# Patient Record
Sex: MALE | Race: WHITE | Employment: OTHER | URBAN - METROPOLITAN AREA
[De-identification: names, ages, dates, MRNs, and addresses within clinical notes are randomized per-mention and may not be internally consistent; named-entity substitution may affect disease eponyms.]

---

## 2016-12-30 RX ORDER — DIPHENHYDRAMINE HYDROCHLORIDE 50 MG/ML
50 INJECTION, SOLUTION INTRAMUSCULAR; INTRAVENOUS AS NEEDED
Status: CANCELLED
Start: 2017-01-02

## 2016-12-30 RX ORDER — FUROSEMIDE 10 MG/ML
20 INJECTION INTRAMUSCULAR; INTRAVENOUS AS NEEDED
Status: CANCELLED
Start: 2017-01-02

## 2016-12-30 RX ORDER — HYDROCORTISONE SODIUM SUCCINATE 100 MG/2ML
100 INJECTION, POWDER, FOR SOLUTION INTRAMUSCULAR; INTRAVENOUS AS NEEDED
Status: CANCELLED | OUTPATIENT
Start: 2017-01-02

## 2016-12-30 RX ORDER — ACETAMINOPHEN 325 MG/1
650 TABLET ORAL AS NEEDED
Status: CANCELLED
Start: 2017-01-02

## 2016-12-30 RX ORDER — HEPARIN 100 UNIT/ML
300-500 SYRINGE INTRAVENOUS AS NEEDED
Status: CANCELLED
Start: 2017-01-02

## 2016-12-30 RX ORDER — EPINEPHRINE 1 MG/ML
0.3 INJECTION, SOLUTION, CONCENTRATE INTRAVENOUS AS NEEDED
Status: CANCELLED | OUTPATIENT
Start: 2017-01-02

## 2016-12-30 RX ORDER — HEPARIN SODIUM 1000 [USP'U]/ML
2000 INJECTION, SOLUTION INTRAVENOUS; SUBCUTANEOUS AS NEEDED
Status: CANCELLED
Start: 2017-01-02

## 2016-12-30 RX ORDER — ALBUTEROL SULFATE 0.83 MG/ML
2.5 SOLUTION RESPIRATORY (INHALATION) AS NEEDED
Status: CANCELLED
Start: 2017-01-02

## 2017-01-02 ENCOUNTER — HOSPITAL ENCOUNTER (OUTPATIENT)
Dept: INFUSION THERAPY | Age: 62
Discharge: HOME | End: 2017-01-02
Payer: COMMERCIAL

## 2017-01-02 VITALS
WEIGHT: 250.8 LBS | SYSTOLIC BLOOD PRESSURE: 130 MMHG | RESPIRATION RATE: 18 BRPM | DIASTOLIC BLOOD PRESSURE: 67 MMHG | BODY MASS INDEX: 37.04 KG/M2 | HEART RATE: 107 BPM | OXYGEN SATURATION: 96 % | TEMPERATURE: 97.6 F

## 2017-01-02 DIAGNOSIS — C32.1 SQUAMOUS CELL CARCINOMA OF EPIGLOTTIS (HCC): ICD-10-CM

## 2017-01-02 LAB
ALBUMIN SERPL BCP-MCNC: 3.5 G/DL (ref 3.2–4.6)
ALBUMIN/GLOB SERPL: 0.9 {RATIO} (ref 1.2–3.5)
ALP SERPL-CCNC: 82 U/L (ref 50–136)
ALT SERPL-CCNC: 23 U/L (ref 12–65)
ANION GAP BLD CALC-SCNC: 9 MMOL/L (ref 7–16)
AST SERPL W P-5'-P-CCNC: 13 U/L (ref 15–37)
BASOPHILS # BLD AUTO: 0.1 K/UL (ref 0–0.2)
BASOPHILS # BLD: 1 % (ref 0–2)
BILIRUB SERPL-MCNC: 0.5 MG/DL (ref 0.2–1.1)
BUN SERPL-MCNC: 16 MG/DL (ref 8–23)
CALCIUM SERPL-MCNC: 8.8 MG/DL (ref 8.3–10.4)
CHLORIDE SERPL-SCNC: 98 MMOL/L (ref 98–107)
CO2 SERPL-SCNC: 29 MMOL/L (ref 23–32)
CREAT SERPL-MCNC: 1.48 MG/DL (ref 0.8–1.5)
CREAT SERPL-MCNC: 1.65 MG/DL (ref 0.8–1.5)
DIFFERENTIAL METHOD BLD: ABNORMAL
EOSINOPHIL # BLD: 0.8 K/UL (ref 0–0.8)
EOSINOPHIL NFR BLD: 7 % (ref 0.5–7.8)
ERYTHROCYTE [DISTWIDTH] IN BLOOD BY AUTOMATED COUNT: 12.9 % (ref 11.9–14.6)
GLOBULIN SER CALC-MCNC: 3.8 G/DL (ref 2.3–3.5)
GLUCOSE SERPL-MCNC: 326 MG/DL (ref 65–100)
HCT VFR BLD AUTO: 33.9 % (ref 41.1–50.3)
HGB BLD-MCNC: 11.4 G/DL (ref 13.6–17.2)
LYMPHOCYTES # BLD AUTO: 16 % (ref 13–44)
LYMPHOCYTES # BLD: 1.7 K/UL (ref 0.5–4.6)
MAGNESIUM SERPL-MCNC: 1.8 MG/DL (ref 1.8–2.4)
MCH RBC QN AUTO: 27.6 PG (ref 26.1–32.9)
MCHC RBC AUTO-ENTMCNC: 33.6 G/DL (ref 31.4–35)
MCV RBC AUTO: 82.1 FL (ref 79.6–97.8)
MONOCYTES # BLD: 0.5 K/UL (ref 0.1–1.3)
MONOCYTES NFR BLD AUTO: 4 % (ref 4–12)
NEUTS SEG # BLD: 7.7 K/UL (ref 1.7–8.2)
NEUTS SEG NFR BLD AUTO: 72 % (ref 43–78)
NRBC # BLD: 0 K/UL (ref 0–0.2)
PLATELET # BLD AUTO: 272 K/UL (ref 150–450)
PMV BLD AUTO: 8.9 FL (ref 10.8–14.1)
POTASSIUM SERPL-SCNC: 3.8 MMOL/L (ref 3.5–5.1)
PROT SERPL-MCNC: 7.3 G/DL (ref 6.3–8.2)
RBC # BLD AUTO: 4.13 M/UL (ref 4.23–5.67)
SODIUM SERPL-SCNC: 136 MMOL/L (ref 136–145)
WBC # BLD AUTO: 10.7 K/UL (ref 4.3–11.1)

## 2017-01-02 PROCEDURE — 74011000258 HC RX REV CODE- 258: Performed by: INTERNAL MEDICINE

## 2017-01-02 PROCEDURE — 77030003560 HC NDL HUBR BARD -A

## 2017-01-02 PROCEDURE — 74011250636 HC RX REV CODE- 250/636: Performed by: INTERNAL MEDICINE

## 2017-01-02 PROCEDURE — 74011250636 HC RX REV CODE- 250/636

## 2017-01-02 PROCEDURE — 96376 TX/PRO/DX INJ SAME DRUG ADON: CPT

## 2017-01-02 PROCEDURE — 96375 TX/PRO/DX INJ NEW DRUG ADDON: CPT

## 2017-01-02 PROCEDURE — 96366 THER/PROPH/DIAG IV INF ADDON: CPT

## 2017-01-02 PROCEDURE — 96415 CHEMO IV INFUSION ADDL HR: CPT

## 2017-01-02 PROCEDURE — 96413 CHEMO IV INFUSION 1 HR: CPT

## 2017-01-02 PROCEDURE — 80053 COMPREHEN METABOLIC PANEL: CPT | Performed by: INTERNAL MEDICINE

## 2017-01-02 PROCEDURE — 96417 CHEMO IV INFUS EACH ADDL SEQ: CPT

## 2017-01-02 PROCEDURE — 85025 COMPLETE CBC W/AUTO DIFF WBC: CPT | Performed by: INTERNAL MEDICINE

## 2017-01-02 PROCEDURE — 96361 HYDRATE IV INFUSION ADD-ON: CPT

## 2017-01-02 PROCEDURE — 96367 TX/PROPH/DG ADDL SEQ IV INF: CPT

## 2017-01-02 PROCEDURE — 83735 ASSAY OF MAGNESIUM: CPT | Performed by: INTERNAL MEDICINE

## 2017-01-02 RX ORDER — SODIUM CHLORIDE 0.9 % (FLUSH) 0.9 %
10 SYRINGE (ML) INJECTION AS NEEDED
Status: ACTIVE | OUTPATIENT
Start: 2017-01-02 | End: 2017-01-02

## 2017-01-02 RX ORDER — DEXAMETHASONE SODIUM PHOSPHATE 100 MG/10ML
10 INJECTION INTRAMUSCULAR; INTRAVENOUS ONCE
Status: COMPLETED | OUTPATIENT
Start: 2017-01-02 | End: 2017-01-02

## 2017-01-02 RX ORDER — SODIUM CHLORIDE 9 MG/ML
500 INJECTION, SOLUTION INTRAVENOUS ONCE
Status: COMPLETED | OUTPATIENT
Start: 2017-01-02 | End: 2017-01-02

## 2017-01-02 RX ORDER — ONDANSETRON 2 MG/ML
8 INJECTION INTRAMUSCULAR; INTRAVENOUS AS NEEDED
Status: DISPENSED | OUTPATIENT
Start: 2017-01-02 | End: 2017-01-03

## 2017-01-02 RX ORDER — ONDANSETRON 2 MG/ML
8 INJECTION INTRAMUSCULAR; INTRAVENOUS ONCE
Status: COMPLETED | OUTPATIENT
Start: 2017-01-02 | End: 2017-01-02

## 2017-01-02 RX ADMIN — SODIUM CHLORIDE 500 ML: 900 INJECTION, SOLUTION INTRAVENOUS at 09:00

## 2017-01-02 RX ADMIN — POTASSIUM CHLORIDE: 2 INJECTION, SOLUTION, CONCENTRATE INTRAVENOUS at 09:42

## 2017-01-02 RX ADMIN — DEXAMETHASONE SODIUM PHOSPHATE 10 MG: 10 INJECTION INTRAMUSCULAR; INTRAVENOUS at 11:10

## 2017-01-02 RX ADMIN — SODIUM CHLORIDE 150 MG: 900 INJECTION, SOLUTION INTRAVENOUS at 11:22

## 2017-01-02 RX ADMIN — Medication 10 ML: at 16:15

## 2017-01-02 RX ADMIN — ONDANSETRON 8 MG: 2 INJECTION INTRAMUSCULAR; INTRAVENOUS at 16:05

## 2017-01-02 RX ADMIN — POTASSIUM CHLORIDE: 2 INJECTION, SOLUTION, CONCENTRATE INTRAVENOUS at 15:15

## 2017-01-02 RX ADMIN — DOCETAXEL 176 MG: 80 INJECTION, SOLUTION, CONCENTRATE INTRAVENOUS at 11:51

## 2017-01-02 RX ADMIN — ONDANSETRON 8 MG: 2 INJECTION INTRAMUSCULAR; INTRAVENOUS at 11:07

## 2017-01-02 RX ADMIN — CISPLATIN 176 MG: 1 INJECTION, SOLUTION INTRAVENOUS at 13:00

## 2017-01-02 NOTE — PROGRESS NOTES
Pt. Discharged ambulatory accompanied by family. Tolerated chemotherapy well. No distress noted. To return to [facility] on 1/7/16.

## 2017-01-03 ENCOUNTER — HOSPITAL ENCOUNTER (OUTPATIENT)
Dept: PHYSICAL THERAPY | Age: 62
Discharge: HOME | End: 2017-01-03
Attending: INTERNAL MEDICINE
Payer: COMMERCIAL

## 2017-01-03 DIAGNOSIS — C32.1 SQUAMOUS CELL CARCINOMA OF EPIGLOTTIS (HCC): ICD-10-CM

## 2017-01-03 PROCEDURE — 92610 EVALUATE SWALLOWING FUNCTION: CPT | Performed by: SPEECH-LANGUAGE PATHOLOGIST

## 2017-01-03 NOTE — PROGRESS NOTES
Fabiana Madden  : 1955 [facility] [address], [address] [place] [address],[address] [facility], [place] [address].  [phone]   [phone]         OUTPATIENT SPEECH LANGUAGE PATHOLOGY: Initial Assessment  ICD-10: Treatment Diagnosis: Dysphagia R13.19  REFERRING PHYSICIAN: Torsten Jauregui MD MD Orders: Dysphagia, Evaluate and Treat  PAST MEDICAL HISTORY:   Mr. Damian Johnson is a 62 y.o. male who  has a past medical history of GERD (gastroesophageal reflux disease); Gout; Hypertension; Morbid obesity ([facility] [address] Squamous cell carcinoma of epiglottis (HCC) (2016); and Type 2 diabetes mellitus ([facility] [address] [facility] He also  has a past surgical history that includes colonoscopy (2016) and other surgical (Left). 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 ASSESSMENT:  Based on the objective data described below, the patient presents with no overt clinical s/sx of Dysphagia. Patient is newly diagnosed SCC of the Epiglottis. Patient is scheduled to began concurrent chemo/XRT. Patient with complaint of some (+) s/sx of Dysphagia especially with particulate textures (i.e. Hamburger meat, toast etc). Currently he is consuming a regular diet with thin liquids. He has not gone for his PEG placement but states that he plans to have PEG inserted. Oral motor exam was WLF's. Patient has natural dentition with good oral hygiene. Ulcers observed along posterior tongue base however patient denies pain or burning. Labial and lingual function is WFL's. Voice (+) for stidor. Patient given p.o trials of thin liquids via cup, puree, mechanical soft, mixed consistencies and solids. No overt clinical s/sx of aspiration observed. Patient would benefit from a MBSS to establish baseline swallow function.  SLP recommends dysphagia therapy at 1x a week to preserve the integrity of his swallow as well as make appropriate diet downgrades if Dysphagia worsens. Patient and wife in agreement with the above aforementioned. Patient will benefit from skilled intervention to address the below impairments. ?????? ? ? This section established at most recent assessment??????????  PROBLEM LIST (Impairments causing functional limitations):  1. Dysphagia  GOALS: (Goals have been discussed and agreed upon with patient.)  SHORT-TERM FUNCTIONAL GOALS: Time Frame: 12 weeks  Patient will tolerate various textures without overt clinical s/sx of aspiration at 100%. Completed laryngeal exercises independently at 100% accuracy. DISCHARGE GOALS: Time Frame: 12 weels  1. Patient will tolerate least restrictive diet without overt clinical s/sx of aspiration for a safe and adequate swallow function at 100%. REHABILITATION POTENTIAL FOR STATED GOALS: GoodPLAN OF CARE:  Patient will benefit from skilled intervention to address the following impairments. RECOMMENDATIONS AND PLANNED INTERVENTIONS (Benefits and precautions of therapy have been discussed with the patient.):  · continue prescribed diet as tolerated  MEDICATIONS:  · With liquid as tolerated  COMPENSATORY STRATEGIES/MODIFICATIONS INCLUDING:  · Small sips and bites  OTHER RECOMMENDATIONS (including follow up treatment recommendations):   · Laryngeal exercises  · Patient education  RECOMMENDED DIET MODIFICATIONS DISCUSSED WITH:  · Patient  TREATMENT PLAN EFFECTIVE DATES: 1/3/17 TO 4/3/17  FREQUENCY/DURATION: Continue to follow patient 1 time a week for 12 weeks to address above goals. Regarding Santos Landin's therapy, I certify that the treatment plan above will be carried out by a therapist or under their direction. Thank you for this referral,  TERESA Szymanski Ed CCC-SLP                  Referring Physician Signature: Chantelle Nogueira MD    Date      SUBJECTIVE:  Cooperative  Present Symptoms: None     Current Medications: on file in patient's chart   Date Last Reviewed: 1/3/17  Current Dietary Status:  Regular/thin      History of reflux:  YES    Reflux medication:unknown  Social History/Home Situation:  lives with his wife. Work/Activity History: unknown    OBJECTIVE:  Objective Measure: Tool Used: National Outcomes Measurement System: Functional Communication Measures: SWALLOWING  Score:  Initial: 7 Most Recent: X (Date: -- )   Interpretation of Tool: This measure describes the change in functional communication status subsequent to speech-language pathology treatment of patients with dysphagia.  o Level 1:  Individual is not able to swallow anything safely by mouth. All nutrition and hydration is received through non-oral means (e.g., nasogastric tube, PEG). o Level 2: Individual is not able to swallow safely by mouth for nutrition and hydration, but may take some consistency with consistent maximal cues in therapy only. Alternative method of feeding required. o Level 3:  Alternative method of feeding required as individual takes less than 50% of nutrition and hydration by mouth, and/or swallowing is safe with consistent use of moderate cues to use compensatory strategies and/or requires maximum diet restriction. o Level 4:  Swallowing is safe, but usually requires moderate cues to use compensatory strategies, and/or the individual has moderate diet restrictions and/or still requires tube feeding and/or oral supplements. o Level 5:  Swallowing is safe with minimal diet restriction and/or occasionally requires minimal cueing to use compensatory strategies. The individual may occasionally self-cue. All nutrition and hydration needs are met by mouth at mealtime. o Level 6:  Swallowing is safe, and the individual eats and drinks independently and may rarely require minimal cueing. The individual usually self-cues when difficulty occurs.  May need to avoid specific food items (e.g., popcorn and nuts), or require additional time (due to dysphagia). o Level 7: The individuals ability to eat independently is not limited by swallow function. Swallowing would be safe and efficient for all consistencies. Compensatory strategies are effectively used when needed. Score Level 7 Level 6 Level 5 Level 4 Level 3 Level 2 Level 1   Modifier CH CI CJ CK CL CM CN   ? Swallowing:     - CURRENT STATUS: CH - 0% impaired, limited or restricted    - GOAL STATUS:  CH - 0% impaired, limited or restricted    - D/C STATUS:  ---------------To be determined---------------    Respiratory Status:      Room Air  CXR Results: N/A  MRI/CT Results:N/A  Oral Motor Structure/Speech:  Oral-Motor Structure/Motor Speech  Labial: No impairment  Dentition: Natural  Oral Hygiene: good  Lingual: No impairment  Velum: No impairment  Mandible: No impairment    Cognitive and Communication Status:  Neurologic State: Alert  Orientation Level: Oriented X4  Cognition: Appropriate decision making  Perception: Appears intact  Perseveration: No perseveration noted  Safety/Judgement: Awareness of environment    BEDSIDE SWALLOW EVALUATION  Oral Assessment:  Oral Assessment  Labial: No impairment  Dentition: Natural  Oral Hygiene: good  Lingual: No impairment  Velum: No impairment  Mandible: No impairment  Gag Reflex: Absent  P.O. Trials:  Patient Position: upright at 90 degrees    The patient was given teaspoon to tablespoon   amounts of the following:   Consistency Presented: Mixed consistency; Thin liquid; Solid;Mechanical soft;Puree  How Presented: Self-fed/presented; Successive swallows;Cup/sip;Spoon    ORAL PHASE:  Bolus Acceptance: No impairment  Bolus Formation/Control: No impairment  Propulsion: No impairment     Oral Residue: None    PHARYNGEAL PHASE:     Laryngeal Elevation: Functional  Aspiration Signs/Symptoms: None  Vocal Quality: No impairment  Cues for Modifications: None  Effective Modifications: None     Pharyngeal Phase Characteristics: No impairment, issues, or problems     OTHER OBSERVATIONS:  Rate/bite size: WNL   Endurance:  Questionable   Coments:      TREATMENT:    (In addition to Assessment/Re-Assessment sessions the following treatments were rendered)  Assessment/Reassessment only, no treatment provided today      LARYNGEAL / PHARYNGEAL EXERCISES:                                                                                                                                     __________________________________________________________________________________________________  Treatment Assessment:  No current s/sx of Dysphagia. Progression/Medical Necessity:   · Patient is expected to demonstrate progress in swallow strength, swallow timeliness, swallow function, diet tolerance and swallow safety to improve swallow safety and decrease aspiration risk. Compliance with Program/Exercises: Will assess as treatment progresses. Recommendations/Intent for next treatment session: \"Treatment next visit will focus on laryngeal exercises\". Total Treatment Duration:  Time In: 1400  Time Out: [address]

## 2017-01-04 ENCOUNTER — HOSPITAL ENCOUNTER (OUTPATIENT)
Dept: RADIATION ONCOLOGY | Age: 62
Discharge: HOME | End: 2017-01-04
Payer: COMMERCIAL

## 2017-01-04 PROCEDURE — 77301 RADIOTHERAPY DOSE PLAN IMRT: CPT

## 2017-01-04 PROCEDURE — 77300 RADIATION THERAPY DOSE PLAN: CPT

## 2017-01-04 PROCEDURE — 77399 UNLISTED PX MED RADJ PHYSICS: CPT

## 2017-01-05 ENCOUNTER — HOSPITAL ENCOUNTER (OUTPATIENT)
Dept: RADIATION ONCOLOGY | Age: 62
Discharge: HOME | End: 2017-01-05
Payer: COMMERCIAL

## 2017-01-05 PROCEDURE — 77338 DESIGN MLC DEVICE FOR IMRT: CPT

## 2017-01-07 ENCOUNTER — HOSPITAL ENCOUNTER (OUTPATIENT)
Dept: INFUSION THERAPY | Age: 62
Discharge: HOME | End: 2017-01-07
Payer: COMMERCIAL

## 2017-01-07 VITALS
SYSTOLIC BLOOD PRESSURE: 120 MMHG | HEART RATE: 100 BPM | TEMPERATURE: 98 F | RESPIRATION RATE: 16 BRPM | OXYGEN SATURATION: 97 % | DIASTOLIC BLOOD PRESSURE: 62 MMHG

## 2017-01-07 PROCEDURE — 74011250636 HC RX REV CODE- 250/636: Performed by: INTERNAL MEDICINE

## 2017-01-07 PROCEDURE — 96360 HYDRATION IV INFUSION INIT: CPT

## 2017-01-07 RX ORDER — SODIUM CHLORIDE 0.9 % (FLUSH) 0.9 %
10-40 SYRINGE (ML) INJECTION AS NEEDED
Status: DISCONTINUED | OUTPATIENT
Start: 2017-01-07 | End: 2017-01-11 | Stop reason: HOSPADM

## 2017-01-07 RX ORDER — SODIUM CHLORIDE 9 MG/ML
999 INJECTION, SOLUTION INTRAVENOUS ONCE
Status: COMPLETED | OUTPATIENT
Start: 2017-01-07 | End: 2017-01-07

## 2017-01-07 RX ORDER — HEPARIN 100 UNIT/ML
500 SYRINGE INTRAVENOUS AS NEEDED
Status: DISPENSED | OUTPATIENT
Start: 2017-01-07 | End: 2017-01-07

## 2017-01-07 RX ADMIN — SODIUM CHLORIDE, PRESERVATIVE FREE 500 UNITS: 5 INJECTION INTRAVENOUS at 16:15

## 2017-01-07 RX ADMIN — SODIUM CHLORIDE 999 ML/HR: 900 INJECTION, SOLUTION INTRAVENOUS at 15:10

## 2017-01-07 RX ADMIN — Medication 10 ML: at 16:15

## 2017-01-07 NOTE — PROGRESS NOTES
Arrived to the [facility]. Assessment completed. Patient tolerated IV fluids well today. One liter normal saline was given due to orthostatic hypotension today. Patient's fluorouracil pump was disconnected from his port today, without difficulty. The orthostatic hypotension was resolved, upon discharge. Any issues or concerns during appointment: complains of nausea. Encouraged him to continue taking his ant-nausea medications at home routinely, at least for next 2 to 3 days, at least.  He and wife verbalizes understanding. Patient aware of next infusion appointment on 1/10/17 (date) at 200 with lab and 18 with [facility]. Discharged ambulatory, with wife. Patient instructed to call Dr. Odell Ozuna office immediately for any problems or concerns. He verbalizes understanding.

## 2017-01-10 ENCOUNTER — HOSPITAL ENCOUNTER (OUTPATIENT)
Dept: LAB | Age: 62
Discharge: HOME | End: 2017-01-10
Payer: COMMERCIAL

## 2017-01-10 ENCOUNTER — HOSPITAL ENCOUNTER (OUTPATIENT)
Dept: INFUSION THERAPY | Age: 62
Discharge: HOME | End: 2017-01-10
Payer: COMMERCIAL

## 2017-01-10 DIAGNOSIS — R11.15 NON-INTRACTABLE CYCLICAL VOMITING WITH NAUSEA: ICD-10-CM

## 2017-01-10 DIAGNOSIS — C32.1 SQUAMOUS CELL CARCINOMA OF EPIGLOTTIS (HCC): ICD-10-CM

## 2017-01-10 DIAGNOSIS — N28.9 RENAL INSUFFICIENCY: ICD-10-CM

## 2017-01-10 LAB
ALBUMIN SERPL BCP-MCNC: 3.2 G/DL (ref 3.2–4.6)
ALBUMIN/GLOB SERPL: 0.9 {RATIO} (ref 1.2–3.5)
ALP SERPL-CCNC: 62 U/L (ref 50–136)
ALT SERPL-CCNC: 26 U/L (ref 12–65)
ANION GAP BLD CALC-SCNC: 7 MMOL/L (ref 7–16)
AST SERPL W P-5'-P-CCNC: 18 U/L (ref 15–37)
BASOPHILS # BLD AUTO: 0 K/UL (ref 0–0.2)
BASOPHILS # BLD: 1 % (ref 0–2)
BILIRUB SERPL-MCNC: 0.5 MG/DL (ref 0.2–1.1)
BUN SERPL-MCNC: 21 MG/DL (ref 8–23)
CALCIUM SERPL-MCNC: 7.9 MG/DL (ref 8.3–10.4)
CHLORIDE SERPL-SCNC: 96 MMOL/L (ref 98–107)
CO2 SERPL-SCNC: 32 MMOL/L (ref 23–32)
CREAT SERPL-MCNC: 1.99 MG/DL (ref 0.8–1.5)
DIFFERENTIAL METHOD BLD: ABNORMAL
EOSINOPHIL # BLD: 0.1 K/UL (ref 0–0.8)
EOSINOPHIL NFR BLD: 5 % (ref 0.5–7.8)
ERYTHROCYTE [DISTWIDTH] IN BLOOD BY AUTOMATED COUNT: 12.1 % (ref 11.9–14.6)
GLOBULIN SER CALC-MCNC: 3.6 G/DL (ref 2.3–3.5)
GLUCOSE SERPL-MCNC: 175 MG/DL (ref 65–100)
HCT VFR BLD AUTO: 26.2 % (ref 41.1–50.3)
HGB BLD-MCNC: 9 G/DL (ref 13.6–17.2)
LYMPHOCYTES # BLD AUTO: 48 % (ref 13–44)
LYMPHOCYTES # BLD: 0.9 K/UL (ref 0.5–4.6)
MCH RBC QN AUTO: 27.6 PG (ref 26.1–32.9)
MCHC RBC AUTO-ENTMCNC: 34.4 G/DL (ref 31.4–35)
MCV RBC AUTO: 80.4 FL (ref 79.6–97.8)
MONOCYTES # BLD: 0.2 K/UL (ref 0.1–1.3)
MONOCYTES NFR BLD AUTO: 9 % (ref 4–12)
NEUTS SEG # BLD: 0.7 K/UL (ref 1.7–8.2)
NEUTS SEG NFR BLD AUTO: 37 % (ref 43–78)
NRBC # BLD: 0 K/UL (ref 0–0.2)
PLATELET # BLD AUTO: 114 K/UL (ref 150–450)
PMV BLD AUTO: 8.8 FL (ref 10.8–14.1)
POTASSIUM SERPL-SCNC: 3.6 MMOL/L (ref 3.5–5.1)
PROT SERPL-MCNC: 6.8 G/DL (ref 6.3–8.2)
RBC # BLD AUTO: 3.26 M/UL (ref 4.23–5.67)
SODIUM SERPL-SCNC: 135 MMOL/L (ref 136–145)
WBC # BLD AUTO: 1.9 K/UL (ref 4.3–11.1)

## 2017-01-10 PROCEDURE — 80053 COMPREHEN METABOLIC PANEL: CPT | Performed by: NURSE PRACTITIONER

## 2017-01-10 PROCEDURE — 74011250636 HC RX REV CODE- 250/636: Performed by: NURSE PRACTITIONER

## 2017-01-10 PROCEDURE — 96361 HYDRATE IV INFUSION ADD-ON: CPT

## 2017-01-10 PROCEDURE — 85025 COMPLETE CBC W/AUTO DIFF WBC: CPT | Performed by: NURSE PRACTITIONER

## 2017-01-10 PROCEDURE — 96375 TX/PRO/DX INJ NEW DRUG ADDON: CPT

## 2017-01-10 PROCEDURE — 96374 THER/PROPH/DIAG INJ IV PUSH: CPT

## 2017-01-10 PROCEDURE — 77030003560 HC NDL HUBR BARD -A

## 2017-01-10 PROCEDURE — 74011250636 HC RX REV CODE- 250/636: Performed by: INTERNAL MEDICINE

## 2017-01-10 RX ORDER — HEPARIN 100 UNIT/ML
300-500 SYRINGE INTRAVENOUS AS NEEDED
Status: DISPENSED | OUTPATIENT
Start: 2017-01-10 | End: 2017-01-11

## 2017-01-10 RX ORDER — SODIUM CHLORIDE 0.9 % (FLUSH) 0.9 %
10-40 SYRINGE (ML) INJECTION AS NEEDED
Status: DISCONTINUED | OUTPATIENT
Start: 2017-01-10 | End: 2017-01-14 | Stop reason: HOSPADM

## 2017-01-10 RX ORDER — DEXAMETHASONE SODIUM PHOSPHATE 4 MG/ML
1 INJECTION, SOLUTION INTRA-ARTICULAR; INTRALESIONAL; INTRAMUSCULAR; INTRAVENOUS; SOFT TISSUE ONCE
Status: COMPLETED | OUTPATIENT
Start: 2017-01-10 | End: 2017-01-10

## 2017-01-10 RX ORDER — LORAZEPAM 2 MG/ML
0.5 INJECTION INTRAMUSCULAR ONCE
Status: COMPLETED | OUTPATIENT
Start: 2017-01-10 | End: 2017-01-10

## 2017-01-10 RX ADMIN — SODIUM CHLORIDE 1000 ML: 900 INJECTION, SOLUTION INTRAVENOUS at 14:49

## 2017-01-10 RX ADMIN — DEXAMETHASONE SODIUM PHOSPHATE 1 MG: 4 INJECTION, SOLUTION INTRAMUSCULAR; INTRAVENOUS at 14:48

## 2017-01-10 RX ADMIN — LORAZEPAM 0.5 MG: 2 INJECTION INTRAMUSCULAR; INTRAVENOUS at 14:47

## 2017-01-10 RX ADMIN — Medication 500 UNITS: at 15:55

## 2017-01-10 RX ADMIN — Medication 10 ML: at 14:46

## 2017-01-10 RX ADMIN — Medication 10 ML: at 15:55

## 2017-01-10 NOTE — PROGRESS NOTES
Arrived to the [facility] of [facility]. 1 liter NS, ativan, and decadron completed. Patient tolerated well. Any issues or concerns during appointment: none. Patient aware of next infusion appointment on 1/23/17 at 8am.  Discharged ambulatory.

## 2017-01-11 ENCOUNTER — APPOINTMENT (OUTPATIENT)
Dept: GENERAL RADIOLOGY | Age: 62
DRG: 809 | End: 2017-01-11
Attending: EMERGENCY MEDICINE
Payer: COMMERCIAL

## 2017-01-11 ENCOUNTER — HOSPITAL ENCOUNTER (INPATIENT)
Age: 62
LOS: 7 days | Discharge: HOME | DRG: 809 | End: 2017-01-19
Attending: EMERGENCY MEDICINE | Admitting: INTERNAL MEDICINE
Payer: COMMERCIAL

## 2017-01-11 DIAGNOSIS — D70.9 FEBRILE NEUTROPENIA (HCC): Primary | ICD-10-CM

## 2017-01-11 DIAGNOSIS — R50.81 FEBRILE NEUTROPENIA (HCC): Primary | ICD-10-CM

## 2017-01-11 DIAGNOSIS — E86.0 DEHYDRATION: ICD-10-CM

## 2017-01-11 LAB
BASOPHILS # BLD AUTO: 0 K/UL (ref 0–0.2)
BASOPHILS # BLD: 0 % (ref 0–2)
DIFFERENTIAL METHOD BLD: ABNORMAL
EOSINOPHIL # BLD: 0 K/UL (ref 0–0.8)
EOSINOPHIL NFR BLD: 2 % (ref 0.5–7.8)
ERYTHROCYTE [DISTWIDTH] IN BLOOD BY AUTOMATED COUNT: 12.6 % (ref 11.9–14.6)
HCT VFR BLD AUTO: 24.8 % (ref 41.1–50.3)
HGB BLD-MCNC: 8.4 G/DL (ref 13.6–17.2)
IMM GRANULOCYTES # BLD: 0 K/UL (ref 0–0.5)
IMM GRANULOCYTES NFR BLD AUTO: 0 % (ref 0–5)
LACTATE BLD-SCNC: 0.9 MMOL/L (ref 0.5–1.9)
LYMPHOCYTES # BLD AUTO: 40 % (ref 13–44)
LYMPHOCYTES # BLD: 0.7 K/UL (ref 0.5–4.6)
MCH RBC QN AUTO: 27.7 PG (ref 26.1–32.9)
MCHC RBC AUTO-ENTMCNC: 33.9 G/DL (ref 31.4–35)
MCV RBC AUTO: 81.8 FL (ref 79.6–97.8)
MONOCYTES # BLD: 0.4 K/UL (ref 0.1–1.3)
MONOCYTES NFR BLD AUTO: 23 % (ref 4–12)
NEUTS SEG # BLD: 0.6 K/UL (ref 1.7–8.2)
NEUTS SEG NFR BLD AUTO: 35 % (ref 43–78)
PLATELET # BLD AUTO: 114 K/UL (ref 150–450)
PMV BLD AUTO: 8.8 FL (ref 10.8–14.1)
RBC # BLD AUTO: 3.03 M/UL (ref 4.23–5.67)
WBC # BLD AUTO: 1.7 K/UL (ref 4.3–11.1)

## 2017-01-11 PROCEDURE — 87040 BLOOD CULTURE FOR BACTERIA: CPT | Performed by: EMERGENCY MEDICINE

## 2017-01-11 PROCEDURE — 96365 THER/PROPH/DIAG IV INF INIT: CPT | Performed by: EMERGENCY MEDICINE

## 2017-01-11 PROCEDURE — 85025 COMPLETE CBC W/AUTO DIFF WBC: CPT | Performed by: EMERGENCY MEDICINE

## 2017-01-11 PROCEDURE — 84145 PROCALCITONIN (PCT): CPT | Performed by: EMERGENCY MEDICINE

## 2017-01-11 PROCEDURE — 80053 COMPREHEN METABOLIC PANEL: CPT | Performed by: EMERGENCY MEDICINE

## 2017-01-11 PROCEDURE — 83605 ASSAY OF LACTIC ACID: CPT

## 2017-01-11 PROCEDURE — 74011250636 HC RX REV CODE- 250/636: Performed by: EMERGENCY MEDICINE

## 2017-01-11 PROCEDURE — 93005 ELECTROCARDIOGRAM TRACING: CPT | Performed by: EMERGENCY MEDICINE

## 2017-01-11 PROCEDURE — 96367 TX/PROPH/DG ADDL SEQ IV INF: CPT | Performed by: EMERGENCY MEDICINE

## 2017-01-11 PROCEDURE — 94760 N-INVAS EAR/PLS OXIMETRY 1: CPT | Performed by: EMERGENCY MEDICINE

## 2017-01-11 PROCEDURE — 99285 EMERGENCY DEPT VISIT HI MDM: CPT | Performed by: EMERGENCY MEDICINE

## 2017-01-11 PROCEDURE — 71010 XR CHEST PORT: CPT

## 2017-01-11 RX ORDER — LORAZEPAM 2 MG/ML
0.5 INJECTION INTRAMUSCULAR ONCE
Status: CANCELLED | OUTPATIENT
Start: 2017-01-12 | End: 2017-01-12

## 2017-01-11 RX ORDER — VANCOMYCIN 2 GRAM/500 ML IN 0.9 % SODIUM CHLORIDE INTRAVENOUS
2000
Status: COMPLETED | OUTPATIENT
Start: 2017-01-11 | End: 2017-01-12

## 2017-01-11 RX ORDER — SODIUM CHLORIDE 0.9 % (FLUSH) 0.9 %
5-10 SYRINGE (ML) INJECTION AS NEEDED
Status: DISCONTINUED | OUTPATIENT
Start: 2017-01-11 | End: 2017-01-12

## 2017-01-11 RX ORDER — DEXAMETHASONE SODIUM PHOSPHATE 4 MG/ML
1 INJECTION, SOLUTION INTRA-ARTICULAR; INTRALESIONAL; INTRAMUSCULAR; INTRAVENOUS; SOFT TISSUE ONCE
Status: CANCELLED | OUTPATIENT
Start: 2017-01-12 | End: 2017-01-12

## 2017-01-11 RX ADMIN — SODIUM CHLORIDE 3402 ML: 900 INJECTION, SOLUTION INTRAVENOUS at 23:30

## 2017-01-11 NOTE — IP AVS SNAPSHOT
Andre Clonts 
 
 
 [address] [facility] [address] [facility] 
[phone] Patient: Viky Jean MRN: [id number] FPN:2/83/4531 You are allergic to the following No active allergies Immunizations Administered for This Admission Name Date Influenza Vaccine (Quad) PF  Deferred () Recent Documentation Height Weight BMI Smoking Status 1.753 m 107.5 kg 35.01 kg/m2 Former Smoker Emergency Contacts Name Discharge Info Relation Home Work Mobile Meera Landin DISCHARGE CAREGIVER [3] Spouse [3] [phone] About your hospitalization You were admitted on:  January 12, 2017 You last received care in the:  [facility] 5B BONE M[employer] You were discharged on:  January 19, 2017 Unit phone number:  [phone] Why you were hospitalized Your primary diagnosis was:  Neutropenic Fever (Hcc) Your diagnoses also included:  Squamous Cell Carcinoma Of Epiglottis (Hcc), Pancytopenia (Hcc), Renal Insufficiency Providers Seen During Your Hospitalizations Provider Role Specialty Primary office phone Maurilio hCilders MD Attending Provider Emergency Medicine [phone] Rosa Benavidez DO Attending Provider Internal Medicine [phone] Carlene Hagen MD Attending Provider Internal Medicine [phone] Olivia Hopson MD Attending Provider Internal Medicine [phone] Your Primary Care Physician (PCP) Primary Care Physician Office Phone Office Fax [address] [facility] [phone] ** None ** Follow-up Information Follow up With Details Comments Contact Info Bertha Dixon MD   [address] [facility] 
[phone] Your Appointments Monday January 23, 2017  8:00 AM EST Chemo with Delia Mina. [address] [facility] [address] Peter Richardson [phone] [facility] [address]  
[phone] [address] Tuesday January 24, 2017 PERCUTANEOUS ENDOSCOPIC GASTROSTOMY TUBE INSERTION with Angel Prather MD, Micky Page MD  
[facility] ([address] [place] [address]) [address]  
[phone] Wednesday January 25, 2017  1:25 PM EST Office Visit with Ludin Torre, [address] ENT [address] [facility]) [address] [facility],4Th Floor [facility]  [address]  
[phone] Thursday January 26, 2017  9:00 AM EST  
SC ST RECURRING VISIT with [facility]  
[facility] ([address]) [address] [facility] [id number] [phone] Saturday January 28, 2017  5:00 PM EST Chemo with FLR5 INF2 [facility] ([address] [place] [address]) 5th Floor Infusion [address] Nichols Rafa [phone]  [facility] [address]  
[phone] [facility] Floor [phone] ext. [phone] [facility] Friday February 10, 2017 10:30 AM EST  
LAB with [address]  
[address] [facility] OUTREACH INSURANCE [facility]) [address] [place] [address]  
[phone] Friday February 10, 2017 11:00 AM EST PreChemo Follow Up with MD Tamica Allen [facility]) FAROOQ/ Dane Carlos [address] [facility] [address]  
[phone] Monday February 13, 2017 10:00 AM EST Chemo with NUR5  
ST. [address] ([facility]) [address] Nichols Rafa [phone] [facility] [address]  
[phone] [address] Current Discharge Medication List  
  
CONTINUE these medications which have NOT CHANGED Dose & Instructions Dispensing Information Comments Morning Noon Evening Bedtime allopurinol 300 mg tablet Commonly known as:  Shawanda Delacruz Your next dose is: Today, Tomorrow Other:  _________ Dose:  300 mg  
300 mg nightly. Indications: GOUT Refills:  0  
     
   
   
   
  
 amLODIPine 10 mg tablet Commonly known as:  Deadwood Citron Your next dose is: Today, Tomorrow Other:  _________ Dose:  10 mg Take 10 mg by mouth nightly. Take / use AM day of surgery  per anesthesia protocols. Indications: Hypertension Refills:  0  
     
   
   
   
  
 aspirin delayed-release 81 mg tablet Your next dose is: Today, Tomorrow Other:  _________ Dose:  81 mg Take 81 mg by mouth every morning. Take / use AM day of surgery  per anesthesia protocols. Indications: myocardial infarction prevention Refills:  0  
     
   
   
   
  
 glipiZIDE SR 10 mg CR tablet Commonly known as:  GLUCOTROL XL Your next dose is: Today, Tomorrow Other:  _________ Dose:  10 mg Take 10 mg by mouth two (2) times a day. Indications: type 2 diabetes mellitus Refills:  0  
     
   
   
   
  
 lidocaine-prilocaine topical cream  
Commonly known as:  EMLA Your next dose is: Today, Tomorrow Other:  _________ Apply  to affected area as needed. Apply 1/2 inch amount of cream to port site 90 minutes prior to needle access. Quantity:  30 g Refills:  0 LORazepam 1 mg tablet Commonly known as:  ATIVAN Your next dose is: Today, Tomorrow Other:  _________ Dose:  0.5-1 mg Take 0.5-1 Tabs by mouth every six (6) hours as needed for Anxiety. Max Daily Amount: 4 mg. Indications: CANCER CHEMOTHERAPY-INDUCED NAUSEA AND VOMITING Quantity:  90 Tab Refills:  0  
     
   
   
   
  
 losartan 50 mg tablet Commonly known as:  COZAAR Your next dose is: Today, Tomorrow Other:  _________  Dose:  50 mg  
 50 mg two (2) times a day. Indications: Hypertension Refills:  0  
     
   
   
   
  
 magic mouthwash Susp Commonly known as:  Dylan Darana Your next dose is: Today, Tomorrow Other:  _________ Dose:  10 mL Take 10 mL by mouth every four (4) hours. Quantity:  500 mL Refills:  3  
     
   
   
   
  
 metFORMIN 500 mg tablet Commonly known as:  GLUCOPHAGE Your next dose is: Today, Tomorrow Other:  _________ Dose:  500 mg Take 500 mg by mouth three (3) times daily (with meals). Indications: PREVENTION OF TYPE 2 DIABETES MELLITUS Refills:  0  
     
   
   
   
  
 omeprazole 20 mg capsule Commonly known as:  PRILOSEC Your next dose is: Today, Tomorrow Other:  _________ Dose:  20 mg Take 20 mg by mouth every morning. Take / use AM day of surgery  per anesthesia protocols. Indications: GASTROESOPHAGEAL REFLUX Refills:  0  
     
   
   
   
  
 ondansetron hcl 8 mg tablet Commonly known as:  Marjennifer Sampson Your next dose is: Today, Tomorrow Other:  _________ Dose:  8 mg Take 1 Tab by mouth every eight (8) hours as needed for Nausea. Quantity:  90 Tab Refills:  3  
     
   
   
   
  
 prochlorperazine 10 mg tablet Commonly known as:  COMPAZINE Your next dose is: Today, Tomorrow Other:  _________ Dose:  10 mg Take 1 Tab by mouth every six (6) hours as needed. Quantity:  120 Tab Refills:  3  
     
   
   
   
  
 triamterene-hydroCHLOROthiazide 37.5-25 mg per tablet Commonly known as:  Ros Alex Your next dose is: Today, Tomorrow Other:  _________ Dose:  1 Tab Take 1 Tab by mouth every morning. Indications: Edema, Hypertension Refills:  0 Discharge Instructions DISCHARGE SUMMARY from Nurse The following personal items are in your possession at time of discharge: Dental Appliances:  (with spouse) Visual Aid: Glasses, With patient Home Medications: None Jewelry: None Clothing: Bathrobe, At bedside, Footwear, Pants, Shirt, Shorts, [place], Undergarments Other Valuables: Eyeglasses, Cell Phone PATIENT INSTRUCTIONS: 
 
After general anesthesia or intravenous sedation, for 24 hours or while taking prescription Narcotics: · Limit your activities · Do not drive and operate hazardous machinery · Do not make important personal or business decisions · Do  not drink alcoholic beverages · If you have not urinated within 8 hours after discharge, please contact your surgeon on call. Report the following to your surgeon: 
· Excessive pain, swelling, redness or odor of or around the surgical area · Temperature over 100.5 · Nausea and vomiting lasting longer than 4 hours or if unable to take medications · Any signs of decreased circulation or nerve impairment to extremity: change in color, persistent  numbness, tingling, coldness or increase pain · Any questions What to do at Home: 
Recommended activity: Activity as tolerated, per MD instructions If you experience any of the following symptoms fever > 101, nausea, vomiting, pain, chest pain, shortness of breath please follow up with MD. 
 
 
*  Please give a list of your current medications to your Primary Care Provider. *  Please update this list whenever your medications are discontinued, doses are 
    changed, or new medications (including over-the-counter products) are added. *  Please carry medication information at all times in case of emergency situations. These are general instructions for a healthy lifestyle: No smoking/ No tobacco products/ Avoid exposure to second hand smoke Surgeon General's Warning:  Quitting smoking now greatly reduces serious risk to your health. Obesity, smoking, and sedentary lifestyle greatly increases your risk for illness A healthy diet, regular physical exercise & weight monitoring are important for maintaining a healthy lifestyle You may be retaining fluid if you have a history of heart failure or if you experience any of the following symptoms:  Weight gain of 3 pounds or more overnight or 5 pounds in a week, increased swelling in our hands or feet or shortness of breath while lying flat in bed. Please call your doctor as soon as you notice any of these symptoms; do not wait until your next office visit. Recognize signs and symptoms of STROKE: 
 
F-face looks uneven A-arms unable to move or move unevenly S-speech slurred or non-existent T-time-call 911 as soon as signs and symptoms begin-DO NOT go Back to bed or wait to see if you get better-TIME IS BRAIN. Warning Signs of HEART ATTACK Call 911 if you have these symptoms: 
? Chest discomfort. Most heart attacks involve discomfort in the center of the chest that lasts more than a few minutes, or that goes away and comes back. It can feel like uncomfortable pressure, squeezing, fullness, or pain. ? Discomfort in other areas of the upper body. Symptoms can include pain or discomfort in one or both arms, the back, neck, jaw, or stomach. ? Shortness of breath with or without chest discomfort. ? Other signs may include breaking out in a cold sweat, nausea, or lightheadedness. Don't wait more than five minutes to call [address]! Fast action can save your life. Calling 911 is almost always the fastest way to get lifesaving treatment. Emergency Medical Services staff can begin treatment when they arrive  up to an hour sooner than if someone gets to the hospital by car. The discharge information has been reviewed with the patient. The patient verbalized understanding. Discharge medications reviewed with the patient and appropriate educational materials and side effects teaching were provided. Learning About Fever What is a fever? A fever is a high body temperature. It's one way your body fights being sick. A fever shows that the body is responding to infection or other illnesses, both minor and severe. A fever is a symptom, not an illness by itself. A fever can be a sign that you are ill, but most fevers are not caused by a serious problem. You may have a fever with a minor illness, such as a cold. But sometimes a very serious infection may cause little or no fever. It is important to look at other symptoms, other conditions you have, and how you feel in general. In children, notice how they act and see what symptoms they complain of. What is a normal body temperature? A normal body temperature is about 98. 6ºF. Some people have a normal temperature that is a little higher or a little lower than this. Your temperature may be a little lower in the morning than it is later in the day. It may go up during hot weather or when you exercise, wear heavy clothes, or take a hot bath. Your temperature may also be different depending on how you take it. A temperature taken in the mouth (oral) or under the arm may be a little lower than your core temperature (rectal). What is a fever temperature? A core temperature of 100.4°F or above is considered a fever. What can cause a fever? A fever may be caused by: · Infections. This is the most common cause of a fever. Examples of infections that can cause a fever include the flu, a kidney infection, or pneumonia. · Some medicines. · Severe trauma or injury, such as a heart attack, stroke, heatstroke, or burns. · Other medical conditions, such as arthritis and some cancers. How can you treat a fever at home? · Ask your doctor if you can take an over-the-counter pain medicine, such as acetaminophen (Tylenol), ibuprofen (Advil, Motrin), or naproxen (Aleve). Be safe with medicines. Read and follow all instructions on the label. · To prevent dehydration, drink plenty of fluids. Choose water and other caffeine-free clear liquids until you feel better. If you have kidney, heart, or liver disease and have to limit fluids, talk with your doctor before you increase the amount of fluids you drink. Follow-up care is a key part of your treatment and safety. Be sure to make and go to all appointments, and call your doctor if you are having problems. It's also a good idea to know your test results and keep a list of the medicines you take. Where can you learn more? Go to http://maria c-jeff.info/. Enter Q279 in the search box to learn more about \"Learning About Fever. \" Current as of: May 27, 2016 Content Version: 11.1 © 9798-8924 [employer]. Care instructions adapted under license by [employer] (which disclaims liability or warranty for this information). If you have questions about a medical condition or this instruction, always ask your healthcare professional. [place] [address] any warranty or liability for your use of this information. Neutropenia: Care Instructions Your Care Instructions Neutropenia (say \"fsm-ytwj-GOM-nee-uh\") means that your blood has too few neutrophils. These are white blood cells that help protect the body from infection. They do this by killing bacteria. Neutropenia can be caused by some types of infection. It also can be caused by immune system conditions such as HIV or lupus, a lack of vitamin J85 or folic acid, or an enlarged spleen. Some medicines can cause it too. It is most often caused by treatments for certain health problems, such as chemotherapy and radiation treatment for cancer. Mild neutropenia usually causes no symptoms. But when it's severe, it increases the risk of infection of your skin and organs. That's because your body can't fight off germs as well as it should. Follow-up care is a key part of your treatment and safety. Be sure to make and go to all appointments, and call your doctor if you are having problems. It's also a good idea to know your test results and keep a list of the medicines you take. How can you care for yourself at home? · Take your medicines exactly as prescribed. Call your doctor if you have any problems with your medicine. · Eat a healthy, balanced diet. Eat foods with a lot of fiber. This helps to prevent constipation. Prevent infections · Take your temperature several times a day, as your doctor suggests. Keep a written record of your temperature readings. Fever is a common symptom of infection. And it may be the only symptom. · Use a soft toothbrush. Do not floss your teeth. Talk with your doctor about other steps to prevent infections in your mouth. · Wash your hands often with soap and water, especially before you eat and after you use the bathroom. · If you are a woman, use sanitary napkins (pads) instead of tampons. Do not douche. · Do not use rectal thermometers or suppositories. · Avoid tasks that might expose you to germs, such as disposing of pet feces or urine. · Avoid crowds of people and anyone who might have an infection or an illness such as a cold or the flu. You may need to avoid people who have recently had certain kinds of vaccinations. · Even small injuries can get infected. Take steps to prevent cuts, burns, and sunburns. · If you have severe neutropenia, your doctor may advise you to avoid fresh fruits, vegetables, and flowers. When should you call for help? Call 911 anytime you think you may need emergency care. For example, call if: 
· You have severe shortness of breath. · You passed out (lost consciousness). Call your doctor now or seek immediate medical care if: 
· You have signs of infection, such as: 
¨ Increased pain, swelling, warmth, or redness of your skin. ¨ Red streaks leading from a wound. ¨ Pus draining from a wound. ¨ A fever. Watch closely for changes in your health, and be sure to contact your doctor if: 
· You do not get better as expected. Where can you learn more? Go to http://maria c-jeff.info/. Enter W142 in the search box to learn more about \"Neutropenia: Care Instructions. \" Current as of: February 19, 2016 Content Version: 11.1 © 6808-5604 [employer]. Care instructions adapted under license by [employer] (which disclaims liability or warranty for this information). If you have questions about a medical condition or this instruction, always ask your healthcare professional. [address] any warranty or liability for your use of this information. PRN - he states he is not planning on returning to [facility] but does plan to go to [facility] for further care. Discharge Orders None Introducing [facility] [place] [facility]! Dear Priya Carlos: 
Thank you for requesting a [employer] account. Our records indicate that you have previously registered for a [employer] account but its currently inactive. Please call our [employer] support line at [phone]. Additional Information If you have questions, please visit the Frequently Asked Questions section of the [employer] website at [email or web address]. [email or web address]/. Remember, [employer] is NOT to be used for urgent needs. For medical emergencies, dial 911. Now available from your iPhone and Android! General Information Please provide this summary of care documentation to your next provider. Patient Signature:  ____________________________________________________________ Date:  ____________________________________________________________  
  
Charla Luke Provider Signature:  ____________________________________________________________ Date:  ____________________________________________________________

## 2017-01-12 ENCOUNTER — HOSPITAL ENCOUNTER (OUTPATIENT)
Dept: INFUSION THERAPY | Age: 62
Discharge: HOME | End: 2017-01-12
Payer: COMMERCIAL

## 2017-01-12 ENCOUNTER — APPOINTMENT (OUTPATIENT)
Dept: GENERAL RADIOLOGY | Age: 62
DRG: 809 | End: 2017-01-12
Attending: INTERNAL MEDICINE
Payer: COMMERCIAL

## 2017-01-12 ENCOUNTER — HOSPITAL ENCOUNTER (OUTPATIENT)
Dept: PHYSICAL THERAPY | Age: 62
End: 2017-01-12
Attending: INTERNAL MEDICINE
Payer: COMMERCIAL

## 2017-01-12 PROBLEM — D70.9 NEUTROPENIC FEVER (HCC): Status: ACTIVE | Noted: 2017-01-12

## 2017-01-12 PROBLEM — D61.818 PANCYTOPENIA (HCC): Status: ACTIVE | Noted: 2017-01-12

## 2017-01-12 PROBLEM — R50.81 NEUTROPENIC FEVER (HCC): Status: ACTIVE | Noted: 2017-01-12

## 2017-01-12 LAB
ALBUMIN SERPL BCP-MCNC: 2.9 G/DL (ref 3.2–4.6)
ALBUMIN/GLOB SERPL: 0.9 {RATIO} (ref 1.2–3.5)
ALP SERPL-CCNC: 51 U/L (ref 50–136)
ALT SERPL-CCNC: 21 U/L (ref 12–65)
ANION GAP BLD CALC-SCNC: 8 MMOL/L (ref 7–16)
AST SERPL W P-5'-P-CCNC: 18 U/L (ref 15–37)
ATRIAL RATE: 111 BPM
BILIRUB SERPL-MCNC: 0.6 MG/DL (ref 0.2–1.1)
BUN SERPL-MCNC: 15 MG/DL (ref 8–23)
CALCIUM SERPL-MCNC: 7.6 MG/DL (ref 8.3–10.4)
CALCULATED P AXIS, ECG09: 65 DEGREES
CALCULATED R AXIS, ECG10: 45 DEGREES
CALCULATED T AXIS, ECG11: 71 DEGREES
CHLORIDE SERPL-SCNC: 99 MMOL/L (ref 98–107)
CO2 SERPL-SCNC: 30 MMOL/L (ref 21–32)
CREAT SERPL-MCNC: 1.78 MG/DL (ref 0.8–1.5)
DIAGNOSIS, 93000: NORMAL
DIASTOLIC BP, ECG02: NORMAL MMHG
FLUAV AG NPH QL IA: NEGATIVE
FLUBV AG NPH QL IA: NEGATIVE
GLOBULIN SER CALC-MCNC: 3.4 G/DL (ref 2.3–3.5)
GLUCOSE BLD STRIP.AUTO-MCNC: 110 MG/DL (ref 65–100)
GLUCOSE BLD STRIP.AUTO-MCNC: 118 MG/DL (ref 65–100)
GLUCOSE BLD STRIP.AUTO-MCNC: 120 MG/DL (ref 65–100)
GLUCOSE BLD STRIP.AUTO-MCNC: 99 MG/DL (ref 65–100)
GLUCOSE SERPL-MCNC: 148 MG/DL (ref 65–100)
P-R INTERVAL, ECG05: 150 MS
POTASSIUM SERPL-SCNC: 3.6 MMOL/L (ref 3.5–5.1)
PROCALCITONIN SERPL-MCNC: 0.3 NG/ML
PROT SERPL-MCNC: 6.3 G/DL (ref 6.3–8.2)
Q-T INTERVAL, ECG07: 312 MS
QRS DURATION, ECG06: 78 MS
QTC CALCULATION (BEZET), ECG08: 424 MS
SODIUM SERPL-SCNC: 137 MMOL/L (ref 136–145)
SYSTOLIC BP, ECG01: NORMAL MMHG
VENTRICULAR RATE, ECG03: 111 BPM

## 2017-01-12 PROCEDURE — 77010033678 HC OXYGEN DAILY

## 2017-01-12 PROCEDURE — 74011250637 HC RX REV CODE- 250/637: Performed by: EMERGENCY MEDICINE

## 2017-01-12 PROCEDURE — 74011000250 HC RX REV CODE- 250: Performed by: NURSE PRACTITIONER

## 2017-01-12 PROCEDURE — 65270000029 HC RM PRIVATE

## 2017-01-12 PROCEDURE — 74011000255 HC RX REV CODE- 255: Performed by: INTERNAL MEDICINE

## 2017-01-12 PROCEDURE — 87804 INFLUENZA ASSAY W/OPTIC: CPT | Performed by: EMERGENCY MEDICINE

## 2017-01-12 PROCEDURE — 65270000015 HC RM PRIVATE ONCOLOGY

## 2017-01-12 PROCEDURE — 74011250636 HC RX REV CODE- 250/636: Performed by: EMERGENCY MEDICINE

## 2017-01-12 PROCEDURE — 94760 N-INVAS EAR/PLS OXIMETRY 1: CPT

## 2017-01-12 PROCEDURE — 74011250636 HC RX REV CODE- 250/636: Performed by: INTERNAL MEDICINE

## 2017-01-12 PROCEDURE — 74011000258 HC RX REV CODE- 258: Performed by: EMERGENCY MEDICINE

## 2017-01-12 PROCEDURE — 87086 URINE CULTURE/COLONY COUNT: CPT | Performed by: EMERGENCY MEDICINE

## 2017-01-12 PROCEDURE — 82962 GLUCOSE BLOOD TEST: CPT

## 2017-01-12 PROCEDURE — 74011250637 HC RX REV CODE- 250/637: Performed by: INTERNAL MEDICINE

## 2017-01-12 PROCEDURE — 74230 X-RAY XM SWLNG FUNCJ C+: CPT

## 2017-01-12 PROCEDURE — 92611 MOTION FLUOROSCOPY/SWALLOW: CPT

## 2017-01-12 PROCEDURE — 94640 AIRWAY INHALATION TREATMENT: CPT

## 2017-01-12 PROCEDURE — 74011000258 HC RX REV CODE- 258: Performed by: INTERNAL MEDICINE

## 2017-01-12 RX ORDER — AMLODIPINE BESYLATE 5 MG/1
10 TABLET ORAL
Status: DISCONTINUED | OUTPATIENT
Start: 2017-01-12 | End: 2017-01-19 | Stop reason: HOSPADM

## 2017-01-12 RX ORDER — ASPIRIN 81 MG/1
81 TABLET ORAL DAILY
Status: DISCONTINUED | OUTPATIENT
Start: 2017-01-12 | End: 2017-01-19 | Stop reason: HOSPADM

## 2017-01-12 RX ORDER — SODIUM CHLORIDE 9 MG/ML
100 INJECTION, SOLUTION INTRAVENOUS CONTINUOUS
Status: DISCONTINUED | OUTPATIENT
Start: 2017-01-12 | End: 2017-01-13

## 2017-01-12 RX ORDER — SODIUM CHLORIDE 0.9 % (FLUSH) 0.9 %
5-10 SYRINGE (ML) INJECTION AS NEEDED
Status: DISCONTINUED | OUTPATIENT
Start: 2017-01-12 | End: 2017-01-19 | Stop reason: HOSPADM

## 2017-01-12 RX ORDER — ACETAMINOPHEN 325 MG/1
650 TABLET ORAL
Status: DISCONTINUED | OUTPATIENT
Start: 2017-01-12 | End: 2017-01-19 | Stop reason: HOSPADM

## 2017-01-12 RX ORDER — LORAZEPAM 0.5 MG/1
0.5 TABLET ORAL
Status: DISCONTINUED | OUTPATIENT
Start: 2017-01-12 | End: 2017-01-19 | Stop reason: HOSPADM

## 2017-01-12 RX ORDER — LORAZEPAM 1 MG/1
1 TABLET ORAL
Status: DISCONTINUED | OUTPATIENT
Start: 2017-01-12 | End: 2017-01-19 | Stop reason: HOSPADM

## 2017-01-12 RX ORDER — ACETAMINOPHEN 325 MG/1
650 TABLET ORAL
Status: COMPLETED | OUTPATIENT
Start: 2017-01-12 | End: 2017-01-12

## 2017-01-12 RX ORDER — ENOXAPARIN SODIUM 100 MG/ML
40 INJECTION SUBCUTANEOUS EVERY 24 HOURS
Status: DISCONTINUED | OUTPATIENT
Start: 2017-01-12 | End: 2017-01-19 | Stop reason: HOSPADM

## 2017-01-12 RX ORDER — VANCOMYCIN/0.9 % SOD CHLORIDE 1.5G/250ML
1500 PLASTIC BAG, INJECTION (ML) INTRAVENOUS EVERY 24 HOURS
Status: DISCONTINUED | OUTPATIENT
Start: 2017-01-13 | End: 2017-01-14

## 2017-01-12 RX ORDER — IPRATROPIUM BROMIDE AND ALBUTEROL SULFATE 2.5; .5 MG/3ML; MG/3ML
3 SOLUTION RESPIRATORY (INHALATION)
Status: DISCONTINUED | OUTPATIENT
Start: 2017-01-12 | End: 2017-01-19 | Stop reason: HOSPADM

## 2017-01-12 RX ORDER — LORAZEPAM 0.5 MG/1
.5-1 TABLET ORAL
Status: DISCONTINUED | OUTPATIENT
Start: 2017-01-12 | End: 2017-01-12 | Stop reason: SDUPTHER

## 2017-01-12 RX ORDER — SODIUM CHLORIDE 0.9 % (FLUSH) 0.9 %
5 SYRINGE (ML) INJECTION EVERY 8 HOURS
Status: DISCONTINUED | OUTPATIENT
Start: 2017-01-12 | End: 2017-01-19 | Stop reason: HOSPADM

## 2017-01-12 RX ORDER — INSULIN LISPRO 100 [IU]/ML
INJECTION, SOLUTION INTRAVENOUS; SUBCUTANEOUS
Status: DISCONTINUED | OUTPATIENT
Start: 2017-01-12 | End: 2017-01-14

## 2017-01-12 RX ORDER — ALLOPURINOL 300 MG/1
300 TABLET ORAL
Status: DISCONTINUED | OUTPATIENT
Start: 2017-01-12 | End: 2017-01-19 | Stop reason: HOSPADM

## 2017-01-12 RX ORDER — LOSARTAN POTASSIUM 50 MG/1
50 TABLET ORAL 2 TIMES DAILY
Status: DISCONTINUED | OUTPATIENT
Start: 2017-01-12 | End: 2017-01-19 | Stop reason: HOSPADM

## 2017-01-12 RX ORDER — LORAZEPAM 2 MG/ML
0.5 INJECTION INTRAMUSCULAR
Status: DISCONTINUED | OUTPATIENT
Start: 2017-01-12 | End: 2017-01-19 | Stop reason: HOSPADM

## 2017-01-12 RX ADMIN — SODIUM CHLORIDE 100 ML/HR: 900 INJECTION, SOLUTION INTRAVENOUS at 21:31

## 2017-01-12 RX ADMIN — AMLODIPINE BESYLATE 10 MG: 5 TABLET ORAL at 03:16

## 2017-01-12 RX ADMIN — SODIUM CHLORIDE 100 ML/HR: 900 INJECTION, SOLUTION INTRAVENOUS at 08:00

## 2017-01-12 RX ADMIN — Medication 5 ML: at 14:00

## 2017-01-12 RX ADMIN — ACETAMINOPHEN 650 MG: 325 TABLET, FILM COATED ORAL at 00:05

## 2017-01-12 RX ADMIN — CEFEPIME HYDROCHLORIDE 2 G: 2 INJECTION, POWDER, FOR SOLUTION INTRAVENOUS at 12:52

## 2017-01-12 RX ADMIN — BARIUM SULFATE 15 ML: 400 SUSPENSION ORAL at 11:09

## 2017-01-12 RX ADMIN — ASPIRIN 81 MG: 81 TABLET, COATED ORAL at 10:22

## 2017-01-12 RX ADMIN — Medication 10 ML: at 21:56

## 2017-01-12 RX ADMIN — BARIUM SULFATE 15 ML: 980 POWDER, FOR SUSPENSION ORAL at 11:10

## 2017-01-12 RX ADMIN — LOSARTAN POTASSIUM 50 MG: 50 TABLET ORAL at 10:22

## 2017-01-12 RX ADMIN — ALLOPURINOL 300 MG: 300 TABLET ORAL at 03:16

## 2017-01-12 RX ADMIN — VANCOMYCIN HYDROCHLORIDE 2000 MG: 10 INJECTION, POWDER, LYOPHILIZED, FOR SOLUTION INTRAVENOUS at 00:59

## 2017-01-12 RX ADMIN — Medication 10 ML: at 21:35

## 2017-01-12 RX ADMIN — BARIUM SULFATE 15 ML: 400 PASTE ORAL at 11:08

## 2017-01-12 RX ADMIN — CEFEPIME HYDROCHLORIDE 2 G: 2 INJECTION, POWDER, FOR SOLUTION INTRAVENOUS at 00:00

## 2017-01-12 RX ADMIN — IPRATROPIUM BROMIDE AND ALBUTEROL SULFATE 3 ML: 2.5; .5 SOLUTION RESPIRATORY (INHALATION) at 18:33

## 2017-01-12 RX ADMIN — LORAZEPAM 0.5 MG: 2 INJECTION INTRAMUSCULAR; INTRAVENOUS at 21:56

## 2017-01-12 NOTE — CONSULTS
Inpatient Hematology / Oncology Consult Note    Reason for Consult:  Neutropenic fever [facility])  Referring Physician:  Sydni Guerra DO    History of Present Illness:  Mr. Safia Patel is a 62 y.o. male who presented to ED with fever. He is a known patient of Dr. Iliana Mcneil receiving treatment for supraglottic cancer. He had developed fever over the last 24 hours and had been feeling unwell with nausea and vomiting. He reported some mild generalized abdominal pain and has had a few loose stools. BC, UC, and CXR were done. He was started on cefepime and vancomycin. Oncology history copied from chart: Mr. Safia Patel was seen in our office for the first time in December 2016. He was referred for evaluation of a head and neck malignancy. He was a gentleman in generally good health with the exception of diabetes mellitus which was non-insulin requiring. Beginning in the fall 2016, he began to note some difficulty with dysphagia as well as some change in his voice. Ultimately, there was some left-sided otalgia for which she sought help from his primary care physician. A course of antibiotics did not prove helpful and ultimately he was referred to Dr. Tita Farfan for evaluation. The patient had not smoked for approximately 11 years prior to his initial visit. He did not abuse alcohol. The patient underwent a flexible laryngoscopy demonstrating a large mass involving the entire epiglottis. The tumor appeared to involve the aryepiglottic folds. The vocal cords were not visualized. CT scanning on November 3, 2016 demonstrated 3.9 x 2.6 x 2.3 cm supraglottic mass extending across midline. Inferiorly, it appeared to involve the left true vocal cord. The overlying thyroid cartilage seems preserved. There was a level 3 lymph node on the left side measuring 1.8 cm in short axis. On November 14, the patient was taken to the OR for panendoscopy.  Dr. Janina Vaughn notes from that visit indicate that he visualized the true and false vocal cords both of which appeared uninvolved by the tumor. He also did micro-debridement, subsequent to which the patient's voice appeared to be improved. 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 There was no mention of HPV status. The patient was seen at [address], where surgery was offered. The patient was averse to the notion of a tracheostomy and sought additional opinion here. Consultation by radiation oncology who agrees with the notion of definitive chemo and radiation therapy. However, in reviewing his oral examination, it was felt that his teeth were in such poor repair that he needed dental evaluation prior to any radiation therapy intervention. Given the time that it would take for recovery, it was decided that a course (or perhaps 2) of induction chemotherapy might be appropriate.     .    Review of Systems:  Constitutional  fever, chills, -weight loss, +appetite changes, +fatigue,    HEENT Denies trauma, blurry vision, hearing loss, ear pain, nosebleeds, +sore throat   Skin Denies lesions or rashes. Lungs dyspnea, cough, sputum production, no hemoptysis. Cardiovascular Denies chest pain, palpitations, or lower extremity edema. Gastrointestinal +nausea, -vomiting, +few loose stools, -bloody or black stools, mild abdominal pain    Denies dysuria, frequency or hesitancy of urination. Neuro Denies headaches, visual changes or ataxia. Denies dizziness, tingling, tremors, sensory change, speech change, focal weakness or headaches. Hematology Denies easy bruising or bleeding, denies gingival bleeding or epistaxis. MSK Denies back pain, arthralgias, myalgias or frequent falls. Psychiatric/Behavioral  The patient is not nervous/anxious.          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      Social History Main Topics    Smoking status: Former Smoker     Packs/day: 2.00     Years: 20.00     Quit date: 2005    Smokeless tobacco: Never Used    Alcohol use No    Drug use: No    Sexual activity: Not on file     Other Topics Concern    Not on file     Social History Narrative     Current Facility-Administered Medications   Medication Dose Route Frequency Provider Last Rate Last Dose    amLODIPine (NORVASC) tablet 10 mg  10 mg Oral QHS Katiana Sale, DO   10 mg at 01/12/17 0316    allopurinol (ZYLOPRIM) tablet 300 mg  300 mg Oral QHS Katiana Sale, DO   300 mg at 01/12/17 7217    aspirin delayed-release tablet 81 mg  81 mg Oral DAILY Katiana Sale, DO        losartan (COZAAR) tablet 50 mg  50 mg Oral BID Katiana Sale, DO        sodium chloride (NS) flush 5 mL  5 mL InterCATHeter Q8H Katiana Sale, DO        sodium chloride (NS) flush 5-10 mL  5-10 mL InterCATHeter PRN Katiana Sale, DO        insulin lispro (HUMALOG) injection   SubCUTAneous AC&HS Katiana Sale, DO        0.9% sodium chloride infusion  100 mL/hr IntraVENous CONTINUOUS Katiana Sale, DO        cefepime (MAXIPIME) 2 g in 0.9% sodium chloride (MBP/ADV) 100 mL  2 g IntraVENous Q12H Katiana Sale, DO        acetaminophen (TYLENOL) tablet 650 mg  650 mg Oral Q6H PRN Veverly Pierce DO Emy        enoxaparin (LOVENOX) injection 40 mg  40 mg SubCUTAneous Q24H Regan Estrelal DO        LORazepam (ATIVAN) tablet 0.5 mg  0.5 mg Oral Q6H PRN Regan Estrella DO        Or    LORazepam (ATIVAN) tablet 1 mg  1 mg Oral Q6H PRN Regan Estrella DO        [START ON 2017] vancomycin (VANCOCIN) 1500 mg in  ml infusion  1,500 mg IntraVENous Q24H Regan Estrella DO         Facility-Administered Medications Ordered in Other Encounters   Medication Dose Route Frequency Provider Last Rate Last Dose    sodium chloride (NS) flush 10-40 mL  10-40 mL IntraVENous PRN Zandra Vela MD   10 mL at 01/10/17 1555       OBJECTIVE:  Patient Vitals for the past 8 hrs:   BP Temp Pulse Resp SpO2   17 0601 134/73 97.2 °F (36.2 °C) (!) 104 20 95 %   17 0558 - - - - (!) 86 %   17 0227 145/73 98.2 °F (36.8 °C) (!) 104 20 93 %   17 0200 109/57 - (!) 101 - 97 %   17 0140 131/63 99.8 °F (37.7 °C) (!) 103 15 97 %   17 0120 135/60 - (!) 101 (!) 36 97 %   17 0100 116/60 - (!) 104 23 94 %     Temp (24hrs), Av °F (37.2 °C), Min:97.2 °F (36.2 °C), Max:100.3 °F (37.9 °C)         Physical Exam:  Constitutional: Well developed, well nourished male in no acute distress, lying comfortably in the hospital bed. Wife at bedside   HEENT: Normocephalic and atraumatic. Oropharynx is clear, mucous membranes are moist.Extraocular muscles are intact. Sclerae anicteric. Neck supple    Lymph node   No palpable submandibular, cervical, supraclavicular, axillary lymph nodes. Skin Warm and dry. No bruising and no rash noted. No erythema. No pallor. Respiratory Lungs are clear to auscultation bilaterally without wheezes, rales or rhonchi, normal air exchange without accessory muscle use. CVS Normal rate, regular rhythm and normal S1 and S2. No murmurs, gallops, or rubs. Abdomen Soft, nontender and nondistended, normoactive bowel sounds. No palpable mass.   No hepatosplenomegaly. Neuro Grossly nonfocal with no obvious sensory or motor deficits. MSK Normal range of motion in general.  No edema and no tenderness. Psych Appropriate mood and affect. Labs:    Recent Results (from the past 24 hour(s))   CULTURE, BLOOD    Collection Time: 01/11/17 11:05 PM   Result Value Ref Range    Special Requests: RT HAND     Culture result: NO GROWTH AFTER 8 HOURS     POC LACTIC ACID    Collection Time: 01/11/17 11:24 PM   Result Value Ref Range    Lactic Acid (POC) 0.9 0.5 - 1.9 mmol/L   CBC WITH AUTOMATED DIFF    Collection Time: 01/11/17 11:30 PM   Result Value Ref Range    WBC 1.7 (LL) 4.3 - 11.1 K/uL    RBC 3.03 (L) 4.23 - 5.67 M/uL    HGB 8.4 (L) 13.6 - 17.2 g/dL    HCT 24.8 (L) 41.1 - 50.3 %    MCV 81.8 79.6 - 97.8 FL    MCH 27.7 26.1 - 32.9 PG    MCHC 33.9 31.4 - 35.0 g/dL    RDW 12.6 11.9 - 14.6 %    PLATELET 209 (L) 233 - 450 K/uL    MPV 8.8 (L) 10.8 - 14.1 FL    DF AUTOMATED      NEUTROPHILS 35 (L) 43 - 78 %    LYMPHOCYTES 40 13 - 44 %    MONOCYTES 23 (H) 4.0 - 12.0 %    EOSINOPHILS 2 0.5 - 7.8 %    BASOPHILS 0 0.0 - 2.0 %    IMMATURE GRANULOCYTES 0.0 0.0 - 5.0 %    ABS. NEUTROPHILS 0.6 (L) 1.7 - 8.2 K/UL    ABS. LYMPHOCYTES 0.7 0.5 - 4.6 K/UL    ABS. MONOCYTES 0.4 0.1 - 1.3 K/UL    ABS. EOSINOPHILS 0.0 0.0 - 0.8 K/UL    ABS. BASOPHILS 0.0 0.0 - 0.2 K/UL    ABS. IMM. GRANS. 0.0 0.0 - 0.5 K/UL   METABOLIC PANEL, COMPREHENSIVE    Collection Time: 01/11/17 11:30 PM   Result Value Ref Range    Sodium 137 136 - 145 mmol/L    Potassium 3.6 3.5 - 5.1 mmol/L    Chloride 99 98 - 107 mmol/L    CO2 30 21 - 32 mmol/L    Anion gap 8 7 - 16 mmol/L    Glucose 148 (H) 65 - 100 mg/dL    BUN 15 8 - 23 MG/DL    Creatinine 1.78 (H) 0.8 - 1.5 MG/DL    GFR est AA 50 (L) >60 ml/min/1.73m2    GFR est non-AA 42 (L) >60 ml/min/1.73m2    Calcium 7.6 (L) 8.3 - 10.4 MG/DL    Bilirubin, total 0.6 0.2 - 1.1 MG/DL    ALT 21 12 - 65 U/L    AST 18 15 - 37 U/L    Alk.  phosphatase 51 50 - 136 U/L Protein, total 6.3 6.3 - 8.2 g/dL    Albumin 2.9 (L) 3.2 - 4.6 g/dL    Globulin 3.4 2.3 - 3.5 g/dL    A-G Ratio 0.9 (L) 1.2 - 3.5     PROCALCITONIN    Collection Time: 01/11/17 11:30 PM   Result Value Ref Range    Procalcitonin 0.3 ng/mL   CULTURE, BLOOD    Collection Time: 01/11/17 11:30 PM   Result Value Ref Range    Special Requests: PORT R CHEST     Culture result: NO GROWTH AFTER 8 HOURS     EKG, 12 LEAD, INITIAL    Collection Time: 01/11/17 11:47 PM   Result Value Ref Range    Systolic BP  mmHg    Diastolic BP  mmHg    Ventricular Rate 111 BPM    Atrial Rate 111 BPM    P-R Interval 150 ms    QRS Duration 78 ms    Q-T Interval 312 ms    QTC Calculation (Bezet) 424 ms    Calculated P Axis 65 degrees    Calculated R Axis 45 degrees    Calculated T Axis 71 degrees    Diagnosis       !! AGE AND GENDER SPECIFIC ECG ANALYSIS !! Sinus tachycardia  Low voltage QRS  Borderline ECG  Confirmed by NAIF FAROOQ (), GUI VENTURA ([id number]) on 1/12/2017 8:09:01 AM     INFLUENZA A & B AG (RAPID TEST)    Collection Time: 01/12/17 12:09 AM   Result Value Ref Range    Influenza A Ag NEGATIVE  NEG      Influenza B Ag NEGATIVE  NEG     CULTURE, URINE    Collection Time: 01/12/17 12:09 AM   Result Value Ref Range    Special Requests: NO SPECIAL REQUESTS      Culture result:        NO GROWTH AFTER SHORT PERIOD OF INCUBATION. FURTHER RESULTS TO FOLLOW AFTER OVERNIGHT INCUBATION. GLUCOSE, POC    Collection Time: 01/12/17  8:16 AM   Result Value Ref Range    Glucose (POC) 118 (H) 65 - 100 mg/dL       Imaging:  [unfilled]    ASSESSMENT:  Problem List  Date Reviewed: 1/10/2017          Codes Class Noted    * (Principal)Neutropenic fever ([facility] ICD-10-CM: D70.9, R50.81  ICD-9-CM: 288.00, 780.61  1/12/2017        Pancytopenia ([facility] ICD-10-CM: G99.321  ICD-9-CM: 284.19  1/12/2017        Renal insufficiency ICD-10-CM: N28.9  ICD-9-CM: 593.9  1/10/2017        Non-intractable cyclical vomiting with nausea ICD-10-CM: G43. 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 He has increased amounts of thick secretions in his throat described as white thick mucus. He has PEG scheduled later this month and depending on LOS may consider placing on this admission. Ralph Lancaster NP   [facility]  [address]  Office : [phone]  Fax : [phone]  Patient seen, examed and discussed with NP, agree with above history/assessment/plan. 61 y.o.male epiglottis SCC s/p cycle 1 neoadjuvant TCF, admitted for fever, neutropenia, N/V, abd pain and diarrhea, ISI, cough, cover with broad spec abx, follow cultures, give granix, IVF. Ivan Sharpe M.D.   [address]  Office : [phone]  Fax : [phone]

## 2017-01-12 NOTE — PROGRESS NOTES
TRANSFER - IN REPORT:    Verbal report received from [facility] on [place]  being received from Emergency(unit) for routine progression of care      Report consisted of patients Situation, Background, Assessment and   Recommendations(SBAR). Information from the following report(s) SBAR and ED Summary was reviewed with the receiving nurse. Opportunity for questions and clarification was provided. Assessment completed upon patients arrival to unit and care assumed.

## 2017-01-12 NOTE — H&P
HOSPITALIST H&P    NAME:  Viky Jean   Age:  62 y.o.  :   1955   MRN:   [id number]  PCP: Bertha Dixon MD  Chief complaint: fever    Subjective:     Patient is a 62 y.o.  male who presents with fever. Pt has h/o throat cancer on chemotherapy. He developed fever in the past 24 hours and has been feeling unwell with nausea and vomiting. He reports some mild generalized abdominal pain and has had a few loose stools. He notified his oncologist who referred him to the ED for workup. He admits to productive cough, but denies chest pain, rash, headaches. Past Medical History   Diagnosis Date    GERD (gastroesophageal reflux disease)      managed with medication     Gout      symptoms in ankles, no recent episodes    Hypertension      managed with medication     Morbid obesity ([facility]     Squamous cell carcinoma of epiglottis ([facility] 2016    Type 2 diabetes mellitus ([facility]      oral hypoglycemic/ does not check BS        Past Surgical History   Procedure Laterality Date    Hx colonoscopy      Hx other surgical Left      at age 12 had 2rd nipple removed       Current Facility-Administered Medications on File Prior to Encounter   Medication Dose Route Frequency Provider Last Rate Last Dose    sodium chloride (NS) flush 10-40 mL  10-40 mL IntraVENous PRN Isidra Cueva MD   10 mL at 01/10/17 1555     Current Outpatient Prescriptions on File Prior to Encounter   Medication Sig Dispense Refill    LORazepam (ATIVAN) 1 mg tablet Take 0.5-1 Tabs by mouth every six (6) hours as needed for Anxiety. Max Daily Amount: 4 mg. Indications: CANCER CHEMOTHERAPY-INDUCED NAUSEA AND VOMITING 90 Tab 0    magic mouthwash (GLADYS) susp Take 10 mL by mouth every four (4) hours. 500 mL 3    lidocaine-prilocaine (EMLA) topical cream Apply  to affected area as needed. Apply 1/2 inch amount of cream to port site 90 minutes prior to needle access.  30 g 0    ondansetron hcl (ZOFRAN) 8 mg tablet Take 1 Tab by mouth every eight (8) hours as needed for Nausea. 90 Tab 3    prochlorperazine (COMPAZINE) 10 mg tablet Take 1 Tab by mouth every six (6) hours as needed. 120 Tab 3    metFORMIN (GLUCOPHAGE) 500 mg tablet Take 500 mg by mouth three (3) times daily (with meals). Indications: PREVENTION OF TYPE 2 DIABETES MELLITUS      aspirin delayed-release 81 mg tablet Take 81 mg by mouth every morning. Take / use AM day of surgery  per anesthesia protocols. Indications: myocardial infarction prevention      triamterene-hydroCHLOROthiazide (MAXZIDE) 37.5-25 mg per tablet Take 1 Tab by mouth every morning. Indications: Edema, Hypertension      losartan (COZAAR) 50 mg tablet 50 mg two (2) times a day. Indications: Hypertension      amLODIPine (NORVASC) 10 mg tablet Take 10 mg by mouth nightly. Take / use AM day of surgery  per anesthesia protocols. Indications: Hypertension      allopurinol (ZYLOPRIM) 300 mg tablet 300 mg nightly. Indications: GOUT      glipiZIDE SR (GLUCOTROL) 10 mg CR tablet Take 10 mg by mouth two (2) times a day. Indications: type 2 diabetes mellitus      omeprazole (PRILOSEC) 20 mg capsule Take 20 mg by mouth every morning. Take / use AM day of surgery  per anesthesia protocols. Indications: GASTROESOPHAGEAL REFLUX         No Known Allergies    Social History   Substance Use Topics    Smoking status: Former Smoker     Packs/day: 2.00     Years: 20.00     Quit date: 2005    Smokeless tobacco: Never Used    Alcohol use No       Family History   Problem Relation Age of Onset    Family history unknown: Yes    Stroke Mother        I have personally reviewed and reconciled patients history. Review of Systems  A comprehensive 12 point review of systems is negative other than what is listed above.     Objective:     Patient Vitals for the past 24 hrs:   BP Temp Pulse Resp SpO2 Height Weight   01/12/17 0140 131/63 99.8 °F (37.7 °C) (!) 103 15 97 % - -   01/12/17 0120 135/60 - (!) 101 (!) 36 97 % - - 01/12/17 0100 116/60 - (!) 104 23 94 % - -   01/12/17 0040 140/63 - (!) 109 15 91 % - -   01/12/17 0020 154/73 - (!) 113 22 97 % - -   01/12/17 0000 162/70 100.3 °F (37.9 °C) (!) 108 16 91 % - -   01/11/17 2357 148/89 - (!) 112 15 91 % - -   01/11/17 2354 - - - - 91 % - -   01/11/17 2200 141/68 99.4 °F (37.4 °C) (!) 120 18 92 % 5' 9\" (1.753 m) 113.4 kg (250 lb)       Exam:  General: awake, alert, no apparent distress  Eyes: PERRL, anicteric  Neck: Supple, trachea midline  Lungs: Clear to auscultation bilaterally  Heart: Regular and tachycardic. No appreciable murmur. Abdomen: Soft, mild generalized abdominal discomfort to palpation, nondistended. Bowel sounds normal.  No rebound tenderness, guarding, or rigidity. Extremities:  No LE edema. Skin: Warm/dry. No rashes or lesions. Neurologic: CN II-XII grossly intact bilaterally. Psych: AOx3. Normal mood and affect. Data Review (Labs):   Recent Results (from the past 24 hour(s))   POC LACTIC ACID    Collection Time: 01/11/17 11:24 PM   Result Value Ref Range    Lactic Acid (POC) 0.9 0.5 - 1.9 mmol/L   CBC WITH AUTOMATED DIFF    Collection Time: 01/11/17 11:30 PM   Result Value Ref Range    WBC 1.7 (LL) 4.3 - 11.1 K/uL    RBC 3.03 (L) 4.23 - 5.67 M/uL    HGB 8.4 (L) 13.6 - 17.2 g/dL    HCT 24.8 (L) 41.1 - 50.3 %    MCV 81.8 79.6 - 97.8 FL    MCH 27.7 26.1 - 32.9 PG    MCHC 33.9 31.4 - 35.0 g/dL    RDW 12.6 11.9 - 14.6 %    PLATELET 789 (L) 584 - 450 K/uL    MPV 8.8 (L) 10.8 - 14.1 FL    DF AUTOMATED      NEUTROPHILS 35 (L) 43 - 78 %    LYMPHOCYTES 40 13 - 44 %    MONOCYTES 23 (H) 4.0 - 12.0 %    EOSINOPHILS 2 0.5 - 7.8 %    BASOPHILS 0 0.0 - 2.0 %    IMMATURE GRANULOCYTES 0.0 0.0 - 5.0 %    ABS. NEUTROPHILS 0.6 (L) 1.7 - 8.2 K/UL    ABS. LYMPHOCYTES 0.7 0.5 - 4.6 K/UL    ABS. MONOCYTES 0.4 0.1 - 1.3 K/UL    ABS. EOSINOPHILS 0.0 0.0 - 0.8 K/UL    ABS. BASOPHILS 0.0 0.0 - 0.2 K/UL    ABS. IMM.  GRANS. 0.0 0.0 - 0.5 K/UL   METABOLIC PANEL, COMPREHENSIVE Collection Time: 01/11/17 11:30 PM   Result Value Ref Range    Sodium 137 136 - 145 mmol/L    Potassium 3.6 3.5 - 5.1 mmol/L    Chloride 99 98 - 107 mmol/L    CO2 30 21 - 32 mmol/L    Anion gap 8 7 - 16 mmol/L    Glucose 148 (H) 65 - 100 mg/dL    BUN 15 8 - 23 MG/DL    Creatinine 1.78 (H) 0.8 - 1.5 MG/DL    GFR est AA 50 (L) >60 ml/min/1.73m2    GFR est non-AA 42 (L) >60 ml/min/1.73m2    Calcium 7.6 (L) 8.3 - 10.4 MG/DL    Bilirubin, total 0.6 0.2 - 1.1 MG/DL    ALT 21 12 - 65 U/L    AST 18 15 - 37 U/L    Alk.  phosphatase 51 50 - 136 U/L    Protein, total 6.3 6.3 - 8.2 g/dL    Albumin 2.9 (L) 3.2 - 4.6 g/dL    Globulin 3.4 2.3 - 3.5 g/dL    A-G Ratio 0.9 (L) 1.2 - 3.5     PROCALCITONIN    Collection Time: 01/11/17 11:30 PM   Result Value Ref Range    Procalcitonin 0.3 ng/mL   INFLUENZA A & B AG (RAPID TEST)    Collection Time: 01/12/17 12:09 AM   Result Value Ref Range    Influenza A Ag NEGATIVE  NEG      Influenza B Ag NEGATIVE  NEG         Assessment:   Principal Problem:    Ne[facility]rop[facility] ([facility] (1/12/2017)    Active Problems:    Squamous cell carcinoma of epiglottis ([facility] (11/23/2016)      Renal insufficiency (1/10/2017)      Pancytopenia ([facility] (1/12/2017)        Plan:     Admit to medical floor  Consult oncology to assume care in AM  Blood cultures x 2 and urine culture obtained in ED  Empiric cefepime and vanc  IVF    DVT prophylaxis: Lovenox  Code Status: FULL    Plan of care discussed with: patient/family  Time spent on patient care: 30 minutes  Anticipated date of discharge: 3 days    Richie Berman DO

## 2017-01-12 NOTE — PROGRESS NOTES
Pharmacokinetic Consult to Pharmacist    Nicole Dunk is a 62 y.o. male being treated for febrile neutropenia with vancomycin and cefepime. Height: 5' 9\" (175.3 cm)  Weight: 113.4 kg (250 lb)  Lab Results   Component Value Date/Time    BUN 15 01/11/2017 11:30 PM    Creatinine 1.78 01/11/2017 11:30 PM    WBC 1.7 01/11/2017 11:30 PM    Procalcitonin 0.3 01/11/2017 11:30 PM      Estimated Creatinine Clearance: 54.8 mL/min (based on Cr of 1.78). CULTURES:  01/11 BC x 2, UC: pending   Influenza: negative x 2    Day 1 of vancomycin. Goal trough is 15-20. Will load with 2 grams, followed by 1.5 g q24h. Plan trough prior to 3rd/4th dose. Will continue to follow patient.       Thank you,  Kisha Winchester, PharmD

## 2017-01-12 NOTE — ED PROVIDER NOTES
HPI Comments: Patient is a 21-year-old male who is coming in for multiple symptoms he last finished chemotherapy for throat cancer on Saturday. Since then he's not been doing well he's had some vomiting and diarrhea with nausea. He's also had a productive wet cough. He has also been having low-grade fevers but they did check earlier today and is 100.5. He denies any current pain. Per the family's had very little by mouth intake. Patient is a 62 y.o. male presenting with fever. The history is provided by the patient. Fever    Associated symptoms include diarrhea, vomiting, cough and shortness of breath. Pertinent negatives include no chest pain, no headaches, no neck pain and no rash. 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 Social History Main Topics    Smoking status: Former Smoker     Packs/day: 2.00     Years: 20.00     Quit date: 2005    Smokeless tobacco: Never Used    Alcohol use No    Drug use: No    Sexual activity: Not on file     Other Topics Concern    Not on file     Social History Narrative         ALLERGIES: Review of patient's allergies indicates no known allergies.     Review of Systems   Constitutional: Positive for activity change, appetite change, chills, fatigue and fever. Respiratory: Positive for cough and shortness of breath. Cardiovascular: Negative for chest pain and palpitations. Gastrointestinal: Positive for diarrhea, nausea and vomiting. Musculoskeletal: Negative for neck pain. Skin: Negative for color change and rash. Neurological: Positive for weakness. Negative for numbness and headaches. Vitals:    01/11/17 2200   BP: 141/68   Pulse: (!) 120   Resp: 18   Temp: 99.4 °F (37.4 °C)   SpO2: 92%   Weight: 113.4 kg (250 lb)   Height: 5' 9\" (1.753 m)            Physical Exam   Constitutional: He is oriented to person, place, and time. He appears well-developed and well-nourished. No distress. HENT:   Head: Normocephalic and atraumatic. Eyes: Conjunctivae are normal. No scleral icterus. Neck: Normal range of motion. Neck supple. Cardiovascular: Normal rate, regular rhythm and normal heart sounds. Pulmonary/Chest: Effort normal and breath sounds normal. No stridor. No respiratory distress. He has no wheezes. He has no rales. Scattered rhonchi bilaterally. Occasional wet sounding cough. Abdominal: Soft. There is no tenderness. There is no rebound and no guarding. Neurological: He is alert and oriented to person, place, and time. No focal weakness   Skin: Skin is warm and dry. No rash noted. He is not diaphoretic. Psychiatric: He has a normal mood and affect. His behavior is normal.   Nursing note and vitals reviewed. MDM  Number of Diagnoses or Management Options  Diagnosis management comments: Patient has fevers with neutropenia no signs of septic shock. His tachycardia is improving he does have fairly significant coughing. He was empirically given antibiotics and fluids I will admit to hospitalist given everything else. Ousmane Billingsley MD; 1/12/2017 @12:52 AM Voice dictation software was used during the making of this note.   This software is not perfect and grammatical and other typographical errors may be present.   This note has not been proofread for errors.  ===================================================================        Amount and/or Complexity of Data Reviewed  Clinical lab tests: ordered and reviewed (Results for orders placed or performed during the hospital encounter of 01/11/17  -INFLUENZA A & B AG (RAPID TEST)       Result                                            Value                         Ref Range                       Influenza A Ag                                    NEGATIVE                      NEG                             Influenza B Ag                                    NEGATIVE                      NEG                        -CBC WITH AUTOMATED DIFF       Result                                            Value                         Ref Range                       WBC                                               1.7 (LL)                      4.3 - 11.1 K/uL                 RBC                                               3.03 (L)                      4.23 - 5.67 M/uL                HGB                                               8.4 (L)                       13.6 - 17.2 g/dL                HCT                                               24.8 (L)                      41.1 - 50.3 %                   MCV                                               81.8                          79.6 - 97.8 FL                  MCH                                               27.7                          26.1 - 32.9 PG                  MCHC                                              33.9                          31.4 - 35.0 g/dL                RDW                                               12.6                          11.9 - 14.6 %                   PLATELET                                          114 (L)                       150 - 450 K/uL                  MPV                                               8.8 (L) 10.8 - 14.1 FL                  DF                                                AUTOMATED                                                     NEUTROPHILS                                       35 (L)                        43 - 78 %                       LYMPHOCYTES                                       40                            13 - 44 %                       MONOCYTES                                         23 (H)                        4.0 - 12.0 %                    EOSINOPHILS                                       2                             0.5 - 7.8 %                     BASOPHILS                                         0                             0.0 - 2.0 %                     IMMATURE GRANULOCYTES                             0.0                           0.0 - 5.0 %                     ABS. NEUTROPHILS                                  0.6 (L)                       1.7 - 8.2 K/UL                  ABS. LYMPHOCYTES                                  0.7                           0.5 - 4.6 K/UL                  ABS. MONOCYTES                                    0.4                           0.1 - 1.3 K/UL                  ABS. EOSINOPHILS                                  0.0                           0.0 - 0.8 K/UL                  ABS. BASOPHILS                                    0.0                           0.0 - 0.2 K/UL                  ABS. IMM.  GRANS.                                  0.0                           0.0 - 0.5 K/UL             -METABOLIC PANEL, COMPREHENSIVE       Result                                            Value                         Ref Range                       Sodium                                            137                           136 - 145 mmol/L                Potassium                                         3.6                           3.5 - 5.1 mmol/L                Chloride                                          99                            98 - 107 mmol/L                 CO2                                               30                            21 - 32 mmol/L                  Anion gap                                         8                             7 - 16 mmol/L                   Glucose                                           148 (H)                       65 - 100 mg/dL                  BUN                                               15                            8 - 23 MG/DL                    Creatinine                                        1.78 (H)                      0.8 - 1.5 MG/DL                 GFR est AA                                        50 (L)                        >60 ml/min/1.73m2               GFR est non-AA                                    42 (L)                        >60 ml/min/1.73m2               Calcium                                           7.6 (L)                       8.3 - 10.4 MG/DL                Bilirubin, total                                  0.6                           0.2 - 1.1 MG/DL                 ALT                                               21                            12 - 65 U/L                     AST                                               18                            15 - 37 U/L                     Alk. phosphatase                                  51                            50 - 136 U/L                    Protein, total                                    6.3                           6.3 - 8.2 g/dL                  Albumin                                           2.9 (L)                       3.2 - 4.6 g/dL                  Globulin                                          3.4                           2.3 - 3.5 g/dL                  A-G Ratio                                         0.9 (L)                       1.2 - 3.5                  -PROCALCITONIN       Result                                            Value                         Ref Range Procalcitonin                                     0.3                           ng/mL                      -POC LACTIC ACID       Result                                            Value                         Ref Range                       Lactic Acid (POC)                                 0.9                           0.5 - 1.9 mmol/L          )  Tests in the radiology section of CPT®: ordered and reviewed (.risrXr Chest Port    Result Date: 1/11/2017  Exam: Single view of the chest Indication: Weakness with nausea, vomiting, and diarrhea, productive cough and fever Comparison: None available Findings: Right chest port catheter is in place with distal tip near the cavoatrial junction. The cardia mediastinal silhouette is within normal limits. Minimal lung base atelectasis without focal consolidation, large pleural effusion, or evidence of pneumothorax. No acute osseous abnormality. IMPRESSION: No acute cardiopulmonary findings.    )      ED Course       Procedures

## 2017-01-12 NOTE — PROGRESS NOTES
SPEECH PATHOLOGY NOTE:  Modified Barium Swallow study complete. Patient with neeru SILENT ASPIRATION of all consistencies assessed. Single tsp of thin liquid, nectar and pudding trials all with silent aspiration during swallow. No further textures given. Recommend STRICT NPO with alternate method for nutrition/meds. Results and recommendations called to floor; spoke with Summer [address] [place] [address]. Full report to follow.   Froilan Wilhelm MA, CCC-SLP

## 2017-01-12 NOTE — PROGRESS NOTES
Kathie Dubon RN & Yasmin Curtis RN have performed a head to toe skin assessment. No areas of skin breakdown noted.

## 2017-01-12 NOTE — PROGRESS NOTES
Speech Pathology  Per SLP at [facility]OSP[facility] [address], pt was evaluated in outpatient last week. Pt was scheduled for an outpatient modified barium swallow study this date as he recently started chemo for epiglottic cancer. I requested orders to have it completed while he is here as an inpatient. Dr. Rissa Bradley in agreement.     [address] [facility] [address] CCC-SLP

## 2017-01-12 NOTE — PROGRESS NOTES
Problem: Dysphagia (Adult)  Goal: *Acute Goals and Plan of Care (Insert Text)  STG: Patient will perform laryngeal strengthening exercise x10 each with 80% accuracy. LTG: Patient will be independent performing laryngeal strengthening exercises with 95% accuracy. Speech language pathology: modified barium swallow study: Initial Assessment    NAME/AGE/GENDER: Nelida Telles is a 62 y.o. male  DATE: 1/12/2017  PRIMARY DIAGNOSIS: Neutropenic fever ([address]       ICD-10: Treatment Diagnosis: Pharyngeal dysphagia (R13.13)  INTERDISCIPLINARY COLLABORATION: Radiologist, Dr. Dion Chan  PRECAUTIONS/ALLERGIES: Review of patient's allergies indicates no known allergies. ASSESSMENT/PLAN OF CARE:Based on the objective data described below, Mr. Linn Dominguez presents with neeru SILENT ASPIRATION of all consistencies assessed. Single tsp of thin liquid, nectar and pudding trials all with silent aspiration during swallow. No further textures given. No epiglottal movement observed during swallow. Recommend STRICT NPO with alternate method for nutrition/meds. Results and recommendations called to floor; spoke with Gerhardt Anthony, [facility]. SLP to follow for laryngeal strengthening exercises. ?????? ? ? This section established at most recent assessment??????????  RECOMMENDATIONS AND PLANNED INTERVENTIONS (Benefits and precautions of therapy have been discussed with the patient.):  · NPO with alternative means of nutrition  MEDICATIONS:  · Non-oral    OTHER RECOMMENDATIONS (including follow up treatment recommendations): · Family training/education  · Laryngeal exercises  · Patient education  FREQUENCY/DURATION: Continue to follow patient 3 times a week until goals met. RECOMMENDED REHABILITATION/EQUIPMENT: (at time of discharge pending progress):   Continue Skilled Therapy. SUBJECTIVE:   Patient pleasant and cooperative. He was seen by SLP at [facility] as outpatient last week and MBS recommended at that time but no overt signs or symptoms of aspiration observed. Patient reports that he went for chemo on Saturday and had rapid deterioration of swallow ability since that time. History of Present Injury/Illness: Mr. Carine Jimenez  has a past medical history of GERD (gastroesophageal reflux disease); Gout; Hypertension; Morbid obesity ([facility] Squamous cell carcinoma of epiglottis ([facility]) (11/23/2016); and Type 2 diabetes mellitus ([facility] He also  has a past surgical history that includes colonoscopy (2016) and other surgical (Left). Present Symptoms: epiglottic cancer; nausea/vomiting  Pain Intensity 1: 0    Current Dietary Status:  Regular textures, thin liquids    Social History/Home Situation:    Home Environment: Private residence  # Steps to Enter: 4  One/Two Story Residence: One story  Living Alone: No  Support Systems: Spouse/Significant Other/Partner, Child(lizette)  Patient Expects to be Discharged to[de-identified] Private residence  Current DME Used/Available at Home: None  OBJECTIVE:     Cognitive/Communication Status:  Mental Status  Neurologic State: Alert  Orientation Level: Appropriate for age  Cognition: Appropriate decision making  Perception: Appears intact  Perseveration: No perseveration noted  Safety/Judgement: Insight into deficits    Oral Assessment:  Oral Assessment  Labial: No impairment  Dentition: Natural  Oral Hygiene: adequate  Lingual: No impairment  Velum: No impairment    Vocal Quality: hoarse    Patient Viewed: Patient Position: Upright in chair  Film Views: Lateral, Fluoro    Oral Prepatory:  The patient was given the following: Consistency Presented:  Thin liquid, Nectar thick liquid, Pudding  How Presented: SLP-fed/presented, Spoon    Oral Phase:  Bolus Acceptance: No impairment  Bolus Formation/Control: No impairment  Propulsion: No impairment     Oral Residue: None  Initiation of Swallow: No impairment  Oral Phase Severity: No impairment    Pharyngeal Phase:  Timing: No impairment  Decreased Tongue Base Retraction?: No  Laryngeal Elevation: Minimal movement of larynx/epiglottis, Incomplete laryngeal closure, No closure, Reduced excursion with laryngeal vestibule gap, Inadequate epiglottic inversion     Aspiration/Timing: Silent , Before, From initial swallow  Aspiration/Penetration Score: 8 (Aspiration-Contrast passes cords/glottis with no effort to eject, ie/silent aspiration)     Pharyngeal Dysfunction: Decreased elevation/closure  Pharyngeal Phase Severity: Severe       Assessment/Reassessment only, no treatment provided today    Tool Used: Dysphagia Outcome and Severity Scale (GUI)    Score Comments   Normal Diet  [] 7 With no strategies or extra time needed       Functional Swallow  [] 6 May have mild oral or pharyngeal delay       Mild Dysphagia    [] 5 Which may require one diet consistency restricted (those who demonstrate penetration which is entirely cleared on MBS would be included)   Mild-Moderate Dysphagia  [] 4 With 1-2 diet consistencies restricted       Moderate Dysphagia  [] 3 With 2 or more diet consistencies restricted       Moderately Severe Dysphagia  [] 2 With partial PO strategies (trials with ST only)       Severe Dysphagia  [x] 1 With inability to tolerate any PO safely          Score:  Initial: 1 Most Recent: X (Date: -- )   Interpretation of Tool: The Dysphagia Outcome and Severity Scale (GUI) is a simple, easy-to-use, 7-point scale developed to systematically rate the functional severity of dysphagia based on objective assessment and make recommendations for diet level, independence level, and type of nutrition. Score 7 6 5 4 3 2 1   Modifier CH CI CJ CK CL CM CN   ?  Swallowing:     - CURRENT STATUS: CN - 100% impaired, limited or restricted    - GOAL STATUS:  CM - 80%-99% impaired, limited or restricted    - D/C STATUS:  ---------------To be determined---------------  Payor: BLUE CROSS / Plan[place] [employer] / Product Type: Nicole Reynolds / __________________________________________________________________________________________________  Safety:   After treatment position/precautions:  · Patient waiting in radiology holding bay for transport back to room. · Results/recommendations called to floor.  Spoke with [facility], RN  Recommendations for treatment: laryngeal exercises  Total Treatment Duration:  Time In: 1045   Time Out: 211 Penelope Lockwood MA, CCC-SLP

## 2017-01-12 NOTE — ED NOTES
TRANSFER - OUT REPORT:    Verbal report given to Linda Del Rosario RN (name) on [place]  being transferred to [facility]) for routine progression of care       Report consisted of patients Situation, Background, Assessment and   Recommendations(SBAR). Information from the following report(s) SBAR was reviewed with the receiving nurse. Lines:   Venous Access Device Venous Chest Port 12/23/16 Upper chest (subclavicular area, right (Active)   Date Accessed (Medial Site) 01/11/17 1/11/2017 11:30 PM   Access Time (Medial Site) 2330 1/11/2017 11:30 PM   Access Needle Size (Site #1) 21 G 1/11/2017 11:30 PM   Access Needle Length (Medial Site) 1 inch 1/11/2017 11:30 PM   Positive Blood Return (Medial Site) Yes 1/11/2017 11:30 PM   Action Taken (Medial Site) Blood drawn 1/11/2017 11:30 PM        Opportunity for questions and clarification was provided.       Patient transported with:   [employer]

## 2017-01-12 NOTE — ED TRIAGE NOTES
C/o weakness, n/v/d, productive cough with clear sputum and fever. Onset Saturday with worsening today. Pt currently receiving chemo for throat ca. Received iv fluids yesterday, told to return with worsening of symptoms. Family states decreased appetite. Family states pt has been wearing chemo pump, taken off this past Saturday. States wore for 5 days. Pt with port to right chest wall, requesting to be accessed. Lab work not drawn in triage.

## 2017-01-12 NOTE — PROGRESS NOTES
2704 pt sleeping oxygen saturation 86% on room air,  Restarted oxygen per nasal cannula at 2 liters / minute  Pt refuses Flu shot.

## 2017-01-13 LAB
ANION GAP BLD CALC-SCNC: 10 MMOL/L (ref 7–16)
BASOPHILS # BLD AUTO: 0 K/UL (ref 0–0.2)
BASOPHILS # BLD: 0 % (ref 0–2)
BUN SERPL-MCNC: 12 MG/DL (ref 8–23)
CALCIUM SERPL-MCNC: 7.8 MG/DL (ref 8.3–10.4)
CHLORIDE SERPL-SCNC: 107 MMOL/L (ref 98–107)
CO2 SERPL-SCNC: 28 MMOL/L (ref 21–32)
CREAT SERPL-MCNC: 1.36 MG/DL (ref 0.8–1.5)
DIFFERENTIAL METHOD BLD: ABNORMAL
EOSINOPHIL # BLD: 0.1 K/UL (ref 0–0.8)
EOSINOPHIL NFR BLD: 3 % (ref 0.5–7.8)
ERYTHROCYTE [DISTWIDTH] IN BLOOD BY AUTOMATED COUNT: 12.8 % (ref 11.9–14.6)
GLUCOSE BLD STRIP.AUTO-MCNC: 103 MG/DL (ref 65–100)
GLUCOSE BLD STRIP.AUTO-MCNC: 104 MG/DL (ref 65–100)
GLUCOSE BLD STRIP.AUTO-MCNC: 125 MG/DL (ref 65–100)
GLUCOSE SERPL-MCNC: 103 MG/DL (ref 65–100)
HCT VFR BLD AUTO: 23.9 % (ref 41.1–50.3)
HGB BLD-MCNC: 8 G/DL (ref 13.6–17.2)
IMM GRANULOCYTES # BLD: 0 K/UL (ref 0–0.5)
IMM GRANULOCYTES NFR BLD AUTO: 0 % (ref 0–5)
LYMPHOCYTES # BLD AUTO: 32 % (ref 13–44)
LYMPHOCYTES # BLD: 0.5 K/UL (ref 0.5–4.6)
MAGNESIUM SERPL-MCNC: 1.6 MG/DL (ref 1.8–2.4)
MCH RBC QN AUTO: 27.3 PG (ref 26.1–32.9)
MCHC RBC AUTO-ENTMCNC: 33.5 G/DL (ref 31.4–35)
MCV RBC AUTO: 81.6 FL (ref 79.6–97.8)
MONOCYTES # BLD: 0.3 K/UL (ref 0.1–1.3)
MONOCYTES NFR BLD AUTO: 21 % (ref 4–12)
NEUTS SEG # BLD: 0.7 K/UL (ref 1.7–8.2)
NEUTS SEG NFR BLD AUTO: 44 % (ref 43–78)
PHOSPHATE SERPL-MCNC: 2.5 MG/DL (ref 2.3–3.7)
PLATELET # BLD AUTO: 105 K/UL (ref 150–450)
PMV BLD AUTO: 9.1 FL (ref 10.8–14.1)
POTASSIUM SERPL-SCNC: 3.4 MMOL/L (ref 3.5–5.1)
RBC # BLD AUTO: 2.93 M/UL (ref 4.23–5.67)
SODIUM SERPL-SCNC: 145 MMOL/L (ref 136–145)
TRIGL SERPL-MCNC: 99 MG/DL (ref 35–150)
WBC # BLD AUTO: 1.5 K/UL (ref 4.3–11.1)

## 2017-01-13 PROCEDURE — 84100 ASSAY OF PHOSPHORUS: CPT | Performed by: NURSE PRACTITIONER

## 2017-01-13 PROCEDURE — 30243N1 TRANSFUSION OF NONAUTOLOGOUS RED BLOOD CELLS INTO CENTRAL VEIN, PERCUTANEOUS APPROACH: ICD-10-PCS | Performed by: INTERNAL MEDICINE

## 2017-01-13 PROCEDURE — 65270000029 HC RM PRIVATE

## 2017-01-13 PROCEDURE — 74011250637 HC RX REV CODE- 250/637: Performed by: INTERNAL MEDICINE

## 2017-01-13 PROCEDURE — 74011250636 HC RX REV CODE- 250/636: Performed by: NURSE PRACTITIONER

## 2017-01-13 PROCEDURE — 74011000258 HC RX REV CODE- 258: Performed by: INTERNAL MEDICINE

## 2017-01-13 PROCEDURE — 74011000250 HC RX REV CODE- 250: Performed by: NURSE PRACTITIONER

## 2017-01-13 PROCEDURE — 74011250636 HC RX REV CODE- 250/636: Performed by: INTERNAL MEDICINE

## 2017-01-13 PROCEDURE — 3E0336Z INTRODUCTION OF NUTRITIONAL SUBSTANCE INTO PERIPHERAL VEIN, PERCUTANEOUS APPROACH: ICD-10-PCS | Performed by: INTERNAL MEDICINE

## 2017-01-13 PROCEDURE — 84478 ASSAY OF TRIGLYCERIDES: CPT | Performed by: NURSE PRACTITIONER

## 2017-01-13 PROCEDURE — 83735 ASSAY OF MAGNESIUM: CPT | Performed by: NURSE PRACTITIONER

## 2017-01-13 PROCEDURE — 82962 GLUCOSE BLOOD TEST: CPT

## 2017-01-13 PROCEDURE — 85025 COMPLETE CBC W/AUTO DIFF WBC: CPT | Performed by: INTERNAL MEDICINE

## 2017-01-13 PROCEDURE — 80048 BASIC METABOLIC PNL TOTAL CA: CPT | Performed by: INTERNAL MEDICINE

## 2017-01-13 PROCEDURE — 74011000258 HC RX REV CODE- 258: Performed by: NURSE PRACTITIONER

## 2017-01-13 RX ORDER — POTASSIUM CHLORIDE 29.8 MG/ML
40 INJECTION INTRAVENOUS ONCE
Status: COMPLETED | OUTPATIENT
Start: 2017-01-13 | End: 2017-01-15

## 2017-01-13 RX ORDER — DEXTROSE MONOHYDRATE AND SODIUM CHLORIDE 5; .45 G/100ML; G/100ML
100 INJECTION, SOLUTION INTRAVENOUS CONTINUOUS
Status: DISPENSED | OUTPATIENT
Start: 2017-01-13 | End: 2017-01-13

## 2017-01-13 RX ORDER — MAGNESIUM SULFATE HEPTAHYDRATE 40 MG/ML
2 INJECTION, SOLUTION INTRAVENOUS ONCE
Status: COMPLETED | OUTPATIENT
Start: 2017-01-13 | End: 2017-01-14

## 2017-01-13 RX ADMIN — ENOXAPARIN SODIUM 40 MG: 100 INJECTION SUBCUTANEOUS at 22:07

## 2017-01-13 RX ADMIN — CEFEPIME HYDROCHLORIDE 2 G: 2 INJECTION, POWDER, FOR SOLUTION INTRAVENOUS at 13:30

## 2017-01-13 RX ADMIN — CALCIUM GLUCONATE: 94 INJECTION, SOLUTION INTRAVENOUS at 20:03

## 2017-01-13 RX ADMIN — MAGNESIUM SULFATE IN WATER 2 G: 40 INJECTION, SOLUTION INTRAVENOUS at 20:10

## 2017-01-13 RX ADMIN — CEFEPIME HYDROCHLORIDE 2 G: 2 INJECTION, POWDER, FOR SOLUTION INTRAVENOUS at 00:22

## 2017-01-13 RX ADMIN — CEFEPIME HYDROCHLORIDE 2 G: 2 INJECTION, POWDER, FOR SOLUTION INTRAVENOUS at 22:04

## 2017-01-13 RX ADMIN — I.V. FAT EMULSION 250 ML: 20 EMULSION INTRAVENOUS at 20:02

## 2017-01-13 RX ADMIN — TBO-FILGRASTIM 480 MCG: 480 INJECTION, SOLUTION SUBCUTANEOUS at 10:21

## 2017-01-13 RX ADMIN — LORAZEPAM 0.5 MG: 2 INJECTION INTRAMUSCULAR; INTRAVENOUS at 22:01

## 2017-01-13 RX ADMIN — VANCOMYCIN HYDROCHLORIDE 1500 MG: 10 INJECTION, POWDER, LYOPHILIZED, FOR SOLUTION INTRAVENOUS at 06:26

## 2017-01-13 RX ADMIN — POTASSIUM CHLORIDE 40 MEQ: 29.8 INJECTION, SOLUTION INTRAVENOUS at 20:10

## 2017-01-13 RX ADMIN — SODIUM CHLORIDE 100 ML/HR: 900 INJECTION, SOLUTION INTRAVENOUS at 10:24

## 2017-01-13 RX ADMIN — Medication 10 ML: at 20:03

## 2017-01-13 RX ADMIN — Medication 10 ML: at 22:01

## 2017-01-13 NOTE — PROGRESS NOTES
Problem: Nutrition Deficit  Goal: *Optimize nutritional status  Nutrition  Reason for assessment: Consult for TPN management per nutritional support protocols. Estevan Tejeda NP)  Assessment:   Diet order(s): NPO  Food/Nutrition History:  Patient with h/o SCC of epiglottis with silent aspiration s/p SLP evaluation. Patient is strict NPO with plan for G tube placement potentially early next week. Patient was originally planned to have G tube placed later this month. Patient to receive TPN until G tube is placed. Patient has a h/o notable for DM, HTN, and ? Renal insufficiency. Central line access : Port  Pertinent Medications: D5 1/2 NS 100ml/hr, Cefepime q8, prednisone, vancomycin, granix, SSI  Pertinent Labs: A1C 7.4 (11/15/16), glucose 103, Na 145, Cl 107, Mg 1.6, Phos 2.5, Triglyceride 99, corrected calcium 8.68  POC Glucoses:  103-104 mg/dl today,  mg/dl x 2 days; Patient has not received any SSI so far during admission  Anthropometrics:Height: 5' 9\" (175.3 cm), Weight Source: Standing scale (comment), Weight: 111.5 kg (245 lb 12.8 oz), Body mass index is 36.3 kg/(m^2). [place] BMI class of obesity class II. Macronutrient needs:  EER:  3081-7253 kcal /day (15-20 kcal/kg listed BW)(153% IBW)  EPR:  58-87 grams protein/day (0.8-1.2 grams/kg IBW)(GFR 57)  Max CHO:  418 grams/day (4mg/kg IBW/min)  Fluid:  1ml/kcal  Intake/Comparative Standards: Current NPO status does not meet kcal or protein needs     Nutrition Diagnosis: Inadequate oral intake r/t inability to consume sufficient oral intake as evidenced by patient with silent aspiration, NPO s/p SLP evaluation. Intervention:   Meals and snacks: NPO  Nutritional Supplement Therapy: Electrolyte replacement per nutritional support protocols are active on MAR. Replace potassium and magnesium per protocol.   PN/IVF:   1. Start TPN: 10%DEX/ 4.25%AA at 85 ml/hr with 250 ml 20% Lipids daily provides 1520 kcal/d (91% of needs), 85 grams of protein/d (100% of needs), 200 grams of CHO/d (does not exceed maximum CHO load) and 2250 ml of total volume/d ( 100% of needs). 2. Adjust IVF once TPN starts. -D/C  3. Potential goal TPN: 20%DEX/ 5%AA at 85 ml/hr with 250 ml 20% Lipids daily provides  2260 kcal/d (100% of needs), 100 grams of protein/d (>100% of needs), 400 grams of CHO/d (does not exceed maximum CHO load) and 2250 ml of total volume/d ( 100% of needs). 4. Add TPN labs, POC Glucoses/SSI if Glucose > 180 mg/dl on AM BMP.                            Marixa Davalos [address], [address], [address] [place] [address], [phone]

## 2017-01-13 NOTE — CONSULTS
[facility] Consult Note       Primary GI Physician: Javier Harman MD  Referring Physician:  Candace Ndiaye NP    Consult Date:  1/13/2017    Admit Date:  1/11/2017    Chief Complaint:  Feeding difficulties    Subjective:     History of Present Illness:  Patient is a 62 y.o. male who is seen in consultation at the request of Candace Ndiaye NP for PEG placement. Patient was referred to ENT last Fall 2016 for change in voice & dysphagia. A flexible laryngoscopy with Dr. Dov Olmos revealed a large mass involving  the entire epiglottis. CT scanning on Nov 2016 demonstrated 3.9 x 2.6 x 2.3 cm supraglottic mass extending across the midline. He was seen by Dr. Javier Harman for PEG placement in Dec 2016 & was scheduled to have one placed later this month. He is s/p chemotherapy. No XRT. Prior to admission, patient developed a fever & had been feeling unwell with N/V. He was admitted with neutropenic fever. CXR, UA, BC were obtained, results pending. A modified barium swallow study was completed & demonstrated silent aspiration. GI was asked to place a PEG sooner rather than the original scheduled date later this month. Patient denies any abdominal pain. No hematochezia or melena. 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  Authorizing Provider   LORazepam (ATIVAN) 1 mg tablet Take 0.5-1 Tabs by mouth every six (6) hours as needed for Anxiety. Max Daily Amount: 4 mg. Indications: CANCER CHEMOTHERAPY-INDUCED NAUSEA AND VOMITING 1/10/17   Anuel Shafer, NP   magic mouthwash Jenners Barbone) susp Take 10 mL by mouth every four (4) hours. 1/5/17   Lorin Manzanares MD   lidocaine-prilocaine (EMLA) topical cream Apply  to affected area as needed. Apply 1/2 inch amount of cream to port site 90 minutes prior to needle access. 12/27/16   Lorin Manzanares MD   ondansetron hcl [facility]) 8 mg tablet Take 1 Tab by mouth every eight (8) hours as needed for Nausea. 12/27/16   Lorin Manzanares MD   prochlorperazine (COMPAZINE) 10 mg tablet Take 1 Tab by mouth every six (6) hours as needed. 12/27/16   Lorin Manzanares MD   metFORMIN (GLUCOPHAGE) 500 mg tablet Take 500 mg by mouth three (3) times daily (with meals). Indications: PREVENTION OF TYPE 2 DIABETES MELLITUS    Historical Provider   aspirin delayed-release 81 mg tablet Take 81 mg by mouth every morning. Take / use AM day of surgery  per anesthesia protocols. Indications: myocardial infarction prevention    Historical Provider   triamterene-hydroCHLOROthiazide (MAXZIDE) 37.5-25 mg per tablet Take 1 Tab by mouth every morning. Indications: Edema, Hypertension 10/20/16   Historical Provider   losartan (COZAAR) 50 mg tablet 50 mg two (2) times a day. Indications: Hypertension 10/20/16   Historical Provider   amLODIPine (NORVASC) 10 mg tablet Take 10 mg by mouth nightly. Take / use AM day of surgery  per anesthesia protocols. Indications: Hypertension 10/20/16   Historical Provider   allopurinol (ZYLOPRIM) 300 mg tablet 300 mg nightly. Indications: GOUT 10/20/16   Historical Provider   glipiZIDE SR (GLUCOTROL) 10 mg CR tablet Take 10 mg by mouth two (2) times a day. Indications: type 2 diabetes mellitus 10/20/16   Historical Provider   omeprazole (PRILOSEC) 20 mg capsule Take 20 mg by mouth every morning. Take / use AM day of surgery  per anesthesia protocols.   Indications: GASTROESOPHAGEAL REFLUX    Historical Provider       Hospital Medications:  Current Facility-Administered Medications   Medication Dose Route Frequency    cefepime (MAXIPIME) 2 g in 0.9% sodium chloride (MBP/ADV) 100 mL  2 g IntraVENous Q8H    amLODIPine (NORVASC) tablet 10 mg  10 mg Oral QHS    allopurinol (ZYLOPRIM) tablet 300 mg  300 mg Oral QHS    aspirin delayed-release tablet 81 mg  81 mg Oral DAILY    losartan (COZAAR) tablet 50 mg  50 mg Oral BID    sodium chloride (NS) flush 5 mL  5 mL InterCATHeter Q8H    sodium chloride (NS) flush 5-10 mL  5-10 mL InterCATHeter PRN    insulin lispro (HUMALOG) injection   SubCUTAneous AC&HS    0.9% sodium chloride infusion  100 mL/hr IntraVENous CONTINUOUS    acetaminophen (TYLENOL) tablet 650 mg  650 mg Oral Q6H PRN    enoxaparin (LOVENOX) injection 40 mg  40 mg SubCUTAneous Q24H    LORazepam (ATIVAN) tablet 0.5 mg  0.5 mg Oral Q6H PRN    Or    LORazepam (ATIVAN) tablet 1 mg  1 mg Oral Q6H PRN    vancomycin (VANCOCIN) 1500 mg in  ml infusion  1,500 mg IntraVENous Q24H    tbo-filgrastim (GRANIX) injection 480 mcg  480 mcg SubCUTAneous DAILY    albuterol-ipratropium (DUO-NEB) 2.5 MG-0.5 MG/3 ML  3 mL Nebulization Q4H PRN    LORazepam (ATIVAN) injection 0.5 mg  0.5 mg IntraVENous Q6H PRN     Facility-Administered Medications Ordered in Other Encounters   Medication Dose Route Frequency    sodium chloride (NS) flush 10-40 mL  10-40 mL IntraVENous PRN       Social History:  Social History   Substance Use Topics    Smoking status: Former Smoker     Packs/day: 2.00     Years: 20.00     Quit date: 2005    Smokeless tobacco: Never Used    Alcohol use No       Pt denies any history of drug use, blood transfusions, or tattoos. Family History:  Family History   Problem Relation Age of Onset    Family history unknown: Yes    Stroke Mother        Review of Systems:  A detailed 10 system ROS is obtained, with pertinent positives as listed above.   All others are negative. Diet:  NPO    Objective:     Physical Exam:  Vitals:  Visit Vitals    /70    Pulse (!) 102    Temp 98.3 °F (36.8 °C)    Resp 20    Ht 5' 9\" (1.753 m)    Wt 111.5 kg (245 lb 12.8 oz)    SpO2 93%    BMI 36.3 kg/m2     Gen:  Pt is alert, cooperative, no acute distress. Skin:  Extremities and face reveal no rashes. HEENT: Sclerae anicteric. Extra-occular muscles are intact. No oral ulcers. No abnormal pigmentation of the lips. The neck is supple. Cardiovascular: Regular rate and rhythm. No murmurs, gallops, or rubs. Respiratory: . Clear breath sounds anteriorly with no wheezes, rales, or rhonchi. GI:  Abdomen nondistended, soft, and nontender. Normal active bowel sounds. No enlargement of the liver or spleen. No masses palpable. Rectal:  Deferred  Musculoskeletal:  No pitting edema of the lower legs. Neurological:  Gross memory appears intact. Patient is alert and oriented. Psychiatric:  Mood appears appropriate with judgement intact. Lymphatic:  No cervical or supraclavicular adenopathy. Laboratory:    Recent Labs      01/13/17   0420  01/11/17   2330   WBC  1.5*  1.7*   HGB  8.0*  8.4*   HCT  23.9*  24.8*   PLT  105*  114*   MCV  81.6  81.8   NA  145  137   K  3.4*  3.6   CL  107  99   CO2  28  30   BUN  12  15   CREA  1.36  1.78*   CA  7.8*  7.6*   GLU  103*  148*   AP   --   51   SGOT   --   18   ALT   --   21   TBILI   --   0.6   ALB   --   2.9*   TP   --   6.3          Assessment:     Principal Problem:  Neutropenic fever (HCC) (1/12/2017)    Active Problems:  Squamous cell carcinoma of epiglottis (HCC) (11/23/2016)  Renal insufficiency (1/10/2017)  Pancytopenia ([address] (1/12/2017)    63 y/o male with epiglottis CA admitted with neutropenic fever. Failed swallow study, positive for aspiration. He was scheduled for PEG placement with Dr. Hayes Laughter later this month, but given the feeding difficulties, GI was asked to place PEG sooner rather than later.   Case was further discussed with anesthesia. Dr. Irineo Calzada who  recommends PEG placement be done in the OR. Plan:     Oncology has plans on keeping Mr. Stroud inpatient over the weekend for nutritional optimization with TPN & further improvement of leukopenia. Plan for PEG placement sometime early next week in the OR and once WBCs improve. Patient is seen and examined in collaboration with Dr. Nany Espitia. Assessment and plan as per Dr. Akin Gibson. Pepe Harris PA-C    Patient seen and examined. Agree with above. Discussed assessment and plans with patient. Increased risks secondary to co-morbidity discussed. Continued need for NPO status till oropharyngeal function improves was discussed. Jasen Gibson MD

## 2017-01-13 NOTE — PROGRESS NOTES
Inpatient Hematology / Oncology Progress Note      Admission Date: 2017 10:12 PM  Reason for Admission/Hospital Course: Neutropenic fever (HCC)      24 Hour Events:  Failed swallow eval  NPO  Start TPN  PEG in OR early next week      ROS:  Constitutional: negative for fever, chills, weakness, malaise, fatigue. CV: negative for chest pain, palpitations, edema. Respiratory: negative for dyspnea, +cough, -wheezing. GI:Negative for nausea, abdominal pain, diarrhea. 10 point review of systems is otherwise negative with the exception of the elements mentioned above in the HPI. No Known Allergies    OBJECTIVE:  Patient Vitals for the past 8 hrs:   BP Temp Pulse Resp SpO2   17 1315 119/69 98.3 °F (36.8 °C) (!) 103 18 92 %   17 0820 150/70 98.3 °F (36.8 °C) (!) 102 20 93 %     Temp (24hrs), Av.6 °F (37 °C), Min:98.3 °F (36.8 °C), Max:98.9 °F (37.2 °C)     0701 -  1900  In: -   Out: 450 [Urine:450]    Physical Exam:  Constitutional: Well developed, well nourished male in no acute distress, sitting comfortably in the bedside chair. Family at bedside   HEENT: Normocephalic and atraumatic. Oropharynx is clear, mucous membranes are moist.Extraocular muscles are intact. Sclerae anicteric. Lymph node   Deferred   Skin Warm and dry. No bruising and no rash noted. No erythema. No pallor. Respiratory Lungs are clear to auscultation bilaterally without wheezes, rales or rhonchi, normal air exchange without accessory muscle use. CVS Normal rate, regular rhythm and normal S1 and S2. No murmurs, gallops, or rubs. Abdomen Soft, nontender and nondistended, normoactive bowel sounds. No palpable mass. No hepatosplenomegaly. Neuro Grossly nonfocal with no obvious sensory or motor deficits. MSK Normal range of motion in general.  No edema and no tenderness. Psych Appropriate mood and affect.         Labs:    Recent Labs      17   0420  17   2330   WBC  1.5* 1.7*   RBC  2.93*  3.03*   HGB  8.0*  8.4*   HCT  23.9*  24.8*   MCV  81.6  81.8   MCH  27.3  27.7   MCHC  33.5  33.9   RDW  12.8  12.6   PLT  105*  114*   GRANS  44  35*   LYMPH  32  40   MONOS  21*  23*   EOS  3  2   BASOS  0  0   IG  0.0  0.0   DF  AUTOMATED  AUTOMATED   ANEU  0.7*  0.6*   ABL  0.5  0.7   ABM  0.3  0.4   MAHI  0.1  0.0   ABB  0.0  0.0   AIG  0.0  0.0      Recent Labs      01/13/17   0420  01/11/17   2330   NA  145  137   K  3.4*  3.6   CL  107  99   CO2  28  30   AGAP  10  8   GLU  103*  148*   BUN  12  15   CREA  1.36  1.78*   GFRAA  >60  50*   GFRNA  57*  42*   CA  7.8*  7.6*   SGOT   --   18   AP   --   51   TP   --   6.3   ALB   --   2.9*   GLOB   --   3.4   AGRAT   --   0.9*         Imaging:      ASSESSMENT:    Problem List  Date Reviewed: 1/12/2017          Codes Class Noted    * (Principal)Neutropenic fever ([facility] ICD-10-CM: D70.9, R50.81  ICD-9-CM: 288.00, 780.61  1/12/2017        Pancytopenia ([facility] ICD-10-CM: I00.712  ICD-9-CM: 284.19  1/12/2017        Renal insufficiency ICD-10-CM: N28.9  ICD-9-CM: 593.9  1/10/2017        Non-intractable cyclical vomiting with nausea ICD-10-CM: G43. 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 Start TPN. PEG placement first part of next week.       Disposition: Mr. Robin Watts failed swallow evaluation and is now NPO. GI was consulted regarding PEG placement as the need was sooner than the planned date later this month. I spoke with GI and due to the complexity of PEG placement and decreased WBC, it will need to in OR which can be done first part of next week.   In the interim we will start on TPN and continue with granix. Divine Angel NP   [facility]  [address] [address]  Office : [phone]  Fax : [phone]   Patient seen, examed and discussed with NP, agree with above history/assessment/plan. 61 y.o.male epiglottis SCC s/p cycle 1 neoadjuvant TCF, admitted for fever, neutropenia, N/V, abd pain and diarrhea, ISI, cough, cover with broad spec abx, follow cultures, give granix, IVF. He failed swallow test for all consistencies, consulted GI for PEG and rec to hold off till [facility] improves next week, supportive TPN and monitor BS.       Olvin Luna M.D.   [address]  Office : [phone]  Fax : [phone]

## 2017-01-13 NOTE — PROGRESS NOTES
Call received from Mahamed Barreto, speech pathologist, stated patient with silent aspiration of all consistencies and recommended strict NPO. Sign placed on patient door for NPO status, and [facility] notified of reporting.  Orders reviewed for NPO status

## 2017-01-14 LAB
ALBUMIN SERPL BCP-MCNC: 2.3 G/DL (ref 3.2–4.6)
ALBUMIN/GLOB SERPL: 0.6 {RATIO} (ref 1.2–3.5)
ALP SERPL-CCNC: 53 U/L (ref 50–136)
ALT SERPL-CCNC: 19 U/L (ref 12–65)
ANION GAP BLD CALC-SCNC: 9 MMOL/L (ref 7–16)
AST SERPL W P-5'-P-CCNC: 20 U/L (ref 15–37)
BACTERIA SPEC CULT: NORMAL
BASOPHILS # BLD AUTO: 0 K/UL (ref 0–0.2)
BASOPHILS # BLD: 0 % (ref 0–2)
BILIRUB SERPL-MCNC: 0.4 MG/DL (ref 0.2–1.1)
BUN SERPL-MCNC: 12 MG/DL (ref 8–23)
CALCIUM SERPL-MCNC: 7.6 MG/DL (ref 8.3–10.4)
CHLORIDE SERPL-SCNC: 107 MMOL/L (ref 98–107)
CO2 SERPL-SCNC: 28 MMOL/L (ref 21–32)
CREAT SERPL-MCNC: 1.39 MG/DL (ref 0.8–1.5)
DIFFERENTIAL METHOD BLD: ABNORMAL
EOSINOPHIL # BLD: 0.1 K/UL (ref 0–0.8)
EOSINOPHIL NFR BLD: 1 % (ref 0.5–7.8)
ERYTHROCYTE [DISTWIDTH] IN BLOOD BY AUTOMATED COUNT: 13 % (ref 11.9–14.6)
GLOBULIN SER CALC-MCNC: 3.7 G/DL (ref 2.3–3.5)
GLUCOSE BLD STRIP.AUTO-MCNC: 139 MG/DL (ref 65–100)
GLUCOSE BLD STRIP.AUTO-MCNC: 166 MG/DL (ref 65–100)
GLUCOSE BLD STRIP.AUTO-MCNC: 194 MG/DL (ref 65–100)
GLUCOSE BLD STRIP.AUTO-MCNC: 272 MG/DL (ref 65–100)
GLUCOSE SERPL-MCNC: 132 MG/DL (ref 65–100)
HCT VFR BLD AUTO: 23.8 % (ref 41.1–50.3)
HGB BLD-MCNC: 7.9 G/DL (ref 13.6–17.2)
IMM GRANULOCYTES # BLD: 0 K/UL (ref 0–0.5)
IMM GRANULOCYTES NFR BLD AUTO: 0.2 % (ref 0–5)
LYMPHOCYTES # BLD AUTO: 13 % (ref 13–44)
LYMPHOCYTES # BLD: 0.7 K/UL (ref 0.5–4.6)
MAGNESIUM SERPL-MCNC: 2.1 MG/DL (ref 1.8–2.4)
MCH RBC QN AUTO: 27.6 PG (ref 26.1–32.9)
MCHC RBC AUTO-ENTMCNC: 33.2 G/DL (ref 31.4–35)
MCV RBC AUTO: 83.2 FL (ref 79.6–97.8)
MONOCYTES # BLD: 0.4 K/UL (ref 0.1–1.3)
MONOCYTES NFR BLD AUTO: 7 % (ref 4–12)
NEUTS SEG # BLD: 4.6 K/UL (ref 1.7–8.2)
NEUTS SEG NFR BLD AUTO: 79 % (ref 43–78)
PHOSPHATE SERPL-MCNC: 1.8 MG/DL (ref 2.3–3.7)
PLATELET # BLD AUTO: 135 K/UL (ref 150–450)
PMV BLD AUTO: 9 FL (ref 10.8–14.1)
POTASSIUM SERPL-SCNC: 3.6 MMOL/L (ref 3.5–5.1)
PROT SERPL-MCNC: 6 G/DL (ref 6.3–8.2)
RBC # BLD AUTO: 2.86 M/UL (ref 4.23–5.67)
SERVICE CMNT-IMP: NORMAL
SODIUM SERPL-SCNC: 144 MMOL/L (ref 136–145)
TRIGL SERPL-MCNC: 126 MG/DL (ref 35–150)
WBC # BLD AUTO: 5.8 K/UL (ref 4.3–11.1)

## 2017-01-14 PROCEDURE — 84100 ASSAY OF PHOSPHORUS: CPT | Performed by: NURSE PRACTITIONER

## 2017-01-14 PROCEDURE — 65270000029 HC RM PRIVATE

## 2017-01-14 PROCEDURE — 74011250636 HC RX REV CODE- 250/636: Performed by: INTERNAL MEDICINE

## 2017-01-14 PROCEDURE — 74011000250 HC RX REV CODE- 250: Performed by: INTERNAL MEDICINE

## 2017-01-14 PROCEDURE — 74011636637 HC RX REV CODE- 636/637: Performed by: INTERNAL MEDICINE

## 2017-01-14 PROCEDURE — 83735 ASSAY OF MAGNESIUM: CPT | Performed by: NURSE PRACTITIONER

## 2017-01-14 PROCEDURE — 84478 ASSAY OF TRIGLYCERIDES: CPT | Performed by: NURSE PRACTITIONER

## 2017-01-14 PROCEDURE — 74011250636 HC RX REV CODE- 250/636: Performed by: NURSE PRACTITIONER

## 2017-01-14 PROCEDURE — 74011000258 HC RX REV CODE- 258: Performed by: INTERNAL MEDICINE

## 2017-01-14 PROCEDURE — 82962 GLUCOSE BLOOD TEST: CPT

## 2017-01-14 PROCEDURE — 80053 COMPREHEN METABOLIC PANEL: CPT | Performed by: NURSE PRACTITIONER

## 2017-01-14 PROCEDURE — 85025 COMPLETE CBC W/AUTO DIFF WBC: CPT | Performed by: NURSE PRACTITIONER

## 2017-01-14 PROCEDURE — 74011000250 HC RX REV CODE- 250: Performed by: NURSE PRACTITIONER

## 2017-01-14 RX ORDER — LORAZEPAM 2 MG/ML
1 INJECTION INTRAMUSCULAR
Status: DISCONTINUED | OUTPATIENT
Start: 2017-01-14 | End: 2017-01-19 | Stop reason: HOSPADM

## 2017-01-14 RX ORDER — INSULIN LISPRO 100 [IU]/ML
INJECTION, SOLUTION INTRAVENOUS; SUBCUTANEOUS EVERY 6 HOURS
Status: DISCONTINUED | OUTPATIENT
Start: 2017-01-14 | End: 2017-01-19 | Stop reason: HOSPADM

## 2017-01-14 RX ADMIN — CEFEPIME HYDROCHLORIDE 2 G: 2 INJECTION, POWDER, FOR SOLUTION INTRAVENOUS at 05:48

## 2017-01-14 RX ADMIN — INSULIN LISPRO 2 UNITS: 100 INJECTION, SOLUTION INTRAVENOUS; SUBCUTANEOUS at 12:03

## 2017-01-14 RX ADMIN — TBO-FILGRASTIM 480 MCG: 480 INJECTION, SOLUTION SUBCUTANEOUS at 08:54

## 2017-01-14 RX ADMIN — VANCOMYCIN HYDROCHLORIDE 1500 MG: 10 INJECTION, POWDER, LYOPHILIZED, FOR SOLUTION INTRAVENOUS at 06:25

## 2017-01-14 RX ADMIN — SODIUM CHLORIDE: 900 INJECTION, SOLUTION INTRAVENOUS at 11:08

## 2017-01-14 RX ADMIN — INSULIN LISPRO 6 UNITS: 100 INJECTION, SOLUTION INTRAVENOUS; SUBCUTANEOUS at 19:45

## 2017-01-14 RX ADMIN — CEFEPIME HYDROCHLORIDE 2 G: 2 INJECTION, POWDER, FOR SOLUTION INTRAVENOUS at 21:11

## 2017-01-14 RX ADMIN — LORAZEPAM 0.5 MG: 2 INJECTION INTRAMUSCULAR; INTRAVENOUS at 02:32

## 2017-01-14 RX ADMIN — I.V. FAT EMULSION 250 ML: 20 EMULSION INTRAVENOUS at 18:00

## 2017-01-14 RX ADMIN — CALCIUM GLUCONATE: 94 INJECTION, SOLUTION INTRAVENOUS at 18:00

## 2017-01-14 RX ADMIN — Medication 10 ML: at 02:32

## 2017-01-14 RX ADMIN — Medication 10 ML: at 23:49

## 2017-01-14 RX ADMIN — CEFEPIME HYDROCHLORIDE 2 G: 2 INJECTION, POWDER, FOR SOLUTION INTRAVENOUS at 13:39

## 2017-01-14 RX ADMIN — METHYLPREDNISOLONE SODIUM SUCCINATE 40 MG: 40 INJECTION, POWDER, FOR SOLUTION INTRAMUSCULAR; INTRAVENOUS at 23:56

## 2017-01-14 RX ADMIN — ENOXAPARIN SODIUM 40 MG: 100 INJECTION SUBCUTANEOUS at 08:54

## 2017-01-14 RX ADMIN — LORAZEPAM 1 MG: 2 INJECTION INTRAMUSCULAR; INTRAVENOUS at 23:46

## 2017-01-14 RX ADMIN — METHYLPREDNISOLONE SODIUM SUCCINATE 40 MG: 40 INJECTION, POWDER, FOR SOLUTION INTRAMUSCULAR; INTRAVENOUS at 12:03

## 2017-01-14 NOTE — PROGRESS NOTES
Inpatient Hematology / Oncology Progress Note      Admission Date: 2017 10:12 PM  Reason for Admission/Hospital Course: Neutropenic fever (HCC)      24 Hour Events:  Failed swallow eval  NPO  Start TPN  PEG in OR early next week      ROS:  Constitutional: negative for fever, chills, weakness, malaise, fatigue. CV: negative for chest pain, palpitations, edema. Respiratory: negative for dyspnea, +cough, -wheezing. GI:Negative for nausea, abdominal pain, diarrhea. 10 point review of systems is otherwise negative with the exception of the elements mentioned above in the HPI. No Known Allergies    OBJECTIVE:  Patient Vitals for the past 8 hrs:   BP Temp Pulse Resp SpO2   17 0740 142/68 98.3 °F (36.8 °C) (!) 102 22 92 %     Temp (24hrs), Av.3 °F (36.8 °C), Min:97.8 °F (36.6 °C), Max:98.8 °F (37.1 °C)         Physical Exam:  Constitutional: Well developed, well nourished male in no acute distress, sitting comfortably in the bedside chair. Family at bedside   HEENT: Normocephalic and atraumatic. Oropharynx is clear, mucous membranes are moist.Extraocular muscles are intact. Sclerae anicteric. Lymph node   Deferred   Skin Warm and dry. No bruising and no rash noted. No erythema. No pallor. Respiratory Lungs are clear to auscultation bilaterally without wheezes, rales or rhonchi, normal air exchange without accessory muscle use. CVS Normal rate, regular rhythm and normal S1 and S2. No murmurs, gallops, or rubs. Abdomen Soft, nontender and nondistended, normoactive bowel sounds. No palpable mass. No hepatosplenomegaly. Neuro Grossly nonfocal with no obvious sensory or motor deficits. MSK Normal range of motion in general.  No edema and no tenderness. Psych Appropriate mood and affect.         Labs:      Recent Labs      17   0650  17   0420  17   2330   WBC  5.8  1.5*  1.7*   RBC  2.86*  2.93*  3.03*   HGB  7.9*  8.0*  8.4*   HCT  23.8*  23.9*  24.8* MCV  83.2  81.6  81.8   MCH  27.6  27.3  27.7   MCHC  33.2  33.5  33.9   RDW  13.0  12.8  12.6   PLT  135*  105*  114*   GRANS  79*  44  35*   LYMPH  13  32  40   MONOS  7  21*  23*   EOS  1  3  2   BASOS  0  0  0   IG  0.2  0.0  0.0   DF  AUTOMATED  AUTOMATED  AUTOMATED   ANEU  4.6  0.7*  0.6*   ABL  0.7  0.5  0.7   ABM  0.4  0.3  0.4   MAHI  0.1  0.1  0.0   ABB  0.0  0.0  0.0   AIG  0.0  0.0  0.0        Recent Labs      01/14/17   0650  01/13/17   0420  01/11/17   2330   NA  144  145  137   K  3.6  3.4*  3.6   CL  107  107  99   CO2  28  28  30   AGAP  9  10  8   GLU  132*  103*  148*   BUN  12  12  15   CREA  1.39  1.36  1.78*   GFRAA  >60  >60  50*   GFRNA  55*  57*  42*   CA  7.6*  7.8*  7.6*   SGOT  20   --   18   AP  53   --   51   TP  6.0*   --   6.3   ALB  2.3*   --   2.9*   GLOB  3.7*   --   3.4   AGRAT  0.6*   --   0.9*   MG  2.1  1.6*   --    PHOS  1.8*  2.5   --          Imaging:      ASSESSMENT:    Problem List  Date Reviewed: 1/12/2017          Codes Class Noted    * (Principal)Neutropenic fever ([facility] ICD-10-CM: D70.9, R50.81  ICD-9-CM: 288.00, 780.61  1/12/2017        Pancytopenia (HCC) ICD-10-CM: R85.406  ICD-9-CM: 284.19  1/12/2017        Renal insufficiency ICD-10-CM: N28.9  ICD-9-CM: 593.9  1/10/2017        Non-intractable cyclical vomiting with nausea ICD-10-CM: G43. 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 Start TPN. PEG placement first part of next week.       Disposition: Mr. Dailey Moh failed swallow evaluation and is now NPO.  GI was consulted regarding PEG placement as the need was sooner than the planned date later this month. I spoke with GI and due to the complexity of PEG placement and decreased WBC, it will need to in OR which can be done first part of next week. In the interim we will start on TPN and continue with granix. Patient seen, examed and discussed with NP, agree with above history/assessment/plan. 61 y.o.male epiglottis SCC s/p cycle 1 neoadjuvant TCF, admitted for fever, neutropenia, N/V, abd pain and diarrhea, ISI, cough, cover with broad spec abx, follow cultures, give granix, IVF. He failed swallow test for all consistencies, consulted GI for PEG and rec to hold off till [address] improves next week,  supportive TPN and monitor BS, hand gout flare and start solumedrol, cultures negative and wean vanc. ANC 4.6 today,       Pam Diana M.D.   [address]  Office : [phone]  Fax : [phone]

## 2017-01-14 NOTE — PROGRESS NOTES
Problem: Nutrition Deficit  Goal: *Optimize nutritional status  Nutrition F/U  TPN management per nutritional support protocols. Nickey Duane, NP)  Assessment:   · Diet order(s): NPO  · Remains TPN dependent d/t dysphagia associated with SCC of epiglottis. Plan for GT early next week in the OR and once WBCs improve. · Central line access : Port. Pt does receive IVATB for ~90 minutes per day. · K 3.6-WNL after bolus yesterday. Current TPN provides ~122 meq/d  · Phos 1.8 c/w refeeding. Current TPN provides ~30 meq Phos/d. Would benefit from increased phos in TPN. · Mg 2. 1-WNL after Mg bolus yesterday. Current TPN provides ~16 meq/d  · POC Glucoses 125-166 mg/ld-starting to trend up since TPN started last night and Solumedrol started today. Hx of DM on Glucophage and Glucotrol PTA. Has received 2 units SSI thus far today. Current TPN contains no insulin. Expect increased glucoses with once dextrose increased in TPN and with start of steroids, Therefore would benefit from addition of insulin to TPN. · Revised Macronutrient needs:EPR:  grams protein/day (1.2-1.5 grams/kg IBW) (Creat WNL)  · Intake/Comparative Standards: Current TPN: 10%DEX/ 4.25%AA at 85 ml/hr with 250 ml 20% Lipids daily provides 1520 kcal/d (91% of needs), 85 grams of protein/d (100% of needs), 200 grams of CHO/d (does not exceed maximum CHO load) and 2250 ml of total volume/d (100% of needs). Intervention:   Meals and snacks: NPO  Nutritional Supplement Therapy: Electrolyte replacement per nutritional support protocols are active on MAR. Replace Phos per protocol. PN:   1. Change TPN to 2L/d of 15%DEX/ 5%AA at 85 ml/hr with 250 ml 20% Lipids daily provides 1920 kcal/d (100% of needs), 100 grams of protein/d (100% of needs), 300 grams of CHO/d (does not exceed maximum CHO load) and 2250 ml of total volume/d ( 100% of needs). Consider adjust TPN rate for IVATB once TPN at goal and if GT not placed until later next week.   2. Add 10 units insulin/L or ~20 units/d  3. Increase Phos to 30 meq/L or ~61 meq/d. 4. Change POC Glucoses and SSI from ac and hs to every 6hrs  5. Add Mg for tomorrow AM and MWF.      [facility], [address], [address] [place] [address], [address] [place] [address]

## 2017-01-14 NOTE — PROGRESS NOTES
MD/NP  Patient needs pain medicine IV for his left hand. He states it is gout. Please change all his meds to IV. Can he have the ativan 0.5 to 1 mg IV for sleep? Thanks.

## 2017-01-15 LAB
ALBUMIN SERPL BCP-MCNC: 2.5 G/DL (ref 3.2–4.6)
ALBUMIN/GLOB SERPL: 0.7 {RATIO} (ref 1.2–3.5)
ALP SERPL-CCNC: 64 U/L (ref 50–136)
ALT SERPL-CCNC: 20 U/L (ref 12–65)
ANION GAP BLD CALC-SCNC: 10 MMOL/L (ref 7–16)
AST SERPL W P-5'-P-CCNC: 18 U/L (ref 15–37)
BASOPHILS # BLD AUTO: 0.2 K/UL (ref 0–0.2)
BASOPHILS NFR BLD MANUAL: 2 % (ref 0–2)
BILIRUB SERPL-MCNC: 0.3 MG/DL (ref 0.2–1.1)
BUN SERPL-MCNC: 16 MG/DL (ref 8–23)
CALCIUM SERPL-MCNC: 8 MG/DL (ref 8.3–10.4)
CHLORIDE SERPL-SCNC: 107 MMOL/L (ref 98–107)
CO2 SERPL-SCNC: 26 MMOL/L (ref 21–32)
CREAT SERPL-MCNC: 1.37 MG/DL (ref 0.8–1.5)
DIFFERENTIAL METHOD BLD: ABNORMAL
ERYTHROCYTE [DISTWIDTH] IN BLOOD BY AUTOMATED COUNT: 12.9 % (ref 11.9–14.6)
GLOBULIN SER CALC-MCNC: 3.6 G/DL (ref 2.3–3.5)
GLUCOSE BLD STRIP.AUTO-MCNC: 258 MG/DL (ref 65–100)
GLUCOSE BLD STRIP.AUTO-MCNC: 287 MG/DL (ref 65–100)
GLUCOSE SERPL-MCNC: 240 MG/DL (ref 65–100)
HCT VFR BLD AUTO: 23.5 % (ref 41.1–50.3)
HGB BLD-MCNC: 7.8 G/DL (ref 13.6–17.2)
LYMPHOCYTES # BLD: 0.1 K/UL (ref 0.5–4.6)
LYMPHOCYTES NFR BLD MANUAL: 1 % (ref 16–44)
MAGNESIUM SERPL-MCNC: 2.2 MG/DL (ref 1.8–2.4)
MCH RBC QN AUTO: 27.5 PG (ref 26.1–32.9)
MCHC RBC AUTO-ENTMCNC: 33.2 G/DL (ref 31.4–35)
MCV RBC AUTO: 82.7 FL (ref 79.6–97.8)
MONOCYTES # BLD: 0.3 K/UL (ref 0.1–1.3)
MONOCYTES NFR BLD MANUAL: 3 % (ref 3–9)
NEUTS BAND NFR BLD MANUAL: 13 % (ref 0–10)
NEUTS SEG # BLD: 9.4 K/UL (ref 1.7–8.2)
NEUTS SEG NFR BLD MANUAL: 81 % (ref 47–75)
PHOSPHATE SERPL-MCNC: 2.6 MG/DL (ref 2.3–3.7)
PLATELET # BLD AUTO: 160 K/UL (ref 150–450)
PLATELET COMMENTS,PCOM: ADEQUATE
PMV BLD AUTO: 9.2 FL (ref 10.8–14.1)
POTASSIUM SERPL-SCNC: 4.2 MMOL/L (ref 3.5–5.1)
PROT SERPL-MCNC: 6.1 G/DL (ref 6.3–8.2)
RBC # BLD AUTO: 2.84 M/UL (ref 4.23–5.67)
RBC MORPH BLD: ABNORMAL
SODIUM SERPL-SCNC: 143 MMOL/L (ref 136–145)
WBC # BLD AUTO: 10 K/UL (ref 4.3–11.1)

## 2017-01-15 PROCEDURE — 83735 ASSAY OF MAGNESIUM: CPT | Performed by: INTERNAL MEDICINE

## 2017-01-15 PROCEDURE — 74011250636 HC RX REV CODE- 250/636: Performed by: INTERNAL MEDICINE

## 2017-01-15 PROCEDURE — 74011000250 HC RX REV CODE- 250: Performed by: INTERNAL MEDICINE

## 2017-01-15 PROCEDURE — 82962 GLUCOSE BLOOD TEST: CPT

## 2017-01-15 PROCEDURE — 65270000029 HC RM PRIVATE

## 2017-01-15 PROCEDURE — 74011636637 HC RX REV CODE- 636/637: Performed by: INTERNAL MEDICINE

## 2017-01-15 PROCEDURE — 84100 ASSAY OF PHOSPHORUS: CPT | Performed by: INTERNAL MEDICINE

## 2017-01-15 PROCEDURE — 80053 COMPREHEN METABOLIC PANEL: CPT | Performed by: INTERNAL MEDICINE

## 2017-01-15 PROCEDURE — 74011000258 HC RX REV CODE- 258: Performed by: INTERNAL MEDICINE

## 2017-01-15 PROCEDURE — 74011000250 HC RX REV CODE- 250: Performed by: NURSE PRACTITIONER

## 2017-01-15 PROCEDURE — 85025 COMPLETE CBC W/AUTO DIFF WBC: CPT | Performed by: INTERNAL MEDICINE

## 2017-01-15 RX ADMIN — INSULIN LISPRO 6 UNITS: 100 INJECTION, SOLUTION INTRAVENOUS; SUBCUTANEOUS at 18:08

## 2017-01-15 RX ADMIN — LORAZEPAM 1 MG: 2 INJECTION INTRAMUSCULAR; INTRAVENOUS at 03:54

## 2017-01-15 RX ADMIN — Medication 10 ML: at 03:54

## 2017-01-15 RX ADMIN — CALCIUM GLUCONATE: 94 INJECTION, SOLUTION INTRAVENOUS at 17:53

## 2017-01-15 RX ADMIN — CEFEPIME HYDROCHLORIDE 2 G: 2 INJECTION, POWDER, FOR SOLUTION INTRAVENOUS at 05:40

## 2017-01-15 RX ADMIN — METHYLPREDNISOLONE SODIUM SUCCINATE 40 MG: 40 INJECTION, POWDER, FOR SOLUTION INTRAMUSCULAR; INTRAVENOUS at 08:58

## 2017-01-15 RX ADMIN — Medication 5 ML: at 20:43

## 2017-01-15 RX ADMIN — INSULIN LISPRO 2 UNITS: 100 INJECTION, SOLUTION INTRAVENOUS; SUBCUTANEOUS at 00:02

## 2017-01-15 RX ADMIN — I.V. FAT EMULSION 250 ML: 20 EMULSION INTRAVENOUS at 17:53

## 2017-01-15 RX ADMIN — INSULIN LISPRO 6 UNITS: 100 INJECTION, SOLUTION INTRAVENOUS; SUBCUTANEOUS at 12:00

## 2017-01-15 RX ADMIN — INSULIN LISPRO 4 UNITS: 100 INJECTION, SOLUTION INTRAVENOUS; SUBCUTANEOUS at 05:41

## 2017-01-15 RX ADMIN — ENOXAPARIN SODIUM 40 MG: 100 INJECTION SUBCUTANEOUS at 08:58

## 2017-01-15 RX ADMIN — METHYLPREDNISOLONE SODIUM SUCCINATE 40 MG: 40 INJECTION, POWDER, FOR SOLUTION INTRAMUSCULAR; INTRAVENOUS at 20:43

## 2017-01-15 RX ADMIN — Medication 5 ML: at 14:00

## 2017-01-15 NOTE — PROGRESS NOTES
Problem: Nutrition Deficit  Goal: *Optimize nutritional status  Nutrition F/U  TPN management per nutritional support protocols. Hilary Castro NP)  Assessment:   Diet order(s): NPO  · Remains TPN dependent d/t dysphagia associated with SCC of epiglottis. Plan for GT early next week in the OR and once WBCs improve. · Central line access : Port. Pt does receive IVATB for ~90 minutes per day. · Phos 2. 6-WNL after bolus yesterday and Phos increased in TPN last night from ~30 meq Phos/d to ~61 meq/d. · POC Glucoses 194-272 mg/ld-increased since Solumedrol started yesterday and CHO increased in TPN. Benjamín Plough Hx of DM on Glucophage and Glucotrol PTA. Received 10 units SSI yesterday and 2 units SSI thus far today. Insulin added to TPN last night and pt is receiving ~20 units/d. Would benefit from increased insulin in TPN. Would hold off on increasing kcal in TPN  Intake/Comparative Standards: Current TPN: 2L/d of 15%DEX/ 5%AA at 85 ml/hr with 250 ml 20% Lipids daily provides 1920 kcal/d (100% of needs), 100 grams of protein/d (100% of needs), 300 grams of CHO/d (does not exceed maximum CHO load) and 2250 ml of total volume/d ( 100% of needs). Intervention:   Meals and snacks: NPO  Nutritional Supplement Therapy: Electrolyte replacement per nutritional support protocols are active on MAR. PN:   1. Increase insulin to 15 units insulin/L or ~31 units/d  2. Adjust TPN rate to 90 ml/hr to account for  IVATB infusion time.       Ludivina Hatfield, [address], [address] [place] [address]

## 2017-01-15 NOTE — PROGRESS NOTES
Inpatient Hematology / Oncology Progress Note      Admission Date: 2017 10:12 PM  Reason for Admission/Hospital Course: Neutropenic fever (HCC)      24 Hour Events:  Failed swallow eval  NPO  Start TPN  PEG in OR early next week      ROS:  Constitutional: negative for fever, chills, weakness, malaise, fatigue. CV: negative for chest pain, palpitations, edema. Respiratory: negative for dyspnea, +cough, -wheezing. GI:Negative for nausea, abdominal pain, diarrhea. 10 point review of systems is otherwise negative with the exception of the elements mentioned above in the HPI. No Known Allergies    OBJECTIVE:  Patient Vitals for the past 8 hrs:   BP Temp Pulse Resp SpO2 Weight   01/15/17 0809 - - - - - 243 lb 14.4 oz (110.6 kg)   01/15/17 0740 137/68 97.5 °F (36.4 °C) 96 22 95 % -   01/15/17 0546 139/60 97.8 °F (36.6 °C) 94 20 92 % -     Temp (24hrs), Av °F (36.7 °C), Min:97.5 °F (36.4 °C), Max:98.8 °F (37.1 °C)         Physical Exam:  Constitutional: Well developed, well nourished male in no acute distress, sitting comfortably in the bedside chair. Family at bedside   HEENT: Normocephalic and atraumatic. Oropharynx is clear, mucous membranes are moist.Extraocular muscles are intact. Sclerae anicteric. Lymph node   Deferred   Skin Warm and dry. No bruising and no rash noted. No erythema. No pallor. Respiratory Lungs are clear to auscultation bilaterally without wheezes, rales or rhonchi, normal air exchange without accessory muscle use. CVS Normal rate, regular rhythm and normal S1 and S2. No murmurs, gallops, or rubs. Abdomen Soft, nontender and nondistended, normoactive bowel sounds. No palpable mass. No hepatosplenomegaly. Neuro Grossly nonfocal with no obvious sensory or motor deficits. MSK Normal range of motion in general.  No edema and no tenderness. Psych Appropriate mood and affect.         Labs:      Recent Labs      01/15/17   0340  17   0650  17 0420   WBC  10.0  5.8  1.5*   RBC  2.84*  2.86*  2.93*   HGB  7.8*  7.9*  8.0*   HCT  23.5*  23.8*  23.9*   MCV  82.7  83.2  81.6   MCH  27.5  27.6  27.3   MCHC  33.2  33.2  33.5   RDW  12.9  13.0  12.8   PLT  160  135*  105*   GRANS  81*  79*  44   LYMPH  1*  13  32   MONOS  3  7  21*   EOS   --   1  3   BASOS  2  0  0   IG   --   0.2  0.0   DF  AUTOMATED  AUTOMATED  AUTOMATED   ANEU  9.4*  4.6  0.7*   ABL  0.1*  0.7  0.5   ABM  0.3  0.4  0.3   MAHI   --   0.1  0.1   ABB  0.2  0.0  0.0   AIG   --   0.0  0.0        Recent Labs      01/15/17   0340  01/14/17   0650  01/13/17   0420   NA  143  144  145   K  4.2  3.6  3.4*   CL  107  107  107   CO2  26  28  28   AGAP  10  9  10   GLU  240*  132*  103*   BUN  16  12  12   CREA  1.37  1.39  1.36   GFRAA  >60  >60  >60   GFRNA  56*  55*  57*   CA  8.0*  7.6*  7.8*   SGOT  18  20   --    AP  64  53   --    TP  6.1*  6.0*   --    ALB  2.5*  2.3*   --    GLOB  3.6*  3.7*   --    AGRAT  0.7*  0.6*   --    MG  2.2  2.1  1.6*   PHOS  2.6  1.8*  2.5         Imaging:      ASSESSMENT:    Problem List  Date Reviewed: 1/12/2017          Codes Class Noted    * (Principal)Neutropenic fever ([facility] [address] ICD-10-CM: D70.9, R50.81  ICD-9-CM: 288.00, 780.61  1/12/2017        Pancytopenia (HCC) ICD-10-CM: Y76.727  ICD-9-CM: 284.19  1/12/2017        Renal insufficiency ICD-10-CM: N28.9  ICD-9-CM: 593.9  1/10/2017        Non-intractable cyclical vomiting with nausea ICD-10-CM: G43. 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 Start TPN.  PEG placement first part of next week.       Disposition: Mr. Gary Elizabeth failed swallow evaluation and is now NPO. GI was consulted regarding PEG placement as the need was sooner than the planned date later this month. I spoke with GI and due to the complexity of PEG placement and decreased WBC, it will need to in OR which can be done first part of next week. In the interim we will start on TPN and continue with granix. Patient seen, examed and discussed with NP, agree with above history/assessment/plan. 61 y.o.male epiglottis SCC s/p cycle 1 neoadjuvant TCF, admitted for fever, neutropenia, N/V, abd pain and diarrhea, ISI, cough, cover with broad spec abx, follow cultures, give granix, IVF. He failed swallow test for all consistencies, consulted GI for PEG and rec to hold off till [address] improves next week,  supportive TPN and monitor BS, hand gout flare and start solumedrol, cultures negative and wean vanc. ANC normalized, culture negative, dc abx, hand better.       Yuliet Aguilar M.D.   [address], [address] [place] [address]  Office : [phone]  Fax : [phone]

## 2017-01-15 NOTE — PROGRESS NOTES
GI DAILY PROGRESS NOTE    Admit Date:  1/11/2017    Today's Date:  1/15/2017    CC:  Feeding difficulties, Aspiration    Subjective:     Patient anxious to proceed with EGD and PEG and go home. Understands that placement of PEG tube will not improve his ability to take anything po and that tube will need to remain in place for few months even if not required. Medications:   Current Facility-Administered Medications   Medication Dose Route Frequency    TPN ADULT-CENTRAL - dextrose 15% amino acid 5%   IntraVENous QPM    methylPREDNISolone (PF) (SOLU-MEDROL) injection 40 mg  40 mg IntraVENous Q12H    insulin lispro (HUMALOG) injection   SubCUTAneous Q6H    TPN ADULT-CENTRAL - dextrose 15% amino acid 5%   IntraVENous QPM    LORazepam (ATIVAN) injection 1 mg  1 mg IntraVENous Q6H PRN    NUTRITIONAL SUPPORT ELECTROLYTE PRN ORDERS   Does Not Apply PRN    fat emulsion 20% (LIPOSYN, INTRALIPID) infusion 250 mL  250 mL IntraVENous QPM    amLODIPine (NORVASC) tablet 10 mg  10 mg Oral QHS    allopurinol (ZYLOPRIM) tablet 300 mg  300 mg Oral QHS    aspirin delayed-release tablet 81 mg  81 mg Oral DAILY    losartan (COZAAR) tablet 50 mg  50 mg Oral BID    sodium chloride (NS) flush 5 mL  5 mL InterCATHeter Q8H    sodium chloride (NS) flush 5-10 mL  5-10 mL InterCATHeter PRN    acetaminophen (TYLENOL) tablet 650 mg  650 mg Oral Q6H PRN    enoxaparin (LOVENOX) injection 40 mg  40 mg SubCUTAneous Q24H    LORazepam (ATIVAN) tablet 0.5 mg  0.5 mg Oral Q6H PRN    Or    LORazepam (ATIVAN) tablet 1 mg  1 mg Oral Q6H PRN    albuterol-ipratropium (DUO-NEB) 2.5 MG-0.5 MG/3 ML  3 mL Nebulization Q4H PRN    LORazepam (ATIVAN) injection 0.5 mg  0.5 mg IntraVENous Q6H PRN       Review of Systems:  ROS was obtained, with pertinent positives as listed above. No chest pain or SOB. Diet:  NPO.     Objective:   Vitals:  Visit Vitals    /78 (BP 1 Location: Left arm, BP Patient Position: At rest;Sitting)    Pulse 92  Temp 97.9 °F (36.6 °C)    Resp 20    Ht 5' 9\" (1.753 m)    Wt 110.6 kg (243 lb 14.4 oz)    SpO2 95%    BMI 36.02 kg/m2     Intake/Output:     01/13 1901 - 01/15 0700  In: 3507 [I.V.:3507]  Out: 2600 [Urine:2600]  Exam:  General appearance: alert, cooperative, no distress  Lungs: clear to auscultation bilaterally anteriorly  Heart: regular rate and rhythm  Abdomen: soft, non-tender. Bowel sounds normal. No masses, no organomegaly  Extremities:  no cyanosis or edema  Neuro:  alert and oriented, hoarse    Data Review (Labs):    Recent Labs      01/15/17   0340  01/14/17   0650  01/13/17   0420   WBC  10.0  5.8  1.5*   HGB  7.8*  7.9*  8.0*   HCT  23.5*  23.8*  23.9*   PLT  160  135*  105*   MCV  82.7  83.2  81.6   NA  143  144  145   K  4.2  3.6  3.4*   CL  107  107  107   CO2  26  28  28   BUN  16  12  12   CREA  1.37  1.39  1.36   CA  8.0*  7.6*  7.8*   MG  2.2  2.1  1.6*   GLU  240*  132*  103*   AP  64  53   --    SGOT  18  20   --    ALT  20  19   --    TBILI  0.3  0.4   --    ALB  2.5*  2.3*   --    TP  6.1*  6.0*   --        Assessment:     Principal Problem:    Neutropenic fever (HCC) (1/12/2017)    Active Problems:    Squamous cell carcinoma of epiglottis (HCC) (11/23/2016)      Renal insufficiency (1/10/2017)      Pancytopenia ([address] (1/12/2017)    Feeding difficulties with aspiration    Plan:     61 y/o male with epiglottis CA admitted with neutropenic fever. Failed swallow study, positive for aspiration. He was scheduled for PEG placement with Dr. Atif Webber later this month, but given the feeding difficulties, GI was asked to place PEG sooner rather than later. Case was further discussed with anesthesia. Dr. Ugo Manzanares who recommends PEG placement be done in the OR. His WBC counts are improved. Will try to proceed tomorrow. Possible failure to place PEG safely was discussed. Alternative of IR placement of gastrostomy tube was also discussed.     Nino Love MD

## 2017-01-16 ENCOUNTER — ANESTHESIA (OUTPATIENT)
Dept: SURGERY | Age: 62
DRG: 809 | End: 2017-01-16
Payer: COMMERCIAL

## 2017-01-16 ENCOUNTER — ANESTHESIA EVENT (OUTPATIENT)
Dept: SURGERY | Age: 62
DRG: 809 | End: 2017-01-16
Payer: COMMERCIAL

## 2017-01-16 LAB
ALBUMIN SERPL BCP-MCNC: 2.5 G/DL (ref 3.2–4.6)
ALBUMIN/GLOB SERPL: 0.7 {RATIO} (ref 1.2–3.5)
ALP SERPL-CCNC: 88 U/L (ref 50–136)
ALT SERPL-CCNC: 27 U/L (ref 12–65)
ANION GAP BLD CALC-SCNC: 9 MMOL/L (ref 7–16)
AST SERPL W P-5'-P-CCNC: 23 U/L (ref 15–37)
BACTERIA SPEC CULT: NORMAL
BACTERIA SPEC CULT: NORMAL
BASOPHILS # BLD AUTO: 0 K/UL (ref 0–0.2)
BASOPHILS # BLD: 0 % (ref 0–2)
BILIRUB SERPL-MCNC: 0.3 MG/DL (ref 0.2–1.1)
BUN SERPL-MCNC: 25 MG/DL (ref 8–23)
CALCIUM SERPL-MCNC: 8.3 MG/DL (ref 8.3–10.4)
CHLORIDE SERPL-SCNC: 106 MMOL/L (ref 98–107)
CO2 SERPL-SCNC: 27 MMOL/L (ref 21–32)
CREAT SERPL-MCNC: 1.31 MG/DL (ref 0.8–1.5)
DIFFERENTIAL METHOD BLD: ABNORMAL
EOSINOPHIL # BLD: 0 K/UL (ref 0–0.8)
EOSINOPHIL NFR BLD: 0 % (ref 0.5–7.8)
ERYTHROCYTE [DISTWIDTH] IN BLOOD BY AUTOMATED COUNT: 13 % (ref 11.9–14.6)
GLOBULIN SER CALC-MCNC: 3.5 G/DL (ref 2.3–3.5)
GLUCOSE BLD STRIP.AUTO-MCNC: 187 MG/DL (ref 65–100)
GLUCOSE BLD STRIP.AUTO-MCNC: 232 MG/DL (ref 65–100)
GLUCOSE BLD STRIP.AUTO-MCNC: 237 MG/DL (ref 65–100)
GLUCOSE BLD STRIP.AUTO-MCNC: 249 MG/DL (ref 65–100)
GLUCOSE BLD STRIP.AUTO-MCNC: 320 MG/DL (ref 65–100)
GLUCOSE SERPL-MCNC: 298 MG/DL (ref 65–100)
HCT VFR BLD AUTO: 24.8 % (ref 41.1–50.3)
HGB BLD-MCNC: 8.1 G/DL (ref 13.6–17.2)
IMM GRANULOCYTES # BLD: 0 K/UL (ref 0–0.5)
IMM GRANULOCYTES NFR BLD AUTO: 0 % (ref 0–5)
LYMPHOCYTES # BLD AUTO: 9 % (ref 13–44)
LYMPHOCYTES # BLD: 1 K/UL (ref 0.5–4.6)
MAGNESIUM SERPL-MCNC: 2.5 MG/DL (ref 1.8–2.4)
MCH RBC QN AUTO: 27.5 PG (ref 26.1–32.9)
MCHC RBC AUTO-ENTMCNC: 32.7 G/DL (ref 31.4–35)
MCV RBC AUTO: 84.1 FL (ref 79.6–97.8)
MONOCYTES # BLD: 0.2 K/UL (ref 0.1–1.3)
MONOCYTES NFR BLD AUTO: 2 % (ref 4–12)
NEUTS SEG # BLD: 10 K/UL (ref 1.7–8.2)
NEUTS SEG NFR BLD AUTO: 89 % (ref 43–78)
PHOSPHATE SERPL-MCNC: 3.9 MG/DL (ref 2.3–3.7)
PLATELET # BLD AUTO: 217 K/UL (ref 150–450)
PLATELET COMMENTS,PCOM: ADEQUATE
PMV BLD AUTO: 9.6 FL (ref 10.8–14.1)
POTASSIUM SERPL-SCNC: 4.5 MMOL/L (ref 3.5–5.1)
PROT SERPL-MCNC: 6 G/DL (ref 6.3–8.2)
RBC # BLD AUTO: 2.95 M/UL (ref 4.23–5.67)
RBC MORPH BLD: ABNORMAL
SERVICE CMNT-IMP: NORMAL
SERVICE CMNT-IMP: NORMAL
SODIUM SERPL-SCNC: 142 MMOL/L (ref 136–145)
WBC # BLD AUTO: 11.2 K/UL (ref 4.3–11.1)
WBC MORPH BLD: ABNORMAL

## 2017-01-16 PROCEDURE — 74011636637 HC RX REV CODE- 636/637: Performed by: NURSE PRACTITIONER

## 2017-01-16 PROCEDURE — 82962 GLUCOSE BLOOD TEST: CPT

## 2017-01-16 PROCEDURE — 74011000258 HC RX REV CODE- 258: Performed by: NURSE PRACTITIONER

## 2017-01-16 PROCEDURE — 74011250636 HC RX REV CODE- 250/636: Performed by: INTERNAL MEDICINE

## 2017-01-16 PROCEDURE — 80053 COMPREHEN METABOLIC PANEL: CPT | Performed by: NURSE PRACTITIONER

## 2017-01-16 PROCEDURE — 74011000250 HC RX REV CODE- 250: Performed by: INTERNAL MEDICINE

## 2017-01-16 PROCEDURE — 84100 ASSAY OF PHOSPHORUS: CPT | Performed by: NURSE PRACTITIONER

## 2017-01-16 PROCEDURE — 74011250636 HC RX REV CODE- 250/636: Performed by: ANESTHESIOLOGY

## 2017-01-16 PROCEDURE — 85025 COMPLETE CBC W/AUTO DIFF WBC: CPT | Performed by: NURSE PRACTITIONER

## 2017-01-16 PROCEDURE — 77030032490 HC SLV COMPR SCD KNE COVD -B: Performed by: INTERNAL MEDICINE

## 2017-01-16 PROCEDURE — 74011000250 HC RX REV CODE- 250: Performed by: NURSE PRACTITIONER

## 2017-01-16 PROCEDURE — 74011250636 HC RX REV CODE- 250/636: Performed by: NURSE PRACTITIONER

## 2017-01-16 PROCEDURE — 74011636637 HC RX REV CODE- 636/637: Performed by: ANESTHESIOLOGY

## 2017-01-16 PROCEDURE — 74011250636 HC RX REV CODE- 250/636

## 2017-01-16 PROCEDURE — 76010000138 HC OR TIME 0.5 TO 1 HR: Performed by: INTERNAL MEDICINE

## 2017-01-16 PROCEDURE — 76210000006 HC OR PH I REC 0.5 TO 1 HR: Performed by: INTERNAL MEDICINE

## 2017-01-16 PROCEDURE — 94640 AIRWAY INHALATION TREATMENT: CPT

## 2017-01-16 PROCEDURE — 76060000032 HC ANESTHESIA 0.5 TO 1 HR: Performed by: INTERNAL MEDICINE

## 2017-01-16 PROCEDURE — 83735 ASSAY OF MAGNESIUM: CPT | Performed by: NURSE PRACTITIONER

## 2017-01-16 PROCEDURE — 74011636637 HC RX REV CODE- 636/637: Performed by: INTERNAL MEDICINE

## 2017-01-16 PROCEDURE — 74011000250 HC RX REV CODE- 250: Performed by: ANESTHESIOLOGY

## 2017-01-16 PROCEDURE — 74011250637 HC RX REV CODE- 250/637: Performed by: ANESTHESIOLOGY

## 2017-01-16 PROCEDURE — 65270000029 HC RM PRIVATE

## 2017-01-16 RX ORDER — MIDAZOLAM HYDROCHLORIDE 1 MG/ML
INJECTION, SOLUTION INTRAMUSCULAR; INTRAVENOUS AS NEEDED
Status: DISCONTINUED | OUTPATIENT
Start: 2017-01-16 | End: 2017-01-16 | Stop reason: HOSPADM

## 2017-01-16 RX ORDER — METRONIDAZOLE 500 MG/100ML
500 INJECTION, SOLUTION INTRAVENOUS ONCE
Status: COMPLETED | OUTPATIENT
Start: 2017-01-16 | End: 2017-01-16

## 2017-01-16 RX ORDER — LIDOCAINE HYDROCHLORIDE 40 MG/ML
SOLUTION TOPICAL ONCE
Status: COMPLETED | OUTPATIENT
Start: 2017-01-16 | End: 2017-01-16

## 2017-01-16 RX ORDER — CEFAZOLIN SODIUM IN 0.9 % NACL 2 G/50 ML
2 INTRAVENOUS SOLUTION, PIGGYBACK (ML) INTRAVENOUS ONCE
Status: ACTIVE | OUTPATIENT
Start: 2017-01-16 | End: 2017-01-17

## 2017-01-16 RX ORDER — OXYMETAZOLINE HCL 0.05 %
2 SPRAY, NON-AEROSOL (ML) NASAL
Status: COMPLETED | OUTPATIENT
Start: 2017-01-16 | End: 2017-01-16

## 2017-01-16 RX ORDER — HYDROMORPHONE HYDROCHLORIDE 2 MG/ML
0.5 INJECTION, SOLUTION INTRAMUSCULAR; INTRAVENOUS; SUBCUTANEOUS
Status: DISCONTINUED | OUTPATIENT
Start: 2017-01-16 | End: 2017-01-16 | Stop reason: HOSPADM

## 2017-01-16 RX ORDER — PROPOFOL 10 MG/ML
INJECTION, EMULSION INTRAVENOUS AS NEEDED
Status: DISCONTINUED | OUTPATIENT
Start: 2017-01-16 | End: 2017-01-16 | Stop reason: HOSPADM

## 2017-01-16 RX ORDER — GLYCOPYRROLATE 0.2 MG/ML
0.2 INJECTION INTRAMUSCULAR; INTRAVENOUS ONCE
Status: COMPLETED | OUTPATIENT
Start: 2017-01-16 | End: 2017-01-16

## 2017-01-16 RX ORDER — MIDAZOLAM HYDROCHLORIDE 1 MG/ML
2 INJECTION, SOLUTION INTRAMUSCULAR; INTRAVENOUS
Status: DISCONTINUED | OUTPATIENT
Start: 2017-01-16 | End: 2017-01-16 | Stop reason: HOSPADM

## 2017-01-16 RX ORDER — SODIUM CHLORIDE, SODIUM LACTATE, POTASSIUM CHLORIDE, CALCIUM CHLORIDE 600; 310; 30; 20 MG/100ML; MG/100ML; MG/100ML; MG/100ML
75 INJECTION, SOLUTION INTRAVENOUS CONTINUOUS
Status: DISCONTINUED | OUTPATIENT
Start: 2017-01-16 | End: 2017-01-16 | Stop reason: HOSPADM

## 2017-01-16 RX ORDER — SODIUM CHLORIDE 0.9 % (FLUSH) 0.9 %
5-10 SYRINGE (ML) INJECTION AS NEEDED
Status: DISCONTINUED | OUTPATIENT
Start: 2017-01-16 | End: 2017-01-16 | Stop reason: HOSPADM

## 2017-01-16 RX ORDER — OXYCODONE HYDROCHLORIDE 5 MG/1
5 TABLET ORAL
Status: DISCONTINUED | OUTPATIENT
Start: 2017-01-16 | End: 2017-01-16 | Stop reason: HOSPADM

## 2017-01-16 RX ORDER — LIDOCAINE HYDROCHLORIDE 20 MG/ML
INJECTION, SOLUTION EPIDURAL; INFILTRATION; INTRACAUDAL; PERINEURAL AS NEEDED
Status: DISCONTINUED | OUTPATIENT
Start: 2017-01-16 | End: 2017-01-16

## 2017-01-16 RX ORDER — OXYCODONE AND ACETAMINOPHEN 10; 325 MG/1; MG/1
1 TABLET ORAL AS NEEDED
Status: DISCONTINUED | OUTPATIENT
Start: 2017-01-16 | End: 2017-01-16 | Stop reason: HOSPADM

## 2017-01-16 RX ADMIN — PROPOFOL 30 MG: 10 INJECTION, EMULSION INTRAVENOUS at 14:19

## 2017-01-16 RX ADMIN — LORAZEPAM 1 MG: 2 INJECTION INTRAMUSCULAR; INTRAVENOUS at 00:10

## 2017-01-16 RX ADMIN — Medication 5 ML: at 20:54

## 2017-01-16 RX ADMIN — INSULIN LISPRO 8 UNITS: 100 INJECTION, SOLUTION INTRAVENOUS; SUBCUTANEOUS at 00:09

## 2017-01-16 RX ADMIN — MIDAZOLAM HYDROCHLORIDE 2 MG: 1 INJECTION, SOLUTION INTRAMUSCULAR; INTRAVENOUS at 13:58

## 2017-01-16 RX ADMIN — CALCIUM GLUCONATE: 94 INJECTION, SOLUTION INTRAVENOUS at 18:16

## 2017-01-16 RX ADMIN — GLYCOPYRROLATE 0.2 MG: 0.2 INJECTION, SOLUTION INTRAMUSCULAR; INTRAVENOUS at 13:36

## 2017-01-16 RX ADMIN — METHYLPREDNISOLONE SODIUM SUCCINATE 40 MG: 40 INJECTION, POWDER, FOR SOLUTION INTRAMUSCULAR; INTRAVENOUS at 20:54

## 2017-01-16 RX ADMIN — OXYMETAZOLINE HYDROCHLORIDE 2 SPRAY: 5 SPRAY NASAL at 13:36

## 2017-01-16 RX ADMIN — PROPOFOL 30 MG: 10 INJECTION, EMULSION INTRAVENOUS at 14:00

## 2017-01-16 RX ADMIN — LIDOCAINE HYDROCHLORIDE: 40 SOLUTION TOPICAL at 13:28

## 2017-01-16 RX ADMIN — INSULIN LISPRO 4 UNITS: 100 INJECTION, SOLUTION INTRAVENOUS; SUBCUTANEOUS at 09:59

## 2017-01-16 RX ADMIN — INSULIN HUMAN 5 UNITS: 100 INJECTION, SOLUTION PARENTERAL at 13:23

## 2017-01-16 RX ADMIN — SODIUM CHLORIDE, SODIUM LACTATE, POTASSIUM CHLORIDE, AND CALCIUM CHLORIDE: 600; 310; 30; 20 INJECTION, SOLUTION INTRAVENOUS at 13:00

## 2017-01-16 RX ADMIN — SODIUM CHLORIDE, SODIUM LACTATE, POTASSIUM CHLORIDE, AND CALCIUM CHLORIDE: 600; 310; 30; 20 INJECTION, SOLUTION INTRAVENOUS at 14:26

## 2017-01-16 RX ADMIN — METHYLPREDNISOLONE SODIUM SUCCINATE 40 MG: 40 INJECTION, POWDER, FOR SOLUTION INTRAMUSCULAR; INTRAVENOUS at 09:28

## 2017-01-16 RX ADMIN — OXYMETAZOLINE HYDROCHLORIDE 2 SPRAY: 5 SPRAY NASAL at 13:23

## 2017-01-16 RX ADMIN — METRONIDAZOLE 500 MG: 500 INJECTION, SOLUTION INTRAVENOUS at 13:59

## 2017-01-16 RX ADMIN — INSULIN LISPRO 4 UNITS: 100 INJECTION, SOLUTION INTRAVENOUS; SUBCUTANEOUS at 19:11

## 2017-01-16 NOTE — PROGRESS NOTES
Date of Surgery Update:  Nicole Lynn was seen and examined. History and physical has been reviewed. The patient has been examined.  There have been no significant clinical changes since the completion of the originally dated History and Physical.    Signed By: Marceline Cranker, MD     January 16, 2017 1:54 PM

## 2017-01-16 NOTE — CONSULTS
Called about pt after there was difficulty intubating him earlier today for a planned PEG tube- he is patient of Dr. Lakisha Driver w/ supraglottic SCCA and has undergone 2 courses of induction chemo. Plans to start RT soon but is currently hospitalized for neutropenic fever. Needs PEG prior to starting RT and barium swallow study revealed aspiration. I will discuss w/ Dr. Juan Norwood tomorrow about possible trach but will defer all airway management options to him as Mr. Artur Hinojosa is his patient. Pt is stable currently w/ no airway obstruction.      Mendel Law, MD

## 2017-01-16 NOTE — PROGRESS NOTES
Problem: Nutrition Deficit  Goal: *Optimize nutritional status  Nutrition F/U  TPN management per nutritional support protocols. Yolanda Baxter NP)  Assessment:   Diet order(s): NPO  · Remains TPN dependent d/t dysphagia associated with SCC of epiglottis. Patient to receive G tube today. RD to d/c lipids and decrease TPN to 1 L tonight until patient is tolerating TF. · Central line access : Port. TPN rate was adjusted for patient to receive IVATB (cefepime) for ~90 minutes per day. RD acknowledges cefepime has been d/c as of 1/16. · Sodium 142, current TPN contains no sodium  · Potassium continues to trend up to 4.5 today-Patient currently receiving ~120 mEq/L in TPN; patient may benefit from decrease in TPN to 50 mEq/L   · Phos elevated at 3.9 this morning following increase in phos in TPN on 1/14 to ~61 meq/d; Patient may benefit from decrease in phosphorus in TPN to 15 mEq/L  · Mg elevated at 2.5-Patient receiving a total of ~16 mEq Mg in TPN; RD to decrease to ~5 mEq/L  · A.M BMP glucose 298; POC Glucoses 249-320 mg/ld-Hx of DM on Glucophage and Glucotrol PTA. Patient receiving Solu-Medrol and 300 g CHO in TPN; Current TPN has 15 units insulin/L for a total of 30 units insulin (0.1 unit/g CHO), in addition to 18 units SSI yesterday and 8 units so far today. Patient would benefit from an increase in insulin in TPN to 25 units/L, (0.16 units/g CHO) with decrease in total CHO in TPN to 150 g CHO tonight. Intake/Comparative Standards: Current TPN: 2L/d of 15%DEX/ 5%AA at 85 ml/hr with 250 ml 20% Lipids daily provides 1920 kcal/d (100% of needs), 100 grams of protein/d (100% of needs), 300 grams of CHO/d (does not exceed maximum CHO load) and 2250 ml of total volume/d ( 100% of needs). Intervention:   Meals and snacks: NPO  EN: Please consult RD for Tube Feeding management once G tube is placed and placement is verified.    Patient would benefit from glucerna 1.2 at 70 ml/hr to provide 2016 kcal, 101 g PRO, 193 g CHO, 1411 mL fluid. Long term tube feeding: patient would benefit from bolus TF of 7 total cans of glucerna 1.2 per day. Nutritional Supplement Therapy: Electrolyte replacement per nutritional support protocols are active on MAR. PN: Decrease rate of 15%DEX/5%AA to 45 ml/hr to provide a total of 1 L of TPN: 710 kcal, 50 g PRO, 150 g CHO, 1000 mL fluid. D/C lipids  1. Increase insulin to 25 units insulin/L   2. Change POC glucose checks to q6 with SSI  3. Adjust TPN rate to 45ml/hr  4. Decrease KP04 to 15 mEq/L  5. Increase KCl to 35 mEq/L  6.  Decrease Mg to 5 mEq/L     Rocío Jhaveri, MS, [address] [place] [address], [phone]

## 2017-01-16 NOTE — PROGRESS NOTES
Inpatient Hematology / Oncology Progress Note      Admission Date: 2017 10:12 PM  Reason for Admission/Hospital Course: Feeding difficulties [R63.3]      24 Hour Events:  Failed swallow eval  NPO TPN  PEG in OR today      ROS:  Constitutional: negative for fever, chills, weakness, malaise, fatigue. CV: negative for chest pain, palpitations, edema. Respiratory: negative for dyspnea, cough, wheezing. GI:Negative for nausea, abdominal pain, diarrhea. 10 point review of systems is otherwise negative with the exception of the elements mentioned above in the HPI. No Known Allergies    OBJECTIVE:  Patient Vitals for the past 8 hrs:   BP Temp Pulse Resp SpO2 Height Weight   17 1329 - - - - 95 % - -   17 1243 142/72 97.6 °F (36.4 °C) 81 22 95 % 5' 9\" (1.753 m) 243 lb (110.2 kg)   17 0831 140/67 97.5 °F (36.4 °C) 79 20 95 % - -     Temp (24hrs), Av.8 °F (36.6 °C), Min:97.5 °F (36.4 °C), Max:98.2 °F (36.8 °C)         Physical Exam:  Constitutional: Well developed, well nourished male in no acute distress, sitting comfortably in the bedside chair. Family at bedside   HEENT: Normocephalic and atraumatic. Oropharynx is clear, mucous membranes are moist.Extraocular muscles are intact. Sclerae anicteric. Lymph node   Deferred   Skin Warm and dry. No bruising and no rash noted. No erythema. No pallor. Respiratory Lungs are clear to auscultation bilaterally without wheezes, rales or rhonchi, normal air exchange without accessory muscle use. CVS Normal rate, regular rhythm and normal S1 and S2. No murmurs, gallops, or rubs. Abdomen Soft, nontender and nondistended, normoactive bowel sounds. No palpable mass. No hepatosplenomegaly. Neuro Grossly nonfocal with no obvious sensory or motor deficits. MSK Normal range of motion in general.  No edema and no tenderness. Psych Appropriate mood and affect.         Labs:      Recent Labs      17   0410  01/15/17   0340 01/14/17   0650   WBC  11.2*  10.0  5.8   RBC  2.95*  2.84*  2.86*   HGB  8.1*  7.8*  7.9*   HCT  24.8*  23.5*  23.8*   MCV  84.1  82.7  83.2   MCH  27.5  27.5  27.6   MCHC  32.7  33.2  33.2   RDW  13.0  12.9  13.0   PLT  217  160  135*   GRANS  89*  81*  79*   LYMPH  9*  1*  13   MONOS  2*  3  7   EOS  0*   --   1   BASOS  0  2  0   IG  0   --   0.2   DF  AUTOMATED  AUTOMATED  AUTOMATED   ANEU  10.0*  9.4*  4.6   ABL  1.0  0.1*  0.7   ABM  0.2  0.3  0.4   MAHI  0.0   --   0.1   ABB  0.0  0.2  0.0   AIG  0.0   --   0.0        Recent Labs      01/16/17   0410  01/15/17   0340  01/14/17   0650   NA  142  143  144   K  4.5  4.2  3.6   CL  106  107  107   CO2  27  26  28   AGAP  9  10  9   GLU  298*  240*  132*   BUN  25*  16  12   CREA  1.31  1.37  1.39   GFRAA  >60  >60  >60   GFRNA  59*  56*  55*   CA  8.3  8.0*  7.6*   SGOT  23  18  20   AP  88  64  53   TP  6.0*  6.1*  6.0*   ALB  2.5*  2.5*  2.3*   GLOB  3.5  3.6*  3.7*   AGRAT  0.7*  0.7*  0.6*   MG  2.5*  2.2  2.1   PHOS  3.9*  2.6  1.8*         Imaging:      ASSESSMENT:    Problem List  Date Reviewed: 1/16/2017          Codes Class Noted    * (Principal)Neutropenic fever ([facility] [address] ICD-10-CM: D70.9, R50.81  ICD-9-CM: 288.00, 780.61  1/12/2017        Pancytopenia (HCC) ICD-10-CM: U38.277  ICD-9-CM: 284.19  1/12/2017        Renal insufficiency ICD-10-CM: N28.9  ICD-9-CM: 593.9  1/10/2017        Non-intractable cyclical vomiting with nausea ICD-10-CM: G43. 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  Start TPN.   01-16 PEG today     Disposition: Mr. Robin Watts failed swallow evaluation and is now NPO. GI was consulted regarding PEG placement as the need was sooner than the planned date later this month. I spoke with GI and due to the complexity of PEG placement and decreased WBC, it will need to in OR which can be done first part of next week. In the interim we will start on TPN and continue with granix. PEG placement today. Dagmar George NP    [address]  Office : [phone]  Fax : [phone]        Attending Addendum:  I personally evaluated the patient with Dagmar George N.P.,  and agree with the assessment, findings and plan as documented. Appears stable, heart regular without murmur, lungs clear, abdomen benign. He may be able to go home tomorrow.               Jessie Coughlin MD  [facility]  [address]  Office : [phone]  Fax : [phone]

## 2017-01-16 NOTE — ANESTHESIA POSTPROCEDURE EVALUATION
Post-Anesthesia Evaluation and Assessment    Patient: Laila Ordonez MRN: [id number]  SSN: [id number]    YOB: 1955  Age: 62 y.o. Sex: male       Cardiovascular Function/Vital Signs  Visit Vitals    /74 (BP 1 Location: Left arm, BP Patient Position: At rest)    Pulse 96    Temp 36.8 °C (98.2 °F)    Resp 18    Ht 5' 9\" (1.753 m)    Wt 110.2 kg (243 lb)    SpO2 98%    BMI 35.88 kg/m2       Patient is status post general anesthesia for Procedure(s):  PROCEDURE CANCELLED - NOT PERFORMED. Nausea/Vomiting: None    Postoperative hydration reviewed and adequate. Pain:  Pain Scale 1: Numeric (0 - 10) (01/16/17 1440)  Pain Intensity 1: 2 (01/16/17 1440)   Managed    Neurological Status:   Neuro (WDL): Within Defined Limits (01/16/17 1440)  Neuro  Neurologic State: Alert (01/16/17 1440)  Orientation Level: Oriented X4 (01/15/17 0759)  Cognition: Appropriate decision making (01/12/17 1213)  LUE Motor Response: Purposeful (01/16/17 1440)  LLE Motor Response: Purposeful (01/16/17 1440)  RUE Motor Response: Purposeful (01/16/17 1440)  RLE Motor Response: Purposeful (01/16/17 1440)   At baseline    Mental Status and Level of Consciousness: Arousable    Pulmonary Status:   O2 Device: Nasal cannula (01/16/17 1440)   Adequate oxygenation and airway patent    Complications related to anesthesia: None    Post-anesthesia assessment completed.  No concerns    Signed By: Britany Nix MD     January 16, 2017

## 2017-01-16 NOTE — PROGRESS NOTES
TRANSFER - OUT REPORT:    Verbal report given to Preop(name) on Nicole Jade  being transferred to Preop(unit) for ordered procedure       Report consisted of patients Situation, Background, Assessment and   Recommendations(SBAR). Information from the following report(s) SBAR was reviewed with the receiving nurse. Lines:   Venous Access Device Venous Chest Port 12/23/16 Upper chest (subclavicular area, right (Active)   Central Line Being Utilized Yes 1/16/2017  8:30 AM   Criteria for Appropriate Use Irritant/vesicant medication 1/16/2017  8:30 AM   Site Assessment Clean, dry, & intact 1/16/2017  8:30 AM   Date of Last Dressing Change 01/16/17 1/16/2017  8:30 AM   Dressing Status Clean, dry, & intact 1/16/2017  8:30 AM   Dressing Type Disk with Chlorhexadine Gluconate (CHG); Transparent 1/16/2017  8:30 AM   Date Accessed (Medial Site) 01/11/17 1/16/2017  8:30 AM   Access Time (Medial Site) 2330 1/11/2017 11:30 PM   Access Needle Size (Site #1) 21 G 1/11/2017 11:30 PM   Access Needle Length (Medial Site) 1 inch 1/11/2017 11:30 PM   Positive Blood Return (Medial Site) Yes 1/15/2017 12:50 PM   Action Taken (Medial Site) Infusing 1/15/2017  7:59 AM   Alcohol Cap Used No 1/12/2017  8:00 AM        Opportunity for questions and clarification was provided.       Patient transported with:   Monitor

## 2017-01-16 NOTE — PROGRESS NOTES
Speech Pathology Note:    Patient off floor much of this afternoon for procedure which could not be performed due to inability to place ETT. Per documentation, may require trach. No treatment provided this date. Will follow for dysphagia management with pharyngeal strength and coordination exercises. Aaron Garcia MS, CCC-SLP

## 2017-01-16 NOTE — ROUTINE PROCESS
TRANSFER - OUT REPORT:    Verbal report given to Sandy Islas RN on [employer]  being transferred to  for routine progression of care       Report consisted of patients Situation, Background, Assessment and   Recommendations(SBAR). Information from the following report(s) SBAR, OR Summary, Intake/Output and MAR was reviewed with the receiving nurse. Lines:   Venous Access Device Venous Chest Port 12/23/16 Upper chest (subclavicular area, right (Active)   Central Line Being Utilized Yes 1/16/2017  8:30 AM   Criteria for Appropriate Use Irritant/vesicant medication 1/16/2017  8:30 AM   Site Assessment Clean, dry, & intact 1/16/2017  8:30 AM   Date of Last Dressing Change 01/16/17 1/16/2017  8:30 AM   Dressing Status Clean, dry, & intact 1/16/2017  8:30 AM   Dressing Type Disk with Chlorhexadine Gluconate (CHG); Transparent 1/16/2017  8:30 AM   Date Accessed (Medial Site) 01/11/17 1/16/2017  8:30 AM   Access Time (Medial Site) 2330 1/11/2017 11:30 PM   Access Needle Size (Site #1) 21 G 1/11/2017 11:30 PM   Access Needle Length (Medial Site) 1 inch 1/11/2017 11:30 PM   Positive Blood Return (Medial Site) Yes 1/15/2017 12:50 PM   Action Taken (Medial Site) Infusing 1/15/2017  7:59 AM   Alcohol Cap Used No 1/12/2017  8:00 AM       Peripheral IV 01/16/17 Right Hand (Active)   Site Assessment Clean, dry, & intact 1/16/2017 12:45 PM   Phlebitis Assessment 0 1/16/2017 12:45 PM   Infiltration Assessment 0 1/16/2017 12:45 PM   Dressing Status Clean, dry, & intact 1/16/2017 12:45 PM   Dressing Type Tape;Transparent 1/16/2017 12:45 PM   Hub Color/Line Status Green; Infusing 1/16/2017 12:45 PM        Opportunity for questions and clarification was provided. Patient transported with:   O2 @ 2 liters    VTE prophylaxis orders have been written for [employer]. Patient given room number and nurses name. Family updated re: pt status after security code verified.

## 2017-01-16 NOTE — PROGRESS NOTES
GI--EGD/PEG could not even be attempted--anasthesia was unsuccessful in their attempt to place an ETT. They recommend a tracheostomy be performed so that the airway is protected. We will consult ENT.   Thanks Antonio Shannon MD

## 2017-01-16 NOTE — ADDENDUM NOTE
Addendum  created 01/16/17 1533 by Kathe Cruz CRNA    Anesthesia Event edited, Anesthesia Intra Flowsheets edited, Procedure Event Log accessed

## 2017-01-16 NOTE — PERIOP NOTES
TRANSFER - IN REPORT:    Verbal report received from ION Pickett on [employer]  being received from 5th floor for ordered procedure      Report consisted of patients Situation, Background, Assessment and   Recommendations(SBAR). Information from the following report(s) SBAR, Kardex, [facility] and Recent Results was reviewed with the receiving nurse. Opportunity for questions and clarification was provided. Assessment completed upon patients arrival to unit and care assumed.

## 2017-01-16 NOTE — PROGRESS NOTES
TRANSFER - IN REPORT:    Verbal report received from Jeanne(name) on Nicole Jade  being received from [employer]) for routine progression of care      Report consisted of patients Situation, Background, Assessment and   Recommendations(SBAR). Information from the following report(s) SBAR was reviewed with the receiving nurse. Opportunity for questions and clarification was provided. Assessment completed upon patients arrival to unit and care assumed.

## 2017-01-16 NOTE — ANESTHESIA PREPROCEDURE EVALUATION
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           Physical Exam    Airway  Mallampati: III  TM Distance: > 6 cm  Neck ROM: normal range of motion   Mouth opening: Normal     Cardiovascular    Rhythm: regular           Dental  No notable dental hx       Pulmonary                 Abdominal  GI exam deferred       Other Findings            Anesthetic Plan    ASA: 3  Anesthesia type: general          Induction: Intravenous  Anesthetic plan and risks discussed with: Patient      Discussed awake look with Glidescope, possible fiberoptic intubation either nasally or orally, Afrin to each nares, 4% lidocaine aerosol and 0.2 mgs glycopyrolate IV have been ordered so as to optimize conditions for fiberoptic intubation

## 2017-01-17 LAB
ALBUMIN SERPL BCP-MCNC: 2.6 G/DL (ref 3.2–4.6)
ALBUMIN/GLOB SERPL: 0.8 {RATIO} (ref 1.2–3.5)
ALP SERPL-CCNC: 73 U/L (ref 50–136)
ALT SERPL-CCNC: 32 U/L (ref 12–65)
ANION GAP BLD CALC-SCNC: 9 MMOL/L (ref 7–16)
AST SERPL W P-5'-P-CCNC: 22 U/L (ref 15–37)
BASOPHILS # BLD AUTO: 0 K/UL (ref 0–0.2)
BASOPHILS # BLD: 0 % (ref 0–2)
BILIRUB SERPL-MCNC: 0.3 MG/DL (ref 0.2–1.1)
BUN SERPL-MCNC: 30 MG/DL (ref 8–23)
CALCIUM SERPL-MCNC: 8.4 MG/DL (ref 8.3–10.4)
CHLORIDE SERPL-SCNC: 108 MMOL/L (ref 98–107)
CO2 SERPL-SCNC: 27 MMOL/L (ref 21–32)
CREAT SERPL-MCNC: 1.34 MG/DL (ref 0.8–1.5)
DIFFERENTIAL METHOD BLD: ABNORMAL
EOSINOPHIL # BLD: 0 K/UL (ref 0–0.8)
EOSINOPHIL NFR BLD: 0 % (ref 0.5–7.8)
ERYTHROCYTE [DISTWIDTH] IN BLOOD BY AUTOMATED COUNT: 13.2 % (ref 11.9–14.6)
GLOBULIN SER CALC-MCNC: 3.4 G/DL (ref 2.3–3.5)
GLUCOSE BLD STRIP.AUTO-MCNC: 188 MG/DL (ref 65–100)
GLUCOSE BLD STRIP.AUTO-MCNC: 214 MG/DL (ref 65–100)
GLUCOSE SERPL-MCNC: 204 MG/DL (ref 65–100)
HCT VFR BLD AUTO: 25.1 % (ref 41.1–50.3)
HGB BLD-MCNC: 8.2 G/DL (ref 13.6–17.2)
IMM GRANULOCYTES # BLD: 0.1 K/UL (ref 0–0.5)
LYMPHOCYTES # BLD AUTO: 10 % (ref 13–44)
LYMPHOCYTES # BLD: 0.6 K/UL (ref 0.5–4.6)
MAGNESIUM SERPL-MCNC: 2.4 MG/DL (ref 1.8–2.4)
MCH RBC QN AUTO: 27.4 PG (ref 26.1–32.9)
MCHC RBC AUTO-ENTMCNC: 32.7 G/DL (ref 31.4–35)
MCV RBC AUTO: 83.9 FL (ref 79.6–97.8)
MONOCYTES # BLD: 0.5 K/UL (ref 0.1–1.3)
MONOCYTES NFR BLD AUTO: 8 % (ref 4–12)
NEUTS SEG # BLD: 5.2 K/UL (ref 1.7–8.2)
NEUTS SEG NFR BLD AUTO: 82 % (ref 43–78)
PHOSPHATE SERPL-MCNC: 4.8 MG/DL (ref 2.3–3.7)
PLATELET # BLD AUTO: 222 K/UL (ref 150–450)
PLATELET COMMENTS,PCOM: ADEQUATE
PMV BLD AUTO: 9.6 FL (ref 10.8–14.1)
POTASSIUM SERPL-SCNC: 4.6 MMOL/L (ref 3.5–5.1)
PROT SERPL-MCNC: 6 G/DL (ref 6.3–8.2)
RBC # BLD AUTO: 2.99 M/UL (ref 4.23–5.67)
RBC MORPH BLD: ABNORMAL
SODIUM SERPL-SCNC: 144 MMOL/L (ref 136–145)
WBC # BLD AUTO: 6.4 K/UL (ref 4.3–11.1)
WBC MORPH BLD: ABNORMAL

## 2017-01-17 PROCEDURE — 85025 COMPLETE CBC W/AUTO DIFF WBC: CPT | Performed by: NURSE PRACTITIONER

## 2017-01-17 PROCEDURE — 74011636637 HC RX REV CODE- 636/637: Performed by: NURSE PRACTITIONER

## 2017-01-17 PROCEDURE — 74011250637 HC RX REV CODE- 250/637: Performed by: INTERNAL MEDICINE

## 2017-01-17 PROCEDURE — 83735 ASSAY OF MAGNESIUM: CPT | Performed by: NURSE PRACTITIONER

## 2017-01-17 PROCEDURE — 74011250636 HC RX REV CODE- 250/636: Performed by: NURSE PRACTITIONER

## 2017-01-17 PROCEDURE — 36430 TRANSFUSION BLD/BLD COMPNT: CPT

## 2017-01-17 PROCEDURE — 74011636637 HC RX REV CODE- 636/637: Performed by: INTERNAL MEDICINE

## 2017-01-17 PROCEDURE — 74011000258 HC RX REV CODE- 258: Performed by: NURSE PRACTITIONER

## 2017-01-17 PROCEDURE — 86900 BLOOD TYPING SEROLOGIC ABO: CPT | Performed by: NURSE PRACTITIONER

## 2017-01-17 PROCEDURE — 74011000250 HC RX REV CODE- 250: Performed by: NURSE PRACTITIONER

## 2017-01-17 PROCEDURE — 74011250636 HC RX REV CODE- 250/636: Performed by: INTERNAL MEDICINE

## 2017-01-17 PROCEDURE — 82962 GLUCOSE BLOOD TEST: CPT

## 2017-01-17 PROCEDURE — 84100 ASSAY OF PHOSPHORUS: CPT | Performed by: NURSE PRACTITIONER

## 2017-01-17 PROCEDURE — 80053 COMPREHEN METABOLIC PANEL: CPT | Performed by: NURSE PRACTITIONER

## 2017-01-17 PROCEDURE — P9040 RBC LEUKOREDUCED IRRADIATED: HCPCS | Performed by: NURSE PRACTITIONER

## 2017-01-17 PROCEDURE — 92526 ORAL FUNCTION THERAPY: CPT

## 2017-01-17 PROCEDURE — 65270000029 HC RM PRIVATE

## 2017-01-17 PROCEDURE — 86923 COMPATIBILITY TEST ELECTRIC: CPT | Performed by: NURSE PRACTITIONER

## 2017-01-17 RX ORDER — DIPHENHYDRAMINE HCL 25 MG
25 CAPSULE ORAL
Status: DISCONTINUED | OUTPATIENT
Start: 2017-01-17 | End: 2017-01-19 | Stop reason: HOSPADM

## 2017-01-17 RX ORDER — SODIUM CHLORIDE 9 MG/ML
250 INJECTION, SOLUTION INTRAVENOUS AS NEEDED
Status: DISCONTINUED | OUTPATIENT
Start: 2017-01-17 | End: 2017-01-19 | Stop reason: HOSPADM

## 2017-01-17 RX ADMIN — METHYLPREDNISOLONE SODIUM SUCCINATE 40 MG: 40 INJECTION, POWDER, FOR SOLUTION INTRAMUSCULAR; INTRAVENOUS at 20:47

## 2017-01-17 RX ADMIN — ACETAMINOPHEN 650 MG: 325 TABLET, FILM COATED ORAL at 22:15

## 2017-01-17 RX ADMIN — METHYLPREDNISOLONE SODIUM SUCCINATE 40 MG: 40 INJECTION, POWDER, FOR SOLUTION INTRAMUSCULAR; INTRAVENOUS at 09:00

## 2017-01-17 RX ADMIN — INSULIN LISPRO 2 UNITS: 100 INJECTION, SOLUTION INTRAVENOUS; SUBCUTANEOUS at 00:00

## 2017-01-17 RX ADMIN — CALCIUM GLUCONATE: 94 INJECTION, SOLUTION INTRAVENOUS at 18:18

## 2017-01-17 RX ADMIN — Medication 5 ML: at 20:47

## 2017-01-17 RX ADMIN — SOYBEAN OIL 250 ML: 20 INJECTION, SOLUTION INTRAVENOUS at 18:18

## 2017-01-17 RX ADMIN — DIPHENHYDRAMINE HYDROCHLORIDE 25 MG: 25 CAPSULE ORAL at 22:15

## 2017-01-17 RX ADMIN — INSULIN LISPRO 4 UNITS: 100 INJECTION, SOLUTION INTRAVENOUS; SUBCUTANEOUS at 09:00

## 2017-01-17 NOTE — PROGRESS NOTES
END OF SHIFT NOTE:    Intake/Output      Voiding: YES  Catheter: NO  Drain:              Stool:  0 occurrences. Emesis:  0 occurrences. VITAL SIGNS  Patient Vitals for the past 12 hrs:   Temp Pulse Resp BP SpO2   01/16/17 1906 98.3 °F (36.8 °C) 67 20 127/82 95 %   01/16/17 1605 98.4 °F (36.9 °C) 76 18 144/75 96 %   01/16/17 1540 98 °F (36.7 °C) 80 12 128/68 98 %   01/16/17 1529 - 82 12 129/66 98 %   01/16/17 1524 - 78 12 124/66 98 %   01/16/17 1519 - 72 12 125/65 -   01/16/17 1514 - 90 12 124/73 98 %   01/16/17 1509 - 84 12 122/68 -   01/16/17 1504 - 84 14 122/69 99 %   01/16/17 1459 - 84 14 122/66 98 %   01/16/17 1454 - 84 14 120/64 -   01/16/17 1449 - 90 16 122/65 98 %   01/16/17 1444 - 91 16 123/67 98 %   01/16/17 1440 98.2 °F (36.8 °C) 96 18 123/74 98 %   01/16/17 1329 - - - - 95 %   01/16/17 1243 97.6 °F (36.4 °C) 81 22 142/72 95 %   01/16/17 0831 97.5 °F (36.4 °C) 79 20 140/67 95 %       Pain Assessment  Pain 1  Pain Scale 1: Numeric (0 - 10) (01/16/17 1540)  Pain Intensity 1: 0 (01/16/17 1540)  Patient Stated Pain Goal: 0 (01/16/17 1540)  Pain Reassessment 1: Yes (01/16/17 1540)  Pain Location 1: Mouth (01/16/17 1440)  Pain Description 1: Sore (01/16/17 1440)    Ambulating  YES    Additional Information:     Shift report given to oncoming nurse at the bedside.     Jeremiah Schwab, RN

## 2017-01-17 NOTE — PROGRESS NOTES
Inpatient Hematology / Oncology Progress Note      Admission Date: 2017 10:12 PM  Reason for Admission/Hospital Course: Feeding difficulties [R63.3]      24 Hour Events:  Needs trach to have PEG  Surgical coordinating timing  Continues to crave Pepsi- advised against it    ROS:  Constitutional: negative for fever, chills, weakness, malaise, fatigue. CV: negative for chest pain, palpitations, edema. Respiratory: negative for dyspnea, cough, wheezing. GI:Negative for nausea, abdominal pain, diarrhea. 10 point review of systems is otherwise negative with the exception of the elements mentioned above in the HPI. No Known Allergies    OBJECTIVE:  Patient Vitals for the past 8 hrs:   BP Temp Pulse Resp SpO2 Weight   17 0807 119/78 97.8 °F (36.6 °C) 79 18 96 % -   17 0332 123/62 98.3 °F (36.8 °C) 70 18 94 % 242 lb 11.2 oz (110.1 kg)     Temp (24hrs), Av °F (36.7 °C), Min:97.3 °F (36.3 °C), Max:98.4 °F (36.9 °C)         Physical Exam:  Constitutional: Well developed, well nourished male in no acute distress, sitting comfortably in the bedside chair. Family at bedside   HEENT: Normocephalic and atraumatic. Oropharynx is clear, mucous membranes are moist.Extraocular muscles are intact. Sclerae anicteric. Lymph node   Deferred   Skin Warm and dry. No bruising and no rash noted. No erythema. No pallor. Respiratory Lungs are clear to auscultation bilaterally without wheezes, rales or rhonchi, normal air exchange without accessory muscle use. CVS Normal rate, regular rhythm and normal S1 and S2. No murmurs, gallops, or rubs. Abdomen Soft, nontender and nondistended, normoactive bowel sounds. No palpable mass. No hepatosplenomegaly. Neuro Grossly nonfocal with no obvious sensory or motor deficits. MSK Normal range of motion in general.  No edema and no tenderness. Psych Appropriate mood and affect.         Labs:      Recent Labs      17   0415  17   0410 01/15/17   0340   WBC  6.4  11.2*  10.0   RBC  2.99*  2.95*  2.84*   HGB  8.2*  8.1*  7.8*   HCT  25.1*  24.8*  23.5*   MCV  83.9  84.1  82.7   MCH  27.4  27.5  27.5   MCHC  32.7  32.7  33.2   RDW  13.2  13.0  12.9   PLT  222  217  160   GRANS  82*  89*  81*   LYMPH  10*  9*  1*   MONOS  8  2*  3   EOS  0*  0*   --    BASOS  0  0  2   IG   --   0   --    DF  AUTOMATED  AUTOMATED  AUTOMATED   ANEU  5.2  10.0*  9.4*   ABL  0.6  1.0  0.1*   ABM  0.5  0.2  0.3   MAHI  0.0  0.0   --    ABB  0.0  0.0  0.2   AIG  0.1  0.0   --         Recent Labs      01/17/17   0415  01/16/17   0410  01/15/17   0340   NA  144  142  143   K  4.6  4.5  4.2   CL  108*  106  107   CO2  27  27  26   AGAP  9  9  10   GLU  204*  298*  240*   BUN  30*  25*  16   CREA  1.34  1.31  1.37   GFRAA  >60  >60  >60   GFRNA  58*  59*  56*   CA  8.4  8.3  8.0*   SGOT  22  23  18   AP  73  88  64   TP  6.0*  6.0*  6.1*   ALB  2.6*  2.5*  2.5*   GLOB  3.4  3.5  3.6*   AGRAT  0.8*  0.7*  0.7*   MG  2.4  2.5*  2.2   PHOS  4.8*  3.9*  2.6         Imaging:      ASSESSMENT:    Problem List  Date Reviewed: 1/16/2017          Codes Class Noted    * (Principal)Neutropenic fever ([facility] [address] ICD-10-CM: D70.9, R50.81  ICD-9-CM: 288.00, 780.61  1/12/2017        Pancytopenia (HCC) ICD-10-CM: U18.745  ICD-9-CM: 284.19  1/12/2017        Renal insufficiency ICD-10-CM: N28.9  ICD-9-CM: 593.9  1/10/2017        Non-intractable cyclical vomiting with nausea ICD-10-CM: G43. 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  Start TPN.   01-16 PEG today  01-17 Unable to place PEG due to unable to intubate     Disposition: OR unable to place PEG due to unable to intubate. Ongoing discussion regarding temporary trach then PEG therefore, continue with TPN/NPO. We will transfuse 2 units today for upcoming surgery. CHANCE Rowell  [address] [place] [address]  Office : [phone]  Fax : [phone]        Attending Addendum:  I personally evaluated the patient with Enriqueta Vinson, N.P.,  and agree with the assessment, findings and plan as documented. His PEG tube will hopefully be placed tomorrow.               Jessie Coughlin MD  [address]  Office : [phone]  Fax : [phone]

## 2017-01-17 NOTE — PROGRESS NOTES
Problem: Nutrition Deficit  Goal: *Optimize nutritional status  Nutrition F/U  TPN management per nutritional support protocols. Reagan Smith, CHANCE)  Assessment:   Diet order(s): NPO  · Remains TPN dependent d/t dysphagia associated with SCC of epiglottis. G tube was unable to be placed yesterday due to inability to intubate. Plan for trach and G tube placement, potentially tomorrow or Thursday. RD d/c lipids and decreased TPN to 1 L last night with anticipation of G tube placement yesterday. RD to increase rate of TPN, add lipids, to meet nutrient requirements while awaiting G tube placement. · Central line access : Port. · Sodium trending up to 144 with decrease in fluids last night; current TPN contains no sodium-RD expects sodium to improve with increased fluids tonight. · Potassium continues to trend up to 4.6 today following decrease in total potassium in TPN to 50 mEq/L last night. RD to decrease tonight. · Phos continues to remain elevated at 4.8 today despite decrease in phosphorus last night; RD to remove phosphorus tonight from TPN  · Mg WNL at 2.4 today  · Blood sugars improved with increase in insulin in TPN last night to a total of 25 units (0.16 units insulin/g CHO), in addition to a total of 16 units SSI yesterday, 4 units so far today. · A.M BMP glucose 204; POC Glucoses 187-249 mg/ld-Hx of DM on Glucophage and Glucotrol PTA. Patient receiving Solu-Medrol and 150g CHO in TPN  Intake/Comparative Standards: Current TPN 15%DEX/5%AA to 45 ml/hr to provide a total of 1 L of TPN: 710 kcal (42% kcal needs), 50 g PRO (57% protein needs), 150 g CHO (does not exceed max CHO load), 1000 mL fluid (60% fluid needs). Intervention:   Meals and snacks: NPO  EN: Please consult RD for Tube Feeding management once G tube is placed and placement is verified. Patient would benefit from glucerna 1.2 at 70 ml/hr to provide 2016 kcal, 101 g PRO, 193 g CHO, 1411 mL fluid.    Long term tube feeding: patient would benefit from bolus TF of 7 total cans of glucerna 1.2 per day. Nutritional Supplement Therapy: Electrolyte replacement per nutritional support protocols are active on MAR. PN: Increase rate of TPN to provide 2L/d of 15%DEX/ 5%AA at 85 ml/hr; Provide 250 ml 20% Lipids daily provides 1920 kcal/d (100% of needs), 100 grams of protein/d (100% of needs), 300 grams of CHO/d (does not exceed maximum CHO load) and 2250 ml of total volume/d ( 100% of needs). D/C lipids  1. Increase insulin to 35 units insulin/L  2. Adjust TPN rate to 85 ml/hr  3. Remove phosphorus from TPN  4. Provide 20 mEq/L KCl for a total of 40 mEq KCl  5.  Decrease Mg to 3 mEq/L to provide a total of 6 mEq Mg      Prince Alfredo, MS, [address] [place] [address], [phone]

## 2017-01-17 NOTE — PROGRESS NOTES
Problem: Dysphagia (Adult)  Goal: *Acute Goals and Plan of Care (Insert Text)  STG: Patient will perform laryngeal strengthening exercise x10 each with 80% accuracy. LTG: Patient will be independent performing laryngeal strengthening exercises with 95% accuracy. Speech language pathology: bedside swallow: Daily Note 1    NAME/AGE/GENDER: Cele Pinon is a 62 y.o. male  DATE: 1/17/2017  PRIMARY DIAGNOSIS: Feeding difficulties [R63.3]  Procedure(s) (LRB):  PROCEDURE CANCELLED - NOT PERFORMED (N/A) 1 Day Post-Op  ICD-10: Treatment Diagnosis: Pharyngeal dysphagia (R13.13)  INTERDISCIPLINARY COLLABORATION: none  PRECAUTIONS/ALLERGIES: Review of patient's allergies indicates no known allergies. ASSESSMENT/PLAN OF CARE:Patient requesting Pepsi. Re-educated patient/wife on reason for strict NPO and results of Modified Barium Swallow (MBS) performed last week. Patient verbalizes understanding, but continues to ask for \"one last Pepsi. \" Patient's wife verbalizes \"This is important, Santos. You can't have it. \" Patient instructed in laryngeal exercises and able to return demonstration. Patient given written copy of exercises. Per notes, patient to have trach and PEG placed tomorrow or Thurs. Instructed patient that tracheostomy will further affect laryngeal mobility and it is very important to maintain NPO. Patient reports trach will be temporary and will be removed after PEG is placed. Instructed patient that NPO status will remain the same even upon discharge from acute setting.?????? ? ? This section established at most recent assessment??????????  RECOMMENDATIONS AND PLANNED INTERVENTIONS (Benefits and precautions of therapy have been discussed with the patient.):  · NPO with alternative means of nutrition  MEDICATIONS:  · Non-oral    OTHER RECOMMENDATIONS (including follow up treatment recommendations):    · Family training/education  · Laryngeal exercises  · Patient education  FREQUENCY/DURATION: Continue to follow patient 3 times a week until goals met. RECOMMENDED REHABILITATION/EQUIPMENT: (at time of discharge pending progress):   Continue Skilled Therapy. SUBJECTIVE:   \"Just one last Pepsi today to get me through the 4 months to come with no Pepsi. \"  History of Present Injury/Illness: Mr. Laurie Gagnon  has a past medical history of GERD (gastroesophageal reflux disease); Gout; Hypertension; Morbid obesity ([facility] [address] Squamous cell carcinoma of epiglottis (HCC) (11/23/2016); and Type 2 diabetes mellitus ([facility] [address] He also  has a past surgical history that includes colonoscopy (2016) and other surgical (Left). Present Symptoms: epiglottic cancer; nausea/vomiting  Pain Intensity 1: 0  Pain Location 1: Mouth    Current Dietary Status:  NPO; TPN    Social History/Home Situation:    Home Environment: Private residence  # Steps to Enter: 4  One/Two Story Residence: One story  Living Alone: No  Support Systems: Spouse/Significant Other/Partner, Child(lizette)  Patient Expects to be Discharged to[de-identified] Private residence  Current DME Used/Available at Home: None  OBJECTIVE:     Cognitive/Communication Status:  Mental Status  Neurologic State: Alert  Orientation Level: Oriented X4  Cognition: Appropriate decision making  Perception: Appears intact  Perseveration: No perseveration noted  Safety/Judgement: Insight into deficits    Oral Assessment:  Oral Assessment  Labial: No impairment  Dentition: Natural  Oral Hygiene: adequate  Lingual: No impairment  Velum: No impairment    Vocal Quality: hoarse    Patient Viewed: Patient Position: Upright in chair  Film Views: Lateral, Fluoro    Oral Prepatory:  The patient was given the following: Consistency Presented:  Thin liquid, Nectar thick liquid, Pudding  How Presented: SLP-fed/presented, Spoon    Oral Phase:  Bolus Acceptance: No impairment  Bolus Formation/Control: No impairment  Propulsion: No impairment     Oral Residue: None  Initiation of Swallow: No impairment  Oral Phase Severity: No impairment    Pharyngeal Phase:  Timing: No impairment  Decreased Tongue Base Retraction?: No  Laryngeal Elevation: Minimal movement of larynx/epiglottis, Incomplete laryngeal closure, No closure, Reduced excursion with laryngeal vestibule gap, Inadequate epiglottic inversion     Aspiration/Timing: Silent , Before, From initial swallow  Aspiration/Penetration Score: 8 (Aspiration-Contrast passes cords/glottis with no effort to eject, ie/silent aspiration)     Pharyngeal Dysfunction: Decreased elevation/closure  Pharyngeal Phase Severity: Severe       Dysphagia Activities: Activities/Procedures listed utilized to improve progress in swallow strength and swallow function. Required minimal cueing to improve swallow safety. Tool Used: Dysphagia Outcome and Severity Scale (GUI)    Score Comments   Normal Diet  [] 7 With no strategies or extra time needed       Functional Swallow  [] 6 May have mild oral or pharyngeal delay       Mild Dysphagia    [] 5 Which may require one diet consistency restricted (those who demonstrate penetration which is entirely cleared on MBS would be included)   Mild-Moderate Dysphagia  [] 4 With 1-2 diet consistencies restricted       Moderate Dysphagia  [] 3 With 2 or more diet consistencies restricted       Moderately Severe Dysphagia  [] 2 With partial PO strategies (trials with ST only)       Severe Dysphagia  [x] 1 With inability to tolerate any PO safely          Score:  Initial: 1 Most Recent: X (Date: -- )   Interpretation of Tool: The Dysphagia Outcome and Severity Scale (GUI) is a simple, easy-to-use, 7-point scale developed to systematically rate the functional severity of dysphagia based on objective assessment and make recommendations for diet level, independence level, and type of nutrition. Score 7 6 5 4 3 2 1   Modifier CH CI CJ CK CL CM CN   ?  Swallowing:     - CURRENT STATUS: CN - 100% impaired, limited or restricted    - GOAL STATUS:  CM - 80%-99% impaired, limited or restricted    - D/C STATUS:  ---------------To be determined---------------  Payor: BLUE CROSS / Plan[place] [employer] LAM / Product Type: LAM /     TREATMENT:    (In addition to Assessment/Re-Assessment sessions the following treatments were rendered)  Dysphagia Activities: Activities/Procedures listed utilized to improve progress in swallow strength and swallow function. Required minimal cueing to perform laryngeal exercises. MODALITIES:                                                                    ORAL MOTOR  EXERCISES:                                                                                                                                                                      LARYNGEAL / PHARYNGEAL EXERCISES:           Effortful Swallow: Yes  Reps : 5  Sets : 1  Hard Glottal Attack: Yes  Reps : 5  Sets : 2              Look Up at Ceiling/Gargle: Yes  Reps : 5  Sets : 1  Mary: Yes  Reps : 5  Sets : 1  Mendelsohn Maneuver: Yes  Reps : 5  Sets : 1 Open Mouth Wide/Yawn: Yes  Reps : 5  Sets : 1           Sing \"EEE\": Yes  Reps : 5  Sets : 1                    Sustained \"ah\": Yes  Sets : 1  Reps : 5  Tongue Back & Hold: Yes  Reps : 5  Sets : 1     __________________________________________________________________________________________________  Safety:   After treatment position/precautions:  · Up in chair  · Family at bedside  Treatment Assessment:    Progression/Medical Necessity:   · Patient is expected to demonstrate progress in swallow strength to improve swallow safety. Compliance with Program/Exercises: Will assess as treatment progresses. Reason for Continuation of Services/Other Comments:  · Patient continues to require skilled intervention due to aspiration and NPO status. Recommendations/Intent for next treatment session: \"Treatment next visit will focus on laryngeal exercises\".     Total Treatment Duration:  Time In: 1115  Time Out: 1619 Bin Edward, MA, CCC-SLP

## 2017-01-17 NOTE — ADT AUTH CERT NOTES
Utilization Review           Medical Oncology 5 [address] - [facility] Day 5 (1/16/2017) by Gerardo Flower RN        Review Entered Review Status       1/16/2017 Completed       Details              Care Day: 5 Care Date: 1/16/2017 Level of Care: Inpatient Floor       Guideline Day 3        Level Of Care       (X) * Activity level acceptable       ( ) * Complete discharge planning              Clinical Status       (X) * Pain and nausea absent or adequately managed       (X) * Temperature status acceptable       (X) * No infection, or status acceptable       (X) * No neutropenia, or status acceptable       (X) * Abdominal status acceptable       (X) * Mucositis absent or adequately resolved       (X) * Diarrhea absent or adequately controlled       ( ) * Blood cell count acceptable       ( ) * Hematologic complications absent or stabilized       (X) * Neurologic status acceptable       (X) * Electrolyte status acceptable       (X) * Tumor lysis absent or resolved       (X) * Malignant effusions absent or adequately controlled       (X) * Pathologic fracture absent or stabilized       ( ) * General Discharge Criteria met              Interventions       ( ) * Intake acceptable       ( ) * No inpatient interventions needed                                   * Milestone              Additional Notes       CONT STAY REVIEW FOR  1/16/17              Gastroenterology Progress Notes              GI--EGD/PEG could not even be attempted--anasthesia was unsuccessful in their attempt to place an ETT. They recommend a tracheostomy be performed so that the airway is protected.  We will consult ENT       WBC: 11.2 (H)       RBC: 2.95 (L)       HGB: 8.1 (L)       HCT: 24.8 (L)       Glucose: 298 (H)       NSR, VSS, 2L NC, SCDS, I&O, GLUCS Q 6,  NPO,  SSI,        ATIVAN IV, SOLUMEDROL IV Q 12, LIPIDS IV Q PM,         MAXIPIME IV Q 8, TPN Q PM, INSULIN IV, FLAGYL IV, LR 75/HR, AFRIN NASAL                  Medical Oncology [facility] - Care Day 4 (1/15/2017) by Fatoumata Richardson RN        Review Entered Review Status       1/16/2017 Completed       Details              Care Day: 4 Care Date: 1/15/2017 Level of Care: Inpatient Floor       Guideline Day 3        Level Of Care       (X) * Activity level acceptable       ( ) * Complete discharge planning              Clinical Status       (X) * Pain and nausea absent or adequately managed       (X) * Temperature status acceptable       (X) * No infection, or status acceptable       (X) * No neutropenia, or status acceptable       (X) * Abdominal status acceptable       (X) * Mucositis absent or adequately resolved       (X) * Diarrhea absent or adequately controlled       ( ) * Blood cell count acceptable       ( ) * Hematologic complications absent or stabilized       (X) * Neurologic status acceptable       (X) * Electrolyte status acceptable       (X) * Tumor lysis absent or resolved       (X) * Malignant effusions absent or adequately controlled       (X) * Pathologic fracture absent or stabilized       ( ) * General Discharge Criteria met              Interventions       ( ) * Intake acceptable       ( ) * No inpatient interventions needed                                   * Milestone              Additional Notes       CONT STAY REVIEW FOR  1/15/17              Gastroenterology Progress Notes       CC: Feeding difficulties, Aspiration               Subjective:               Patient anxious to proceed with EGD and PEG and go home.               Understands that placement of PEG tube will not improve his ability to take anything po and that tube will need to remain in place for few months even if not required.       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 Failed swallow study, positive for aspiration. He was scheduled for PEG placement with Dr. Author Gallardo later this month, but given the feeding difficulties, GI was asked to place PEG sooner rather than later. Case was further discussed with anesthesia. Dr. Osuna Spotted who recommends PEG placement be done in the OR. His WBC counts are improved. Will try to proceed tomorrow. Possible failure to place PEG safely was discussed.  Alternative of IR placement of gastrostomy tube was also discussed.       WBC: 10.0       RBC: 2.84 (L)       HGB: 7.8 (L)       HCT: 23.5 (L)       Glucose: 240 (H)       Calcium: 8.0 (L)       GFR est non-AA: 56 (L)              VSS, 2L NC, SCDS, I&O, GLUCS Q 6,  NPO, LOVENOX , SSI,        ATIVAN IV, SOLUMEDROL IV Q 12, LIPIDS IV Q PM,         MAXIPIME IV Q 8, TPN Q PM, VANCO IV QD D/C                                Medical Oncology [facility] - Care Day 3 (1/14/2017) by Ana Laura Wright RN        Review Entered Review Status       1/16/2017 Completed       Details              Care Day: 3 Care Date: 1/14/2017 Level of Care: Inpatient Floor       Guideline Day 3        Level Of Care       (X) * Activity level acceptable       ( ) * Complete discharge planning              Clinical Status       (X) * Pain and nausea absent or adequately managed       (X) * Temperature status acceptable       (X) * No infection, or status acceptable       (X) * No neutropenia, or status acceptable       (X) * Abdominal status acceptable       (X) * Mucositis absent or adequately resolved       (X) * Diarrhea absent or adequately controlled       ( ) * Blood cell count acceptable       ( ) * Hematologic complications absent or stabilized       (X) * Neurologic status acceptable       (X) * Electrolyte status acceptable       (X) * Tumor lysis absent or resolved       (X) * Malignant effusions absent or adequately controlled       (X) * Pathologic fracture absent or stabilized       ( ) * General Discharge Criteria met              Interventions       ( ) * Intake acceptable       ( ) * No inpatient interventions needed                                   * Milestone              Additional Notes       CONT STAY REVIEW FOR  1/14/17       Inpatient Hematology / Oncology Progress Note       Reason for Admission/Hospital Course: Neutropenic fever (HCC)       24 Hour Events:       Failed swallow eval       NPO       Start TPN       PEG in OR early next week       ASSESSMENT:               Problem List Date Reviewed: 1/12/2017             Codes Class Noted         * (Principal)Neutropenic fever ([facility] [address] ICD-10-CM: D70.9, R50.81       ICD-9-CM: 288.00, 780.61 1/12/2017                  Pancytopenia ([facility] [address] ICD-10-CM: V24.445       ICD-9-CM: 284.19 1/12/2017                  Renal insufficiency ICD-10-CM: N28.9       ICD-9-CM: 593.9 1/10/2017                  Non-intractable cyclical vomiting with nausea ICD-10-CM: G43. A0       ICD-9-CM: 536.2 1/10/2017                  Squamous cell carcinoma of epiglottis (HCC) ICD-10-CM: C32.1       ICD-9-CM: 161.1 11/23/2016                                               PLAN:       -Squamous cell carcinoma of epiglottis s/p first cycle TCF                -Neutropenic fever-follow cultures, continue cefepime and vancomycin       01-13 BCNTD                -Neutropenia- did not receive Neulasta outpatient-start granix       01-13 ANC 0.7                -Dysphagia       01-13 Keep patient NPO due to failed swallow study. Start TPN. PEG placement first part of next week.                 Disposition: Mr. Sabrina Gonzalez failed swallow evaluation and is now NPO. GI was consulted regarding PEG placement as the need was sooner than the planned date later this month. I spoke with GI and due to the complexity of PEG placement and decreased WBC, it will need to in OR which can be done first part of next week. In the interim we will start on TPN and continue with granix.                        Patient seen, examed and discussed with NP, agree with above history/assessment/plan. 61 y.o.male epiglottis SCC s/p cycle 1 neoadjuvant TCF, admitted for fever, neutropenia, N/V, abd pain and diarrhea, ISI, cough, cover with broad spec abx, follow cultures, give granix, IVF. He failed swallow test for all consistencies, consulted GI for PEG and rec to hold off till [address] improves next week, supportive TPN and monitor BS, hand gout flare and start solumedrol, cultures negative and wean vanc.  ANC 4.6 today,                WBC: 5.8       RBC: 2.86 (L)       HGB: 7.9 (L)       HCT: 23.8 (L)       PLATELET: 938 (L)       Glucose: 132 (H)       , SAT 93% 2L NC,  SCDS, I&O, GLUCS Q 6,  NPO, LOVENOX , SSI,        ATIVAN IV, SOLUMEDROL IV Q 12, K PHOS IV, LIPIDS IV Q PM,         MAXIPIME IV Q 8, TPN Q PM, VANCO IV QD

## 2017-01-17 NOTE — PROGRESS NOTES
GI DAILY PROGRESS NOTE    Admit Date:  1/11/2017    Today's Date:  1/17/2017    CC:  Feeding difficulties, Aspiration    Subjective:     Patient anxious to proceed with EGD and PEG and go home. Small BM. Denies pain, N/V. Wants a soda informed of the dangers of aspiration. Medications:   Current Facility-Administered Medications   Medication Dose Route Frequency    influenza vaccine 2016-17 (36mos+)(PF) (FLUZONE/FLUARIX/FLULAVAL QUAD) injection 0.5 mL  0.5 mL IntraMUSCular PRIOR TO DISCHARGE    TPN ADULT-CENTRAL - dextrose 15% amino acid 5%   IntraVENous QPM    methylPREDNISolone (PF) (SOLU-MEDROL) injection 40 mg  40 mg IntraVENous Q12H    insulin lispro (HUMALOG) injection   SubCUTAneous Q6H    LORazepam (ATIVAN) injection 1 mg  1 mg IntraVENous Q6H PRN    NUTRITIONAL SUPPORT ELECTROLYTE PRN ORDERS   Does Not Apply PRN    amLODIPine (NORVASC) tablet 10 mg  10 mg Oral QHS    allopurinol (ZYLOPRIM) tablet 300 mg  300 mg Oral QHS    aspirin delayed-release tablet 81 mg  81 mg Oral DAILY    losartan (COZAAR) tablet 50 mg  50 mg Oral BID    sodium chloride (NS) flush 5 mL  5 mL InterCATHeter Q8H    sodium chloride (NS) flush 5-10 mL  5-10 mL InterCATHeter PRN    acetaminophen (TYLENOL) tablet 650 mg  650 mg Oral Q6H PRN    enoxaparin (LOVENOX) injection 40 mg  40 mg SubCUTAneous Q24H    LORazepam (ATIVAN) tablet 0.5 mg  0.5 mg Oral Q6H PRN    Or    LORazepam (ATIVAN) tablet 1 mg  1 mg Oral Q6H PRN    albuterol-ipratropium (DUO-NEB) 2.5 MG-0.5 MG/3 ML  3 mL Nebulization Q4H PRN    LORazepam (ATIVAN) injection 0.5 mg  0.5 mg IntraVENous Q6H PRN       Review of Systems:  ROS was obtained, with pertinent positives as listed above. No chest pain or SOB.     Diet:  NPO.  TPN    Objective:   Vitals:  Visit Vitals    /78    Pulse 79    Temp 97.8 °F (36.6 °C)    Resp 18    Ht 5' 9\" (1.753 m)    Wt 110.1 kg (242 lb 11.2 oz)    SpO2 96%    BMI 35.84 kg/m2     Intake/Output:     01/15 1901 - 01/17 0700  In: 2310 [I.V.:2310]  Out: 905 [Urine:900]  Exam:  General appearance: alert, cooperative, no distress  Lungs: clear to auscultation bilaterally anteriorly  Heart: regular rate and rhythm  Abdomen: soft, non-tender. Bowel sounds normal. No masses, no organomegaly  Extremities:  no cyanosis or edema  Neuro:  alert and oriented, hoarse    Data Review (Labs):    Recent Labs      01/17/17   0415  01/16/17   0410  01/15/17   0340   WBC  6.4  11.2*  10.0   HGB  8.2*  8.1*  7.8*   HCT  25.1*  24.8*  23.5*   PLT  222  217  160   MCV  83.9  84.1  82.7   NA  144  142  143   K  4.6  4.5  4.2   CL  108*  106  107   CO2  27  27  26   BUN  30*  25*  16   CREA  1.34  1.31  1.37   CA  8.4  8.3  8.0*   MG  2.4  2.5*  2.2   GLU  204*  298*  240*   AP  73  88  64   SGOT  22  23  18   ALT  32  27  20   TBILI  0.3  0.3  0.3   ALB  2.6*  2.5*  2.5*   TP  6.0*  6.0*  6.1*       Assessment:     Principal Problem:    Neutropenic fever (HCC) (1/12/2017)    Active Problems:    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 Failed swallow study, positive for aspiration. He was scheduled for PEG placement with Dr. Author Gallardo later this month, but given the feeding difficulties, GI was asked to place PEG sooner rather than later. Case was further discussed with anesthesia. Dr. Enrrique Acosta who recommends PEG placement be done in the OR. His WBC counts are improved. PEG could not even be attempted--anasthesia was unsuccessful in their attempt to place an ETT on 1/16/17. Possible tracheostomy tomorrow and PEG after (Wed or Thursday)    Rosalba Monge NP  Patient seen and examined in collaboration with Dr Dorna Reusing. Assessment and plan per Dr Michelle Gayle.

## 2017-01-17 NOTE — PROGRESS NOTES
END OF SHIFT NOTE:    Intake/Output      Voiding: YES  Catheter: NO  Drain:              Stool:  0 occurrences. Emesis:  0 occurrences. VITAL SIGNS  Patient Vitals for the past 12 hrs:   Temp Pulse Resp BP SpO2   01/17/17 1522 97.5 °F (36.4 °C) 86 18 148/74 97 %   01/17/17 1305 97.8 °F (36.6 °C) 74 18 136/76 96 %   01/17/17 0807 97.8 °F (36.6 °C) 79 18 119/78 96 %       Pain Assessment  Pain 1  Pain Scale 1: Visual (01/17/17 1100)  Pain Intensity 1: 0 (01/17/17 1100)  Patient Stated Pain Goal: 0 (01/16/17 1540)  Pain Reassessment 1: Yes (01/16/17 1540)  Pain Location 1: Mouth (01/16/17 1440)  Pain Description 1: Sore (01/16/17 1440)    Ambulating  YES    Additional Information:     Shift report given to oncoming nurse at the bedside.     Nathen Rodriguez RN

## 2017-01-18 LAB
ALBUMIN SERPL BCP-MCNC: 2.5 G/DL (ref 3.2–4.6)
ALBUMIN/GLOB SERPL: 0.8 {RATIO} (ref 1.2–3.5)
ALP SERPL-CCNC: 66 U/L (ref 50–136)
ALT SERPL-CCNC: 37 U/L (ref 12–65)
ANION GAP BLD CALC-SCNC: 8 MMOL/L (ref 7–16)
AST SERPL W P-5'-P-CCNC: 29 U/L (ref 15–37)
BASOPHILS # BLD AUTO: 0 K/UL (ref 0–0.2)
BASOPHILS # BLD: 0 % (ref 0–2)
BILIRUB SERPL-MCNC: 0.8 MG/DL (ref 0.2–1.1)
BUN SERPL-MCNC: 34 MG/DL (ref 8–23)
CALCIUM SERPL-MCNC: 7.4 MG/DL (ref 8.3–10.4)
CHLORIDE SERPL-SCNC: 108 MMOL/L (ref 98–107)
CO2 SERPL-SCNC: 26 MMOL/L (ref 21–32)
CREAT SERPL-MCNC: 1.28 MG/DL (ref 0.8–1.5)
DIFFERENTIAL METHOD BLD: ABNORMAL
EOSINOPHIL # BLD: 0 K/UL (ref 0–0.8)
EOSINOPHIL NFR BLD: 0 % (ref 0.5–7.8)
ERYTHROCYTE [DISTWIDTH] IN BLOOD BY AUTOMATED COUNT: 13.1 % (ref 11.9–14.6)
GLOBULIN SER CALC-MCNC: 3.3 G/DL (ref 2.3–3.5)
GLUCOSE BLD STRIP.AUTO-MCNC: 130 MG/DL (ref 65–100)
GLUCOSE BLD STRIP.AUTO-MCNC: 226 MG/DL (ref 65–100)
GLUCOSE BLD STRIP.AUTO-MCNC: 261 MG/DL (ref 65–100)
GLUCOSE SERPL-MCNC: 228 MG/DL (ref 65–100)
HCT VFR BLD AUTO: 29 % (ref 41.1–50.3)
HGB BLD-MCNC: 9.7 G/DL (ref 13.6–17.2)
IMM GRANULOCYTES # BLD: 0.1 K/UL (ref 0–0.5)
LYMPHOCYTES # BLD AUTO: 16 % (ref 13–44)
LYMPHOCYTES # BLD: 0.9 K/UL (ref 0.5–4.6)
MAGNESIUM SERPL-MCNC: 2.1 MG/DL (ref 1.8–2.4)
MCH RBC QN AUTO: 28 PG (ref 26.1–32.9)
MCHC RBC AUTO-ENTMCNC: 33.4 G/DL (ref 31.4–35)
MCV RBC AUTO: 83.8 FL (ref 79.6–97.8)
MONOCYTES # BLD: 0.4 K/UL (ref 0.1–1.3)
MONOCYTES NFR BLD AUTO: 8 % (ref 4–12)
NEUTS SEG # BLD: 4.2 K/UL (ref 1.7–8.2)
NEUTS SEG NFR BLD AUTO: 76 % (ref 43–78)
PHOSPHATE SERPL-MCNC: 3.7 MG/DL (ref 2.3–3.7)
PLATELET # BLD AUTO: 212 K/UL (ref 150–450)
PLATELET COMMENTS,PCOM: ADEQUATE
PMV BLD AUTO: 9.5 FL (ref 10.8–14.1)
POTASSIUM SERPL-SCNC: 4.2 MMOL/L (ref 3.5–5.1)
PROT SERPL-MCNC: 5.8 G/DL (ref 6.3–8.2)
RBC # BLD AUTO: 3.46 M/UL (ref 4.23–5.67)
RBC MORPH BLD: ABNORMAL
SODIUM SERPL-SCNC: 142 MMOL/L (ref 136–145)
WBC # BLD AUTO: 5.5 K/UL (ref 4.3–11.1)
WBC MORPH BLD: ABNORMAL

## 2017-01-18 PROCEDURE — 84100 ASSAY OF PHOSPHORUS: CPT | Performed by: NURSE PRACTITIONER

## 2017-01-18 PROCEDURE — 74011250636 HC RX REV CODE- 250/636: Performed by: INTERNAL MEDICINE

## 2017-01-18 PROCEDURE — P9040 RBC LEUKOREDUCED IRRADIATED: HCPCS | Performed by: NURSE PRACTITIONER

## 2017-01-18 PROCEDURE — 86644 CMV ANTIBODY: CPT | Performed by: NURSE PRACTITIONER

## 2017-01-18 PROCEDURE — 85025 COMPLETE CBC W/AUTO DIFF WBC: CPT | Performed by: NURSE PRACTITIONER

## 2017-01-18 PROCEDURE — 74011636637 HC RX REV CODE- 636/637: Performed by: NURSE PRACTITIONER

## 2017-01-18 PROCEDURE — 74011636637 HC RX REV CODE- 636/637: Performed by: INTERNAL MEDICINE

## 2017-01-18 PROCEDURE — 36430 TRANSFUSION BLD/BLD COMPNT: CPT

## 2017-01-18 PROCEDURE — 74011000250 HC RX REV CODE- 250: Performed by: NURSE PRACTITIONER

## 2017-01-18 PROCEDURE — 80053 COMPREHEN METABOLIC PANEL: CPT | Performed by: NURSE PRACTITIONER

## 2017-01-18 PROCEDURE — 65270000029 HC RM PRIVATE

## 2017-01-18 PROCEDURE — 83735 ASSAY OF MAGNESIUM: CPT | Performed by: NURSE PRACTITIONER

## 2017-01-18 PROCEDURE — 74011250636 HC RX REV CODE- 250/636: Performed by: NURSE PRACTITIONER

## 2017-01-18 PROCEDURE — 82962 GLUCOSE BLOOD TEST: CPT

## 2017-01-18 PROCEDURE — 74011000258 HC RX REV CODE- 258: Performed by: NURSE PRACTITIONER

## 2017-01-18 RX ADMIN — INSULIN LISPRO 6 UNITS: 100 INJECTION, SOLUTION INTRAVENOUS; SUBCUTANEOUS at 06:00

## 2017-01-18 RX ADMIN — METHYLPREDNISOLONE SODIUM SUCCINATE 40 MG: 40 INJECTION, POWDER, FOR SOLUTION INTRAMUSCULAR; INTRAVENOUS at 12:26

## 2017-01-18 RX ADMIN — CALCIUM GLUCONATE: 94 INJECTION, SOLUTION INTRAVENOUS at 17:47

## 2017-01-18 RX ADMIN — Medication 5 ML: at 12:26

## 2017-01-18 RX ADMIN — Medication 5 ML: at 06:33

## 2017-01-18 RX ADMIN — SOYBEAN OIL 250 ML: 20 INJECTION, SOLUTION INTRAVENOUS at 17:47

## 2017-01-18 RX ADMIN — INSULIN LISPRO 2 UNITS: 100 INJECTION, SOLUTION INTRAVENOUS; SUBCUTANEOUS at 00:00

## 2017-01-18 NOTE — PROGRESS NOTES
Spoke with both Clyde Upton NP (Gen-Surg) and Dr. Kelsi Stallings (ENT) regarding placement of patient's trach. Dr. Kelsi Stallings stated the first available time in OR today (1/18) is at 1800. Will touch base with GI to see whether they are also available to place the PEG at 1800.

## 2017-01-18 NOTE — PROGRESS NOTES
GI DAILY PROGRESS NOTE    Admit Date:  1/11/2017    Today's Date:  1/18/2017    CC:  Feeding difficulties, Aspiration    Subjective:     Patient anxious to proceed with [facility] and EGD/PEG and go home. Small BM. Denies pain, N/V. Medications:   Current Facility-Administered Medications   Medication Dose Route Frequency    0.9% sodium chloride infusion 250 mL  250 mL IntraVENous PRN    TPN ADULT-CENTRAL - dextrose 15% amino acid 5%   IntraVENous QPM    fat emulsion 20% (LIPOSYN, INTRALIPID) infusion 250 mL  250 mL IntraVENous QPM    diphenhydrAMINE (BENADRYL) capsule 25 mg  25 mg Oral Q6H PRN    influenza vaccine 2016-17 (36mos+)(PF) (FLUZONE/FLUARIX/FLULAVAL QUAD) injection 0.5 mL  0.5 mL IntraMUSCular PRIOR TO DISCHARGE    methylPREDNISolone (PF) (SOLU-MEDROL) injection 40 mg  40 mg IntraVENous Q12H    insulin lispro (HUMALOG) injection   SubCUTAneous Q6H    LORazepam (ATIVAN) injection 1 mg  1 mg IntraVENous Q6H PRN    NUTRITIONAL SUPPORT ELECTROLYTE PRN ORDERS   Does Not Apply PRN    amLODIPine (NORVASC) tablet 10 mg  10 mg Oral QHS    allopurinol (ZYLOPRIM) tablet 300 mg  300 mg Oral QHS    aspirin delayed-release tablet 81 mg  81 mg Oral DAILY    losartan (COZAAR) tablet 50 mg  50 mg Oral BID    sodium chloride (NS) flush 5 mL  5 mL InterCATHeter Q8H    sodium chloride (NS) flush 5-10 mL  5-10 mL InterCATHeter PRN    acetaminophen (TYLENOL) tablet 650 mg  650 mg Oral Q6H PRN    enoxaparin (LOVENOX) injection 40 mg  40 mg SubCUTAneous Q24H    LORazepam (ATIVAN) tablet 0.5 mg  0.5 mg Oral Q6H PRN    Or    LORazepam (ATIVAN) tablet 1 mg  1 mg Oral Q6H PRN    albuterol-ipratropium (DUO-NEB) 2.5 MG-0.5 MG/3 ML  3 mL Nebulization Q4H PRN    LORazepam (ATIVAN) injection 0.5 mg  0.5 mg IntraVENous Q6H PRN       Review of Systems:  ROS was obtained, with pertinent positives as listed above. No chest pain or SOB.     Diet:  NPO.  TPN    Objective:   Vitals:  Visit Vitals    /76    Pulse 67    Temp 97.5 °F (36.4 °C)    Resp 20    Ht 5' 9\" (1.753 m)    Wt 108.8 kg (239 lb 12.8 oz)    SpO2 97%    BMI 35.41 kg/m2     Intake/Output:     01/16 1901 - 01/18 0700  In: 1678 [I.V.:1678]  Out: 1400 [Urine:1400]  Exam:  General appearance: alert, cooperative, no distress  Lungs: clear to auscultation bilaterally anteriorly  Heart: regular rate and rhythm  Abdomen: soft, non-tender. Bowel sounds normal. No masses, no organomegaly  Extremities:  no cyanosis or edema  Neuro:  alert and oriented, hoarse    Data Review (Labs):    Recent Labs      01/18/17   0520  01/17/17   0415  01/16/17   0410   WBC  5.5  6.4  11.2*   HGB  9.7*  8.2*  8.1*   HCT  29.0*  25.1*  24.8*   PLT  212  222  217   MCV  83.8  83.9  84.1   NA  142  144  142   K  4.2  4.6  4.5   CL  108*  108*  106   CO2  26  27  27   BUN  34*  30*  25*   CREA  1.28  1.34  1.31   CA  7.4*  8.4  8.3   MG  2.1  2.4  2.5*   GLU  228*  204*  298*   AP  66  73  88   SGOT  29  22  23   ALT  37  32  27   TBILI  0.8  0.3  0.3   ALB  2.5*  2.6*  2.5*   TP  5.8*  6.0*  6.0*       Assessment:     Principal Problem:    Neutropenic fever (HCC) (1/12/2017)    Active Problems:    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 Failed swallow study, positive for aspiration. He was scheduled for PEG placement with Dr. Maria Fernanda Ramirez later this month, but given the feeding difficulties, GI was asked to place PEG sooner rather than later. Case was further discussed with anesthesia. Dr. Howard Benavidez who recommends PEG placement be done in the OR. His WBC counts are improved. PEG could not even be attempted--anasthesia was unsuccessful in their attempt to place an ETT on 1/16/17. Possible tracheostomy tomorrow and PEG after (Wed or Thursday).   Patient wants trach peg together will not agree to with unless done together. Was understanding that Dr Leonardo Quispe was seeing patient. Will consult general surgery to see if procedures can be done today/tomorrow. Kim Levin NP  Patient seen and examined in collaboration with Dr Carloz Negron. Assessment and plan per Dr Cedrick Reynaga.

## 2017-01-18 NOTE — CONSULTS
SURGERY:    1105 -- Received called from GI NP re: STAT consult for trach/PEG. Discussed situation with appropriate Onc and GI providers. Apparently ENT was previously consulted (seen 1/16/17) and following for trach needs. He is a known Pt of Dr. Edwin Lobato. 1117 -- Called and left message with [facility] to discuss situation with Dr. Edwin Lobato. [phone] -- Return call from Dr. Edwin Lobato. He is aware of need for trach and plans to perform this later this afternoon. Message left for GI NP, spoke with Onc NP and notified pts RN of plan. No needs for General Surgery intervention at this time. Pt not seen or evaluated, full consult not completed. Will sign-off. Please call with future GS needs. Scotty Hammans, NP-C    I have neither seen or examined this patient. I was not aware of any of the issues during this evaluation until after the events have transpired and now been assumed by ENT.   Tal Pardo MD

## 2017-01-18 NOTE — PROGRESS NOTES
Problem: Nutrition Deficit  Goal: *Optimize nutritional status  Nutrition F/U  TPN management per nutritional support protocols. Quinton Olmos, CHANCE)  Assessment:   Diet order(s): NPO  · Remains TPN dependent d/t dysphagia associated with SCC of epiglottis. Plan for trach and G tube placement either today or tomorrow. RD to continue current TPN/lipids until G tube is placed. · Central line access : Port. · Sodium trending down to WDL of 142 this morning-continue no sodium in TPN  · Potassium WDL of 4.2 following decrease in total potassium to 20 mEq/L last night. · Phos WDL of 3.7 following removal of phosphorus last night. RD expects phos to continue to trend down-patient may benefit from small addition of phosphorus in TPN tonight  · Mg WDL of 2.1 with decrease to 3 mEq/L last night  · Patient received an increased amount of insulin in TPN last night-35 units/L for a total of 70 units (0.23 units insulin/g CHO); patient has received 6 units SSI yesterday, 8 units so far today  · A.M BMP glucose 228; POC Glucoses 188-261 mg/ld-Hx of DM on Glucophage and Glucotrol PTA. Patient receiving Solu-Medrol and 300g CHO in TPN  Intake/Comparative Standards: Current TPN 15%DEX/5%AA at 85 ml/hr with 250 ml 20% Lipids daily provides 1920 kcal/d (100% of needs), 100 grams of protein/d (100% of needs), 300 grams of CHO/d (does not exceed maximum CHO load) and 2250 ml of total volume/d ( 100% of needs). Intervention:   Meals and snacks: NPO  EN: Please consult RD for Tube Feeding management once G tube is placed and placement is verified. Patient would benefit from glucerna 1.2 at 70 ml/hr to provide 2016 kcal, 101 g PRO, 193 g CHO, 1411 mL fluid. Long term tube feeding: patient would benefit from bolus TF of 7 total cans of glucerna 1.2 per day. Nutritional Supplement Therapy: Electrolyte replacement per nutritional support protocols are active on MAR.    PN: Continue TPN of 2L/d of 15%DEX/ 5%AA at 85 ml/hr with 250 ml 20% Lipids daily to provide 1920 kcal/d (100% of needs), 100 grams of protein/d (100% of needs), 300 grams of CHO/d (does not exceed maximum CHO load) and 2250 ml of total volume/d ( 100% of needs). 1. Increase insulin to 38 units insulin/L (total of 76 units of insulin, 0.25 units/g CHO)  2.  Add 10 mEq/L KP04, Reduce KCl to 10 mEq/L for a total of 20 mEq/L potassium      Anh Norman, MS, RD, LD, [phone]

## 2017-01-18 NOTE — PROGRESS NOTES
Inpatient Hematology / Oncology Progress Note      Admission Date: 2017 10:12 PM  Reason for Admission/Hospital Course: Feeding difficulties [R63.3]      24 Hour Events:  Trach and PEG today  Wife at bedside  Anxious to hurry up and have procedure  Wants to go home    ROS:  Constitutional: negative for fever, chills, weakness, malaise, fatigue. CV: negative for chest pain, palpitations, edema. Respiratory: negative for dyspnea, cough, wheezing. GI:Negative for nausea, abdominal pain, diarrhea. 10 point review of systems is otherwise negative with the exception of the elements mentioned above in the HPI. No Known Allergies    OBJECTIVE:  Patient Vitals for the past 8 hrs:   BP Temp Pulse Resp SpO2 Weight   17 0734 134/76 97.5 °F (36.4 °C) 67 20 97 % -   17 0504 119/75 98.1 °F (36.7 °C) 98 18 98 % 239 lb 12.8 oz (108.8 kg)   17 0400 120/74 98.1 °F (36.7 °C) 92 18 98 % -     Temp (24hrs), Av.9 °F (36.6 °C), Min:97.5 °F (36.4 °C), Max:98.2 °F (36.8 °C)         Physical Exam:  Constitutional: Well developed, well nourished male in no acute distress, sitting comfortably in the bedside chair. Wife at bedside   HEENT: Normocephalic and atraumatic. Oropharynx is clear, mucous membranes are moist.Extraocular muscles are intact. Sclerae anicteric. Lymph node   Deferred   Skin Warm and dry. No bruising and no rash noted. No erythema. No pallor. Respiratory Lungs are clear to auscultation bilaterally without wheezes, rales or rhonchi, normal air exchange without accessory muscle use. CVS Normal rate, regular rhythm and normal S1 and S2. No murmurs, gallops, or rubs. Abdomen Soft, nontender and nondistended, normoactive bowel sounds. No palpable mass. No hepatosplenomegaly. Neuro Grossly nonfocal with no obvious sensory or motor deficits. MSK Normal range of motion in general.  No edema and no tenderness. Psych Appropriate mood and affect.  Anxious       Labs: Recent Labs      01/18/17   0520  01/17/17   0415  01/16/17   0410   WBC  5.5  6.4  11.2*   RBC  3.46*  2.99*  2.95*   HGB  9.7*  8.2*  8.1*   HCT  29.0*  25.1*  24.8*   MCV  83.8  83.9  84.1   MCH  28.0  27.4  27.5   MCHC  33.4  32.7  32.7   RDW  13.1  13.2  13.0   PLT  212  222  217   GRANS  76  82*  89*   LYMPH  16  10*  9*   MONOS  8  8  2*   EOS  0*  0*  0*   BASOS  0  0  0   IG   --    --   0   DF  AUTOMATED  AUTOMATED  AUTOMATED   ANEU  4.2  5.2  10.0*   ABL  0.9  0.6  1.0   ABM  0.4  0.5  0.2   MAHI  0.0  0.0  0.0   ABB  0.0  0.0  0.0   AIG  0.1  0.1  0.0        Recent Labs      01/18/17   0520  01/17/17   0415  01/16/17   0410   NA  142  144  142   K  4.2  4.6  4.5   CL  108*  108*  106   CO2  26  27  27   AGAP  8  9  9   GLU  228*  204*  298*   BUN  34*  30*  25*   CREA  1.28  1.34  1.31   GFRAA  >60  >60  >60   GFRNA  >60  58*  59*   CA  7.4*  8.4  8.3   SGOT  29  22  23   AP  66  73  88   TP  5.8*  6.0*  6.0*   ALB  2.5*  2.6*  2.5*   GLOB  3.3  3.4  3.5   AGRAT  0.8*  0.8*  0.7*   MG  2.1  2.4  2.5*   PHOS  3.7  4.8*  3.9*         Imaging:      ASSESSMENT:    Problem List  Date Reviewed: 1/16/2017          Codes Class Noted    * (Principal)Neutropenic fever ([facility] [address] ICD-10-CM: D70.9, R50.81  ICD-9-CM: 288.00, 780.61  1/12/2017        Pancytopenia (HCC) ICD-10-CM: I87.223  ICD-9-CM: 284.19  1/12/2017        Renal insufficiency ICD-10-CM: N28.9  ICD-9-CM: 593.9  1/10/2017        Non-intractable cyclical vomiting with nausea ICD-10-CM: G43. 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 Start TPN.   01-16 PEG today  01-17 Unable to place PEG due to unable to intubate  01-18 to OR for trach and peg    -Left hand pain ?gout? Solumedrol 40 bid  01-18 pain resolved-begin to taper steroid           CHANCE Mazariegos  [address] [place] [address]  Office : [phone]  Fax : [phone]                        Attending Addendum:  I personally evaluated the patient with Destin Cabral N.P.,  and agree with the assessment, findings and plan as documented. We will ask IR if they can place his PEG tube.               Narcisa Hirsch MD  [address] [place] [address]  Office : [phone]  Fax : [phone]

## 2017-01-18 NOTE — PROGRESS NOTES
Called and left a message with Dr. Glennette Mortimer RN in an attempt to get clarification on patient's trach placement, as neither trach nor PEG placement has been scheduled yet.

## 2017-01-19 ENCOUNTER — HOSPITAL ENCOUNTER (OUTPATIENT)
Dept: PHYSICAL THERAPY | Age: 62
Discharge: HOME | End: 2017-01-19
Attending: INTERNAL MEDICINE
Payer: COMMERCIAL

## 2017-01-19 VITALS
DIASTOLIC BLOOD PRESSURE: 90 MMHG | HEART RATE: 84 BPM | SYSTOLIC BLOOD PRESSURE: 144 MMHG | OXYGEN SATURATION: 98 % | HEIGHT: 69 IN | RESPIRATION RATE: 18 BRPM | BODY MASS INDEX: 35.12 KG/M2 | TEMPERATURE: 97.7 F | WEIGHT: 237.1 LBS

## 2017-01-19 LAB
ABO + RH BLD: NORMAL
ALBUMIN SERPL BCP-MCNC: 2.8 G/DL (ref 3.2–4.6)
ALBUMIN/GLOB SERPL: 0.8 {RATIO} (ref 1.2–3.5)
ALP SERPL-CCNC: 73 U/L (ref 50–136)
ALT SERPL-CCNC: 42 U/L (ref 12–65)
ANION GAP BLD CALC-SCNC: 9 MMOL/L (ref 7–16)
AST SERPL W P-5'-P-CCNC: 26 U/L (ref 15–37)
BILIRUB SERPL-MCNC: 0.4 MG/DL (ref 0.2–1.1)
BLD PROD TYP BPU: NORMAL
BLD PROD TYP BPU: NORMAL
BLOOD GROUP ANTIBODIES SERPL: NORMAL
BPU ID: NORMAL
BPU ID: NORMAL
BUN SERPL-MCNC: 37 MG/DL (ref 8–23)
CALCIUM SERPL-MCNC: 8.1 MG/DL (ref 8.3–10.4)
CHLORIDE SERPL-SCNC: 107 MMOL/L (ref 98–107)
CO2 SERPL-SCNC: 28 MMOL/L (ref 21–32)
CREAT SERPL-MCNC: 1.33 MG/DL (ref 0.8–1.5)
CROSSMATCH RESULT,%XM: NORMAL
CROSSMATCH RESULT,%XM: NORMAL
GLOBULIN SER CALC-MCNC: 3.3 G/DL (ref 2.3–3.5)
GLUCOSE BLD STRIP.AUTO-MCNC: 135 MG/DL (ref 65–100)
GLUCOSE SERPL-MCNC: 131 MG/DL (ref 65–100)
PHOSPHATE SERPL-MCNC: 4.6 MG/DL (ref 2.3–3.7)
POTASSIUM SERPL-SCNC: 3.6 MMOL/L (ref 3.5–5.1)
PROT SERPL-MCNC: 6.1 G/DL (ref 6.3–8.2)
SODIUM SERPL-SCNC: 144 MMOL/L (ref 136–145)
SPECIMEN EXP DATE BLD: NORMAL
STATUS OF UNIT,%ST: NORMAL
STATUS OF UNIT,%ST: NORMAL
UNIT DIVISION, %UDIV: 0
UNIT DIVISION, %UDIV: 0

## 2017-01-19 PROCEDURE — 80053 COMPREHEN METABOLIC PANEL: CPT | Performed by: NURSE PRACTITIONER

## 2017-01-19 PROCEDURE — 84100 ASSAY OF PHOSPHORUS: CPT | Performed by: NURSE PRACTITIONER

## 2017-01-19 PROCEDURE — 82962 GLUCOSE BLOOD TEST: CPT

## 2017-01-19 RX ORDER — HEPARIN 100 UNIT/ML
300 SYRINGE INTRAVENOUS AS NEEDED
Status: DISCONTINUED | OUTPATIENT
Start: 2017-01-19 | End: 2017-01-19 | Stop reason: HOSPADM

## 2017-01-19 NOTE — PROGRESS NOTES
END OF SHIFT NOTE: Patient is still waiting patiently for PEG placement. IR consulted for placement. TPN/Lipids hung this evening. Patient resting quietly with wife at bedside. Intake/Output      Voiding: YES  Catheter: NO  Drain:      Stool:  1 occurrences. Emesis:  0 occurrences. VITAL SIGNS  Patient Vitals for the past 12 hrs:   Temp Pulse Resp BP SpO2   01/18/17 1907 97.7 °F (36.5 °C) 85 20 127/70 96 %   01/18/17 1600 - - - 132/76 -   01/18/17 1515 97.3 °F (36.3 °C) 78 18 (!) 174/95 97 %   01/18/17 1313 97.7 °F (36.5 °C) 76 18 120/75 98 %     Pain Assessment  Pain 1  Pain Scale 1: Visual (01/18/17 1931)  Pain Intensity 1: 0 (01/18/17 1931)  Patient Stated Pain Goal: 0 (01/18/17 0830)  Pain Reassessment 1: Yes (01/17/17 9000)  Pain Location 1: Mouth (01/16/17 1440)  Pain Description 1: Sore (01/16/17 1440)    Ambulating  YES    Shift report given to oncoming nurse at the bedside.     Barbara Page RN

## 2017-01-19 NOTE — DISCHARGE INSTRUCTIONS
DISCHARGE SUMMARY from Nurse    The following personal items are in your possession at time of discharge:    Dental Appliances:  (with spouse)  Visual Aid: Glasses, With patient     Home Medications: None  Jewelry: None  Clothing: Bathrobe, At bedside, Footwear, Pants, Shirt, Shorts, Slippers, Undergarments  Other Valuables: Eyeglasses, Cell Phone             PATIENT INSTRUCTIONS:    After general anesthesia or intravenous sedation, for 24 hours or while taking prescription Narcotics:  · Limit your activities  · Do not drive and operate hazardous machinery  · Do not make important personal or business decisions  · Do  not drink alcoholic beverages  · If you have not urinated within 8 hours after discharge, please contact your surgeon on call. Report the following to your surgeon:  · Excessive pain, swelling, redness or odor of or around the surgical area  · Temperature over 100.5  · Nausea and vomiting lasting longer than 4 hours or if unable to take medications  · Any signs of decreased circulation or nerve impairment to extremity: change in color, persistent  numbness, tingling, coldness or increase pain  · Any questions        What to do at Home:  Recommended activity: Activity as tolerated, per MD instructions    If you experience any of the following symptoms fever > 101, nausea, vomiting, pain, chest pain, shortness of breath please follow up with MD.      *  Please give a list of your current medications to your Primary Care Provider. *  Please update this list whenever your medications are discontinued, doses are      changed, or new medications (including over-the-counter products) are added. *  Please carry medication information at all times in case of emergency situations.           These are general instructions for a healthy lifestyle:    No smoking/ No tobacco products/ Avoid exposure to second hand smoke    Surgeon General's Warning:  Quitting smoking now greatly reduces serious risk to your health. Obesity, smoking, and sedentary lifestyle greatly increases your risk for illness    A healthy diet, regular physical exercise & weight monitoring are important for maintaining a healthy lifestyle    You may be retaining fluid if you have a history of heart failure or if you experience any of the following symptoms:  Weight gain of 3 pounds or more overnight or 5 pounds in a week, increased swelling in our hands or feet or shortness of breath while lying flat in bed. Please call your doctor as soon as you notice any of these symptoms; do not wait until your next office visit. Recognize signs and symptoms of STROKE:    F-face looks uneven    A-arms unable to move or move unevenly    S-speech slurred or non-existent    T-time-call 911 as soon as signs and symptoms begin-DO NOT go       Back to bed or wait to see if you get better-TIME IS BRAIN. Warning Signs of HEART ATTACK     Call 911 if you have these symptoms:   Chest discomfort. Most heart attacks involve discomfort in the center of the chest that lasts more than a few minutes, or that goes away and comes back. It can feel like uncomfortable pressure, squeezing, fullness, or pain.  Discomfort in other areas of the upper body. Symptoms can include pain or discomfort in one or both arms, the back, neck, jaw, or stomach.  Shortness of breath with or without chest discomfort.  Other signs may include breaking out in a cold sweat, nausea, or lightheadedness. Don't wait more than five minutes to call 911 - MINUTES MATTER! Fast action can save your life. Calling 911 is almost always the fastest way to get lifesaving treatment. Emergency Medical Services staff can begin treatment when they arrive -- up to an hour sooner than if someone gets to the hospital by car. The discharge information has been reviewed with the patient. The patient verbalized understanding.     Discharge medications reviewed with the patient and appropriate educational materials and side effects teaching were provided. Learning About Fever  What is a fever? A fever is a high body temperature. It's one way your body fights being sick. A fever shows that the body is responding to infection or other illnesses, both minor and severe. A fever is a symptom, not an illness by itself. A fever can be a sign that you are ill, but most fevers are not caused by a serious problem. You may have a fever with a minor illness, such as a cold. But sometimes a very serious infection may cause little or no fever. It is important to look at other symptoms, other conditions you have, and how you feel in general. In children, notice how they act and see what symptoms they complain of. What is a normal body temperature? A normal body temperature is about 98. 6ºF. Some people have a normal temperature that is a little higher or a little lower than this. Your temperature may be a little lower in the morning than it is later in the day. It may go up during hot weather or when you exercise, wear heavy clothes, or take a hot bath. Your temperature may also be different depending on how you take it. A temperature taken in the mouth (oral) or under the arm may be a little lower than your core temperature (rectal). What is a fever temperature? A core temperature of 100.4°F or above is considered a fever. What can cause a fever? A fever may be caused by:  · Infections. This is the most common cause of a fever. Examples of infections that can cause a fever include the flu, a kidney infection, or pneumonia. · Some medicines. · Severe trauma or injury, such as a heart attack, stroke, heatstroke, or burns. · Other medical conditions, such as arthritis and some cancers. How can you treat a fever at home? · Ask your doctor if you can take an over-the-counter pain medicine, such as acetaminophen (Tylenol), ibuprofen (Advil, Motrin), or naproxen (Aleve). Be safe with medicines. Read and follow all instructions on the label. · To prevent dehydration, drink plenty of fluids. Choose water and other caffeine-free clear liquids until you feel better. If you have kidney, heart, or liver disease and have to limit fluids, talk with your doctor before you increase the amount of fluids you drink. Follow-up care is a key part of your treatment and safety. Be sure to make and go to all appointments, and call your doctor if you are having problems. It's also a good idea to know your test results and keep a list of the medicines you take. Where can you learn more? Go to http://maria c-jeff.info/. Enter Q152 in the search box to learn more about \"Learning About Fever. \"  Current as of: May 27, 2016  Content Version: 11.1  © 5417-0492 [employer]. Care instructions adapted under license by [employer] (which disclaims liability or warranty for this information). If you have questions about a medical condition or this instruction, always ask your healthcare professional. [place] [address] any warranty or liability for your use of this information. Neutropenia: Care Instructions  Your Care Instructions  Neutropenia (say \"mvn-vwzv-KBE-nee-uh\") means that your blood has too few neutrophils. These are white blood cells that help protect the body from infection. They do this by killing bacteria. Neutropenia can be caused by some types of infection. It also can be caused by immune system conditions such as HIV or lupus, a lack of vitamin M20 or folic acid, or an enlarged spleen. Some medicines can cause it too. It is most often caused by treatments for certain health problems, such as chemotherapy and radiation treatment for cancer. Mild neutropenia usually causes no symptoms. But when it's severe, it increases the risk of infection of your skin and organs. That's because your body can't fight off germs as well as it should.   Follow-up care is a key part of your treatment and safety. Be sure to make and go to all appointments, and call your doctor if you are having problems. It's also a good idea to know your test results and keep a list of the medicines you take. How can you care for yourself at home? · Take your medicines exactly as prescribed. Call your doctor if you have any problems with your medicine. · Eat a healthy, balanced diet. Eat foods with a lot of fiber. This helps to prevent constipation. Prevent infections  · Take your temperature several times a day, as your doctor suggests. Keep a written record of your temperature readings. Fever is a common symptom of infection. And it may be the only symptom. · Use a soft toothbrush. Do not floss your teeth. Talk with your doctor about other steps to prevent infections in your mouth. · Wash your hands often with soap and water, especially before you eat and after you use the bathroom. · If you are a woman, use sanitary napkins (pads) instead of tampons. Do not douche. · Do not use rectal thermometers or suppositories. · Avoid tasks that might expose you to germs, such as disposing of pet feces or urine. · Avoid crowds of people and anyone who might have an infection or an illness such as a cold or the flu. You may need to avoid people who have recently had certain kinds of vaccinations. · Even small injuries can get infected. Take steps to prevent cuts, burns, and sunburns. · If you have severe neutropenia, your doctor may advise you to avoid fresh fruits, vegetables, and flowers. When should you call for help? Call 911 anytime you think you may need emergency care. For example, call if:  · You have severe shortness of breath. · You passed out (lost consciousness). Call your doctor now or seek immediate medical care if:  · You have signs of infection, such as:  ¨ Increased pain, swelling, warmth, or redness of your skin. ¨ Red streaks leading from a wound.   ¨ Pus draining from a wound. ¨ A fever. Watch closely for changes in your health, and be sure to contact your doctor if:  · You do not get better as expected. Where can you learn more? Go to http://maria c-jeff.info/. Enter U972 in the search box to learn more about \"Neutropenia: Care Instructions. \"  Current as of: February 19, 2016  Content Version: 11.1  © 2719-3686 [employer]. Care instructions adapted under license by [employer] (which disclaims liability or warranty for this information). If you have questions about a medical condition or this instruction, always ask your healthcare professional. [address] any warranty or liability for your use of this information. PRN - he states he is not planning on returning to [facility] but does plan to go to [facility] for further care.

## 2017-01-19 NOTE — PROGRESS NOTES
Speech Pathology:   Patient is currently hospitalized. Outpatient speech appointment cancelled this date. Continue with plan of care. TERESA Pretty

## 2017-01-19 NOTE — ADT AUTH CERT NOTES
Utilization Review           Medical Oncology [address] Day 7 (1/18/2017) by Marcel Daniel RN        Review Entered Review Status       1/18/2017 Completed       Details              Care Day: 7 Care Date: 1/18/2017 Level of Care: Inpatient Floor       Guideline Day 3        Level Of Care       (X) * Activity level acceptable       ( ) * Complete discharge planning              Clinical Status       (X) * Pain and nausea absent or adequately managed       (X) * Temperature status acceptable       (X) * No infection, or status acceptable       (X) * No neutropenia, or status acceptable       (X) * Abdominal status acceptable       (X) * Mucositis absent or adequately resolved       (X) * Diarrhea absent or adequately controlled       ( ) * Blood cell count acceptable       (X) * Hematologic complications absent or stabilized       (X) * Neurologic status acceptable       (X) * Electrolyte status acceptable       (X) * Tumor lysis absent or resolved       (X) * Malignant effusions absent or adequately controlled       (X) * Pathologic fracture absent or stabilized       ( ) * General Discharge Criteria met              Interventions       ( ) * Intake acceptable       ( ) * No inpatient interventions needed                                   * Milestone              Additional Notes       CONT STAY REVIEW FOR  1/18/17        GI DAILY PROGRESS NOTE        Admit Date: 1/11/2017        Today's Date: 1/18/2017        CC: Feeding difficulties, Aspiration        Subjective:               Patient anxious to proceed with [facility] and EGD/PEG and go home. Small BM.  Denies pain, N/V.        Assessment:               Principal Problem:       Neutropenic fever ([address] (1/12/2017)               Active Problems:       Squamous cell carcinoma of epiglottis (HCC) (11/23/2016)               Renal insufficiency (1/10/2017)               Pancytopenia ([address] (1/12/2017)               Feeding difficulties with aspiration               Plan:               63 y/o male with epiglottis CA admitted with neutropenic fever. Failed swallow study, positive for aspiration. He was scheduled for PEG placement with Dr. Pershing Gosselin later this month, but given the feeding difficulties, GI was asked to place PEG sooner rather than later. Case was further discussed with anesthesia. Dr. Sharlene Morin who recommends PEG placement be done in the OR. His WBC counts are improved. PEG could not even be attempted--anasthesia was unsuccessful in their attempt to place an ETT on 1/16/17. Possible tracheostomy tomorrow and PEG after (Wed or Thursday). Patient wants trach peg together will not agree to with unless done together. Was understanding that Dr Omar Anderson was seeing patient.  Will consult general surgery to see if procedures can be done today/tomorrow.        WBC: 5.5       RBC: 3.46 (L)       HGB: 9.7 (L)       HCT: 29.0 (L)        Glucose: 228 (H)       /95, GEN SURGERY CONSULT,  NPO, GLUC QID, TPN, SOLUMEDROL                         Medical Oncology [facility] - Care Day 6 (1/17/2017) by Celso Rascon RN        Review Entered Review Status       1/18/2017 Completed       Details              Care Day: 6 Care Date: 1/17/2017 Level of Care: Inpatient Floor       Guideline Day 3        Level Of Care       (X) * Activity level acceptable       ( ) * Complete discharge planning              Clinical Status       (X) * Pain and nausea absent or adequately managed       (X) * Temperature status acceptable       (X) * No infection, or status acceptable       (X) * No neutropenia, or status acceptable       (X) * Abdominal status acceptable       (X) * Mucositis absent or adequately resolved       (X) * Diarrhea absent or adequately controlled       ( ) * Blood cell count acceptable       ( ) * Hematologic complications absent or stabilized       (X) * Neurologic status acceptable       (X) * Electrolyte status acceptable       (X) * Tumor lysis absent or resolved       (X) * Malignant effusions absent or adequately controlled       (X) * Pathologic fracture absent or stabilized       ( ) * General Discharge Criteria met              Interventions       ( ) * Intake acceptable       ( ) * No inpatient interventions needed                                   * Milestone              Additional Notes       1/17/17       GI DAILY PROGRESS NOTE               Admit Date: 1/11/2017               Today's Date: 1/17/2017               CC: Feeding difficulties, Aspiration               Subjective:               Patient anxious to proceed with EGD and PEG and go home. Small BM. Denies pain, N/V. Wants a soda informed of the dangers of aspiration       Diet: NPO. TPN               Assessment:               Principal Problem:       Neutropenic fever (HCC) (1/12/2017)               Active Problems:       Squamous cell carcinoma of epiglottis (HCC) (11/23/2016)               Renal insufficiency (1/10/2017)               Pancytopenia ([address] (1/12/2017)               Feeding difficulties with aspiration               Plan:               61 y/o male with epiglottis CA admitted with neutropenic fever. Failed swallow study, positive for aspiration. He was scheduled for PEG placement with Dr. Fifi Bro later this month, but given the feeding difficulties, GI was asked to place PEG sooner rather than later. Case was further discussed with anesthesia. Dr. Demetrio Leblanc who recommends PEG placement be done in the OR. His WBC counts are improved. PEG could not even be attempted--anasthesia was unsuccessful in their attempt to place an ETT on 1/16/17.  Possible tracheostomy tomorrow and PEG after (Wed or Thursday)       WBC: 6.4       RBC: 2.99 (L)       HGB: 8.2 (L)       HCT: 25.1 (L)        BENADRYL , LIPIDS IV, SSI, TPN, SOLUMEDROL IV , 2 PRCS

## 2017-01-19 NOTE — CONSULTS
Department of Interventional Radiology  [phone]        Consult Note     Patient: Nicole Jade MRN: [id number]  SSN: [id number]    YOB: 1955  Age: 62 y.o. Sex: male      Referring Physician: DORIE Pino NP    Consult Date: 1/19/2017     Consulted to see pt regarding gastrostomy feeding tube placement. Upon arrival to the 5th floor, I was notified that the patient had already left AMA. We are familiar with this patient, having placed his venous chest port last month. He will need a secure airway prior to G-tube placement, and it looks like Dr. Martin William may be scheduling the patient for a trach in the near future. Once this is scheduled please call IR scheduling for a date/time. He will need to drink Gastroview the night before G-tube placement if at all possible or via NG.      Hannah Tony PA-C      Chart reviewed and plan of care created w Ms. Marcial this morning. Unfortunately, Mr. Ashlie Rosales left the hospital before Ms. Windy Barrera arrived at his room. His airway issues will need resolution prior to any procedure. I suspect that he will need a tracheostomy. Percutaneous Gastrostomy placement also requires NasoGastric intubation, but typically with a small bore catheter that is removed during the procedure. Ideally, the patient will receive gastrograffin the night before the procedure, either orally or via an NG tube. I have just spoken to Dr. Meenakshi Oneill who is trying to facilitate feeding tube placement for Mr. Ashlie Rosales. I will contact Dr. Martin William as I believe that he has already been contacted regarding this patient.       Jesenia Dobbins MD

## 2017-01-19 NOTE — PROGRESS NOTES
Discharge instructions and prescriptions given and reviewed with pt, verbalizes understanding, medication side effect sheet reviewed with pt, pt discharged home with family. Patient left room and told wife \"let's go\".

## 2017-01-19 NOTE — PROGRESS NOTES
Patient is refusing to stay and is requesting to speak to patient relations. Patient relations notified, but states they are in a meeting and unable to come quickly. [place] to come as soon as they can. Roger Jaimes NP notified and both she Dr. Sarbjit Elam were notified of patient's agitation and desire to leave imminently.

## 2017-01-19 NOTE — DISCHARGE SUMMARY
[facility]: In Patient Hematology / Oncology Discharge Summary Note    Patient ID:  Jai Woods  [id number]  58 y.o.  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 Lakesha Iglesias is a 62 y.o. male who presented to ED on 01/12/2017 with fever. He is a known patient of Dr. Lucy Gutierrez receiving treatment for supraglottic cancer sp first cycle TCF. He had developed fever over the last 24 hours and had been feeling unwell with nausea and vomiting. He reported some mild generalized abdominal pain and has had a few loose stools. BC, UC, and CXR were done. He was started on cefepime and vancomycin. While here, Mr. Lakesha Iglesias had a barium swallow study which was previously scheduled and was needed prior to starting RT. He failed  for all consistencies so he was placed NPO. He had a PEG scheduled as OP with GI later this month so we asked GI to see if PEG could be placed sooner than later. GI agreed however wanted to wait for his [address] to recover so procedure was planned for 01/16/17. Patient was placed on granix over the weekend and counts did recover. On 01/16/17, he was taken for PEG and anesthesiololgy was not able to intubate so procedure was cancel. ENT was then consulted and planned to discuss w/ Dr. Ramirez Roe about possible trach with plans to  defer all airway management options to him as Mr. Lakesha Iglesias is his patient. Arrangements were made for possible tracheostomy and PEG on 01/18 or 01/19.  On 01/18 call was received from Dr. Yisel Gamboa stating that he has re- considered method of G tube placement and given the slight risk of met to stomach from the pull through PEG technique, would prefer to see if IR will place. Therefore we consulted IR for consideration of PEG placement. Today patient is angry and upset that he has been here for a week and he still has not been able to received PEG. Mr. Anjali Espinal was treated with compassion and understanding regarding the unfortunate events. We allowed for him to vent his anger and frustrations. We encouraged him to stay long enough to discuss with Patient Relations however, he states that he has lost trust and ann in our hospital and our doctors and will be going back to [address] for all future treatment. Consults:  IP CONSULT TO ONCOLOGY  IP CONSULT TO GASTROENTEROLOGY  IP CONSULT TO OTOLARYNGOLOGY  IP CONSULT TO INTERVENTIONAL RADIOLOGY  IP CONSULT TO GENERAL SURGERY    Significant Diagnostic Studies:   01/11/17 Exam: Single view of the chest     Indication: Weakness with nausea, vomiting, and diarrhea, productive cough and  fever     Comparison: None available     Findings:   Right chest port catheter is in place with distal tip near the cavoatrial  junction. The cardia mediastinal silhouette is within normal limits. Minimal  lung base atelectasis without focal consolidation, large pleural effusion, or  evidence of pneumothorax. No acute osseous abnormality.      IMPRESSION:   No acute cardiopulmonary findings. Modified Barium Swallow, 1/12/2017     History: Dysphagia, cancer of the epiglottis.     Technique: Fluoroscopic guidance was provided for the speech pathologist.  Multiple consistencies of barium were given.     Findings: The patient's ability to swallow was evaluated with thin, nectar, and  pudding consistencies. The patient had gross aspiration with all consistencies  tested. This study was discontinued at this point. Please see speech pathologist  report for further details.     Fluoroscopy time: 0.5 minutes.   No Known Allergies    OBJECTIVE:  Patient Vitals for the past 8 hrs:   BP Temp Pulse Resp SpO2   17 0829 144/90 97.7 °F (36.5 °C) 84 18 98 %     Temp (24hrs), Av.6 °F (36.4 °C), Min:97.3 °F (36.3 °C), Max:97.7 °F (36.5 °C)         Physical Exam:  Constitutional: Oriented to person, place, and time. Well-developed and well-nourished. HEENT: Normocephalic and atraumatic. Lymph node   Deferred   Skin Warm and dry. No bruising and no rash noted. No erythema. No pallor. Respiratory Effort normal. No respiratory distress. No wheezes. No rales. No tenderness. CVS Deferred   Abdomen Deferred   Neuro Deferred   MSK Normal range of motion.     Psych Angry and frustrated     Labs:  Recent Labs      17   0520  17   0415   WBC  5.5  6.4   RBC  3.46*  2.99*   HGB  9.7*  8.2*   HCT  29.0*  25.1*   MCV  83.8  83.9   MCH  28.0  27.4   MCHC  33.4  32.7   RDW  13.1  13.2   PLT  212  222   GRANS  76  82*   LYMPH  16  10*   MONOS  8  8   EOS  0*  0*   BASOS  0  0   DF  AUTOMATED  AUTOMATED   ANEU  4.2  5.2   ABL  0.9  0.6   ABM  0.4  0.5   MAHI  0.0  0.0   ABB  0.0  0.0   AIG  0.1  0.1    Recent Labs      17   0330  17   0520  17   0415   NA  144  142  144   K  3.6  4.2  4.6   CL  107  108*  108*   CO2  28  26  27   AGAP  9  8  9   GLU  131*  228*  204*   BUN  37*  34*  30*   CREA  1.33  1.28  1.34   GFRAA  >60  >60  >60   GFRNA  58*  >60  58*   CA  8.1*  7.4*  8.4   SGOT  26  29  22   AP  73  66  73   TP  6.1*  5.8*  6.0*   ALB  2.8*  2.5*  2.6*   GLOB  3.3  3.3  3.4   AGRAT  0.8*  0.8*  0.8*   MG   --   2.1  2.4   PHOS  4.6*  3.7  4.8*       ASSESSMENT:    Principal Problem:    Neutropenic fever (HCC) (2017)    Active Problems:    Squamous cell carcinoma of epiglottis (HCC) (2016)      Renal insufficiency (1/10/2017)      Pancytopenia ([facility] (2017)      Discharge Medication List as of 2017 10:41 AM      CONTINUE these medications which have NOT CHANGED    Details   LORazepam (ATIVAN) 1 mg tablet Take 0.5-1 Tabs by mouth every six (6) hours as needed for Anxiety. Max Daily Amount: 4 mg. Indications: CANCER CHEMOTHERAPY-INDUCED NAUSEA AND VOMITING, Print, Disp-90 Tab, R-0      magic mouthwash (GLADYS) susp Take 10 mL by mouth every four (4) hours. , Print, Disp-500 mL, R-3      lidocaine-prilocaine (EMLA) topical cream Apply  to affected area as needed. Apply 1/2 inch amount of cream to port site 90 minutes prior to needle access., Normal, Disp-30 g, R-0      ondansetron hcl (ZOFRAN) 8 mg tablet Take 1 Tab by mouth every eight (8) hours as needed for Nausea., Normal, Disp-90 Tab, R-3      prochlorperazine (COMPAZINE) 10 mg tablet Take 1 Tab by mouth every six (6) hours as needed., Normal, Disp-120 Tab, R-3      metFORMIN (GLUCOPHAGE) 500 mg tablet Take 500 mg by mouth three (3) times daily (with meals). Indications: PREVENTION OF TYPE 2 DIABETES MELLITUS, Historical Med      aspirin delayed-release 81 mg tablet Take 81 mg by mouth every morning. Take / use AM day of surgery  per anesthesia protocols. Indications: myocardial infarction prevention, Historical Med      triamterene-hydroCHLOROthiazide (MAXZIDE) 37.5-25 mg per tablet Take 1 Tab by mouth every morning. Indications: Edema, Hypertension, Historical Med      losartan (COZAAR) 50 mg tablet 50 mg two (2) times a day. Indications: Hypertension, Historical Med      amLODIPine (NORVASC) 10 mg tablet Take 10 mg by mouth nightly. Take / use AM day of surgery  per anesthesia protocols. Indications: Hypertension, Historical Med      allopurinol (ZYLOPRIM) 300 mg tablet 300 mg nightly. Indications: GOUT, Historical Med      glipiZIDE SR (GLUCOTROL) 10 mg CR tablet Take 10 mg by mouth two (2) times a day. Indications: type 2 diabetes mellitus, Historical Med      omeprazole (PRILOSEC) 20 mg capsule Take 20 mg by mouth every morning. Take / use AM day of surgery  per anesthesia protocols.   Indications: GASTROESOPHAGEAL REFLUX, Historical Med             PLAN:    Follow-up Appointments   Procedures    FOLLOW UP VISIT Appointment in: Other (Specify) PRN - he states he is not planning on returning to [address] [place] [address] but does plan to go to [place] for further care. PRN - he states he is not planning on returning to [address] [place] [address] but does plan to go to [place] for further care. Standing Status:   Standing     Number of Occurrences:   1     Order Specific Question:   Appointment in     Answer: Other [id number])                 Margaret Mendosa NP  [address]  Office : [phone]  Fax : [phone]        Attending Addendum:  I personally evaluated the patient with Margaret Mendosa N.P.,  and agree with the assessment, findings and plan as documented. He decided to pursue treatment in [place] because we were unable to have a PEG placed.               Ling Santacruz MD  [address]  Office : [phone]  Fax : [phone]

## 2017-01-20 ENCOUNTER — HOSPITAL ENCOUNTER (OUTPATIENT)
Dept: LAB | Age: 62
Discharge: HOME | End: 2017-01-20
Payer: COMMERCIAL

## 2017-01-20 ENCOUNTER — HOSPITAL ENCOUNTER (OUTPATIENT)
Dept: INFUSION THERAPY | Age: 62
Discharge: HOME | End: 2017-01-20
Payer: COMMERCIAL

## 2017-01-20 VITALS
OXYGEN SATURATION: 96 % | TEMPERATURE: 97.7 F | SYSTOLIC BLOOD PRESSURE: 135 MMHG | DIASTOLIC BLOOD PRESSURE: 74 MMHG | HEART RATE: 93 BPM

## 2017-01-20 DIAGNOSIS — N28.9 RENAL INSUFFICIENCY: ICD-10-CM

## 2017-01-20 DIAGNOSIS — C32.1 SQUAMOUS CELL CARCINOMA OF EPIGLOTTIS (HCC): ICD-10-CM

## 2017-01-20 DIAGNOSIS — R11.15 NON-INTRACTABLE CYCLICAL VOMITING WITH NAUSEA: ICD-10-CM

## 2017-01-20 LAB
ALBUMIN SERPL BCP-MCNC: 2.9 G/DL (ref 3.2–4.6)
ALBUMIN/GLOB SERPL: 0.9 {RATIO} (ref 1.2–3.5)
ALP SERPL-CCNC: 69 U/L (ref 50–136)
ALT SERPL-CCNC: 33 U/L (ref 12–65)
ANION GAP BLD CALC-SCNC: 5 MMOL/L (ref 7–16)
AST SERPL W P-5'-P-CCNC: 17 U/L (ref 15–37)
BASOPHILS # BLD AUTO: 0 K/UL (ref 0–0.2)
BASOPHILS # BLD: 0 % (ref 0–2)
BILIRUB SERPL-MCNC: 0.6 MG/DL (ref 0.2–1.1)
BUN SERPL-MCNC: 30 MG/DL (ref 8–23)
CALCIUM SERPL-MCNC: 7.8 MG/DL (ref 8.3–10.4)
CHLORIDE SERPL-SCNC: 109 MMOL/L (ref 98–107)
CHOLEST SERPL-MCNC: 104 MG/DL
CO2 SERPL-SCNC: 27 MMOL/L (ref 23–32)
CREAT SERPL-MCNC: 1.34 MG/DL (ref 0.8–1.5)
DIFFERENTIAL METHOD BLD: ABNORMAL
EOSINOPHIL # BLD: 0.1 K/UL (ref 0–0.8)
EOSINOPHIL NFR BLD: 2 % (ref 0.5–7.8)
ERYTHROCYTE [DISTWIDTH] IN BLOOD BY AUTOMATED COUNT: 13.1 % (ref 11.9–14.6)
GLOBULIN SER CALC-MCNC: 3.3 G/DL (ref 2.3–3.5)
GLUCOSE SERPL-MCNC: 138 MG/DL (ref 65–100)
HCT VFR BLD AUTO: 33 % (ref 41.1–50.3)
HDLC SERPL-MCNC: 20 MG/DL (ref 40–60)
HDLC SERPL: 5.2 {RATIO}
HGB BLD-MCNC: 10.9 G/DL (ref 13.6–17.2)
LDH SERPL L TO P-CCNC: 184 U/L (ref 100–190)
LDLC SERPL CALC-MCNC: 41.6 MG/DL
LIPID PROFILE,FLP: ABNORMAL
LYMPHOCYTES # BLD AUTO: 22 % (ref 13–44)
LYMPHOCYTES # BLD: 1.7 K/UL (ref 0.5–4.6)
MAGNESIUM SERPL-MCNC: 2.1 MG/DL (ref 1.8–2.4)
MCH RBC QN AUTO: 27.5 PG (ref 26.1–32.9)
MCHC RBC AUTO-ENTMCNC: 33 G/DL (ref 31.4–35)
MCV RBC AUTO: 83.3 FL (ref 79.6–97.8)
MONOCYTES # BLD: 0.5 K/UL (ref 0.1–1.3)
MONOCYTES NFR BLD AUTO: 6 % (ref 4–12)
NEUTS SEG # BLD: 5.2 K/UL (ref 1.7–8.2)
NEUTS SEG NFR BLD AUTO: 70 % (ref 43–78)
NRBC # BLD: 0 K/UL (ref 0–0.2)
PHOSPHATE SERPL-MCNC: 3 MG/DL (ref 2.3–3.7)
PLATELET # BLD AUTO: 232 K/UL (ref 150–450)
PMV BLD AUTO: 8.8 FL (ref 10.8–14.1)
POTASSIUM SERPL-SCNC: 3.9 MMOL/L (ref 3.5–5.1)
PREALB SERPL-MCNC: 19.1 MG/DL (ref 18–35.7)
PROT SERPL-MCNC: 6.2 G/DL (ref 6.3–8.2)
RBC # BLD AUTO: 3.96 M/UL (ref 4.23–5.67)
SODIUM SERPL-SCNC: 141 MMOL/L (ref 136–145)
TRIGL SERPL-MCNC: 212 MG/DL (ref 35–150)
VLDLC SERPL CALC-MCNC: 42.4 MG/DL (ref 6–23)
WBC # BLD AUTO: 7.5 K/UL (ref 4.3–11.1)

## 2017-01-20 PROCEDURE — 85025 COMPLETE CBC W/AUTO DIFF WBC: CPT | Performed by: INTERNAL MEDICINE

## 2017-01-20 PROCEDURE — 74011250636 HC RX REV CODE- 250/636: Performed by: INTERNAL MEDICINE

## 2017-01-20 PROCEDURE — 84134 ASSAY OF PREALBUMIN: CPT | Performed by: INTERNAL MEDICINE

## 2017-01-20 PROCEDURE — 84100 ASSAY OF PHOSPHORUS: CPT | Performed by: INTERNAL MEDICINE

## 2017-01-20 PROCEDURE — 96361 HYDRATE IV INFUSION ADD-ON: CPT

## 2017-01-20 PROCEDURE — 83615 LACTATE (LD) (LDH) ENZYME: CPT | Performed by: INTERNAL MEDICINE

## 2017-01-20 PROCEDURE — 96374 THER/PROPH/DIAG INJ IV PUSH: CPT

## 2017-01-20 PROCEDURE — 80061 LIPID PANEL: CPT | Performed by: INTERNAL MEDICINE

## 2017-01-20 PROCEDURE — 83735 ASSAY OF MAGNESIUM: CPT | Performed by: INTERNAL MEDICINE

## 2017-01-20 PROCEDURE — 80053 COMPREHEN METABOLIC PANEL: CPT | Performed by: INTERNAL MEDICINE

## 2017-01-20 PROCEDURE — 77030003560 HC NDL HUBR BARD -A

## 2017-01-20 RX ORDER — SODIUM CHLORIDE 0.9 % (FLUSH) 0.9 %
10 SYRINGE (ML) INJECTION AS NEEDED
Status: DISCONTINUED | OUTPATIENT
Start: 2017-01-20 | End: 2017-01-24 | Stop reason: HOSPADM

## 2017-01-20 RX ORDER — HEPARIN 100 UNIT/ML
300-500 SYRINGE INTRAVENOUS AS NEEDED
Status: DISPENSED | OUTPATIENT
Start: 2017-01-20 | End: 2017-01-20

## 2017-01-20 RX ORDER — SODIUM CHLORIDE 9 MG/ML
2000 INJECTION, SOLUTION INTRAVENOUS ONCE
Status: COMPLETED | OUTPATIENT
Start: 2017-01-20 | End: 2017-01-20

## 2017-01-20 RX ORDER — ONDANSETRON 2 MG/ML
8 INJECTION INTRAMUSCULAR; INTRAVENOUS AS NEEDED
Status: DISPENSED | OUTPATIENT
Start: 2017-01-20 | End: 2017-01-20

## 2017-01-20 RX ADMIN — Medication 10 ML: at 09:23

## 2017-01-20 RX ADMIN — SODIUM CHLORIDE 2000 ML: 900 INJECTION, SOLUTION INTRAVENOUS at 07:34

## 2017-01-20 RX ADMIN — ONDANSETRON 8 MG: 2 INJECTION, SOLUTION INTRAMUSCULAR; INTRAVENOUS at 09:23

## 2017-01-20 RX ADMIN — SODIUM CHLORIDE, PRESERVATIVE FREE 500 UNITS: 5 INJECTION INTRAVENOUS at 09:23

## 2017-01-20 NOTE — PROGRESS NOTES
2 liters IVF infused, pt tolerated well, discharged to waiting room for appointment with Dr Chani Pleitez

## 2017-01-23 ENCOUNTER — ANESTHESIA (OUTPATIENT)
Dept: SURGERY | Age: 62
End: 2017-01-23
Payer: COMMERCIAL

## 2017-01-23 ENCOUNTER — ANESTHESIA EVENT (OUTPATIENT)
Dept: SURGERY | Age: 62
End: 2017-01-23
Payer: COMMERCIAL

## 2017-01-23 ENCOUNTER — PATIENT OUTREACH (OUTPATIENT)
Dept: CASE MANAGEMENT | Age: 62
End: 2017-01-23

## 2017-01-23 ENCOUNTER — HOSPITAL ENCOUNTER (OUTPATIENT)
Age: 62
Setting detail: OBSERVATION
Discharge: HOME HEALTH | End: 2017-01-26
Attending: OTOLARYNGOLOGY | Admitting: OTOLARYNGOLOGY
Payer: COMMERCIAL

## 2017-01-23 ENCOUNTER — HOSPITAL ENCOUNTER (OUTPATIENT)
Dept: INFUSION THERAPY | Age: 62
End: 2017-01-23
Payer: COMMERCIAL

## 2017-01-23 LAB
BACTERIA SPEC CULT: NORMAL
GLUCOSE BLD STRIP.AUTO-MCNC: 161 MG/DL (ref 65–100)
GLUCOSE BLD STRIP.AUTO-MCNC: 97 MG/DL (ref 65–100)
SERVICE CMNT-IMP: NORMAL

## 2017-01-23 PROCEDURE — 77030032490 HC SLV COMPR SCD KNE COVD -B: Performed by: OTOLARYNGOLOGY

## 2017-01-23 PROCEDURE — 77030008477 HC STYL SATN SLP COVD -A: Performed by: ANESTHESIOLOGY

## 2017-01-23 PROCEDURE — 74011000250 HC RX REV CODE- 250

## 2017-01-23 PROCEDURE — 77030008793 HC TU TRACH CUF COVD -B: Performed by: OTOLARYNGOLOGY

## 2017-01-23 PROCEDURE — G0378 HOSPITAL OBSERVATION PER HR: HCPCS

## 2017-01-23 PROCEDURE — 82962 GLUCOSE BLOOD TEST: CPT

## 2017-01-23 PROCEDURE — 99218 HC RM OBSERVATION: CPT

## 2017-01-23 PROCEDURE — 77030018846 HC SOL IRR STRL H20 ICUM -A: Performed by: OTOLARYNGOLOGY

## 2017-01-23 PROCEDURE — 74011250636 HC RX REV CODE- 250/636: Performed by: ANESTHESIOLOGY

## 2017-01-23 PROCEDURE — 77030011640 HC PAD GRND REM COVD -A: Performed by: OTOLARYNGOLOGY

## 2017-01-23 PROCEDURE — 77030037378 HC BRONCHOSCOPE DISP TRAN -D: Performed by: ANESTHESIOLOGY

## 2017-01-23 PROCEDURE — 76010000162 HC OR TIME 1.5 TO 2 HR INTENSV-TIER 1: Performed by: OTOLARYNGOLOGY

## 2017-01-23 PROCEDURE — 77030032490 HC SLV COMPR SCD KNE COVD -B

## 2017-01-23 PROCEDURE — 74011250636 HC RX REV CODE- 250/636: Performed by: OTOLARYNGOLOGY

## 2017-01-23 PROCEDURE — 77030020143 HC AIRWY LARYN INTUB CGAS -A: Performed by: ANESTHESIOLOGY

## 2017-01-23 PROCEDURE — 77030002996 HC SUT SLK J&J -A: Performed by: OTOLARYNGOLOGY

## 2017-01-23 PROCEDURE — 77030008703 HC TU ET UNCUF COVD -A: Performed by: ANESTHESIOLOGY

## 2017-01-23 PROCEDURE — 87641 MR-STAPH DNA AMP PROBE: CPT | Performed by: OTOLARYNGOLOGY

## 2017-01-23 PROCEDURE — 77030011267 HC ELECTRD BLD COVD -A: Performed by: OTOLARYNGOLOGY

## 2017-01-23 PROCEDURE — 76060000034 HC ANESTHESIA 1.5 TO 2 HR: Performed by: OTOLARYNGOLOGY

## 2017-01-23 PROCEDURE — 74011250636 HC RX REV CODE- 250/636

## 2017-01-23 PROCEDURE — 77030019940 HC BLNKT HYPOTHRM STRY -B: Performed by: ANESTHESIOLOGY

## 2017-01-23 PROCEDURE — 77030005103 HC CATH GASTMY BLN HALY -B: Performed by: OTOLARYNGOLOGY

## 2017-01-23 PROCEDURE — 74011000250 HC RX REV CODE- 250: Performed by: OTOLARYNGOLOGY

## 2017-01-23 PROCEDURE — 74011000250 HC RX REV CODE- 250: Performed by: ANESTHESIOLOGY

## 2017-01-23 PROCEDURE — 77030013113: Performed by: OTOLARYNGOLOGY

## 2017-01-23 PROCEDURE — 77030021678 HC GLIDESCP STAT DISP VERT -B: Performed by: ANESTHESIOLOGY

## 2017-01-23 DEVICE — TRACHEOSTOMY TUBE CUFFED WITH DISPOSABLE INNER CANNULA
Type: IMPLANTABLE DEVICE | Site: NECK | Status: FUNCTIONAL
Brand: SHILEY

## 2017-01-23 RX ORDER — DEXTROSE 40 %
15 GEL (GRAM) ORAL AS NEEDED
Status: DISCONTINUED | OUTPATIENT
Start: 2017-01-23 | End: 2017-01-26 | Stop reason: HOSPADM

## 2017-01-23 RX ORDER — SUCCINYLCHOLINE CHLORIDE 20 MG/ML
INJECTION INTRAMUSCULAR; INTRAVENOUS AS NEEDED
Status: DISCONTINUED | OUTPATIENT
Start: 2017-01-23 | End: 2017-01-23 | Stop reason: HOSPADM

## 2017-01-23 RX ORDER — MORPHINE SULFATE 2 MG/ML
2 INJECTION, SOLUTION INTRAMUSCULAR; INTRAVENOUS
Status: DISCONTINUED | OUTPATIENT
Start: 2017-01-23 | End: 2017-01-26 | Stop reason: HOSPADM

## 2017-01-23 RX ORDER — BUPIVACAINE HYDROCHLORIDE AND EPINEPHRINE 2.5; 5 MG/ML; UG/ML
INJECTION, SOLUTION EPIDURAL; INFILTRATION; INTRACAUDAL; PERINEURAL AS NEEDED
Status: DISCONTINUED | OUTPATIENT
Start: 2017-01-23 | End: 2017-01-23 | Stop reason: HOSPADM

## 2017-01-23 RX ORDER — PROPOFOL 10 MG/ML
INJECTION, EMULSION INTRAVENOUS AS NEEDED
Status: DISCONTINUED | OUTPATIENT
Start: 2017-01-23 | End: 2017-01-23 | Stop reason: HOSPADM

## 2017-01-23 RX ORDER — NEOSTIGMINE METHYLSULFATE 1 MG/ML
INJECTION INTRAVENOUS AS NEEDED
Status: DISCONTINUED | OUTPATIENT
Start: 2017-01-23 | End: 2017-01-23 | Stop reason: HOSPADM

## 2017-01-23 RX ORDER — LIDOCAINE HYDROCHLORIDE AND EPINEPHRINE 10; 10 MG/ML; UG/ML
INJECTION, SOLUTION INFILTRATION; PERINEURAL AS NEEDED
Status: DISCONTINUED | OUTPATIENT
Start: 2017-01-23 | End: 2017-01-23 | Stop reason: HOSPADM

## 2017-01-23 RX ORDER — DEXAMETHASONE SODIUM PHOSPHATE 4 MG/ML
INJECTION, SOLUTION INTRA-ARTICULAR; INTRALESIONAL; INTRAMUSCULAR; INTRAVENOUS; SOFT TISSUE AS NEEDED
Status: DISCONTINUED | OUTPATIENT
Start: 2017-01-23 | End: 2017-01-23 | Stop reason: HOSPADM

## 2017-01-23 RX ORDER — ROCURONIUM BROMIDE 10 MG/ML
INJECTION, SOLUTION INTRAVENOUS AS NEEDED
Status: DISCONTINUED | OUTPATIENT
Start: 2017-01-23 | End: 2017-01-23 | Stop reason: HOSPADM

## 2017-01-23 RX ORDER — MIDAZOLAM HYDROCHLORIDE 1 MG/ML
INJECTION, SOLUTION INTRAMUSCULAR; INTRAVENOUS AS NEEDED
Status: DISCONTINUED | OUTPATIENT
Start: 2017-01-23 | End: 2017-01-23 | Stop reason: HOSPADM

## 2017-01-23 RX ORDER — CEFAZOLIN SODIUM 1 G/3ML
INJECTION, POWDER, FOR SOLUTION INTRAMUSCULAR; INTRAVENOUS AS NEEDED
Status: DISCONTINUED | OUTPATIENT
Start: 2017-01-23 | End: 2017-01-23 | Stop reason: HOSPADM

## 2017-01-23 RX ORDER — INSULIN LISPRO 100 [IU]/ML
INJECTION, SOLUTION INTRAVENOUS; SUBCUTANEOUS
Status: DISCONTINUED | OUTPATIENT
Start: 2017-01-23 | End: 2017-01-26 | Stop reason: HOSPADM

## 2017-01-23 RX ORDER — ONDANSETRON 2 MG/ML
INJECTION INTRAMUSCULAR; INTRAVENOUS AS NEEDED
Status: DISCONTINUED | OUTPATIENT
Start: 2017-01-23 | End: 2017-01-23 | Stop reason: HOSPADM

## 2017-01-23 RX ORDER — HYDRALAZINE HYDROCHLORIDE 20 MG/ML
10 INJECTION INTRAMUSCULAR; INTRAVENOUS
Status: DISCONTINUED | OUTPATIENT
Start: 2017-01-23 | End: 2017-01-24

## 2017-01-23 RX ORDER — SODIUM CHLORIDE, SODIUM LACTATE, POTASSIUM CHLORIDE, CALCIUM CHLORIDE 600; 310; 30; 20 MG/100ML; MG/100ML; MG/100ML; MG/100ML
75 INJECTION, SOLUTION INTRAVENOUS CONTINUOUS
Status: DISPENSED | OUTPATIENT
Start: 2017-01-23 | End: 2017-01-24

## 2017-01-23 RX ORDER — GLYCOPYRROLATE 0.2 MG/ML
INJECTION INTRAMUSCULAR; INTRAVENOUS AS NEEDED
Status: DISCONTINUED | OUTPATIENT
Start: 2017-01-23 | End: 2017-01-23 | Stop reason: HOSPADM

## 2017-01-23 RX ORDER — SODIUM CHLORIDE, SODIUM LACTATE, POTASSIUM CHLORIDE, CALCIUM CHLORIDE 600; 310; 30; 20 MG/100ML; MG/100ML; MG/100ML; MG/100ML
75 INJECTION, SOLUTION INTRAVENOUS CONTINUOUS
Status: DISCONTINUED | OUTPATIENT
Start: 2017-01-23 | End: 2017-01-23 | Stop reason: HOSPADM

## 2017-01-23 RX ORDER — FAMOTIDINE 10 MG/ML
20 INJECTION INTRAVENOUS EVERY 12 HOURS
Status: DISCONTINUED | OUTPATIENT
Start: 2017-01-23 | End: 2017-01-23

## 2017-01-23 RX ORDER — FAMOTIDINE 10 MG/ML
20 INJECTION INTRAVENOUS ONCE
Status: COMPLETED | OUTPATIENT
Start: 2017-01-23 | End: 2017-01-23

## 2017-01-23 RX ORDER — DEXTROSE 50 % IN WATER (D50W) INTRAVENOUS SYRINGE
25-50 AS NEEDED
Status: DISCONTINUED | OUTPATIENT
Start: 2017-01-23 | End: 2017-01-26 | Stop reason: HOSPADM

## 2017-01-23 RX ORDER — FENTANYL CITRATE 50 UG/ML
INJECTION, SOLUTION INTRAMUSCULAR; INTRAVENOUS AS NEEDED
Status: DISCONTINUED | OUTPATIENT
Start: 2017-01-23 | End: 2017-01-23 | Stop reason: HOSPADM

## 2017-01-23 RX ADMIN — PROPOFOL 50 MG: 10 INJECTION, EMULSION INTRAVENOUS at 18:02

## 2017-01-23 RX ADMIN — PROPOFOL 100 MG: 10 INJECTION, EMULSION INTRAVENOUS at 18:28

## 2017-01-23 RX ADMIN — FENTANYL CITRATE 100 MCG: 50 INJECTION, SOLUTION INTRAMUSCULAR; INTRAVENOUS at 19:36

## 2017-01-23 RX ADMIN — SUCCINYLCHOLINE CHLORIDE 100 MG: 20 INJECTION INTRAMUSCULAR; INTRAVENOUS at 18:44

## 2017-01-23 RX ADMIN — Medication 2 MG: at 22:29

## 2017-01-23 RX ADMIN — FENTANYL CITRATE 75 MCG: 50 INJECTION, SOLUTION INTRAMUSCULAR; INTRAVENOUS at 18:52

## 2017-01-23 RX ADMIN — PROPOFOL 50 MG: 10 INJECTION, EMULSION INTRAVENOUS at 18:10

## 2017-01-23 RX ADMIN — SODIUM CHLORIDE, SODIUM LACTATE, POTASSIUM CHLORIDE, AND CALCIUM CHLORIDE 75 ML/HR: 600; 310; 30; 20 INJECTION, SOLUTION INTRAVENOUS at 15:30

## 2017-01-23 RX ADMIN — GLYCOPYRROLATE 0.3 MG: 0.2 INJECTION INTRAMUSCULAR; INTRAVENOUS at 19:26

## 2017-01-23 RX ADMIN — ROCURONIUM BROMIDE 15 MG: 10 INJECTION, SOLUTION INTRAVENOUS at 18:59

## 2017-01-23 RX ADMIN — CEFAZOLIN SODIUM 2 G: 1 INJECTION, POWDER, FOR SOLUTION INTRAMUSCULAR; INTRAVENOUS at 19:06

## 2017-01-23 RX ADMIN — MIDAZOLAM HYDROCHLORIDE 2 MG: 1 INJECTION, SOLUTION INTRAMUSCULAR; INTRAVENOUS at 18:02

## 2017-01-23 RX ADMIN — FAMOTIDINE 20 MG: 10 INJECTION, SOLUTION INTRAVENOUS at 16:52

## 2017-01-23 RX ADMIN — DEXAMETHASONE SODIUM PHOSPHATE 10 MG: 4 INJECTION, SOLUTION INTRA-ARTICULAR; INTRALESIONAL; INTRAMUSCULAR; INTRAVENOUS; SOFT TISSUE at 18:50

## 2017-01-23 RX ADMIN — NEOSTIGMINE METHYLSULFATE 3 MG: 1 INJECTION INTRAVENOUS at 19:26

## 2017-01-23 RX ADMIN — FENTANYL CITRATE 25 MCG: 50 INJECTION, SOLUTION INTRAMUSCULAR; INTRAVENOUS at 18:02

## 2017-01-23 RX ADMIN — ONDANSETRON 4 MG: 2 INJECTION INTRAMUSCULAR; INTRAVENOUS at 19:07

## 2017-01-23 RX ADMIN — SODIUM CHLORIDE, SODIUM LACTATE, POTASSIUM CHLORIDE, AND CALCIUM CHLORIDE 75 ML/HR: 600; 310; 30; 20 INJECTION, SOLUTION INTRAVENOUS at 21:50

## 2017-01-23 NOTE — IP AVS SNAPSHOT
Radha Ted 
 
 
 [address] [facility],[address] 
[phone] Patient: Cele Pinon MRN: [id number] HWA:6/24/1908 You are allergic to the following No active allergies Recent Documentation Weight BMI Smoking Status 106.6 kg 34.7 kg/m2 Former Smoker Emergency Contacts Name Discharge Info Relation Home Work Mobile Meera Landin DISCHARGE CAREGIVER [3] Spouse [3] [phone] About your hospitalization You were admitted on:  January 23, 2017 You last received care in the:  [address] You were discharged on:  January 26, 2017 Unit phone number:  [phone] Why you were hospitalized Your primary diagnosis was:  Head And Neck Cancer (Hcc) Your diagnoses also included:  Type 2 Diabetes Mellitus With Hyperglycemia (Hcc), Htn (Hypertension) Providers Seen During Your Hospitalizations Provider Role Specialty Primary office phone Stephy Hernandez DO Attending Provider Otolaryngology [phone] Your Primary Care Physician (PCP) Primary Care Physician Office Phone Office Fax 0615 [facility] [phone] ** None ** Follow-up Information Follow up With Details Comments Contact Info Florencia Interiano MD   [address] [facility] 
[phone] Your Appointments Thursday January 26, 2017 11:00 AM EST PreChemo Follow Up with JAMIE CARRILLO NP1 Sherrell Harris Hematology and Oncology [facility]) C/ Dane Carlos [address] [place] [id number]  
[phone] Thursday January 26, 2017 11:00 AM EST Follow Up with GREGORY Doll Hematology and Oncology [facility]) C/ Dane Carlos [address] [place] [id number]  
[phone] Friday January 27, 2017  7:15 AM EST CHEMO with Negar Cortez. [address] [facility] ([facility] ) [address] Ildefonso Raphael [phone] [facility] [address]  
[phone] [address] Friday January 27, 2017  9:00 AM EST Follow Up with JAMIE Palumbo Hematology and Oncology [facility]) FAROOQ/ Dane Carlos [address] [facility] [address]  
[phone] Monday February 13, 2017 10:00 AM EST Chemo with NUR5  
ST. [address] [facility] ([facility]) [address] Ildefonso Raphael [phone] [facility] [address]  
[phone] [address] Thursday February 16, 2017  8:15 AM EST  
LAB with [address]  
[facility] OUTREACH INSURANCE [facility]) Олег Felderon [address] [place] [address]  
[phone] Thursday February 16, 2017  8:45 AM EST PreChemo Follow Up with MD Arcenio Max Hematology and Oncology [facility]) C/ Dane Carlos [address] [facility] [address]  
[phone] Thursday February 16, 2017  9:15 AM EST Chemo with NUS10  
ST. [address] [facility] ([facility]) [address] Ildefonso Raphael [phone] [facility] [address]  
[phone] [address] Current Discharge Medication List  
  
ASK your doctor about these medications Dose & Instructions Dispensing Information Comments Morning Noon Evening Bedtime  
 allopurinol 300 mg tablet Commonly known as:  Diane Minor Your next dose is: Today, Tomorrow Other:  _________ Dose:  300 mg  
300 mg nightly. Indications: GOUT Refills:  0  
     
   
   
   
  
 amLODIPine 10 mg tablet Commonly known as:  Azucena Arevalo Your next dose is: Today, Tomorrow Other:  _________  Dose:  10 mg  
 Take 10 mg by mouth nightly. Take / use AM day of surgery  per anesthesia protocols. Indications: Hypertension Refills:  0  
     
   
   
   
  
 aspirin delayed-release 81 mg tablet Your next dose is: Today, Tomorrow Other:  _________ Dose:  81 mg Take 81 mg by mouth every morning. Take / use AM day of surgery  per anesthesia protocols. Indications: myocardial infarction prevention Refills:  0  
     
   
   
   
  
 glipiZIDE SR 10 mg CR tablet Commonly known as:  GLUCOTROL XL Your next dose is: Today, Tomorrow Other:  _________ Dose:  10 mg Take 10 mg by mouth two (2) times a day. Indications: type 2 diabetes mellitus Refills:  0  
     
   
   
   
  
 lidocaine-prilocaine topical cream  
Commonly known as:  EMLA Your next dose is: Today, Tomorrow Other:  _________ Apply  to affected area as needed. Apply 1/2 inch amount of cream to port site 90 minutes prior to needle access. Quantity:  30 g Refills:  0 LORazepam 1 mg tablet Commonly known as:  ATIVAN Your next dose is: Today, Tomorrow Other:  _________ Dose:  0.5-1 mg Take 0.5-1 Tabs by mouth every six (6) hours as needed for Anxiety. Max Daily Amount: 4 mg. Indications: CANCER CHEMOTHERAPY-INDUCED NAUSEA AND VOMITING Quantity:  90 Tab Refills:  0  
     
   
   
   
  
 losartan 50 mg tablet Commonly known as:  COZAAR Your next dose is: Today, Tomorrow Other:  _________ Dose:  50 mg  
50 mg two (2) times a day. Indications: Hypertension Refills:  0  
     
   
   
   
  
 magic mouthwash Susp Commonly known as:  [place] Your next dose is: Today, Tomorrow Other:  _________ Dose:  10 mL Take 10 mL by mouth every four (4) hours. Quantity:  500 mL Refills:  3  
     
   
   
   
  
 metFORMIN 500 mg tablet Commonly known as:  GLUCOPHAGE  
   
 Your next dose is: Today, Tomorrow Other:  _________ Dose:  500 mg Take 500 mg by mouth three (3) times daily (with meals). Indications: PREVENTION OF TYPE 2 DIABETES MELLITUS Refills:  0  
     
   
   
   
  
 omeprazole 20 mg capsule Commonly known as:  PRILOSEC Your next dose is: Today, Tomorrow Other:  _________ Dose:  20 mg Take 20 mg by mouth every morning. Take / use AM day of surgery  per anesthesia protocols. Indications: GASTROESOPHAGEAL REFLUX Refills:  0  
     
   
   
   
  
 ondansetron hcl 8 mg tablet Commonly known as:  [place] Rubioman Your next dose is: Today, Tomorrow Other:  _________ Dose:  8 mg Take 1 Tab by mouth every eight (8) hours as needed for Nausea. Quantity:  90 Tab Refills:  3  
     
   
   
   
  
 prochlorperazine 10 mg tablet Commonly known as:  COMPAZINE Your next dose is: Today, Tomorrow Other:  _________ Dose:  10 mg Take 1 Tab by mouth every six (6) hours as needed. Quantity:  120 Tab Refills:  3  
     
   
   
   
  
 triamterene-hydroCHLOROthiazide 37.5-25 mg per tablet Commonly known as:  [place] Your next dose is: Today, Tomorrow Other:  _________ Dose:  1 Tab Take 1 Tab by mouth every morning. Indications: Edema, Hypertension Refills:  0 Discharge Instructions DISCHARGE SUMMARY from Nurse The following personal items are in your possession at time of discharge: 
 
Dental Appliances: Partials, At home Visual Aid: None Home Medications: None Jewelry: None Clothing: Sent home Other Valuables: Sent home PATIENT INSTRUCTIONS: 
 
 
F-face looks uneven A-arms unable to move or move unevenly S-speech slurred or non-existent T-time-call 911 as soon as signs and symptoms begin-DO NOT go Back to bed or wait to see if you get better-TIME IS BRAIN. Warning Signs of HEART ATTACK Call 911 if you have these symptoms: 
? Chest discomfort. Most heart attacks involve discomfort in the center of the chest that lasts more than a few minutes, or that goes away and comes back. It can feel like uncomfortable pressure, squeezing, fullness, or pain. ? Discomfort in other areas of the upper body. Symptoms can include pain or discomfort in one or both arms, the back, neck, jaw, or stomach. ? Shortness of breath with or without chest discomfort. ? Other signs may include breaking out in a cold sweat, nausea, or lightheadedness. Don't wait more than five minutes to call [address]! Fast action can save your life. Calling 911 is almost always the fastest way to get lifesaving treatment. Emergency Medical Services staff can begin treatment when they arrive  up to an hour sooner than if someone gets to the hospital by car. The discharge information has been reviewed with the patient. The patient verbalized understanding. Discharge medications reviewed with the patient and appropriate educational materials and side effects teaching were provided. Discharge Instructions Attachments/References TRACHEOSTOMY: POST-OP (ENGLISH) TRACHEOSTOMY (ENGLISH) PEG (PERCUTANEOUS ENDOSCOPIC GASTROSTOMY): POST-OP (ENGLISH) HAND-WASHING (ENGLISH) Discharge Orders None Introducing [facility]! Dear Daisy Singh: 
Thank you for requesting a C[employer]ne[employer] account. Our records indicate that you have previously registered for a C[employer]ne[employer] account but its currently inactive. Please call our [employer] support line at [phone]. Additional Information If you have questions, please visit the Frequently Asked Questions section of the MyChart website at [email or web address] Remember, MyChart is NOT to be used for urgent needs. For medical emergencies, dial 911. Now available from your iPhone and Android! General Information Please provide this summary of care documentation to your next provider. Patient Signature:  ____________________________________________________________ Date:  ____________________________________________________________  
  
Celina Bowden Provider Signature:  ____________________________________________________________ Date:  ____________________________________________________________ More Information Tracheostomy: What to Expect at [facility] Your Recovery After surgery, your neck may be sore, and you may have trouble swallowing for a few days. It may take 2 to 3 days to get used to breathing through the tracheostomy (trach) tube. You can expect to feel better each day, but it may take at least 2 weeks to adjust to living with your trach (say \"trayk\"). At first, it may be hard to make sounds or to speak. Your doctor, nurses, respiratory therapists, and speech therapists can help you learn to talk with your trach tube or with other speaking devices. When you speak, your voice may sound deeper and scratchier than normal. 
Your trach tube may be sewn or tied to your skin. If you have stitches, the doctor will remove them about 1 week after your surgery. He or she may also take out your original tube and put in a new tube 5 to 10 days after surgery. Taking good care of your trach is very important. It can prevent infections and help keep you breathing easily. This care sheet gives you a general idea about how long it will take for you to recover. But each person recovers at a different pace. Follow the steps below to get better as quickly as possible. How can you care for yourself at home? Activity · Rest when you feel tired. Getting enough sleep will help you recover. · You may need to take at least 2 weeks off work. It depends on the type of work you do, your employer, your ability to speak, how you feel, and other health problems you may have. Some people are not able to return to their previous job. · Try to walk each day. Start out by walking a little more than you did the day before. Bit by bit, increase the amount you walk. Walking boosts blood flow and helps prevent pneumonia and constipation. · Avoid strenuous activities, such as biking, jogging, weight lifting, and aerobic exercise, for 6 weeks. · You may drive when you are no longer taking prescription pain medicine and can quickly move your foot from the gas pedal to the brake. · You may take a bath in shallow water. Do not splash water into your trach. · You may take a shower. Aim the shower head at your lower body or back. Cover the tube so that no water gets in but you can still breathe. · Do not swim. Diet · You should be able to eat without problems. If food or liquid gets into your tracheostomy tube, suction it out right away. Sit up while you eat. · If your stomach is upset, try bland, low-fat foods like plain rice, broiled chicken, toast, and yogurt. · Drink plenty of fluids (unless your doctor tells you not to). · You may notice that your bowel movements are not regular right after your surgery. This is common. Try to avoid constipation and straining with bowel movements. You may want to take a fiber supplement every day. If you have not had a bowel movement after a couple of days, ask your doctor about taking a mild laxative. Medicines · Your doctor will tell you if and when you can restart your medicines. He or she will also give you instructions about taking any new medicines.  
· If you take blood thinners, such as warfarin (Coumadin), clopidogrel (Plavix), or aspirin, be sure to talk to your doctor. He or she will tell you if and when to start taking those medicines again. Make sure that you understand exactly what your doctor wants you to do. · Be safe with medicines. Take pain medicines exactly as directed. ¨ If the doctor gave you a prescription medicine for pain, take it as prescribed. ¨ If you are not taking a prescription pain medicine, take an over-the-counter medicine such as acetaminophen (Tylenol), ibuprofen (Advil, Motrin), or naproxen (Aleve). Read and follow all instructions on the label. ¨ Do not take two or more pain medicines at the same time unless the doctor told you to. Many pain medicines contain acetaminophen, which is Tylenol. Too much acetaminophen (Tylenol) can be harmful. · If you think your pain medicine is making you sick to your stomach: 
¨ Take your medicine after meals (unless your doctor tells you not to). ¨ Ask your doctor for a different pain medicine. · If your doctor prescribed antibiotics, take them as directed. Do not stop taking them just because you feel better. You need to take the full course of antibiotics. Incision, stoma, and tube care Your doctor or nurse will give you instructions on how to take care of your trach. This will include how to suction your trach, how to clean the opening in your neck (stoma), and how to clean and replace your trach's inner tube (inner cannula). Be sure to follow all of your doctor's instructions closely. · Suctioning ¨ Do this as often as needed, but at least 3 or 4 times a day. For example, do it before you go to bed and when you wake up in the morning. ¨ You will need suction catheters, a suction machine, saline fluid, a small cup, and a mirror. These should be delivered to your home before you arrive there. ¨ Wash your hands with soap and water for at least 30 seconds. ¨ Pour saline fluid into the cup. ¨ Connect a catheter to the suction machine tubing. Dip the catheter tip into the saline fluid. Insert the wet catheter into the trach. Push the tube in gently, about 3 to 4 inches, until you feel it hit something. ¨ Slowly pull the catheter out of the trach, rolling it back and forth between your fingers, with your thumb over the control valve (this turns the suction on). Do not keep the suction on for more than 10 seconds at a time. ¨ Wait about 30 seconds, and repeat inserting and pulling out the tube until all the mucus has been removed. Then suction the saline left in the cup. ¨ Catheters can be used again if you clean them properly. Ask your doctor how to do this. Throw away used catheters if your doctor tells you to. · Stoma care ¨ Clean and dry the stoma 3 times a day. Apply ointment to keep the skin healthy. Do not let crust form on the skin at the stoma. ¨ You will need hydrogen peroxide, tap or sterile water, 8 or 10 cotton swabs, 2 small cups, a dry cloth, a mirror, and ointment for the skin. ¨ Wash your hands with soap and water for at least 30 seconds. ¨ Fill one cup with equal amounts of water and hydrogen peroxide. Put 3 or 4 cotton swabs in the cup. Fill the other cup with water, and put 3 or 4 swabs in that cup. ¨ Clean and remove dried mucus around the stoma with the cotton swabs in the peroxide cup. Then clean off any peroxide with the swabs in the water cup. ¨ Dry your skin with the cloth. If your doctor tells you to use skin ointment for your stoma, apply it with the remaining swabs. ¨ Wash your hands again. · Cleaning the inner cannula ¨ If you are not using the disposable type of cannula, clean and replace the inner cannula 2 or 3 times each day. You will need 2 small bowls, a small brush, hydrogen peroxide, water, and a mirror. ¨ Wash your hands with soap and water for at least 30 seconds. ¨ Pour equal amounts of water and hydrogen peroxide into one bowl.  Pour a small amount of water in the other bowl. ¨ Unlock the inner cannula, and remove it by gently pulling it out and down. Put this into the peroxide bowl to soak. Clean the inside and outside of the cannula with the brush. ¨ Rinse the cannula under tap water, then let it soak in the bowl of water. Shake the cannula out, and slide it gently back into the outer cannula. Turn it to lock it in place. Make sure it is locked in place and you cannot pull it out. ¨ Wash your hands again. Other instructions · Wear clothes that are loose around your neck. · If you are outside, wear a loose covering over your trach, such as a scarf or other cloth. Covering your trach prevents dust, dirt, and bacteria from getting into it. You can also use special trach \"bibs\" to cover your trach and to protect your clothing from mucus when you cough. You can buy trach bibs at a medical supply store. · Try not to breathe in anything that might irritate your trach. This includes small bits of food, smoke, powders, aerosol sprays, and dust. 
· Keep the air in your home moist with a room vaporizer or humidifier. · If you have thick mucus plugging your trach tube, ask your doctor about squirting a small amount of saline down your trach to help clear the tube. · Carry an extra cannula with you at all times, in case your tube becomes blocked. Follow-up care is a key part of your treatment and safety. Be sure to make and go to all appointments, and call your doctor if you are having problems. It's also a good idea to know your test results and keep a list of the medicines you take. When should you call for help? Call 911 anytime you think you may need emergency care. For example, call if: 
· You passed out (lost consciousness). · You have severe trouble breathing, and coughing and suctioning do not help. · Your trach falls out and you cannot get it back in, and you are not able to breathe. Call your doctor now or seek immediate medical care if: 
· Your trach falls out and you cannot get it back in, but you can still breathe. · You have trouble breathing after suctioning. · Your skin around your trach (stoma) has signs of infection, such as: 
¨ Increased pain, swelling, warmth, or redness. ¨ Red streaks leading from the area. ¨ Pus draining from the area. ¨ A fever. · You have pain that does not get better after you take pain medicine. · You are sick to your stomach and cannot drink fluids. Watch closely for any changes in your health, and be sure to contact your doctor if: 
· You do not have a bowel movement after taking a laxative. Where can you learn more? Go to http://maria c-jeff.info/. Enter W258 in the search box to learn more about \"Tracheostomy: What to Expect at Home. \" Current as of: July 29, 2016 Content Version: 11.1 © 0492-1332 [employer]. Care instructions adapted under license by [employer] (which disclaims liability or warranty for this information). If you have questions about a medical condition or this instruction, always ask your healthcare professional. [place] [address] any warranty or liability for your use of this information. Your Tracheostomy: Care Instructions Your Care Instructions A tracheostomy is a permanent opening through the neck into the windpipe, or trachea. This opening makes breathing easier if you have a lung or nerve problem or an infection that makes it hard to breathe. Taking good care of a tracheostomy is very important. It can prevent infections and help keep you breathing easily. Follow-up care is a key part of your treatment and safety. Be sure to make and go to all appointments, and call your doctor if you are having problems. Its also a good idea to know your test results and keep a list of the medicines you take. How can you care for yourself at home? General tips · Always wash your hands before and after caring for your tracheostomy. · Keep the air in your home moist with a room vaporizer. · Eat while sitting up. If any food gets into the tube, suction it out right away. · Wear clothing that is loose around your neck. · In a bath or shower, avoid getting water into the tracheostomy. Cover the tube so that no water gets in but you can still breathe. · Do not swim. · Replace the tube grossman if it gets wet or damaged. If it is not damaged, it can be washed and dried and used again. · Your tracheostomy, or \"trach\" (say \"trayk\"), has three parts: the outer cannula, the inner cannula, and the obturator. The inner cannula is the piece that you will remove and clean. · Your doctor may give you more instructions. Follow them closely. Suctioning · Suction the trach 3 to 4 times a day, or more if needed. For example, do it before you go to bed and when you wake up in the morning. · You will need suction catheters, a suction machine, saline fluid, a small cup, and a mirror. · Wash your hands with soap and water for at least 30 seconds. · Pour saline fluid into the cup. · Connect a catheter to the suction machine tubing. Dip the catheter tip into the saline fluid. Insert the wet catheter into the trach. Push the tube in gently, about 3 to 4 inches, until you feel it hit something. · Slowly pull the catheter out of the trach, rolling it back and forth between your fingers, with your thumb over the control valve (this turns the suction on). Do not keep the suction on for more than 10 seconds at a time. · Wait about 30 seconds and repeat inserting and pulling out the tube until all the mucus has been removed. Then suction the saline left in the cup. · Throw away the used catheter, and wash your hands again. Stoma care A stoma is the opening in your neck. · Clean and dry the stoma 3 times a day. Do not let crust form on the skin at the stoma. · You will need hydrogen peroxide, tap or sterile water, 8 or 10 cotton swabs, 2 small cups, a dry cloth, a mirror, and ointment for the skin. · Wash your hands with soap and water for at least 30 seconds. · Fill one cup with half water and half hydrogen peroxide. Put 3 or 4 cotton swabs in the cup. Fill the other cup with water and put 3 or 4 swabs in that cup. · Clean and remove dried mucus around the stoma with the cotton swabs in the peroxide cup. Then clean off any peroxide with the swabs in the water cup. · Dry your skin with the cloth. Apply ointment with the remaining swabs. · Wash your hands again. Cleaning the inner cannula A cannula is the tube that fits into the stoma. · Clean and replace the inner cannula 2 or 3 times each day. You will need 2 small bowls, a small brush, hydrogen peroxide, water, and a mirror. · Wash your hands with soap and water for at least 30 seconds. · Pour half water and half hydrogen peroxide into one bowl. Pour a small amount of water in the other bowl. · Unlock the inner cannula and remove it by gently pulling it out and down. Put this into the peroxide bowl to soak. Clean the inside and outside of the cannula with the brush. · Rinse the cannula under tap water, then let it soak in the bowl of water. Shake the cannula out and slide it gently back into the outer cannula. Turn it to lock it in place. Make sure it is locked in place and you cannot pull it out. · Wash your hands again. When should you call for help? Call 911 anytime you think you may need emergency care. For example, call if: 
· You have severe trouble breathing, and coughing or suctioning does not help. · Your trach falls out and you cannot get it back in. Call your doctor now or seek immediate medical care if: 
· You have trouble breathing after suctioning. · You have signs of infection, such as: 
¨ Increased pain, swelling, warmth, or redness in the stoma. ¨ Red streaks leading from the stoma. ¨ Pus draining from the stoma. ¨ A fever. Watch closely for changes in your health, and be sure to contact your doctor if you have any problems. Where can you learn more? Go to http://maria c-jeff.info/. Enter P279 in the search box to learn more about \"Your Tracheostomy: Care Instructions. \" Current as of: July 29, 2016 Content Version: 11.1 © 7851-5629 [employer]. Care instructions adapted under license by [employer] (which disclaims liability or warranty for this information). If you have questions about a medical condition or this instruction, always ask your healthcare professional. [place] [address] any warranty or liability for your use of this information. Percutaneous Endoscopic Gastrostomy: What to Expect at [facility] Your Recovery A percutaneous endoscopic gastrostomy (PEG) is a procedure to make an opening between the skin of your belly and your stomach. The doctor put a thin tube called a gastrostomy tube (G-tube or feeding tube) into your stomach through the opening. The tube can put liquid nutrition, fluid, and medicines directly into your stomach. The tube also may be used to drain liquid or air from the stomach. Your belly may feel sore, like you pulled a muscle, for several days. Your doctor will give you pain medicine for this. It will take about a week for the skin around your feeding tube to heal. You may have some yellowish mucus where the feeding tube comes out of your belly. This is normal and is not a sign of infection. You will need to learn how to use and care for your feeding tube. Your doctor may recommend that you have a nurse or dietitian visit you at home to help you get started with your feeding tube. At first you may need a friend or family member to help you with your tube feedings. But with practice, you may be able to do it yourself. This care sheet gives you a general idea about how long it will take for you to recover. But each person recovers at a different pace. Follow the steps below to feel better as quickly as possible. How can you care for yourself at home? Activity · Rest when you feel tired. Getting enough sleep will help you recover. · Try to walk each day. Start by walking a little more than you did the day before. Bit by bit, increase the amount you walk. Walking boosts blood flow and helps prevent pneumonia and constipation. · Ask your doctor when you can drive again. · Until your doctor says it is okay, avoid lifting anything that would make you strain. This may include heavy grocery bags and milk containers, a heavy briefcase or backpack, cat litter or dog food bags, a vacuum , or a child. · You can shower as usual. Pat dry the skin around your feeding tube. Do not take a bath unless your doctor tells you it is okay. Diet · Follow your doctor's instructions about eating and drinking. · Follow your doctor's instructions about what nutrition and fluids to feed through your tube. Medicines · Your doctor will tell you if and when you can restart your medicines. He or she will also give you instructions about taking any new medicines. · If you take blood thinners, such as warfarin (Coumadin), clopidogrel (Plavix), or aspirin, be sure to talk to your doctor. He or she will tell you if and when to start taking those medicines again. Make sure that you understand exactly what your doctor wants you to do. · If you take medicine through your feeding tube: ¨ Follow exactly your doctor's instructions about how to do this. Do not try to put whole pills in your feeding tube. They may get stuck. Ask your doctor if liquid medicine is available. ¨ Do not mix your medicine with tube-feeding formula. This can cause a clog in the feeding tube. ¨ Do not put more than one medicine down your feeding tube at a time. ¨ Flush the tube with water before and after you put each medicine down your tube. · Be safe with medicines. Take pain medicines exactly as directed. ¨ If the doctor gave you a prescription medicine for pain, take it as prescribed. ¨ If you are not taking a prescription pain medicine, ask your doctor if you can take an over-the-counter medicine. ¨ Do not take two or more pain medicines at the same time unless the doctor told you to. Many pain medicines have acetaminophen, which is Tylenol. Too much acetaminophen (Tylenol) can be harmful. · If you think your pain medicine is making you sick to your stomach: 
¨ Take your pain medicine after meals (unless your doctor has told you not to). ¨ Ask your doctor for a different pain medicine. · If your doctor prescribed antibiotics, take them as directed. Do not stop taking them just because you feel better. You need to take the full course of antibiotics. Incision care · Your doctor or nurse will show you how to care for the skin around the tube. Be sure to follow the instructions on keeping the area clean. · Wash the skin around your feeding tube daily with warm, soapy water, and pat it dry. Other cleaning products, such as hydrogen peroxide, can make the wound heal more slowly. You may cover the area with a gauze bandage if it weeps or rubs against clothing. Change the bandage every day. · Keep the area clean and dry. Using your feeding tube · Follow your doctor's instructions about using the feeding tube. Your doctor or nurse will: ¨ Tell you what to put through the tube. ¨ Teach you how to watch for infection at the tube site or for a clog in the tube. ¨ Tell you what activities you can do. · Keep your feeding tube clamped unless you are using it. · Keep the tube taped to your skin at all times, and leave some slack in the tube. This helps prevent pain and keeps the tube from coming out. · Wash your hands before you handle the tube and tube-feeding formula. Wash the top of the can of formula before you open it. · Keep the formula in the refrigerator after you open it. Do not let the formula sit at room temperature for more than 8 hours. · Sit up or keep your head up during the feeding and for 30 minutes after. · If you feel sick to your stomach or have stomach cramps during the tube feeding, slow the rate that the formula comes through the tube. Then gradually increase the rate as you can tolerate it. Follow-up care is a key part of your treatment and safety. Be sure to make and go to all appointments, and call your doctor if you are having problems. It's also a good idea to know your test results and keep a list of the medicines you take. When should you call for help? Call 911 anytime you think you may need emergency care. For example, call if: 
· You pass out (lose consciousness). · You have severe trouble breathing. · You have sudden chest pain and shortness of breath, or you cough up blood. · You have severe belly pain. Call your doctor now or seek immediate medical care if: 
· You have pain that does not get better after you take pain medicine. · You have a fever over 100°F. 
· You have signs of infection, such as: 
¨ Increased pain, swelling, warmth, or redness. ¨ Red streaks leading from the area. ¨ Pus draining from the area. ¨ Swollen lymph nodes in your neck, armpits, or groin. ¨ A fever. · The stitches that hold the feeding tube in place are loose or come out. · Your feeding tube comes out. · Your feeding tube is clogged, or liquid will not go down the tube. · You cough a lot or have other trouble during tube feedings. · You have nausea, vomiting, or diarrhea. Watch closely for any changes in your health, and be sure to contact your doctor if: 
· You have any problems with your feeding tube. Where can you learn more? Go to http://maria c-jeff.info/. Enter Q727 in the search box to learn more about \"Percutaneous Endoscopic Gastrostomy: What to Expect at Home. \" Current as of: August 9, 2016 Content Version: 11.1 © 8937-7862 [employer]. Care instructions adapted under license by [employer] (which disclaims liability or warranty for this information). If you have questions about a medical condition or this instruction, always ask your healthcare professional. [address] any warranty or liability for your use of this information. Hand-Washing: Care Instructions Your Care Instructions It is important for caregivers to wash their hands properly. This is the single best way to prevent the spread of infections. Hand-washing can help keep you from getting sick. It is easy, doesn't cost much, and it works. Make sure that you and your caregivers follow safe hand-washing routines. Caregivers may include health care workers or family members at home or in a care facility. You can talk to them about this information on hand-washing. Follow-up care is a key part of your treatment and safety. Be sure to make and go to all appointments, and call your doctor if you are having problems. It's also a good idea to know your test results and keep a list of the medicines you take. How can you care for yourself at home? · Caregivers should wash their hands with soap and water: ¨ When their hands are dirty, especially after being exposed to body fluids. This includes blood. ¨ When their hands may have been exposed to germs that could spread infection. ¨ After they touch broken skin, sores, or wound bandages. ¨ After they use the bathroom. · At other times, caregivers can use an alcohol-based gel  or soap and water to clean hands. This should be done: ¨ Before and after any contact with you. ¨ After they take off gloves. ¨ Before they handle a device that touches your body (even if gloves are used). ¨ After they touch any objects near you, such as medical equipment, lights, or doorknobs. ¨ Before they handle medicine or prepare food. Proper hand-washing for caregivers · When using an alcohol-based gel , fill your palm with the gel. Then spread it all over your hands. Rub your hands together until they are dry. · When washing hands with soap and water: ¨ Get your hands wet. Then use enough soap to cover your whole hands. ¨ Rub your hands together hard, in a [place]. Be sure that you cover all surfaces. Rub your wrists, the backs of your hands, between your fingers, and under your fingernails. Wash your hands for at least 30 seconds. ¨ Rinse your hands with water. But don't use hot water. It may irritate your hands. ¨ Use a paper towel to hold the faucet handle when you turn off the water. ¨ Dry your hands well with a paper towel. Don't use towels that have been used by others or used more than once. · If you use bar soap, use small bars. Set the soap on a rack that lets water drain. Where can you learn more? Go to http://maria c-jeff.info/. Enter Z453 in the search box to learn more about \"Hand-Washing: Care Instructions. \" Current as of: May 24, 2016 Content Version: 11.1 © 1575-5811 [employer]. Care instructions adapted under license by [employer] (which disclaims liability or warranty for this information). If you have questions about a medical condition or this instruction, always ask your healthcare professional. [place] [address] any warranty or liability for your use of this information.

## 2017-01-23 NOTE — H&P
Tam Vidal MD  [address] [facility] [address], [address], [facility]  [phone]      History and Physical    Patient: Earnest Zepeda MRN: [id number]  SSN: [id number]    YOB: 1955  Age: 62 y.o. Sex: male      Subjective:      Earnest Zepeda is a 62 y.o. male who I am asked to see for a PEG/open gastrostomy tube placement. I have discussed the options of waiting until tomorrow to undergo a feeding tube by IR or proceed with an open feeding tube placement today in combination with his planned Tracheostomy by Dr. Tristian Will. He was referred for evaluation of a head and neck malignancy. He was a gentleman in generally good health with the exception of diabetes mellitus which was non-insulin requiring. Beginning in the fall 2016, he began to note some difficulty with dysphagia as well as some change in his voice. Ultimately, there was some left-sided otalgia for which she sought help from his primary care physician. A course of antibiotics did not prove helpful and ultimately he was referred to Dr. Tristian Will for evaluation. The patient had not smoked for approximately 11 years prior to his initial visit. He did not abuse alcohol. The patient underwent a flexible laryngoscopy demonstrating a large mass involving the entire epiglottis. The tumor appeared to involve the aryepiglottic folds. The vocal cords were not visualized. CT scanning on November 3, 2016 demonstrated 3.9 x 2.6 x 2.3 cm supraglottic mass extending across midline. Inferiorly, it appeared to involve the left true vocal cord. The overlying thyroid cartilage seems preserved. There was a level 3 lymph node on the left side measuring 1.8 cm in short axis. On November 14, the patient was taken to the OR for panendoscopy. Dr. Farheen Casper notes from that visit indicate that he visualized the true and false vocal cords both of which appeared uninvolved by the tumor.  He also did micro-debridement, subsequent to which the patient's voice appeared to be improved. 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 There was no mention of HPV status. The patient has been seen at [facility] surgery has been offered. The patient is averse to the notion of a tracheostomy if that is a possibility. When seen here, the plan was to treat with two cycles of neoadjuvant chemotherapy and then definitive chemo/XRT. He returns for followup. He received his first cycle of Taxotere, cisplatin, fluorouracil in January 2017. He was subsequently hospitalized with fever and neutropenia from which he recovered uneventfully. He did however flunk a barium swallow test for all consistencies and was placed on TPN. PEG tube was planned for that admission as well but was apparently aborted due to difficulty with intubation, and a trach was recommended. According to the patient, he was willing to go through the procedure but it was continually postponed or other options presented to the point where he was reported to have left AMA (although he denies this as well and indicates that he was told by some staff member that his procedure was canceled for the day and he should go home). In any event, he is clearly disgruntled with the process and inefficiency. He feels relatively well. He remains hoarse but has no shortness of breath. He is perhaps less hoarse since his initial dose of chemotherapy. After this discussion the patient wishes to proceed with open Reagan Gastrostomy Feeding tube placement.     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 Authorizing Provider   LORazepam (ATIVAN) 1 mg tablet Take 0.5-1 Tabs by mouth every six (6) hours as needed for Anxiety. Max Daily Amount: 4 mg. Indications: CANCER CHEMOTHERAPY-INDUCED NAUSEA AND VOMITING 1/10/17   Abe Rehman NP   magic mouthwash Valetta Wallace) susp Take 10 mL by mouth every four (4) hours. 1/5/17   Doris Huynh MD   lidocaine-prilocaine (EMLA) topical cream Apply  to affected area as needed. Apply 1/2 inch amount of cream to port site 90 minutes prior to needle access. 12/27/16   Doris Huynh MD   ondansetron hcl [facility]) 8 mg tablet Take 1 Tab by mouth every eight (8) hours as needed for Nausea. 12/27/16   Doris Huynh MD   prochlorperazine (COMPAZINE) 10 mg tablet Take 1 Tab by mouth every six (6) hours as needed. 12/27/16   Doris Huynh MD   metFORMIN (GLUCOPHAGE) 500 mg tablet Take 500 mg by mouth three (3) times daily (with meals). Indications: PREVENTION OF TYPE 2 DIABETES MELLITUS    Historical Provider   aspirin delayed-release 81 mg tablet Take 81 mg by mouth every morning. Take / use AM day of surgery  per anesthesia protocols. Indications: myocardial infarction prevention    Historical Provider   triamterene-hydroCHLOROthiazide (MAXZIDE) 37.5-25 mg per tablet Take 1 Tab by mouth every morning. Indications: Edema, Hypertension 10/20/16   Historical Provider   losartan (COZAAR) 50 mg tablet 50 mg two (2) times a day.  Indications: Hypertension 10/20/16   Historical Provider amLODIPine (NORVASC) 10 mg tablet Take 10 mg by mouth nightly. Take / use AM day of surgery  per anesthesia protocols. Indications: Hypertension 10/20/16   Historical Provider   allopurinol (ZYLOPRIM) 300 mg tablet 300 mg nightly. Indications: GOUT 10/20/16   Historical Provider   glipiZIDE SR (GLUCOTROL) 10 mg CR tablet Take 10 mg by mouth two (2) times a day. Indications: type 2 diabetes mellitus 10/20/16   Historical Provider   omeprazole (PRILOSEC) 20 mg capsule Take 20 mg by mouth every morning. Take / use AM day of surgery  per anesthesia protocols. Indications: GASTROESOPHAGEAL REFLUX    Historical Provider        No Known Allergies    Review of Systems:  A comprehensive review of systems was negative except for that written in the History of Present Illness. Objective:   Xrays and labs reviewed. Vitals:    01/23/17 1430   BP: 133/73   Pulse: 95   Resp: 18   Temp: 98.7 °F (37.1 °C)   SpO2: 96%   Weight: 236 lb (107 kg)        Physical Exam:  GENERAL: alert, cooperative, no distress, appears stated age  LUNG: clear to auscultation bilaterally  HEART: regular rate and rhythm  ABDOMEN: soft, non-tender. Bowel sounds normal. No masses,  no organomegaly,  No previous abdominal surgery. EXTREMITIES:  extremities normal, atraumatic, no cyanosis or edema, no edema    Assessment:     Hospital Problems  Date Reviewed: 1/20/2017    None        Need for Nutritional access and for medications. Malnutrition. Plan:     Open Reagan Gastrostomy tube placement today in conjunction with Dr. Diamond Aceves planned tracheostomy. I have explained the risks and benefits of the procedure today to him and his wife and gave them the option for a Percutaneous G tube placement tomorrow or open placement today. They wish to proceed with open surgery. I will have this added to the consent form. If he has any additional questions he will have to the nurses call me back earlier than his surgery time.           Send fax copy to Indio Stanford MD  Signed By: River Marcus.  Patel Manning MD, FACS    January 23, 2017

## 2017-01-24 ENCOUNTER — PATIENT OUTREACH (OUTPATIENT)
Dept: CASE MANAGEMENT | Age: 62
End: 2017-01-24

## 2017-01-24 PROBLEM — E11.65 TYPE 2 DIABETES MELLITUS WITH HYPERGLYCEMIA (HCC): Status: ACTIVE | Noted: 2017-01-24

## 2017-01-24 PROBLEM — I10 HTN (HYPERTENSION): Status: ACTIVE | Noted: 2017-01-24

## 2017-01-24 PROBLEM — C76.0 HEAD AND NECK CANCER (HCC): Status: ACTIVE | Noted: 2017-01-24

## 2017-01-24 LAB
EST. AVERAGE GLUCOSE BLD GHB EST-MCNC: 177 MG/DL
GLUCOSE BLD STRIP.AUTO-MCNC: 141 MG/DL (ref 65–100)
GLUCOSE BLD STRIP.AUTO-MCNC: 142 MG/DL (ref 65–100)
GLUCOSE BLD STRIP.AUTO-MCNC: 183 MG/DL (ref 65–100)
GLUCOSE BLD STRIP.AUTO-MCNC: 228 MG/DL (ref 65–100)
HBA1C MFR BLD: 7.8 % (ref 4.8–6)

## 2017-01-24 PROCEDURE — 74011250637 HC RX REV CODE- 250/637: Performed by: OTOLARYNGOLOGY

## 2017-01-24 PROCEDURE — 77030018846 HC SOL IRR STRL H20 ICUM -A

## 2017-01-24 PROCEDURE — 74011636637 HC RX REV CODE- 636/637: Performed by: INTERNAL MEDICINE

## 2017-01-24 PROCEDURE — 82962 GLUCOSE BLOOD TEST: CPT

## 2017-01-24 PROCEDURE — 74011250637 HC RX REV CODE- 250/637: Performed by: INTERNAL MEDICINE

## 2017-01-24 PROCEDURE — 94760 N-INVAS EAR/PLS OXIMETRY 1: CPT

## 2017-01-24 PROCEDURE — 77010033678 HC OXYGEN DAILY

## 2017-01-24 PROCEDURE — 77030018798 HC PMP KT ENTRL FED COVD -A

## 2017-01-24 PROCEDURE — 74011250636 HC RX REV CODE- 250/636: Performed by: OTOLARYNGOLOGY

## 2017-01-24 PROCEDURE — 83036 HEMOGLOBIN GLYCOSYLATED A1C: CPT | Performed by: INTERNAL MEDICINE

## 2017-01-24 PROCEDURE — G0378 HOSPITAL OBSERVATION PER HR: HCPCS

## 2017-01-24 PROCEDURE — 99218 HC RM OBSERVATION: CPT

## 2017-01-24 RX ORDER — ACETAMINOPHEN 325 MG/1
650 TABLET ORAL
Status: DISCONTINUED | OUTPATIENT
Start: 2017-01-24 | End: 2017-01-26 | Stop reason: HOSPADM

## 2017-01-24 RX ORDER — HYDROGEN PEROXIDE 3 %
SOLUTION, NON-ORAL MISCELLANEOUS AS NEEDED
Status: DISCONTINUED | OUTPATIENT
Start: 2017-01-24 | End: 2017-01-26 | Stop reason: HOSPADM

## 2017-01-24 RX ORDER — LOSARTAN POTASSIUM 50 MG/1
50 TABLET ORAL DAILY
Status: DISCONTINUED | OUTPATIENT
Start: 2017-01-25 | End: 2017-01-24

## 2017-01-24 RX ORDER — HYDRALAZINE HYDROCHLORIDE 20 MG/ML
20 INJECTION INTRAMUSCULAR; INTRAVENOUS
Status: DISCONTINUED | OUTPATIENT
Start: 2017-01-24 | End: 2017-01-26 | Stop reason: HOSPADM

## 2017-01-24 RX ORDER — AMLODIPINE BESYLATE 10 MG/1
10 TABLET ORAL
Status: DISCONTINUED | OUTPATIENT
Start: 2017-01-24 | End: 2017-01-26 | Stop reason: HOSPADM

## 2017-01-24 RX ORDER — LOSARTAN POTASSIUM 50 MG/1
50 TABLET ORAL DAILY
Status: DISCONTINUED | OUTPATIENT
Start: 2017-01-24 | End: 2017-01-26 | Stop reason: HOSPADM

## 2017-01-24 RX ADMIN — ACETAMINOPHEN 650 MG: 325 TABLET, FILM COATED ORAL at 20:26

## 2017-01-24 RX ADMIN — LOSARTAN POTASSIUM 50 MG: 50 TABLET ORAL at 19:55

## 2017-01-24 RX ADMIN — INSULIN LISPRO 2 UNITS: 100 INJECTION, SOLUTION INTRAVENOUS; SUBCUTANEOUS at 11:17

## 2017-01-24 RX ADMIN — INSULIN LISPRO 4 UNITS: 100 INJECTION, SOLUTION INTRAVENOUS; SUBCUTANEOUS at 08:53

## 2017-01-24 RX ADMIN — Medication 0.5 MG: at 11:29

## 2017-01-24 RX ADMIN — SODIUM CHLORIDE, SODIUM LACTATE, POTASSIUM CHLORIDE, AND CALCIUM CHLORIDE 75 ML/HR: 600; 310; 30; 20 INJECTION, SOLUTION INTRAVENOUS at 11:13

## 2017-01-24 RX ADMIN — AMLODIPINE BESYLATE 10 MG: 10 TABLET ORAL at 23:35

## 2017-01-24 NOTE — PROGRESS NOTES
Hospitalist Progress Note     Admit Date:  2017  1:50 PM   Name:  iCtlaly Loja   Age:  62 y.o.  :  1955   MRN:  [id number]   PCP:  Reggie Valdez MD  Treatment Team: Attending Provider: Sean Angel DO; Consulting Provider: Chrystal Kaiser MD; Utilization Review: Jorge Luis Cornejo RN    Subjective:   HPI:  Pt is a 62 y.o. male with PMH of head and neck cancer, started on chemo earlier this month who had trach and PEG on . He tolerated procedure well and has no complaints post-operatively. The hospitalist group was asked to consult for medical management of this surgical pt.     - pt has no complaints. Cannot speak, mouths words. No CP, SOB. No abd pain or drainage around PEG site.   No HA      Objective:     Patient Vitals for the past 24 hrs:   Temp Pulse Resp BP SpO2   17 1034 - 78 16 135/78 99 %   17 1004 - 81 14 146/80 100 %   17 0934 - 79 17 136/78 99 %   17 0904 - 88 20 152/76 100 %   17 0834 - 77 10 137/74 100 %   17 0813 98 °F (36.7 °C) 79 18 136/73 100 %   17 0807 - - - - 100 %   17 0804 - 76 (!) 0 136/73 100 %   17 0734 - 79 (!) 7 130/79 100 %   17 0704 - 81 15 131/77 100 %   17 0504 - 87 - 140/76 99 %   17 0434 98.1 °F (36.7 °C) 88 18 135/74 99 %   17 0404 - 89 16 138/77 100 %   17 0334 - 89 - 138/77 100 %   17 0304 - 92 - 140/77 100 %   17 0104 - 93 21 145/77 97 %   17 0049 - 96 20 144/79 97 %   17 0034 98.4 °F (36.9 °C) 94 23 151/75 98 %   17 0019 - 94 21 143/75 97 %   17 0004 - 98 17 152/82 98 %   17 2349 - 95 12 146/71 98 %   17 2334 - 97 8 153/80 97 %   17 2319 - 98 16 154/80 97 %   17 2304 - 96 13 151/75 97 %   17 2249 - (!) 122 20 122/86 97 %   17 2234 - 98 21 151/78 96 %   17 2219 - 98 15 143/88 96 %   17 100 °F (37.8 °C) (!) 112 24 114/89 100 %   17 - 97 (!) 53 (!) 136/93 96 % 01/23/17 2010 - - - - 96 %   01/23/17 1430 98.7 °F (37.1 °C) 95 18 133/73 96 %     Oxygen Therapy  O2 Sat (%): 99 % (01/24/17 1034)  Pulse via Oximetry: 78 beats per minute (01/24/17 1034)  O2 Device: Tracheal collar (01/24/17 0807)  O2 Flow Rate (L/min): 7 l/min (01/24/17 0807)  FIO2 (%): 35 % (01/24/17 0807)    Intake/Output Summary (Last 24 hours) at 01/24/17 1304  Last data filed at 01/24/17 0434   Gross per 24 hour   Intake             1857 ml   Output              625 ml   Net             1232 ml       General:    Well nourished. Alert. CV:   RRR. No murmur, rub, or gallop. Lungs:   Clear to auscultation bilaterally. No wheezing, rhonchi, or rales. Abdomen:   Soft, nontender, nondistended. Bowel sounds normal.   Extremities: Warm and dry. No cyanosis or edema. Skin:     No rashes or jaundice. Current Meds:  Current Facility-Administered Medications   Medication Dose Route Frequency    hydrogen peroxide 3 %   Topical PRN    amLODIPine (NORVASC) tablet 10 mg  10 mg Oral QHS    [START ON 1/25/2017] losartan (COZAAR) tablet 50 mg  50 mg Oral DAILY    lactated ringers infusion  75 mL/hr IntraVENous CONTINUOUS    insulin lispro (HUMALOG) injection   SubCUTAneous AC&HS    dextrose 40% (GLUTOSE) oral gel 1 Tube  15 g Oral PRN    glucagon (GLUCAGEN) injection 1 mg  1 mg IntraMUSCular PRN    dextrose (D50W) injection syrg 12.5-25 g  25-50 mL IntraVENous PRN    hydrALAZINE (APRESOLINE) 20 mg/mL injection 10 mg  10 mg IntraVENous Q6H PRN    morphine injection 2 mg  2 mg IntraVENous Q1H PRN       Labs and Studies:  I have reviewed all labs, meds, telemetry events, and studies from the last 24 hours.   Recent Results (from the past 24 hour(s))   GLUCOSE, POC    Collection Time: 01/23/17  5:00 PM   Result Value Ref Range    Glucose (POC) 97 65 - 100 mg/dL   MRSA SCREEN - PCR (NASAL)    Collection Time: 01/23/17  7:50 PM   Result Value Ref Range    Special Requests: NO SPECIAL REQUESTS      Culture result:        MRSA target DNA is not detected (presumptive not colonized with MRSA)   GLUCOSE, POC    Collection Time: 01/23/17 10:35 PM   Result Value Ref Range    Glucose (POC) 161 (H) 65 - 100 mg/dL   HEMOGLOBIN A1C WITH EAG    Collection Time: 01/24/17  4:00 AM   Result Value Ref Range    Hemoglobin A1c 7.8 (H) 4.8 - 6.0 %    Est. average glucose 177 mg/dL   GLUCOSE, POC    Collection Time: 01/24/17  8:51 AM   Result Value Ref Range    Glucose (POC) 228 (H) 65 - 100 mg/dL   GLUCOSE, POC    Collection Time: 01/24/17 11:12 AM   Result Value Ref Range    Glucose (POC) 183 (H) 65 - 100 mg/dL        All Micro Results     Procedure Component Value Units Date/Time    MRSA SCREEN - PCR (NASAL) [[id number]] Collected:  01/23/17 1950    Order Status:  Completed Specimen:  Nasal from Nares Updated:  01/23/17 2221     Special Requests: NO SPECIAL REQUESTS        Culture result:         MRSA target DNA is not detected (presumptive not colonized with MRSA)          Recent Imaging:  CXR Results  (Last 48 hours)    None        CT Results  (Last 48 hours)    None          Assessment and Plan:     Hospital Problems as of 1/24/2017  Date Reviewed: 1/20/2017          Codes Class Noted - Resolved POA    Type 2 diabetes mellitus with hyperglycemia ([facility] ICD-10-CM: E11.65  ICD-9-CM: 250.00  1/24/2017 - Present Yes        HTN (hypertension) ICD-10-CM: I10  ICD-9-CM: 401.9  1/24/2017 - Present Yes                PLAN:    · Resume home losartan, amlodipine today. Can resume other home med tomorrow if needed  · Cont ISS for DM for now. On metformin only at home. · Remainder of management per primary.       Signed:  Maria Alejandra Powell MD

## 2017-01-24 NOTE — PROGRESS NOTES
Spiritual Care Assessment/Progress Notes    Earnest Zepeda [id number]  [id number]    1955  62 y.o.  male    Patient Telephone Number: [phone] (home)   Orthodox Affiliation: Emely Ordoñez   Language: English   Extended Emergency Contact Information  Primary Emergency Contact: Meera Landin  Address[address] Phone: [phone]  Mobile Phone: [phone]  Relation: Spouse   Patient Active Problem List    Diagnosis Date Noted    Neutropenic fever ([facility] 01/12/2017    Pancytopenia ([facility] 01/12/2017    Renal insufficiency 01/10/2017    Non-intractable cyclical vomiting with nausea 01/10/2017    [facility] 11/23/2016        Date: 1/24/2017       Level of Orthodox/Spiritual Activity:  []         Involved in ann tradition/spiritual practice    []         Not involved in ann tradition/spiritual practice  []         Spiritually oriented    []         Claims no spiritual orientation    []         seeking spiritual identity  []         Feels alienated from Lutheran practice/tradition  []         Feels angry about Lutheran practice/tradition  []         Spirituality/Lutheran tradition is a resource for coping at this time.   [x]         Not able to assess due to medical condition    Services Provided Today:  []         crisis intervention    []         reading Scriptures  [x]         spiritual assessment    []         prayer  []         empathic listening/emotional support  []         rites and rituals (cite in comments)  []         life review     []         Lutheran support  []         theological development   []         advocacy  []         ethical dialog     []         blessing  []         bereavement support    []         support to family  []         anticipatory grief support   []         help with AMD  []         spiritual guidance    []         meditation      Spiritual Care Needs  [] Emotional Support  []         Spiritual/Sikh Care  []         Loss/Adjustment  []         Advocacy/Referral                /Ethics  [x]         No needs expressed at               this time  []         Other: (note in               comments)  Spiritual Care Plan  []         Follow up visits with               pt/family  []         Provide materials  []         Schedule sacraments  []         Contact Community               Clergy  [x]         Follow up as needed  []         Other: (note in               comments)     Comments: patient sleeping when I entered the room. No family or friends at bedside. Assessment completed via chart and per brief conference with patient's primary RN. Patient's preferred Latter-day tradition is [place]. A spouse is listed as an emergency contact. Pastoral care will continue to follow. Paul Stevenson.  Kathie

## 2017-01-24 NOTE — CONSULTS
Consult    Patient: Britt Mazariegos MRN: [id number]  SSN: [id number]    YOB: 1955  Age: 62 y.o. Sex: male      Subjective:      Britt Mazariegos is a 62 y.o. male with PMH of head and neck cancer, started on chemo earlier this month who is POD #0 for trach and PEG. He tolerated procedure well and has no complaints post-operatively. The hospitalist group was asked to consult for medical management of this surgical pt. He is unable to speak but mouths that he has not needed his DM meds in 3 weeks. Pain controlled at this time.      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 11/23/2016    Type 2 diabetes mellitus ([facility]      oral hypoglycemic/ does not check BS      Past Surgical History   Procedure Laterality Date    Hx colonoscopy  2016    Hx other surgical Left      at age 12 had 2rd nipple removed      Family History   Problem Relation Age of Onset    Stroke Mother     Lung Disease Mother      Social History   Substance Use Topics    Smoking status: Former Smoker     Packs/day: 2.00     Years: 20.00     Quit date: 2005    Smokeless tobacco: Never Used    Alcohol use No      Current Facility-Administered Medications   Medication Dose Route Frequency Provider Last Rate Last Dose    lactated ringers infusion  75 mL/hr IntraVENous CONTINUOUS Lyndsey Furnace, DO         Facility-Administered Medications Ordered in Other Encounters   Medication Dose Route Frequency Provider Last Rate Last Dose    central line flush (saline) syringe 10 mL  10 mL InterCATHeter PRN Aramis Garcia MD   10 mL at 01/20/17 0923        No Known Allergies    Objective:     Vitals:    01/23/17 1430 01/23/17 2010 01/23/17 2012 01/23/17 2013   BP: 133/73  (!) 136/93 114/89   Pulse: 95  97 (!) 112   Resp: 18  (!) 53 24   Temp: 98.7 °F (37.1 °C)   100 °F (37.8 °C)   SpO2: 96% 96% 96% 100%   Weight: 107 kg (236 lb)  107.5 kg (237 lb)         Physical Exam:  General: middle aged  male, lying in bed, mouths words, NAD  HEENT: NCAT, EOMI, trach with some bloody drainage around site  CV: RRR, no m/g/r   Lungs: CTAB, no wheezes/crackles, good air movement   Abd: soft, NT/ND, PEG tube  Ext: no edema, 2+ DP and PT pulses     Assessment:     Mr. Allie Zamudio is a 61 yo M with head and neck cancer s/p trach and PEG today, doing well post-operatively. Pt has a normal BP at this time and last BG was 97. Plan:     1. T2DM - CF insulin only as pt has been off oral meds for a few weeks and BG are controlled at this time. 2. HTN - Normotensive at this time. Prn hydralazine will be available but scheduled meds will be helf for now. They can be restarted tomorrow as needed. We will continue to follow.     Signed By: Idris Mims MD     January 23, 2017

## 2017-01-24 NOTE — PROGRESS NOTES
Problem: Nutrition Deficit  Goal: *Optimize nutritional status  Nutrition:  Reason for assessment: Consult for TF management per nutritional support protocols / Dr. Soni Alonso. Consult received for General Nutrition Management and Supplements per Dr. Bibiana Devi  Malnutrition screening tool trigger for unintentional weight loss of 24-33#. Assessment:   Food/Nutrition History: PEG FT in place,  Abdomen soft, non tender, non distended. No documented BM. Pt s/p tracheostomy and PEG placement on 1/23. Past medical history notable for squamous cell carcinoma-epiglottis; started on chemo this month. Pt with past medical history notable for DM, HTN and GERD. Pt has had unintentional weight loss r/t swallowing difficulty, dysphagia; had been on TPN while patient at [address] 1/19/17. Pertinent Medications: LR at 75 ml/hr, Humalog  Pertinent labs:  POC glucose today:  228 mg/dl, A1C 7.8 with average glucose of 177 mg/dl. Anthropometrics: , Weight Source: Bed, Weight: 107.5 kg (237 lb), Body mass index is 35 kg/(m^2). BMI class of Obesity Class 11. Pt currently at 95% usual body weight from 3 weeks ago (250#) ;148% IBW. Macronutrient needs:  EER:  5620-2892 kcal /day (18-22 kcal/kg BW)  EPR:   grams protein/day (1.2-1.5 grams/kg IBW)  Max CHO:  296 grams/day (50% kcal)  Fluid:  1ml/kcal or per MD recommendations  Intake/Comparative standards: Current NPO status does not meet kcal or protein needs. 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   Intervention:  Meals and snacks: Continue NPO  EN:Start PEG TF of Jevity 1.2 at 20 ml/hr and increase by 10 ml/hr q 6 hrs as tolerated to goal rate of 70 ml/hr with 30 ml water every hr to provide 2016 kcal/day (100% of needs), 93 grams protein/day (100% of needs), 285 grams CHO/day (does not exceed max CHO),  and ~ 1356 ml free water/day + additional 720 ml/water from flush for total of 2076 ml water/day (100% of needs). May need to change tube feeding formula if persistent hyperglycemia persists. Per Vanesa Barrow RD at the [facility], recommend to try Jevity prior to specialized nutrition for enhanced glycemic control. Nutrition Supplement Therapy: Electrolyte replacement per nutritional support protocols are active on MAR. IV: Adjust IVF ml for ml of TF rate increase. Coordination of nutrition care:  Interdisciplinary Rounds, Vanesa Barrow RD, [facility] [place], [address] [place] [address], MPH  [phone]

## 2017-01-24 NOTE — PROGRESS NOTES
01/23/17 2010   Oxygen Therapy   O2 Sat (%) 96 %   Pulse via Oximetry 98 beats per minute   O2 Device Tracheal collar     Set up patient on 35% cool mist trach collar with water trap. Patient is alert, oriented and not in distress.

## 2017-01-24 NOTE — PROGRESS NOTES
PROGRESS NOTE            ASSESSMENT:   Patient is 62 y.o. with diagnosis of : Active Problems:    Type 2 diabetes mellitus with hyperglycemia ([facility] (2017)      HTN (hypertension) (2017)    1. 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 SUBJECTIVE:  Diagnosis/Chief Complaint:  S/p Trach & PEG    Pt is resting very comfortably in his room. He has no complaints, no SOB, no bleeding. The trach cuff is still inflated. OBJECTIVE:         Temp (24hrs), Av.6 °F (37 °C), Min:98 °F (36.7 °C), Max:100 °F (37.8 °C)           EXAM: neck - tach tube in place. There is some mucous seen at the tube opening. There is no bleeding from the tube  Or around the trach tube. There is no purluence, no leakage of air.

## 2017-01-24 NOTE — PROGRESS NOTES
Face to Face Encounter    Patients Name: Vandana Calderon    YOB: 1955    Ordering Physician:   Candido Suarez    Primary Diagnosis: SQUAMOUS CELL CARCINOMA OF THE THROAT  SQUAMOUS CELL CARCINOMA OF THE THROAT TRACH and PEG     Date of Face to Face:   1/24/2017                                  Face to Face Encounter findings are related to primary reason for home care:   yes. 1. I certify that the patient needs intermittent care as follows: skilled nursing care:  teaching/training of trach and PEG and skilled observation/assessment, patient education    2. I certify that this patient is homebound, that is: 1) patient requires the use of a indep device, special transportation, or assistance of another to leave the home; or 2) patient's condition makes leaving the home medically contraindicated; and 3) patient has a normal inability to leave the home and leaving the home requires considerable and taxing effort. Patient may leave the home for infrequent and short duration for medical reasons, and occasional absences for non-medical reasons. Homebound status is due to the following functional limitations: Patient currently under activity restrictions secondary to recent surgical procedure, this hinders their ability to safely leave the home. Patient with strength deficits limiting the performance of all ADL's without caregiver assistance or the use of an assistive device. 3. I certify that this patient is under my care and that I, or a nurse practitioner or  [id number], or clinical nurse specialist, or certified nurse midwife, working with me, had a Face-to-Face Encounter that meets the physician Face-to-Face Encounter requirements.   The following are the clinical findings from the [address] [place] [address] encounter that support the need for skilled services and is a summary of the encounter:   See Progress Notes     See attached progess note      LUCY Adam  1/24/2017      THE FOLLOWING TO BE COMPLETED BY THE COMMUNITY PHYSICIAN:    I concur with the findings described above from the F2F encounter that this patient is homebound and in need of a skilled service.     Certifying Physician: _____________________________________      Printed Certifying Physician Name: _____________________________________    Date: _________________

## 2017-01-24 NOTE — PROGRESS NOTES
[facility] Pt resting comfortably in bed without complaint at this time. Will continue to monitor.

## 2017-01-24 NOTE — PROGRESS NOTES
Care Management Interventions  Mode of Transport at Discharge: Other (see comment)  Transition of Care Consult (CM Consult): Discharge Planning, [address]: No  Reason Outside [place]: Out of service area  Current Support Network: Lives with Spouse  Confirm Follow Up Transport: Family  Plan discussed with Pt/Family/Caregiver: Yes  Discharge Location  Discharge Placement: Home with home health       Winston met with pt and  this am. Pt was transferred out of ICU late yesterday pm. Couple is anticipating discharge this am due to pt having an appointment at the [facility] this am. Pt had one episode of becoming choked/rapid response called. RN performed suctioning. Sw spoke with wife to finalize all plans. [facility] have both been in contact with wife to coordinate arrangements to visit pt this pm. Wife informed Sw that the RN casemanager with [facility] stated that they WILL cover his Jevity as his sole source of nutrition. Wife given some cans here and to be given some more by Etelvina Stone at [address] [place] [address]. All anxious to leave hospital. No other needs iden. Christina Montero spent many hours working on this case today. Winston spoke with Md this pm for clarification on several of pt's needs. Pt very anxious to be discharged as soon as possible and feels the hospital staff is all working too slowly. Winston met with pt and wife times several today in an effort to create a safe/thorough discharge plan. Pt needs Jevity as his sole source of nutrition. [employer] companies stating [employer] does not cover nutrition. Sw and dietician having to work through some options for wife. Pt needs all trach supplies, suction machine as well as home health care RN and . [facility] is aware of planned discharge for tomorrow. Winston felt pt would need to stay one more night for teaching of trach and PEG feedings with wife.  Wife attempting to get a call into pt's [facility] RN casemanager to investigate about Jevity coverage. Winston faxed all clinical and orders to [facility] to be delivered tomorrow. Couple lives in [place]. Pt has an appointment tomorrow that he needs to get to at the [facility]. Wife to transport. Pt is not thinking through all that it requires in order to discharge him safely. Sw will follow up with couple in am. [facility]        Referral per MD.  Pt will need trach supplies, suction machine , feedings and HH at d/c.  SW spoke with pt ( pt wrote answers on pad ). Pt confirmed his address and phone #.  SW explained that at d/c [facility] nurse would see pt at home. Pt lives in [place], has [facility] [employer]. Pt writes Dr. Dve Leblanc says I am going home today. WINSTON explained that was not correct and reasons why that was not possible. Pt has yet to receive any education by nursing here on trach care and his feedings have just started and they need to be monitored and changed over to bolus feedings. WINSTON explained what bolus feedings meant. WINSTON told pt possible d/c on Thurs. Pt very upset. WINSTON related this info to pt's nurse who stated she would talk to him as she had already spoken with his spouse. WINSTON made referral to [id number] , faxed clinical and orders.

## 2017-01-24 NOTE — ACP (ADVANCE CARE PLANNING)
Spoke with Tiana MENDES at Dr Raul Carvajal' office this morn and made her aware pt needed to be cancelled since feeding tube was placed by [facility]. She said she would contact their scheduling dept.

## 2017-01-24 NOTE — OP NOTES
Tam Tran MD  [facility] and [address], [address], [place]   [phone]       Operative Report    Patient: Karson Taylor MRN: [id number]  SSN: [id number]    YOB: 1955  Age: 62 y.o. Sex: male       Date of Surgery: 1/23/2017     Preoperative Diagnosis: SQUAMOUS CELL CARCINOMA OF THE THROAT     Postoperative Diagnosis: SQUAMOUS CELL CARCINOMA OF THE THROAT     NAME OF PROCEDURE:  Procedure(s):  TRACHEOSTOMY  GASTROSTOMY TUBE PLACEMENT (OPEN). Reagan Gastrostomy tube. Please See Dr. Israel Rodriguez note for the Tracheostomy. SURGEON: Tam Tran MD    Anesthesia: General   The patient was re prepped and draped after the Tracheostomy for the Reagan Gastrostomy Tube placement. Complications: none      Procedure Details       Informed consent was obtained and the patient was brought to the operating room and placed supine. After induction of a general anesthetic, Dr. Sadie Burns performed a Tracheostomy. I was called in afterward to perform my portion of the procedure. The abdomen was prepped and draped in standard fashion. A midline incision was made and cautery was used to dissect  into the peritoneal cavity. Exploration revealed a distended stomach and no adhesions. I chose an appropriate site for the Feeding Tube placement. A 8 mm incision was made in the left upper abdomen. I passed the 24 Fr feeding tube through the abdominal wall. I opened the stomach with electrocautery and after testing the balloon with 10 ml identified an intact balloon. I tacked the serosa with a 3-0 silk suture and a pursestring of 2-0 silk to seal the gastrostomy tube with in it. I filled the balloon and withdrew the tube until the balloon was seated below the abdominal wall. The bumper was pushed down the skin level and attached with 2-0 nylon in two places.   I closed the midline incision with 2-0 looped PDS and after irrigation with saline the skin was closed with 3-0 Vicryl and covered with dermabond. I did inject with 0.25% Marcaine. Appropriate dressings were applied in the form of a drain dressing. The patient tolerated the procedure well with no apparent complications and was awakened from anesthesia and extubated in the operating room and taken to the recovery room in satisfactory condition. Sponge and needle counts were correct times two as reported to me.     Estimated Blood Loss: per anesthesia           Specimens: * No specimens in log *        Signed By:  Jaspal Hernandez MD     January 23, 2017

## 2017-01-24 NOTE — PROGRESS NOTES
Dual Skin Assessment    Skin assessment completed with Baltazar Molina RN    See below.   Incision on upper mid abdomen    Wound Abdomen (Active)        Juan Carlos Ball RN    1/23/2017 8:09 PM

## 2017-01-24 NOTE — PROGRESS NOTES
TRANSFER - IN REPORT:    Verbal report received from Langston Saint, RN on [place]  being received from [address] [place] [address] for routine post - op      Report consisted of patients Situation, Background, Assessment and   Recommendations(SBAR). Information from the following report(s) SBAR, Kardex, OR Summary, Procedure Summary, Intake/Output, MAR and Cardiac Rhythm NSR was reviewed with the receiving nurse. Opportunity for questions and clarification was provided. Assessment completed upon patients arrival to unit and care assumed.

## 2017-01-25 LAB
ANION GAP BLD CALC-SCNC: 4 MMOL/L (ref 7–16)
BUN SERPL-MCNC: 24 MG/DL (ref 8–23)
CALCIUM SERPL-MCNC: 8.5 MG/DL (ref 8.3–10.4)
CHLORIDE SERPL-SCNC: 102 MMOL/L (ref 98–107)
CO2 SERPL-SCNC: 33 MMOL/L (ref 21–32)
CREAT SERPL-MCNC: 1.28 MG/DL (ref 0.8–1.5)
GLUCOSE BLD STRIP.AUTO-MCNC: 143 MG/DL (ref 65–100)
GLUCOSE BLD STRIP.AUTO-MCNC: 150 MG/DL (ref 65–100)
GLUCOSE BLD STRIP.AUTO-MCNC: 159 MG/DL (ref 65–100)
GLUCOSE BLD STRIP.AUTO-MCNC: 256 MG/DL (ref 65–100)
GLUCOSE SERPL-MCNC: 163 MG/DL (ref 65–100)
POTASSIUM SERPL-SCNC: 3.8 MMOL/L (ref 3.5–5.1)
SODIUM SERPL-SCNC: 139 MMOL/L (ref 136–145)

## 2017-01-25 PROCEDURE — 77010033678 HC OXYGEN DAILY

## 2017-01-25 PROCEDURE — 65270000029 HC RM PRIVATE

## 2017-01-25 PROCEDURE — 80048 BASIC METABOLIC PNL TOTAL CA: CPT | Performed by: INTERNAL MEDICINE

## 2017-01-25 PROCEDURE — 99218 HC RM OBSERVATION: CPT

## 2017-01-25 PROCEDURE — 74011250637 HC RX REV CODE- 250/637: Performed by: INTERNAL MEDICINE

## 2017-01-25 PROCEDURE — 74011250637 HC RX REV CODE- 250/637: Performed by: OTOLARYNGOLOGY

## 2017-01-25 PROCEDURE — G0378 HOSPITAL OBSERVATION PER HR: HCPCS

## 2017-01-25 PROCEDURE — 74011636637 HC RX REV CODE- 636/637: Performed by: INTERNAL MEDICINE

## 2017-01-25 PROCEDURE — 92597 ORAL SPEECH DEVICE EVAL: CPT | Performed by: SPEECH-LANGUAGE PATHOLOGIST

## 2017-01-25 PROCEDURE — 82962 GLUCOSE BLOOD TEST: CPT

## 2017-01-25 RX ADMIN — INSULIN LISPRO 2 UNITS: 100 INJECTION, SOLUTION INTRAVENOUS; SUBCUTANEOUS at 08:49

## 2017-01-25 RX ADMIN — LOSARTAN POTASSIUM 50 MG: 50 TABLET ORAL at 08:41

## 2017-01-25 RX ADMIN — ACETAMINOPHEN 650 MG: 325 TABLET, FILM COATED ORAL at 22:26

## 2017-01-25 RX ADMIN — INSULIN LISPRO 6 UNITS: 100 INJECTION, SOLUTION INTRAVENOUS; SUBCUTANEOUS at 22:27

## 2017-01-25 RX ADMIN — INSULIN LISPRO 2 UNITS: 100 INJECTION, SOLUTION INTRAVENOUS; SUBCUTANEOUS at 17:33

## 2017-01-25 RX ADMIN — AMLODIPINE BESYLATE 10 MG: 10 TABLET ORAL at 22:26

## 2017-01-25 NOTE — PROGRESS NOTES
Bedside shift change report given to [address] Karina Edward (oncoming nurse) by Idalia Harman RN (offgoing nurse). Report included the following information Kardex, Intake/Output, MAR, Recent Results, Med Rec Status and Cardiac Rhythm NSR. No DTIs/No pressure ulcers observed. Allevyn intact to sacrum per protocol. Tracheostomy with trach collar and o2. Productive cough. No s/sx of distress upon transfer of care/report.

## 2017-01-25 NOTE — PROGRESS NOTES
Pt very frustrated with situation. Wants to go home today, but very difficult to coordinate all of the care. Pt mouthed to his wife to go home and grab his gun so he can kill himself instead.

## 2017-01-25 NOTE — ACP (ADVANCE CARE PLANNING)
I spoke with patient's wife tonight and gave her scheduled time of 1-26-17 for NP and 1-27-17 for chemo. She is understanding pt will be released tomorrow. Email sent to NP providers to make them aware of patient visit this week.

## 2017-01-25 NOTE — PROGRESS NOTES
Bedside shift change report given to Vanessa Núñez RN (oncoming nurse) by Imelda Ozuna (offgoing nurse). Report included the following information Kardex, Intake/Output, MAR, Recent Results, Med Rec Status and Cardiac Rhythm NSR. Tracheostomy c 30% cool mist. Minimal secretions with cough through trach. Alert and oriented x 4. Denies pain at this time. Sitting up in chair watching TV. No DTIs/No pressure ulcers observed. Allevyn to sacrum intact per protocol.

## 2017-01-25 NOTE — PROGRESS NOTES
Davey respiratory therapist states,\"I do not have a cap available for 's tracheostomy. \"   Order placed in [facility] for speech therapy for PMV in am.

## 2017-01-25 NOTE — PROGRESS NOTES
Pt ambulated to chair on trach collar. Tolerated well. Wife at bedside. Vital signs remain stable. Denies pain or any other needs at this time.

## 2017-01-25 NOTE — PROGRESS NOTES
Bedside shift report received from [facility], [address] [facility]. Pt resting comfortably in bed. Alert, mouths words appropriately, nods appropriately and follows commands. Tracheostomy with trach collar and O2. Has productive cough. PEG noted to L side of abdomen. Midline abdominal incision. No drainage or redness around incision site. TF infusing. Pt denies pain or other needs at this time. Will continue to monitor.

## 2017-01-25 NOTE — PROGRESS NOTES
Problem: Communication Impaired (Adult)  Goal: *Speech Goal: (INSERT TEXT)  Outcome: Progressing Towards Goal  Speech consulted for PMV placement.

## 2017-01-25 NOTE — PROGRESS NOTES
STG: Patient tolerate PMV trials with SLP only without respiratory compromise at 100% accuracy    Speech language pathology: Passy ludivina valve note: Initial Assessment and Discharge   OBSERVATION    NAME/AGE/GENDER: Oxana De Leon is a 62 y.o. 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 Lyna Dakins presents was seen for PMV trials s/p PEG and Trach placement. RT accompanied ST at bedside for PMV trials. Patient required suctioning prior to placement. He tolerated trials fairly well with two coughing episodes with decreased in O2 stats to 89-90, however stats quickly rebounded back to his baseline at 97. Patient was able to produce adequate voicing. He was able to say his name, date of birth, count etc without difficulty. PMV was removed without back pressure. SLP recommends that speech therapy follows up with patient on a home health basis for more trials with PMV before patient can use independently. Patient and RN are aware of the results and recommendations. Patient will benefit from skilled intervention to address the below impairments. ?????? ? ? This section established at most recent assessment??????????  PROBLEM LIST (Impairments causing functional limitations):  1. Voice  REHABILITATION POTENTIAL FOR STATED GOALS: Good  PLAN OF CARE:   Patient will benefit from skilled intervention to address the following impairments.   RECOMMENDATIONS AND PLANNED INTERVENTIONS (Benefits and precautions of therapy have been discussed with the patient.):  · NPO with alternative means of nutrition  SPEAKING VALVE RECOMMENDATION  PLACEMENT:  · Speech-Language Pathologist  SPEAKING VALVE WEAR SCHEDULE:  · No schedule at this time. Patient will need follow up from [facility] a few more times prior to using independently. FREQUENCY/DURATION: Continue to follow patient 2 times a week for duration of hospital stay to address above goals. RECOMMENDED REHABILITATION/EQUIPMENT: (at time of discharge pending progress):   Home Health: Speech Therapy. SUBJECTIVE:   \"I am doing fine\"  History of Present Injury/Illness: Mr. 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 He also has no past medical history of Adverse effect of anesthesia; Difficult intubation; Malignant hyperthermia due to anesthesia; or Pseudocholinesterase deficiency. Abbie Pardo He also  has a past surgical history that includes colonoscopy (2016) and other surgical (Left). Present Symptoms: None  Pain Intensity 1: 0  Pain Location 1: Head  Pain Orientation 1: Upper  Pain Intervention(s) 1: Medication (see MAR)  Current Medications:   No current facility-administered medications on file prior to encounter. Current Outpatient Prescriptions on File Prior to Encounter   Medication Sig Dispense Refill    allopurinol (ZYLOPRIM) 300 mg tablet 300 mg nightly. Indications: GOUT      LORazepam (ATIVAN) 1 mg tablet Take 0.5-1 Tabs by mouth every six (6) hours as needed for Anxiety. Max Daily Amount: 4 mg. Indications: CANCER CHEMOTHERAPY-INDUCED NAUSEA AND VOMITING 90 Tab 0    magic mouthwash (GLADYS) susp Take 10 mL by mouth every four (4) hours. 500 mL 3    lidocaine-prilocaine (EMLA) topical cream Apply  to affected area as needed. Apply 1/2 inch amount of cream to port site 90 minutes prior to needle access. 30 g 0    ondansetron hcl (ZOFRAN) 8 mg tablet Take 1 Tab by mouth every eight (8) hours as needed for Nausea.  80 Tab 3    prochlorperazine (COMPAZINE) 10 mg tablet Take 1 Tab by mouth every six (6) hours as needed. 120 Tab 3    metFORMIN (GLUCOPHAGE) 500 mg tablet Take 500 mg by mouth three (3) times daily (with meals). Indications: PREVENTION OF TYPE 2 DIABETES MELLITUS      aspirin delayed-release 81 mg tablet Take 81 mg by mouth every morning. Take / use AM day of surgery  per anesthesia protocols. Indications: myocardial infarction prevention      triamterene-hydroCHLOROthiazide (MAXZIDE) 37.5-25 mg per tablet Take 1 Tab by mouth every morning. Indications: Edema, Hypertension      losartan (COZAAR) 50 mg tablet 50 mg two (2) times a day. Indications: Hypertension      amLODIPine (NORVASC) 10 mg tablet Take 10 mg by mouth nightly. Take / use AM day of surgery  per anesthesia protocols. Indications: Hypertension      glipiZIDE SR (GLUCOTROL) 10 mg CR tablet Take 10 mg by mouth two (2) times a day. Indications: type 2 diabetes mellitus      omeprazole (PRILOSEC) 20 mg capsule Take 20 mg by mouth every morning. Take / use AM day of surgery  per anesthesia protocols.   Indications: GASTROESOPHAGEAL REFLUX       Social History/Home Situation:  Home Environment: Trailer/mobile home  # Steps to Enter: 3  One/Two Story Residence: One story  Living Alone: No  Support Systems: Spouse/Significant Other/Partner, Family member(s), Friends \ neighbors, Child(lizette), /, [facility]  Patient Expects to be Discharged to[de-identified] Private residence  Current DME Used/Available at Home: Glucometer  OBJECTIVE/TREATMENT:   Assessment/Reassessment only, no treatment provided today    TREATMENT AND INTERVENTIONS:  Tracheostomy Data/Eval  Trach Size/Status: #6   Date of Placement: 01/24/17  [place] Placement: SLP  On Ventilator: No  Mental Status  Neurologic State: Alert  Orientation Level: Oriented to person  Cognition: Follows commands  Perception: Appears intact  Perseveration: No perseveration noted  Safety/Judgement: Awareness of environment  Evidence of Comprehension  Response to Basic Yes/No Questions (%): 100 %  Response to Complex Yes/No Questions (%) : 100 %  One-Step Basic Commands (%): 100 %  Two-Step Basic Commands (%): 100 %  Cough : Present  Airway Clearance  Suction: Deep  Suction Device: Inline suction catheter  Sputum Method Obtained: Tracheal  Sputum Amount: Moderate  Sputum Color/Odor: Tan  Sputum Consistency: Thick     PMV Trial   Application: SLP  Tolerance: Increased coughing;Decreased O2 saturation  Vocal Quality: No impairment  Vocal Intensity: No impairment  Duration (minutes): 10 minutes  Oxygen Therapy  O2 Sat (%): 95 %  Pulse via Oximetry: 91 beats per minute  O2 Device: Tracheostomy;Tracheal collar  O2 Flow Rate (L/min): 6 l/min  FIO2 (%): 30 %  Airway Clearance  Suction: Deep  Suction Device: Inline suction catheter  Sputum Method Obtained: Tracheal  Sputum Amount: Moderate  Sputum Color/Odor: Tan  Sputum Consistency: Thick    PASSY [place] VALVE TRIALS:      Heart Rate   SPO2 RR   Prior to Trial   96 91 18   After cuff deflation      After PMV placed 97 94 19   After PMV removed 96 91 18     Tool Used: Functional [place] Measure (FIMTM)   Score Comments   Eating  1     Comprehension  6     Expression  2     Social Interaction       Problem Solving       Memory          Score:  Initial: 2 Most Recent: X (Date: -- )   Interpretation of Tool: Provides a uniform system of measurement for disability based on the International Classification of Impairment, Disabilities and Handicaps; measures the level of a patient's disability and indicates how much assistance is required for the individual to carry out activities of daily living. Score 7 6 5 4 3 2 1   Modifier CH CI CJ CK CL CM CN   ?  Voice:     - CURRENT STATUS: CM - 80%-99% impaired, limited or restricted    - GOAL STATUS:   - 0% impaired, limited or restricted    - D/C STATUS:  ---------------To be determined---------------  Payor: BLUE CROSS / Plan[place] BLUE [employer]SSENTIALS LAM / Product Type: LAM /   __________________________________________________________________________________________________  Safety:   After treatment position/precautions:  · Supine in bed  Treatment Assessment:  Tolerated PMV trials fairly well. Progression/Medical Necessity:   · Skilled intervention continues to be required due to decreased secretion management, inability to communicate and decreased swallow function  Compliance with Program/Exercises: Will assess as treatment progresses. Reason for Continuation of Services/Other Comments:  · Patient continues to require skilled intervention due to patient not being able to utilize PMV independently  Recommendations/Intent for next treatment session: \"Treatment next visit will focus on PMV trials\". Total Treatment Duration:  Time In: 0900  Time Out: [address], Jacqueline Patricia. Fatoumata Herrera

## 2017-01-25 NOTE — PROGRESS NOTES
Per dietician, tube feedings stopped. 0 residual. Bolus feeding of Jevity 1.2 completed. Water flush 30 mL before and after. Pt and wife educated on process.

## 2017-01-25 NOTE — OP NOTES
[address]   OPERATIVE REPORT       Name:  Fartun Bragg   MR#:  [id number]   :  1955   Account #:  [de-identified]   Date of Adm:  2017       PREOPERATIVE DIAGNOSES   1. Squamous cell carcinoma of the epiglottis. 2. Dysphagia. POSTOPERATIVE DIAGNOSES   1. Squamous cell carcinoma of the epiglottis. 2. Dysphagia. NAME OF PROCEDURE: Tracheostomy. SURGEON: Adrianna Au DO    ASSISTANT: None. ANESTHESIA: General.    COMPLICATIONS: None. BLOOD LOSS: Less than 5 mL. HISTORY: This is a 62-year-old male who came to see me a couple   months ago due to increasing hoarseness over a 6-months time   period, did a scope in the office which revealed there to be an   abnormal-appearing mass in the supraglottis, so I did a   panendoscopy with biopsy which confirmed squamous cell carcinoma   and this did appear to be coming from the epiglottis, did not   involve the vocal cords or the rest of the larynx. The patient   subsequently started undergoing chemotherapy and during his   chemotherapy treatments while he was in the hospital, he did   have a barium swallow done which showed aspiration, so it was   recommended he go n.p.o. Because he was n.p.o. and he needed   nutrition, it was recommended a PEG tube be placed and they did   try to intubate him to place the PEG tube and Anesthesia was not   able to, so the procedure was aborted and then I was asked to   see him to do a trach placement so that we could protect his   airway and so that they could place the PEG tube for nutrition   so he could continue with his oncology therapy. I did talk to   the patient over the phone in regards to the procedure risks and   benefits, these were rediscussed with him in the preop area in   the hospital, all questions were answered and he was agreeable   to the surgery. DESCRIPTION OF PROCEDURE: The patient again was identified in   the preoperative waiting area.  He was taken back to the   operating room where he underwent general anesthesia. Once he   was under general anesthesia, the neck was extended, the neck   was prepped and he was draped in a sterile fashion. Once that   was complete, then I was able to rei out the neck structures. I   was able to trace out the cricoid cartilage, which did sit   pretty low in the neck, even with the neck extended. I drew a line just below the level of the cricoid cartilage, and   less than 1 mL of 1% lidocaine with epinephrine was injected   just below the predrawn line. Once that was done, then a knife   was used to make an incision through the predrawn line, staying   midline. I was able to take this down through the subcutaneous   fat, the platysma, was able to split the strap muscles in the   midline vertically, then I was able to identify the thyroid   gland, which was split in the midline. Then, I was able to dissect   all this tissue laterally bilaterally until I was able to get a   nice clean exposure of the trachea itself. A knife was then used   to make an incision between the 2nd and 3rd and 3rd and 4th   tracheal rings. He did have a very fibrotic cartilage, so I did   need to use a pair of heavy scissors to make an incision   completely through with both horizontal cuts and I made a   vertical cut on either side and I removed the anterior portion   of the third tracheal ring. Once that was done, I could see the   end of the endotracheal tube, I took a 6.0 cuffed Shiley   tracheostomy tube, checked the cuff prior to insertion and it   did hold its pressure, so then the trach tube was placed into   the neck without any difficulty. It was then sewn into place   using a silk suture, it was tied around the neck using the   enclosed trach tie, and once that was complete, then my portion   of the procedure was complete and Dr. Micheline Sahu then proceeded with PEG   tube placement.         DO CRISTAL Hawkins / NATALIE   D:  01/24/2017   21:03 T:  01/25/2017   04:42   Job #:  [id number]

## 2017-01-25 NOTE — PROGRESS NOTES
Wife educated again on bolus feedings through PEG tube. While assisting and at bedside, wife administered bolus feeding. Pt tolerated well. No residuals. Wife performed properly and verbalized/demonstrated understanding.

## 2017-01-25 NOTE — PROGRESS NOTES
Placed pt on 3L  NC for PMV trials. Pt tolerated trial well. Lowest SAT was 90.  No complications at this time

## 2017-01-25 NOTE — PROGRESS NOTES
notified c/o headache. Order obtained to cap/plug trach tonight(order PMV in am if not tolerated). Order obtained for tylenol prn and  can have ice chips only. Respiratory therapist notified of new orders.

## 2017-01-25 NOTE — PROGRESS NOTES
Problem: Nutrition Deficit  Goal: *Optimize nutritional status  Nutrition Follow Up:  Reason for assessment: Consult for TF management per nutritional support protocols / Dr. Mir Dimas. Consult received for General Nutrition Management and Supplements per Dr. Merissa Sanchez  Malnutrition screening tool trigger for unintentional weight loss of 24-33#. Assessment:   Pt s/p tracheostomy and PEG placement on 1/23. Continuous PEG feeding of Jevity 1.2 initiated on 1/24; pt tolerating feeding at rate of 30 ml/hr with no residuals. No documented BM. Pt states he is going home today; has an appointment at the [facility] 1/26. Pertinent labs: POC glucose range today:  159-143 mg/dl, A1C 7.8 with average glucose of 177 mg/dl. Macronutrient needs:  EER: 6309-5481 kcal /day (18-22 kcal/kg BW)  EPR:  grams protein/day (1.2-1.5 grams/kg IBW)  Max CHO: 296 grams/day (50% kcal)  Fluid: 1ml/kcal or per MD recommendations  Intake/Comparative standards: EN:  Jevity 1.2 at 30 ml/hr meets ~ 45% estimated kcal and protein needs. Intervention:   Meals and snacks: Continue NPO  EN:  Discontinued current continuous PEG feeding and transition to bolus feeds. Bolus feeds while inpatient: Goal: 7 cans/day of Jevity 1.2 . Provide 1.5 cans at first 4 feedings and 1 can at last feeding of the day. Provide 65 ml water flush before and after each feeding. Bolus feedings to provide 1995 kcal/day (100% of needs), 92 grams protein/day (100% of needs), 281 grams CHO/day (does not exceed max CHO), and ~ 1337 ml free water/day + additional 650 ml/water from flush for total of 1987 ml water/day (100% of needs). *Note:  May need to change tube feeding formula if persistent hyperglycemia persists. Per Etheleen Seip, RD at the [facility], recommend to try Jevity prior to specialized nutrition for enhanced glycemic control. Bolus Feed Recommendations for Home:  Jevity 1.5 with goal of 6 cans/day.   Provide 4 feedings/day with 1.5 cans at each feeding. Provide 105 ml free water before and after each feeding. Home feeding of 6 cans Jevity 1.5 per day to provide:  2130 kcal/day (100% estimated needs), 91 gm pro/day (100% estimated needs), 307 gm CHO/day (104% estimated max CHO) and 1080 ml free water/day + additional 1050 ml free water from flushes for total  2130 ml water/day (100% estimated needs). [facility] will provide patient with tube feeding for home X 1 day if necessary and Brooks Yanes RD at the [facility] will provide patient with 1 case Jevity 1.5 until procurement of enteral nutition through home health services finalized. Nutrition Therapy: Electrolyte replacement per nutritional support protocols are active on MAR. Education:  Explained bolus feedings / feeding formula with patient and spouse. Anderson Dong RN providing education with patient and spouse on management ofPEG feeding. Coordination of nutrition care:  Interdisciplinary Rounds, Brooks Yanes,  [address] [place] [address], [place], [place], Jemma Ann RN  Monitoring / Evaluation:  PEG feeding management, tolerance  Sinan Rojas, [address] [place] [address], [place], MPH  [phone]

## 2017-01-25 NOTE — PROGRESS NOTES
Phone call with Dr. David Son regarding discharge planning. OK to be discharged as soon as home health supplies are gathered and prepared. Social work able to speak with MD as well. Pt to be placed on room air trial in an attempt to go home without trach collar.

## 2017-01-25 NOTE — PROGRESS NOTES
Hospitalist Progress Note    2017  Admit Date: 2017  1:50 PM   NAME: Elodia Hanson   :  1955   MRN:  [id number]   Attending: Sylwia Lo DO  PCP:  Wild Knott MD      Admitted for:Trach and PEG    SUBJECTIVE:   Patient this morning has no complaints, slight pain in neck, and sore abdomen.  No vomiting getting around ok          Review of Systems negative with exception of pertinent positives noted above  Past medical history unchanged from H&P    PHYSICAL EXAM     Visit Vitals    BP (!) 156/91    Pulse (!) 103    Temp 99.4 °F (37.4 °C)    Resp 15    Wt 106.6 kg (235 lb)    SpO2 96%    BMI 34.7 kg/m2      Temp (24hrs), Av.8 °F (37.1 °C), Min:98.4 °F (36.9 °C), Max:99.4 °F (37.4 °C)    Oxygen Therapy  O2 Sat (%): 96 % (17)  Pulse via Oximetry: 110 beats per minute (17)  O2 Device: Room air (17)  O2 Flow Rate (L/min): 6 l/min (17)  FIO2 (%): 28 % (17)    Intake/Output Summary (Last 24 hours) at 17 1804  Last data filed at 17 1735   Gross per 24 hour   Intake          2863.75 ml   Output             1550 ml   Net          1313.75 ml        General: No acute distress  mucous membranes pink and moist acyanotic anicteric  Neck:  Supple full range of motion tach clean and dry  Lungs:  Air entry equal bilaterally, no crepitations rales rhonchi   Heart:  Regular rate and rhythm,  No murmur, rub, or gallop  Abdomen: Soft, Non distended, Non tender, no rebound guarding Positive bowel sounds peg site clean  Extremities: No cyanosis, clubbing or edema  Neurologic:  No focal deficits  Musculoskeletal: no   Joint swelling tenderness erythema  Skin:  No erythema, rashes noted          LAB  Recent Labs      17   1727  17   1138  17   0833  17   2308  17   1802   GLUCPOC  150*  143*  159*  142*  141*      No results for input(s): WBC, HGB, HCT, PLT, INR, HGBEXT, HCTEXT, PLTEXT in the last 72 hours.    No lab exists for component: INREXT  Recent Labs      01/25/17   0545   NA  139   K  3.8   CL  102   CO2  33*   GLU  163*   BUN  24*   CREA  1.28   CA  8.5       EKG and imaging reviewed personally by me  No results found. Results for orders placed or performed during the hospital encounter of 01/11/17   EKG, 12 LEAD, INITIAL   Result Value Ref Range    Systolic BP  mmHg    Diastolic BP  mmHg    Ventricular Rate 111 BPM    Atrial Rate 111 BPM    P-R Interval 150 ms    QRS Duration 78 ms    Q-T Interval 312 ms    QTC Calculation (Bezet) 424 ms    Calculated P Axis 65 degrees    Calculated R Axis 45 degrees    Calculated T Axis 71 degrees    Diagnosis       !! AGE AND GENDER SPECIFIC ECG ANALYSIS !! Sinus tachycardia  Low voltage QRS  Borderline ECG  Confirmed by NAIF FAROOQ (), GUI VENTURA ([id number]) on 1/12/2017 8:09:01 AM       XR Results (most recent):    Results from [place] encounter on 01/11/17   XR SWALLOW FUNC VIDEO   Narrative Modified Barium Swallow, 1/12/2017    History: Dysphagia, cancer of the epiglottis. Technique: Fluoroscopic guidance was provided for the speech pathologist.  Multiple consistencies of barium were given. Findings: The patient's ability to swallow was evaluated with thin, nectar, and  pudding consistencies. The patient had gross aspiration with all consistencies  tested. This study was discontinued at this point. Please see speech pathologist  report for further details. Fluoroscopy time: 0.5 minutes. Active problems  Active Hospital Problems    Diagnosis Date Noted    Type 2 diabetes mellitus with hyperglycemia ([facility] [place] [address] 01/24/2017    HTN (hypertension) 01/24/2017    Head and neck cancer ([facility] [place] [address] 01/24/2017       ASSESSMENT  AND PLAN      · DM - continue metformin through peg once started on feeding, monitor A1C  · HTN - continue home medications  · Head and neck CA - as per ent.   Plans for discharge today, will sign off  Signed By: Katie Blanco MD January 25, 2017

## 2017-01-26 ENCOUNTER — HOSPITAL ENCOUNTER (OUTPATIENT)
Dept: LAB | Age: 62
Discharge: HOME | End: 2017-01-26
Payer: COMMERCIAL

## 2017-01-26 ENCOUNTER — HOSPITAL ENCOUNTER (OUTPATIENT)
Dept: PHYSICAL THERAPY | Age: 62
End: 2017-01-26
Attending: INTERNAL MEDICINE
Payer: COMMERCIAL

## 2017-01-26 VITALS
HEART RATE: 112 BPM | DIASTOLIC BLOOD PRESSURE: 60 MMHG | SYSTOLIC BLOOD PRESSURE: 104 MMHG | BODY MASS INDEX: 34.7 KG/M2 | RESPIRATION RATE: 18 BRPM | OXYGEN SATURATION: 90 % | WEIGHT: 235 LBS | TEMPERATURE: 97.7 F

## 2017-01-26 DIAGNOSIS — C32.1 SQUAMOUS CELL CARCINOMA OF EPIGLOTTIS (HCC): ICD-10-CM

## 2017-01-26 LAB
ALBUMIN SERPL BCP-MCNC: 3 G/DL (ref 3.2–4.6)
ALBUMIN/GLOB SERPL: 0.7 {RATIO} (ref 1.2–3.5)
ALP SERPL-CCNC: 71 U/L (ref 50–136)
ALT SERPL-CCNC: 16 U/L (ref 12–65)
ANION GAP BLD CALC-SCNC: 6 MMOL/L (ref 7–16)
AST SERPL W P-5'-P-CCNC: 12 U/L (ref 15–37)
BASOPHILS # BLD AUTO: 0 K/UL (ref 0–0.2)
BASOPHILS # BLD: 0 % (ref 0–2)
BILIRUB SERPL-MCNC: 0.7 MG/DL (ref 0.2–1.1)
BUN SERPL-MCNC: 21 MG/DL (ref 8–23)
CALCIUM SERPL-MCNC: 9 MG/DL (ref 8.3–10.4)
CHLORIDE SERPL-SCNC: 99 MMOL/L (ref 98–107)
CO2 SERPL-SCNC: 32 MMOL/L (ref 23–32)
CREAT SERPL-MCNC: 1.38 MG/DL (ref 0.8–1.5)
DIFFERENTIAL METHOD BLD: ABNORMAL
EOSINOPHIL # BLD: 0 K/UL (ref 0–0.8)
EOSINOPHIL NFR BLD: 0 % (ref 0.5–7.8)
ERYTHROCYTE [DISTWIDTH] IN BLOOD BY AUTOMATED COUNT: 13 % (ref 11.9–14.6)
GLOBULIN SER CALC-MCNC: 4.4 G/DL (ref 2.3–3.5)
GLUCOSE BLD STRIP.AUTO-MCNC: 154 MG/DL (ref 65–100)
GLUCOSE SERPL-MCNC: 197 MG/DL (ref 65–100)
HCT VFR BLD AUTO: 31.2 % (ref 41.1–50.3)
HGB BLD-MCNC: 10.2 G/DL (ref 13.6–17.2)
LYMPHOCYTES # BLD AUTO: 12 % (ref 13–44)
LYMPHOCYTES # BLD: 1.1 K/UL (ref 0.5–4.6)
MCH RBC QN AUTO: 27.8 PG (ref 26.1–32.9)
MCHC RBC AUTO-ENTMCNC: 32.7 G/DL (ref 31.4–35)
MCV RBC AUTO: 85 FL (ref 79.6–97.8)
MONOCYTES # BLD: 0.6 K/UL (ref 0.1–1.3)
MONOCYTES NFR BLD AUTO: 7 % (ref 4–12)
NEUTS SEG # BLD: 7.1 K/UL (ref 1.7–8.2)
NEUTS SEG NFR BLD AUTO: 80 % (ref 43–78)
NRBC # BLD: 0 K/UL (ref 0–0.2)
PLATELET # BLD AUTO: 185 K/UL (ref 150–450)
PMV BLD AUTO: 9.2 FL (ref 10.8–14.1)
POTASSIUM SERPL-SCNC: 4.2 MMOL/L (ref 3.5–5.1)
PROT SERPL-MCNC: 7.4 G/DL (ref 6.3–8.2)
RBC # BLD AUTO: 3.67 M/UL (ref 4.23–5.67)
SODIUM SERPL-SCNC: 137 MMOL/L (ref 136–145)
WBC # BLD AUTO: 8.8 K/UL (ref 4.3–11.1)

## 2017-01-26 PROCEDURE — 80053 COMPREHEN METABOLIC PANEL: CPT | Performed by: NURSE PRACTITIONER

## 2017-01-26 PROCEDURE — 85025 COMPLETE CBC W/AUTO DIFF WBC: CPT | Performed by: NURSE PRACTITIONER

## 2017-01-26 PROCEDURE — G0378 HOSPITAL OBSERVATION PER HR: HCPCS

## 2017-01-26 PROCEDURE — 74011636637 HC RX REV CODE- 636/637: Performed by: INTERNAL MEDICINE

## 2017-01-26 PROCEDURE — 36415 COLL VENOUS BLD VENIPUNCTURE: CPT | Performed by: NURSE PRACTITIONER

## 2017-01-26 PROCEDURE — 74011250637 HC RX REV CODE- 250/637: Performed by: INTERNAL MEDICINE

## 2017-01-26 PROCEDURE — 99218 HC RM OBSERVATION: CPT

## 2017-01-26 PROCEDURE — 82962 GLUCOSE BLOOD TEST: CPT

## 2017-01-26 PROCEDURE — 94760 N-INVAS EAR/PLS OXIMETRY 1: CPT

## 2017-01-26 RX ADMIN — INSULIN LISPRO 2 UNITS: 100 INJECTION, SOLUTION INTRAVENOUS; SUBCUTANEOUS at 08:14

## 2017-01-26 RX ADMIN — LOSARTAN POTASSIUM 50 MG: 50 TABLET ORAL at 08:15

## 2017-01-26 NOTE — DISCHARGE INSTRUCTIONS
DISCHARGE SUMMARY from Nurse    The following personal items are in your possession at time of discharge:    Dental Appliances: Partials, At home  Visual Aid: None     Home Medications: None  Jewelry: None  Clothing: Sent home  Other Valuables: Sent home             PATIENT INSTRUCTIONS:    After general anesthesia or intravenous sedation, for 24 hours or while taking prescription Narcotics:  · Limit your activities  · Do not drive and operate hazardous machinery  · Do not make important personal or business decisions  · Do  not drink alcoholic beverages  · If you have not urinated within 8 hours after discharge, please contact your surgeon on call. Report the following to your surgeon:  · Excessive pain, swelling, redness or odor of or around the surgical area  · Temperature over 100.5  · Nausea and vomiting lasting longer than 4 hours or if unable to take medications  · Any signs of decreased circulation or nerve impairment to extremity: change in color, persistent  numbness, tingling, coldness or increase pain  · Any questions        What to do at Home:  Recommended activity: Activity as tolerated,     [address] [place] [address] Outpatient Speech:  Tejinder Nunez [phone]    If you experience any of the following symptoms fever, increase in pain or shortness of breath please follow up with Dr Daphne Patel. *  Please give a list of your current medications to your Primary Care Provider. *  Please update this list whenever your medications are discontinued, doses are      changed, or new medications (including over-the-counter products) are added. *  Please carry medication information at all times in case of emergency situations. These are general instructions for a healthy lifestyle:    No smoking/ No tobacco products/ Avoid exposure to second hand smoke    Surgeon General's Warning:  Quitting smoking now greatly reduces serious risk to your health.     Obesity, smoking, and sedentary lifestyle greatly increases your risk for illness    A healthy diet, regular physical exercise & weight monitoring are important for maintaining a healthy lifestyle    You may be retaining fluid if you have a history of heart failure or if you experience any of the following symptoms:  Weight gain of 3 pounds or more overnight or 5 pounds in a week, increased swelling in our hands or feet or shortness of breath while lying flat in bed. Please call your doctor as soon as you notice any of these symptoms; do not wait until your next office visit. Recognize signs and symptoms of STROKE:    F-face looks uneven    A-arms unable to move or move unevenly    S-speech slurred or non-existent    T-time-call 911 as soon as signs and symptoms begin-DO NOT go       Back to bed or wait to see if you get better-TIME IS BRAIN. Warning Signs of HEART ATTACK     Call 911 if you have these symptoms:   Chest discomfort. Most heart attacks involve discomfort in the center of the chest that lasts more than a few minutes, or that goes away and comes back. It can feel like uncomfortable pressure, squeezing, fullness, or pain.  Discomfort in other areas of the upper body. Symptoms can include pain or discomfort in one or both arms, the back, neck, jaw, or stomach.  Shortness of breath with or without chest discomfort.  Other signs may include breaking out in a cold sweat, nausea, or lightheadedness. Don't wait more than five minutes to call 911 - MINUTES MATTER! Fast action can save your life. Calling 911 is almost always the fastest way to get lifesaving treatment. Emergency Medical Services staff can begin treatment when they arrive -- up to an hour sooner than if someone gets to the hospital by car. The discharge information has been reviewed with the patient. The patient verbalized understanding.     Discharge medications reviewed with the patient and appropriate educational materials and side effects teaching were provided.

## 2017-01-26 NOTE — PROGRESS NOTES
Admission assessment complete. Pt alert and oriented x4, respirations present, coarse breath sounds, HOB elevated, pt denies any SOB at this time, S1&S2 auscultated, HR regular, abd soft, non- tender, Active BS in all 4 quadrants, No pressure ulcers or edema noted, dual skin assessment completed with Enrrique Richardson RN, midline incision c/d/i, trach, and peg tube. Generalized scars, all other skin clean dry and intact. pt up with assistance, voiding, pt denies any pain at this time, pt instructed to call for assistance, pt verbalizes understanding, bed low and locked, side rails x3, call light within reach.

## 2017-01-26 NOTE — PROGRESS NOTES
TRANSFER - OUT REPORT:    Verbal report given to Christopher Muhammad RN(name) on [place]  being transferred to med surg(unit) for routine progression of care       Report consisted of patients Situation, Background, Assessment and   Recommendations(SBAR). Information from the following report(s) SBAR was reviewed with the receiving nurse. Lines:   Venous Access Device Venous Chest Port 12/23/16 Upper chest (subclavicular area, right (Active)       Venous Access Device venous access device  (Active)   Central Line Being Utilized Yes 1/24/2017  7:47 PM   Criteria for Appropriate Use Limited/no vessel suitable for conventional peripheral access 1/25/2017  4:10 PM   Site Assessment Clean, dry, & intact 1/25/2017  4:10 PM   Dressing Status Clean, dry, & intact 1/25/2017  4:10 PM   Dressing Type Disk with Chlorhexadine Gluconate (CHG); Transparent 1/24/2017  7:47 PM   Positive Blood Return (Medial Site) Yes 1/25/2017  4:10 PM   Action Taken (Medial Site) Infusing 1/25/2017  4:10 PM   Alcohol Cap Used No 1/24/2017  7:47 PM       Peripheral IV 01/16/17 Right Hand (Active)        Opportunity for questions and clarification was provided.       Patient transported with:   Registered Nurse  Tech

## 2017-01-26 NOTE — PROGRESS NOTES
8ml of residual pulled back before 2350 feeding, 1 can of jevity 1.2 given through peg tube, flushed with 30ml of water.  Pt tolerated feeding

## 2017-01-26 NOTE — PROGRESS NOTES
TRANSFER - IN REPORT:    Verbal report received from Jeanne(name) on Citlaly Loja  being received from ICU(unit) for routine progression of care      Report consisted of patients Situation, Background, Assessment and   Recommendations(SBAR). Information from the following report(s) SBAR, Kardex, Procedure Summary, Intake/Output and MAR was reviewed with the receiving nurse. Opportunity for questions and clarification was provided. Assessment to be completed upon patients arrival to unit and care assumed.

## 2017-01-26 NOTE — PROGRESS NOTES
Trach teaching given to pt wife. Instructed her how to change trach ties, change inner canula and clean skin around trach. Wife verbalized understanding. Respiratory Therapy instructed wife on suction techniques.

## 2017-01-26 NOTE — PROGRESS NOTES
Assessment complete. Respirations even and unlabored. Trach site intact with old dried drainage. PEG site intact. Jevity 1.2, 1 1/2 cans given per peg. Pt tolerated. Pt sitting up in chair. Call light in reach, pt instructed to call for assistance.

## 2017-01-26 NOTE — PROGRESS NOTES
PROGRESS NOTE          ASSESSMENT:   Patient is 62 y.o. with diagnosis of : Principal Problem:    Head and neck cancer ([facility] (2017)    Active Problems:    Type 2 diabetes mellitus with hyperglycemia ([facility] (2017)      HTN (hypertension) (2017)        PLAN:  Pt is stable and frustrated. He is supposed to discharged today. SUBJECTIVE:  Diagnosis/Chief Complaint: SCCa Epiglottis      He has no complaints, no bleeding,no SOB. The cuff is still inflated on his balloon. OBJECTIVE:         Temp (24hrs), Av.9 °F (37.2 °C), Min:98 °F (36.7 °C), Max:99.7 °F (37.6 °C)           EXAM: trach tube in place, no erythema, no purulence, no SOB    Reviewed: Medications, allergies, clinical lab test results and imaging results have been reviewed. Any abnormal findings have been addressed.

## 2017-01-26 NOTE — PROGRESS NOTES
Discharge instructions reviewed with pt and wife with opportunity for questions. Wife verbalized understanding of discharge instructions. [facility].

## 2017-01-27 ENCOUNTER — HOSPITAL ENCOUNTER (OUTPATIENT)
Dept: INFUSION THERAPY | Age: 62
Discharge: HOME | End: 2017-01-27
Payer: COMMERCIAL

## 2017-01-27 VITALS
DIASTOLIC BLOOD PRESSURE: 74 MMHG | BODY MASS INDEX: 34.35 KG/M2 | OXYGEN SATURATION: 96 % | SYSTOLIC BLOOD PRESSURE: 142 MMHG | WEIGHT: 232.6 LBS | RESPIRATION RATE: 20 BRPM | HEART RATE: 103 BPM | TEMPERATURE: 98.3 F

## 2017-01-27 DIAGNOSIS — C32.1 SQUAMOUS CELL CARCINOMA OF EPIGLOTTIS (HCC): ICD-10-CM

## 2017-01-27 PROCEDURE — 74011000258 HC RX REV CODE- 258: Performed by: NURSE PRACTITIONER

## 2017-01-27 PROCEDURE — 96367 TX/PROPH/DG ADDL SEQ IV INF: CPT

## 2017-01-27 PROCEDURE — 96417 CHEMO IV INFUS EACH ADDL SEQ: CPT

## 2017-01-27 PROCEDURE — 96375 TX/PRO/DX INJ NEW DRUG ADDON: CPT

## 2017-01-27 PROCEDURE — 77030003560 HC NDL HUBR BARD -A

## 2017-01-27 PROCEDURE — 96413 CHEMO IV INFUSION 1 HR: CPT

## 2017-01-27 PROCEDURE — 96366 THER/PROPH/DIAG IV INF ADDON: CPT

## 2017-01-27 PROCEDURE — 74011250636 HC RX REV CODE- 250/636

## 2017-01-27 PROCEDURE — 96415 CHEMO IV INFUSION ADDL HR: CPT

## 2017-01-27 PROCEDURE — 74011250636 HC RX REV CODE- 250/636: Performed by: NURSE PRACTITIONER

## 2017-01-27 RX ORDER — HEPARIN 100 UNIT/ML
300-500 SYRINGE INTRAVENOUS AS NEEDED
Status: ACTIVE | OUTPATIENT
Start: 2017-01-27 | End: 2017-01-27

## 2017-01-27 RX ORDER — DEXAMETHASONE SODIUM PHOSPHATE 100 MG/10ML
10 INJECTION INTRAMUSCULAR; INTRAVENOUS ONCE
Status: COMPLETED | OUTPATIENT
Start: 2017-01-27 | End: 2017-01-27

## 2017-01-27 RX ORDER — SODIUM CHLORIDE 9 MG/ML
500 INJECTION, SOLUTION INTRAVENOUS ONCE
Status: COMPLETED | OUTPATIENT
Start: 2017-01-27 | End: 2017-01-27

## 2017-01-27 RX ORDER — SODIUM CHLORIDE 0.9 % (FLUSH) 0.9 %
10 SYRINGE (ML) INJECTION AS NEEDED
Status: ACTIVE | OUTPATIENT
Start: 2017-01-27 | End: 2017-01-27

## 2017-01-27 RX ORDER — HYDROCORTISONE SODIUM SUCCINATE 100 MG/2ML
100 INJECTION, POWDER, FOR SOLUTION INTRAMUSCULAR; INTRAVENOUS AS NEEDED
Status: DISPENSED | OUTPATIENT
Start: 2017-01-27 | End: 2017-01-27

## 2017-01-27 RX ORDER — ONDANSETRON 2 MG/ML
8 INJECTION INTRAMUSCULAR; INTRAVENOUS ONCE
Status: COMPLETED | OUTPATIENT
Start: 2017-01-27 | End: 2017-01-27

## 2017-01-27 RX ORDER — DIPHENHYDRAMINE HYDROCHLORIDE 50 MG/ML
50 INJECTION, SOLUTION INTRAMUSCULAR; INTRAVENOUS AS NEEDED
Status: DISPENSED | OUTPATIENT
Start: 2017-01-27 | End: 2017-01-27

## 2017-01-27 RX ADMIN — SODIUM CHLORIDE 500 ML: 900 INJECTION, SOLUTION INTRAVENOUS at 09:46

## 2017-01-27 RX ADMIN — SODIUM CHLORIDE 150 MG: 900 INJECTION, SOLUTION INTRAVENOUS at 10:12

## 2017-01-27 RX ADMIN — ONDANSETRON 8 MG: 2 INJECTION INTRAMUSCULAR; INTRAVENOUS at 10:00

## 2017-01-27 RX ADMIN — DEXAMETHASONE SODIUM PHOSPHATE 10 MG: 10 INJECTION INTRAMUSCULAR; INTRAVENOUS at 10:02

## 2017-01-27 RX ADMIN — POTASSIUM CHLORIDE: 2 INJECTION, SOLUTION, CONCENTRATE INTRAVENOUS at 15:17

## 2017-01-27 RX ADMIN — Medication 10 ML: at 09:46

## 2017-01-27 RX ADMIN — CISPLATIN 176 MG: 1 INJECTION, SOLUTION INTRAVENOUS at 13:14

## 2017-01-27 RX ADMIN — POTASSIUM CHLORIDE: 2 INJECTION, SOLUTION, CONCENTRATE INTRAVENOUS at 10:39

## 2017-01-27 RX ADMIN — Medication 10 ML: at 16:27

## 2017-01-27 RX ADMIN — DOCETAXEL 176 MG: 80 INJECTION, SOLUTION, CONCENTRATE INTRAVENOUS at 12:02

## 2017-01-27 NOTE — PROGRESS NOTES
Arrived to the [facility]. Assessment completed. Labs reviewed. Patient was just seen at [facility] for his OV. Patient tolerated chemotherapy well today. There were no adverse reactions noted. Patient was seen by Dr. Colette Ruvalcaba this morning. Patient's fluorouracil pump was placed today, with all clamps on the tubing open. Any issues or concerns during appointment:  Patient just had a trachesostomy placed yesterday. He states that the area is slightly sore. Patient aware of next infusion appointment on 2/1/17 (date) at 36 (time) with IV infusion center. Discharged ambulatory, per self. Patient instructed to call Dr. Cata Mccormick office immediately for any problems or concerns. He verbalizes understanding.

## 2017-01-27 NOTE — PROGRESS NOTES
Problem: Chemotherapy Treatment  Goal: *Chemotherapy regimen followed  Outcome: Progressing Towards Goal  Patient here for cycle #2 of chemotherapy. He states that he tolerated cycle #1 of chemotherapy \"pretty well\". He verbalized that he only experienced some nausea, but that it was controlled well with the anti-nausea medications. He denied any other problems except for the nausea and some fatigue. Reviewed the process and procedure with him today.

## 2017-01-28 ENCOUNTER — APPOINTMENT (OUTPATIENT)
Dept: INFUSION THERAPY | Age: 62
End: 2017-01-28
Payer: COMMERCIAL

## 2017-01-30 ENCOUNTER — HOSPITAL ENCOUNTER (OUTPATIENT)
Dept: RADIATION ONCOLOGY | Age: 62
Discharge: HOME | End: 2017-01-30
Payer: COMMERCIAL

## 2017-01-30 PROCEDURE — 77334 RADIATION TREATMENT AID(S): CPT

## 2017-02-01 ENCOUNTER — PATIENT OUTREACH (OUTPATIENT)
Dept: CASE MANAGEMENT | Age: 62
End: 2017-02-01

## 2017-02-01 ENCOUNTER — HOSPITAL ENCOUNTER (INPATIENT)
Age: 62
LOS: 4 days | Discharge: HOME | DRG: 699 | End: 2017-02-05
Attending: INTERNAL MEDICINE | Admitting: INTERNAL MEDICINE
Payer: COMMERCIAL

## 2017-02-01 ENCOUNTER — HOSPITAL ENCOUNTER (OUTPATIENT)
Dept: LAB | Age: 62
Discharge: HOME | End: 2017-02-01
Payer: COMMERCIAL

## 2017-02-01 ENCOUNTER — HOSPITAL ENCOUNTER (OUTPATIENT)
Dept: INFUSION THERAPY | Age: 62
Discharge: HOME | End: 2017-02-01
Payer: COMMERCIAL

## 2017-02-01 DIAGNOSIS — N28.9 RENAL INSUFFICIENCY: ICD-10-CM

## 2017-02-01 DIAGNOSIS — C32.1 SQUAMOUS CELL CARCINOMA OF EPIGLOTTIS (HCC): ICD-10-CM

## 2017-02-01 DIAGNOSIS — R11.15 NON-INTRACTABLE CYCLICAL VOMITING WITH NAUSEA: ICD-10-CM

## 2017-02-01 PROBLEM — D70.1 CHEMOTHERAPY-INDUCED NEUTROPENIA (HCC): Status: ACTIVE | Noted: 2017-02-01

## 2017-02-01 PROBLEM — T45.1X5A CHEMOTHERAPY-INDUCED NEUTROPENIA (HCC): Status: ACTIVE | Noted: 2017-02-01

## 2017-02-01 PROBLEM — Z93.0 TRACHEOSTOMY IN PLACE (HCC): Status: ACTIVE | Noted: 2017-02-01

## 2017-02-01 PROBLEM — L03.221 CELLULITIS OF NECK: Status: ACTIVE | Noted: 2017-02-01

## 2017-02-01 LAB
ALBUMIN SERPL BCP-MCNC: 2.8 G/DL (ref 3.2–4.6)
ALBUMIN/GLOB SERPL: 0.6 {RATIO} (ref 1.2–3.5)
ALP SERPL-CCNC: 66 U/L (ref 50–136)
ALT SERPL-CCNC: 19 U/L (ref 12–65)
ANION GAP BLD CALC-SCNC: 8 MMOL/L (ref 7–16)
AST SERPL W P-5'-P-CCNC: 12 U/L (ref 15–37)
BASOPHILS # BLD AUTO: 0 K/UL (ref 0–0.2)
BASOPHILS # BLD: 1 % (ref 0–2)
BILIRUB SERPL-MCNC: 0.6 MG/DL (ref 0.2–1.1)
BUN SERPL-MCNC: 44 MG/DL (ref 8–23)
CALCIUM SERPL-MCNC: 8.6 MG/DL (ref 8.3–10.4)
CHLORIDE SERPL-SCNC: 97 MMOL/L (ref 98–107)
CO2 SERPL-SCNC: 29 MMOL/L (ref 23–32)
CREAT SERPL-MCNC: 2.21 MG/DL (ref 0.8–1.5)
DIFFERENTIAL METHOD BLD: ABNORMAL
EOSINOPHIL # BLD: 0 K/UL (ref 0–0.8)
EOSINOPHIL NFR BLD: 1 % (ref 0.5–7.8)
ERYTHROCYTE [DISTWIDTH] IN BLOOD BY AUTOMATED COUNT: 12.4 % (ref 11.9–14.6)
GLOBULIN SER CALC-MCNC: 4.6 G/DL (ref 2.3–3.5)
GLUCOSE BLD STRIP.AUTO-MCNC: 164 MG/DL (ref 65–100)
GLUCOSE BLD STRIP.AUTO-MCNC: 385 MG/DL (ref 65–100)
GLUCOSE SERPL-MCNC: 440 MG/DL (ref 65–100)
HCT VFR BLD AUTO: 27.5 % (ref 41.1–50.3)
HGB BLD-MCNC: 9 G/DL (ref 13.6–17.2)
LYMPHOCYTES # BLD AUTO: 28 % (ref 13–44)
LYMPHOCYTES # BLD: 0.4 K/UL (ref 0.5–4.6)
MCH RBC QN AUTO: 27.2 PG (ref 26.1–32.9)
MCHC RBC AUTO-ENTMCNC: 32.7 G/DL (ref 31.4–35)
MCV RBC AUTO: 83.1 FL (ref 79.6–97.8)
MONOCYTES # BLD: 0 K/UL (ref 0.1–1.3)
MONOCYTES NFR BLD AUTO: 2 % (ref 4–12)
NEUTS SEG # BLD: 0.9 K/UL (ref 1.7–8.2)
NEUTS SEG NFR BLD AUTO: 69 % (ref 43–78)
NRBC # BLD: 0 K/UL (ref 0–0.2)
PLATELET # BLD AUTO: 135 K/UL (ref 150–450)
PMV BLD AUTO: 9.9 FL (ref 10.8–14.1)
POTASSIUM SERPL-SCNC: 3.9 MMOL/L (ref 3.5–5.1)
PROT SERPL-MCNC: 7.4 G/DL (ref 6.3–8.2)
RBC # BLD AUTO: 3.31 M/UL (ref 4.23–5.67)
SODIUM SERPL-SCNC: 134 MMOL/L (ref 136–145)
WBC # BLD AUTO: 1.3 K/UL (ref 4.3–11.1)

## 2017-02-01 PROCEDURE — 74011250637 HC RX REV CODE- 250/637: Performed by: INTERNAL MEDICINE

## 2017-02-01 PROCEDURE — 77030003560 HC NDL HUBR BARD -A

## 2017-02-01 PROCEDURE — 74011250636 HC RX REV CODE- 250/636: Performed by: NURSE PRACTITIONER

## 2017-02-01 PROCEDURE — 82962 GLUCOSE BLOOD TEST: CPT

## 2017-02-01 PROCEDURE — 3E0H76Z INTRODUCTION OF NUTRITIONAL SUBSTANCE INTO LOWER GI, VIA NATURAL OR ARTIFICIAL OPENING: ICD-10-PCS | Performed by: INTERNAL MEDICINE

## 2017-02-01 PROCEDURE — 74011250636 HC RX REV CODE- 250/636: Performed by: INTERNAL MEDICINE

## 2017-02-01 PROCEDURE — 77030006998

## 2017-02-01 PROCEDURE — 65270000029 HC RM PRIVATE

## 2017-02-01 PROCEDURE — 74011000258 HC RX REV CODE- 258: Performed by: NURSE PRACTITIONER

## 2017-02-01 PROCEDURE — 85025 COMPLETE CBC W/AUTO DIFF WBC: CPT | Performed by: NURSE PRACTITIONER

## 2017-02-01 PROCEDURE — 74011636637 HC RX REV CODE- 636/637: Performed by: NURSE PRACTITIONER

## 2017-02-01 PROCEDURE — 74011250637 HC RX REV CODE- 250/637: Performed by: NURSE PRACTITIONER

## 2017-02-01 PROCEDURE — 80053 COMPREHEN METABOLIC PANEL: CPT | Performed by: NURSE PRACTITIONER

## 2017-02-01 RX ORDER — PROCHLORPERAZINE MALEATE 10 MG
10 TABLET ORAL
Status: DISCONTINUED | OUTPATIENT
Start: 2017-02-01 | End: 2017-02-05 | Stop reason: HOSPADM

## 2017-02-01 RX ORDER — SODIUM CHLORIDE 0.9 % (FLUSH) 0.9 %
10 SYRINGE (ML) INJECTION AS NEEDED
Status: ACTIVE | OUTPATIENT
Start: 2017-02-01 | End: 2017-02-02

## 2017-02-01 RX ORDER — METFORMIN HYDROCHLORIDE 500 MG/1
500 TABLET ORAL
Status: DISCONTINUED | OUTPATIENT
Start: 2017-02-01 | End: 2017-02-03

## 2017-02-01 RX ORDER — ACETAMINOPHEN 325 MG/1
650 TABLET ORAL
Status: DISCONTINUED | OUTPATIENT
Start: 2017-02-01 | End: 2017-02-05 | Stop reason: HOSPADM

## 2017-02-01 RX ORDER — LORAZEPAM 0.5 MG/1
.5-1 TABLET ORAL
Status: DISCONTINUED | OUTPATIENT
Start: 2017-02-01 | End: 2017-02-05 | Stop reason: HOSPADM

## 2017-02-01 RX ORDER — ALLOPURINOL 300 MG/1
300 TABLET ORAL
Status: DISCONTINUED | OUTPATIENT
Start: 2017-02-01 | End: 2017-02-05 | Stop reason: HOSPADM

## 2017-02-01 RX ORDER — LOSARTAN POTASSIUM 50 MG/1
50 TABLET ORAL 2 TIMES DAILY
Status: DISCONTINUED | OUTPATIENT
Start: 2017-02-01 | End: 2017-02-05 | Stop reason: HOSPADM

## 2017-02-01 RX ORDER — VANCOMYCIN/0.9 % SOD CHLORIDE 1.5G/250ML
1500 PLASTIC BAG, INJECTION (ML) INTRAVENOUS EVERY 24 HOURS
Status: DISCONTINUED | OUTPATIENT
Start: 2017-02-01 | End: 2017-02-04

## 2017-02-01 RX ORDER — ONDANSETRON 8 MG/1
8 TABLET, ORALLY DISINTEGRATING ORAL EVERY 8 HOURS
Status: DISCONTINUED | OUTPATIENT
Start: 2017-02-01 | End: 2017-02-04

## 2017-02-01 RX ORDER — ENOXAPARIN SODIUM 100 MG/ML
40 INJECTION SUBCUTANEOUS EVERY 24 HOURS
Status: DISCONTINUED | OUTPATIENT
Start: 2017-02-01 | End: 2017-02-05 | Stop reason: HOSPADM

## 2017-02-01 RX ORDER — TRIAMTERENE/HYDROCHLOROTHIAZID 37.5-25 MG
1 TABLET ORAL DAILY
Status: DISCONTINUED | OUTPATIENT
Start: 2017-02-02 | End: 2017-02-03

## 2017-02-01 RX ORDER — AMLODIPINE BESYLATE 10 MG/1
10 TABLET ORAL
Status: DISCONTINUED | OUTPATIENT
Start: 2017-02-01 | End: 2017-02-05 | Stop reason: HOSPADM

## 2017-02-01 RX ORDER — INSULIN LISPRO 100 [IU]/ML
INJECTION, SOLUTION INTRAVENOUS; SUBCUTANEOUS
Status: DISCONTINUED | OUTPATIENT
Start: 2017-02-01 | End: 2017-02-05 | Stop reason: HOSPADM

## 2017-02-01 RX ORDER — SODIUM CHLORIDE 9 MG/ML
1000 INJECTION, SOLUTION INTRAVENOUS ONCE
Status: COMPLETED | OUTPATIENT
Start: 2017-02-01 | End: 2017-02-04

## 2017-02-01 RX ORDER — SODIUM CHLORIDE 9 MG/ML
150 INJECTION, SOLUTION INTRAVENOUS CONTINUOUS
Status: DISCONTINUED | OUTPATIENT
Start: 2017-02-01 | End: 2017-02-05 | Stop reason: HOSPADM

## 2017-02-01 RX ORDER — HEPARIN 100 UNIT/ML
500 SYRINGE INTRAVENOUS AS NEEDED
Status: DISPENSED | OUTPATIENT
Start: 2017-02-01 | End: 2017-02-02

## 2017-02-01 RX ADMIN — PIPERACILLIN SODIUM,TAZOBACTAM SODIUM 3.38 G: 3; .375 INJECTION, POWDER, FOR SOLUTION INTRAVENOUS at 17:13

## 2017-02-01 RX ADMIN — INSULIN LISPRO 11 UNITS: 100 INJECTION, SOLUTION INTRAVENOUS; SUBCUTANEOUS at 17:48

## 2017-02-01 RX ADMIN — ALLOPURINOL 300 MG: 300 TABLET ORAL at 22:22

## 2017-02-01 RX ADMIN — METFORMIN HYDROCHLORIDE 500 MG: 500 TABLET, FILM COATED ORAL at 17:14

## 2017-02-01 RX ADMIN — TBO-FILGRASTIM 480 MCG: 480 INJECTION, SOLUTION SUBCUTANEOUS at 17:14

## 2017-02-01 RX ADMIN — LOSARTAN POTASSIUM 50 MG: 50 TABLET ORAL at 17:14

## 2017-02-01 RX ADMIN — ENOXAPARIN SODIUM 40 MG: 40 INJECTION SUBCUTANEOUS at 17:14

## 2017-02-01 RX ADMIN — VANCOMYCIN HYDROCHLORIDE 1500 MG: 10 INJECTION, POWDER, LYOPHILIZED, FOR SOLUTION INTRAVENOUS at 17:20

## 2017-02-01 RX ADMIN — AMLODIPINE BESYLATE 10 MG: 10 TABLET ORAL at 22:21

## 2017-02-01 RX ADMIN — ONDANSETRON 8 MG: 8 TABLET, ORALLY DISINTEGRATING ORAL at 22:22

## 2017-02-01 RX ADMIN — SODIUM CHLORIDE 150 ML/HR: 900 INJECTION, SOLUTION INTRAVENOUS at 17:00

## 2017-02-01 RX ADMIN — ACETAMINOPHEN 650 MG: 325 TABLET, FILM COATED ORAL at 22:56

## 2017-02-01 RX ADMIN — SODIUM CHLORIDE 1000 ML: 900 INJECTION, SOLUTION INTRAVENOUS at 17:13

## 2017-02-01 RX ADMIN — ONDANSETRON 8 MG: 8 TABLET, ORALLY DISINTEGRATING ORAL at 17:13

## 2017-02-01 RX ADMIN — INSULIN LISPRO 2 UNITS: 100 INJECTION, SOLUTION INTRAVENOUS; SUBCUTANEOUS at 22:21

## 2017-02-01 NOTE — ACP (ADVANCE CARE PLANNING)
I saw patient today with Omer Innocent. He was shown visual scans of his airway and we discussed why he would need trach left inserted at least until duration of radiation is complete. I also went to radiation today and spoke with Renuka Kim concerning this. Pt came in with high blood sugar, reddened chest, elevated creatinine and odorous drainage and low grade fevers. Pt will be admitted into [address] [place] [address] room 525.

## 2017-02-01 NOTE — PROGRESS NOTES
Patient assessed for resp needs. SAT 99% on RA. Family at bedside states patient was anticipating having trach capped. BBS essentially clear/diminished. Rn to order spare trach for bedside as well as inner cannulas. Patient prefers no humidity at this time.

## 2017-02-01 NOTE — IP AVS SNAPSHOT
Current Discharge Medication List  
  
Take these medications at their scheduled times Dose & Instructions Dispensing Information Comments Morning Noon Evening Bedtime  
 allopurinol 300 mg tablet Commonly known as:  Jammie Davida Your next dose is: Today, Tomorrow Other:  ____________ Dose:  300 mg  
300 mg nightly. Indications: GOUT Refills:  0  
     
   
   
   
  
 amLODIPine 10 mg tablet Commonly known as:  Vesna Siegel Your next dose is: Today, Tomorrow Other:  ____________ Dose:  10 mg Take 10 mg by mouth nightly. Take / use AM day of surgery  per anesthesia protocols. Indications: Hypertension Refills:  0  
     
   
   
   
  
 aspirin delayed-release 81 mg tablet Your next dose is: Today, Tomorrow Other:  ____________ Dose:  81 mg Take 81 mg by mouth every morning. Take / use AM day of surgery  per anesthesia protocols. Indications: myocardial infarction prevention Refills:  0  
     
   
   
   
  
 citalopram 10 mg tablet Commonly known as:  [place] Dame Your next dose is: Today, Tomorrow Other:  ____________ Dose:  10 mg  
1 Tab by Per G Tube route daily. Quantity:  30 Tab Refills:  2  
     
   
   
   
  
 fluconazole 200 mg tablet Commonly known as:  DIFLUCAN Your next dose is: Today, Tomorrow Other:  ____________ Dose:  200 mg  
1 Tab by Per G Tube route daily for 5 days. FDA advises cautious prescribing of oral fluconazole in pregnancy. Quantity:  5 Tab Refills:  0  
     
   
   
   
  
 glipiZIDE SR 10 mg CR tablet Commonly known as:  GLUCOTROL XL Your next dose is: Today, Tomorrow Other:  ____________ Dose:  10 mg Take 10 mg by mouth two (2) times a day. Indications: type 2 diabetes mellitus Refills:  0 Lactose-Free Food with Fiber 0.06 gram-1.5 kcal/mL Liqd Commonly known as:  JEVITY 1.5 BRANDO Your next dose is: Today, Tomorrow Other:  ____________ Dose:  6 Can 6 Cans by PEG Tube route daily for 100 days. Bolus Feedings, Jevity 1.5 or comparable, goal 6 cans/day. Pt will need additional 38-42 oz free water daily via PEG. Estimated ARRON greater than 90 days. Indications: DYSPHAGIA Quantity:  180 Can Refills:  4  
     
   
   
   
  
 losartan 50 mg tablet Commonly known as:  COZAAR Your next dose is: Today, Tomorrow Other:  ____________ Dose:  50 mg  
50 mg two (2) times a day. Indications: Hypertension Refills:  0  
     
   
   
   
  
 magic mouthwash Susp Commonly known as:  [place] Your next dose is: Today, Tomorrow Other:  ____________ Dose:  10 mL Take 10 mL by mouth every four (4) hours. Quantity:  500 mL Refills:  3  
     
   
   
   
  
 magnesium oxide 400 mg tablet Commonly known as:  MAG-OX Your next dose is: Today, Tomorrow Other:  ____________ Dose:  400 mg  
1 Tab by Per G Tube route three (3) times daily. Quantity:  90 Tab Refills:  2  
     
   
   
   
  
 omeprazole 20 mg capsule Commonly known as:  PRILOSEC Your next dose is: Today, Tomorrow Other:  ____________ Dose:  20 mg Take 20 mg by mouth every morning. Take / use AM day of surgery  per anesthesia protocols. Indications: GASTROESOPHAGEAL REFLUX Refills:  0  
     
   
   
   
  
 potassium chloride 20 mEq/15 mL solution Commonly known as:  KAON 10% Your next dose is: Today, Tomorrow Other:  ____________ Dose:  40 mEq 30 mL by Per G Tube route two (2) times daily (with meals) for 30 days. Indications: HYPOKALEMIA Quantity:  480 mL Refills:  2 Take these medications as needed Dose & Instructions Dispensing Information Comments Morning Noon Evening Bedtime lidocaine-prilocaine topical cream  
Commonly known as:  EMLA Your next dose is: Today, Tomorrow Other:  ____________ Apply  to affected area as needed. Apply 1/2 inch amount of cream to port site 90 minutes prior to needle access. Quantity:  30 g Refills:  0 LORazepam 1 mg tablet Commonly known as:  ATIVAN Your next dose is: Today, Tomorrow Other:  ____________ Dose:  0.5-1 mg Take 0.5-1 Tabs by mouth every six (6) hours as needed for Anxiety. Max Daily Amount: 4 mg. Indications: CANCER CHEMOTHERAPY-INDUCED NAUSEA AND VOMITING Quantity:  90 Tab Refills:  0  
     
   
   
   
  
 ondansetron hcl 8 mg tablet Commonly known as:  Iliaan Campbell Your next dose is: Today, Tomorrow Other:  ____________ Dose:  8 mg Take 1 Tab by mouth every eight (8) hours as needed for Nausea. Quantity:  90 Tab Refills:  3  
     
   
   
   
  
 prochlorperazine 10 mg tablet Commonly known as:  COMPAZINE Your next dose is: Today, Tomorrow Other:  ____________ Dose:  10 mg Take 1 Tab by mouth every six (6) hours as needed. Quantity:  120 Tab Refills:  3 Take these medications as directed Dose & Instructions Dispensing Information Comments Morning Noon Evening Bedtime  
 clindamycin 75 mg/5 mL solution Commonly known as:  CLEOCIN Your next dose is: Today, Tomorrow Other:  ____________ 10 mL by mouth 4 times daily x 7 days Quantity:  280 mL Refills:  0 Where to Get Your Medications These medications were sent to [facility]/pharmacy #[id number]- [place], [place] - [address] [facility] [address] [facility] Phone:  [phone]  
  citalopram 10 mg tablet  
 fluconazole 200 mg tablet  
 magnesium oxide 400 mg tablet  
 potassium chloride 20 mEq/15 mL solution

## 2017-02-01 NOTE — IP AVS SNAPSHOT
Jacob Burden 
 
 
 [address] [facility] [address] [facility] 
[phone] Patient: Vandana Calderon MRN: [id number] ZIP:[id number] You are allergic to the following No active allergies Recent Documentation Weight BMI Smoking Status 102 kg 33.21 kg/m2 Former Smoker Unresulted Labs Order Current Status CULTURE, WOUND W GRAM STAIN Preliminary result Emergency Contacts Name Discharge Info Relation Home Work Mobile Meera Landin DISCHARGE CAREGIVER [3] Spouse [3] [phone] About your hospitalization You were admitted on:  February 1, 2017 You last received care in the:  [address] You were discharged on:  February 5, 2017 Unit phone number:  [phone] Why you were hospitalized Your primary diagnosis was:  Renal Insufficiency Your diagnoses also included:  Type 2 Diabetes Mellitus With Hyperglycemia (Hcc), Squamous Cell Carcinoma Of Epiglottis (Hcc), Pancytopenia (Hcc), Cellulitis Of Neck, Tracheostomy In Place, Chemotherapy-Induced Neutropenia (Hcc) Providers Seen During Your Hospitalizations Provider Role Specialty Primary office phone Chacorta Dale MD Attending Provider Hematology [phone] Your Primary Care Physician (PCP) Primary Care Physician Office Phone Office Fax [address] [facility] [phone] ** None ** Follow-up Information Follow up With Details Comments Contact Info Alfonso Macdonald MD   [facility] 
[phone] Chacorta Dale MD  Your navigator will call you tomorrow to setup a follow up appintment at [facility] this week [address] [facility] [address] [facility] [address] [phone] Your Appointments Wednesday February 08, 2017 10:30 AM EST New Patient with Gisellzechariah Yoo, [address] ENT [address] [facility]) [address] [facility],4Th Floor [address] [facility]  [id number]  
[phone] Monday February 13, 2017 10:00 AM EST Chemo with NUR5  
ST. [address] ([address] Madeline Esquivel [phone] [facility] [address]  
[phone] [address] Monday February 13, 2017 10:30 AM EST Follow Up with GREGORY Pang [facility]ology [facility]/ Dane Carlos [address] [facility] [address]  
[phone] Thursday February 16, 2017  8:15 AM EST  
LAB with [address]  
18[address] [facility] [employer]ANCE [address]) Олег [address]  
[phone] Thursday February 16, 2017  8:45 AM EST PreChemo Follow Up with MD Kulwinder Acuña [facility] Hematology and Oncology [facility]) FAROOQ/ Dane Carlos [address] [place] [address]  
[phone] Thursday February 16, 2017  9:15 AM EST Chemo with NUS10  
ST. [address] ([address]) [address] Madeline Esquivel [phone] [facility] [address]  
[phone] [address] Current Discharge Medication List  
  
START taking these medications Dose & Instructions Dispensing Information Comments Morning Noon Evening Bedtime  
 citalopram 10 mg tablet Commonly known as:  Negra Tamiko Your next dose is: Today, Tomorrow Other:  _________ Dose:  10 mg  
1 Tab by Per G Tube route daily. Quantity:  30 Tab Refills:  2  
     
   
   
   
  
 fluconazole 200 mg tablet Commonly known as:  DIFLUCAN Your next dose is: Today, Tomorrow Other:  _________ Dose:  200 mg  
1 Tab by Per G Tube route daily for 5 days. FDA advises cautious prescribing of oral fluconazole in pregnancy. Quantity:  5 Tab Refills:  0 magnesium oxide 400 mg tablet Commonly known as:  MAG-OX Your next dose is: Today, Tomorrow Other:  _________ Dose:  400 mg  
1 Tab by Per G Tube route three (3) times daily. Quantity:  90 Tab Refills:  2  
     
   
   
   
  
 potassium chloride 20 mEq/15 mL solution Commonly known as:  KAON 10% Your next dose is: Today, Tomorrow Other:  _________ Dose:  40 mEq 30 mL by Per G Tube route two (2) times daily (with meals) for 30 days. Indications: HYPOKALEMIA Quantity:  480 mL Refills:  2 CONTINUE these medications which have NOT CHANGED Dose & Instructions Dispensing Information Comments Morning Noon Evening Bedtime  
 allopurinol 300 mg tablet Commonly known as:  Gaylan Elders Your next dose is: Today, Tomorrow Other:  _________ Dose:  300 mg  
300 mg nightly. Indications: GOUT Refills:  0  
     
   
   
   
  
 amLODIPine 10 mg tablet Commonly known as:  Mary Kay Darting Your next dose is: Today, Tomorrow Other:  _________ Dose:  10 mg Take 10 mg by mouth nightly. Take / use AM day of surgery  per anesthesia protocols. Indications: Hypertension Refills:  0  
     
   
   
   
  
 aspirin delayed-release 81 mg tablet Your next dose is: Today, Tomorrow Other:  _________ Dose:  81 mg Take 81 mg by mouth every morning. Take / use AM day of surgery  per anesthesia protocols. Indications: myocardial infarction prevention Refills:  0  
     
   
   
   
  
 clindamycin 75 mg/5 mL solution Commonly known as:  CLEOCIN Your next dose is: Today, Tomorrow Other:  _________ 10 mL by mouth 4 times daily x 7 days Quantity:  280 mL Refills:  0  
     
   
   
   
  
 glipiZIDE SR 10 mg CR tablet Commonly known as:  GLUCOTROL XL Your next dose is: Today, Tomorrow Other:  _________ Dose:  10 mg Take 10 mg by mouth two (2) times a day. Indications: type 2 diabetes mellitus Refills:  0 Lactose-Free Food with Fiber 0.06 gram-1.5 kcal/mL Liqd Commonly known as:  JEVITY 1.5 BRANDO Your next dose is: Today, Tomorrow Other:  _________ Dose:  6 Can 6 Cans by PEG Tube route daily for 100 days. Bolus Feedings, Jevity 1.5 or comparable, goal 6 cans/day. Pt will need additional 38-42 oz free water daily via PEG. Estimated ARRON greater than 90 days. Indications: DYSPHAGIA Quantity:  180 Can Refills:  4  
     
   
   
   
  
 lidocaine-prilocaine topical cream  
Commonly known as:  EMLA Your next dose is: Today, Tomorrow Other:  _________ Apply  to affected area as needed. Apply 1/2 inch amount of cream to port site 90 minutes prior to needle access. Quantity:  30 g Refills:  0 LORazepam 1 mg tablet Commonly known as:  ATIVAN Your next dose is: Today, Tomorrow Other:  _________ Dose:  0.5-1 mg Take 0.5-1 Tabs by mouth every six (6) hours as needed for Anxiety. Max Daily Amount: 4 mg. Indications: CANCER CHEMOTHERAPY-INDUCED NAUSEA AND VOMITING Quantity:  90 Tab Refills:  0  
     
   
   
   
  
 losartan 50 mg tablet Commonly known as:  COZAAR Your next dose is: Today, Tomorrow Other:  _________ Dose:  50 mg  
50 mg two (2) times a day. Indications: Hypertension Refills:  0  
     
   
   
   
  
 magic mouthwash Susp Commonly known as:  [place] Your next dose is: Today, Tomorrow Other:  _________ Dose:  10 mL Take 10 mL by mouth every four (4) hours. Quantity:  500 mL Refills:  3  
     
   
   
   
  
 omeprazole 20 mg capsule Commonly known as:  PRILOSEC Your next dose is: Today, Tomorrow Other:  _________  Dose:  20 mg  
 Take 20 mg by mouth every morning. Take / use AM day of surgery  per anesthesia protocols. Indications: GASTROESOPHAGEAL REFLUX Refills:  0  
     
   
   
   
  
 ondansetron hcl 8 mg tablet Commonly known as:  Jefferson Sorto Your next dose is: Today, Tomorrow Other:  _________ Dose:  8 mg Take 1 Tab by mouth every eight (8) hours as needed for Nausea. Quantity:  90 Tab Refills:  3  
     
   
   
   
  
 prochlorperazine 10 mg tablet Commonly known as:  COMPAZINE Your next dose is: Today, Tomorrow Other:  _________ Dose:  10 mg Take 1 Tab by mouth every six (6) hours as needed. Quantity:  120 Tab Refills:  3 STOP taking these medications   
 metFORMIN 500 mg tablet Commonly known as:  GLUCOPHAGE  
   
  
 triamterene-hydroCHLOROthiazide 37.5-25 mg per tablet Commonly known as:  Annalee Oneill Where to Get Your Medications These medications were sent to [facility]/pharmacy #[id number]- [place] [address] Chemo   [facility] [address] [place] [address] Phone:  [phone]  
  citalopram 10 mg tablet  
 fluconazole 200 mg tablet  
 magnesium oxide 400 mg tablet  
 potassium chloride 20 mEq/15 mL solution Discharge Instructions DISCHARGE SUMMARY from Nurse The following personal items are in your possession at time of discharge: 
 
Dental Appliances: None Visual Aid: Glasses, With patient Home Medications: None Jewelry: None Clothing: At bedside Other Valuables: At bedside PATIENT INSTRUCTIONS: 
 
 
F-face looks uneven A-arms unable to move or move unevenly S-speech slurred or non-existent T-time-call 911 as soon as signs and symptoms begin-DO NOT go Back to bed or wait to see if you get better-TIME IS BRAIN. Warning Signs of HEART ATTACK Call 911 if you have these symptoms: 
? Chest discomfort. Most heart attacks involve discomfort in the center of the chest that lasts more than a few minutes, or that goes away and comes back. It can feel like uncomfortable pressure, squeezing, fullness, or pain. ? Discomfort in other areas of the upper body. Symptoms can include pain or discomfort in one or both arms, the back, neck, jaw, or stomach. ? Shortness of breath with or without chest discomfort. ? Other signs may include breaking out in a cold sweat, nausea, or lightheadedness. Don't wait more than five minutes to call [address]! Fast action can save your life. Calling 911 is almost always the fastest way to get lifesaving treatment. Emergency Medical Services staff can begin treatment when they arrive  up to an hour sooner than if someone gets to the hospital by car. The discharge information has been reviewed with the patient and spouse. The patient and spouse verbalized understanding. Discharge medications reviewed with the patient and spouse and appropriate educational materials and side effects teaching were provided. Discharge Instructions Attachments/References TRACHEOSTOMY (ENGLISH) TUBE FEEDING: HOME (ENGLISH) Discharge Orders None [facility] Announcement We are excited to announce that we are making your provider's discharge notes available to you in its learning. You will see these notes when they are completed and signed by the physician that discharged you from your recent hospital stay.   If you have any questions or concerns about any information you see in its learning, please call the [employer] Department where you were seen or reach out to your Primary Care Provider for more information about your plan of care. Introducing [facility]! Dear Nancy Benites: 
Thank you for requesting a [employer] account. Our records indicate that you have previously registered for a [employer] account but its currently inactive. Please call our [employer] support line at [phone]. Additional Information If you have questions, please visit the Frequently Asked Questions section of the [employer] website at [email or web address] Remember, [employer] is NOT to be used for urgent needs. For medical emergencies, dial 911. Now available from your iPhone and Android! General Information Please provide this summary of care documentation to your next provider. Patient Signature:  ____________________________________________________________ Date:  ____________________________________________________________  
  
Kamille Holcomb Provider Signature:  ____________________________________________________________ Date:  ____________________________________________________________ More Information Your Tracheostomy: Care Instructions Your Care Instructions A tracheostomy is a permanent opening through the neck into the windpipe, or trachea. This opening makes breathing easier if you have a lung or nerve problem or an infection that makes it hard to breathe. Taking good care of a tracheostomy is very important. It can prevent infections and help keep you breathing easily. Follow-up care is a key part of your treatment and safety. Be sure to make and go to all appointments, and call your doctor if you are having problems. Its also a good idea to know your test results and keep a list of the medicines you take. How can you care for yourself at home? General tips · Always wash your hands before and after caring for your tracheostomy. · Keep the air in your home moist with a room vaporizer. · Eat while sitting up. If any food gets into the tube, suction it out right away. · Wear clothing that is loose around your neck. · In a bath or shower, avoid getting water into the tracheostomy. Cover the tube so that no water gets in but you can still breathe. · Do not swim. · Replace the tube grossman if it gets wet or damaged. If it is not damaged, it can be washed and dried and used again. · Your tracheostomy, or \"trach\" (say \"trayk\"), has three parts: the outer cannula, the inner cannula, and the obturator. The inner cannula is the piece that you will remove and clean. · Your doctor may give you more instructions. Follow them closely. Suctioning · Suction the trach 3 to 4 times a day, or more if needed. For example, do it before you go to bed and when you wake up in the morning. · You will need suction catheters, a suction machine, saline fluid, a small cup, and a mirror. · Wash your hands with soap and water for at least 30 seconds. · Pour saline fluid into the cup. · Connect a catheter to the suction machine tubing. Dip the catheter tip into the saline fluid. Insert the wet catheter into the trach. Push the tube in gently, about 3 to 4 inches, until you feel it hit something. · Slowly pull the catheter out of the trach, rolling it back and forth between your fingers, with your thumb over the control valve (this turns the suction on). Do not keep the suction on for more than 10 seconds at a time. · Wait about 30 seconds and repeat inserting and pulling out the tube until all the mucus has been removed. Then suction the saline left in the cup. · Throw away the used catheter, and wash your hands again. Stoma care A stoma is the opening in your neck. · Clean and dry the stoma 3 times a day. Do not let crust form on the skin at the stoma.  
· You will need hydrogen peroxide, tap or sterile water, 8 or 10 cotton swabs, 2 small cups, a dry cloth, a mirror, and ointment for the skin. · Wash your hands with soap and water for at least 30 seconds. · Fill one cup with half water and half hydrogen peroxide. Put 3 or 4 cotton swabs in the cup. Fill the other cup with water and put 3 or 4 swabs in that cup. · Clean and remove dried mucus around the stoma with the cotton swabs in the peroxide cup. Then clean off any peroxide with the swabs in the water cup. · Dry your skin with the cloth. Apply ointment with the remaining swabs. · Wash your hands again. Cleaning the inner cannula A cannula is the tube that fits into the stoma. · Clean and replace the inner cannula 2 or 3 times each day. You will need 2 small bowls, a small brush, hydrogen peroxide, water, and a mirror. · Wash your hands with soap and water for at least 30 seconds. · Pour half water and half hydrogen peroxide into one bowl. Pour a small amount of water in the other bowl. · Unlock the inner cannula and remove it by gently pulling it out and down. Put this into the peroxide bowl to soak. Clean the inside and outside of the cannula with the brush. · Rinse the cannula under tap water, then let it soak in the bowl of water. Shake the cannula out and slide it gently back into the outer cannula. Turn it to lock it in place. Make sure it is locked in place and you cannot pull it out. · Wash your hands again. When should you call for help? Call 911 anytime you think you may need emergency care. For example, call if: 
· You have severe trouble breathing, and coughing or suctioning does not help. · Your trach falls out and you cannot get it back in. Call your doctor now or seek immediate medical care if: 
· You have trouble breathing after suctioning. · You have signs of infection, such as: 
¨ Increased pain, swelling, warmth, or redness in the stoma. ¨ Red streaks leading from the stoma. ¨ Pus draining from the stoma. ¨ A fever. Watch closely for changes in your health, and be sure to contact your doctor if you have any problems. Where can you learn more? Go to http://maria c-jeff.info/. Enter T644 in the search box to learn more about \"Your Tracheostomy: Care Instructions. \" Current as of: July 29, 2016 Content Version: 11.1 © 2006-2016 [employer]. Care instructions adapted under license by [employer] (which disclaims liability or warranty for this information). If you have questions about a medical condition or this instruction, always ask your healthcare professional. [place] [address] any warranty or liability for your use of this information. Home Tube Feeding: Care Instructions Your Care Instructions Tube feeding is a way of providing nutrition and fluids through a tube into the stomach or intestines. The tube may be inserted through the skin and into the stomach during surgery, or it may go through the mouth or nose, down the throat, and then into the stomach. Tube feeding can nourish people who have a short illness that makes swallowing difficult or people who have a severe illness, and it may prolong life. Follow-up care is a key part of your treatment and safety. Be sure to make and go to all appointments, and call your doctor if you are having problems. Its also a good idea to know your test results and keep a list of the medicines you take. How can you care for yourself at home? · Follow your doctor's instructions for use and care of the feeding tube. Your doctor will: ¨ Tell you what tube feeding formula and fluids to put through the tube. ¨ Show you how to care for the skin around the tube. Be sure to follow instructions on keeping the area clean. ¨ Teach you how to watch for infection or blockage of the tube. ¨ Tell you what activities you can do. · Keep the formula in the refrigerator after opening it.  Do not let the formula in the hanging bag sit out at room temperature for more than 8 hours. For the caregiver · Wash your hands before handling the tube and formula. Wash the top of the can of formula before you open it. · Tube feedings that go into the stomach: The person you are caring for needs to be sitting up or have his or her head up during the feeding and for 30 minutes afterward. These feedings can be given in about 30 minutes, five or six times throughout a day. · Tube feedings that go into the intestine: The person you are caring for will have a pump that slowly pushes the formula into the intestine over several hours. This is often done at night. · If nausea, diarrhea, or stomach cramps happen during feeding, slow the rate that the formula comes through the tube. Then gradually increase the amount as the person can tolerate it. · Flush the tube with plain water after each feeding to keep it clean. Do not put anything other than formula or water through the tube unless your doctor has told you to. · Take care of yourself. ¨ Do not try to do everything yourself. Ask other family members to help, and find out what other types of help may be available. ¨ Eat well and get enough rest. Make sure you do not ignore your own health while you are caring for your loved one. ¨ Schedule time for yourself. Get out of the house to do things you enjoy, run errands, or go shopping. When should you call for help? Call your doctor now or seek immediate medical care if: 
· You have signs of infection, such as: 
¨ Increased pain, swelling, warmth, or redness around the tube. ¨ Red streaks leading from the area where the tube is inserted. ¨ Pus draining from the tube area. ¨ A fever. · The tube comes out or becomes blocked. · You have nausea, vomiting, or diarrhea. · You have signs of needing more fluids. You have sunken eyes and a dry mouth, and you pass only a little dark urine. Watch closely for changes in your health, and be sure to contact your doctor if: 
· You have any problems with your feeding. Where can you learn more? Go to http://maria c-jeff.info/. Enter V340 in the search box to learn more about \"Home Tube Feeding: Care Instructions. \" Current as of: August 9, 2016 Content Version: 11.1 © 5602-9519 [employer]. Care instructions adapted under license by [employer] (which disclaims liability or warranty for this information). If you have questions about a medical condition or this instruction, always ask your healthcare professional. [place] [address] any warranty or liability for your use of this information.

## 2017-02-01 NOTE — PROGRESS NOTES
Dual skin assessment completed by this RN and Yanni Diaz RN. Patient with trach in place. Currently being suctioned and cleaned by RT. Patient with PEG tube. Area is red and inflamed.

## 2017-02-01 NOTE — PROGRESS NOTES
Pharmacokinetic Consult to Pharmacist    Tamiko Carvajal is a 62 y.o. male being treated for SSTI with vancomycin. Lab Results   Component Value Date/Time    BUN 44 02/01/2017 02:00 PM    Creatinine 2.21 02/01/2017 02:00 PM    WBC 1.3 02/01/2017 02:00 PM    Procalcitonin 0.3 01/11/2017 11:30 PM      Estimated Creatinine Clearance: 41.4 mL/min (based on Cr of 2.21). CULTURES:  pending    Day 1 of vancomycin. Goal trough is 12-18. Vancomycin dose initiated at 1500 mg q 24 hours. Pt SCr up a bit today. May need to adjust pending on the trend. Will obtain a trough prior to ~4th dose. Will continue to follow patient.       Thank you,  Angel Scott, PharmD  Clinical Pharmacist  [phone]

## 2017-02-01 NOTE — PROGRESS NOTES
TRANSFER - IN REPORT:    Verbal report received from Ehsan Man RN  on [place]  being received from [facility]  for routine progression of care      Report consisted of patients Situation, Background, Assessment and   Recommendations(SBAR). Information from the following report(s) SBAR, MAR and Recent Results was reviewed with the receiving nurse. Opportunity for questions and clarification was provided. Assessment completed upon patients arrival to unit and care assumed.

## 2017-02-01 NOTE — PROGRESS NOTES
Pt would like to be back on bolus tube feedings tomorrow if possible. Will pass along to oncoming shift.

## 2017-02-01 NOTE — PROGRESS NOTES
END OF SHIFT NOTE:    Intake/Output  02/01 0701 - 02/01 1900  In: 30   Out: -    Voiding: YES  Catheter: NO  Drain:   PEG/Gastrostomy Tube 01/23/17 (Active)   Site Assessment Clean, dry, & intact 2/1/2017  5:05 PM   Dressing Status Other (comment) 2/1/2017  5:05 PM   G Port Status Infusing 2/1/2017  5:05 PM   Action Taken Tubing changed 2/1/2017  5:05 PM   Drainage Description Nico Browning 2/1/2017  5:05 PM   Tube Feeding/Verify Rate (mL/hr) 25 2/1/2017  5:05 PM   Water Flush Volume (mL) 30 mL 2/1/2017  5:05 PM               Stool:  0 occurrences. Emesis:  0 occurrences. VITAL SIGNS  Patient Vitals for the past 12 hrs:   Temp Pulse Resp BP SpO2   02/01/17 1648 99 °F (37.2 °C) (!) 107 18 129/65 100 %       Pain Assessment  Pain 1  Pain Scale 1: Numeric (0 - 10) (02/01/17 1659)  Pain Intensity 1: 0 (02/01/17 1659)  Patient Stated Pain Goal: 0 (02/01/17 1659)    Ambulating  YES    Additional Information: pt resting in bed. IV antibiotics started. One bolus given. Respiratory aware of trach and performed trach care. Continuous tube feedings started at 25ml/hr. Will increase to goal of 75ml/hr    Shift report given to oncoming nurse at the bedside.     Emmanuel Diaz RN

## 2017-02-01 NOTE — H&P
Inpatient Hematology / Oncology History and Physical    Reason for Asmission:  head/neck ca  ISI    History of Present Illness:  Mr. Kaleb Landa is a 62 y.o. male admitted on 02/01/17 with a primary diagnosis of renal insufficiency, neutropenia, cellulitis. Oncology History: He was seen in our office for the first time in December 2016. He was referred for evaluation of a head and neck malignancy. He was a gentleman in generally good health with the exception of diabetes mellitus which was non-insulin requiring. Beginning in the fall 2016, he began to note some difficulty with dysphagia as well as some change in his voice. Ultimately, there was some left-sided otalgia for which she sought help from his primary care physician. A course of antibiotics did not prove helpful and ultimately he was referred to Dr. Ezio Brewster for evaluation. The patient had not smoked for approximately 11 years prior to his initial visit. He did not abuse alcohol. The patient underwent a flexible laryngoscopy demonstrating a large mass involving the entire epiglottis. The tumor appeared to involve the aryepiglottic folds. The vocal cords were not visualized. CT scanning on November 3, 2016 demonstrated 3.9 x 2.6 x 2.3 cm supraglottic mass extending across midline. Inferiorly, it appeared to involve the left true vocal cord. The overlying thyroid cartilage seems preserved. There was a level 3 lymph node on the left side measuring 1.8 cm in short axis. On November 14, the patient was taken to the OR for panendoscopy. Dr. Queen Mendoza notes from that visit indicate that he visualized the true and false vocal cords both of which appeared uninvolved by the tumor. He also did micro-debridement, subsequent to which the patient's voice appeared to be improved. 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 There was no mention of HPV status.  The patient has been seen at [facility] surgery had been offered. The patient is averse to the notion of a tracheostomy if that is a possibility. When seen here, the plan was to treat with two cycles of neoadjuvant chemotherapy and then definitive chemo/XRT.      He is status post cycle 2 of Taxotere, Cisplatin, fluorouracil. 5-FU pump removed today. He was hospitalized following cycle 1 for neutropenic fever from which he recovered uneventfully. He did however fail a barium swallow test for all consistencies and was placed on TPN. PEG tube was placed as well as a trach. He returned today for toxicity check and found to have chest cellulitis stemming from his new trachea. He is neutropenic with low grade fever. He has a very foul odor most likely related to dying tumor. Labs also revealed blood sugar > 400 and renal insufficiency with creatinine of 2.2. He will be admitted for hydration, blood sugar control, and IV antibiotics.        Review of Systems:  Constitutional + low-grade fevers, fatigue, decreased appetite. Denies chills, night sweats. HEENT Denies trauma, blurry vision, hearing loss, ear pain, nosebleeds, sore throat, ear discharge. + neck pain. Skin + erythema to chest/neck/trach area. Lungs Denies dyspnea, cough, sputum production or hemoptysis. Cardiovascular + lower extremity edema. Denies chest pain, palpitations, or lower extremity edema. Gastrointestinal Denies nausea, vomiting, changes in bowel habits, bloody or black stools. + abdominal soreness around PEG.  Denies dysuria, frequency or hesitancy of urination. Neuro Denies headaches, visual changes or ataxia. Denies dizziness, tingling, tremors, sensory change, speech change, focal weakness or headaches. Hematology Denies easy bruising or bleeding, denies gingival bleeding or epistaxis. Endo Denies heat/cold intolerance, denies thyroid abnormalities. + diabetes. MSK Denies back pain, arthralgias, myalgias or frequent falls.      Psychiatric/Behavioral Denies depression and substance abuse. The patient is not nervous/anxious. No Known Allergies  Past Medical History   Diagnosis Date    GERD (gastroesophageal reflux disease)      managed with medication     Gout      symptoms in ankles, no recent episodes    Hypertension      managed with medication     Morbid obesity ([address]     Squamous cell carcinoma of epiglottis ([facility] [facility] 11/23/2016    Type 2 diabetes mellitus ([facility] [address] [facility]      oral hypoglycemic/ does not check BS      Past Surgical History   Procedure Laterality Date    Hx colonoscopy  2016    Hx other surgical Left      at age 12 had 2rd nipple removed     Family History   Problem Relation Age of Onset    Stroke Mother     Lung Disease Mother      Social History     Social History    Marital status:      Spouse name: N/A    Number of children: N/A    Years of education: N/A     Occupational History    Not on file. Social History Main Topics    Smoking status: Former Smoker     Packs/day: 2.00     Years: 20.00     Quit date: 2005    Smokeless tobacco: Never Used    Alcohol use No    Drug use: No    Sexual activity: Not on file     Other Topics Concern    Not on file     Social History Narrative     Current Outpatient Prescriptions   Medication Sig Dispense Refill    clindamycin (CLEOCIN) 75 mg/5 mL solution 10 mL by mouth 4 times daily x 7 days 280 mL 0    Lactose-Free Food with Fiber (JEVITY 1.5 BRANDO) 0.06 gram-1.5 kcal/mL liqd 6 Cans by PEG Tube route daily for 100 days. Bolus Feedings, Jevity 1.5 or comparable, goal 6 cans/day. Pt will need additional 38-42 oz free water daily via PEG. Estimated ARRON greater than 90 days. Indications: DYSPHAGIA 180 Can 4    LORazepam (ATIVAN) 1 mg tablet Take 0.5-1 Tabs by mouth every six (6) hours as needed for Anxiety. Max Daily Amount: 4 mg. Indications: CANCER CHEMOTHERAPY-INDUCED NAUSEA AND VOMITING 90 Tab 0    magic mouthwash (GLADYS) susp Take 10 mL by mouth every four (4) hours.  500 mL 3    lidocaine-prilocaine (EMLA) topical cream Apply  to affected area as needed. Apply 1/2 inch amount of cream to port site 90 minutes prior to needle access. 30 g 0    ondansetron hcl (ZOFRAN) 8 mg tablet Take 1 Tab by mouth every eight (8) hours as needed for Nausea. 90 Tab 3    prochlorperazine (COMPAZINE) 10 mg tablet Take 1 Tab by mouth every six (6) hours as needed. 120 Tab 3    metFORMIN (GLUCOPHAGE) 500 mg tablet Take 500 mg by mouth three (3) times daily (with meals). Indications: PREVENTION OF TYPE 2 DIABETES MELLITUS      aspirin delayed-release 81 mg tablet Take 81 mg by mouth every morning. Take / use AM day of surgery  per anesthesia protocols. Indications: myocardial infarction prevention      triamterene-hydroCHLOROthiazide (MAXZIDE) 37.5-25 mg per tablet Take 1 Tab by mouth every morning. Indications: Edema, Hypertension      losartan (COZAAR) 50 mg tablet 50 mg two (2) times a day. Indications: Hypertension      amLODIPine (NORVASC) 10 mg tablet Take 10 mg by mouth nightly. Take / use AM day of surgery  per anesthesia protocols. Indications: Hypertension      allopurinol (ZYLOPRIM) 300 mg tablet 300 mg nightly. Indications: GOUT      glipiZIDE SR (GLUCOTROL) 10 mg CR tablet Take 10 mg by mouth two (2) times a day. Indications: type 2 diabetes mellitus      omeprazole (PRILOSEC) 20 mg capsule Take 20 mg by mouth every morning. Take / use AM day of surgery  per anesthesia protocols. Indications: GASTROESOPHAGEAL REFLUX       Facility-Administered Medications Ordered in Other Encounters   Medication Dose Route Frequency Provider Last Rate Last Dose    heparin (porcine) pf 500 Units  500 Units InterCATHeter PRN Shayla Mcdaniels MD        saline peripheral flush soln 10 mL  10 mL InterCATHeter PRN Shayla Mcdaniels MD           OBJECTIVE:  No data found.     Temp (24hrs), Av.2 °F (36.8 °C), Min:98.2 °F (36.8 °C), Max:98.2 °F (36.8 °C)         Physical Exam:  Constitutional: Well developed, well nourished male in no acute distress, sitting comfortably on the exam table. HEENT: Normocephalic and atraumatic. Oropharynx is clear, mucous membranes are dry. Extraocular muscles are intact. Sclerae anicteric. Neck supple. Trach collar intact. Skin Warm and dry. No bruising and no rash noted. No pallor. + cellulitis in large V shape down chest. Redness noted around sutures in abdominal wall. PEG tube intact. Respiratory Lungs are clear to auscultation bilaterally without wheezes, rales or rhonchi, normal air exchange without accessory muscle use. CVS Normal rate, regular rhythm and normal S1 and S2. No murmurs, gallops, or rubs. Abdomen Soft, mildly tender and mild distention, normoactive bowel sounds. No palpable mass. Neuro Grossly nonfocal with no obvious sensory or motor deficits. MSK Normal range of motion in general.  + lower extremity edema and no tenderness. Psych Appropriate mood and affect. Labs:    Recent Results (from the past 24 hour(s))   METABOLIC PANEL, COMPREHENSIVE    Collection Time: 02/01/17  2:00 PM   Result Value Ref Range    Sodium 134 (L) 136 - 145 mmol/L    Potassium 3.9 3.5 - 5.1 mmol/L    Chloride 97 (L) 98 - 107 mmol/L    CO2 29 23 - 32 mmol/L    Anion gap 8 7 - 16 mmol/L    Glucose 440 (H) 65 - 100 mg/dL    BUN 44 (H) 8 - 23 MG/DL    Creatinine 2.21 (H) 0.8 - 1.5 MG/DL    GFR est AA 39 (L) >60 ml/min/1.73m2    GFR est non-AA 32 (L) >60 ml/min/1.73m2    Calcium 8.6 8.3 - 10.4 MG/DL    Bilirubin, total 0.6 0.2 - 1.1 MG/DL    ALT (SGPT) 19 12 - 65 U/L    AST (SGOT) 12 (L) 15 - 37 U/L    Alk.  phosphatase 66 50 - 136 U/L    Protein, total 7.4 6.3 - 8.2 g/dL    Albumin 2.8 (L) 3.2 - 4.6 g/dL    Globulin 4.6 (H) 2.3 - 3.5 g/dL    A-G Ratio 0.6 (L) 1.2 - 3.5     CBC WITH AUTOMATED DIFF    Collection Time: 02/01/17  2:00 PM   Result Value Ref Range    WBC 1.3 (LL) 4.3 - 11.1 K/uL    RBC 3.31 (L) 4.23 - 5.67 M/uL    HGB 9.0 (L) 13.6 - 17.2 g/dL    HCT 27.5 (L) 41.1 - 50.3 %    MCV 83.1 79.6 - 97.8 FL    MCH 27.2 26.1 - 32.9 PG    MCHC 32.7 31.4 - 35.0 g/dL    RDW 12.4 11.9 - 14.6 %    PLATELET 812 (L) 684 - 450 K/uL    MPV 9.9 (L) 10.8 - 14.1 FL    ABSOLUTE NRBC 0.00 0.0 - 0.2 K/uL    DF AUTOMATED      NEUTROPHILS 69 43 - 78 %    LYMPHOCYTES 28 13 - 44 %    MONOCYTES 2 (L) 4.0 - 12.0 %    EOSINOPHILS 1 0.5 - 7.8 %    BASOPHILS 1 0.0 - 2.0 %    ABS. NEUTROPHILS 0.9 (L) 1.7 - 8.2 K/UL    ABS. LYMPHOCYTES 0.4 (L) 0.5 - 4.6 K/UL    ABS. MONOCYTES 0.0 (L) 0.1 - 1.3 K/UL    ABS. EOSINOPHILS 0.0 0.0 - 0.8 K/UL    ABS. BASOPHILS 0.0 0.0 - 0.2 K/UL       Imaging:  None     ASSESSMENT:  Problem List  Date Reviewed: 1/30/2017          Codes Class Noted    Cellulitis of neck ICD-10-CM: R72.754  ICD-9-CM: 682.1  2/1/2017        Tracheostomy in place ICD-10-CM: Z93.0  ICD-9-CM: V44.0  2/1/2017        Chemotherapy-induced neutropenia (HCC) ICD-10-CM: D70.1  ICD-9-CM: 288.03, E933.1  2/1/2017        Type 2 diabetes mellitus with hyperglycemia ([facility] ICD-10-CM: E11.65  ICD-9-CM: 250.00  1/24/2017        HTN (hypertension) ICD-10-CM: I10  ICD-9-CM: 401.9  1/24/2017        Head and neck cancer ([facility] ICD-10-CM: C76.0  ICD-9-CM: 195.0  1/24/2017        Neutropenic fever ([facility] ICD-10-CM: D70.9, R50.81  ICD-9-CM: 288.00, 780.61  1/12/2017        Pancytopenia ([facility] [address] ICD-10-CM: X94.154  ICD-9-CM: 284.19  1/12/2017        * (Principal)Renal insufficiency ICD-10-CM: N28.9  ICD-9-CM: 593.9  1/10/2017        Non-intractable cyclical vomiting with nausea ICD-10-CM: G43. A0  ICD-9-CM: 536.2  1/10/2017        Squamous cell carcinoma of epiglottis (HCC) ICD-10-CM: C32.1  ICD-9-CM: 161.1  11/23/2016                PLAN:    - Squamous cell carcinoma of epiglottis   Cycle 2 TCF completed today 02/01/17. Plan to proceed with surgery in 4-6 weeks.      - Neutropenia  Start daily Granix 24 hours after completion of chemotherapy which would be 02/02 at 1700.     - Cellulitis  In the setting of immunocompromised/neutropenia - start Zosyn and Vancomycin.     - Diabetes Mellitus type II - not controlled  Continue Metformin and start sliding scale Lispro. Switch to diabetic formula tube feeds. Consult hospitalist for management.     - Renal Insufficiency   NS bolus 1000 mL x 1 then 150 mL/hr.     - Consult speech therapy  - Consult nutrition for tube feed management   - Trach care/mouth care frequently  - Jaspreet SOP's   - DVT prophylaxis with Lovenox     Lab studies and imaging studies were personally reviewed. Pertinent old records were reviewed.                 Toshia Taylor NP   [address]  Office : [phone]  Fax : [phone]

## 2017-02-02 ENCOUNTER — HOSPITAL ENCOUNTER (OUTPATIENT)
Dept: RADIATION ONCOLOGY | Age: 62
Discharge: HOME | End: 2017-02-02
Payer: COMMERCIAL

## 2017-02-02 LAB
ALBUMIN SERPL BCP-MCNC: 2.4 G/DL (ref 3.2–4.6)
ALBUMIN/GLOB SERPL: 0.6 {RATIO} (ref 1.2–3.5)
ALP SERPL-CCNC: 56 U/L (ref 50–136)
ALT SERPL-CCNC: 19 U/L (ref 12–65)
ANION GAP BLD CALC-SCNC: 10 MMOL/L (ref 7–16)
ANION GAP BLD CALC-SCNC: 8 MMOL/L (ref 7–16)
AST SERPL W P-5'-P-CCNC: 15 U/L (ref 15–37)
BASOPHILS # BLD AUTO: 0 K/UL (ref 0–0.2)
BASOPHILS # BLD: 1 % (ref 0–2)
BILIRUB SERPL-MCNC: 0.6 MG/DL (ref 0.2–1.1)
BUN SERPL-MCNC: 39 MG/DL (ref 8–23)
BUN SERPL-MCNC: 42 MG/DL (ref 8–23)
CALCIUM SERPL-MCNC: 8 MG/DL (ref 8.3–10.4)
CALCIUM SERPL-MCNC: 8.1 MG/DL (ref 8.3–10.4)
CHLORIDE SERPL-SCNC: 105 MMOL/L (ref 98–107)
CHLORIDE SERPL-SCNC: 105 MMOL/L (ref 98–107)
CO2 SERPL-SCNC: 28 MMOL/L (ref 21–32)
CO2 SERPL-SCNC: 30 MMOL/L (ref 21–32)
CREAT SERPL-MCNC: 2.02 MG/DL (ref 0.8–1.5)
CREAT SERPL-MCNC: 2.03 MG/DL (ref 0.8–1.5)
DIFFERENTIAL METHOD BLD: ABNORMAL
EOSINOPHIL # BLD: 0 K/UL (ref 0–0.8)
EOSINOPHIL NFR BLD: 1 % (ref 0.5–7.8)
ERYTHROCYTE [DISTWIDTH] IN BLOOD BY AUTOMATED COUNT: 12.9 % (ref 11.9–14.6)
GLOBULIN SER CALC-MCNC: 3.9 G/DL (ref 2.3–3.5)
GLUCOSE BLD STRIP.AUTO-MCNC: 159 MG/DL (ref 65–100)
GLUCOSE BLD STRIP.AUTO-MCNC: 170 MG/DL (ref 65–100)
GLUCOSE BLD STRIP.AUTO-MCNC: 197 MG/DL (ref 65–100)
GLUCOSE BLD STRIP.AUTO-MCNC: 256 MG/DL (ref 65–100)
GLUCOSE SERPL-MCNC: 140 MG/DL (ref 65–100)
GLUCOSE SERPL-MCNC: 142 MG/DL (ref 65–100)
HCT VFR BLD AUTO: 24.6 % (ref 41.1–50.3)
HGB BLD-MCNC: 8.1 G/DL (ref 13.6–17.2)
IMM GRANULOCYTES # BLD: 0 K/UL (ref 0–0.5)
IMM GRANULOCYTES NFR BLD AUTO: 0.8 % (ref 0–5)
LYMPHOCYTES # BLD AUTO: 46 % (ref 13–44)
LYMPHOCYTES # BLD: 0.6 K/UL (ref 0.5–4.6)
MAGNESIUM SERPL-MCNC: 1.8 MG/DL (ref 1.8–2.4)
MCH RBC QN AUTO: 27.6 PG (ref 26.1–32.9)
MCHC RBC AUTO-ENTMCNC: 32.9 G/DL (ref 31.4–35)
MCV RBC AUTO: 84 FL (ref 79.6–97.8)
MONOCYTES # BLD: 0 K/UL (ref 0.1–1.3)
MONOCYTES NFR BLD AUTO: 3 % (ref 4–12)
NEUTS SEG # BLD: 0.6 K/UL (ref 1.7–8.2)
NEUTS SEG NFR BLD AUTO: 48 % (ref 43–78)
PLATELET # BLD AUTO: 104 K/UL (ref 150–450)
PMV BLD AUTO: 10.2 FL (ref 10.8–14.1)
POTASSIUM SERPL-SCNC: 3.7 MMOL/L (ref 3.5–5.1)
POTASSIUM SERPL-SCNC: 3.7 MMOL/L (ref 3.5–5.1)
PROT SERPL-MCNC: 6.3 G/DL (ref 6.3–8.2)
RBC # BLD AUTO: 2.93 M/UL (ref 4.23–5.67)
SODIUM SERPL-SCNC: 143 MMOL/L (ref 136–145)
SODIUM SERPL-SCNC: 143 MMOL/L (ref 136–145)
WBC # BLD AUTO: 1.2 K/UL (ref 4.3–11.1)

## 2017-02-02 PROCEDURE — 74011000258 HC RX REV CODE- 258: Performed by: NURSE PRACTITIONER

## 2017-02-02 PROCEDURE — 77399 UNLISTED PX MED RADJ PHYSICS: CPT

## 2017-02-02 PROCEDURE — 77301 RADIOTHERAPY DOSE PLAN IMRT: CPT

## 2017-02-02 PROCEDURE — 80048 BASIC METABOLIC PNL TOTAL CA: CPT | Performed by: NURSE PRACTITIONER

## 2017-02-02 PROCEDURE — 74011250636 HC RX REV CODE- 250/636: Performed by: NURSE PRACTITIONER

## 2017-02-02 PROCEDURE — 92522 EVALUATE SPEECH PRODUCTION: CPT

## 2017-02-02 PROCEDURE — 65270000029 HC RM PRIVATE

## 2017-02-02 PROCEDURE — 74011250636 HC RX REV CODE- 250/636: Performed by: INTERNAL MEDICINE

## 2017-02-02 PROCEDURE — 77300 RADIATION THERAPY DOSE PLAN: CPT

## 2017-02-02 PROCEDURE — 80053 COMPREHEN METABOLIC PANEL: CPT | Performed by: NURSE PRACTITIONER

## 2017-02-02 PROCEDURE — 74011250637 HC RX REV CODE- 250/637: Performed by: NURSE PRACTITIONER

## 2017-02-02 PROCEDURE — 82962 GLUCOSE BLOOD TEST: CPT

## 2017-02-02 PROCEDURE — 74011250637 HC RX REV CODE- 250/637: Performed by: INTERNAL MEDICINE

## 2017-02-02 PROCEDURE — 36591 DRAW BLOOD OFF VENOUS DEVICE: CPT

## 2017-02-02 PROCEDURE — 85025 COMPLETE CBC W/AUTO DIFF WBC: CPT | Performed by: NURSE PRACTITIONER

## 2017-02-02 PROCEDURE — 74011636637 HC RX REV CODE- 636/637: Performed by: NURSE PRACTITIONER

## 2017-02-02 PROCEDURE — 77030019605

## 2017-02-02 PROCEDURE — 83735 ASSAY OF MAGNESIUM: CPT | Performed by: NURSE PRACTITIONER

## 2017-02-02 RX ORDER — LOPERAMIDE HYDROCHLORIDE 2 MG/1
4 CAPSULE ORAL
Status: DISCONTINUED | OUTPATIENT
Start: 2017-02-02 | End: 2017-02-05 | Stop reason: HOSPADM

## 2017-02-02 RX ADMIN — METFORMIN HYDROCHLORIDE 500 MG: 500 TABLET, FILM COATED ORAL at 09:37

## 2017-02-02 RX ADMIN — LOSARTAN POTASSIUM 50 MG: 50 TABLET ORAL at 16:28

## 2017-02-02 RX ADMIN — PIPERACILLIN SODIUM,TAZOBACTAM SODIUM 3.38 G: 3; .375 INJECTION, POWDER, FOR SOLUTION INTRAVENOUS at 02:13

## 2017-02-02 RX ADMIN — INSULIN LISPRO 2 UNITS: 100 INJECTION, SOLUTION INTRAVENOUS; SUBCUTANEOUS at 12:45

## 2017-02-02 RX ADMIN — LOPERAMIDE HYDROCHLORIDE 4 MG: 2 CAPSULE ORAL at 18:52

## 2017-02-02 RX ADMIN — INSULIN LISPRO 6 UNITS: 100 INJECTION, SOLUTION INTRAVENOUS; SUBCUTANEOUS at 16:57

## 2017-02-02 RX ADMIN — ONDANSETRON 8 MG: 8 TABLET, ORALLY DISINTEGRATING ORAL at 05:30

## 2017-02-02 RX ADMIN — ONDANSETRON 8 MG: 8 TABLET, ORALLY DISINTEGRATING ORAL at 21:51

## 2017-02-02 RX ADMIN — ACETAMINOPHEN 650 MG: 325 TABLET, FILM COATED ORAL at 09:50

## 2017-02-02 RX ADMIN — ACETAMINOPHEN 650 MG: 325 TABLET, FILM COATED ORAL at 03:02

## 2017-02-02 RX ADMIN — INSULIN LISPRO 2 UNITS: 100 INJECTION, SOLUTION INTRAVENOUS; SUBCUTANEOUS at 09:41

## 2017-02-02 RX ADMIN — AMLODIPINE BESYLATE 10 MG: 10 TABLET ORAL at 21:51

## 2017-02-02 RX ADMIN — METFORMIN HYDROCHLORIDE 500 MG: 500 TABLET, FILM COATED ORAL at 12:46

## 2017-02-02 RX ADMIN — ALLOPURINOL 300 MG: 300 TABLET ORAL at 21:51

## 2017-02-02 RX ADMIN — METFORMIN HYDROCHLORIDE 500 MG: 500 TABLET, FILM COATED ORAL at 16:28

## 2017-02-02 RX ADMIN — LOSARTAN POTASSIUM 50 MG: 50 TABLET ORAL at 09:37

## 2017-02-02 RX ADMIN — INSULIN LISPRO 2 UNITS: 100 INJECTION, SOLUTION INTRAVENOUS; SUBCUTANEOUS at 21:51

## 2017-02-02 RX ADMIN — TRIAMTERENE AND HYDROCHLOROTHIAZIDE 1 TABLET: 37.5; 25 TABLET ORAL at 09:37

## 2017-02-02 RX ADMIN — PIPERACILLIN SODIUM,TAZOBACTAM SODIUM 3.38 G: 3; .375 INJECTION, POWDER, FOR SOLUTION INTRAVENOUS at 09:37

## 2017-02-02 RX ADMIN — ACETAMINOPHEN 650 MG: 325 TABLET, FILM COATED ORAL at 15:35

## 2017-02-02 RX ADMIN — PIPERACILLIN SODIUM,TAZOBACTAM SODIUM 3.38 G: 3; .375 INJECTION, POWDER, FOR SOLUTION INTRAVENOUS at 16:28

## 2017-02-02 RX ADMIN — ENOXAPARIN SODIUM 40 MG: 40 INJECTION SUBCUTANEOUS at 16:28

## 2017-02-02 RX ADMIN — ONDANSETRON 8 MG: 8 TABLET, ORALLY DISINTEGRATING ORAL at 12:46

## 2017-02-02 RX ADMIN — TBO-FILGRASTIM 480 MCG: 480 INJECTION, SOLUTION SUBCUTANEOUS at 09:41

## 2017-02-02 RX ADMIN — VANCOMYCIN HYDROCHLORIDE 1500 MG: 10 INJECTION, POWDER, LYOPHILIZED, FOR SOLUTION INTRAVENOUS at 16:28

## 2017-02-02 RX ADMIN — ACETAMINOPHEN 650 MG: 325 TABLET, FILM COATED ORAL at 21:51

## 2017-02-02 NOTE — PROGRESS NOTES
Inpatient Hematology / Oncology Progress Note      Admission Date: 2017  4:33 PM  Reason for Admission/Hospital Course: head/neck ca  ISI  Renal insufficiency     He is status post cycle 2 of Taxotere, Cisplatin, fluorouracil. 5-FU pump removed today. He was hospitalized following cycle 1 for neutropenic fever from which he recovered uneventfully. He did however fail a barium swallow test for all consistencies and was placed on TPN. PEG tube was placed as well as a trach. He returned today for toxicity check and found to have chest cellulitis stemming from his new trachea. He is neutropenic with low grade fever. He has a very foul odor most likely related to dying tumor. Labs also revealed blood sugar > 400 and renal insufficiency with creatinine of 2.2. He will be admitted for hydration, blood sugar control, and IV antibiotics.      24 Hour Events:  Tmax 100.2   Vitals stable  Cellulitis to abdomen ongoing  Foul smell from tumor necrosis ongoing - no drainage seen during exam but patient reports no change to drainage    ROS:  Constitutional: Negative for fever, chills, weakness, malaise, fatigue. CV: Negative for chest pain, palpitations, edema. Respiratory: Negative for dyspnea, cough, wheezing. GI: Positive for abdominal tenderness; negative for nausea, diarrhea. 10 point review of systems is otherwise negative with the exception of the elements mentioned above in the HPI. No Known Allergies    OBJECTIVE:  Patient Vitals for the past 8 hrs:   BP Temp Pulse Resp SpO2   17 1231 116/64 99.6 °F (37.6 °C) 97 18 93 %   17 0730 101/57 99.9 °F (37.7 °C) (!) 102 18 94 %     Temp (24hrs), Av.1 °F (37.3 °C), Min:98 °F (36.7 °C), Max:100.2 °F (37.9 °C)     0701 -  1900  In: 773   Out: -     Physical Exam:  Constitutional: Ill-appearing male in no acute distress, sitting comfortably in the hospital bed. HEENT: Normocephalic and atraumatic.  Oropharynx is clear, mucous membranes are moist. No ulcerations or thrush. Neck supple. Erythema around trach site extending down to chest to top of sternum. No exudate noted on exam   Lymph node   Deferred   Skin Warm and dry. No bruising and no rash noted. No erythema. No pallor. Respiratory Lungs are clear to auscultation bilaterally without wheezes, rales or rhonchi, normal air exchange without accessory muscle use. CVS Normal rate, regular rhythm and normal S1 and S2. No murmurs, gallops, or rubs. Abdomen Soft, nontender and nondistended, normoactive bowel sounds. Erythema and crusting to midline abdominal incision and mild erythema around PEG tube site - open to air   Neuro Grossly nonfocal with no obvious sensory or motor deficits. MSK Normal range of motion in general.  No edema and no tenderness. Psych Appropriate mood and affect.     Right chest wall port site without redness, tenderness or swelling    Labs:    Recent Labs      02/02/17   0340  02/01/17   1400   WBC  1.2*  1.3*   RBC  2.93*  3.31*   HGB  8.1*  9.0*   HCT  24.6*  27.5*   MCV  84.0  83.1   MCH  27.6  27.2   MCHC  32.9  32.7   RDW  12.9  12.4   PLT  104*  135*   GRANS  48  69   LYMPH  46*  28   MONOS  3*  2*   EOS  1  1   BASOS  1  1   IG  0.8   --    DF  AUTOMATED  AUTOMATED   ANEU  0.6*  0.9*   ABL  0.6  0.4*   ABM  0.0*  0.0*   MAHI  0.0  0.0   ABB  0.0  0.0   AIG  0.0   --       Recent Labs      02/02/17   0340  02/01/17   1400   NA  143  143  134*   K  3.7  3.7  3.9   CL  105  105  97*   CO2  28  30  29   AGAP  10  8  8   GLU  140*  142*  440*   BUN  42*  39*  44*   CREA  2.03*  2.02*  2.21*   GFRAA  43*  44*  39*   GFRNA  36*  36*  32*   CA  8.0*  8.1*  8.6   SGOT  15  12*   AP  56  66   TP  6.3  7.4   ALB  2.4*  2.8*   GLOB  3.9*  4.6*   AGRAT  0.6*  0.6*   MG  1.8   --      Imaging: NA      ASSESSMENT:    Problem List  Date Reviewed: 2/2/2017          Codes Class Noted    Cellulitis of neck ICD-10-CM: P09.320  ICD-9-CM: 682.1  2/1/2017 Tracheostomy in place ICD-10-CM: Z93.0  ICD-9-CM: V44.0  2/1/2017        Chemotherapy-induced neutropenia (HCC) ICD-10-CM: D70.1  ICD-9-CM: 288.03, E933.1  2/1/2017        Type 2 diabetes mellitus with hyperglycemia ([facility] ICD-10-CM: E11.65  ICD-9-CM: 250.00  1/24/2017        HTN (hypertension) ICD-10-CM: I10  ICD-9-CM: 401.9  1/24/2017        Head and neck cancer ([facility] ICD-10-CM: C76.0  ICD-9-CM: 195.0  1/24/2017        Neutropenic fever ([facility] ICD-10-CM: D70.9, R50.81  ICD-9-CM: 288.00, 780.61  1/12/2017        Pancytopenia ([facility] ICD-10-CM: X54.561  ICD-9-CM: 284.19  1/12/2017        * (Principal)Renal insufficiency ICD-10-CM: N28.9  ICD-9-CM: 593.9  1/10/2017        Non-intractable cyclical vomiting with nausea ICD-10-CM: G43. A0  ICD-9-CM: 536.2  1/10/2017        Squamous cell carcinoma of epiglottis (HCC) ICD-10-CM: C32.1  ICD-9-CM: 161.1  11/23/2016              PLAN:  Squamous cell carcinoma of epiglottis   Cycle 2 TCF completed today 02/01/17. Plan to proceed with surgery in 4-6 weeks. 2/2/17 Speech and nutrition on board to assist with nutritional needs     - Neutropenia  Start daily Granix 24 hours after completion of chemotherapy which would be 02/02 at 1700.   2/2/17 Has now received 2 doses granix. Continue daily     - Cellulitis  In the setting of immunocompromised/neutropenia - start Zosyn and Vancomycin. 2/2/17 Tmax 100.2. Continue current antibiotics.    - Diabetes Mellitus type II - not controlled  Continue Metformin and start sliding scale Lispro. Switch to diabetic formula tube feeds. Consult hospitalist for management. 2/2/17 BS controlled overnight. Continue sliding scale     - Renal Insufficiency   NS bolus 1000 mL x 1 then 150 mL/hr.   2/2/17 Continue aggressive hydration.  Slight improvement to creatinine overnight (down to 2.02)    - Trach care/mouth care frequently  - Jaspreet SOP's   - DVT prophylaxis with Lovenox                  Ofelia Wright NP   [facility] [address] [place] [address]  Office : [phone]  Fax : [phone]         Attending Addendum:  I personally evaluated the patient with Erasto Barr N.P.,  and agree with the assessment, findings and plan as documented. Appears stable, heart regular without murmur, lungs clear, abdomen benign. Continue broad spectrum abx and hydration. Supportive care above.                Keyla Gallegos MD  [address]  Office : [phone]  Fax : [phone]

## 2017-02-02 NOTE — MED STUDENT NOTES
*ATTENTION:  This note has been created by a medical student for educational purposes only. Please do not refer to the content of this note for clinical decision-making, billing, or other purposes. Please see attending physicians note to obtain clinical information on this patient. *          Progress Note    Patient: Samuel Oquendo MRN: [id number]  SSN: [id number]    YOB: 1955  Age: 62 y.o. Sex: male      Admit Date: 2/1/2017    LOS: 1 day     Subjective:    Mr. Carissa Powell is a 61 yo male admitted for neutropenic fever, renal insufficiency and cellulitis of the neck. Mr. Carissa Powell recently had a tracheostomy and PEG tube placed. He was dx with a supra glottic mass in Dec 2016. The mass was found to be a SCC and he is currently on chemotherapy treatment. ROS is positive for headache and diarrhea, all others negative.      Review of Systems:  Pertinent in HPI    Objective:     Vitals:    02/01/17 1921 02/01/17 2327 02/02/17 0335 02/02/17 0730   BP: 114/65 132/75 113/56 101/57   Pulse: (!) 104 (!) 107 (!) 104 (!) 102   Resp: 18 18 18 18   Temp: 100.2 °F (37.9 °C) 98 °F (36.7 °C) 99.1 °F (37.3 °C) 99.9 °F (37.7 °C)   SpO2: 99% 92% 93% 94%   Weight:   100.8 kg (222 lb 3.2 oz)         Intake and Output:  Current Shift:    Last three shifts: 01/31 1901 - 02/02 0700  In: 3081 [I.V.:2254]  Out: 300 [Urine:300]    Physical Exam:   gen- WDWN, mild distress  Neuro- A&ox3  HEENT- eyes anicteric, red/swollen skin around tracheostomy   Cardio- no mrg, rrr, S1S2 normal, tachycardic  pulm- CTA anterior, no rhales wheeze or rhonchi  abdom- soft, distended, PEG tube present, incisional scar to right of PEG  Ext- pulse 2+ radial/pedal, pitting edema BLE 1+      Lab/Data Review:  Recent Results (from the past 12 hour(s))   METABOLIC PANEL, BASIC    Collection Time: 02/02/17  3:40 AM   Result Value Ref Range    Sodium 143 136 - 145 mmol/L    Potassium 3.7 3.5 - 5.1 mmol/L    Chloride 105 98 - 107 mmol/L    CO2 30 21 - 32 mmol/L    Anion gap 8 7 - 16 mmol/L    Glucose 142 (H) 65 - 100 mg/dL    BUN 39 (H) 8 - 23 MG/DL    Creatinine 2.02 (H) 0.8 - 1.5 MG/DL    GFR est AA 44 (L) >60 ml/min/1.73m2    GFR est non-AA 36 (L) >60 ml/min/1.73m2    Calcium 8.1 (L) 8.3 - 16.6 MG/DL   METABOLIC PANEL, COMPREHENSIVE    Collection Time: 02/02/17  3:40 AM   Result Value Ref Range    Sodium 143 136 - 145 mmol/L    Potassium 3.7 3.5 - 5.1 mmol/L    Chloride 105 98 - 107 mmol/L    CO2 28 21 - 32 mmol/L    Anion gap 10 7 - 16 mmol/L    Glucose 140 (H) 65 - 100 mg/dL    BUN 42 (H) 8 - 23 MG/DL    Creatinine 2.03 (H) 0.8 - 1.5 MG/DL    GFR est AA 43 (L) >60 ml/min/1.73m2    GFR est non-AA 36 (L) >60 ml/min/1.73m2    Calcium 8.0 (L) 8.3 - 10.4 MG/DL    Bilirubin, total 0.6 0.2 - 1.1 MG/DL    ALT (SGPT) 19 12 - 65 U/L    AST (SGOT) 15 15 - 37 U/L    Alk. phosphatase 56 50 - 136 U/L    Protein, total 6.3 6.3 - 8.2 g/dL    Albumin 2.4 (L) 3.2 - 4.6 g/dL    Globulin 3.9 (H) 2.3 - 3.5 g/dL    A-G Ratio 0.6 (L) 1.2 - 3.5     MAGNESIUM    Collection Time: 02/02/17  3:40 AM   Result Value Ref Range    Magnesium 1.8 1.8 - 2.4 mg/dL   CBC WITH AUTOMATED DIFF    Collection Time: 02/02/17  3:40 AM   Result Value Ref Range    WBC 1.2 (LL) 4.3 - 11.1 K/uL    RBC 2.93 (L) 4.23 - 5.67 M/uL    HGB 8.1 (L) 13.6 - 17.2 g/dL    HCT 24.6 (L) 41.1 - 50.3 %    MCV 84.0 79.6 - 97.8 FL    MCH 27.6 26.1 - 32.9 PG    MCHC 32.9 31.4 - 35.0 g/dL    RDW 12.9 11.9 - 14.6 %    PLATELET 532 (L) 732 - 450 K/uL    MPV 10.2 (L) 10.8 - 14.1 FL    DF AUTOMATED      NEUTROPHILS 48 43 - 78 %    LYMPHOCYTES 46 (H) 13 - 44 %    MONOCYTES 3 (L) 4.0 - 12.0 %    EOSINOPHILS 1 0.5 - 7.8 %    BASOPHILS 1 0.0 - 2.0 %    IMMATURE GRANULOCYTES 0.8 0.0 - 5.0 %    ABS. NEUTROPHILS 0.6 (L) 1.7 - 8.2 K/UL    ABS. LYMPHOCYTES 0.6 0.5 - 4.6 K/UL    ABS. MONOCYTES 0.0 (L) 0.1 - 1.3 K/UL    ABS. EOSINOPHILS 0.0 0.0 - 0.8 K/UL    ABS. BASOPHILS 0.0 0.0 - 0.2 K/UL    ABS. IMM.  GRANS. 0.0 0.0 - 0.5 K/UL   GLUCOSE, POC    Collection Time: 02/02/17  8:43 AM   Result Value Ref Range    Glucose (POC) 197 (H) 65 - 100 mg/dL         Assessment/ Plan      Principal Problem:    Renal insufficiency (1/10/2017)  - IVF: bolus 1 L with 150 mL/hr subsequent     Active Problems:    Squamous cell carcinoma of epiglottis (HCC) (11/23/2016)  - Cycle 2 TCF completed today 02/01/17. Plan to proceed with surgery in 4-6 weeks. Pancytopenia ([facility] (1/12/2017)      Type 2 diabetes mellitus with hyperglycemia ([facility] (1/24/2017)  - Lispro with sliding scale       Cellulitis of neck (2/1/2017)  - IV antibiotic zosyn and vanc, PO antibiotics clinda       Tracheostomy in place (2/1/2017)      Chemotherapy-induced neutropenia (HCC) (2/1/2017)  - Start daily Granix     HTN  - amlodipine and maxzide         Signed By: Radha Herman     February 2, 2017

## 2017-02-02 NOTE — CONSULTS
HOSPITALIST CONSULT  NAME:  Earnest Zepeda   Age:  62 y.o.  :   1955   MRN:   [id number]  PCP: Ely Garcia MD  Consulting MD:    Treatment Team: Attending Provider: Paul Pedersen MD; Consulting Provider: Robert Linares MD; Utilization Review: Barbra Ham RN    ADMISSION DIAGNOSIS:neutropenic fever  REASON FOR CONSULT:DM, acute renal insufficiency,    HPI:   Patient is a 62year old gentleman with history of DM, HTN, recently diagnosed squamous cell CA, supraglottic, who underwent trach and debridement about 1 week ago Has had 2 cycles of chemotherapy, on recheck yesterday was noted to have fever, neutropenia, and renal insufficiency. 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 Taking? LORazepam (ATIVAN) 1 mg tablet Not Taking at Unknown time  No No   Sig: Take 0.5-1 Tabs by mouth every six (6) hours as needed for Anxiety. Max Daily Amount: 4 mg. Indications: CANCER CHEMOTHERAPY-INDUCED NAUSEA AND VOMITING   Lactose-Free Food with Fiber (JEVITY 1.5 BRANDO) 0.06 gram-1.5 kcal/mL liqd   No No   Si Cans by PEG Tube route daily for 100 days. Bolus Feedings, Jevity 1.5 or comparable, goal 6 cans/day. Pt will need additional 38-42 oz free water daily via PEG. Estimated ARRON greater than 90 days.   Indications: DYSPHAGIA   allopurinol (ZYLOPRIM) 300 mg tablet Not Taking at Unknown time  Yes No   Si mg nightly. Indications: GOUT   amLODIPine (NORVASC) 10 mg tablet Not Taking at Unknown time  Yes No   Sig: Take 10 mg by mouth nightly. Take / use AM day of surgery  per anesthesia protocols. Indications: Hypertension   aspirin delayed-release 81 mg tablet   Yes No   Sig: Take 81 mg by mouth every morning. Take / use AM day of surgery  per anesthesia protocols. Indications: myocardial infarction prevention   clindamycin (CLEOCIN) 75 mg/5 mL solution   No No   Sig: 10 mL by mouth 4 times daily x 7 days   glipiZIDE SR (GLUCOTROL) 10 mg CR tablet   Yes No   Sig: Take 10 mg by mouth two (2) times a day. Indications: type 2 diabetes mellitus   lidocaine-prilocaine (EMLA) topical cream   No No   Sig: Apply  to affected area as needed. Apply 1/2 inch amount of cream to port site 90 minutes prior to needle access. losartan (COZAAR) 50 mg tablet Not Taking at Unknown time  Yes No   Si mg two (2) times a day. Indications: Hypertension   magic mouthwash (GLADYS) susp   No No   Sig: Take 10 mL by mouth every four (4) hours. metFORMIN (GLUCOPHAGE) 500 mg tablet   Yes No   Sig: Take 500 mg by mouth three (3) times daily (with meals). Indications: PREVENTION OF TYPE 2 DIABETES MELLITUS   omeprazole (PRILOSEC) 20 mg capsule Not Taking at Unknown time  Yes No   Sig: Take 20 mg by mouth every morning. Take / use AM day of surgery  per anesthesia protocols. Indications: GASTROESOPHAGEAL REFLUX   ondansetron hcl (ZOFRAN) 8 mg tablet   No No   Sig: Take 1 Tab by mouth every eight (8) hours as needed for Nausea. prochlorperazine (COMPAZINE) 10 mg tablet   No No   Sig: Take 1 Tab by mouth every six (6) hours as needed. triamterene-hydroCHLOROthiazide (MAXZIDE) 37.5-25 mg per tablet Not Taking at Unknown time  Yes No   Sig: Take 1 Tab by mouth every morning.  Indications: Edema, Hypertension      Facility-Administered Medications: None     No Known Allergies   Social History   Substance Use Topics    Smoking status: Former Smoker     Packs/day: 2.00     Years: 20.00     Quit date:     Smokeless tobacco: Never Used    Alcohol use No      Family History   Problem Relation Age of Onset    Stroke Mother     Lung Disease Mother       Objective:     Visit Vitals    /57    Pulse (!) 102    Temp 99.9 °F (37.7 °C)    Resp 18    Wt 100.8 kg (222 lb 3.2 oz)    SpO2 94%    BMI 32.81 kg/m2      Temp (24hrs), Av.1 °F (37.3 °C), Min:98 °F (36.7 °C), Max:100.2 °F (37.9 °C)    Oxygen Therapy  O2 Sat (%): 94 % (17 07)  O2 Device: Room air (17)  Physical Exam:  General:    Alert, cooperative, no distress, appears stated age. Head:   Normocephalic, without obvious abnormality, atraumatic.tacheostomy noted, erythema looks more inflammatory than infective  Nose:  Nares normal. No drainage or sinus tenderness. Lungs:   Clear to auscultation bilaterally. No Wheezing or Rhonchi. No rales. Heart:   Regular rate and rhythm,  no murmur, rub or gallop. Abdomen:   Soft, non-tender. Not distended. Bowel sounds normal. Wound crusting, peg has mild erythema  Extremities: No cyanosis. No edema. No clubbing  Skin:     Texture, turgor normal. No rashes or lesions.   Not Jaundiced  Neurologic: Alert and oriented x 3, no focal deficits   Data Review: personally by me   Recent Results (from the past 24 hour(s))   METABOLIC PANEL, COMPREHENSIVE    Collection Time: 17  2:00 PM   Result Value Ref Range    Sodium 134 (L) 136 - 145 mmol/L    Potassium 3.9 3.5 - 5.1 mmol/L    Chloride 97 (L) 98 - 107 mmol/L    CO2 29 23 - 32 mmol/L    Anion gap 8 7 - 16 mmol/L    Glucose 440 (H) 65 - 100 mg/dL    BUN 44 (H) 8 - 23 MG/DL    Creatinine 2.21 (H) 0.8 - 1.5 MG/DL    GFR est AA 39 (L) >60 ml/min/1.73m2    GFR est non-AA 32 (L) >60 ml/min/1.73m2    Calcium 8.6 8.3 - 10.4 MG/DL    Bilirubin, total 0.6 0.2 - 1.1 MG/DL    ALT (SGPT) 19 12 - 65 U/L    AST (SGOT) 12 (L) 15 - 37 U/L    Alk. phosphatase 66 50 - 136 U/L    Protein, total 7.4 6.3 - 8.2 g/dL    Albumin 2.8 (L) 3.2 - 4.6 g/dL    Globulin 4.6 (H) 2.3 - 3.5 g/dL    A-G Ratio 0.6 (L) 1.2 - 3.5     CBC WITH AUTOMATED DIFF    Collection Time: 02/01/17  2:00 PM   Result Value Ref Range    WBC 1.3 (LL) 4.3 - 11.1 K/uL    RBC 3.31 (L) 4.23 - 5.67 M/uL    HGB 9.0 (L) 13.6 - 17.2 g/dL    HCT 27.5 (L) 41.1 - 50.3 %    MCV 83.1 79.6 - 97.8 FL    MCH 27.2 26.1 - 32.9 PG    MCHC 32.7 31.4 - 35.0 g/dL    RDW 12.4 11.9 - 14.6 %    PLATELET 747 (L) 766 - 450 K/uL    MPV 9.9 (L) 10.8 - 14.1 FL    ABSOLUTE NRBC 0.00 0.0 - 0.2 K/uL    DF AUTOMATED      NEUTROPHILS 69 43 - 78 %    LYMPHOCYTES 28 13 - 44 %    MONOCYTES 2 (L) 4.0 - 12.0 %    EOSINOPHILS 1 0.5 - 7.8 %    BASOPHILS 1 0.0 - 2.0 %    ABS. NEUTROPHILS 0.9 (L) 1.7 - 8.2 K/UL    ABS. LYMPHOCYTES 0.4 (L) 0.5 - 4.6 K/UL    ABS. MONOCYTES 0.0 (L) 0.1 - 1.3 K/UL    ABS. EOSINOPHILS 0.0 0.0 - 0.8 K/UL    ABS.  BASOPHILS 0.0 0.0 - 0.2 K/UL   GLUCOSE, POC    Collection Time: 02/01/17  4:52 PM   Result Value Ref Range    Glucose (POC) 385 (H) 65 - 100 mg/dL   GLUCOSE, POC    Collection Time: 02/01/17  9:04 PM   Result Value Ref Range    Glucose (POC) 164 (H) 65 - 610 mg/dL   METABOLIC PANEL, BASIC    Collection Time: 02/02/17  3:40 AM   Result Value Ref Range    Sodium 143 136 - 145 mmol/L    Potassium 3.7 3.5 - 5.1 mmol/L    Chloride 105 98 - 107 mmol/L    CO2 30 21 - 32 mmol/L    Anion gap 8 7 - 16 mmol/L    Glucose 142 (H) 65 - 100 mg/dL    BUN 39 (H) 8 - 23 MG/DL    Creatinine 2.02 (H) 0.8 - 1.5 MG/DL    GFR est AA 44 (L) >60 ml/min/1.73m2    GFR est non-AA 36 (L) >60 ml/min/1.73m2    Calcium 8.1 (L) 8.3 - 92.7 MG/DL   METABOLIC PANEL, COMPREHENSIVE    Collection Time: 02/02/17  3:40 AM   Result Value Ref Range    Sodium 143 136 - 145 mmol/L    Potassium 3.7 3.5 - 5.1 mmol/L    Chloride 105 98 - 107 mmol/L    CO2 28 21 - 32 mmol/L    Anion gap 10 7 - 16 mmol/L    Glucose 140 (H) 65 - 100 mg/dL    BUN 42 (H) 8 - 23 MG/DL    Creatinine 2.03 (H) 0.8 - 1.5 MG/DL    GFR est AA 43 (L) >60 ml/min/1.73m2    GFR est non-AA 36 (L) >60 ml/min/1.73m2    Calcium 8.0 (L) 8.3 - 10.4 MG/DL    Bilirubin, total 0.6 0.2 - 1.1 MG/DL    ALT (SGPT) 19 12 - 65 U/L    AST (SGOT) 15 15 - 37 U/L    Alk. phosphatase 56 50 - 136 U/L    Protein, total 6.3 6.3 - 8.2 g/dL    Albumin 2.4 (L) 3.2 - 4.6 g/dL    Globulin 3.9 (H) 2.3 - 3.5 g/dL    A-G Ratio 0.6 (L) 1.2 - 3.5     MAGNESIUM    Collection Time: 02/02/17  3:40 AM   Result Value Ref Range    Magnesium 1.8 1.8 - 2.4 mg/dL   CBC WITH AUTOMATED DIFF    Collection Time: 02/02/17  3:40 AM   Result Value Ref Range    WBC 1.2 (LL) 4.3 - 11.1 K/uL    RBC 2.93 (L) 4.23 - 5.67 M/uL    HGB 8.1 (L) 13.6 - 17.2 g/dL    HCT 24.6 (L) 41.1 - 50.3 %    MCV 84.0 79.6 - 97.8 FL    MCH 27.6 26.1 - 32.9 PG    MCHC 32.9 31.4 - 35.0 g/dL    RDW 12.9 11.9 - 14.6 %    PLATELET 935 (L) 025 - 450 K/uL    MPV 10.2 (L) 10.8 - 14.1 FL    DF AUTOMATED      NEUTROPHILS 48 43 - 78 %    LYMPHOCYTES 46 (H) 13 - 44 %    MONOCYTES 3 (L) 4.0 - 12.0 %    EOSINOPHILS 1 0.5 - 7.8 %    BASOPHILS 1 0.0 - 2.0 %    IMMATURE GRANULOCYTES 0.8 0.0 - 5.0 %    ABS. NEUTROPHILS 0.6 (L) 1.7 - 8.2 K/UL    ABS. LYMPHOCYTES 0.6 0.5 - 4.6 K/UL    ABS. MONOCYTES 0.0 (L) 0.1 - 1.3 K/UL    ABS. EOSINOPHILS 0.0 0.0 - 0.8 K/UL    ABS. BASOPHILS 0.0 0.0 - 0.2 K/UL    ABS. IMM. GRANS. 0.0 0.0 - 0.5 K/UL   GLUCOSE, POC    Collection Time: 02/02/17  8:43 AM   Result Value Ref Range    Glucose (POC) 197 (H) 65 - 100 mg/dL     Imaging /Procedures /Studies reviewed personally by me  XR Results (most recent):    Results from [place] encounter on 01/11/17   XR SWALLOW FUN VIDEO   Narrative Modified Barium Swallow, 1/12/2017    History: Dysphagia, cancer of the epiglottis. Technique: Fluoroscopic guidance was provided for the speech pathologist.  Multiple consistencies of barium were given. Findings:  The patient's ability to swallow was evaluated with thin, nectar, and  pudding consistencies. The patient had gross aspiration with all consistencies  tested. This study was discontinued at this point. Please see speech pathologist  report for further details. Fluoroscopy time: 0.5 minutes. CT Scan    Results from [place] encounter on 11/16/16   CT NECK SOFT TISSUE W CONT   Narrative CT of the neck     Comparison: None    Indication: Glottic and supraglottic neoplasm during laryngoscopy, sore throat,  initial evaluation    Technique: Multiple sequential axial images from the lung apices to the level of  the orbits obtained with 80 mL of Isovue 370. This study was acquired following  the IV administration of iodinated contrast material, given the patients  indications for the examination. If IV contrast material had not been  administered, the likelihood of detecting abnormalities relevant to the patients  condition would have been substantially decreased. Radiation dose reduction  techniques were used for this study:  Our CT scanners use one or all of the  following: Automated exposure control, adjustment of the mA and/or kVp according  to patient's size, iterative reconstruction. Findings:    Parotid and submandibular glands are normal. Thyroid gland is normal.  Carotid  arteries and jugular veins appear patent. Nasopharynx, oropharynx are normal. Supraglottic soft tissue mass extending  across the midline measures up to 3.9 x 2.6 x 2.3 cm and extending inferiorly  apparently to involve the left true vocal cord. Overlying cartilage preserved. Adjacent level 3 adenopathy measures up to 1.8 cm short axis series 2 image 59. Dejan Broach No compression of the airway. Parapharyngeal fat is normal. [place] and  lingual tonsils are normal.    Cervical spine is normal in appearance.  Imaged intracranial structures are  normal. Orbits are normal. Paranasal sinuses and mastoid air cells are normal.  Visualized lung apices and mediastinum show minimal emphysema. Impression Impression:   Large supraglottic soft tissue mass extending inferiorly to involve the left  true vocal cord. Adjacent regional left neck adenopathy. Assessment and Plan: Active Hospital Problems    Diagnosis Date Noted    Cellulitis of neck 02/01/2017    Tracheostomy in place 02/01/2017    Chemotherapy-induced neutropenia (HCC) 02/01/2017    Type 2 diabetes mellitus with hyperglycemia ([facility] 01/24/2017    Pancytopenia ([facility] 01/12/2017    Renal insufficiency 01/10/2017    Squamous cell carcinoma of epiglottis (HCC) 11/23/2016       PLAN  ·  DM - will continue SSI, glucose has been controlled, well, last admission, continue to monitor  · Renal insufficiency - agree with hydration. Monitor creatinine  · Neutropenic fever - as per oncology, on antibiotics, unclear source. · Squamous Cell CA - treatment as per oncology. Code Status: full code    Anticipated discharge: as per oncology    Thank you for this interesting consult. Will follow along.     Signed By: Katharine Trejo MD     February 2, 2017

## 2017-02-02 NOTE — PROGRESS NOTES
Problem: Nutrition Deficit  Goal: *Optimize nutritional status  Nutrition:  Reason for assessment: TF management per nutritional support protocols. Assessment:   Food/Nutrition History: Patient with squamous cell carcinoma-epiglottis, s/p G tube placement 1/23. Patient has been tolerating bolus TF via G tube of Jevity 1.5 (4x/day, total of 6 cans) at home. Patient finished cycle 2 TCF 2/1 with plans to start Granix today. RD acknowledges renal insufficiency upon admission. He received continuous TF of glucerna 1.2 overnight and wishes for bolus TF per home regimen. Abdomen with active bowel sounds. Date of Last BM: 2/1  Pertinent Medications: metformin, SSI (13 units 2/1, 2 units so far today),  ml/hr, zosyn  Pertinent labs: Na 143 (WDL), K 3.7, Cr 2.03, glucose 140  POC Glucoses:  164-385 mg/dl  Anthropometrics: Ht: 69 inches, Weight Source: Standing scale (comment), Weight: 100.8 kg (222 lb 3.2 oz), Body mass index is 32.81 kg/(m^2). BMI class of obesity class I. Macronutrient needs:  EER: 6178-2683 kcal /day (20-25 kcal/kg BW)  EPR:  grams protein/day (1.2-1.5 grams/kg IBW)  Max CHO: 315 grams/day (50% kcal)  Fluid: 1ml/kcal or per MD recommendations  Intake/Comparative standards: Current NPO status does not meet kcal or protein needs     Nutrition Diagnosis: Difficulty swallowing r/t squamous cell carcinoma of the epiglottis as evidenced by patient NPO, relying on enteral nutrition for 100% of nutrition needs. Intervention:  Meals and snacks: NPO  EN:Start TF of Jevity 1.5 via G tube. Provide a total of 4 bolus feeds per day, 1.5 cans at each feed, with an 80 ml water flush before and after each bolus. Patient to receive a total of 6 cans per day. This regimen to provide 2130 kcal (100% kcal needs), 91 gm protein (100% protein needs), 306 g CHO (does not exceed max CHO load), 1080 ml for a total of 1720 ml with free water plus water flushes.               Patient to receive feeding at 8 am, noon, 4 pm, 8 pm.   Nutrition Supplement Therapy: Electrolyte replacement per nutritional support protocols are active on MAR.   IV: Adjust IVF ml for ml of TF rate increase-per MD  Coordination of nutrition care: [place] [address] [address], [address] [place] [address], [address] [place] [address], [phone]

## 2017-02-02 NOTE — PROGRESS NOTES
Problem: Voice Impaired (Adult)  Goal: *Acute Goals and Plan of Care (Insert Text)  STG: Pt will tolerate a PMV for a minimum of 20 minutes without changes in vitals. STG: Pt will demonstrate donning/doffing of PMV independently. STG: Pt will state uses of PMV (when to wear it, cuff deflation, etc)    LTG: Ability to use PMV for communication while maintaining adequate SpO2 levels. SPEECH LANGUAGE PATHOLOGY: PASSY SALVADOR VALVE NOTE: INITIAL ASSESSMENT     NAME/AGE/GENDER: Earnest Zepeda is a 62 y.o. male  DATE: 2/2/2017  PRIMARY DIAGNOSIS: head/neck ca  ISI  Renal insufficiency       ICD-10: Treatment Diagnosis: other voice disorder 49.8  INTERDISCIPLINARY COLLABORATION: Registered Nurse  ASSESSMENT:   Based on the objective data described below, Mr. Robin Watts tolerated the PMV without difficulty. Pt known to ST as he was seen at the beginning of January of this year due to dysphagia. Pt received a modified barium swallow study at that time with recommendations for strict NPO. He has received a PEG. He also received a trach 1/24/17. He was evaluated by ST 1//25/17 at [address] for a PMV. He tolerated it without difficulty. It was sent home with him with recommendations for him to use it with home health SLP prior to independent use as he was discharged that day. Pt reported this date he didn't have PMV trials with SLP at home as one wasn't sent with him. However, treating SLP at [address] reported it was sent. Per notes, the pt's spouse reported the home health SLP reported the MD needed to complete the PMV trials. Pt did tolerate trials this date without difficulty. Speech 100% intelligible. Pt did voice using finger occlusion prior to placement. However, pt with inflated cuff. Advised pt not to use finger occlusion with an inflated cuff. Verbal understanding expressed though he did it later in the session prior to placement.   Recommend continuing with trials with PMV with hopes of pt being able to use it independently at discharge. Feel pt may benefit from a cuffless trach at home. Will follow for PMV trials and dysphagia. Patient will benefit from skilled intervention to address the below impairments. ?????? ? ? This section established at most recent assessment??????????  PROBLEM LIST (Impairments causing functional limitations):  1. Voice disorder  2. Dysphagia  REHABILITATION POTENTIAL FOR STATED GOALS: GOOD      PLAN OF CARE:   Patient will benefit from skilled intervention to address the following impairments. RECOMMENDATIONS AND PLANNED INTERVENTIONS (Benefits and precautions of therapy have been discussed with the patient.):  · NPO with alternative means of nutrition  SPEAKING VALVE RECOMMENDATION  PLACEMENT:  · Speech-Language Pathologist  · Respiratory Therapist  · Registered Nurse  SPEAKING VALVE WEAR SCHEDULE:  · As tolerated  FREQUENCY/DURATION: Continue to follow patient 3 times a week for duration of hospital stay to address above goals. RECOMMENDED REHABILITATION/EQUIPMENT: (at time of discharge pending progress):   Continue Skilled Therapy. SUBJECTIVE:   Pt cooperative. History of Present Injury/Illness: Mr. Carissa Powell  has a past medical history of GERD (gastroesophageal reflux disease); Gout; Hypertension; Morbid obesity ([address] Squamous cell carcinoma of epiglottis (HCC) (2016); and Type 2 diabetes mellitus ([facility] [facility] He also has no past medical history of Adverse effect of anesthesia; Difficult intubation; Malignant hyperthermia due to anesthesia; or Pseudocholinesterase deficiency. Eduardo Easley He also  has a past surgical history that includes colonoscopy (2016) and other surgical (Left).     Present Symptoms: voice disorder   Pain Intensity 1: 0  Pain Location 1: Head  Pain Intervention(s) 1: Medication (see MAR)  Current Medications:   Current Facility-Administered Medications on File Prior to Encounter   Medication Dose Route Frequency Provider Last Rate Last Dose    [] heparin (porcine) pf 500 Units  500 Units InterCATHeter PRN Juliette Gallardo MD        [] saline peripheral flush soln 10 mL  10 mL InterCATHeter PRN Juliette Gallardo MD           Current Outpatient Prescriptions on File Prior to Encounter   Medication Sig Dispense Refill    clindamycin (CLEOCIN) 75 mg/5 mL solution 10 mL by mouth 4 times daily x 7 days 280 mL 0    Lactose-Free Food with Fiber (JEVITY 1.5 BRANDO) 0.06 gram-1.5 kcal/mL liqd 6 Cans by PEG Tube route daily for 100 days. Bolus Feedings, Jevity 1.5 or comparable, goal 6 cans/day. Pt will need additional 38-42 oz free water daily via PEG. Estimated ARRON greater than 90 days. Indications: DYSPHAGIA 180 Can 4    LORazepam (ATIVAN) 1 mg tablet Take 0.5-1 Tabs by mouth every six (6) hours as needed for Anxiety. Max Daily Amount: 4 mg. Indications: CANCER CHEMOTHERAPY-INDUCED NAUSEA AND VOMITING 90 Tab 0    magic mouthwash (GLADYS) susp Take 10 mL by mouth every four (4) hours. 500 mL 3    lidocaine-prilocaine (EMLA) topical cream Apply  to affected area as needed. Apply 1/2 inch amount of cream to port site 90 minutes prior to needle access. 30 g 0    ondansetron hcl (ZOFRAN) 8 mg tablet Take 1 Tab by mouth every eight (8) hours as needed for Nausea. 90 Tab 3    prochlorperazine (COMPAZINE) 10 mg tablet Take 1 Tab by mouth every six (6) hours as needed. 120 Tab 3    metFORMIN (GLUCOPHAGE) 500 mg tablet Take 500 mg by mouth three (3) times daily (with meals). Indications: PREVENTION OF TYPE 2 DIABETES MELLITUS        aspirin delayed-release 81 mg tablet Take 81 mg by mouth every morning. Take / use AM day of surgery  per anesthesia protocols. Indications: myocardial infarction prevention        triamterene-hydroCHLOROthiazide (MAXZIDE) 37.5-25 mg per tablet Take 1 Tab by mouth every morning. Indications: Edema, Hypertension        losartan (COZAAR) 50 mg tablet 50 mg two (2) times a day.  Indications: Hypertension        amLODIPine (NORVASC) 10 mg tablet Take 10 mg by mouth nightly. Take / use AM day of surgery  per anesthesia protocols. Indications: Hypertension        allopurinol (ZYLOPRIM) 300 mg tablet 300 mg nightly. Indications: GOUT        glipiZIDE SR (GLUCOTROL) 10 mg CR tablet Take 10 mg by mouth two (2) times a day. Indications: type 2 diabetes mellitus        omeprazole (PRILOSEC) 20 mg capsule Take 20 mg by mouth every morning. Take / use AM day of surgery  per anesthesia protocols. Indications: GASTROESOPHAGEAL REFLUX         Social History/Home Situation:  Residing with spouse   Home Environment: Private residence  One/Two Story Residence: One story  Living Alone: No  Support Systems: Spouse/Significant Other/Partner  Patient Expects to be Discharged to[de-identified] Private residence  Current DME Used/Available at Home: Adaptive feeding aides      OBJECTIVE/TREATMENT:   Assessment/Reassessment only, no treatment provided today     TREATMENT AND INTERVENTIONS:  Tracheostomy Data/Eval  Trach Size/Status: Cuffed, cuff inflated  Date of Placement: 01/24/17  Mental Status  Neurologic State: Alert  Orientation Level: Oriented X4  Evidence of Comprehension  Response to Complex Yes/No Questions (%) :  (50%)  Airway Clearance  Suction: Deep;Trach  Suction Device: Suction catheter  Sputum Method Obtained: Tracheal  Sputum Amount: Small  Sputum Color/Odor: Bloody  Sputum Consistency: Thick  Finger Occlusion  Application: Patient  Tolerance: Tolerated without changes in vitals, breathing effort or level of anxiety  PMV Trial   Application: Patient;RN;RT;SLP  Tolerance: Tolerated without changes in vitals, breathing effort or level of anxiety  Vocal Quality: Hoarse;Strain  Oxygen Therapy  O2 Sat (%): 94 %  O2 Device: Room air  Airway Clearance  Suction: Deep;Trach  Suction Device: Suction catheter  Sputum Method Obtained: Tracheal  Sputum Amount: Small  Sputum Color/Odor: Bloody  Sputum Consistency:  Thick     PASSY SALVADOR VALVE TRIALS: Heart Rate    SPO2 RR   Prior to Trial   104 94     After cuff deflation 106 93     After PMV placed 101 98     After PMV removed            Tool Used: Functional Meacham Measure (FIMTM)    Score Comments   Eating       Comprehension       Expression  5     Social Interaction       Problem Solving       Memory          Score:  Initial: 5 Most Recent: X (Date: -- )   Interpretation of Tool: Provides a uniform system of measurement for disability based on the International Classification of Impairment, Disabilities and Handicaps; measures the level of a patient's disability and indicates how much assistance is required for the individual to carry out activities of daily living. Score 7 6 5 4 3 2 1   Modifier CH CI CJ CK CL CM CN   · Spoken Expression:               - CURRENT STATUS:           CJ - 20%-39% impaired, limited or restricted               - GOAL STATUS:                   CI - 1%-19% impaired, limited or restricted               - D/C STATUS:                       ---------------To be determined---------------  Payor: BLUE CROSS / Plan[place] BLUE ESSENTIALS LAM / Product Type: LAM /   __________________________________________________________________________________________________  Safety:   After treatment position/precautions:  · Upright in Bed  Treatment Assessment:   . Progression/Medical Necessity:   · Skilled intervention continues to be required due to inability to communicate and decreased swallow function  Compliance with Program/Exercises: Will assess as treatment progresses. Reason for Continuation of Services/Other Comments:  · Patient continues to require skilled intervention due to dysphagia and voice disorder  Recommendations/Intent for next treatment session: \"Treatment next visit will focus on PMV trials\".      Total Treatment Duration:  Time In: 0932  Time Out: Carol 22, MSP, CCC-SLP

## 2017-02-02 NOTE — PROGRESS NOTES
END OF SHIFT NOTE:    Intake/Output  02/01 1901 - 02/02 0700  In: 3051 [I.V.:2254]  Out: 300 [Urine:300]   Voiding: YES  Catheter: NO  Drain:   PEG/Gastrostomy Tube 01/23/17 (Active)   Site Assessment Clean, dry, & intact 2/2/2017  3:15 AM   Dressing Status Other (comment) 2/2/2017  2:45 AM   G Port Status Infusing 2/2/2017  3:15 AM   Action Taken Placement verified (comment) 2/2/2017  3:15 AM   Drainage Description Madeline Bumpers 2/2/2017  2:45 AM   Gastric Residual (mL) 0 ml 2/2/2017  3:15 AM   Tube Feeding/Formula Options Diabetic (Glucerna 1.2) 2/2/2017  3:15 AM   Tube Feeding/Verify Rate (mL/hr) 75 2/2/2017  3:15 AM   Water Flush Volume (mL) 120 mL 2/2/2017  3:15 AM   Intake (ml) 223 ml 2/2/2017  3:15 AM   Medication Volume 50 ml 2/2/2017  3:15 AM               Stool:  1 occurrences. Emesis:  0 occurrences. VITAL SIGNS  Patient Vitals for the past 12 hrs:   Temp Pulse Resp BP SpO2   02/02/17 0335 99.1 °F (37.3 °C) (!) 104 18 113/56 93 %   02/01/17 2327 98 °F (36.7 °C) (!) 107 18 132/75 92 %   02/01/17 1921 100.2 °F (37.9 °C) (!) 104 18 114/65 99 %       Pain Assessment  Pain 1  Pain Scale 1: Numeric (0 - 10) (02/02/17 0355)  Pain Intensity 1: 4 (02/02/17 0355)  Patient Stated Pain Goal: 0 (02/01/17 1659)  Pain Reassessment 1: Yes (02/02/17 0355)  Pain Onset 1: tonigjht (02/02/17 0314)  Pain Location 1: Head (02/02/17 0314)  Pain Description 1: Aching (02/02/17 0314)  Pain Intervention(s) 1: Medication (see MAR) (02/02/17 0314)    Ambulating  YES    Additional Information: large amounts of secretions from the trach throughout the night. Shift report given to oncoming nurse at the bedside.     Abida Nichols

## 2017-02-03 LAB
ANION GAP BLD CALC-SCNC: 10 MMOL/L (ref 7–16)
BUN SERPL-MCNC: 27 MG/DL (ref 8–23)
CALCIUM SERPL-MCNC: 7.8 MG/DL (ref 8.3–10.4)
CHLORIDE SERPL-SCNC: 107 MMOL/L (ref 98–107)
CO2 SERPL-SCNC: 27 MMOL/L (ref 21–32)
CREAT SERPL-MCNC: 1.91 MG/DL (ref 0.8–1.5)
DIFFERENTIAL METHOD BLD: ABNORMAL
ERYTHROCYTE [DISTWIDTH] IN BLOOD BY AUTOMATED COUNT: 12.7 % (ref 11.9–14.6)
GLUCOSE BLD STRIP.AUTO-MCNC: 124 MG/DL (ref 65–100)
GLUCOSE BLD STRIP.AUTO-MCNC: 158 MG/DL (ref 65–100)
GLUCOSE BLD STRIP.AUTO-MCNC: 192 MG/DL (ref 65–100)
GLUCOSE BLD STRIP.AUTO-MCNC: 196 MG/DL (ref 65–100)
GLUCOSE SERPL-MCNC: 120 MG/DL (ref 65–100)
HCT VFR BLD AUTO: 23.2 % (ref 41.1–50.3)
HGB BLD-MCNC: 7.7 G/DL (ref 13.6–17.2)
LYMPHOCYTES # BLD: 0.7 K/UL (ref 0.5–4.6)
LYMPHOCYTES NFR BLD MANUAL: 56 % (ref 16–44)
MAGNESIUM SERPL-MCNC: 1.8 MG/DL (ref 1.8–2.4)
MCH RBC QN AUTO: 27.6 PG (ref 26.1–32.9)
MCHC RBC AUTO-ENTMCNC: 33.2 G/DL (ref 31.4–35)
MCV RBC AUTO: 83.2 FL (ref 79.6–97.8)
MONOCYTES # BLD: 0.2 K/UL (ref 0.1–1.3)
MONOCYTES NFR BLD MANUAL: 18 % (ref 3–9)
NEUTS SEG # BLD: 0.3 K/UL (ref 1.7–8.2)
NEUTS SEG NFR BLD MANUAL: 26 % (ref 47–75)
PLATELET # BLD AUTO: 80 K/UL (ref 150–450)
PLATELET COMMENTS,PCOM: ABNORMAL
PMV BLD AUTO: 10.4 FL (ref 10.8–14.1)
POTASSIUM SERPL-SCNC: 3.5 MMOL/L (ref 3.5–5.1)
RBC # BLD AUTO: 2.79 M/UL (ref 4.23–5.67)
RBC MORPH BLD: ABNORMAL
SODIUM SERPL-SCNC: 144 MMOL/L (ref 136–145)
TOTAL CELLS COUNTED SPEC: 50
WBC # BLD AUTO: 1.2 K/UL (ref 4.3–11.1)

## 2017-02-03 PROCEDURE — 80048 BASIC METABOLIC PNL TOTAL CA: CPT | Performed by: NURSE PRACTITIONER

## 2017-02-03 PROCEDURE — 83735 ASSAY OF MAGNESIUM: CPT | Performed by: NURSE PRACTITIONER

## 2017-02-03 PROCEDURE — 82962 GLUCOSE BLOOD TEST: CPT

## 2017-02-03 PROCEDURE — 74011250636 HC RX REV CODE- 250/636: Performed by: INTERNAL MEDICINE

## 2017-02-03 PROCEDURE — 65270000029 HC RM PRIVATE

## 2017-02-03 PROCEDURE — 36591 DRAW BLOOD OFF VENOUS DEVICE: CPT

## 2017-02-03 PROCEDURE — 87077 CULTURE AEROBIC IDENTIFY: CPT | Performed by: NURSE PRACTITIONER

## 2017-02-03 PROCEDURE — 74011000258 HC RX REV CODE- 258: Performed by: NURSE PRACTITIONER

## 2017-02-03 PROCEDURE — 74011250636 HC RX REV CODE- 250/636: Performed by: NURSE PRACTITIONER

## 2017-02-03 PROCEDURE — 85025 COMPLETE CBC W/AUTO DIFF WBC: CPT | Performed by: NURSE PRACTITIONER

## 2017-02-03 PROCEDURE — 74011250637 HC RX REV CODE- 250/637: Performed by: INTERNAL MEDICINE

## 2017-02-03 PROCEDURE — 87186 SC STD MICRODIL/AGAR DIL: CPT | Performed by: NURSE PRACTITIONER

## 2017-02-03 PROCEDURE — 74011250637 HC RX REV CODE- 250/637: Performed by: NURSE PRACTITIONER

## 2017-02-03 PROCEDURE — 87205 SMEAR GRAM STAIN: CPT | Performed by: NURSE PRACTITIONER

## 2017-02-03 PROCEDURE — 74011636637 HC RX REV CODE- 636/637: Performed by: NURSE PRACTITIONER

## 2017-02-03 RX ORDER — KETOROLAC TROMETHAMINE 15 MG/ML
15 INJECTION, SOLUTION INTRAMUSCULAR; INTRAVENOUS ONCE
Status: COMPLETED | OUTPATIENT
Start: 2017-02-03 | End: 2017-02-03

## 2017-02-03 RX ADMIN — ONDANSETRON 8 MG: 8 TABLET, ORALLY DISINTEGRATING ORAL at 05:06

## 2017-02-03 RX ADMIN — TRIAMTERENE AND HYDROCHLOROTHIAZIDE 1 TABLET: 37.5; 25 TABLET ORAL at 08:45

## 2017-02-03 RX ADMIN — PIPERACILLIN SODIUM,TAZOBACTAM SODIUM 3.38 G: 3; .375 INJECTION, POWDER, FOR SOLUTION INTRAVENOUS at 16:46

## 2017-02-03 RX ADMIN — LOSARTAN POTASSIUM 50 MG: 50 TABLET ORAL at 08:45

## 2017-02-03 RX ADMIN — ACETAMINOPHEN 650 MG: 325 TABLET, FILM COATED ORAL at 08:55

## 2017-02-03 RX ADMIN — PIPERACILLIN SODIUM,TAZOBACTAM SODIUM 3.38 G: 3; .375 INJECTION, POWDER, FOR SOLUTION INTRAVENOUS at 01:16

## 2017-02-03 RX ADMIN — ONDANSETRON 8 MG: 8 TABLET, ORALLY DISINTEGRATING ORAL at 17:09

## 2017-02-03 RX ADMIN — SODIUM CHLORIDE 150 ML/HR: 900 INJECTION, SOLUTION INTRAVENOUS at 19:09

## 2017-02-03 RX ADMIN — LOSARTAN POTASSIUM 50 MG: 50 TABLET ORAL at 16:47

## 2017-02-03 RX ADMIN — TBO-FILGRASTIM 480 MCG: 480 INJECTION, SOLUTION SUBCUTANEOUS at 08:45

## 2017-02-03 RX ADMIN — ONDANSETRON 8 MG: 8 TABLET, ORALLY DISINTEGRATING ORAL at 21:23

## 2017-02-03 RX ADMIN — KETOROLAC TROMETHAMINE 15 MG: 15 INJECTION, SOLUTION INTRAMUSCULAR; INTRAVENOUS at 19:41

## 2017-02-03 RX ADMIN — METFORMIN HYDROCHLORIDE 500 MG: 500 TABLET, FILM COATED ORAL at 08:45

## 2017-02-03 RX ADMIN — PIPERACILLIN SODIUM,TAZOBACTAM SODIUM 3.38 G: 3; .375 INJECTION, POWDER, FOR SOLUTION INTRAVENOUS at 08:45

## 2017-02-03 RX ADMIN — ENOXAPARIN SODIUM 40 MG: 40 INJECTION SUBCUTANEOUS at 16:47

## 2017-02-03 RX ADMIN — VANCOMYCIN HYDROCHLORIDE 1500 MG: 10 INJECTION, POWDER, LYOPHILIZED, FOR SOLUTION INTRAVENOUS at 17:57

## 2017-02-03 RX ADMIN — INSULIN LISPRO 2 UNITS: 100 INJECTION, SOLUTION INTRAVENOUS; SUBCUTANEOUS at 21:19

## 2017-02-03 NOTE — PROGRESS NOTES
END OF SHIFT NOTE:    Intake/Output      Voiding: YES  Catheter: NO  Drain:   PEG/Gastrostomy Tube 01/23/17 (Active)   Site Assessment Clean, dry, & intact 2/2/2017  3:35 PM   Dressing Status Other (comment) 2/2/2017  3:35 PM   G Port Status Clamped 2/2/2017  3:35 PM   Action Taken Placement verified (comment) 2/2/2017  3:35 PM   Drainage Description Joanie Munson 2/2/2017  2:45 AM   Gastric Residual (mL) 0 ml 2/2/2017  3:35 PM   Tube Feeding/Formula Options Glucerna 1.5 2/2/2017  3:35 PM   Tube Feeding/Verify Rate (mL/hr) 0 2/2/2017  3:35 PM   Water Flush Volume (mL) 520 mL 2/2/2017  3:35 PM   Intake (ml) 720 ml 2/2/2017  3:35 PM   Medication Volume 60 ml 2/2/2017  3:35 PM       Stool:  7 occurrences. Emesis:  0 occurrences. VITAL SIGNS  Patient Vitals for the past 12 hrs:   Temp Pulse Resp BP SpO2   02/02/17 1945 99.6 °F (37.6 °C) (!) 101 20 109/58 92 %   02/02/17 1537 98.6 °F (37 °C) (!) 104 18 119/81 94 %   02/02/17 1231 99.6 °F (37.6 °C) 97 18 116/64 93 %       Pain Assessment  Pain 1  Pain Scale 1: Numeric (0 - 10) (02/02/17 1605)  Pain Intensity 1: 0 (02/02/17 1605)  Patient Stated Pain Goal: 0 (02/02/17 1535)  Pain Reassessment 1: Yes (02/02/17 1605)  Pain Onset 1: intermittent (02/02/17 1535)  Pain Location 1: Head (02/02/17 1535)  Pain Description 1: Aching (02/02/17 1535)  Pain Intervention(s) 1: Medication (see MAR) (02/02/17 1535)    Ambulating  YES    Additional Information: Patient is having diarrhea. Imodium ordered every 6 hours as needed (up to 8 tablets/day). Also complaining of a headache intermittently that is relieved by prn tylenol. Skin breakdown noted to chest due to trach drainage. Barrier cream applied to area and covered with allevyn dressing. Shift report given to oncoming nurse at the bedside.     Earline Christina RN

## 2017-02-03 NOTE — PROGRESS NOTES
Speech Pathology  Spoke with Tameka Self NP this morning to discuss recommendation for a cuffless trach for pt to assist with ease of care of trach at home. She was in agreement and reported she would place the consult to have it completed. Will continue to follow.      [address] CCC-SLP

## 2017-02-03 NOTE — PROGRESS NOTES
END OF SHIFT NOTE:    Intake/Output      Voiding: YES  Catheter: NO  Drain:   PEG/Gastrostomy Tube 01/23/17 (Active)   Site Assessment Clean, dry, & intact 2/3/2017  2:30 AM   Dressing Status Other (comment) 2/2/2017  7:30 PM   G Port Status Clamped 2/3/2017  2:30 AM   Action Taken Placement verified (comment) 2/3/2017  2:30 AM   Drainage Description Shanice Froston 2/3/2017  2:30 AM   Gastric Residual (mL) 0 ml 2/3/2017  2:30 AM   Tube Feeding/Formula Options Glucerna 1.5 2/3/2017  2:30 AM   Tube Feeding/Verify Rate (mL/hr) 0 2/2/2017  3:35 PM   Water Flush Volume (mL) 60 mL 2/3/2017  2:30 AM   Intake (ml) 720 ml 2/2/2017  3:35 PM   Medication Volume 60 ml 2/2/2017  3:35 PM               Stool:  0 occurrences. Emesis:  0 occurrences. VITAL SIGNS  Patient Vitals for the past 12 hrs:   Temp Pulse Resp BP SpO2   02/03/17 0327 98.8 °F (37.1 °C) 96 18 110/52 96 %   02/02/17 2339 98.9 °F (37.2 °C) 98 18 104/49 92 %   02/02/17 1945 99.6 °F (37.6 °C) (!) 101 20 109/58 92 %       Pain Assessment  Pain 1  Pain Scale 1: Numeric (0 - 10) (02/03/17 0230)  Pain Intensity 1: 0 (02/03/17 0230)  Patient Stated Pain Goal: 0 (02/02/17 1535)  Pain Reassessment 1: Yes (02/02/17 2230)  Pain Onset 1: intermittent  (02/02/17 2151)  Pain Location 1: Head (02/02/17 2151)  Pain Description 1: Aching (02/02/17 2151)  Pain Intervention(s) 1: Medication (see MAR) (02/02/17 2151)    Ambulating  YES    Additional Information: Pt refused night bolus feeding. Shift report given to oncoming nurse at the bedside.     Brooke Ramirez

## 2017-02-03 NOTE — PROGRESS NOTES
Inpatient Hematology / Oncology Progress Note      Admission Date: 2017  4:33 PM  Reason for Admission/Hospital Course: head/neck ca  ISI  Renal insufficiency     He is status post cycle 2 of Taxotere, Cisplatin, fluorouracil. 5-FU pump removed today. He was hospitalized following cycle 1 for neutropenic fever from which he recovered uneventfully. He did however fail a barium swallow test for all consistencies and was placed on TPN. PEG tube was placed as well as a trach. He returned today for toxicity check and found to have chest cellulitis stemming from his new trachea. He is neutropenic with low grade fever. He has a very foul odor most likely related to dying tumor. Labs also revealed blood sugar > 400 and renal insufficiency with creatinine of 2.2. He will be admitted for hydration, blood sugar control, and IV antibiotics.      24 Hour Events:  Tmax 99.1, vitals stable  Cellulitis to abdomen ongoing - draining this AM  Foul smell from tumor necrosis ongoing   + cough    ROS:  Constitutional: Negative for fever, chills, weakness, malaise, fatigue. CV: Negative for chest pain, palpitations, edema. Respiratory: Negative for dyspnea, cough, wheezing. GI: Positive for abdominal tenderness; negative for nausea, diarrhea. 10 point review of systems is otherwise negative with the exception of the elements mentioned above in the HPI. No Known Allergies    OBJECTIVE:  Patient Vitals for the past 8 hrs:   BP Temp Pulse Resp SpO2   17 0825 122/55 99.1 °F (37.3 °C) 93 18 94 %     Temp (24hrs), Av.1 °F (37.3 °C), Min:98.6 °F (37 °C), Max:99.6 °F (37.6 °C)     07 -  1900  In: 520   Out: -     Physical Exam:  Constitutional: Ill-appearing male in no acute distress, sitting comfortably in the hospital bed. HEENT: Normocephalic and atraumatic. Oropharynx is clear, mucous membranes are moist. No ulcerations or thrush. Neck supple.  Erythema around trach site extending down to chest to top of sternum - covered in tegaderm    Lymph node   Deferred   Skin Warm and dry. No bruising and no rash noted. No erythema. No pallor. Respiratory Lungs are clear to auscultation bilaterally without wheezes, rales or rhonchi, normal air exchange without accessory muscle use. CVS Normal rate, regular rhythm and normal S1 and S2. No murmurs, gallops, or rubs. Abdomen Soft, nontender and nondistended, normoactive bowel sounds. Erythema and crusting to midline abdominal incision and mild erythema around PEG tube site - open to air. Purulent drainage to midline incision   Neuro Grossly nonfocal with no obvious sensory or motor deficits. MSK Normal range of motion in general.  No edema and no tenderness. Psych Appropriate mood and affect.     Right chest wall port site without redness, tenderness or swelling    Labs:      Recent Labs      02/03/17   0515  02/02/17   0340  02/01/17   1400   WBC  1.2*  1.2*  1.3*   RBC  2.79*  2.93*  3.31*   HGB  7.7*  8.1*  9.0*   HCT  23.2*  24.6*  27.5*   MCV  83.2  84.0  83.1   MCH  27.6  27.6  27.2   MCHC  33.2  32.9  32.7   RDW  12.7  12.9  12.4   PLT  80*  104*  135*   GRANS  26*  48  69   LYMPH  56*  46*  28   MONOS  18*  3*  2*   EOS   --   1  1   BASOS   --   1  1   IG   --   0.8   --    DF  MANUAL  AUTOMATED  AUTOMATED   ANEU  0.3*  0.6*  0.9*   ABL  0.7  0.6  0.4*   ABM  0.2  0.0*  0.0*   MAHI   --   0.0  0.0   ABB   --   0.0  0.0   AIG   --   0.0   --         Recent Labs      02/03/17   0515  02/02/17   0340  02/01/17   1400   NA  144  143  143  134*   K  3.5  3.7  3.7  3.9   CL  107  105  105  97*   CO2  27  28  30  29   AGAP  10  10  8  8   GLU  120*  140*  142*  440*   BUN  27*  42*  39*  44*   CREA  1.91*  2.03*  2.02*  2.21*   GFRAA  46*  43*  44*  39*   GFRNA  38*  36*  36*  32*   CA  7.8*  8.0*  8.1*  8.6   SGOT   --   15  12*   AP   --   56  66   TP   --   6.3  7.4   ALB   --   2.4*  2.8*   GLOB   --   3.9*  4.6*   AGRAT   --   0.6*  0.6* MG  1.8  1.8   --      Imaging: NA      ASSESSMENT:    Problem List  Date Reviewed: 2/2/2017          Codes Class Noted    Cellulitis of neck ICD-10-CM: T93.442  ICD-9-CM: 682.1  2/1/2017        Tracheostomy in place ICD-10-CM: Z93.0  ICD-9-CM: V44.0  2/1/2017        Chemotherapy-induced neutropenia (HCC) ICD-10-CM: D70.1  ICD-9-CM: 288.03, E933.1  2/1/2017        Type 2 diabetes mellitus with hyperglycemia ([facility] ICD-10-CM: E11.65  ICD-9-CM: 250.00  1/24/2017        HTN (hypertension) ICD-10-CM: I10  ICD-9-CM: 401.9  1/24/2017        Head and neck cancer ([facility] ICD-10-CM: C76.0  ICD-9-CM: 195.0  1/24/2017        Neutropenic fever ([facility] ICD-10-CM: D70.9, R50.81  ICD-9-CM: 288.00, 780.61  1/12/2017        Pancytopenia ([facility] ICD-10-CM: J73.542  ICD-9-CM: 284.19  1/12/2017        * (Principal)Renal insufficiency ICD-10-CM: N28.9  ICD-9-CM: 593.9  1/10/2017        Non-intractable cyclical vomiting with nausea ICD-10-CM: G43. A0  ICD-9-CM: 536.2  1/10/2017        Squamous cell carcinoma of epiglottis (HCC) ICD-10-CM: C32.1  ICD-9-CM: 161.1  11/23/2016              PLAN:  Squamous cell carcinoma of epiglottis   Cycle 2 TCF completed today 02/01/17. Plan to proceed with surgery in 4-6 weeks. 2/2/17 Speech and nutrition on board to assist with nutritional needs  2/3/17 Discussed with speech - recommend ENT consult to change to cuffless trach. Will consult Dr. Vanessa Joshi     - Neutropenia  Start daily Granix 24 hours after completion of chemotherapy which would be 02/02 at 1700.   2/2/17 Has now received 2 doses granix. Continue daily     - Cellulitis  In the setting of immunocompromised/neutropenia - start Zosyn and Vancomycin. 2/2/17 Tmax 100.2. Continue current antibiotics. 2/3/17 Culture abdominal wound. Consult gen surgery to evaluate. Dressing under trach to prevent further breakdown from exudate     - Diabetes Mellitus type II - not controlled  Continue Metformin and start sliding scale Lispro.    Switch to diabetic formula tube feeds. Consult hospitalist for management. 2/2/17 BS controlled overnight. Continue sliding scale  2/3/17 Hospitalist holding metformin given his renal insufficiency. Can resume at discharge once creatinine normalizes. Continue sliding scale      - Renal Insufficiency   NS bolus 1000 mL x 1 then 150 mL/hr.   2/2/17 Continue aggressive hydration. Slight improvement to creatinine overnight (down to 2.02)  2/3/17 Continue fluids. Creatinine trending back down     - Trach care/mouth care frequently  - Jaspreet SOP's   - DVT prophylaxis with Lovenox      Changes to plan today: Consult surgery and ENT. PEG clogged this morning but now resolved with pepsi. Tolerating tube feeds well. Continue fluids, granix daily and supportive care               Margaret Chu NP   [address]  Office : [phone]  Fax : [phone]         Attending Addendum:  I personally evaluated the patient with Charlie Mills N.P.,  and agree with the assessment, findings and plan as documented. Appears stable, heart regular without murmur, lungs clear, abdomen benign. Clinically improved. Will get surgery to evaluate G tube drainage. Continue broad spectrum abx. ENT for cuffless trach as outpt on Wednesday.                Carmen Last MD  [facility]  [id number] [place] [address]  Office : [phone]  Fax : [phone]

## 2017-02-03 NOTE — PROGRESS NOTES
Care Management Interventions  PCP Verified by CM: Yes  Palliative Care Consult (Criteria: CHF and RRAT>21): Yes  Mode of Transport at Discharge: Other (see comment)  Transition of Care Consult (CM Consult): [address]: Yes  Reason Outside [place]: Patient already serviced by other home care/hospice agency  MyChart Signup: No  Discharge Durable Medical Equipment: No  Health Maintenance Reviewed: Yes  Physical Therapy Consult: Yes  Occupational Therapy Consult: Yes  Speech Therapy Consult: Yes  Current Support Network: Lives with Spouse, Own Home  Confirm Follow Up Transport: Family  Plan discussed with Pt/Family/Caregiver: Yes  Freedom of Choice Offered: Yes  Discharge Location  Discharge Placement: Home with home health     Patient recently dc'd from HOSP[facility] [address] with [address] and [facility] [employer] for TF and supplies.

## 2017-02-03 NOTE — PROGRESS NOTES
Patient requested to not be suctioned at this time. Inner cannula changed. O2 saturation is 92% on RA. Patient doesn't want humidity to his trach at this time.

## 2017-02-03 NOTE — PROGRESS NOTES
Care Management Interventions  PCP Verified by CM: Yes  Palliative Care Consult (Criteria: CHF and RRAT>21): Yes  Mode of Transport at Discharge:  Other (see comment)  Transition of Care Consult (CM Consult): [address]: No  [facility]: Out of service area  [address]: No  Discharge Durable Medical Equipment: No  Health Maintenance Reviewed: Yes  Physical Therapy Consult: No  Occupational Therapy Consult: No  Speech Therapy Consult: Yes  Current Support Network: Lives with Spouse, Own Home  Confirm Follow Up Transport: Family  Plan discussed with Pt/Family/Caregiver: Yes  Freedom of Choice Offered: Yes  Discharge Location  Discharge Placement: Home with home health     [facility] and supplies at [facility] [address]

## 2017-02-03 NOTE — CONSULTS
HOSPITALIST CONSULT  NAME:  Chanel Keller   Age:  62 y.o.  :   1955   MRN:   [id number]  PCP: Connor Ruano MD  Consulting MD:    Treatment Team: Attending Provider: Rajesh Salvador MD; Consulting Provider: Parker Rutherford MD; Utilization Review: Zan Gipson RN    ADMISSION DIAGNOSIS:neutropenic fever  REASON FOR CONSULT:DM, acute renal insufficiency,    HPI:   Patient is a 62year old gentleman with history of DM, HTN, recently diagnosed squamous cell CA, supraglottic, who underwent trach and debridement about 1 week ago Has had 2 cycles of chemotherapy, on recheck yesterday was noted to have fever, neutropenia, and renal insufficiency. The hospitalist group was consulted for DM management. 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 Taking? LORazepam (ATIVAN) 1 mg tablet Not Taking at Unknown time  No No   Sig: Take 0.5-1 Tabs by mouth every six (6) hours as needed for Anxiety. Max Daily Amount: 4 mg. Indications: CANCER CHEMOTHERAPY-INDUCED NAUSEA AND VOMITING   Lactose-Free Food with Fiber (JEVITY 1.5 BRANDO) 0.06 gram-1.5 kcal/mL liqd   No No   Si Cans by PEG Tube route daily for 100 days. Bolus Feedings, Jevity 1.5 or comparable, goal 6 cans/day. Pt will need additional 38-42 oz free water daily via PEG. Estimated ARRON greater than 90 days.   Indications: DYSPHAGIA   allopurinol (ZYLOPRIM) 300 mg tablet Not Taking at Unknown time  Yes No   Si mg nightly. Indications: GOUT   amLODIPine (NORVASC) 10 mg tablet Not Taking at Unknown time  Yes No   Sig: Take 10 mg by mouth nightly. Take / use AM day of surgery  per anesthesia protocols. Indications: Hypertension   aspirin delayed-release 81 mg tablet   Yes No   Sig: Take 81 mg by mouth every morning. Take / use AM day of surgery  per anesthesia protocols. Indications: myocardial infarction prevention   clindamycin (CLEOCIN) 75 mg/5 mL solution   No No   Sig: 10 mL by mouth 4 times daily x 7 days   glipiZIDE SR (GLUCOTROL) 10 mg CR tablet   Yes No   Sig: Take 10 mg by mouth two (2) times a day. Indications: type 2 diabetes mellitus   lidocaine-prilocaine (EMLA) topical cream   No No   Sig: Apply  to affected area as needed. Apply 1/2 inch amount of cream to port site 90 minutes prior to needle access. losartan (COZAAR) 50 mg tablet Not Taking at Unknown time  Yes No   Si mg two (2) times a day. Indications: Hypertension   magic mouthwash (GLADYS) susp   No No   Sig: Take 10 mL by mouth every four (4) hours. metFORMIN (GLUCOPHAGE) 500 mg tablet   Yes No   Sig: Take 500 mg by mouth three (3) times daily (with meals). Indications: PREVENTION OF TYPE 2 DIABETES MELLITUS   omeprazole (PRILOSEC) 20 mg capsule Not Taking at Unknown time  Yes No   Sig: Take 20 mg by mouth every morning. Take / use AM day of surgery  per anesthesia protocols. Indications: GASTROESOPHAGEAL REFLUX   ondansetron hcl (ZOFRAN) 8 mg tablet   No No   Sig: Take 1 Tab by mouth every eight (8) hours as needed for Nausea. prochlorperazine (COMPAZINE) 10 mg tablet   No No   Sig: Take 1 Tab by mouth every six (6) hours as needed. triamterene-hydroCHLOROthiazide (MAXZIDE) 37.5-25 mg per tablet Not Taking at Unknown time  Yes No   Sig: Take 1 Tab by mouth every morning.  Indications: Edema, Hypertension      Facility-Administered Medications: None     No Known Allergies   Social History   Substance Use Topics    Smoking status: Former Smoker     Packs/day: 2.00     Years: 20.00     Quit date:     Smokeless tobacco: Never Used    Alcohol use No      Family History   Problem Relation Age of Onset    Stroke Mother     Lung Disease Mother       Objective:     Visit Vitals    /55    Pulse 93    Temp 99.1 °F (37.3 °C)    Resp 18    Wt 102.5 kg (225 lb 14.4 oz)    SpO2 94%    BMI 33.36 kg/m2      Temp (24hrs), Av.1 °F (37.3 °C), Min:98.6 °F (37 °C), Max:99.6 °F (37.6 °C)    Oxygen Therapy  O2 Sat (%): 94 % (17)  O2 Device: Room air (17)  Physical Exam:  General:    Alert, cooperative, no distress, appears stated age. Head:   Normocephalic, without obvious abnormality, atraumatic.tacheostomy noted, erythema looks more inflammatory than infective  Nose:  Nares normal. No drainage or sinus tenderness. Lungs:   Clear to auscultation bilaterally. No Wheezing or Rhonchi. No rales. Heart:   Regular rate and rhythm,  no murmur, rub or gallop. Abdomen:   Soft, non-tender. Not distended. Bowel sounds normal. Wound crusting, peg has mild erythema  Extremities: No cyanosis. No edema. No clubbing  Skin:     Texture, turgor normal. No rashes or lesions.   Not Jaundiced  Neurologic: Alert and oriented x 3, no focal deficits   Data Review: personally by me   Recent Results (from the past 24 hour(s))   GLUCOSE, POC    Collection Time: 17 12:28 PM   Result Value Ref Range    Glucose (POC) 159 (H) 65 - 100 mg/dL   GLUCOSE, POC    Collection Time: 17  4:53 PM   Result Value Ref Range    Glucose (POC) 256 (H) 65 - 100 mg/dL   GLUCOSE, POC    Collection Time: 17  9:36 PM   Result Value Ref Range    Glucose (POC) 170 (H) 65 - 425 mg/dL   METABOLIC PANEL, BASIC    Collection Time: 17  5:15 AM   Result Value Ref Range    Sodium 144 136 - 145 mmol/L    Potassium 3.5 3.5 - 5.1 mmol/L    Chloride 107 98 - 107 mmol/L    CO2 27 21 - 32 mmol/L    Anion gap 10 7 - 16 mmol/L    Glucose 120 (H) 65 - 100 mg/dL    BUN 27 (H) 8 - 23 MG/DL    Creatinine 1.91 (H) 0.8 - 1.5 MG/DL    GFR est AA 46 (L) >60 ml/min/1.73m2    GFR est non-AA 38 (L) >60 ml/min/1.73m2    Calcium 7.8 (L) 8.3 - 10.4 MG/DL   MAGNESIUM    Collection Time: 02/03/17  5:15 AM   Result Value Ref Range    Magnesium 1.8 1.8 - 2.4 mg/dL   CBC WITH AUTOMATED DIFF    Collection Time: 02/03/17  5:15 AM   Result Value Ref Range    WBC 1.2 (LL) 4.3 - 11.1 K/uL    RBC 2.79 (L) 4.23 - 5.67 M/uL    HGB 7.7 (L) 13.6 - 17.2 g/dL    HCT 23.2 (L) 41.1 - 50.3 %    MCV 83.2 79.6 - 97.8 FL    MCH 27.6 26.1 - 32.9 PG    MCHC 33.2 31.4 - 35.0 g/dL    RDW 12.7 11.9 - 14.6 %    PLATELET 80 (L) 360 - 450 K/uL    MPV 10.4 (L) 10.8 - 14.1 FL    NEUTROPHILS 26 (L) 47 - 75 %    LYMPHOCYTES 56 (H) 16 - 44 %    MONOCYTES 18 (H) 3 - 9 %    TOTAL CELLS COUNTED 50      ABS. NEUTROPHILS 0.3 (L) 1.7 - 8.2 K/UL    ABS. LYMPHOCYTES 0.7 0.5 - 4.6 K/UL    ABS. MONOCYTES 0.2 0.1 - 1.3 K/UL    RBC COMMENTS NORMOCYTIC/NORMOCHROMIC      PLATELET COMMENTS DECREASED      DF MANUAL     GLUCOSE, POC    Collection Time: 02/03/17  8:25 AM   Result Value Ref Range    Glucose (POC) 124 (H) 65 - 100 mg/dL     Imaging /Procedures /Studies reviewed personally by me  XR Results (most recent):    Results from Hospital Encounter encounter on 01/11/17   XR SWALLOW FUNC VIDEO   Narrative Modified Barium Swallow, 1/12/2017    History: Dysphagia, cancer of the epiglottis. Technique: Fluoroscopic guidance was provided for the speech pathologist.  Multiple consistencies of barium were given. Findings: The patient's ability to swallow was evaluated with thin, nectar, and  pudding consistencies. The patient had gross aspiration with all consistencies  tested. This study was discontinued at this point. Please see speech pathologist  report for further details. Fluoroscopy time: 0.5 minutes.             CT Scan    Results from Orders Only encounter on 11/16/16   CT NECK SOFT TISSUE W CONT   Narrative CT of the neck     Comparison: None    Indication: Glottic and supraglottic neoplasm during laryngoscopy, sore throat,  initial evaluation    Technique: Multiple sequential axial images from the lung apices to the level of  the orbits obtained with 80 mL of Isovue 370. This study was acquired following  the IV administration of iodinated contrast material, given the patients  indications for the examination. If IV contrast material had not been  administered, the likelihood of detecting abnormalities relevant to the patients  condition would have been substantially decreased. Radiation dose reduction  techniques were used for this study:  Our CT scanners use one or all of the  following: Automated exposure control, adjustment of the mA and/or kVp according  to patient's size, iterative reconstruction. Findings:    Parotid and submandibular glands are normal. Thyroid gland is normal.  Carotid  arteries and jugular veins appear patent. Nasopharynx, oropharynx are normal. Supraglottic soft tissue mass extending  across the midline measures up to 3.9 x 2.6 x 2.3 cm and extending inferiorly  apparently to involve the left true vocal cord. Overlying cartilage preserved. Adjacent level 3 adenopathy measures up to 1.8 cm short axis series 2 image 59. Marten Reuben No compression of the airway. Parapharyngeal fat is normal. [place] and  lingual tonsils are normal.    Cervical spine is normal in appearance. Imaged intracranial structures are  normal. Orbits are normal. Paranasal sinuses and mastoid air cells are normal.  Visualized lung apices and mediastinum show minimal emphysema. Impression Impression:   Large supraglottic soft tissue mass extending inferiorly to involve the left  true vocal cord. Adjacent regional left neck adenopathy. Assessment and Plan:      Active Hospital Problems    Diagnosis Date Noted    Cellulitis of neck 02/01/2017    Tracheostomy in place 02/01/2017    Chemotherapy-induced neutropenia (HCC) 02/01/2017    Type 2 diabetes mellitus with hyperglycemia ([facility] 01/24/2017    Pancytopenia ([facility] 01/12/2017    Renal insufficiency 01/10/2017    Squamous cell carcinoma of epiglottis (HCC) 11/23/2016       PLAN  ·  DM - will continue SSI, glucose has been controlled. His Cr is still elevated, I will hold his metformin and would not resume it until his Creatinine is below 1.5. Once his kidney function has recovered, I would resume his regular dose of metformin and glipizide and stop insulin SS. · Renal insufficiency - agree with hydration. Stop diuretics. Unless completely necessary, I would not resume them ( will make him prone to dehydration and ISI). His Cr is improving. If at some point his Cr is not getting better, consider holding Cozaar ( ARB II are usually stopped during episodes of ISI)   · Neutropenic fever - as per oncology, on antibiotics, unclear source. · Squamous Cell CA - treatment as per oncology. Code Status: full code    Anticipated discharge: as per oncology    Thank you for this interesting consult. Will sign off today. Call us for any need.  Thanks!!     Signed By: Antonina Zaidi MD     February 3, 2017

## 2017-02-03 NOTE — CONSULTS
[facility]  [address] [place] [address]  [phone]    Office Note/H&P/Consult Note   Malik Knott   MRN: [id number]     : 1955        General Surgery Consult Ordered By: Kathy Bernardo NP  Reason for Consult: Assess PEG tube    HPI: Malik Knott is a 62 y.o. male who was consulted to General Surgery for issues regarding his PEG tube. States earlier today the tube would not flush and was leaking around tube. PEG tube originally placed by Dr. Asael Dominique 17. He was a gentleman in generally good health with the exception of diabetes mellitus which was non-insulin requiring. Beginning in the 2016, he began to note some difficulty with dysphagia as well as some change in his voice. Ultimately, there was some left-sided otalgia for which she sought help from his primary care physician. A course of antibiotics did not prove helpful and ultimately he was referred to Dr. Tita Farfan for evaluation. The patient had not smoked for approximately 11 years prior to his initial visit. He did not abuse alcohol. The patient underwent a flexible laryngoscopy demonstrating a large mass involving the entire epiglottis. The tumor appeared to involve the aryepiglottic folds. The vocal cords were not visualized. CT scanning on November 3, 2016 demonstrated 3.9 x 2.6 x 2.3 cm supraglottic mass extending across midline. Inferiorly, it appeared to involve the left true vocal cord. The overlying thyroid cartilage seems preserved. There was a level 3 lymph node on the left side measuring 1.8 cm in short axis. On , the patient was taken to the OR for panendoscopy. Dr. Janina Vaughn notes from that visit indicate that he visualized the true and false vocal cords both of which appeared uninvolved by the tumor. He also did micro-debridement, subsequent to which the patient's voice appeared to be improved.  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 Past Medical History   Diagnosis Date    GERD (gastroesophageal reflux disease)      managed with medication     Gout      symptoms in ankles, no recent episodes    Hypertension      managed with medication     Morbid [facility]     Squamous cell carcinoma of epiglottis ([facility] 11/23/2016    Type 2 diabetes mellitus ([facility]      oral hypoglycemic/ does not check BS      Past Surgical History   Procedure Laterality Date    Hx colonoscopy  2016    Hx other surgical Left      at age 12 had 2rd nipple removed     Current Facility-Administered Medications   Medication Dose Route Frequency    NUTRITIONAL SUPPORT ELECTROLYTE PRN ORDERS   Does Not Apply PRN    loperamide (IMODIUM) capsule 4 mg  4 mg Oral Q6H PRN    allopurinol (ZYLOPRIM) tablet 300 mg  300 mg Per J Tube QHS    amLODIPine (NORVASC) tablet 10 mg  10 mg Per J Tube QHS    losartan (COZAAR) tablet 50 mg  50 mg Per J Tube BID    LORazepam (ATIVAN) tablet 0.5-1 mg  0.5-1 mg Per J Tube Q6H PRN    ondansetron (ZOFRAN ODT) tablet 8 mg  8 mg Oral Q8H    prochlorperazine (COMPAZINE) tablet 10 mg  10 mg Per J Tube Q6H PRN    0.9% sodium chloride infusion  150 mL/hr IntraVENous CONTINUOUS    insulin lispro (HUMALOG) injection   SubCUTAneous AC&HS    enoxaparin (LOVENOX) injection 40 mg  40 mg SubCUTAneous Q24H    tbo-filgrastim (GRANIX) injection 480 mcg  480 mcg SubCUTAneous DAILY    vancomycin (VANCOCIN) 1500 mg in  ml infusion  1,500 mg IntraVENous Q24H    piperacillin-tazobactam (ZOSYN) 3.375 g in 0.9% sodium chloride (MBP/ADV) 100 mL  3.375 g IntraVENous Q8H    acetaminophen (TYLENOL) tablet 650 mg  650 mg Oral Q4H PRN     ALLERGIES:  Review of patient's allergies indicates no known allergies.     Social History     Social History    Marital status:      Spouse name: N/A    Number of children: N/A    Years of education: N/A     Social History Main Topics    Smoking status: Former Smoker     Packs/day: 2.00     Years: 20.00     Quit date: 2005    Smokeless tobacco: Never Used    Alcohol use No    Drug use: No    Sexual activity: Not on file     Other Topics Concern    Not on file     Social History Narrative     History   Smoking Status    Former Smoker    Packs/day: 2.00    Years: 20.00    Quit date: 2005   Smokeless Tobacco    Never Used     Family History   Problem Relation Age of Onset    Stroke Mother     Lung Disease Mother        ROS: The patient has no difficulty with chest pain or shortness of breath. No fever or chills. Comprehensive review of systems was otherwise unremarkable except as noted above. Physical Exam:   Constitutional: Alert, cooperative,  no acute distress. Visit Vitals    /68 (BP 1 Location: Left arm, BP Patient Position: Supine)    Pulse 98    Temp 99.6 °F (37.6 °C)    Resp 20    Wt 225 lb 14.4 oz (102.5 kg)    SpO2 96%    BMI 33.36 kg/m2     Eyes:Sclera are clear. ENMT: no obvious neck masses, no ear or lip lesions  CV: RRR. Normal perfusion  Resp: No JVD. Breathing is  non-labored, CTA  GI: obese, soft, non-tender, nondistended, bowel sounds active. Erythema and crusting to midline abdominal incision and mild erythema around PEG tube site - open to air. Musculoskeletal: unremarkable with normal function. Neuro:  AOx3, No obvious focal deficits  Psychiatric: normal affect and mood, no memory impairment      Labs:  Lab Results   Component Value Date/Time    WBC 1.2 02/03/2017 05:15 AM    PLATELET 80 22/65/5886 05:15 AM    Sodium 144 02/03/2017 05:15 AM    Potassium 3.5 02/03/2017 05:15 AM    Chloride 107 02/03/2017 05:15 AM    CO2 27 02/03/2017 05:15 AM    BUN 27 02/03/2017 05:15 AM    Creatinine 1.91 02/03/2017 05:15 AM    Glucose 120 02/03/2017 05:15 AM    INR 0.9 12/20/2016 01:44 PM    aPTT 24.7 12/20/2016 01:44 PM    Bilirubin, total 0.6 02/02/2017 03:40 AM    AST (SGOT) 15 02/02/2017 03:40 AM    ALT (SGPT) 19 02/02/2017 03:40 AM    Alk. phosphatase 56 02/02/2017 03:40 AM       No results found for this or any previous visit. Assessment/Plan: Tamie Jimenez is a 62 y.o. male who has signs and symptoms consistent with the below issues:  Problem List  Date Reviewed: 2/2/2017          Codes Class Noted    Cellulitis of neck ICD-10-CM: L03.221  ICD-9-CM: 682.1  2/1/2017        Tracheostomy in place ICD-10-CM: Z93.0  ICD-9-CM: V44.0  2/1/2017        Chemotherapy-induced neutropenia ([facility] ICD-10-CM: D70.1  ICD-9-CM: 288.03, E933.1  2/1/2017        Type 2 diabetes mellitus with hyperglycemia ([facility] ICD-10-CM: E11.65  ICD-9-CM: 250.00  1/24/2017        HTN (hypertension) ICD-10-CM: I10  ICD-9-CM: 401.9  1/24/2017        Head and neck cancer ([facility] ICD-10-CM: C76.0  ICD-9-CM: 195.0  1/24/2017        Neutropenic fever ([facility] ICD-10-CM: D70.9, R50.81  ICD-9-CM: 288.00, 780.61  1/12/2017        Pancytopenia ([facility] ICD-10-CM: O70.727  ICD-9-CM: 284.19  1/12/2017        * (Principal)Renal insufficiency ICD-10-CM: N28.9  ICD-9-CM: 593.9  1/10/2017        Non-intractable cyclical vomiting with nausea ICD-10-CM: G43. A0  ICD-9-CM: 536.2  1/10/2017        Squamous cell carcinoma of epiglottis (HCC) ICD-10-CM: C32.1  ICD-9-CM: 161.1  11/23/2016          Care Management per Hospitalist  Oncology Following  General Surgery  --PEG clogged this morning, now resolved with pepsi. PEG flushing without problems or significant leakage. --Further orders per Dr. Flex James, [facility]    The patient was seen in conjunction with  who independently evaluated the patient, reviewed the chart and agreed with the assessment and plan. I have seen and independently examined this patient in addition to the Nurse Practitioner and I formulated the assessment and plan and communicated the plan to her for the completion of the above note. The plan will include fixation of the tube and drain study. There is no apparent complication at this time.   I have discussed the plan with the patient and they are in agreement. If they have any additional questions in the interim I have asked them to notify the nurse to call me. Jailyn Franco MD, FACS  General and Bariatric Surgery  [facility].

## 2017-02-04 ENCOUNTER — APPOINTMENT (OUTPATIENT)
Dept: GENERAL RADIOLOGY | Age: 62
DRG: 699 | End: 2017-02-04
Attending: SURGERY
Payer: COMMERCIAL

## 2017-02-04 LAB
ANION GAP BLD CALC-SCNC: 8 MMOL/L (ref 7–16)
BASOPHILS # BLD AUTO: 0 K/UL (ref 0–0.2)
BASOPHILS # BLD: 2 % (ref 0–2)
BUN SERPL-MCNC: 23 MG/DL (ref 8–23)
CALCIUM SERPL-MCNC: 7.4 MG/DL (ref 8.3–10.4)
CHLORIDE SERPL-SCNC: 104 MMOL/L (ref 98–107)
CO2 SERPL-SCNC: 31 MMOL/L (ref 21–32)
CREAT SERPL-MCNC: 1.81 MG/DL (ref 0.8–1.5)
DIFFERENTIAL METHOD BLD: ABNORMAL
EOSINOPHIL # BLD: 0 K/UL (ref 0–0.8)
EOSINOPHIL NFR BLD: 2 % (ref 0.5–7.8)
ERYTHROCYTE [DISTWIDTH] IN BLOOD BY AUTOMATED COUNT: 12.5 % (ref 11.9–14.6)
GLUCOSE BLD STRIP.AUTO-MCNC: 129 MG/DL (ref 65–100)
GLUCOSE BLD STRIP.AUTO-MCNC: 136 MG/DL (ref 65–100)
GLUCOSE BLD STRIP.AUTO-MCNC: 142 MG/DL (ref 65–100)
GLUCOSE BLD STRIP.AUTO-MCNC: 161 MG/DL (ref 65–100)
GLUCOSE SERPL-MCNC: 119 MG/DL (ref 65–100)
HCT VFR BLD AUTO: 21.6 % (ref 41.1–50.3)
HGB BLD-MCNC: 7.2 G/DL (ref 13.6–17.2)
IMM GRANULOCYTES # BLD: 0 K/UL (ref 0–0.5)
IMM GRANULOCYTES NFR BLD AUTO: 0 % (ref 0–5)
LYMPHOCYTES # BLD AUTO: 58 % (ref 13–44)
LYMPHOCYTES # BLD: 0.6 K/UL (ref 0.5–4.6)
MAGNESIUM SERPL-MCNC: 1.5 MG/DL (ref 1.8–2.4)
MCH RBC QN AUTO: 27.2 PG (ref 26.1–32.9)
MCHC RBC AUTO-ENTMCNC: 33.3 G/DL (ref 31.4–35)
MCV RBC AUTO: 81.5 FL (ref 79.6–97.8)
MONOCYTES # BLD: 0.1 K/UL (ref 0.1–1.3)
MONOCYTES NFR BLD AUTO: 7 % (ref 4–12)
NEUTS SEG # BLD: 0.3 K/UL (ref 1.7–8.2)
NEUTS SEG NFR BLD AUTO: 31 % (ref 43–78)
PLATELET # BLD AUTO: 71 K/UL (ref 150–450)
PMV BLD AUTO: 9.7 FL (ref 10.8–14.1)
POTASSIUM SERPL-SCNC: 3.2 MMOL/L (ref 3.5–5.1)
RBC # BLD AUTO: 2.65 M/UL (ref 4.23–5.67)
SODIUM SERPL-SCNC: 143 MMOL/L (ref 136–145)
VANCOMYCIN TROUGH SERPL-MCNC: 7.8 UG/ML (ref 5–20)
WBC # BLD AUTO: 1.1 K/UL (ref 4.3–11.1)

## 2017-02-04 PROCEDURE — 85025 COMPLETE CBC W/AUTO DIFF WBC: CPT | Performed by: NURSE PRACTITIONER

## 2017-02-04 PROCEDURE — 65270000029 HC RM PRIVATE

## 2017-02-04 PROCEDURE — 77030019605

## 2017-02-04 PROCEDURE — 74011250636 HC RX REV CODE- 250/636: Performed by: NURSE PRACTITIONER

## 2017-02-04 PROCEDURE — 36591 DRAW BLOOD OFF VENOUS DEVICE: CPT

## 2017-02-04 PROCEDURE — 74011636637 HC RX REV CODE- 636/637: Performed by: NURSE PRACTITIONER

## 2017-02-04 PROCEDURE — 77030006998

## 2017-02-04 PROCEDURE — 74011250637 HC RX REV CODE- 250/637: Performed by: NURSE PRACTITIONER

## 2017-02-04 PROCEDURE — 80202 ASSAY OF VANCOMYCIN: CPT | Performed by: INTERNAL MEDICINE

## 2017-02-04 PROCEDURE — 74011636320 HC RX REV CODE- 636/320: Performed by: INTERNAL MEDICINE

## 2017-02-04 PROCEDURE — 74011250636 HC RX REV CODE- 250/636: Performed by: INTERNAL MEDICINE

## 2017-02-04 PROCEDURE — 74011000258 HC RX REV CODE- 258: Performed by: NURSE PRACTITIONER

## 2017-02-04 PROCEDURE — 82962 GLUCOSE BLOOD TEST: CPT

## 2017-02-04 PROCEDURE — 83735 ASSAY OF MAGNESIUM: CPT | Performed by: NURSE PRACTITIONER

## 2017-02-04 PROCEDURE — 80048 BASIC METABOLIC PNL TOTAL CA: CPT | Performed by: NURSE PRACTITIONER

## 2017-02-04 PROCEDURE — 74000 XR ABD (KUB): CPT

## 2017-02-04 RX ORDER — MAGNESIUM SULFATE HEPTAHYDRATE 40 MG/ML
2 INJECTION, SOLUTION INTRAVENOUS ONCE
Status: COMPLETED | OUTPATIENT
Start: 2017-02-04 | End: 2017-02-04

## 2017-02-04 RX ORDER — POTASSIUM CHLORIDE 20MEQ/15ML
20 LIQUID (ML) ORAL 2 TIMES DAILY WITH MEALS
Status: DISCONTINUED | OUTPATIENT
Start: 2017-02-04 | End: 2017-02-05

## 2017-02-04 RX ORDER — CITALOPRAM 10 MG/1
10 TABLET ORAL DAILY
Status: DISCONTINUED | OUTPATIENT
Start: 2017-02-04 | End: 2017-02-05 | Stop reason: HOSPADM

## 2017-02-04 RX ORDER — POTASSIUM CHLORIDE 29.8 MG/ML
40 INJECTION INTRAVENOUS ONCE
Status: COMPLETED | OUTPATIENT
Start: 2017-02-04 | End: 2017-02-04

## 2017-02-04 RX ORDER — FLUCONAZOLE 100 MG/1
200 TABLET ORAL DAILY
Status: DISCONTINUED | OUTPATIENT
Start: 2017-02-04 | End: 2017-02-05 | Stop reason: HOSPADM

## 2017-02-04 RX ORDER — HYDROMORPHONE HYDROCHLORIDE 1 MG/ML
1 INJECTION, SOLUTION INTRAMUSCULAR; INTRAVENOUS; SUBCUTANEOUS
Status: DISCONTINUED | OUTPATIENT
Start: 2017-02-04 | End: 2017-02-05 | Stop reason: HOSPADM

## 2017-02-04 RX ORDER — ONDANSETRON 8 MG/1
8 TABLET, ORALLY DISINTEGRATING ORAL
Status: DISCONTINUED | OUTPATIENT
Start: 2017-02-04 | End: 2017-02-05 | Stop reason: HOSPADM

## 2017-02-04 RX ORDER — LANOLIN ALCOHOL/MO/W.PET/CERES
400 CREAM (GRAM) TOPICAL 3 TIMES DAILY
Status: DISCONTINUED | OUTPATIENT
Start: 2017-02-04 | End: 2017-02-05 | Stop reason: HOSPADM

## 2017-02-04 RX ADMIN — ALLOPURINOL 300 MG: 300 TABLET ORAL at 22:19

## 2017-02-04 RX ADMIN — AMLODIPINE BESYLATE 10 MG: 10 TABLET ORAL at 22:19

## 2017-02-04 RX ADMIN — SODIUM CHLORIDE 150 ML/HR: 900 INJECTION, SOLUTION INTRAVENOUS at 00:37

## 2017-02-04 RX ADMIN — MAGNESIUM SULFATE HEPTAHYDRATE 2 G: 40 INJECTION, SOLUTION INTRAVENOUS at 11:48

## 2017-02-04 RX ADMIN — PIPERACILLIN SODIUM,TAZOBACTAM SODIUM 3.38 G: 3; .375 INJECTION, POWDER, FOR SOLUTION INTRAVENOUS at 00:36

## 2017-02-04 RX ADMIN — VANCOMYCIN HYDROCHLORIDE 1500 MG: 10 INJECTION, POWDER, LYOPHILIZED, FOR SOLUTION INTRAVENOUS at 20:11

## 2017-02-04 RX ADMIN — DIATRIZOATE MEGLUMINE AND DIATRIZOATE SODIUM 30 ML: 660; 100 LIQUID ORAL; RECTAL at 16:07

## 2017-02-04 RX ADMIN — PIPERACILLIN SODIUM,TAZOBACTAM SODIUM 3.38 G: 3; .375 INJECTION, POWDER, FOR SOLUTION INTRAVENOUS at 17:53

## 2017-02-04 RX ADMIN — LORAZEPAM 1 MG: 0.5 TABLET ORAL at 22:19

## 2017-02-04 RX ADMIN — Medication 400 MG: at 22:19

## 2017-02-04 RX ADMIN — INSULIN LISPRO 2 UNITS: 100 INJECTION, SOLUTION INTRAVENOUS; SUBCUTANEOUS at 22:18

## 2017-02-04 RX ADMIN — POTASSIUM CHLORIDE 40 MEQ: 29.8 INJECTION, SOLUTION INTRAVENOUS at 11:48

## 2017-02-04 RX ADMIN — TBO-FILGRASTIM 480 MCG: 480 INJECTION, SOLUTION SUBCUTANEOUS at 11:49

## 2017-02-04 RX ADMIN — PIPERACILLIN SODIUM,TAZOBACTAM SODIUM 3.38 G: 3; .375 INJECTION, POWDER, FOR SOLUTION INTRAVENOUS at 10:47

## 2017-02-04 NOTE — PROGRESS NOTES
END OF SHIFT NOTE:    Intake/Output      Voiding: YES  Catheter: NO  Drain:   PEG/Gastrostomy Tube 01/23/17 (Active)   Site Assessment Clean, dry, & intact 2/3/2017  8:14 PM   Dressing Status Clean, dry, & intact 2/3/2017  8:14 PM   G Port Status Clamped 2/3/2017  8:14 PM   Action Taken Retaped 2/3/2017  8:14 PM   Drainage Description Serosanguinous 2/3/2017  8:14 PM   Gastric Residual (mL) 150 ml 2/3/2017  1:51 PM   Tube Feeding/Formula Options Glucerna 1.5 2/3/2017 10:46 AM   Tube Feeding/Verify Rate (mL/hr) 0 2/2/2017  3:35 PM   Water Flush Volume (mL) 160 mL 2/3/2017  5:10 PM   Intake (ml) 360 ml 2/3/2017  5:10 PM   Medication Volume 20 ml 2/3/2017  5:10 PM               Stool:  0 occurrences. Emesis:  0 occurrences. VITAL SIGNS  Patient Vitals for the past 12 hrs:   Temp Pulse Resp BP SpO2   02/03/17 2108 98.2 °F (36.8 °C) - - - -   02/03/17 1946 100.4 °F (38 °C) 84 20 134/71 90 %   02/03/17 1537 99.6 °F (37.6 °C) 98 20 128/68 96 %   02/03/17 1230 98.4 °F (36.9 °C) 97 18 120/61 93 %       Pain Assessment  Pain 1  Pain Scale 1: Numeric (0 - 10) (02/03/17 1942)  Pain Intensity 1: 6 (02/03/17 1942)  Patient Stated Pain Goal: 0 (02/02/17 1535)  Pain Reassessment 1: Yes (02/02/17 2230)  Pain Onset 1: today (02/03/17 1942)  Pain Location 1: Head (02/03/17 1942)  Pain Description 1: Aching (02/03/17 1942)  Pain Intervention(s) 1: Medication (see MAR) (02/03/17 1942)    Ambulating  YES    Additional Information:     Shift report given to oncoming nurse at the bedside.     Smiley Correia RN

## 2017-02-04 NOTE — PROGRESS NOTES
Reviewed notes prior to visiting patient. Hoa identified and contact information on file. Patient appears calm. Sitting in chair. Receptive to  visit as patient asked for prayer. Provided supportive presence as we spent time together. Patient benefited from addressing his  spiritual needs. Will continue to review and explore possible support during this admission.   Signed by Magui Hinojosa MDIV, [facility]

## 2017-02-04 NOTE — PROGRESS NOTES
Inpatient Hematology / Oncology Progress Note      Admission Date: 2017  4:33 PM  Reason for Admission/Hospital Course: head/neck ca  ISI  Renal insufficiency     He is status post cycle 2 of Taxotere, Cisplatin, fluorouracil. 5-FU pump removed today. He was hospitalized following cycle 1 for neutropenic fever from which he recovered uneventfully. He did however fail a barium swallow test for all consistencies and was placed on TPN. PEG tube was placed as well as a trach. He returned today for toxicity check and found to have chest cellulitis stemming from his new trachea. He is neutropenic with low grade fever. He has a very foul odor most likely related to dying tumor. Labs also revealed blood sugar > 400 and renal insufficiency with creatinine of 2.2. He will be admitted for hydration, blood sugar control, and IV antibiotics.      24 Hour Events:  Tmax 100.3, vitals stable  Foul smell much improved. Coughing. Headache. Copious amounts of drainage around PEG. Asking about going straight to surgery rather than proceed with chemoradiation. ROS:  Constitutional: Negative for fever, chills, weakness, malaise, fatigue. CV: Negative for chest pain, palpitations, edema. Respiratory: Negative for dyspnea, wheezing. + cough. GI: Positive for abdominal tenderness, copious amounts of drainage around PEG. Negative for nausea, diarrhea. 10 point review of systems is otherwise negative with the exception of the elements mentioned above in the HPI.      No Known Allergies    OBJECTIVE:  Patient Vitals for the past 8 hrs:   BP Temp Pulse Resp SpO2 Weight   17 0709 129/60 99.7 °F (37.6 °C) 96 22 95 % -   17 0403 138/66 100.3 °F (37.9 °C) 99 18 91 % -   17 0320 - - - - - 226 lb 11.2 oz (102.8 kg)     Temp (24hrs), Av.3 °F (37.4 °C), Min:98.2 °F (36.8 °C), Max:100.4 °F (38 °C)         Physical Exam:  Constitutional: Ill-appearing male in no acute distress, sitting comfortably in the hospital bed. HEENT: Normocephalic and atraumatic. Oropharynx is clear, mucous membranes are moist. No ulcerations or thrush. Neck supple. Erythema around trach site extending down to chest to top of sternum - covered in tegaderm. Drainage from trach continues. Lymph node   Deferred. Skin Warm and dry. No bruising and no rash noted. No erythema. No pallor. Respiratory Lungs are clear to auscultation bilaterally without wheezes, rales or rhonchi, normal air exchange without accessory muscle use. CVS Normal rate, regular rhythm and normal S1 and S2. No murmurs, gallops, or rubs. Abdomen Soft, tender and mildly distended. Normoactive bowel sounds. Erythema and crusting to midline abdominal incision and mild erythema around PEG tube site - covered in a bandage with copious amounts of dark green fluid draining from site. Neuro Grossly nonfocal with no obvious sensory or motor deficits. MSK Normal range of motion in general.  No edema and no tenderness. Psych Appropriate mood and affect.     Right chest wall port site without redness, tenderness or swelling    Labs:      Recent Labs      02/04/17   0400  02/03/17   0515  02/02/17   0340  02/01/17   1400   WBC  1.1*  1.2*  1.2*  1.3*   RBC  2.65*  2.79*  2.93*  3.31*   HGB  7.2*  7.7*  8.1*  9.0*   HCT  21.6*  23.2*  24.6*  27.5*   MCV  81.5  83.2  84.0  83.1   MCH  27.2  27.6  27.6  27.2   MCHC  33.3  33.2  32.9  32.7   RDW  12.5  12.7  12.9  12.4   PLT  71*  80*  104*  135*   GRANS  31*  26*  48  69   LYMPH  58*  56*  46*  28   MONOS  7  18*  3*  2*   EOS  2   --   1  1   BASOS  2   --   1  1   IG  0.0   --   0.8   --    DF  AUTOMATED  MANUAL  AUTOMATED  AUTOMATED   ANEU  0.3*  0.3*  0.6*  0.9*   ABL  0.6  0.7  0.6  0.4*   ABM  0.1  0.2  0.0*  0.0*   MAHI  0.0   --   0.0  0.0   ABB  0.0   --   0.0  0.0   AIG  0.0   --   0.0   --         Recent Labs      02/04/17   0400  02/03/17   0515  02/02/17   0340  02/01/17   1400   NA  143  144  143 143  134*   K  3.2*  3.5  3.7  3.7  3.9   CL  104  107  105  105  97*   CO2  31  27  28  30  29   AGAP  8  10  10  8  8   GLU  119*  120*  140*  142*  440*   BUN  23  27*  42*  39*  44*   CREA  1.81*  1.91*  2.03*  2.02*  2.21*   GFRAA  49*  46*  43*  44*  39*   GFRNA  41*  38*  36*  36*  32*   CA  7.4*  7.8*  8.0*  8.1*  8.6   SGOT   --    --   15  12*   AP   --    --   56  66   TP   --    --   6.3  7.4   ALB   --    --   2.4*  2.8*   GLOB   --    --   3.9*  4.6*   AGRAT   --    --   0.6*  0.6*   MG  1.5*  1.8  1.8   --      Imaging: NA      ASSESSMENT:    Problem List  Date Reviewed: 2/2/2017          Codes Class Noted    Cellulitis of neck ICD-10-CM: H51.178  ICD-9-CM: 682.1  2/1/2017        Tracheostomy in place ICD-10-CM: Z93.0  ICD-9-CM: V44.0  2/1/2017        Chemotherapy-induced neutropenia (HCC) ICD-10-CM: D70.1  ICD-9-CM: 288.03, E933.1  2/1/2017        Type 2 diabetes mellitus with hyperglycemia (HCC) ICD-10-CM: E11.65  ICD-9-CM: 250.00  1/24/2017        HTN (hypertension) ICD-10-CM: I10  ICD-9-CM: 401.9  1/24/2017        Head and neck cancer ([facility] ICD-10-CM: C76.0  ICD-9-CM: 195.0  1/24/2017        Neutropenic fever (HCC) ICD-10-CM: D70.9, R50.81  ICD-9-CM: 288.00, 780.61  1/12/2017        Pancytopenia (HCC) ICD-10-CM: A12.965  ICD-9-CM: 284.19  1/12/2017        * (Principal)Renal insufficiency ICD-10-CM: N28.9  ICD-9-CM: 593.9  1/10/2017        Non-intractable cyclical vomiting with nausea ICD-10-CM: G43. A0  ICD-9-CM: 536.2  1/10/2017        Squamous cell carcinoma of epiglottis (HCC) ICD-10-CM: C32.1  ICD-9-CM: 161.1  11/23/2016              PLAN:  Squamous cell carcinoma of epiglottis   Cycle 2 TCF completed today 02/01/17. Plan to proceed with surgery in 4-6 weeks. 2/2/17 Speech and nutrition on board to assist with nutritional needs  2/3/17 Discussed with speech - recommend ENT consult to change to cuffless trach. Will consult Dr. Cristin Poe.   2/4/17 Plan for cuffless trach to be placed as an outpatient on Wednesday.      - Neutropenia  Start daily Granix 24 hours after completion of chemotherapy which would be 02/02 at 1700.   2/4/17 Has now received 2 doses granix. Continue daily. .       - Cellulitis  In the setting of immunocompromised/neutropenia - start Zosyn and Vancomycin. 2/2/17 Tmax 100.2. Continue current antibiotics. 2/3/17 Culture abdominal wound. Consult gen surgery to evaluate. Dressing under trach to prevent further breakdown from exudate. 2/4/17 Awaiting surgery final consult. Copious amounts of dark green liquid coming out from around PEG tube. Yeast on gram stain - start Diflucan.      - Diabetes Mellitus type II - not controlled  Continue Metformin and start sliding scale Lispro. Switch to diabetic formula tube feeds. Consult hospitalist for management. 2/2/17 BS controlled overnight. Continue sliding scale  2/3/17 Hospitalist holding metformin given his renal insufficiency. Can resume at discharge once creatinine normalizes. Continue sliding scale. 2/4/17 Well controlled currently - continue sliding scale.       - Renal Insufficiency   NS bolus 1000 mL x 1 then 150 mL/hr.   2/2/17 Continue aggressive hydration. Slight improvement to creatinine overnight (down to 2.02)  2/3/17 Continue fluids. Creatinine trending back down. 2/4/17 Creatinine continues to trend down. Maryellen Allison care/mouth care frequently  - Jaspreet SOP's   - DVT prophylaxis with Lovenox      Changes to plan today: Consult surgery for copious drainage around PEG. ENT plans for cuffless trach as an outpatient on Wednesday? Stop ATC Zofran as this is likely his cause of headaches. Replace Mg+ and K+. Continue fluids, granix daily and supportive care. Continue antibiotics. Add Diflucan for yeast growing from wound culture. He is asking about proceeding to surgery rather than chemoradiation.                Sony Manuel NP   [facility]  [address] [facility] [address] [address]  Office : [phone]  Fax : [phone]       Attending Addendum:  I personally evaluated the patient with Lupis Fleming N.P.,  and agree with the assessment, findings and plan as documented. Appears stable, heart regular without murmur, lungs clear, abdomen benign. Await surgical eval of G tube. Continue current care including abx.                Antonio Jorge MD  [address]  Office : [phone]  Fax : [phone]

## 2017-02-04 NOTE — PROGRESS NOTES
PLAN:  Care management per Oncology  PEG Tube repositioned by Dr. Irene Gonzalez and taped in place. Skin cleaned and dry gauze placed around PEG. Will obtain KUB with water soluble contrast to verify proper tube placement. Ok to use tube for feeding once proper placement verified. ASSESSMENT:  Admit Date: 2/1/2017   * No surgery found *  * No surgery found *    Principal Problem:    Renal insufficiency (1/10/2017)    Active Problems:    Squamous cell carcinoma of epiglottis ([address] (11/23/2016)      Pancytopenia ([address] (1/12/2017)      Type 2 diabetes mellitus with hyperglycemia ([address] (1/24/2017)      Cellulitis of neck (2/1/2017)      Tracheostomy in place (2/1/2017)      Chemotherapy-induced neutropenia ([address] (2/1/2017)         SUBJECTIVE:  Pt awake in bed. PEG tube with moderate amount of dark drainage from around tube. Tube repositioned by Dr. Irene Gonzalez and taped in place. Skin cleaned and dry gauze placed around PEG. Will obtain KUB with water soluble contrast to verify proper tube placement. Ok to use tube for feeding once proper placement verified. Wife at bedside. AF, NAD. OBJECTIVE:  Constitutional: Alert, cooperative,  no acute distress; appears stated age   Visit Vitals    /70 (BP 1 Location: Left arm, BP Patient Position: At rest)    Pulse (!) 102    Temp 97.2 °F (36.2 °C)    Resp 24    Wt 226 lb 11.2 oz (102.8 kg)    SpO2 96%    BMI 33.48 kg/m2     Eyes:Sclera are clear. ENMT: no external lesions gross hearing normal; no obvious neck masses, no ear or lip lesions  CV: RRR. Normal perfusion  Resp: No JVD. Breathing is  non-labored; no audible wheezing. GI: obese, soft, non-tender, nondistended, bowel sounds active. Erythema and crusting to midline abdominal incision and erythema around PEG tube site       Musculoskeletal: unremarkable with normal function. No embolic signs or cyanosis.    Neuro:  AOx3; moves all 4; no focal deficits  Psychiatric: normal affect and mood, no memory impairment      Patient Vitals for the past 24 hrs:   BP Temp Pulse Resp SpO2 Weight   02/04/17 1215 140/70 97.2 °F (36.2 °C) (!) 102 24 96 % -   02/04/17 0709 129/60 99.7 °F (37.6 °C) 96 22 95 % -   02/04/17 0403 138/66 100.3 °F (37.9 °C) 99 18 91 % -   02/04/17 0320 - - - - - 226 lb 11.2 oz (102.8 kg)   02/03/17 2343 120/64 98.2 °F (36.8 °C) 94 18 93 % -   02/03/17 2108 - 98.2 °F (36.8 °C) - - - -   02/03/17 1946 134/71 100.4 °F (38 °C) 84 20 90 % -   02/03/17 1537 128/68 99.6 °F (37.6 °C) 98 20 96 % -     Labs:  Recent Labs      02/04/17   0400   02/02/17   0340   WBC  1.1*   < >  1.2*   HGB  7.2*   < >  8.1*   PLT  71*   < >  104*   NA  143   < >  143  143   K  3.2*   < >  3.7  3.7   CL  104   < >  105  105   CO2  31   < >  28  30   BUN  23   < >  42*  39*   CREA  1.81*   < >  2.03*  2.02*   GLU  119*   < >  140*  142*   TBILI   --    --   0.6   SGOT   --    --   15   ALT   --    --   19   AP   --    --   56    < > = values in this interval not displayed. Ila Uribe, [facility]-BC    The patient was seen in conjunction with Dr. Evelia Brooks who independently evaluated the patient, reviewed the chart and agreed with the assessment and plan. I have seen and examined the patient and evaluated his open Reagan Gastrostomy tube. It has retracted and was pulled and secured the site cleaned and a Drain study ordered to confirm appropriate position. It may be used once confirmed.   Virginia Helm MD

## 2017-02-04 NOTE — PROGRESS NOTES
Wife very concerned regarding copious amounts drainage from around peg tube. Wife states \"i think Dr. Donovan Clarke put tube in\" . after reviewing chart its noted that Dr. Ivy Calderon, surgeon placed peg tube- surgery had been consulted yesterday and they came by to see patient, but patient didn't have this issue at that time. Have spoken to [facility] today who has spoken with nurse practitioner from surgery, and they will be by sometime today. Notified wife and patient of above.

## 2017-02-05 VITALS
SYSTOLIC BLOOD PRESSURE: 124 MMHG | DIASTOLIC BLOOD PRESSURE: 67 MMHG | HEART RATE: 95 BPM | WEIGHT: 224.9 LBS | TEMPERATURE: 99.5 F | BODY MASS INDEX: 33.21 KG/M2 | OXYGEN SATURATION: 92 % | RESPIRATION RATE: 20 BRPM

## 2017-02-05 LAB
ANION GAP BLD CALC-SCNC: 9 MMOL/L (ref 7–16)
BASOPHILS # BLD AUTO: 0 K/UL (ref 0–0.2)
BASOPHILS # BLD: 0 % (ref 0–2)
BUN SERPL-MCNC: 20 MG/DL (ref 8–23)
CALCIUM SERPL-MCNC: 7.6 MG/DL (ref 8.3–10.4)
CHLORIDE SERPL-SCNC: 103 MMOL/L (ref 98–107)
CO2 SERPL-SCNC: 30 MMOL/L (ref 21–32)
CREAT SERPL-MCNC: 1.58 MG/DL (ref 0.8–1.5)
DIFFERENTIAL METHOD BLD: ABNORMAL
EOSINOPHIL # BLD: 0 K/UL (ref 0–0.8)
EOSINOPHIL NFR BLD: 1 % (ref 0.5–7.8)
ERYTHROCYTE [DISTWIDTH] IN BLOOD BY AUTOMATED COUNT: 12.5 % (ref 11.9–14.6)
GLUCOSE BLD STRIP.AUTO-MCNC: 130 MG/DL (ref 65–100)
GLUCOSE BLD STRIP.AUTO-MCNC: 138 MG/DL (ref 65–100)
GLUCOSE SERPL-MCNC: 124 MG/DL (ref 65–100)
HCT VFR BLD AUTO: 21 % (ref 41.1–50.3)
HGB BLD-MCNC: 7 G/DL (ref 13.6–17.2)
IMM GRANULOCYTES # BLD: 0 K/UL (ref 0–0.5)
IMM GRANULOCYTES NFR BLD AUTO: 0 % (ref 0–5)
LYMPHOCYTES # BLD AUTO: 44 % (ref 13–44)
LYMPHOCYTES # BLD: 0.7 K/UL (ref 0.5–4.6)
MAGNESIUM SERPL-MCNC: 2 MG/DL (ref 1.8–2.4)
MCH RBC QN AUTO: 27.1 PG (ref 26.1–32.9)
MCHC RBC AUTO-ENTMCNC: 33.3 G/DL (ref 31.4–35)
MCV RBC AUTO: 81.4 FL (ref 79.6–97.8)
MONOCYTES # BLD: 0.2 K/UL (ref 0.1–1.3)
MONOCYTES NFR BLD AUTO: 12 % (ref 4–12)
NEUTS SEG # BLD: 0.7 K/UL (ref 1.7–8.2)
NEUTS SEG NFR BLD AUTO: 43 % (ref 43–78)
PLATELET # BLD AUTO: 88 K/UL (ref 150–450)
PMV BLD AUTO: 10 FL (ref 10.8–14.1)
POTASSIUM SERPL-SCNC: 3 MMOL/L (ref 3.5–5.1)
RBC # BLD AUTO: 2.58 M/UL (ref 4.23–5.67)
SODIUM SERPL-SCNC: 142 MMOL/L (ref 136–145)
WBC # BLD AUTO: 1.6 K/UL (ref 4.3–11.1)

## 2017-02-05 PROCEDURE — 74011250637 HC RX REV CODE- 250/637: Performed by: NURSE PRACTITIONER

## 2017-02-05 PROCEDURE — 74011250637 HC RX REV CODE- 250/637: Performed by: INTERNAL MEDICINE

## 2017-02-05 PROCEDURE — 77030018798 HC PMP KT ENTRL FED COVD -A

## 2017-02-05 PROCEDURE — 36591 DRAW BLOOD OFF VENOUS DEVICE: CPT

## 2017-02-05 PROCEDURE — 74011250636 HC RX REV CODE- 250/636: Performed by: NURSE PRACTITIONER

## 2017-02-05 PROCEDURE — 82962 GLUCOSE BLOOD TEST: CPT

## 2017-02-05 PROCEDURE — 74011250636 HC RX REV CODE- 250/636: Performed by: INTERNAL MEDICINE

## 2017-02-05 PROCEDURE — 74011000258 HC RX REV CODE- 258: Performed by: NURSE PRACTITIONER

## 2017-02-05 PROCEDURE — 85025 COMPLETE CBC W/AUTO DIFF WBC: CPT | Performed by: NURSE PRACTITIONER

## 2017-02-05 PROCEDURE — 83735 ASSAY OF MAGNESIUM: CPT | Performed by: NURSE PRACTITIONER

## 2017-02-05 PROCEDURE — 94760 N-INVAS EAR/PLS OXIMETRY 1: CPT

## 2017-02-05 PROCEDURE — 80048 BASIC METABOLIC PNL TOTAL CA: CPT | Performed by: NURSE PRACTITIONER

## 2017-02-05 RX ORDER — POTASSIUM CHLORIDE 29.8 MG/ML
40 INJECTION INTRAVENOUS ONCE
Status: COMPLETED | OUTPATIENT
Start: 2017-02-05 | End: 2017-02-05

## 2017-02-05 RX ORDER — POTASSIUM CHLORIDE 20MEQ/15ML
40 LIQUID (ML) ORAL 2 TIMES DAILY WITH MEALS
Qty: 480 ML | Refills: 2 | Status: SHIPPED | OUTPATIENT
Start: 2017-02-05 | End: 2017-03-07

## 2017-02-05 RX ORDER — FLUCONAZOLE 200 MG/1
200 TABLET ORAL DAILY
Qty: 5 TAB | Refills: 0 | Status: SHIPPED | OUTPATIENT
Start: 2017-02-05 | End: 2017-02-10

## 2017-02-05 RX ORDER — LANOLIN ALCOHOL/MO/W.PET/CERES
400 CREAM (GRAM) TOPICAL 3 TIMES DAILY
Qty: 90 TAB | Refills: 2 | Status: SHIPPED | OUTPATIENT
Start: 2017-02-05 | End: 2017-06-08 | Stop reason: ALTCHOICE

## 2017-02-05 RX ORDER — HEPARIN 100 UNIT/ML
300 SYRINGE INTRAVENOUS ONCE
Status: DISCONTINUED | OUTPATIENT
Start: 2017-02-05 | End: 2017-02-05 | Stop reason: HOSPADM

## 2017-02-05 RX ORDER — POTASSIUM CHLORIDE 20MEQ/15ML
40 LIQUID (ML) ORAL 2 TIMES DAILY WITH MEALS
Status: DISCONTINUED | OUTPATIENT
Start: 2017-02-05 | End: 2017-02-05 | Stop reason: HOSPADM

## 2017-02-05 RX ORDER — CITALOPRAM 10 MG/1
10 TABLET ORAL DAILY
Qty: 30 TAB | Refills: 2 | Status: SHIPPED | OUTPATIENT
Start: 2017-02-05 | End: 2017-05-25 | Stop reason: SDUPTHER

## 2017-02-05 RX ADMIN — TBO-FILGRASTIM 480 MCG: 480 INJECTION, SOLUTION SUBCUTANEOUS at 09:54

## 2017-02-05 RX ADMIN — SODIUM CHLORIDE 150 ML/HR: 900 INJECTION, SOLUTION INTRAVENOUS at 04:51

## 2017-02-05 RX ADMIN — CITALOPRAM HYDROBROMIDE 10 MG: 10 TABLET ORAL at 09:53

## 2017-02-05 RX ADMIN — POTASSIUM CHLORIDE 40 MEQ: 29.8 INJECTION, SOLUTION INTRAVENOUS at 07:56

## 2017-02-05 RX ADMIN — PIPERACILLIN SODIUM,TAZOBACTAM SODIUM 3.38 G: 3; .375 INJECTION, POWDER, FOR SOLUTION INTRAVENOUS at 09:53

## 2017-02-05 RX ADMIN — LOSARTAN POTASSIUM 50 MG: 50 TABLET ORAL at 09:53

## 2017-02-05 RX ADMIN — VANCOMYCIN HYDROCHLORIDE 750 MG: 10 INJECTION, POWDER, LYOPHILIZED, FOR SOLUTION INTRAVENOUS at 07:57

## 2017-02-05 RX ADMIN — Medication 400 MG: at 09:53

## 2017-02-05 RX ADMIN — FLUCONAZOLE 200 MG: 100 TABLET ORAL at 09:53

## 2017-02-05 RX ADMIN — LOPERAMIDE HYDROCHLORIDE 4 MG: 2 CAPSULE ORAL at 12:55

## 2017-02-05 RX ADMIN — PIPERACILLIN SODIUM,TAZOBACTAM SODIUM 3.38 G: 3; .375 INJECTION, POWDER, FOR SOLUTION INTRAVENOUS at 01:01

## 2017-02-05 NOTE — DISCHARGE INSTRUCTIONS
DISCHARGE SUMMARY from Nurse    The following personal items are in your possession at time of discharge:    Dental Appliances: None  Visual Aid: Glasses, With patient     Home Medications: None  Jewelry: None  Clothing: At bedside  Other Valuables: At bedside             PATIENT INSTRUCTIONS:    After general anesthesia or intravenous sedation, for 24 hours or while taking prescription Narcotics:  · Limit your activities  · Do not drive and operate hazardous machinery  · Do not make important personal or business decisions  · Do  not drink alcoholic beverages  · If you have not urinated within 8 hours after discharge, please contact your surgeon on call. Report the following to your surgeon:  · Excessive pain, swelling, redness or odor of or around the surgical area  · Temperature over 100.5  · Nausea and vomiting lasting longer than 4 hours or if unable to take medications  · Any signs of decreased circulation or nerve impairment to extremity: change in color, persistent  numbness, tingling, coldness or increase pain  · Any questions        What to do at Home:  Recommended activity: Activity as tolerated, rest frequently. If you experience any of the following symptoms chest pain, difficulty breathing, nausea with vomiting, fever/ chills , please follow up with Dr Alexis Melvin or emergency department. *  Please give a list of your current medications to your Primary Care Provider. *  Please update this list whenever your medications are discontinued, doses are      changed, or new medications (including over-the-counter products) are added. *  Please carry medication information at all times in case of emergency situations. These are general instructions for a healthy lifestyle:    No smoking/ No tobacco products/ Avoid exposure to second hand smoke    Surgeon General's Warning:  Quitting smoking now greatly reduces serious risk to your health.     Obesity, smoking, and sedentary lifestyle greatly increases your risk for illness    A healthy diet, regular physical exercise & weight monitoring are important for maintaining a healthy lifestyle    You may be retaining fluid if you have a history of heart failure or if you experience any of the following symptoms:  Weight gain of 3 pounds or more overnight or 5 pounds in a week, increased swelling in our hands or feet or shortness of breath while lying flat in bed. Please call your doctor as soon as you notice any of these symptoms; do not wait until your next office visit. Recognize signs and symptoms of STROKE:    F-face looks uneven    A-arms unable to move or move unevenly    S-speech slurred or non-existent    T-time-call 911 as soon as signs and symptoms begin-DO NOT go       Back to bed or wait to see if you get better-TIME IS BRAIN. Warning Signs of HEART ATTACK     Call 911 if you have these symptoms:   Chest discomfort. Most heart attacks involve discomfort in the center of the chest that lasts more than a few minutes, or that goes away and comes back. It can feel like uncomfortable pressure, squeezing, fullness, or pain.  Discomfort in other areas of the upper body. Symptoms can include pain or discomfort in one or both arms, the back, neck, jaw, or stomach.  Shortness of breath with or without chest discomfort.  Other signs may include breaking out in a cold sweat, nausea, or lightheadedness. Don't wait more than five minutes to call 911 - MINUTES MATTER! Fast action can save your life. Calling 911 is almost always the fastest way to get lifesaving treatment. Emergency Medical Services staff can begin treatment when they arrive -- up to an hour sooner than if someone gets to the hospital by car. The discharge information has been reviewed with the patient and spouse. The patient and spouse verbalized understanding.     Discharge medications reviewed with the patient and spouse and appropriate educational materials and side effects teaching were provided.

## 2017-02-05 NOTE — PROGRESS NOTES
PLAN:  Care management per [address] [facility] today  Will follow up as outpatient with Dr. Alyssa Manuel if needed. ASSESSMENT:  Admit Date: 2/1/2017   * No surgery found *  * No surgery found *    Principal Problem:    Renal insufficiency (1/10/2017)    Active Problems:    Squamous cell carcinoma of epiglottis ([address] (11/23/2016)      Pancytopenia ([address] (1/12/2017)      Type 2 diabetes mellitus with hyperglycemia ([address] (1/24/2017)      Cellulitis of neck (2/1/2017)      Tracheostomy in place (2/1/2017)      Chemotherapy-induced neutropenia ([address] (2/1/2017)         SUBJECTIVE:  Pt awake in bed. States going home today. Still having small amount of drainage from around PEG. Gauze placed around PEG. AF, VSS. OBJECTIVE:  Constitutional: Alert oriented cooperative patient in no acute distress; appears stated age   Visit Vitals    /67 (BP 1 Location: Left arm, BP Patient Position: Sitting)    Pulse 95    Temp 99.5 °F (37.5 °C)    Resp 20    Wt 224 lb 14.4 oz (102 kg)    SpO2 92%    BMI 33.21 kg/m2     Eyes:Sclera are clear. ENMT: no external lesions gross hearing normal; no obvious neck masses, no ear or lip lesions, trach in place  CV: RRR. Normal perfusion  Resp: No JVD. Breathing is non-labored; no audible wheezing. GI: obese, soft, non-tender, nondistended, bowel sounds active. Erythema and crusting to healing midline abdominal incision and some erythema around PEG tube site    Musculoskeletal: unremarkable with normal function. No embolic signs or cyanosis.    Neuro: AOx3; moves all 4; no focal deficits  Psychiatric: normal affect and mood, no memory impairment      Patient Vitals for the past 24 hrs:   BP Temp Pulse Resp SpO2 Weight   02/05/17 1100 124/67 99.5 °F (37.5 °C) 95 20 92 % -   02/05/17 0829 - - - - 94 % -   02/05/17 0730 128/70 98.3 °F (36.8 °C) 98 20 92 % 224 lb 14.4 oz (102 kg)   02/05/17 0424 138/61 98.5 °F (36.9 °C) 98 20 90 % -   02/04/17 2335 133/57 99.3 °F (37.4 °C) 79 18 91 % - 02/04/17 1921 126/60 99.3 °F (37.4 °C) 99 20 93 % -   02/04/17 1528 128/67 98.9 °F (37.2 °C) (!) 101 20 96 % -     Labs:  Recent Labs      02/05/17   0415   WBC  1.6*   HGB  7.0*   PLT  88*   NA  142   K  3.0*   CL  103   CO2  30   BUN  20   CREA  1.58*   GLU  124*     2/4/17 KUB supine and upright views     History: Check feeding tube position with contrast please. \"tube check\". , 62  years Male     Comparison: None available     Findings: A percutaneous gastrostomy tube is seen, with contrast injected via  the tube on the second image, and contrast filling the gastric lumen and  proximal duodenum. There is no definite evidence of contrast extravasation to  suggest leak. No free air is evident. The bowel gas pattern is nonspecific and  there is no evidence of ileus or obstruction. No suspicious renal or ureteral  calculi are evident. Visualized osseous structures unremarkable.     IMPRESSION  Impression: No acute pathology identified. Percutaneous gastrostomy tube  appears in anatomic position. LEANNE Terry    The patient was seen in conjunction with Dr. Daniela Bojorquez who independently evaluated the patient, reviewed the chart and agreed with the assessment and plan.

## 2017-02-05 NOTE — PROGRESS NOTES
Bedside and Verbal shift change report received from [address] [place] [address], [facility] (offgoing nurse). Report included the following information SBAR, Kardex, MAR and Recent Results.

## 2017-02-05 NOTE — PROGRESS NOTES
Pharmacokinetic Consult to Pharmacist    Nicole Dunk is a 62 y.o. male being treated for SSTI with vancomycin, zosyn and diflucan. Weight: 102.8 kg (226 lb 11.2 oz)  Lab Results   Component Value Date/Time    BUN 23 02/04/2017 04:00 AM    Creatinine 1.81 02/04/2017 04:00 AM    WBC 1.1 02/04/2017 04:00 AM      Estimated Creatinine Clearance: 51.3 mL/min (based on Cr of 1.81). CULTURES:  2/3 PEG site: yeast, pending      Lab Results   Component Value Date/Time    Vancomycin,trough 7.8 02/04/2017 08:00 PM       Day 4 of vancomycin. Goal trough is 12-18. Vanc trough 7.8 prior to 4th dose of 1.5g q 24h. Dose was off schedule and trough about 2 hours late. True trough approximately 8.7. Will change dosing to 750 mg q 12h. Next trough around 4th dose. Will continue to follow patient.       Thank you,  Jenet Canavan, PharmD  Clinical Pharmacist

## 2017-02-05 NOTE — PROGRESS NOTES
Bedside and Verbal shift change report given to Indra Arceo RN (oncoming nurse). Report included the following information SBAR, Kardex, MAR and Recent Results.

## 2017-02-05 NOTE — PROGRESS NOTES
END OF SHIFT NOTE:    Intake/Output      Voiding: YES  Catheter: NO  Drain:   PEG/Gastrostomy Tube 01/23/17 (Active)   Site Assessment Clean, dry, & intact 2/3/2017  8:14 PM   Dressing Status New 2/4/2017  3:32 AM   G Port Status Clamped 2/4/2017  3:32 AM   Action Taken Retaped 2/3/2017  8:14 PM   Drainage Description Serosanguinous 2/3/2017  8:14 PM   Gastric Residual (mL) 150 ml 2/3/2017  1:51 PM   Tube Feeding/Formula Options Glucerna 1.5 2/3/2017 10:46 AM   Tube Feeding/Verify Rate (mL/hr) 0 2/2/2017  3:35 PM   Water Flush Volume (mL) 160 mL 2/3/2017  5:10 PM   Intake (ml) 360 ml 2/3/2017  5:10 PM   Medication Volume 20 ml 2/3/2017  5:10 PM               Stool:  0 occurrences. Emesis:  0 occurrences. VITAL SIGNS  Patient Vitals for the past 12 hrs:   Temp Pulse Resp BP SpO2   02/04/17 1921 99.3 °F (37.4 °C) 99 20 126/60 93 %   02/04/17 1528 98.9 °F (37.2 °C) (!) 101 20 128/67 96 %   02/04/17 1215 97.2 °F (36.2 °C) (!) 102 24 140/70 96 %       Pain Assessment  Pain 1  Pain Scale 1: Numeric (0 - 10) (02/04/17 0320)  Pain Intensity 1: 0 (02/04/17 0320)  Patient Stated Pain Goal: 0 (02/02/17 1535)  Pain Reassessment 1: Yes (02/03/17 2042)  Pain Onset 1: today (02/03/17 1942)  Pain Location 1: Head (02/03/17 1942)  Pain Description 1: Aching (02/03/17 1942)  Pain Intervention(s) 1: Medication (see MAR) (02/03/17 1942)    Ambulating  YES    Additional Information: greatly improved drainage from PEG site     Shift report given to oncoming nurse at the bedside.     Felton Arellano RN

## 2017-02-05 NOTE — PROGRESS NOTES
Problem: Falls - Risk of  Goal: *Knowledge of fall prevention  Outcome: Progressing Towards Goal  Pt educated on risk of falls and reminded to call for assistance. Call bell within reach, pt verbalizes understanding to call for assistance.

## 2017-02-06 ENCOUNTER — HOSPITAL ENCOUNTER (OUTPATIENT)
Dept: RADIATION ONCOLOGY | Age: 62
Discharge: HOME | End: 2017-02-06
Payer: COMMERCIAL

## 2017-02-06 LAB
BACTERIA SPEC CULT: NORMAL
BACTERIA SPEC CULT: NORMAL
GRAM STN SPEC: NORMAL
SERVICE CMNT-IMP: NORMAL

## 2017-02-06 PROCEDURE — 77338 DESIGN MLC DEVICE FOR IMRT: CPT

## 2017-02-07 ENCOUNTER — PATIENT OUTREACH (OUTPATIENT)
Dept: CASE MANAGEMENT | Age: 62
End: 2017-02-07

## 2017-02-09 ENCOUNTER — HOSPITAL ENCOUNTER (OUTPATIENT)
Dept: LAB | Age: 62
Discharge: HOME | End: 2017-02-09
Payer: COMMERCIAL

## 2017-02-09 DIAGNOSIS — C32.1 SQUAMOUS CELL CARCINOMA OF EPIGLOTTIS (HCC): ICD-10-CM

## 2017-02-09 LAB
ALBUMIN SERPL BCP-MCNC: 2.5 G/DL (ref 3.2–4.6)
ALBUMIN/GLOB SERPL: 0.7 {RATIO} (ref 1.2–3.5)
ALP SERPL-CCNC: 67 U/L (ref 50–136)
ALT SERPL-CCNC: 15 U/L (ref 12–65)
ANION GAP BLD CALC-SCNC: 6 MMOL/L (ref 7–16)
AST SERPL W P-5'-P-CCNC: 14 U/L (ref 15–37)
BASOPHILS # BLD AUTO: 0 K/UL (ref 0–0.2)
BASOPHILS # BLD: 0 % (ref 0–2)
BILIRUB SERPL-MCNC: 0.4 MG/DL (ref 0.2–1.1)
BUN SERPL-MCNC: 17 MG/DL (ref 8–23)
CALCIUM SERPL-MCNC: 8.1 MG/DL (ref 8.3–10.4)
CHLORIDE SERPL-SCNC: 105 MMOL/L (ref 98–107)
CO2 SERPL-SCNC: 29 MMOL/L (ref 23–32)
CREAT SERPL-MCNC: 1.5 MG/DL (ref 0.8–1.5)
DIFFERENTIAL METHOD BLD: ABNORMAL
EOSINOPHIL # BLD: 0 K/UL (ref 0–0.8)
EOSINOPHIL NFR BLD: 0 % (ref 0.5–7.8)
ERYTHROCYTE [DISTWIDTH] IN BLOOD BY AUTOMATED COUNT: 13.1 % (ref 11.9–14.6)
GLOBULIN SER CALC-MCNC: 3.7 G/DL (ref 2.3–3.5)
GLUCOSE SERPL-MCNC: 274 MG/DL (ref 65–100)
HCT VFR BLD AUTO: 22.7 % (ref 41.1–50.3)
HGB BLD-MCNC: 7.3 G/DL (ref 13.6–17.2)
LYMPHOCYTES # BLD AUTO: 23 % (ref 13–44)
LYMPHOCYTES # BLD: 0.8 K/UL (ref 0.5–4.6)
MAGNESIUM SERPL-MCNC: 2.1 MG/DL (ref 1.8–2.4)
MCH RBC QN AUTO: 27.4 PG (ref 26.1–32.9)
MCHC RBC AUTO-ENTMCNC: 32.2 G/DL (ref 31.4–35)
MCV RBC AUTO: 85.3 FL (ref 79.6–97.8)
MONOCYTES # BLD: 0.2 K/UL (ref 0.1–1.3)
MONOCYTES NFR BLD AUTO: 7 % (ref 4–12)
NEUTS SEG # BLD: 2.4 K/UL (ref 1.7–8.2)
NEUTS SEG NFR BLD AUTO: 70 % (ref 43–78)
NRBC # BLD: 0 K/UL (ref 0–0.2)
NRBC BLD-RTO: 0 PER 100 WBC (ref 0–2)
PLATELET # BLD AUTO: 222 K/UL (ref 150–450)
PMV BLD AUTO: 9.2 FL (ref 10.8–14.1)
POTASSIUM SERPL-SCNC: 4.2 MMOL/L (ref 3.5–5.1)
PROT SERPL-MCNC: 6.2 G/DL (ref 6.3–8.2)
RBC # BLD AUTO: 2.66 M/UL (ref 4.23–5.67)
SODIUM SERPL-SCNC: 140 MMOL/L (ref 136–145)
WBC # BLD AUTO: 3.4 K/UL (ref 4.3–11.1)

## 2017-02-09 PROCEDURE — 85025 COMPLETE CBC W/AUTO DIFF WBC: CPT | Performed by: INTERNAL MEDICINE

## 2017-02-09 PROCEDURE — 80053 COMPREHEN METABOLIC PANEL: CPT | Performed by: INTERNAL MEDICINE

## 2017-02-09 PROCEDURE — 83735 ASSAY OF MAGNESIUM: CPT | Performed by: INTERNAL MEDICINE

## 2017-02-13 ENCOUNTER — HOSPITAL ENCOUNTER (OUTPATIENT)
Dept: INFUSION THERAPY | Age: 62
End: 2017-02-13
Payer: COMMERCIAL

## 2017-02-15 ENCOUNTER — PATIENT OUTREACH (OUTPATIENT)
Dept: CASE MANAGEMENT | Age: 62
End: 2017-02-15

## 2017-02-15 NOTE — ACP (ADVANCE CARE PLANNING)
Called pt's wife and gave her new scheduled time 2-20-17 at 1:30 labs and 2pm appt with NP and chemo start 7:15 on 2-21-17.

## 2017-02-16 ENCOUNTER — APPOINTMENT (OUTPATIENT)
Dept: INFUSION THERAPY | Age: 62
End: 2017-02-16
Payer: COMMERCIAL

## 2017-02-20 ENCOUNTER — HOSPITAL ENCOUNTER (OUTPATIENT)
Dept: LAB | Age: 62
Discharge: HOME | End: 2017-02-20
Payer: COMMERCIAL

## 2017-02-20 ENCOUNTER — PATIENT OUTREACH (OUTPATIENT)
Dept: CASE MANAGEMENT | Age: 62
End: 2017-02-20

## 2017-02-20 DIAGNOSIS — C76.0 HEAD AND NECK CANCER (HCC): ICD-10-CM

## 2017-02-20 LAB
ALBUMIN SERPL BCP-MCNC: 2.9 G/DL (ref 3.2–4.6)
ALBUMIN/GLOB SERPL: 0.6 {RATIO} (ref 1.2–3.5)
ALP SERPL-CCNC: 94 U/L (ref 50–136)
ALT SERPL-CCNC: 17 U/L (ref 12–65)
ANION GAP BLD CALC-SCNC: 8 MMOL/L (ref 7–16)
AST SERPL W P-5'-P-CCNC: 15 U/L (ref 15–37)
BASOPHILS # BLD AUTO: 0 K/UL (ref 0–0.2)
BASOPHILS # BLD: 1 % (ref 0–2)
BILIRUB SERPL-MCNC: 0.4 MG/DL (ref 0.2–1.1)
BUN SERPL-MCNC: 24 MG/DL (ref 8–23)
CALCIUM SERPL-MCNC: 8.8 MG/DL (ref 8.3–10.4)
CHLORIDE SERPL-SCNC: 97 MMOL/L (ref 98–107)
CO2 SERPL-SCNC: 30 MMOL/L (ref 23–32)
CREAT SERPL-MCNC: 1.64 MG/DL (ref 0.8–1.5)
DIFFERENTIAL METHOD BLD: ABNORMAL
EOSINOPHIL # BLD: 0.2 K/UL (ref 0–0.8)
EOSINOPHIL NFR BLD: 3 % (ref 0.5–7.8)
ERYTHROCYTE [DISTWIDTH] IN BLOOD BY AUTOMATED COUNT: 15.5 % (ref 11.9–14.6)
GLOBULIN SER CALC-MCNC: 4.6 G/DL (ref 2.3–3.5)
GLUCOSE SERPL-MCNC: 249 MG/DL (ref 65–100)
HCT VFR BLD AUTO: 21.9 % (ref 41.1–50.3)
HGB BLD-MCNC: 7 G/DL (ref 13.6–17.2)
LYMPHOCYTES # BLD AUTO: 22 % (ref 13–44)
LYMPHOCYTES # BLD: 1.3 K/UL (ref 0.5–4.6)
MCH RBC QN AUTO: 27.5 PG (ref 26.1–32.9)
MCHC RBC AUTO-ENTMCNC: 32 G/DL (ref 31.4–35)
MCV RBC AUTO: 85.9 FL (ref 79.6–97.8)
MONOCYTES # BLD: 0.4 K/UL (ref 0.1–1.3)
MONOCYTES NFR BLD AUTO: 7 % (ref 4–12)
NEUTS SEG # BLD: 4 K/UL (ref 1.7–8.2)
NEUTS SEG NFR BLD AUTO: 68 % (ref 43–78)
NRBC # BLD: 0 K/UL (ref 0–0.2)
NRBC BLD-RTO: 0 PER 100 WBC (ref 0–2)
PLATELET # BLD AUTO: 259 K/UL (ref 150–450)
PMV BLD AUTO: 8.3 FL (ref 10.8–14.1)
POTASSIUM SERPL-SCNC: 4.4 MMOL/L (ref 3.5–5.1)
PROT SERPL-MCNC: 7.5 G/DL (ref 6.3–8.2)
RBC # BLD AUTO: 2.55 M/UL (ref 4.23–5.67)
SODIUM SERPL-SCNC: 135 MMOL/L (ref 136–145)
WBC # BLD AUTO: 5.9 K/UL (ref 4.3–11.1)

## 2017-02-20 PROCEDURE — 85025 COMPLETE CBC W/AUTO DIFF WBC: CPT | Performed by: NURSE PRACTITIONER

## 2017-02-20 PROCEDURE — 86900 BLOOD TYPING SEROLOGIC ABO: CPT | Performed by: NURSE PRACTITIONER

## 2017-02-20 PROCEDURE — 36415 COLL VENOUS BLD VENIPUNCTURE: CPT | Performed by: NURSE PRACTITIONER

## 2017-02-20 PROCEDURE — 80053 COMPREHEN METABOLIC PANEL: CPT | Performed by: NURSE PRACTITIONER

## 2017-02-20 PROCEDURE — 86920 COMPATIBILITY TEST SPIN: CPT | Performed by: NURSE PRACTITIONER

## 2017-02-20 PROCEDURE — 86644 CMV ANTIBODY: CPT | Performed by: NURSE PRACTITIONER

## 2017-02-20 RX ORDER — ACETAMINOPHEN 325 MG/1
650 TABLET ORAL ONCE
Status: ACTIVE | OUTPATIENT
Start: 2017-02-20 | End: 2017-02-21

## 2017-02-20 RX ORDER — SODIUM CHLORIDE 9 MG/ML
250 INJECTION, SOLUTION INTRAVENOUS AS NEEDED
Status: DISCONTINUED | OUTPATIENT
Start: 2017-02-20 | End: 2017-02-25 | Stop reason: HOSPADM

## 2017-02-20 RX ORDER — ACETAMINOPHEN 325 MG/1
650 TABLET ORAL ONCE
Status: DISCONTINUED | OUTPATIENT
Start: 2017-02-21 | End: 2017-02-20 | Stop reason: SDUPTHER

## 2017-02-20 RX ORDER — DIPHENHYDRAMINE HCL 25 MG
25 CAPSULE ORAL
Status: ACTIVE | OUTPATIENT
Start: 2017-02-20 | End: 2017-02-21

## 2017-02-20 NOTE — ACP (ADVANCE CARE PLANNING)
Saw pt with Wallene Fat, NP for C1 erbitux with concurrent radiation. Pt is scheduled to start treatment tomorrow. Hgb is 7.0 today and pt reports feeling fatigue. Ordered 2U PRBC for pt to receive with treatment tomorrow. Type and cross today. Encouraged to call with questions or concerns. Navigation will continue to follow.

## 2017-02-21 ENCOUNTER — HOSPITAL ENCOUNTER (OUTPATIENT)
Dept: INFUSION THERAPY | Age: 62
Discharge: HOME | End: 2017-02-21
Payer: COMMERCIAL

## 2017-02-21 ENCOUNTER — HOSPITAL ENCOUNTER (OUTPATIENT)
Dept: RADIATION ONCOLOGY | Age: 62
Discharge: HOME | End: 2017-02-21
Payer: COMMERCIAL

## 2017-02-21 VITALS
OXYGEN SATURATION: 97 % | TEMPERATURE: 98.2 F | BODY MASS INDEX: 32.72 KG/M2 | RESPIRATION RATE: 18 BRPM | HEART RATE: 89 BPM | SYSTOLIC BLOOD PRESSURE: 132 MMHG | DIASTOLIC BLOOD PRESSURE: 74 MMHG | WEIGHT: 221.6 LBS

## 2017-02-21 DIAGNOSIS — C32.1 SQUAMOUS CELL CARCINOMA OF EPIGLOTTIS (HCC): ICD-10-CM

## 2017-02-21 PROCEDURE — 36430 TRANSFUSION BLD/BLD COMPNT: CPT

## 2017-02-21 PROCEDURE — 96375 TX/PRO/DX INJ NEW DRUG ADDON: CPT

## 2017-02-21 PROCEDURE — 77030003560 HC NDL HUBR BARD -A

## 2017-02-21 PROCEDURE — 96413 CHEMO IV INFUSION 1 HR: CPT

## 2017-02-21 PROCEDURE — P9040 RBC LEUKOREDUCED IRRADIATED: HCPCS | Performed by: NURSE PRACTITIONER

## 2017-02-21 PROCEDURE — 77386 HC IMRT TRMT DLVR COMPL: CPT

## 2017-02-21 PROCEDURE — 74011250637 HC RX REV CODE- 250/637: Performed by: NURSE PRACTITIONER

## 2017-02-21 PROCEDURE — 74011250636 HC RX REV CODE- 250/636: Performed by: INTERNAL MEDICINE

## 2017-02-21 PROCEDURE — 96415 CHEMO IV INFUSION ADDL HR: CPT

## 2017-02-21 PROCEDURE — 77030018667 ADMN ST IV BLD FENW -A

## 2017-02-21 RX ORDER — DIPHENHYDRAMINE HYDROCHLORIDE 50 MG/ML
50 INJECTION, SOLUTION INTRAMUSCULAR; INTRAVENOUS ONCE
Status: COMPLETED | OUTPATIENT
Start: 2017-02-21 | End: 2017-02-21

## 2017-02-21 RX ORDER — SODIUM CHLORIDE 0.9 % (FLUSH) 0.9 %
10 SYRINGE (ML) INJECTION AS NEEDED
Status: ACTIVE | OUTPATIENT
Start: 2017-02-21 | End: 2017-02-21

## 2017-02-21 RX ORDER — HEPARIN 100 UNIT/ML
300-500 SYRINGE INTRAVENOUS AS NEEDED
Status: DISPENSED | OUTPATIENT
Start: 2017-02-21 | End: 2017-02-21

## 2017-02-21 RX ORDER — ACETAMINOPHEN 325 MG/1
650 TABLET ORAL ONCE
Status: DISCONTINUED | OUTPATIENT
Start: 2017-02-21 | End: 2017-02-21

## 2017-02-21 RX ADMIN — DIPHENHYDRAMINE HYDROCHLORIDE 50 MG: 50 INJECTION, SOLUTION INTRAMUSCULAR; INTRAVENOUS at 07:50

## 2017-02-21 RX ADMIN — ACETAMINOPHEN 650 MG: 160 SOLUTION ORAL at 08:10

## 2017-02-21 RX ADMIN — Medication 10 ML: at 07:46

## 2017-02-21 RX ADMIN — SODIUM CHLORIDE, PRESERVATIVE FREE 500 UNITS: 5 INJECTION INTRAVENOUS at 15:17

## 2017-02-21 RX ADMIN — SODIUM CHLORIDE 500 ML: 900 INJECTION, SOLUTION INTRAVENOUS at 07:46

## 2017-02-21 RX ADMIN — CETUXIMAB 896 MG: 2 SOLUTION INTRAVENOUS at 08:18

## 2017-02-21 RX ADMIN — Medication 10 ML: at 15:16

## 2017-02-21 NOTE — PROGRESS NOTES
Massage THERAPY: Daily Note    Referring Physician: Nargis Barreto MD  Medical/Referring Diagnosis: Head and neck cancer ([facility] [C76.0]   Precautions/Allergies: Zofran Mary Folk hcl (pf)]  SUBJECTIVE:  Present Symptoms: receiving infusion     Pre-Treatment Pain: /10  Past Medical History:    Mr. 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 He also has no past medical history of Adverse effect of anesthesia; Difficult intubation; Malignant hyperthermia due to anesthesia; or Pseudocholinesterase deficiency. Mr. Mary Morgan  has a past surgical history that includes colonoscopy (2016) and other surgical (Left). Current Medications:       Current Outpatient Prescriptions:     glucose blood VI test strips (RELION PRIME TEST STRIPS) strip, Test blood sugar before meals and bedtime Diagnosis code E11.65, Disp: 200 Strip, Rfl: 5    citalopram (CELEXA) 10 mg tablet, 1 Tab by Per G Tube route daily. , Disp: 30 Tab, Rfl: 2    magnesium oxide (MAG-OX) 400 mg tablet, 1 Tab by Per G Tube route three (3) times daily. , Disp: 90 Tab, Rfl: 2    potassium chloride (KAON 10%) 20 mEq/15 mL solution, 30 mL by Per G Tube route two (2) times daily (with meals) for 30 days. Indications: HYPOKALEMIA, Disp: 480 mL, Rfl: 2    Lactose-Free Food with Fiber (JEVITY 1.5 BRANDO) 0.06 gram-1.5 kcal/mL liqd, 6 Cans by PEG Tube route daily for 100 days. Bolus Feedings, Jevity 1.5 or comparable, goal 6 cans/day. Pt will need additional 38-42 oz free water daily via PEG. Estimated ARRON greater than 90 days. Indications: DYSPHAGIA, Disp: 180 Can, Rfl: 4    LORazepam (ATIVAN) 1 mg tablet, Take 0.5-1 Tabs by mouth every six (6) hours as needed for Anxiety. Max Daily Amount: 4 mg.  Indications: CANCER CHEMOTHERAPY-INDUCED NAUSEA AND VOMITING, Disp: 90 Tab, Rfl: 0    magic mouthwash (GLADYS) susp, Take 10 mL by mouth every four (4) hours. (Patient taking differently: Take 10 mL by mouth every four (4) hours as needed.), Disp: 500 mL, Rfl: 3    lidocaine-prilocaine (EMLA) topical cream, Apply  to affected area as needed. Apply 1/2 inch amount of cream to port site 90 minutes prior to needle access. , Disp: 30 g, Rfl: 0    ondansetron hcl (ZOFRAN) 8 mg tablet, Take 1 Tab by mouth every eight (8) hours as needed for Nausea., Disp: 90 Tab, Rfl: 3    prochlorperazine (COMPAZINE) 10 mg tablet, Take 1 Tab by mouth every six (6) hours as needed. , Disp: 120 Tab, Rfl: 3    aspirin delayed-release 81 mg tablet, Take 81 mg by mouth every morning. Take / use AM day of surgery  per anesthesia protocols. Indications: myocardial infarction prevention, Disp: , Rfl:     losartan (COZAAR) 50 mg tablet, 50 mg two (2) times a day. Indications: Hypertension, Disp: , Rfl:     amLODIPine (NORVASC) 10 mg tablet, Take 10 mg by mouth nightly. Take / use AM day of surgery  per anesthesia protocols. Indications: Hypertension, Disp: , Rfl:     allopurinol (ZYLOPRIM) 300 mg tablet, 300 mg nightly. Indications: GOUT, Disp: , Rfl:     glipiZIDE SR (GLUCOTROL) 10 mg CR tablet, Take 10 mg by mouth two (2) times daily as needed. Indications: type 2 diabetes mellitus, Disp: , Rfl:     omeprazole (PRILOSEC) 20 mg capsule, Take 20 mg by mouth every morning. Take / use AM day of surgery  per anesthesia protocols. Indications: GASTROESOPHAGEAL REFLUX, Disp: , Rfl:     Current Facility-Administered Medications:     saline peripheral flush soln 10 mL, 10 mL, InterCATHeter, PRN, Faye Gillette MD, 10 mL at 02/21/17 0746    heparin (porcine) pf 300-500 Units, 300-500 Units, InterCATHeter, PRN, Faye Gillette MD    0.9% sodium chloride infusion 250 mL, 250 mL, IntraVENous, PRN, Giselle Manzano NP       OBJECTIVE/ASSESSMENT:  Objective Measure:    Tool Used: Subjective Units of Distress Scale (SUDS)  Score:  Pre-Treatment: /100 Post-Treatment: /100   Interpretation of Score: Rating of patient's distress, fear, anxiety or discomfort on a scale of 0-100. Observations of Patient:  Patient reclining with wife at side, alert,  Response To Treatment: relaxed and appreciative of service   Post-Treatment Pain: /10  TREATMENT:    (In addition to Assessment/Re-Assessment sessions the following treatments were rendered)  Treatment Provided:  []  Soft tissue massage  [x]  Healing Touch   Location: bilateral hands  Patient Position: seated  Time: 15 minutes    PLAN OF CARE:    [x]  I will follow up with this patient as needed. []  No follow up visit necessary.     Thank you for this referral.  Daniel Carpio

## 2017-02-21 NOTE — PROGRESS NOTES
Pt arrived ambulatory to [facility] with port previously accessed with good blood return and dressing dry and intact. NS infusing. Benadryl and tylenol given as ordered. erbitux infusing. Pt tolerated well. 1st unit of PRBC infusing. Pt tolerated well. Port flushed. Pt to radiation and to return for 2nd unit of PRBC. Pt returned to infusion. 2nd unit of PRBC infused. Pt tolerated well. Pt aware of next appt on 2/28/17 at 0915. Pt discharged ambulatory.

## 2017-02-22 ENCOUNTER — HOSPITAL ENCOUNTER (OUTPATIENT)
Dept: RADIATION ONCOLOGY | Age: 62
Discharge: HOME | End: 2017-02-22
Payer: COMMERCIAL

## 2017-02-22 LAB
ABO + RH BLD: NORMAL
BLD PROD TYP BPU: NORMAL
BLD PROD TYP BPU: NORMAL
BLOOD GROUP ANTIBODIES SERPL: NORMAL
BPU ID: NORMAL
BPU ID: NORMAL
CROSSMATCH RESULT,%XM: NORMAL
CROSSMATCH RESULT,%XM: NORMAL
SPECIMEN EXP DATE BLD: NORMAL
STATUS OF UNIT,%ST: NORMAL
STATUS OF UNIT,%ST: NORMAL
UNIT DIVISION, %UDIV: 0
UNIT DIVISION, %UDIV: 0

## 2017-02-22 PROCEDURE — 77386 HC IMRT TRMT DLVR COMPL: CPT

## 2017-02-23 ENCOUNTER — HOSPITAL ENCOUNTER (OUTPATIENT)
Dept: RADIATION ONCOLOGY | Age: 62
Discharge: HOME | End: 2017-02-23
Payer: COMMERCIAL

## 2017-02-23 PROCEDURE — 77386 HC IMRT TRMT DLVR COMPL: CPT

## 2017-02-24 ENCOUNTER — HOSPITAL ENCOUNTER (OUTPATIENT)
Dept: RADIATION ONCOLOGY | Age: 62
Discharge: HOME | End: 2017-02-24
Payer: COMMERCIAL

## 2017-02-24 PROCEDURE — 77386 HC IMRT TRMT DLVR COMPL: CPT

## 2017-02-27 ENCOUNTER — HOSPITAL ENCOUNTER (OUTPATIENT)
Dept: RADIATION ONCOLOGY | Age: 62
Discharge: HOME | End: 2017-02-27
Payer: COMMERCIAL

## 2017-02-27 VITALS — WEIGHT: 218.2 LBS | BODY MASS INDEX: 32.22 KG/M2

## 2017-02-27 PROCEDURE — 77386 HC IMRT TRMT DLVR COMPL: CPT

## 2017-02-27 PROCEDURE — 77336 RADIATION PHYSICS CONSULT: CPT

## 2017-02-27 NOTE — PROGRESS NOTES
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      INTERVAL HISTORY: Madelyn Shah has completed one week of therapy. He has some irri and some difficulty swallowing but is otherwise  Doing fine.     OBJECTIVE: clear secretions coming from his tracheostomy. ASSESSMENT and PLAN: Jonathan Costa. is tolerating radiation as anticipated for the current dose and fraction. There is no evidence of active thrush. ontinue therapy as planned and see next week as anticipated.     Sheyla Rose MD

## 2017-02-28 ENCOUNTER — HOSPITAL ENCOUNTER (OUTPATIENT)
Dept: INFUSION THERAPY | Age: 62
Discharge: HOME | End: 2017-02-28
Payer: COMMERCIAL

## 2017-02-28 ENCOUNTER — HOSPITAL ENCOUNTER (OUTPATIENT)
Dept: RADIATION ONCOLOGY | Age: 62
Discharge: HOME | End: 2017-02-28
Payer: COMMERCIAL

## 2017-02-28 ENCOUNTER — PATIENT OUTREACH (OUTPATIENT)
Dept: CASE MANAGEMENT | Age: 62
End: 2017-02-28

## 2017-02-28 ENCOUNTER — HOSPITAL ENCOUNTER (OUTPATIENT)
Dept: LAB | Age: 62
Discharge: HOME | End: 2017-02-28
Payer: COMMERCIAL

## 2017-02-28 VITALS
OXYGEN SATURATION: 98 % | DIASTOLIC BLOOD PRESSURE: 55 MMHG | SYSTOLIC BLOOD PRESSURE: 122 MMHG | HEART RATE: 93 BPM | RESPIRATION RATE: 16 BRPM

## 2017-02-28 DIAGNOSIS — C32.1 SQUAMOUS CELL CARCINOMA OF EPIGLOTTIS (HCC): ICD-10-CM

## 2017-02-28 LAB
ALBUMIN SERPL BCP-MCNC: 3 G/DL (ref 3.2–4.6)
ALBUMIN/GLOB SERPL: 0.7 {RATIO} (ref 1.2–3.5)
ALP SERPL-CCNC: 83 U/L (ref 50–136)
ALT SERPL-CCNC: 22 U/L (ref 12–65)
ANION GAP BLD CALC-SCNC: 7 MMOL/L (ref 7–16)
AST SERPL W P-5'-P-CCNC: 16 U/L (ref 15–37)
BASOPHILS # BLD AUTO: 0 K/UL (ref 0–0.2)
BASOPHILS # BLD: 0 % (ref 0–2)
BILIRUB SERPL-MCNC: 0.6 MG/DL (ref 0.2–1.1)
BUN SERPL-MCNC: 24 MG/DL (ref 8–23)
CALCIUM SERPL-MCNC: 8.9 MG/DL (ref 8.3–10.4)
CHLORIDE SERPL-SCNC: 99 MMOL/L (ref 98–107)
CO2 SERPL-SCNC: 31 MMOL/L (ref 23–32)
CREAT SERPL-MCNC: 1.33 MG/DL (ref 0.8–1.5)
DIFFERENTIAL METHOD BLD: ABNORMAL
EOSINOPHIL # BLD: 0.1 K/UL (ref 0–0.8)
EOSINOPHIL NFR BLD: 2 % (ref 0.5–7.8)
ERYTHROCYTE [DISTWIDTH] IN BLOOD BY AUTOMATED COUNT: 14.1 % (ref 11.9–14.6)
GLOBULIN SER CALC-MCNC: 4.3 G/DL (ref 2.3–3.5)
GLUCOSE SERPL-MCNC: 234 MG/DL (ref 65–100)
HCT VFR BLD AUTO: 27.9 % (ref 41.1–50.3)
HGB BLD-MCNC: 9.2 G/DL (ref 13.6–17.2)
LYMPHOCYTES # BLD AUTO: 10 % (ref 13–44)
LYMPHOCYTES # BLD: 0.7 K/UL (ref 0.5–4.6)
MAGNESIUM SERPL-MCNC: 2.2 MG/DL (ref 1.8–2.4)
MCH RBC QN AUTO: 28.2 PG (ref 26.1–32.9)
MCHC RBC AUTO-ENTMCNC: 33 G/DL (ref 31.4–35)
MCV RBC AUTO: 85.6 FL (ref 79.6–97.8)
MONOCYTES # BLD: 0.3 K/UL (ref 0.1–1.3)
MONOCYTES NFR BLD AUTO: 5 % (ref 4–12)
NEUTS SEG # BLD: 5.7 K/UL (ref 1.7–8.2)
NEUTS SEG NFR BLD AUTO: 83 % (ref 43–78)
NRBC # BLD: 0 K/UL (ref 0–0.2)
PLATELET # BLD AUTO: 184 K/UL (ref 150–450)
PMV BLD AUTO: 8.6 FL (ref 10.8–14.1)
POTASSIUM SERPL-SCNC: 3.7 MMOL/L (ref 3.5–5.1)
PROT SERPL-MCNC: 7.3 G/DL (ref 6.3–8.2)
RBC # BLD AUTO: 3.26 M/UL (ref 4.23–5.67)
SODIUM SERPL-SCNC: 137 MMOL/L (ref 136–145)
WBC # BLD AUTO: 6.9 K/UL (ref 4.3–11.1)

## 2017-02-28 PROCEDURE — 80053 COMPREHEN METABOLIC PANEL: CPT | Performed by: INTERNAL MEDICINE

## 2017-02-28 PROCEDURE — 85025 COMPLETE CBC W/AUTO DIFF WBC: CPT | Performed by: INTERNAL MEDICINE

## 2017-02-28 PROCEDURE — 96413 CHEMO IV INFUSION 1 HR: CPT

## 2017-02-28 PROCEDURE — 96375 TX/PRO/DX INJ NEW DRUG ADDON: CPT

## 2017-02-28 PROCEDURE — 96361 HYDRATE IV INFUSION ADD-ON: CPT

## 2017-02-28 PROCEDURE — 77386 HC IMRT TRMT DLVR COMPL: CPT

## 2017-02-28 PROCEDURE — 74011250636 HC RX REV CODE- 250/636: Performed by: NURSE PRACTITIONER

## 2017-02-28 PROCEDURE — 74011250636 HC RX REV CODE- 250/636

## 2017-02-28 PROCEDURE — 83735 ASSAY OF MAGNESIUM: CPT | Performed by: INTERNAL MEDICINE

## 2017-02-28 RX ORDER — DIPHENHYDRAMINE HYDROCHLORIDE 50 MG/ML
50 INJECTION, SOLUTION INTRAMUSCULAR; INTRAVENOUS ONCE
Status: COMPLETED | OUTPATIENT
Start: 2017-02-28 | End: 2017-02-28

## 2017-02-28 RX ORDER — HEPARIN 100 UNIT/ML
300-500 SYRINGE INTRAVENOUS AS NEEDED
Status: DISPENSED | OUTPATIENT
Start: 2017-02-28 | End: 2017-02-28

## 2017-02-28 RX ORDER — SODIUM CHLORIDE 0.9 % (FLUSH) 0.9 %
10 SYRINGE (ML) INJECTION AS NEEDED
Status: ACTIVE | OUTPATIENT
Start: 2017-02-28 | End: 2017-02-28

## 2017-02-28 RX ADMIN — Medication 10 ML: at 12:25

## 2017-02-28 RX ADMIN — DIPHENHYDRAMINE HYDROCHLORIDE 50 MG: 50 INJECTION, SOLUTION INTRAMUSCULAR; INTRAVENOUS at 09:42

## 2017-02-28 RX ADMIN — Medication 10 ML: at 09:30

## 2017-02-28 RX ADMIN — CETUXIMAB 560 MG: 2 SOLUTION INTRAVENOUS at 10:20

## 2017-02-28 RX ADMIN — SODIUM CHLORIDE 500 ML: 900 INJECTION, SOLUTION INTRAVENOUS at 09:30

## 2017-02-28 RX ADMIN — SODIUM CHLORIDE, PRESERVATIVE FREE 500 UNITS: 5 INJECTION INTRAVENOUS at 12:25

## 2017-02-28 NOTE — PROGRESS NOTES
Arrived to the [facility]. Assessment completed. Patient was seen in [place] [address] today, before coming to infusion. Labs reviewed. Patient tolerated chemotherapy well today. There were no adverse reactions noted. He was monitored for one hour today, after the Erbitux infusion. Patient managed his tracheostomy today, per self, without difficulties. Any issues or concerns during appointment: none. Patient aware of next infusion appointment on 3/7/17 (date) at 56 (time) with IV infusion center. Discharged ambulatory, to radiation therapy. Patient instructed to call Dr. Falguni Forrest office immediately for any problems or concerns. He verbalizes understanding.

## 2017-02-28 NOTE — ACP (ADVANCE CARE PLANNING)
I saw pt today with Emmanuel Lomax NP prior to Erbitux infusion. He states he is feeling well without throat pain or major discomfort. He is on day 6 of 35 radiation treatments. He will go to speech therapy tomorrow. Encouraged pt to participate with speech therapy and continue eating since this is his major concern today. Pt encouraged to continue to work up to 6 cans daily of food as he is now using 5.  Nurse navigation is following

## 2017-03-01 ENCOUNTER — HOSPITAL ENCOUNTER (OUTPATIENT)
Dept: RADIATION ONCOLOGY | Age: 62
Discharge: HOME | End: 2017-03-01
Payer: COMMERCIAL

## 2017-03-01 ENCOUNTER — HOSPITAL ENCOUNTER (OUTPATIENT)
Dept: PHYSICAL THERAPY | Age: 62
Discharge: HOME | End: 2017-03-01
Payer: COMMERCIAL

## 2017-03-01 PROCEDURE — 92526 ORAL FUNCTION THERAPY: CPT | Performed by: SPEECH-LANGUAGE PATHOLOGIST

## 2017-03-01 PROCEDURE — 77386 HC IMRT TRMT DLVR COMPL: CPT

## 2017-03-01 PROCEDURE — 92610 EVALUATE SWALLOWING FUNCTION: CPT | Performed by: SPEECH-LANGUAGE PATHOLOGIST

## 2017-03-01 NOTE — PROGRESS NOTES
[facility]  : [facility] [place] [address], [place] [address].  [phone]   [phone]         OUTPATIENT SPEECH LANGUAGE PATHOLOGY: Re-evaluation  ICD-10: Treatment Diagnosis: Oropharyngeal Dysphagia R13.12  REFERRING PHYSICIAN: Dr. Carolyn Vidal MD Orders: Evaluate and Treat  PAST MEDICAL HISTORY:   Mr. Lyna Dakins is a 62 y.o. male who  has a past medical history of GERD (gastroesophageal reflux disease); Gout; Hypertension; Morbid obesity ([facility] Squamous cell carcinoma of epiglottis (HCC) (2016); and Type 2 diabetes mellitus ([facility] He also has no past medical history of Adverse effect of anesthesia; Difficult intubation; Malignant hyperthermia due to anesthesia; or Pseudocholinesterase deficiency. He also  has a past surgical history that includes colonoscopy () and other surgical (Left). MEDICAL/REFERRING DIAGNOSIS: Dysphagia [R13.10]  DATE OF ONSET: 2017   PRIOR LEVEL OF FUNCTION: Independent  PRECAUTIONS/ALLERGIES: Zofran [ondansetron hcl (pf)]     ASSESSMENT:  Based on the objective data described below, the patient presents with mild-moderate oral dysphagia and moderate to severe pharyngeal dysphagia. This patient is known to this SLP due to recent evaluation in 2017 for oncology rehab. Patient was diagnosed with SCC of the epiglottis. At the time of his initial evaluation, he had not gone for his PEG placement. He was hospitalized a month ago for fever and during the hospitalization they attempted to place his PEG however they were unsuccessful due to airway obstruction. He participated in MBSS while hospitalized and found to grossly silent aspirate on all consistencies therefore SLP recommended NPO. Patient was also required to have a trach placed as well. He is tolerating his trach well. He did not have his speaking valve, stating \"it won't stay on. \" He continues to talk over the trach without finger occluding. SLP encouraged patient to finger occlude when he talks. He has an appointment to see Dr. Juan Norwood today regarding the trach. He is here today for oncology rehab for Dysphagia. Per Dr. Juan Norwood, patient could have some liquids however patient stated that all of his nutrition is met via PEG and on occasion he has a few sips of water. Oral motor exam was essentially WNL's. Mild lingual thrush observed. SLP spoke with patient and wife regarding oral hygiene strategies/protocol. Both verbalized understanding. Patient given p.o trials of thin liquids with (+) overt clinical s/sx of aspiration with a strong cough x's 1. SLP recommends continue with NPO status with PEG feedings and oncology rehab to preserve the integrity of his swallow function. Reviewed guidelines for Yoostay Protocol. Both in agreement and verbalized understanding. Patient will benefit from skilled intervention to address the below impairments. ?????? ? ? This section established at most recent assessment??????????  PROBLEM LIST (Impairments causing functional limitations):  1. Dysphagia  GOALS: (Goals have been discussed and agreed upon with patient.)  SHORT-TERM FUNCTIONAL GOALS: Time Frame: 3 months  Patient will complete laryngeal exercises independently at 100% accuracy. Repeat MBSS at duration of treatment with 100% participation. DISCHARGE GOALS: Time Frame: 3 months  1. Patient will tolerate least restrictive diet without overt clinical s/sx of aspiration observed for a safe and adequate swallow function at 100%. REHABILITATION POTENTIAL FOR STATED GOALS: GoodPLAN OF CARE:  Patient will benefit from skilled intervention to address the following impairments.   RECOMMENDATIONS AND PLANNED INTERVENTIONS (Benefits and precautions of therapy have been discussed with the patient.):  · NPO with alternative means of nutrition  MEDICATIONS:  · Non-oral  COMPENSATORY STRATEGIES/MODIFICATIONS INCLUDING:  · None  OTHER RECOMMENDATIONS (including follow up treatment recommendations):   · Laryngeal exercises  RECOMMENDED DIET MODIFICATIONS DISCUSSED WITH:  · Family  · Patient  TREATMENT PLAN EFFECTIVE DATES: 3/1/17 TO 6/1/17  FREQUENCY/DURATION: Continue to follow patient 1 time a week for 12 weeks to address above goals. Regarding Santos Landin's therapy, I certify that the treatment plan above will be carried out by a therapist or under their direction. Thank you for this referral,  TERESA Tipton Ed CCC-SLP                  Referring Physician Signature: David Shresthazabrina Perez      SUBJECTIVE:  Cooperative. Present Symptoms: None      Current Medications:   Current Outpatient Prescriptions on File Prior to Encounter   Medication Sig Dispense Refill    oxyCODONE (ROXICODONE) 5 mg/5 mL solution Take 5 mL by mouth every four (4) hours as needed for Pain. Max Daily Amount: 30 mg. 473 mL 0    glucose blood VI test strips (RELION PRIME TEST STRIPS) strip Test blood sugar before meals and bedtime Diagnosis code E11.65 200 Strip 5    citalopram (CELEXA) 10 mg tablet 1 Tab by Per G Tube route daily. 30 Tab 2    magnesium oxide (MAG-OX) 400 mg tablet 1 Tab by Per G Tube route three (3) times daily. 90 Tab 2    potassium chloride (KAON 10%) 20 mEq/15 mL solution 30 mL by Per G Tube route two (2) times daily (with meals) for 30 days. Indications: HYPOKALEMIA 480 mL 2    Lactose-Free Food with Fiber (JEVITY 1.5 BRANDO) 0.06 gram-1.5 kcal/mL liqd 6 Cans by PEG Tube route daily for 100 days. Bolus Feedings, Jevity 1.5 or comparable, goal 6 cans/day. Pt will need additional 38-42 oz free water daily via PEG. Estimated ARRON greater than 90 days. Indications: DYSPHAGIA 180 Can 4    LORazepam (ATIVAN) 1 mg tablet Take 0.5-1 Tabs by mouth every six (6) hours as needed for Anxiety. Max Daily Amount: 4 mg.  Indications: CANCER CHEMOTHERAPY-INDUCED NAUSEA AND VOMITING 90 Tab 0    magic mouthwash (GLADYS) susp Take 10 mL by mouth every four (4) hours. (Patient taking differently: Take 10 mL by mouth every four (4) hours as needed.) 500 mL 3    lidocaine-prilocaine (EMLA) topical cream Apply  to affected area as needed. Apply 1/2 inch amount of cream to port site 90 minutes prior to needle access. 30 g 0    ondansetron hcl (ZOFRAN) 8 mg tablet Take 1 Tab by mouth every eight (8) hours as needed for Nausea. 90 Tab 3    prochlorperazine (COMPAZINE) 10 mg tablet Take 1 Tab by mouth every six (6) hours as needed. 120 Tab 3    aspirin delayed-release 81 mg tablet Take 81 mg by mouth every morning. Take / use AM day of surgery  per anesthesia protocols. Indications: myocardial infarction prevention      losartan (COZAAR) 50 mg tablet 50 mg two (2) times a day. Indications: Hypertension      amLODIPine (NORVASC) 10 mg tablet Take 10 mg by mouth nightly. Take / use AM day of surgery  per anesthesia protocols. Indications: Hypertension      allopurinol (ZYLOPRIM) 300 mg tablet 300 mg nightly. Indications: GOUT      glipiZIDE SR (GLUCOTROL) 10 mg CR tablet Take 10 mg by mouth two (2) times daily as needed. Indications: type 2 diabetes mellitus      omeprazole (PRILOSEC) 20 mg capsule Take 20 mg by mouth every morning. Take / use AM day of surgery  per anesthesia protocols.   Indications: GASTROESOPHAGEAL REFLUX       Current Facility-Administered Medications on File Prior to Encounter   Medication Dose Route Frequency Provider Last Rate Last Dose    [COMPLETED] sodium chloride 0.9 % bolus infusion 500 mL  500 mL IntraVENous ONCE Priscella , NP   Stopped at 17 1225    [COMPLETED] cetuximab (ERBITUX) infusion 560 mg  250 mg/m2 (Treatment Plan Recorded) IntraVENous ONCE Priscella , NP   Stopped at 17 1120    [] saline peripheral flush soln 10 mL  10 mL InterCATHeter PRN Priscella , NP   10 mL at 17 1225    [] heparin (porcine) pf 300-500 Units  300-500 Units InterCATHeter PRN Priscella , NP   500 Units at 02/28/17 1225        Date Last Reviewed: 3/1/17  Current Dietary Status:  NPO      History of reflux:  YES    Reflux medication:Prilosec  Social History/Home Situation: Lives with wife. Work/Activity History: unknown    OBJECTIVE:  Objective Measure: Tool Used: National Outcomes Measurement System: Functional Communication Measures: SWALLOWING  Score:  Initial: 1 Most Recent: X (Date: -- )   Interpretation of Tool: This measure describes the change in functional communication status subsequent to speech-language pathology treatment of patients with dysphagia.  o Level 1:  Individual is not able to swallow anything safely by mouth. All nutrition and hydration is received through non-oral means (e.g., nasogastric tube, PEG). o Level 2: Individual is not able to swallow safely by mouth for nutrition and hydration, but may take some consistency with consistent maximal cues in therapy only. Alternative method of feeding required. o Level 3:  Alternative method of feeding required as individual takes less than 50% of nutrition and hydration by mouth, and/or swallowing is safe with consistent use of moderate cues to use compensatory strategies and/or requires maximum diet restriction. o Level 4:  Swallowing is safe, but usually requires moderate cues to use compensatory strategies, and/or the individual has moderate diet restrictions and/or still requires tube feeding and/or oral supplements. o Level 5:  Swallowing is safe with minimal diet restriction and/or occasionally requires minimal cueing to use compensatory strategies. The individual may occasionally self-cue. All nutrition and hydration needs are met by mouth at mealtime. o Level 6:  Swallowing is safe, and the individual eats and drinks independently and may rarely require minimal cueing. The individual usually self-cues when difficulty occurs.  May need to avoid specific food items (e.g., popcorn and nuts), or require additional time (due to dysphagia). o Level 7: The individuals ability to eat independently is not limited by swallow function. Swallowing would be safe and efficient for all consistencies. Compensatory strategies are effectively used when needed. Score Level 7 Level 6 Level 5 Level 4 Level 3 Level 2 Level 1   Modifier CH CI CJ CK CL CM CN   ? Swallowing:     - CURRENT STATUS: CN - 100% impaired, limited or restricted    - GOAL STATUS:  CJ - 20%-39% impaired, limited or restricted    - D/C STATUS:  ---------------To be determined---------------    Respiratory Status:      Room Air  CXR Results:N/A  MRI/CT Results:N/A  Oral Motor Structure/Speech:  Oral-Motor Structure/Motor Speech  Labial: No impairment  Dentition: Natural, Partials (comment)  Oral Hygiene: good  Lingual: No impairment  Velum: No impairment  Mandible: No impairment    Cognitive and Communication Status:  Neurologic State: Alert  Orientation Level: Oriented X4  Cognition: Follows commands; Appropriate for age attention/concentration  Perception: Appears intact  Perseveration: No perseveration noted  Safety/Judgement: Not assessed    BEDSIDE SWALLOW EVALUATION  Oral Assessment:  Oral Assessment  Labial: No impairment  Dentition: Natural;Partials (comment)  Oral Hygiene: good  Lingual: No impairment  Velum: No impairment  Mandible: No impairment  P.O. Trials:  Patient Position: upright in chair    The patient was given teaspoon to tablespoon   amounts of the following:   Consistency Presented: Thin liquid  How Presented: Self-fed/presented    ORAL PHASE:  Bolus Acceptance: No impairment  Bolus Formation/Control: No impairment  Propulsion: No impairment     Oral Residue: None    PHARYNGEAL PHASE:  Initiation of Swallow: No impairment  Laryngeal Elevation: Weak  Aspiration Signs/Symptoms: Strong cough  Vocal Quality: No impairment                OTHER OBSERVATIONS:  Rate/bite size: WNL   Endurance:   WNL   Coments:      TREATMENT:    (In addition to Assessment/Re-Assessment sessions the following treatments were rendered)  Assessment/Reassessment only, no treatment provided today          LARYNGEAL / PHARYNGEAL EXERCISES:                                                                                                                                     __________________________________________________________________________________________________  Treatment Assessment:  Dysphagia evaluation. Progression/Medical Necessity:   · Patient is expected to demonstrate progress in swallow strength, swallow timeliness, swallow function, diet tolerance and swallow safety to improve swallow safety, work toward diet advancement and decrease aspiration risk. Compliance with Program/Exercises: Will assess as treatment progresses. Reason for Continuation of Services/Other Comments:  · consuming a modified diet  Recommendations/Intent for next treatment session: \"Treatment next visit will focus on laryngeal exercises. \". Total Treatment Duration:  Time In: 0900  Time Out: [facility]. Elsa Sterling

## 2017-03-02 ENCOUNTER — HOSPITAL ENCOUNTER (OUTPATIENT)
Dept: RADIATION ONCOLOGY | Age: 62
Discharge: HOME | End: 2017-03-02
Payer: COMMERCIAL

## 2017-03-02 PROCEDURE — 77386 HC IMRT TRMT DLVR COMPL: CPT

## 2017-03-03 ENCOUNTER — HOSPITAL ENCOUNTER (OUTPATIENT)
Dept: RADIATION ONCOLOGY | Age: 62
Discharge: HOME | End: 2017-03-03
Payer: COMMERCIAL

## 2017-03-03 PROCEDURE — 77386 HC IMRT TRMT DLVR COMPL: CPT

## 2017-03-06 ENCOUNTER — HOSPITAL ENCOUNTER (OUTPATIENT)
Dept: RADIATION ONCOLOGY | Age: 62
Discharge: HOME | End: 2017-03-06
Payer: COMMERCIAL

## 2017-03-06 VITALS — BODY MASS INDEX: 32.12 KG/M2 | WEIGHT: 217.5 LBS

## 2017-03-06 PROCEDURE — 77336 RADIATION PHYSICS CONSULT: CPT

## 2017-03-06 PROCEDURE — 77386 HC IMRT TRMT DLVR COMPL: CPT

## 2017-03-06 NOTE — PROGRESS NOTES
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      INTERVAL HISTORY: Gaudencio Ignacio is being treated for larynx cancer. He has some irritation and some difficulty swallowing but is otherwise doing fine. He tells me week 2 his mouth gets dry.       OBJECTIVE: clear secretions coming from his tracheostomy. ASSESSMENT and PLAN: Mao Shin is tolerating radiation as anticipated for the current dose and fraction. We will continue therapy as planned and see next week as anticipated.     Shukri Cohen MD  03/06/17

## 2017-03-07 ENCOUNTER — HOSPITAL ENCOUNTER (OUTPATIENT)
Dept: LAB | Age: 62
Discharge: HOME | End: 2017-03-07
Payer: COMMERCIAL

## 2017-03-07 ENCOUNTER — PATIENT OUTREACH (OUTPATIENT)
Dept: CASE MANAGEMENT | Age: 62
End: 2017-03-07

## 2017-03-07 ENCOUNTER — HOSPITAL ENCOUNTER (OUTPATIENT)
Dept: RADIATION ONCOLOGY | Age: 62
Discharge: HOME | End: 2017-03-07
Payer: COMMERCIAL

## 2017-03-07 ENCOUNTER — HOSPITAL ENCOUNTER (OUTPATIENT)
Dept: INFUSION THERAPY | Age: 62
Discharge: HOME | End: 2017-03-07
Payer: COMMERCIAL

## 2017-03-07 VITALS
HEART RATE: 90 BPM | SYSTOLIC BLOOD PRESSURE: 123 MMHG | DIASTOLIC BLOOD PRESSURE: 66 MMHG | RESPIRATION RATE: 18 BRPM | TEMPERATURE: 98.4 F

## 2017-03-07 DIAGNOSIS — N28.9 RENAL INSUFFICIENCY: ICD-10-CM

## 2017-03-07 DIAGNOSIS — R11.15 NON-INTRACTABLE CYCLICAL VOMITING WITH NAUSEA: ICD-10-CM

## 2017-03-07 DIAGNOSIS — C32.1 SQUAMOUS CELL CARCINOMA OF EPIGLOTTIS (HCC): ICD-10-CM

## 2017-03-07 LAB
ALBUMIN SERPL BCP-MCNC: 3.1 G/DL (ref 3.2–4.6)
ALBUMIN/GLOB SERPL: 0.8 {RATIO} (ref 1.2–3.5)
ALP SERPL-CCNC: 77 U/L (ref 50–136)
ALT SERPL-CCNC: 22 U/L (ref 12–65)
ANION GAP BLD CALC-SCNC: 7 MMOL/L (ref 7–16)
AST SERPL W P-5'-P-CCNC: 15 U/L (ref 15–37)
BASOPHILS # BLD AUTO: 0 K/UL (ref 0–0.2)
BASOPHILS # BLD: 0 % (ref 0–2)
BILIRUB SERPL-MCNC: 0.6 MG/DL (ref 0.2–1.1)
BUN SERPL-MCNC: 19 MG/DL (ref 8–23)
CALCIUM SERPL-MCNC: 8.9 MG/DL (ref 8.3–10.4)
CHLORIDE SERPL-SCNC: 102 MMOL/L (ref 98–107)
CO2 SERPL-SCNC: 31 MMOL/L (ref 23–32)
CREAT SERPL-MCNC: 1.15 MG/DL (ref 0.8–1.5)
DIFFERENTIAL METHOD BLD: ABNORMAL
EOSINOPHIL # BLD: 0.2 K/UL (ref 0–0.8)
EOSINOPHIL NFR BLD: 4 % (ref 0.5–7.8)
ERYTHROCYTE [DISTWIDTH] IN BLOOD BY AUTOMATED COUNT: 14.3 % (ref 11.9–14.6)
GLOBULIN SER CALC-MCNC: 4 G/DL (ref 2.3–3.5)
GLUCOSE SERPL-MCNC: 153 MG/DL (ref 65–100)
HCT VFR BLD AUTO: 24.2 % (ref 41.1–50.3)
HGB BLD-MCNC: 8.1 G/DL (ref 13.6–17.2)
LYMPHOCYTES # BLD AUTO: 9 % (ref 13–44)
LYMPHOCYTES # BLD: 0.5 K/UL (ref 0.5–4.6)
MAGNESIUM SERPL-MCNC: 2.2 MG/DL (ref 1.8–2.4)
MCH RBC QN AUTO: 28.7 PG (ref 26.1–32.9)
MCHC RBC AUTO-ENTMCNC: 33.5 G/DL (ref 31.4–35)
MCV RBC AUTO: 85.8 FL (ref 79.6–97.8)
MONOCYTES # BLD: 0.3 K/UL (ref 0.1–1.3)
MONOCYTES NFR BLD AUTO: 5 % (ref 4–12)
NEUTS SEG # BLD: 4.3 K/UL (ref 1.7–8.2)
NEUTS SEG NFR BLD AUTO: 81 % (ref 43–78)
NRBC # BLD: 0 K/UL (ref 0–0.2)
PLATELET # BLD AUTO: 137 K/UL (ref 150–450)
PMV BLD AUTO: 8.3 FL (ref 10.8–14.1)
POTASSIUM SERPL-SCNC: 3.8 MMOL/L (ref 3.5–5.1)
PROT SERPL-MCNC: 7.1 G/DL (ref 6.3–8.2)
RBC # BLD AUTO: 2.82 M/UL (ref 4.23–5.67)
SODIUM SERPL-SCNC: 140 MMOL/L (ref 136–145)
WBC # BLD AUTO: 5.4 K/UL (ref 4.3–11.1)

## 2017-03-07 PROCEDURE — 96413 CHEMO IV INFUSION 1 HR: CPT

## 2017-03-07 PROCEDURE — 85025 COMPLETE CBC W/AUTO DIFF WBC: CPT | Performed by: INTERNAL MEDICINE

## 2017-03-07 PROCEDURE — 77386 HC IMRT TRMT DLVR COMPL: CPT

## 2017-03-07 PROCEDURE — 96375 TX/PRO/DX INJ NEW DRUG ADDON: CPT

## 2017-03-07 PROCEDURE — 80053 COMPREHEN METABOLIC PANEL: CPT | Performed by: INTERNAL MEDICINE

## 2017-03-07 PROCEDURE — 74011250636 HC RX REV CODE- 250/636

## 2017-03-07 PROCEDURE — 74011250636 HC RX REV CODE- 250/636: Performed by: INTERNAL MEDICINE

## 2017-03-07 PROCEDURE — 83735 ASSAY OF MAGNESIUM: CPT | Performed by: INTERNAL MEDICINE

## 2017-03-07 RX ORDER — DIPHENHYDRAMINE HYDROCHLORIDE 50 MG/ML
50 INJECTION, SOLUTION INTRAMUSCULAR; INTRAVENOUS ONCE
Status: COMPLETED | OUTPATIENT
Start: 2017-03-07 | End: 2017-03-07

## 2017-03-07 RX ORDER — SODIUM CHLORIDE 0.9 % (FLUSH) 0.9 %
10 SYRINGE (ML) INJECTION AS NEEDED
Status: ACTIVE | OUTPATIENT
Start: 2017-03-07 | End: 2017-03-07

## 2017-03-07 RX ORDER — HEPARIN 100 UNIT/ML
300-500 SYRINGE INTRAVENOUS AS NEEDED
Status: DISPENSED | OUTPATIENT
Start: 2017-03-07 | End: 2017-03-07

## 2017-03-07 RX ADMIN — SODIUM CHLORIDE 500 ML: 900 INJECTION, SOLUTION INTRAVENOUS at 10:25

## 2017-03-07 RX ADMIN — DIPHENHYDRAMINE HYDROCHLORIDE 50 MG: 50 INJECTION, SOLUTION INTRAMUSCULAR; INTRAVENOUS at 10:26

## 2017-03-07 RX ADMIN — Medication 10 ML: at 10:25

## 2017-03-07 RX ADMIN — Medication 10 ML: at 12:50

## 2017-03-07 RX ADMIN — SODIUM CHLORIDE, PRESERVATIVE FREE 500 UNITS: 5 INJECTION INTRAVENOUS at 12:50

## 2017-03-07 RX ADMIN — CETUXIMAB 560 MG: 2 SOLUTION INTRAVENOUS at 10:55

## 2017-03-07 NOTE — ACP (ADVANCE CARE PLANNING)
I saw pt this morn prior to C1D15 Erbitux and Day 11 of 35 radiation. He feels he is feeling much better and more energetic. He is maintaining weight with 5 cans Jevity daily with goal of 6 cans day. Shanice Saab and Verenice Jimenez are also here to see pt for supportive care. Pt has rash on left side of neck and Dr Michelle Garcia orders Cleocin lotion but check with radiation first before using. Pt verbalizes understanding of this. Pt encouraged to call with questions or concerns.

## 2017-03-07 NOTE — PROGRESS NOTES
Arrived to the [facility]. Erbitux completed. Patient tolerated well. Any issues or concerns during appointment: none. Patient aware of next infusion appointment on 03/08  at 0830 . Discharged ambulatory.

## 2017-03-08 ENCOUNTER — HOSPITAL ENCOUNTER (OUTPATIENT)
Dept: RADIATION ONCOLOGY | Age: 62
Discharge: HOME | End: 2017-03-08
Payer: COMMERCIAL

## 2017-03-08 ENCOUNTER — HOSPITAL ENCOUNTER (OUTPATIENT)
Dept: PHYSICAL THERAPY | Age: 62
Discharge: HOME | End: 2017-03-08
Payer: COMMERCIAL

## 2017-03-08 PROCEDURE — 77386 HC IMRT TRMT DLVR COMPL: CPT

## 2017-03-08 NOTE — PROGRESS NOTES
Pooja Casper  : 1955 [facility] [place] [id number], [address] [place] [address],[address], [place] [address].  [phone]   [phone]         OUTPATIENT SPEECH LANGUAGE PATHOLOGY: Daily Note  ICD-10: Treatment Diagnosis: Oropharyngeal Dysphagia R13.12  REFERRING PHYSICIAN: Dr. Dereje Gómez MD Orders: Evaluate and Treat  PAST MEDICAL HISTORY:   Mr. Carine Jimenez is a 62 y.o. male who  has a past medical history of GERD (gastroesophageal reflux disease); Gout; Hypertension; Morbid obesity ([facility] [address] Squamous cell carcinoma of epiglottis (HCC) (2016); and Type 2 diabetes mellitus ([facility] [address] He also has no past medical history of Adverse effect of anesthesia; Difficult intubation; Malignant hyperthermia due to anesthesia; or Pseudocholinesterase deficiency. He also  has a past surgical history that includes colonoscopy () and other surgical (Left). MEDICAL/REFERRING DIAGNOSIS: Dysphagia [R13.10]  DATE OF ONSET: 2017   PRIOR LEVEL OF FUNCTION: Independent  PRECAUTIONS/ALLERGIES: Zofran Jaymie Rim hcl (pf)]     ASSESSMENT:  Patient attended follow up Dysphagia therapy. He continues with NPO with all nutrition via PEG. He does on occasion drink Gatorade however SLP discussed oral hygiene etc. Patient verbalized understanding. Patient continues to require cues for finger occlusion when he talks. SLP attempted PMV trials with him this date. He reported that his ENT gave him a \"red one\" but when he would cough, the speaking valve(?) would come off. Tolerated PMV trials okay this date. Patient with some anxiety when PMV was placed. SLP concentrated on breathing exercises. Very little back pressure observed on removal. Next visit will focus on PMV trials and laryngeal exercises. Based on the objective data described below, the patient presents with mild-moderate oral dysphagia and moderate to severe pharyngeal dysphagia.  This patient is known to this SLP due to recent evaluation in January 2017 for oncology rehab. Patient was diagnosed with SCC of the epiglottis. At the time of his initial evaluation, he had not gone for his PEG placement. He was hospitalized a month ago for fever and during the hospitalization they attempted to place his PEG however they were unsuccessful due to airway obstruction. He participated in MBSS while hospitalized and found to grossly silent aspirate on all consistencies therefore SLP recommended NPO. Patient was also required to have a trach placed as well. He is tolerating his trach well. He did not have his speaking valve, stating \"it won't stay on. \" He continues to talk over the trach without finger occluding. SLP encouraged patient to finger occlude when he talks. He has an appointment to see Dr. Kizzy Elizabeth today regarding the trach. He is here today for oncology rehab for Dysphagia. Per Dr. Kizzy Elizabeth, patient could have some liquids however patient stated that all of his nutrition is met via PEG and on occasion he has a few sips of water. Oral motor exam was essentially WNL's. Mild lingual thrush observed. SLP spoke with patient and wife regarding oral hygiene strategies/protocol. Both verbalized understanding. Patient given p.o trials of thin liquids with (+) overt clinical s/sx of aspiration with a strong cough x's 1. SLP recommends continue with NPO status with PEG feedings and oncology rehab to preserve the integrity of his swallow function. Reviewed guidelines for [place] Protocol. Both in agreement and verbalized understanding. Patient will benefit from skilled intervention to address the below impairments. ?????? ? ? This section established at most recent assessment??????????  PROBLEM LIST (Impairments causing functional limitations):  1. Dysphagia  GOALS: (Goals have been discussed and agreed upon with patient.)  SHORT-TERM FUNCTIONAL GOALS: Time Frame: 3 months  Patient will complete laryngeal exercises independently at 100% accuracy. Repeat MBSS at duration of treatment with 100% participation. DISCHARGE GOALS: Time Frame: 3 months  1. Patient will tolerate least restrictive diet without overt clinical s/sx of aspiration observed for a safe and adequate swallow function at 100%. REHABILITATION POTENTIAL FOR STATED GOALS: GoodPLAN OF CARE:  Patient will benefit from skilled intervention to address the following impairments. RECOMMENDATIONS AND PLANNED INTERVENTIONS (Benefits and precautions of therapy have been discussed with the patient.):  · NPO with alternative means of nutrition  MEDICATIONS:  · Non-oral  COMPENSATORY STRATEGIES/MODIFICATIONS INCLUDING:  · None  OTHER RECOMMENDATIONS (including follow up treatment recommendations):   · Laryngeal exercises  RECOMMENDED DIET MODIFICATIONS DISCUSSED WITH:  · Family  · Patient  TREATMENT PLAN EFFECTIVE DATES: 3/1/17 TO 6/1/17  FREQUENCY/DURATION: Continue to follow patient 1 time a week for 12 weeks to address above goals. Regarding Santos Landin's therapy, I certify that the treatment plan above will be carried out by a therapist or under their direction. Thank you for this referral,  TERESA Blancas Ed CCC-SLP                  Referring Physician Signature: Alva Perez      SUBJECTIVE:  Cooperative. Present Symptoms: None      Current Medications:   Current Outpatient Prescriptions on File Prior to Encounter   Medication Sig Dispense Refill    clindamycin (CLEOCIN T) 1 % lotion Apply  to affected area two (2) times daily as needed. use thin film on affected area 1 Bottle 0    oxyCODONE (ROXICODONE) 5 mg/5 mL solution Take 5 mL by mouth every four (4) hours as needed for Pain. Max Daily Amount: 30 mg. 473 mL 0    glucose blood VI test strips (RELION PRIME TEST STRIPS) strip Test blood sugar before meals and bedtime Diagnosis code E11.65 200 Strip 5    citalopram (CELEXA) 10 mg tablet 1 Tab by Per G Tube route daily.  30 Tab 2    magnesium oxide (MAG-OX) 400 mg tablet 1 Tab by Per G Tube route three (3) times daily. 90 Tab 2    [] potassium chloride (KAON 10%) 20 mEq/15 mL solution 30 mL by Per G Tube route two (2) times daily (with meals) for 30 days. Indications: HYPOKALEMIA 480 mL 2    Lactose-Free Food with Fiber (JEVITY 1.5 BRANDO) 0.06 gram-1.5 kcal/mL liqd 6 Cans by PEG Tube route daily for 100 days. Bolus Feedings, Jevity 1.5 or comparable, goal 6 cans/day. Pt will need additional 38-42 oz free water daily via PEG. Estimated ARRON greater than 90 days. Indications: DYSPHAGIA 180 Can 4    LORazepam (ATIVAN) 1 mg tablet Take 0.5-1 Tabs by mouth every six (6) hours as needed for Anxiety. Max Daily Amount: 4 mg. Indications: CANCER CHEMOTHERAPY-INDUCED NAUSEA AND VOMITING 90 Tab 0    magic mouthwash (GLADYS) susp Take 10 mL by mouth every four (4) hours. (Patient taking differently: Take 10 mL by mouth every four (4) hours as needed.) 500 mL 3    lidocaine-prilocaine (EMLA) topical cream Apply  to affected area as needed. Apply 1/2 inch amount of cream to port site 90 minutes prior to needle access. 30 g 0    ondansetron hcl (ZOFRAN) 8 mg tablet Take 1 Tab by mouth every eight (8) hours as needed for Nausea. 90 Tab 3    prochlorperazine (COMPAZINE) 10 mg tablet Take 1 Tab by mouth every six (6) hours as needed. 120 Tab 3    aspirin delayed-release 81 mg tablet Take 81 mg by mouth every morning. Take / use AM day of surgery  per anesthesia protocols. Indications: myocardial infarction prevention      losartan (COZAAR) 50 mg tablet 50 mg two (2) times a day. Indications: Hypertension      amLODIPine (NORVASC) 10 mg tablet Take 10 mg by mouth nightly. Take / use AM day of surgery  per anesthesia protocols. Indications: Hypertension      allopurinol (ZYLOPRIM) 300 mg tablet 300 mg nightly. Indications: GOUT      glipiZIDE SR (GLUCOTROL) 10 mg CR tablet Take 10 mg by mouth two (2) times daily as needed.  Indications: type 2 diabetes mellitus      omeprazole (PRILOSEC) 20 mg capsule Take 20 mg by mouth every morning. Take / use AM day of surgery  per anesthesia protocols. Indications: GASTROESOPHAGEAL REFLUX       Current Facility-Administered Medications on File Prior to Encounter   Medication Dose Route Frequency Provider Last Rate Last Dose    [COMPLETED] sodium chloride 0.9 % bolus infusion 500 mL  500 mL IntraVENous ONCE Humberto Polk MD   Stopped at 17 1249    [COMPLETED] diphenhydrAMINE (BENADRYL) injection 50 mg  50 mg IntraVENous ONCE Humberto Polk MD   50 mg at 17 1026    [COMPLETED] cetuximab (ERBITUX) infusion 560 mg  250 mg/m2 (Treatment Plan Recorded) IntraVENous ONCE Humberto Polk MD   Stopped at 17 1155    [] saline peripheral flush soln 10 mL  10 mL InterCATHeter PRN Humberto Polk MD   10 mL at 17 1250    [] heparin (porcine) pf 300-500 Units  300-500 Units InterCATHeter PRN Humberto Polk MD   500 Units at 17 1250        Date Last Reviewed: 3/8/17  Current Dietary Status:  NPO      History of reflux:  YES    Reflux medication:Prilosec  Social History/Home Situation: Lives with wife. Work/Activity History: unknown    OBJECTIVE:  Objective Measure: Tool Used: National Outcomes Measurement System: Functional Communication Measures: SWALLOWING  Score:  Initial: 1 Most Recent: X (Date: -- )   Interpretation of Tool: This measure describes the change in functional communication status subsequent to speech-language pathology treatment of patients with dysphagia.  o Level 1:  Individual is not able to swallow anything safely by mouth. All nutrition and hydration is received through non-oral means (e.g., nasogastric tube, PEG). o Level 2: Individual is not able to swallow safely by mouth for nutrition and hydration, but may take some consistency with consistent maximal cues in therapy only. Alternative method of feeding required.   o Level 3:  Alternative method of feeding required as individual takes less than 50% of nutrition and hydration by mouth, and/or swallowing is safe with consistent use of moderate cues to use compensatory strategies and/or requires maximum diet restriction. o Level 4:  Swallowing is safe, but usually requires moderate cues to use compensatory strategies, and/or the individual has moderate diet restrictions and/or still requires tube feeding and/or oral supplements. o Level 5:  Swallowing is safe with minimal diet restriction and/or occasionally requires minimal cueing to use compensatory strategies. The individual may occasionally self-cue. All nutrition and hydration needs are met by mouth at mealtime. o Level 6:  Swallowing is safe, and the individual eats and drinks independently and may rarely require minimal cueing. The individual usually self-cues when difficulty occurs. May need to avoid specific food items (e.g., popcorn and nuts), or require additional time (due to dysphagia). o Level 7: The individuals ability to eat independently is not limited by swallow function. Swallowing would be safe and efficient for all consistencies. Compensatory strategies are effectively used when needed. Score Level 7 Level 6 Level 5 Level 4 Level 3 Level 2 Level 1   Modifier CH CI CJ CK CL CM CN   ? Swallowing:     - CURRENT STATUS: CN - 100% impaired, limited or restricted    - GOAL STATUS:  CJ - 20%-39% impaired, limited or restricted    - D/C STATUS:  ---------------To be determined---------------    Respiratory Status:      Room Air  CXR Results:N/A  MRI/CT Results:N/A  Oral Motor Structure/Speech:       Cognitive and Communication Status:                      BEDSIDE SWALLOW EVALUATION  Oral Assessment:     P.O. Trials: The patient was given teaspoon to tablespoon   amounts of the following:           ORAL PHASE:                   PHARYNGEAL PHASE:                            OTHER OBSERVATIONS:  Rate/bite size: WNL   Endurance:   WNL   Coments: TREATMENT:    (In addition to Assessment/Re-Assessment sessions the following treatments were rendered)  Assessment/Reassessment only, no treatment provided today          LARYNGEAL / PHARYNGEAL EXERCISES:           Effortful Swallow: Yes  Reps : 15  Sets : 1                                Mary: Yes  Reps : 15  Sets : 1  Mendelsohn Maneuver: Yes  Reps : 15  Sets : 1                                     Supraglottic Swallow: Yes                             __________________________________________________________________________________________________  Treatment Assessment:  Dysphagia treatment  Progression/Medical Necessity:   · Patient is expected to demonstrate progress in swallow strength, swallow timeliness, swallow function, diet tolerance and swallow safety to improve swallow safety, work toward diet advancement and decrease aspiration risk. Compliance with Program/Exercises: Will assess as treatment progresses. Reason for Continuation of Services/Other Comments:  · consuming a modified diet  Recommendations/Intent for next treatment session: \"Treatment next visit will focus on laryngeal exercises and PMV trials\". Total Treatment Duration:  Time In: 0900  Time Out: 528 Brady Christianson. Justine Zendejas

## 2017-03-09 ENCOUNTER — HOSPITAL ENCOUNTER (OUTPATIENT)
Dept: RADIATION ONCOLOGY | Age: 62
Discharge: HOME | End: 2017-03-09
Payer: COMMERCIAL

## 2017-03-09 PROCEDURE — 77386 HC IMRT TRMT DLVR COMPL: CPT

## 2017-03-10 ENCOUNTER — HOSPITAL ENCOUNTER (OUTPATIENT)
Dept: RADIATION ONCOLOGY | Age: 62
Discharge: HOME | End: 2017-03-10
Payer: COMMERCIAL

## 2017-03-10 PROCEDURE — 77386 HC IMRT TRMT DLVR COMPL: CPT

## 2017-03-13 ENCOUNTER — HOSPITAL ENCOUNTER (OUTPATIENT)
Dept: RADIATION ONCOLOGY | Age: 62
Discharge: HOME | End: 2017-03-13
Payer: COMMERCIAL

## 2017-03-13 VITALS — BODY MASS INDEX: 32.07 KG/M2 | WEIGHT: 217.2 LBS

## 2017-03-13 PROCEDURE — 77336 RADIATION PHYSICS CONSULT: CPT

## 2017-03-13 PROCEDURE — 77386 HC IMRT TRMT DLVR COMPL: CPT

## 2017-03-13 NOTE — PROGRESS NOTES
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 6X photons     INTERVAL HISTORY: Lavonne Billingsley is being treated for larynx cancer. He has some irritation and some difficulty swallowing but is otherwise doing fine. He tells me week 2 his mouth gets dry. Mild neck erythema, worse on the left.       OBJECTIVE: Clear secretions coming from his tracheostomy. ASSESSMENT and PLAN: Geneva is tolerating radiation as anticipated for the current dose and fraction. We will continue therapy as planned and see next week as anticipated.     Pura Vásquez MD  03/13/17

## 2017-03-14 ENCOUNTER — HOSPITAL ENCOUNTER (OUTPATIENT)
Dept: INFUSION THERAPY | Age: 62
Discharge: HOME | End: 2017-03-14
Payer: COMMERCIAL

## 2017-03-14 ENCOUNTER — HOSPITAL ENCOUNTER (OUTPATIENT)
Dept: LAB | Age: 62
Discharge: HOME | End: 2017-03-14
Payer: COMMERCIAL

## 2017-03-14 ENCOUNTER — HOSPITAL ENCOUNTER (OUTPATIENT)
Dept: RADIATION ONCOLOGY | Age: 62
Discharge: HOME | End: 2017-03-14
Payer: COMMERCIAL

## 2017-03-14 VITALS
TEMPERATURE: 98.7 F | SYSTOLIC BLOOD PRESSURE: 119 MMHG | OXYGEN SATURATION: 97 % | DIASTOLIC BLOOD PRESSURE: 68 MMHG | HEART RATE: 84 BPM

## 2017-03-14 DIAGNOSIS — C32.1 SQUAMOUS CELL CARCINOMA OF EPIGLOTTIS (HCC): ICD-10-CM

## 2017-03-14 LAB
ALBUMIN SERPL BCP-MCNC: 3 G/DL (ref 3.2–4.6)
ALBUMIN/GLOB SERPL: 0.8 {RATIO} (ref 1.2–3.5)
ALP SERPL-CCNC: 79 U/L (ref 50–136)
ALT SERPL-CCNC: 19 U/L (ref 12–65)
ANION GAP BLD CALC-SCNC: 7 MMOL/L (ref 7–16)
AST SERPL W P-5'-P-CCNC: 13 U/L (ref 15–37)
BASOPHILS # BLD AUTO: 0 K/UL (ref 0–0.2)
BASOPHILS # BLD: 0 % (ref 0–2)
BILIRUB SERPL-MCNC: 0.7 MG/DL (ref 0.2–1.1)
BUN SERPL-MCNC: 18 MG/DL (ref 8–23)
CALCIUM SERPL-MCNC: 8.8 MG/DL (ref 8.3–10.4)
CHLORIDE SERPL-SCNC: 101 MMOL/L (ref 98–107)
CO2 SERPL-SCNC: 31 MMOL/L (ref 23–32)
CREAT SERPL-MCNC: 1.15 MG/DL (ref 0.8–1.5)
DIFFERENTIAL METHOD BLD: ABNORMAL
EOSINOPHIL # BLD: 0.2 K/UL (ref 0–0.8)
EOSINOPHIL NFR BLD: 4 % (ref 0.5–7.8)
ERYTHROCYTE [DISTWIDTH] IN BLOOD BY AUTOMATED COUNT: 14.9 % (ref 11.9–14.6)
GLOBULIN SER CALC-MCNC: 4 G/DL (ref 2.3–3.5)
GLUCOSE SERPL-MCNC: 215 MG/DL (ref 65–100)
HCT VFR BLD AUTO: 23.6 % (ref 41.1–50.3)
HGB BLD-MCNC: 7.9 G/DL (ref 13.6–17.2)
LYMPHOCYTES # BLD AUTO: 10 % (ref 13–44)
LYMPHOCYTES # BLD: 0.5 K/UL (ref 0.5–4.6)
MAGNESIUM SERPL-MCNC: 2.2 MG/DL (ref 1.8–2.4)
MCH RBC QN AUTO: 28.9 PG (ref 26.1–32.9)
MCHC RBC AUTO-ENTMCNC: 33.5 G/DL (ref 31.4–35)
MCV RBC AUTO: 86.4 FL (ref 79.6–97.8)
MONOCYTES # BLD: 0.3 K/UL (ref 0.1–1.3)
MONOCYTES NFR BLD AUTO: 5 % (ref 4–12)
NEUTS SEG # BLD: 4 K/UL (ref 1.7–8.2)
NEUTS SEG NFR BLD AUTO: 81 % (ref 43–78)
NRBC # BLD: 0 K/UL (ref 0–0.2)
PLATELET # BLD AUTO: 137 K/UL (ref 150–450)
PMV BLD AUTO: 8.5 FL (ref 10.8–14.1)
POTASSIUM SERPL-SCNC: 3.8 MMOL/L (ref 3.5–5.1)
PROT SERPL-MCNC: 7 G/DL (ref 6.3–8.2)
RBC # BLD AUTO: 2.73 M/UL (ref 4.23–5.67)
SODIUM SERPL-SCNC: 139 MMOL/L (ref 136–145)
WBC # BLD AUTO: 5 K/UL (ref 4.3–11.1)

## 2017-03-14 PROCEDURE — 80053 COMPREHEN METABOLIC PANEL: CPT | Performed by: NURSE PRACTITIONER

## 2017-03-14 PROCEDURE — 77386 HC IMRT TRMT DLVR COMPL: CPT

## 2017-03-14 PROCEDURE — 96413 CHEMO IV INFUSION 1 HR: CPT

## 2017-03-14 PROCEDURE — 96361 HYDRATE IV INFUSION ADD-ON: CPT

## 2017-03-14 PROCEDURE — 96375 TX/PRO/DX INJ NEW DRUG ADDON: CPT

## 2017-03-14 PROCEDURE — 74011250636 HC RX REV CODE- 250/636: Performed by: NURSE PRACTITIONER

## 2017-03-14 PROCEDURE — 83735 ASSAY OF MAGNESIUM: CPT | Performed by: NURSE PRACTITIONER

## 2017-03-14 PROCEDURE — 74011250636 HC RX REV CODE- 250/636

## 2017-03-14 PROCEDURE — 85025 COMPLETE CBC W/AUTO DIFF WBC: CPT | Performed by: NURSE PRACTITIONER

## 2017-03-14 RX ORDER — SODIUM CHLORIDE 0.9 % (FLUSH) 0.9 %
10 SYRINGE (ML) INJECTION AS NEEDED
Status: ACTIVE | OUTPATIENT
Start: 2017-03-14 | End: 2017-03-14

## 2017-03-14 RX ORDER — DIPHENHYDRAMINE HYDROCHLORIDE 50 MG/ML
50 INJECTION, SOLUTION INTRAMUSCULAR; INTRAVENOUS ONCE
Status: COMPLETED | OUTPATIENT
Start: 2017-03-14 | End: 2017-03-14

## 2017-03-14 RX ORDER — HYDROCORTISONE SODIUM SUCCINATE 100 MG/2ML
100 INJECTION, POWDER, FOR SOLUTION INTRAMUSCULAR; INTRAVENOUS AS NEEDED
Status: DISPENSED | OUTPATIENT
Start: 2017-03-14 | End: 2017-03-14

## 2017-03-14 RX ORDER — HEPARIN 100 UNIT/ML
300-500 SYRINGE INTRAVENOUS AS NEEDED
Status: DISPENSED | OUTPATIENT
Start: 2017-03-14 | End: 2017-03-14

## 2017-03-14 RX ADMIN — DIPHENHYDRAMINE HYDROCHLORIDE 50 MG: 50 INJECTION, SOLUTION INTRAMUSCULAR; INTRAVENOUS at 10:34

## 2017-03-14 RX ADMIN — Medication 10 ML: at 12:48

## 2017-03-14 RX ADMIN — SODIUM CHLORIDE, PRESERVATIVE FREE 500 UNITS: 5 INJECTION INTRAVENOUS at 12:48

## 2017-03-14 RX ADMIN — CETUXIMAB 560 MG: 2 SOLUTION INTRAVENOUS at 10:59

## 2017-03-14 RX ADMIN — SODIUM CHLORIDE 500 ML: 900 INJECTION, SOLUTION INTRAVENOUS at 10:15

## 2017-03-14 RX ADMIN — Medication 10 ML: at 10:15

## 2017-03-14 NOTE — PROGRESS NOTES
Tolerated Erbitux infusion without difficulty. Vitals stable. Patient discharged via ambulation accompanied by wife. Instructed to notify physician of any problems, questions or concerns. Allowed opportunity for patient/family to ask questions. Verbalized understanding. Next appointment is 03/21/17 at [place] [id number] with Jordyn with labs and MD visit immediately prior.

## 2017-03-14 NOTE — PROGRESS NOTES
Problem: Chemotherapy Treatment  Goal: *Chemotherapy regimen followed  Outcome: Progressing Towards Goal  Reviewed Erbitux infusion. Verbalizes understanding.

## 2017-03-15 ENCOUNTER — APPOINTMENT (OUTPATIENT)
Dept: PHYSICAL THERAPY | Age: 62
End: 2017-03-15
Payer: COMMERCIAL

## 2017-03-15 ENCOUNTER — HOSPITAL ENCOUNTER (OUTPATIENT)
Dept: RADIATION ONCOLOGY | Age: 62
Discharge: HOME | End: 2017-03-15
Payer: COMMERCIAL

## 2017-03-15 ENCOUNTER — PATIENT OUTREACH (OUTPATIENT)
Dept: CASE MANAGEMENT | Age: 62
End: 2017-03-15

## 2017-03-15 PROCEDURE — 77386 HC IMRT TRMT DLVR COMPL: CPT

## 2017-03-15 NOTE — ACP (ADVANCE CARE PLANNING)
I saw pt on 3-14-17 with Indira Hernández prior to Erbitux therapy. He is feeling much better he states without complaints today (other than desiring trach to be removed) His chest is no longer as red and irritated and pt states rhis feels much better. Pt has appt with speech therapist so encouraged him to attend---continue 5/cans day increase to 6 cans if tolerated.  Nurse navigation is following

## 2017-03-16 ENCOUNTER — HOSPITAL ENCOUNTER (OUTPATIENT)
Dept: RADIATION ONCOLOGY | Age: 62
Discharge: HOME | End: 2017-03-16
Payer: COMMERCIAL

## 2017-03-16 PROCEDURE — 77386 HC IMRT TRMT DLVR COMPL: CPT

## 2017-03-17 ENCOUNTER — HOSPITAL ENCOUNTER (OUTPATIENT)
Dept: RADIATION ONCOLOGY | Age: 62
Discharge: HOME | End: 2017-03-17
Payer: COMMERCIAL

## 2017-03-17 PROCEDURE — 77386 HC IMRT TRMT DLVR COMPL: CPT

## 2017-03-20 ENCOUNTER — HOSPITAL ENCOUNTER (OUTPATIENT)
Dept: RADIATION ONCOLOGY | Age: 62
Discharge: HOME | End: 2017-03-20
Payer: COMMERCIAL

## 2017-03-20 PROCEDURE — 77386 HC IMRT TRMT DLVR COMPL: CPT

## 2017-03-20 PROCEDURE — 77336 RADIATION PHYSICS CONSULT: CPT

## 2017-03-21 ENCOUNTER — HOSPITAL ENCOUNTER (OUTPATIENT)
Dept: LAB | Age: 62
Discharge: HOME | End: 2017-03-21
Payer: COMMERCIAL

## 2017-03-21 ENCOUNTER — HOSPITAL ENCOUNTER (OUTPATIENT)
Dept: RADIATION ONCOLOGY | Age: 62
Discharge: HOME | End: 2017-03-21
Payer: COMMERCIAL

## 2017-03-21 ENCOUNTER — HOSPITAL ENCOUNTER (OUTPATIENT)
Dept: INFUSION THERAPY | Age: 62
Discharge: HOME | End: 2017-03-21
Payer: COMMERCIAL

## 2017-03-21 VITALS
DIASTOLIC BLOOD PRESSURE: 56 MMHG | RESPIRATION RATE: 18 BRPM | SYSTOLIC BLOOD PRESSURE: 108 MMHG | HEART RATE: 85 BPM | TEMPERATURE: 98.2 F

## 2017-03-21 DIAGNOSIS — C32.1 SQUAMOUS CELL CARCINOMA OF EPIGLOTTIS (HCC): ICD-10-CM

## 2017-03-21 LAB
ALBUMIN SERPL BCP-MCNC: 2.9 G/DL (ref 3.2–4.6)
ALBUMIN/GLOB SERPL: 0.7 {RATIO} (ref 1.2–3.5)
ALP SERPL-CCNC: 68 U/L (ref 50–136)
ALT SERPL-CCNC: 16 U/L (ref 12–65)
ANION GAP BLD CALC-SCNC: 6 MMOL/L (ref 7–16)
AST SERPL W P-5'-P-CCNC: 14 U/L (ref 15–37)
BASOPHILS # BLD AUTO: 0 K/UL (ref 0–0.2)
BASOPHILS # BLD: 0 % (ref 0–2)
BILIRUB SERPL-MCNC: 0.6 MG/DL (ref 0.2–1.1)
BUN SERPL-MCNC: 22 MG/DL (ref 8–23)
CALCIUM SERPL-MCNC: 9 MG/DL (ref 8.3–10.4)
CHLORIDE SERPL-SCNC: 100 MMOL/L (ref 98–107)
CO2 SERPL-SCNC: 33 MMOL/L (ref 23–32)
CREAT SERPL-MCNC: 1.23 MG/DL (ref 0.8–1.5)
DIFFERENTIAL METHOD BLD: ABNORMAL
EOSINOPHIL # BLD: 0.2 K/UL (ref 0–0.8)
EOSINOPHIL NFR BLD: 3 % (ref 0.5–7.8)
ERYTHROCYTE [DISTWIDTH] IN BLOOD BY AUTOMATED COUNT: 14.6 % (ref 11.9–14.6)
GLOBULIN SER CALC-MCNC: 3.9 G/DL (ref 2.3–3.5)
GLUCOSE SERPL-MCNC: 204 MG/DL (ref 65–100)
HCT VFR BLD AUTO: 21.5 % (ref 41.1–50.3)
HGB BLD-MCNC: 7.3 G/DL (ref 13.6–17.2)
LYMPHOCYTES # BLD AUTO: 8 % (ref 13–44)
LYMPHOCYTES # BLD: 0.4 K/UL (ref 0.5–4.6)
MAGNESIUM SERPL-MCNC: 2.5 MG/DL (ref 1.8–2.4)
MCH RBC QN AUTO: 29.3 PG (ref 26.1–32.9)
MCHC RBC AUTO-ENTMCNC: 34 G/DL (ref 31.4–35)
MCV RBC AUTO: 86.3 FL (ref 79.6–97.8)
MONOCYTES # BLD: 0.3 K/UL (ref 0.1–1.3)
MONOCYTES NFR BLD AUTO: 6 % (ref 4–12)
NEUTS SEG # BLD: 3.8 K/UL (ref 1.7–8.2)
NEUTS SEG NFR BLD AUTO: 83 % (ref 43–78)
NRBC # BLD: 0 K/UL (ref 0–0.2)
PLATELET # BLD AUTO: 161 K/UL (ref 150–450)
PMV BLD AUTO: 8.5 FL (ref 10.8–14.1)
POTASSIUM SERPL-SCNC: 3.8 MMOL/L (ref 3.5–5.1)
PROT SERPL-MCNC: 6.8 G/DL (ref 6.3–8.2)
RBC # BLD AUTO: 2.49 M/UL (ref 4.23–5.67)
SODIUM SERPL-SCNC: 139 MMOL/L (ref 136–145)
WBC # BLD AUTO: 4.6 K/UL (ref 4.3–11.1)

## 2017-03-21 PROCEDURE — 77386 HC IMRT TRMT DLVR COMPL: CPT

## 2017-03-21 PROCEDURE — 96375 TX/PRO/DX INJ NEW DRUG ADDON: CPT

## 2017-03-21 PROCEDURE — 83735 ASSAY OF MAGNESIUM: CPT | Performed by: INTERNAL MEDICINE

## 2017-03-21 PROCEDURE — 80053 COMPREHEN METABOLIC PANEL: CPT | Performed by: INTERNAL MEDICINE

## 2017-03-21 PROCEDURE — 85025 COMPLETE CBC W/AUTO DIFF WBC: CPT | Performed by: INTERNAL MEDICINE

## 2017-03-21 PROCEDURE — 74011250636 HC RX REV CODE- 250/636: Performed by: INTERNAL MEDICINE

## 2017-03-21 PROCEDURE — 96413 CHEMO IV INFUSION 1 HR: CPT

## 2017-03-21 RX ORDER — DIPHENHYDRAMINE HYDROCHLORIDE 50 MG/ML
50 INJECTION, SOLUTION INTRAMUSCULAR; INTRAVENOUS ONCE
Status: COMPLETED | OUTPATIENT
Start: 2017-03-21 | End: 2017-03-21

## 2017-03-21 RX ORDER — HEPARIN 100 UNIT/ML
300-500 SYRINGE INTRAVENOUS AS NEEDED
Status: DISPENSED | OUTPATIENT
Start: 2017-03-21 | End: 2017-03-21

## 2017-03-21 RX ORDER — SODIUM CHLORIDE 0.9 % (FLUSH) 0.9 %
10 SYRINGE (ML) INJECTION AS NEEDED
Status: ACTIVE | OUTPATIENT
Start: 2017-03-21 | End: 2017-03-21

## 2017-03-21 RX ADMIN — DIPHENHYDRAMINE HYDROCHLORIDE 50 MG: 50 INJECTION, SOLUTION INTRAMUSCULAR; INTRAVENOUS at 10:39

## 2017-03-21 RX ADMIN — SODIUM CHLORIDE, PRESERVATIVE FREE 500 UNITS: 5 INJECTION INTRAVENOUS at 13:16

## 2017-03-21 RX ADMIN — CETUXIMAB 560 MG: 2 SOLUTION INTRAVENOUS at 11:17

## 2017-03-21 RX ADMIN — Medication 10 ML: at 13:16

## 2017-03-21 RX ADMIN — SODIUM CHLORIDE 500 ML: 900 INJECTION, SOLUTION INTRAVENOUS at 10:32

## 2017-03-21 RX ADMIN — Medication 10 ML: at 10:32

## 2017-03-22 ENCOUNTER — HOSPITAL ENCOUNTER (OUTPATIENT)
Dept: RADIATION ONCOLOGY | Age: 62
Discharge: HOME | End: 2017-03-22
Payer: COMMERCIAL

## 2017-03-22 ENCOUNTER — HOSPITAL ENCOUNTER (OUTPATIENT)
Dept: PHYSICAL THERAPY | Age: 62
Discharge: HOME | End: 2017-03-22
Payer: COMMERCIAL

## 2017-03-22 PROCEDURE — 77386 HC IMRT TRMT DLVR COMPL: CPT

## 2017-03-22 PROCEDURE — 74011250636 HC RX REV CODE- 250/636

## 2017-03-22 NOTE — PROGRESS NOTES
Speech Pathology:     Patient arrived to therapy however patient not feeling well. Rescheduled for 3/31/17. TERESA Blancas Nhi

## 2017-03-23 ENCOUNTER — HOSPITAL ENCOUNTER (OUTPATIENT)
Dept: RADIATION ONCOLOGY | Age: 62
Discharge: HOME | End: 2017-03-23
Payer: COMMERCIAL

## 2017-03-23 PROCEDURE — 77386 HC IMRT TRMT DLVR COMPL: CPT

## 2017-03-24 ENCOUNTER — HOSPITAL ENCOUNTER (OUTPATIENT)
Dept: RADIATION ONCOLOGY | Age: 62
End: 2017-03-24
Payer: COMMERCIAL

## 2017-03-27 ENCOUNTER — HOSPITAL ENCOUNTER (OUTPATIENT)
Dept: RADIATION ONCOLOGY | Age: 62
Discharge: HOME | End: 2017-03-27
Payer: COMMERCIAL

## 2017-03-27 VITALS — BODY MASS INDEX: 31.91 KG/M2 | WEIGHT: 216.1 LBS

## 2017-03-27 PROCEDURE — 77386 HC IMRT TRMT DLVR COMPL: CPT

## 2017-03-27 NOTE — PROGRESS NOTES
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 6X photons     INTERVAL HISTORY: Natan Darling is being treated for larynx cancer. He has some irritation and some difficulty swallowing but is otherwise doing fine. He tells me week 2 his mouth gets dry. Mild neck erythema, worse on the left. Week 4 with no new issues and again week 5. .     OBJECTIVE: Clear secretions coming from his tracheostomy. Desquamation around site. ASSESSMENT and PLAN: Earnest Zepeda is tolerating radiation as anticipated for the current dose and fraction. Will hold trach bolus for a few days for the skin to heal.  We will continue therapy as planned and see next week as anticipated.     Karl Crouch MD  03/27/17

## 2017-03-28 ENCOUNTER — HOSPITAL ENCOUNTER (OUTPATIENT)
Dept: RADIATION ONCOLOGY | Age: 62
Discharge: HOME | End: 2017-03-28
Payer: COMMERCIAL

## 2017-03-28 ENCOUNTER — HOSPITAL ENCOUNTER (OUTPATIENT)
Dept: INFUSION THERAPY | Age: 62
Discharge: HOME | End: 2017-03-28
Payer: COMMERCIAL

## 2017-03-28 ENCOUNTER — HOSPITAL ENCOUNTER (OUTPATIENT)
Dept: LAB | Age: 62
Discharge: HOME | End: 2017-03-28
Payer: COMMERCIAL

## 2017-03-28 VITALS
DIASTOLIC BLOOD PRESSURE: 60 MMHG | SYSTOLIC BLOOD PRESSURE: 117 MMHG | HEART RATE: 80 BPM | TEMPERATURE: 98 F | RESPIRATION RATE: 18 BRPM

## 2017-03-28 DIAGNOSIS — C32.1 SQUAMOUS CELL CARCINOMA OF EPIGLOTTIS (HCC): ICD-10-CM

## 2017-03-28 DIAGNOSIS — C76.0 HEAD AND NECK CANCER (HCC): ICD-10-CM

## 2017-03-28 LAB
ALBUMIN SERPL BCP-MCNC: 3 G/DL (ref 3.2–4.6)
ALBUMIN/GLOB SERPL: 0.7 {RATIO} (ref 1.2–3.5)
ALP SERPL-CCNC: 69 U/L (ref 50–136)
ALT SERPL-CCNC: 16 U/L (ref 12–65)
ANION GAP BLD CALC-SCNC: 7 MMOL/L (ref 7–16)
AST SERPL W P-5'-P-CCNC: 13 U/L (ref 15–37)
BASOPHILS # BLD AUTO: 0 K/UL (ref 0–0.2)
BASOPHILS # BLD: 0 % (ref 0–2)
BILIRUB SERPL-MCNC: 0.5 MG/DL (ref 0.2–1.1)
BUN SERPL-MCNC: 18 MG/DL (ref 8–23)
CALCIUM SERPL-MCNC: 9 MG/DL (ref 8.3–10.4)
CHLORIDE SERPL-SCNC: 101 MMOL/L (ref 98–107)
CO2 SERPL-SCNC: 32 MMOL/L (ref 23–32)
CREAT SERPL-MCNC: 1.11 MG/DL (ref 0.8–1.5)
DIFFERENTIAL METHOD BLD: ABNORMAL
EOSINOPHIL # BLD: 0.1 K/UL (ref 0–0.8)
EOSINOPHIL NFR BLD: 3 % (ref 0.5–7.8)
ERYTHROCYTE [DISTWIDTH] IN BLOOD BY AUTOMATED COUNT: 14.8 % (ref 11.9–14.6)
GLOBULIN SER CALC-MCNC: 4.2 G/DL (ref 2.3–3.5)
GLUCOSE SERPL-MCNC: 164 MG/DL (ref 65–100)
HCT VFR BLD AUTO: 22.5 % (ref 41.1–50.3)
HGB BLD-MCNC: 7.3 G/DL (ref 13.6–17.2)
LYMPHOCYTES # BLD AUTO: 9 % (ref 13–44)
LYMPHOCYTES # BLD: 0.4 K/UL (ref 0.5–4.6)
MAGNESIUM SERPL-MCNC: 2.3 MG/DL (ref 1.8–2.4)
MCH RBC QN AUTO: 28.6 PG (ref 26.1–32.9)
MCHC RBC AUTO-ENTMCNC: 32.4 G/DL (ref 31.4–35)
MCV RBC AUTO: 88.2 FL (ref 79.6–97.8)
MONOCYTES # BLD: 0.3 K/UL (ref 0.1–1.3)
MONOCYTES NFR BLD AUTO: 6 % (ref 4–12)
NEUTS SEG # BLD: 3.9 K/UL (ref 1.7–8.2)
NEUTS SEG NFR BLD AUTO: 82 % (ref 43–78)
NRBC # BLD: 0 K/UL (ref 0–0.2)
PLATELET # BLD AUTO: 165 K/UL (ref 150–450)
PMV BLD AUTO: 8.2 FL (ref 10.8–14.1)
POTASSIUM SERPL-SCNC: 3.8 MMOL/L (ref 3.5–5.1)
PROT SERPL-MCNC: 7.2 G/DL (ref 6.3–8.2)
RBC # BLD AUTO: 2.55 M/UL (ref 4.23–5.67)
SODIUM SERPL-SCNC: 140 MMOL/L (ref 136–145)
WBC # BLD AUTO: 4.7 K/UL (ref 4.3–11.1)

## 2017-03-28 PROCEDURE — 96413 CHEMO IV INFUSION 1 HR: CPT

## 2017-03-28 PROCEDURE — 74011250636 HC RX REV CODE- 250/636

## 2017-03-28 PROCEDURE — 77336 RADIATION PHYSICS CONSULT: CPT

## 2017-03-28 PROCEDURE — 83735 ASSAY OF MAGNESIUM: CPT | Performed by: NURSE PRACTITIONER

## 2017-03-28 PROCEDURE — 74011250636 HC RX REV CODE- 250/636: Performed by: INTERNAL MEDICINE

## 2017-03-28 PROCEDURE — 74011250636 HC RX REV CODE- 250/636: Performed by: NURSE PRACTITIONER

## 2017-03-28 PROCEDURE — 85025 COMPLETE CBC W/AUTO DIFF WBC: CPT | Performed by: NURSE PRACTITIONER

## 2017-03-28 PROCEDURE — 77386 HC IMRT TRMT DLVR COMPL: CPT

## 2017-03-28 PROCEDURE — 86900 BLOOD TYPING SEROLOGIC ABO: CPT | Performed by: NURSE PRACTITIONER

## 2017-03-28 PROCEDURE — 86923 COMPATIBILITY TEST ELECTRIC: CPT | Performed by: NURSE PRACTITIONER

## 2017-03-28 PROCEDURE — 96375 TX/PRO/DX INJ NEW DRUG ADDON: CPT

## 2017-03-28 PROCEDURE — 80053 COMPREHEN METABOLIC PANEL: CPT | Performed by: NURSE PRACTITIONER

## 2017-03-28 RX ORDER — HEPARIN 100 UNIT/ML
300-500 SYRINGE INTRAVENOUS AS NEEDED
Status: DISPENSED | OUTPATIENT
Start: 2017-03-28 | End: 2017-03-28

## 2017-03-28 RX ORDER — SODIUM CHLORIDE 9 MG/ML
250 INJECTION, SOLUTION INTRAVENOUS AS NEEDED
Status: DISCONTINUED | OUTPATIENT
Start: 2017-03-28 | End: 2017-04-01 | Stop reason: HOSPADM

## 2017-03-28 RX ORDER — SODIUM CHLORIDE 0.9 % (FLUSH) 0.9 %
10 SYRINGE (ML) INJECTION AS NEEDED
Status: ACTIVE | OUTPATIENT
Start: 2017-03-28 | End: 2017-03-28

## 2017-03-28 RX ORDER — DIPHENHYDRAMINE HYDROCHLORIDE 50 MG/ML
25 INJECTION, SOLUTION INTRAMUSCULAR; INTRAVENOUS ONCE
Status: COMPLETED | OUTPATIENT
Start: 2017-03-28 | End: 2017-03-28

## 2017-03-28 RX ORDER — DIPHENHYDRAMINE HYDROCHLORIDE 50 MG/ML
25 INJECTION, SOLUTION INTRAMUSCULAR; INTRAVENOUS AS NEEDED
Status: CANCELLED
Start: 2017-03-28

## 2017-03-28 RX ADMIN — SODIUM CHLORIDE 250 ML: 900 INJECTION, SOLUTION INTRAVENOUS at 12:30

## 2017-03-28 RX ADMIN — DIPHENHYDRAMINE HYDROCHLORIDE 25 MG: 50 INJECTION, SOLUTION INTRAMUSCULAR; INTRAVENOUS at 10:55

## 2017-03-28 RX ADMIN — Medication 10 ML: at 13:20

## 2017-03-28 RX ADMIN — SODIUM CHLORIDE, PRESERVATIVE FREE 300 UNITS: 5 INJECTION INTRAVENOUS at 13:22

## 2017-03-28 RX ADMIN — CETUXIMAB 560 MG: 2 SOLUTION INTRAVENOUS at 11:28

## 2017-03-28 RX ADMIN — SODIUM CHLORIDE 500 ML: 900 INJECTION, SOLUTION INTRAVENOUS at 10:25

## 2017-03-28 RX ADMIN — Medication 10 ML: at 10:25

## 2017-03-28 NOTE — PROGRESS NOTES
Arrived ambulatory for infusion after [facility] visit  Erbitux infused with no difficulties.  No concern  Type and cross sent to blood bank for transfusion  Observed for 1 hr after completion  Next appt 3/29 for transfusion

## 2017-03-29 ENCOUNTER — HOSPITAL ENCOUNTER (OUTPATIENT)
Dept: RADIATION ONCOLOGY | Age: 62
Discharge: HOME | End: 2017-03-29
Payer: COMMERCIAL

## 2017-03-29 ENCOUNTER — HOSPITAL ENCOUNTER (OUTPATIENT)
Dept: INFUSION THERAPY | Age: 62
Discharge: HOME | End: 2017-03-29
Payer: COMMERCIAL

## 2017-03-29 ENCOUNTER — PATIENT OUTREACH (OUTPATIENT)
Dept: CASE MANAGEMENT | Age: 62
End: 2017-03-29

## 2017-03-29 ENCOUNTER — APPOINTMENT (OUTPATIENT)
Dept: PHYSICAL THERAPY | Age: 62
End: 2017-03-29
Payer: COMMERCIAL

## 2017-03-29 VITALS
TEMPERATURE: 98.7 F | SYSTOLIC BLOOD PRESSURE: 114 MMHG | DIASTOLIC BLOOD PRESSURE: 66 MMHG | BODY MASS INDEX: 31.93 KG/M2 | RESPIRATION RATE: 18 BRPM | WEIGHT: 216.2 LBS | HEART RATE: 84 BPM | OXYGEN SATURATION: 96 %

## 2017-03-29 PROCEDURE — 74011250636 HC RX REV CODE- 250/636: Performed by: INTERNAL MEDICINE

## 2017-03-29 PROCEDURE — 36430 TRANSFUSION BLD/BLD COMPNT: CPT

## 2017-03-29 PROCEDURE — P9040 RBC LEUKOREDUCED IRRADIATED: HCPCS | Performed by: NURSE PRACTITIONER

## 2017-03-29 PROCEDURE — 86644 CMV ANTIBODY: CPT | Performed by: NURSE PRACTITIONER

## 2017-03-29 PROCEDURE — 77030018667 ADMN ST IV BLD FENW -A

## 2017-03-29 PROCEDURE — 77386 HC IMRT TRMT DLVR COMPL: CPT

## 2017-03-29 PROCEDURE — 96374 THER/PROPH/DIAG INJ IV PUSH: CPT

## 2017-03-29 RX ORDER — ACETAMINOPHEN 325 MG/1
650 TABLET ORAL
Status: ACTIVE | OUTPATIENT
Start: 2017-03-29 | End: 2017-03-29

## 2017-03-29 RX ORDER — DIPHENHYDRAMINE HYDROCHLORIDE 50 MG/ML
25 INJECTION, SOLUTION INTRAMUSCULAR; INTRAVENOUS ONCE
Status: COMPLETED | OUTPATIENT
Start: 2017-03-29 | End: 2017-03-29

## 2017-03-29 RX ORDER — SODIUM CHLORIDE 0.9 % (FLUSH) 0.9 %
10-40 SYRINGE (ML) INJECTION AS NEEDED
Status: DISCONTINUED | OUTPATIENT
Start: 2017-03-29 | End: 2017-04-02 | Stop reason: HOSPADM

## 2017-03-29 RX ORDER — HEPARIN 100 UNIT/ML
500 SYRINGE INTRAVENOUS AS NEEDED
Status: DISPENSED | OUTPATIENT
Start: 2017-03-29 | End: 2017-03-29

## 2017-03-29 RX ORDER — SODIUM CHLORIDE 9 MG/ML
250 INJECTION, SOLUTION INTRAVENOUS AS NEEDED
Status: DISCONTINUED | OUTPATIENT
Start: 2017-03-29 | End: 2017-04-02 | Stop reason: HOSPADM

## 2017-03-29 RX ORDER — DIPHENHYDRAMINE HCL 25 MG
25 CAPSULE ORAL
Status: ACTIVE | OUTPATIENT
Start: 2017-03-29 | End: 2017-03-29

## 2017-03-29 RX ADMIN — Medication 10 ML: at 10:58

## 2017-03-29 RX ADMIN — Medication 10 ML: at 08:30

## 2017-03-29 RX ADMIN — DIPHENHYDRAMINE HYDROCHLORIDE 25 MG: 50 INJECTION, SOLUTION INTRAMUSCULAR; INTRAVENOUS at 08:52

## 2017-03-29 RX ADMIN — SODIUM CHLORIDE, PRESERVATIVE FREE 500 UNITS: 5 INJECTION INTRAVENOUS at 10:58

## 2017-03-29 RX ADMIN — SODIUM CHLORIDE 250 ML: 900 INJECTION, SOLUTION INTRAVENOUS at 08:55

## 2017-03-29 NOTE — PROGRESS NOTES
Pt. Discharged ambulatory accompanied by self. Tolerated transfusion well. No distress noted. To return to Infusions on 4/4/17.

## 2017-03-30 ENCOUNTER — HOSPITAL ENCOUNTER (OUTPATIENT)
Dept: RADIATION ONCOLOGY | Age: 62
Discharge: HOME | End: 2017-03-30
Payer: COMMERCIAL

## 2017-03-30 ENCOUNTER — HOSPITAL ENCOUNTER (OUTPATIENT)
Dept: PHYSICAL THERAPY | Age: 62
Discharge: HOME | End: 2017-03-30
Payer: COMMERCIAL

## 2017-03-30 PROCEDURE — 77386 HC IMRT TRMT DLVR COMPL: CPT

## 2017-03-30 PROCEDURE — 92526 ORAL FUNCTION THERAPY: CPT | Performed by: SPEECH-LANGUAGE PATHOLOGIST

## 2017-03-30 NOTE — PROGRESS NOTES
[place]  : 1955 [facility] [place] [id number] [facility], [address] [place] [address],[address], [place] [address].  [phone]   [phone]         OUTPATIENT SPEECH LANGUAGE PATHOLOGY: Daily Note  ICD-10: Treatment Diagnosis: Oropharyngeal Dysphagia R13.12  REFERRING PHYSICIAN: Dr. Zhen Mckeon MD Orders: Evaluate and Treat  PAST MEDICAL HISTORY:   Mr. Jamaica Myers is a 62 y.o. male who  has a past medical history of GERD (gastroesophageal reflux disease); Gout; Hypertension; Morbid obesity ([facility] [address] Squamous cell carcinoma of epiglottis (HCC) (2016); and Type 2 diabetes mellitus ([facility] [address] He also has no past medical history of Adverse effect of anesthesia; Difficult intubation; Malignant hyperthermia due to anesthesia; or Pseudocholinesterase deficiency. He also  has a past surgical history that includes colonoscopy (2016); other surgical (Left); and heent. MEDICAL/REFERRING DIAGNOSIS: Dysphagia [R13.10]  DATE OF ONSET: 2017   PRIOR LEVEL OF FUNCTION: Independent  PRECAUTIONS/ALLERGIES: Zoshazia Heaven Ran hcl (pf)]     ASSESSMENT:  Patient attended follow up Dysphagia therapy. He continues with NPO with all nutrition via PEG. He does on occasion drink Gatorade however SLP discussed oral hygiene etc. Patient verbalized understanding. Patient continues to require cues for finger occlusion when he talks. Patient wants to return to ST once his trach is downsized. Tolerated PMV trials okay this date. Patient with some anxiety when PMV was placed. SLP concentrated on breathing exercises. Very little back pressure observed on removal. Next visit will focus on PMV trials and laryngeal exercises. Based on the objective data described below, the patient presents with mild-moderate oral dysphagia and moderate to severe pharyngeal dysphagia. This patient is known to this SLP due to recent evaluation in 2017 for oncology rehab. Patient was diagnosed with SCC of the epiglottis. At the time of his initial evaluation, he had not gone for his PEG placement. He was hospitalized a month ago for fever and during the hospitalization they attempted to place his PEG however they were unsuccessful due to airway obstruction. He participated in MBSS while hospitalized and found to grossly silent aspirate on all consistencies therefore SLP recommended NPO. Patient was also required to have a trach placed as well. He is tolerating his trach well. He did not have his speaking valve, stating \"it won't stay on. \" He continues to talk over the trach without finger occluding. SLP encouraged patient to finger occlude when he talks. He has an appointment to see Dr. Eric Wilson today regarding the trach. He is here today for oncology rehab for Dysphagia. Per Dr. Eric Wilson, patient could have some liquids however patient stated that all of his nutrition is met via PEG and on occasion he has a few sips of water. Oral motor exam was essentially WNL's. Mild lingual thrush observed. SLP spoke with patient and wife regarding oral hygiene strategies/protocol. Both verbalized understanding. Patient given p.o trials of thin liquids with (+) overt clinical s/sx of aspiration with a strong cough x's 1. SLP recommends continue with NPO status with PEG feedings and oncology rehab to preserve the integrity of his swallow function. Reviewed guidelines for [employer] Protocol. Both in agreement and verbalized understanding. Patient will benefit from skilled intervention to address the below impairments. ?????? ? ? This section established at most recent assessment??????????  PROBLEM LIST (Impairments causing functional limitations):  1. Dysphagia  GOALS: (Goals have been discussed and agreed upon with patient.)  SHORT-TERM FUNCTIONAL GOALS: Time Frame: 3 months  Patient will complete laryngeal exercises independently at 100% accuracy. Repeat MBSS at duration of treatment with 100% participation.    DISCHARGE GOALS: Time Frame: 3 months  1. Patient will tolerate least restrictive diet without overt clinical s/sx of aspiration observed for a safe and adequate swallow function at 100%. REHABILITATION POTENTIAL FOR STATED GOALS: GoodPLAN OF CARE:  Patient will benefit from skilled intervention to address the following impairments. RECOMMENDATIONS AND PLANNED INTERVENTIONS (Benefits and precautions of therapy have been discussed with the patient.):  · NPO with alternative means of nutrition  MEDICATIONS:  · Non-oral  COMPENSATORY STRATEGIES/MODIFICATIONS INCLUDING:  · None  OTHER RECOMMENDATIONS (including follow up treatment recommendations):   · Laryngeal exercises  RECOMMENDED DIET MODIFICATIONS DISCUSSED WITH:  · Family  · Patient  TREATMENT PLAN EFFECTIVE DATES: 3/1/17 TO 17  FREQUENCY/DURATION: Continue to follow patient 1 time a week for 12 weeks to address above goals. Regarding Santos Landin's therapy, I certify that the treatment plan above will be carried out by a therapist or under their direction. Thank you for this referral,  TERESA De Dios Ed CCC-SLP                  Referring Physician Signature: Bridger Cash  Date      SUBJECTIVE:  Cooperative. Present Symptoms: None      Current Medications:   Current Outpatient Prescriptions on File Prior to Encounter   Medication Sig Dispense Refill    [] azithromycin (ZITHROMAX) 250 mg tablet Take 1 Tab by mouth daily for 5 days. 5 Tab 0    clindamycin (CLEOCIN T) 1 % lotion Apply  to affected area two (2) times daily as needed. use thin film on affected area 1 Bottle 0    oxyCODONE (ROXICODONE) 5 mg/5 mL solution Take 5 mL by mouth every four (4) hours as needed for Pain. Max Daily Amount: 30 mg. 473 mL 0    glucose blood VI test strips (RELION PRIME TEST STRIPS) strip Test blood sugar before meals and bedtime Diagnosis code E11.65 200 Strip 5    citalopram (CELEXA) 10 mg tablet 1 Tab by Per G Tube route daily.  30 Tab 2    magnesium oxide (MAG-OX) 400 mg tablet 1 Tab by Per G Tube route three (3) times daily. 90 Tab 2    Lactose-Free Food with Fiber (JEVITY 1.5 BRANDO) 0.06 gram-1.5 kcal/mL liqd 6 Cans by PEG Tube route daily for 100 days. Bolus Feedings, Jevity 1.5 or comparable, goal 6 cans/day. Pt will need additional 38-42 oz free water daily via PEG. Estimated ARRON greater than 90 days. Indications: DYSPHAGIA 180 Can 4    LORazepam (ATIVAN) 1 mg tablet Take 0.5-1 Tabs by mouth every six (6) hours as needed for Anxiety. Max Daily Amount: 4 mg. Indications: CANCER CHEMOTHERAPY-INDUCED NAUSEA AND VOMITING 90 Tab 0    magic mouthwash (GLADYS) susp Take 10 mL by mouth every four (4) hours. (Patient taking differently: Take 10 mL by mouth every four (4) hours as needed.) 500 mL 3    lidocaine-prilocaine (EMLA) topical cream Apply  to affected area as needed. Apply 1/2 inch amount of cream to port site 90 minutes prior to needle access. 30 g 0    ondansetron hcl (ZOFRAN) 8 mg tablet Take 1 Tab by mouth every eight (8) hours as needed for Nausea. 90 Tab 3    prochlorperazine (COMPAZINE) 10 mg tablet Take 1 Tab by mouth every six (6) hours as needed. 120 Tab 3    aspirin delayed-release 81 mg tablet Take 81 mg by mouth every morning. Take / use AM day of surgery  per anesthesia protocols. Indications: myocardial infarction prevention      losartan (COZAAR) 50 mg tablet 50 mg two (2) times a day. Indications: Hypertension      amLODIPine (NORVASC) 10 mg tablet Take 10 mg by mouth nightly. Take / use AM day of surgery  per anesthesia protocols. Indications: Hypertension      allopurinol (ZYLOPRIM) 300 mg tablet 300 mg nightly. Indications: GOUT      glipiZIDE SR (GLUCOTROL) 10 mg CR tablet Take 10 mg by mouth two (2) times daily as needed. Indications: type 2 diabetes mellitus      omeprazole (PRILOSEC) 20 mg capsule Take 20 mg by mouth every morning. Take / use AM day of surgery  per anesthesia protocols.   Indications: GASTROESOPHAGEAL REFLUX       Current Facility-Administered Medications on File Prior to Encounter   Medication Dose Route Frequency Provider Last Rate Last Dose    0.9% sodium chloride infusion 250 mL  250 mL IntraVENous PRN Jesus Sam MD   Stopped at 17 0905    [] diphenhydrAMINE (BENADRYL) capsule 25 mg  25 mg Oral Q6H PRN Jesus Sam MD        [] acetaminophen (TYLENOL) tablet 650 mg  650 mg Oral Q6H PRN Jesus Sam MD        sodium chloride (NS) flush 10-40 mL  10-40 mL IntraVENous PRN Jesus Sam MD   10 mL at 17 1058    [] heparin (porcine) pf 500 Units  500 Units InterCATHeter PRN Jesus Sam MD   500 Units at 17 1058    0.9% sodium chloride infusion 250 mL  250 mL IntraVENous PRN Jesica Davis NP   Stopped at 17 1315        Date Last Reviewed: 3/30/17  Current Dietary Status:  NPO      History of reflux:  YES    Reflux medication:Prilosec  Social History/Home Situation: Lives with wife. Work/Activity History: unknown    OBJECTIVE:  Objective Measure: Tool Used: National Outcomes Measurement System: Functional Communication Measures: SWALLOWING  Score:  Initial: 1 Most Recent: X (Date: -- )   Interpretation of Tool: This measure describes the change in functional communication status subsequent to speech-language pathology treatment of patients with dysphagia.  o Level 1:  Individual is not able to swallow anything safely by mouth. All nutrition and hydration is received through non-oral means (e.g., nasogastric tube, PEG). o Level 2: Individual is not able to swallow safely by mouth for nutrition and hydration, but may take some consistency with consistent maximal cues in therapy only. Alternative method of feeding required.   o Level 3:  Alternative method of feeding required as individual takes less than 50% of nutrition and hydration by mouth, and/or swallowing is safe with consistent use of moderate cues to use compensatory strategies and/or requires maximum diet restriction. o Level 4:  Swallowing is safe, but usually requires moderate cues to use compensatory strategies, and/or the individual has moderate diet restrictions and/or still requires tube feeding and/or oral supplements. o Level 5:  Swallowing is safe with minimal diet restriction and/or occasionally requires minimal cueing to use compensatory strategies. The individual may occasionally self-cue. All nutrition and hydration needs are met by mouth at mealtime. o Level 6:  Swallowing is safe, and the individual eats and drinks independently and may rarely require minimal cueing. The individual usually self-cues when difficulty occurs. May need to avoid specific food items (e.g., popcorn and nuts), or require additional time (due to dysphagia). o Level 7: The individuals ability to eat independently is not limited by swallow function. Swallowing would be safe and efficient for all consistencies. Compensatory strategies are effectively used when needed. Score Level 7 Level 6 Level 5 Level 4 Level 3 Level 2 Level 1   Modifier CH CI CJ CK CL CM CN   ? Swallowing:     - CURRENT STATUS: CN - 100% impaired, limited or restricted    - GOAL STATUS:  CJ - 20%-39% impaired, limited or restricted    - D/C STATUS:  ---------------To be determined---------------    Respiratory Status:      Room Air  CXR Results:N/A  MRI/CT Results:N/A  Oral Motor Structure/Speech:       Cognitive and Communication Status:                      BEDSIDE SWALLOW EVALUATION  Oral Assessment:     P.O. Trials: The patient was given teaspoon to tablespoon   amounts of the following:           ORAL PHASE:                   PHARYNGEAL PHASE:                            OTHER OBSERVATIONS:  Rate/bite size: WNL   Endurance:   WNL   Coments:      TREATMENT:    (In addition to Assessment/Re-Assessment sessions the following treatments were rendered)  Dysphagia Activities: Activities/Procedures listed utilized to improve progress in swallow strength. Required minimal cueing to improve swallow safety. LARYNGEAL / PHARYNGEAL EXERCISES:           Effortful Swallow: Yes  Reps : 15  Sets : 1                                         Mendelsohn Maneuver: Yes                                           Supraglottic Swallow: Yes  Reps : 15  Sets : 1                       __________________________________________________________________________________________________  Treatment Assessment:  Dysphagia treatment  Progression/Medical Necessity:   · Patient is expected to demonstrate progress in swallow strength, swallow timeliness, swallow function, diet tolerance and swallow safety to improve swallow safety, work toward diet advancement and decrease aspiration risk. Compliance with Program/Exercises: Will assess as treatment progresses. Reason for Continuation of Services/Other Comments:  · consuming a modified diet  Recommendations/Intent for next treatment session: \"Treatment next visit will focus on laryngeal exercises and PMV trials\". Total Treatment Duration:  Time In: 0900  Time Out: [place] , [place]. Marvie Dakins

## 2017-03-31 ENCOUNTER — HOSPITAL ENCOUNTER (OUTPATIENT)
Dept: RADIATION ONCOLOGY | Age: 62
Discharge: HOME | End: 2017-03-31
Payer: COMMERCIAL

## 2017-03-31 PROCEDURE — 77386 HC IMRT TRMT DLVR COMPL: CPT

## 2017-04-03 ENCOUNTER — HOSPITAL ENCOUNTER (OUTPATIENT)
Dept: RADIATION ONCOLOGY | Age: 62
Discharge: HOME | End: 2017-04-03
Payer: COMMERCIAL

## 2017-04-03 VITALS — WEIGHT: 213.5 LBS | BODY MASS INDEX: 31.53 KG/M2

## 2017-04-03 PROCEDURE — 77386 HC IMRT TRMT DLVR COMPL: CPT

## 2017-04-03 NOTE — PROGRESS NOTES
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 6X photons     INTERVAL HISTORY: Ana Mccallum is being treated for larynx cancer. He has some irritation and some difficulty swallowing but is otherwise doing fine. He tells me week 2 his mouth gets dry. Mild neck erythema, worse on the left. Week 4 with no new issues and again week 5-6.     OBJECTIVE: Clear secretions coming from his tracheostomy. Desquamation around site. ASSESSMENT and PLAN: Mini Owens is tolerating radiation as anticipated for the current dose and fraction. Will hold trach bolus  for the skin to heal.  We will continue therapy as planned and see next week for final weekly visit.       Mario Sinclair MD  04/03/17

## 2017-04-04 ENCOUNTER — HOSPITAL ENCOUNTER (OUTPATIENT)
Dept: LAB | Age: 62
Discharge: HOME | End: 2017-04-04
Payer: COMMERCIAL

## 2017-04-04 ENCOUNTER — HOSPITAL ENCOUNTER (OUTPATIENT)
Dept: INFUSION THERAPY | Age: 62
Discharge: HOME | End: 2017-04-04
Payer: COMMERCIAL

## 2017-04-04 ENCOUNTER — HOSPITAL ENCOUNTER (OUTPATIENT)
Dept: RADIATION ONCOLOGY | Age: 62
Discharge: HOME | End: 2017-04-04
Payer: COMMERCIAL

## 2017-04-04 VITALS
DIASTOLIC BLOOD PRESSURE: 69 MMHG | TEMPERATURE: 98.5 F | RESPIRATION RATE: 18 BRPM | HEART RATE: 95 BPM | BODY MASS INDEX: 31.32 KG/M2 | SYSTOLIC BLOOD PRESSURE: 118 MMHG | OXYGEN SATURATION: 95 % | WEIGHT: 212.1 LBS

## 2017-04-04 DIAGNOSIS — C32.1 SQUAMOUS CELL CARCINOMA OF EPIGLOTTIS (HCC): ICD-10-CM

## 2017-04-04 LAB
ALBUMIN SERPL BCP-MCNC: 3.2 G/DL (ref 3.2–4.6)
ALBUMIN/GLOB SERPL: 0.8 {RATIO} (ref 1.2–3.5)
ALP SERPL-CCNC: 80 U/L (ref 50–136)
ALT SERPL-CCNC: 23 U/L (ref 12–65)
ANION GAP BLD CALC-SCNC: 7 MMOL/L (ref 7–16)
AST SERPL W P-5'-P-CCNC: 17 U/L (ref 15–37)
BASOPHILS # BLD AUTO: 0 K/UL (ref 0–0.2)
BASOPHILS # BLD: 0 % (ref 0–2)
BILIRUB SERPL-MCNC: 0.7 MG/DL (ref 0.2–1.1)
BUN SERPL-MCNC: 18 MG/DL (ref 8–23)
CALCIUM SERPL-MCNC: 9.2 MG/DL (ref 8.3–10.4)
CHLORIDE SERPL-SCNC: 102 MMOL/L (ref 98–107)
CO2 SERPL-SCNC: 31 MMOL/L (ref 23–32)
CREAT SERPL-MCNC: 1.14 MG/DL (ref 0.8–1.5)
DIFFERENTIAL METHOD BLD: ABNORMAL
EOSINOPHIL # BLD: 0.1 K/UL (ref 0–0.8)
EOSINOPHIL NFR BLD: 2 % (ref 0.5–7.8)
ERYTHROCYTE [DISTWIDTH] IN BLOOD BY AUTOMATED COUNT: 15.1 % (ref 11.9–14.6)
GLOBULIN SER CALC-MCNC: 4.2 G/DL (ref 2.3–3.5)
GLUCOSE SERPL-MCNC: 149 MG/DL (ref 65–100)
HCT VFR BLD AUTO: 27.1 % (ref 41.1–50.3)
HGB BLD-MCNC: 9 G/DL (ref 13.6–17.2)
LYMPHOCYTES # BLD AUTO: 11 % (ref 13–44)
LYMPHOCYTES # BLD: 0.8 K/UL (ref 0.5–4.6)
MAGNESIUM SERPL-MCNC: 2.3 MG/DL (ref 1.8–2.4)
MCH RBC QN AUTO: 29 PG (ref 26.1–32.9)
MCHC RBC AUTO-ENTMCNC: 33.2 G/DL (ref 31.4–35)
MCV RBC AUTO: 87.4 FL (ref 79.6–97.8)
MONOCYTES # BLD: 0.4 K/UL (ref 0.1–1.3)
MONOCYTES NFR BLD AUTO: 6 % (ref 4–12)
NEUTS SEG # BLD: 6.2 K/UL (ref 1.7–8.2)
NEUTS SEG NFR BLD AUTO: 82 % (ref 43–78)
NRBC # BLD: 0 K/UL (ref 0–0.2)
PLATELET # BLD AUTO: 206 K/UL (ref 150–450)
PMV BLD AUTO: 8.5 FL (ref 10.8–14.1)
POTASSIUM SERPL-SCNC: 3.7 MMOL/L (ref 3.5–5.1)
PROT SERPL-MCNC: 7.4 G/DL (ref 6.3–8.2)
RBC # BLD AUTO: 3.1 M/UL (ref 4.23–5.67)
SODIUM SERPL-SCNC: 140 MMOL/L (ref 136–145)
WBC # BLD AUTO: 7.6 K/UL (ref 4.3–11.1)

## 2017-04-04 PROCEDURE — 83735 ASSAY OF MAGNESIUM: CPT | Performed by: INTERNAL MEDICINE

## 2017-04-04 PROCEDURE — 96361 HYDRATE IV INFUSION ADD-ON: CPT

## 2017-04-04 PROCEDURE — 74011250636 HC RX REV CODE- 250/636

## 2017-04-04 PROCEDURE — 96375 TX/PRO/DX INJ NEW DRUG ADDON: CPT

## 2017-04-04 PROCEDURE — 77386 HC IMRT TRMT DLVR COMPL: CPT

## 2017-04-04 PROCEDURE — 77336 RADIATION PHYSICS CONSULT: CPT

## 2017-04-04 PROCEDURE — 80053 COMPREHEN METABOLIC PANEL: CPT | Performed by: INTERNAL MEDICINE

## 2017-04-04 PROCEDURE — 96413 CHEMO IV INFUSION 1 HR: CPT

## 2017-04-04 PROCEDURE — 74011250636 HC RX REV CODE- 250/636: Performed by: INTERNAL MEDICINE

## 2017-04-04 PROCEDURE — 85025 COMPLETE CBC W/AUTO DIFF WBC: CPT | Performed by: INTERNAL MEDICINE

## 2017-04-04 RX ORDER — SODIUM CHLORIDE 0.9 % (FLUSH) 0.9 %
10 SYRINGE (ML) INJECTION AS NEEDED
Status: ACTIVE | OUTPATIENT
Start: 2017-04-04 | End: 2017-04-04

## 2017-04-04 RX ORDER — HEPARIN 100 UNIT/ML
300-500 SYRINGE INTRAVENOUS AS NEEDED
Status: DISPENSED | OUTPATIENT
Start: 2017-04-04 | End: 2017-04-04

## 2017-04-04 RX ORDER — DIPHENHYDRAMINE HYDROCHLORIDE 50 MG/ML
25 INJECTION, SOLUTION INTRAMUSCULAR; INTRAVENOUS ONCE
Status: COMPLETED | OUTPATIENT
Start: 2017-04-04 | End: 2017-04-04

## 2017-04-04 RX ADMIN — SODIUM CHLORIDE 500 ML: 900 INJECTION, SOLUTION INTRAVENOUS at 10:45

## 2017-04-04 RX ADMIN — DIPHENHYDRAMINE HYDROCHLORIDE 25 MG: 50 INJECTION, SOLUTION INTRAMUSCULAR; INTRAVENOUS at 11:16

## 2017-04-04 RX ADMIN — Medication 10 ML: at 10:45

## 2017-04-04 RX ADMIN — SODIUM CHLORIDE, PRESERVATIVE FREE 500 UNITS: 5 INJECTION INTRAVENOUS at 12:45

## 2017-04-04 RX ADMIN — Medication 10 ML: at 12:45

## 2017-04-04 RX ADMIN — CETUXIMAB 560 MG: 2 SOLUTION INTRAVENOUS at 11:30

## 2017-04-04 NOTE — PROGRESS NOTES
Pt arrived ambulatory today at 1040, to receive IV chemotherapy. Pt tolerated without difficulty. Patient discharged via ambulatory accompanied by spouse. Instructed to notify physician of any problems, questions or concerns. Allowed opportunity for patient/family to ask questions. Verbalized understanding. Next appointment is April 27 at 0900 with Dr. Minesh Linder.

## 2017-04-04 NOTE — PROGRESS NOTES
Problem: Knowledge Deficit  Goal: *Participate in the learning process  Outcome: Progressing Towards Goal  Verbalizes/demonstrates understanding of purpose/procedure/potential side effects of erbitux.

## 2017-04-05 ENCOUNTER — HOSPITAL ENCOUNTER (OUTPATIENT)
Dept: RADIATION ONCOLOGY | Age: 62
Discharge: HOME | End: 2017-04-05
Payer: COMMERCIAL

## 2017-04-05 PROCEDURE — 77386 HC IMRT TRMT DLVR COMPL: CPT

## 2017-04-06 ENCOUNTER — HOSPITAL ENCOUNTER (OUTPATIENT)
Dept: RADIATION ONCOLOGY | Age: 62
Discharge: HOME | End: 2017-04-06
Payer: COMMERCIAL

## 2017-04-06 LAB
ABO + RH BLD: NORMAL
BLD PROD TYP BPU: NORMAL
BLOOD GROUP ANTIBODIES SERPL: NORMAL
BPU ID: NORMAL
CROSSMATCH RESULT,%XM: NORMAL
SPECIMEN EXP DATE BLD: NORMAL
STATUS OF UNIT,%ST: NORMAL
UNIT DIVISION, %UDIV: 0

## 2017-04-06 PROCEDURE — 77386 HC IMRT TRMT DLVR COMPL: CPT

## 2017-04-07 ENCOUNTER — HOSPITAL ENCOUNTER (OUTPATIENT)
Dept: RADIATION ONCOLOGY | Age: 62
Discharge: HOME | End: 2017-04-07
Payer: COMMERCIAL

## 2017-04-07 PROCEDURE — 77386 HC IMRT TRMT DLVR COMPL: CPT

## 2017-04-10 ENCOUNTER — HOSPITAL ENCOUNTER (OUTPATIENT)
Dept: RADIATION ONCOLOGY | Age: 62
Discharge: HOME | End: 2017-04-10
Payer: COMMERCIAL

## 2017-04-10 PROCEDURE — 77386 HC IMRT TRMT DLVR COMPL: CPT

## 2017-04-10 NOTE — PROGRESS NOTES
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 6X photons     INTERVAL HISTORY: Chantell Santos is being treated for larynx cancer. He is taking all nutrition via PEG and is taking 5 cans a day as well as supplimental water. He reports some skin irritation on the left neck.      OBJECTIVE:  NAD. Oral cavity dry and erythematous. No Pseudomembranes. Left neck and area around stoma with dry desquamation. ASSESSMENT and PLAN: Ailyn Nye is tolerating radiation as anticipated for the current dose and fraction. Silvadene prescribed. We will continue therapy as planned and he will return in 3 weeks.       Sweta Lobo MD  04/10/17

## 2017-04-11 ENCOUNTER — HOSPITAL ENCOUNTER (OUTPATIENT)
Dept: RADIATION ONCOLOGY | Age: 62
Discharge: HOME | End: 2017-04-11
Payer: COMMERCIAL

## 2017-04-11 PROCEDURE — 77386 HC IMRT TRMT DLVR COMPL: CPT

## 2017-04-11 PROCEDURE — 77336 RADIATION PHYSICS CONSULT: CPT

## 2017-04-20 ENCOUNTER — HOSPITAL ENCOUNTER (OUTPATIENT)
Dept: PHYSICAL THERAPY | Age: 62
Discharge: HOME | End: 2017-04-20
Payer: COMMERCIAL

## 2017-04-20 VITALS — OXYGEN SATURATION: 97 %

## 2017-04-20 PROCEDURE — 92526 ORAL FUNCTION THERAPY: CPT | Performed by: SPEECH-LANGUAGE PATHOLOGIST

## 2017-04-20 NOTE — PROGRESS NOTES
[place]  : [facility] [place] [address] [facility], [address] [place] [address],[address], [place] [address].  [phone]   [phone]         OUTPATIENT SPEECH LANGUAGE PATHOLOGY: Daily Note  ICD-10: Treatment Diagnosis: Oropharyngeal Dysphagia R13.12  REFERRING PHYSICIAN: Dr. Zhen Mckeon MD Orders: Evaluate and Treat  PAST MEDICAL HISTORY:   Mr. Jamaica Myers is a 62 y.o. male who  has a past medical history of GERD (gastroesophageal reflux disease); Gout; Hypertension; Morbid obesity ([facility] [address] Squamous cell carcinoma of epiglottis (HCC) (2016); and Type 2 diabetes mellitus ([facility] [address] He also has no past medical history of Adverse effect of anesthesia; Difficult intubation; Malignant hyperthermia due to anesthesia; or Pseudocholinesterase deficiency. He also  has a past surgical history that includes colonoscopy (2016); other surgical (Left); heent; and vascular access. MEDICAL/REFERRING DIAGNOSIS: Dysphagia [R13.10]  DATE OF ONSET: 2017   PRIOR LEVEL OF FUNCTION: Independent  PRECAUTIONS/ALLERGIES: Zofran Heaven Ran hcl (pf)]     ASSESSMENT:  Patient attended follow up Dysphagia therapy. He continues with NPO with all nutrition via PEG. Patient stated that he could no longer drink Gatorade due to increased coughing. SLP advised patient to continue with strict NPO recommendations and take all p.o via PEG. He verbalized understanding and in agreement. Patient recently was downsized to a size 4 trach and has been tolerating the down size well. Tolerated PMV trials very well this date. No episodes of anxiety observed. Patient's oxygen and RR rate remained steady. Patient has great voicing and tolerate trials for period of 25 minutes with 1 coughing episode. Based on the objective data described below, the patient presents with mild-moderate oral dysphagia and moderate to severe pharyngeal dysphagia.  This patient is known to this SLP due to recent evaluation in 2017 for oncology rehab. Patient was diagnosed with SCC of the epiglottis. At the time of his initial evaluation, he had not gone for his PEG placement. He was hospitalized a month ago for fever and during the hospitalization they attempted to place his PEG however they were unsuccessful due to airway obstruction. He participated in MBSS while hospitalized and found to grossly silent aspirate on all consistencies therefore SLP recommended NPO. Patient was also required to have a trach placed as well. He is tolerating his trach well. He did not have his speaking valve, stating \"it won't stay on. \" He continues to talk over the trach without finger occluding. SLP encouraged patient to finger occlude when he talks. He has an appointment to see Dr. Dejan Brannon today regarding the trach. He is here today for oncology rehab for Dysphagia. Per Dr. Dejan Brannon, patient could have some liquids however patient stated that all of his nutrition is met via PEG and on occasion he has a few sips of water. Oral motor exam was essentially WNL's. Mild lingual thrush observed. SLP spoke with patient and wife regarding oral hygiene strategies/protocol. Both verbalized understanding. Patient given p.o trials of thin liquids with (+) overt clinical s/sx of aspiration with a strong cough x's 1. SLP recommends continue with NPO status with PEG feedings and oncology rehab to preserve the integrity of his swallow function. Reviewed guidelines for Nativis Protocol. Both in agreement and verbalized understanding. Patient will benefit from skilled intervention to address the below impairments. ?????? ? ? This section established at most recent assessment??????????  PROBLEM LIST (Impairments causing functional limitations):  1. Dysphagia  GOALS: (Goals have been discussed and agreed upon with patient.)  SHORT-TERM FUNCTIONAL GOALS: Time Frame: 3 months  Patient will complete laryngeal exercises independently at 100% accuracy.    Repeat MBSS at duration of treatment with 100% participation. DISCHARGE GOALS: Time Frame: 3 months  1. Patient will tolerate least restrictive diet without overt clinical s/sx of aspiration observed for a safe and adequate swallow function at 100%. REHABILITATION POTENTIAL FOR STATED GOALS: GoodPLAN OF CARE:  Patient will benefit from skilled intervention to address the following impairments. RECOMMENDATIONS AND PLANNED INTERVENTIONS (Benefits and precautions of therapy have been discussed with the patient.):  · NPO with alternative means of nutrition  MEDICATIONS:  · Non-oral  COMPENSATORY STRATEGIES/MODIFICATIONS INCLUDING:  · None  OTHER RECOMMENDATIONS (including follow up treatment recommendations):   · Laryngeal exercises  RECOMMENDED DIET MODIFICATIONS DISCUSSED WITH:  · Family  · Patient  TREATMENT PLAN EFFECTIVE DATES: 3/1/17 TO 6/1/17  FREQUENCY/DURATION: Continue to follow patient 1 time a week for 12 weeks to address above goals. Regarding Santos Landin's therapy, I certify that the treatment plan above will be carried out by a therapist or under their direction. Thank you for this referral,  TERESA De Dios Ed CCC-SLP                  Referring Physician Signature: Bridger Cash  Date      SUBJECTIVE:  Cooperative. Present Symptoms: None      Current Medications:   Current Outpatient Prescriptions on File Prior to Encounter   Medication Sig Dispense Refill    LORazepam (ATIVAN) 1 mg tablet Take 1 Tab by mouth every six (6) hours as needed for Anxiety. Max Daily Amount: 2 mg. Indications: CANCER CHEMOTHERAPY-INDUCED NAUSEA AND VOMITING 30 Tab 1    silver sulfADIAZINE (SILVADENE) 1 % topical cream Apply  to affected area three (3) times daily. May stain clothing 400 g 1    oxyCODONE (ROXICODONE) 5 mg/5 mL solution Take 5 mL by mouth every four (4) hours as needed for Pain. Max Daily Amount: 30 mg. 473 mL 0    clindamycin (CLEOCIN T) 1 % lotion Apply  to affected area two (2) times daily as needed.  use thin film on affected area 1 Bottle 0    glucose blood VI test strips (RELION PRIME TEST STRIPS) strip Test blood sugar before meals and bedtime Diagnosis code E11.65 200 Strip 5    citalopram (CELEXA) 10 mg tablet 1 Tab by Per G Tube route daily. 30 Tab 2    magnesium oxide (MAG-OX) 400 mg tablet 1 Tab by Per G Tube route three (3) times daily. 90 Tab 2    Lactose-Free Food with Fiber (JEVITY 1.5 BRANDO) 0.06 gram-1.5 kcal/mL liqd 6 Cans by PEG Tube route daily for 100 days. Bolus Feedings, Jevity 1.5 or comparable, goal 6 cans/day. Pt will need additional 38-42 oz free water daily via PEG. Estimated ARRON greater than 90 days. Indications: DYSPHAGIA 180 Can 4    magic mouthwash (GLADYS) susp Take 10 mL by mouth every four (4) hours. (Patient taking differently: Take 10 mL by mouth every four (4) hours as needed.) 500 mL 3    lidocaine-prilocaine (EMLA) topical cream Apply  to affected area as needed. Apply 1/2 inch amount of cream to port site 90 minutes prior to needle access. 30 g 0    ondansetron hcl (ZOFRAN) 8 mg tablet Take 1 Tab by mouth every eight (8) hours as needed for Nausea. 90 Tab 3    prochlorperazine (COMPAZINE) 10 mg tablet Take 1 Tab by mouth every six (6) hours as needed. 120 Tab 3    aspirin delayed-release 81 mg tablet Take 81 mg by mouth every morning. Take / use AM day of surgery  per anesthesia protocols. Indications: myocardial infarction prevention      losartan (COZAAR) 50 mg tablet 50 mg two (2) times a day. Indications: Hypertension      amLODIPine (NORVASC) 10 mg tablet Take 10 mg by mouth nightly. Take / use AM day of surgery  per anesthesia protocols. Indications: Hypertension      allopurinol (ZYLOPRIM) 300 mg tablet 300 mg nightly. Indications: GOUT      glipiZIDE SR (GLUCOTROL) 10 mg CR tablet Take 10 mg by mouth two (2) times daily as needed. Indications: type 2 diabetes mellitus      omeprazole (PRILOSEC) 20 mg capsule Take 20 mg by mouth every morning.  Take / use AM day of surgery  per anesthesia protocols. Indications: GASTROESOPHAGEAL REFLUX       No current facility-administered medications on file prior to encounter. Date Last Reviewed: 4/20/17  Current Dietary Status:  NPO      History of reflux:  YES    Reflux medication:Prilosec  Social History/Home Situation: Lives with wife. Work/Activity History: unknown    OBJECTIVE:  Objective Measure: Tool Used: National Outcomes Measurement System: Functional Communication Measures: SWALLOWING  Score:  Initial: 1 Most Recent: X (Date: -- )   Interpretation of Tool: This measure describes the change in functional communication status subsequent to speech-language pathology treatment of patients with dysphagia.  o Level 1:  Individual is not able to swallow anything safely by mouth. All nutrition and hydration is received through non-oral means (e.g., nasogastric tube, PEG). o Level 2: Individual is not able to swallow safely by mouth for nutrition and hydration, but may take some consistency with consistent maximal cues in therapy only. Alternative method of feeding required. o Level 3:  Alternative method of feeding required as individual takes less than 50% of nutrition and hydration by mouth, and/or swallowing is safe with consistent use of moderate cues to use compensatory strategies and/or requires maximum diet restriction. o Level 4:  Swallowing is safe, but usually requires moderate cues to use compensatory strategies, and/or the individual has moderate diet restrictions and/or still requires tube feeding and/or oral supplements. o Level 5:  Swallowing is safe with minimal diet restriction and/or occasionally requires minimal cueing to use compensatory strategies. The individual may occasionally self-cue. All nutrition and hydration needs are met by mouth at mealtime. o Level 6:  Swallowing is safe, and the individual eats and drinks independently and may rarely require minimal cueing.  The individual usually self-cues when difficulty occurs. May need to avoid specific food items (e.g., popcorn and nuts), or require additional time (due to dysphagia). o Level 7: The individuals ability to eat independently is not limited by swallow function. Swallowing would be safe and efficient for all consistencies. Compensatory strategies are effectively used when needed. Score Level 7 Level 6 Level 5 Level 4 Level 3 Level 2 Level 1   Modifier CH CI CJ CK CL CM CN   ? Swallowing:     - CURRENT STATUS: CN - 100% impaired, limited or restricted    - GOAL STATUS:  CJ - 20%-39% impaired, limited or restricted    - D/C STATUS:  ---------------To be determined---------------    Respiratory Status:  Room air   Room Air  CXR Results:N/A  MRI/CT Results:N/A  Oral Motor Structure/Speech:  Oral-Motor Structure/Motor Speech  Labial: No impairment  Dentition: Natural, Partials (comment)  Oral Hygiene: good  Lingual: Decreased rate, Decreased strength    Cognitive and Communication Status:  Neurologic State: Alert  Orientation Level: Oriented X4  Cognition: Follows commands  Perception: Appears intact  Perseveration: No perseveration noted  Safety/Judgement: Awareness of environment    BEDSIDE SWALLOW EVALUATION  Oral Assessment:  Oral Assessment  Labial: No impairment  Dentition: Natural;Partials (comment)  Oral Hygiene: good  Lingual: Decreased rate;Decreased strength  Gag Reflex: Absent  P.O.  Trials:  Patient Position: upright in chair    The patient was given teaspoon to tablespoon   amounts of the following:   Consistency Presented: Ice chips  How Presented: SLP-fed/presented;Spoon    ORAL PHASE:  Bolus Acceptance: No impairment  Bolus Formation/Control: Impaired  Propulsion: Delayed (# of seconds)  Type of Impairment: Mastication;Delayed  Oral Residue: None    PHARYNGEAL PHASE:  Initiation of Swallow: Delayed (# of seconds)  Laryngeal Elevation: Weak  Aspiration Signs/Symptoms: Change vocal quality;Delayed cough/throat clear  Vocal Quality: No impairment                OTHER OBSERVATIONS:  Rate/bite size: WNL   Endurance: WNL   Coments:      TREATMENT:    (In addition to Assessment/Re-Assessment sessions the following treatments were rendered)  Dysphagia Activities: Activities/Procedures listed utilized to improve progress in swallow strength. Required minimal cueing to improve swallow safety. LARYNGEAL / PHARYNGEAL EXERCISES:           Effortful Swallow: Yes  Reps : 10  Sets : 1                                                                                                                   Type of Assessment  Type of Assessment: Treatment  Tracheostomy Data/Eval  Trach Size/Status: #4 ;Uncuffed  Additional Trach Info: Size change  [place] Placement: SLP  On Ventilator: No  Mental Status  Neurologic State: Alert  Orientation Level: Oriented X4  Cognition: Follows commands  Perception: Appears intact  Perseveration: No perseveration noted  Safety/Judgement: Awareness of environment  Evidence of Comprehension  Meaningful Eye Movement/Gaze: Yes  Response to Basic Yes/No Questions (%): 100 %  Response to Complex Yes/No Questions (%) : 100 %  One-Step Basic Commands (%): 100 %  Two-Step Basic Commands (%): 100 %  Cough : Present     Finger Occlusion  Application: Patient  Tolerance: Tolerated without changes in vitals, breathing effort or level of anxiety  Vocal Quality: No impairment  Vocal Intensity: No impairment  PMV Trial   Application: SLP  Tolerance:  Tolerated without changes in vitals, breathing effort or level of anxiety  Vocal Quality: No impairment  Vocal Intensity: No impairment  Oxygen Therapy  O2 Sat (%): 97 %  Pulse via Oximetry: 93 beats per minute  O2 Device: Room air           __________________________________________________________________________________________________  Treatment Assessment:  Dysphagia treatment  Progression/Medical Necessity:   · Patient is expected to demonstrate progress in swallow strength, swallow timeliness, swallow function, diet tolerance and swallow safety to improve swallow safety, work toward diet advancement and decrease aspiration risk. Compliance with Program/Exercises: Will assess as treatment progresses. Reason for Continuation of Services/Other Comments:  · consuming a modified diet  Recommendations/Intent for next treatment session: \"Treatment next visit will focus on laryngeal exercises and PMV trials\". Total Treatment Duration:  Time In: 1000  Time Out: [address]. Julieta Baxter

## 2017-04-27 ENCOUNTER — HOSPITAL ENCOUNTER (OUTPATIENT)
Dept: LAB | Age: 62
Discharge: HOME | End: 2017-04-27
Payer: COMMERCIAL

## 2017-04-27 ENCOUNTER — PATIENT OUTREACH (OUTPATIENT)
Dept: CASE MANAGEMENT | Age: 62
End: 2017-04-27

## 2017-04-27 ENCOUNTER — APPOINTMENT (OUTPATIENT)
Dept: PHYSICAL THERAPY | Age: 62
End: 2017-04-27
Payer: COMMERCIAL

## 2017-04-27 DIAGNOSIS — C76.0 HEAD AND NECK CANCER (HCC): ICD-10-CM

## 2017-04-27 PROBLEM — C09.9 TONSIL CANCER (HCC): Status: ACTIVE | Noted: 2017-04-27

## 2017-04-27 LAB
ALBUMIN SERPL BCP-MCNC: 3.1 G/DL (ref 3.2–4.6)
ALBUMIN/GLOB SERPL: 0.8 {RATIO} (ref 1.2–3.5)
ALP SERPL-CCNC: 73 U/L (ref 50–136)
ALT SERPL-CCNC: 21 U/L (ref 12–65)
ANION GAP BLD CALC-SCNC: 7 MMOL/L (ref 7–16)
AST SERPL W P-5'-P-CCNC: 14 U/L (ref 15–37)
BASOPHILS # BLD AUTO: 0 K/UL (ref 0–0.2)
BASOPHILS # BLD: 0 % (ref 0–2)
BILIRUB SERPL-MCNC: 0.6 MG/DL (ref 0.2–1.1)
BUN SERPL-MCNC: 20 MG/DL (ref 8–23)
CALCIUM SERPL-MCNC: 9 MG/DL (ref 8.3–10.4)
CHLORIDE SERPL-SCNC: 102 MMOL/L (ref 98–107)
CO2 SERPL-SCNC: 32 MMOL/L (ref 21–32)
CREAT SERPL-MCNC: 1.18 MG/DL (ref 0.8–1.5)
DIFFERENTIAL METHOD BLD: ABNORMAL
EOSINOPHIL # BLD: 0.1 K/UL (ref 0–0.8)
EOSINOPHIL NFR BLD: 1 % (ref 0.5–7.8)
ERYTHROCYTE [DISTWIDTH] IN BLOOD BY AUTOMATED COUNT: 14.4 % (ref 11.9–14.6)
GLOBULIN SER CALC-MCNC: 3.8 G/DL (ref 2.3–3.5)
GLUCOSE SERPL-MCNC: 188 MG/DL (ref 65–100)
HCT VFR BLD AUTO: 23.9 % (ref 41.1–50.3)
HGB BLD-MCNC: 7.9 G/DL (ref 13.6–17.2)
LYMPHOCYTES # BLD AUTO: 7 % (ref 13–44)
LYMPHOCYTES # BLD: 0.4 K/UL (ref 0.5–4.6)
MAGNESIUM SERPL-MCNC: 2.4 MG/DL (ref 1.8–2.4)
MCH RBC QN AUTO: 28.9 PG (ref 26.1–32.9)
MCHC RBC AUTO-ENTMCNC: 33.1 G/DL (ref 31.4–35)
MCV RBC AUTO: 87.5 FL (ref 79.6–97.8)
MONOCYTES # BLD: 0.3 K/UL (ref 0.1–1.3)
MONOCYTES NFR BLD AUTO: 6 % (ref 4–12)
NEUTS SEG # BLD: 5 K/UL (ref 1.7–8.2)
NEUTS SEG NFR BLD AUTO: 86 % (ref 43–78)
NRBC # BLD: 0 K/UL (ref 0–0.2)
PLATELET # BLD AUTO: 160 K/UL (ref 150–450)
PMV BLD AUTO: 8.4 FL (ref 10.8–14.1)
POTASSIUM SERPL-SCNC: 3.8 MMOL/L (ref 3.5–5.1)
PROT SERPL-MCNC: 6.9 G/DL (ref 6.3–8.2)
RBC # BLD AUTO: 2.73 M/UL (ref 4.23–5.67)
SODIUM SERPL-SCNC: 141 MMOL/L (ref 136–145)
WBC # BLD AUTO: 5.7 K/UL (ref 4.3–11.1)

## 2017-04-27 PROCEDURE — 80053 COMPREHEN METABOLIC PANEL: CPT | Performed by: INTERNAL MEDICINE

## 2017-04-27 PROCEDURE — 83735 ASSAY OF MAGNESIUM: CPT | Performed by: INTERNAL MEDICINE

## 2017-04-27 PROCEDURE — 85025 COMPLETE CBC W/AUTO DIFF WBC: CPT | Performed by: INTERNAL MEDICINE

## 2017-05-03 ENCOUNTER — HOSPITAL ENCOUNTER (OUTPATIENT)
Dept: PHYSICAL THERAPY | Age: 62
Discharge: HOME | End: 2017-05-03
Payer: COMMERCIAL

## 2017-05-03 ENCOUNTER — PATIENT OUTREACH (OUTPATIENT)
Dept: CASE MANAGEMENT | Age: 62
End: 2017-05-03

## 2017-05-03 VITALS — OXYGEN SATURATION: 98 %

## 2017-05-03 PROCEDURE — 92526 ORAL FUNCTION THERAPY: CPT | Performed by: SPEECH-LANGUAGE PATHOLOGIST

## 2017-05-03 NOTE — PROGRESS NOTES
Vijay Saldana  : 1955 [facility] at [address] [place] [address] 52, [address] [address],[address], [place] [address].  [phone]   [phone]         OUTPATIENT SPEECH LANGUAGE PATHOLOGY: Daily Note  ICD-10: Treatment Diagnosis: Oropharyngeal Dysphagia R13.12  REFERRING PHYSICIAN: Dr. Dandy Cancino MD Orders: Evaluate and Treat  PAST MEDICAL HISTORY:   Mr. Christina Dudley is a 62 y.o. male who  has a past medical history of GERD (gastroesophageal reflux disease); Gout; Hypertension; Morbid obesity ([facility] [address] Squamous cell carcinoma of epiglottis (HCC) (2016); and Type 2 diabetes mellitus ([facility] [address] He also has no past medical history of Adverse effect of anesthesia; Difficult intubation; Malignant hyperthermia due to anesthesia; or Pseudocholinesterase deficiency. He also  has a past surgical history that includes colonoscopy (2016); other surgical (Left); heent; and vascular access. MEDICAL/REFERRING DIAGNOSIS: Dysphagia [R13.10]  DATE OF ONSET: 2017   PRIOR LEVEL OF FUNCTION: Independent  PRECAUTIONS/ALLERGIES: Zofran Evan Nap hcl (pf)]     ASSESSMENT:  Patient attended follow up Dysphagia therapy. He continues with NPO with all nutrition via PEG. Patient continues to report coughing with free water. SLP advised patient to continue with strict NPO recommendations and take all p.o via PEG with the exception of free water. He verbalized understanding and in agreement. Continues to tolerate his trach downsize. Tolerated PMV trials very well this date. No episodes of anxiety observed. Patient's oxygen and RR rate remained steady. Patient has great voicing and tolerate trials for period of 35 minutes with 1 sputum produced coughing episode. See below for specific tasks. Patient was given the PMV to take home with specific instructions on how to clean the PMV as well as taking the PMV off when he's sleeping.  Patient was able to recall all instructions and safely donned his PMV on and off with minimal assist.     Patient reports increased chills and some swelling along the neck region. Indicates that his throat is a little sore. He called his Oncology navigator and patient was prescribed antibiotics. Patient's oral cavity presents with mild lingual thrush. SLP informed patient to maintain good oral hygiene. Patient will benefit from skilled intervention to address the below impairments. ?????? ? ? This section established at most recent assessment??????????  PROBLEM LIST (Impairments causing functional limitations):  1. Dysphagia  GOALS: (Goals have been discussed and agreed upon with patient.)  SHORT-TERM FUNCTIONAL GOALS: Time Frame: 3 months  Patient will complete laryngeal exercises independently at 100% accuracy. Repeat MBSS at duration of treatment with 100% participation. DISCHARGE GOALS: Time Frame: 3 months  1. Patient will tolerate least restrictive diet without overt clinical s/sx of aspiration observed for a safe and adequate swallow function at 100%. REHABILITATION POTENTIAL FOR STATED GOALS: GoodPLAN OF CARE:  Patient will benefit from skilled intervention to address the following impairments. RECOMMENDATIONS AND PLANNED INTERVENTIONS (Benefits and precautions of therapy have been discussed with the patient.):  · NPO with alternative means of nutrition  MEDICATIONS:  · Non-oral  COMPENSATORY STRATEGIES/MODIFICATIONS INCLUDING:  · None  OTHER RECOMMENDATIONS (including follow up treatment recommendations):   · Laryngeal exercises  RECOMMENDED DIET MODIFICATIONS DISCUSSED WITH:  · Family  · Patient  TREATMENT PLAN EFFECTIVE DATES: 3/1/17 TO 6/1/17  FREQUENCY/DURATION: Continue to follow patient 1 time a week for 12 weeks to address above goals. Regarding Santos Landin's therapy, I certify that the treatment plan above will be carried out by a therapist or under their direction. Thank you for this referral,  TERESA Rosenberg Ed CCC-SLP                  Referring Physician Signature: Doyal Lyme  Date      SUBJECTIVE:  Cooperative. Present Symptoms: None      Current Medications:   Current Outpatient Prescriptions on File Prior to Encounter   Medication Sig Dispense Refill    LORazepam (ATIVAN) 1 mg tablet Take 1 Tab by mouth every eight (8) hours as needed for Anxiety. Max Daily Amount: 3 mg. Indications: CANCER CHEMOTHERAPY-INDUCED NAUSEA AND VOMITING 30 Tab 2    silver sulfADIAZINE (SILVADENE) 1 % topical cream Apply  to affected area three (3) times daily. May stain clothing 400 g 1    oxyCODONE (ROXICODONE) 5 mg/5 mL solution Take 5 mL by mouth every four (4) hours as needed for Pain. Max Daily Amount: 30 mg. 473 mL 0    clindamycin (CLEOCIN T) 1 % lotion Apply  to affected area two (2) times daily as needed. use thin film on affected area 1 Bottle 0    glucose blood VI test strips (RELION PRIME TEST STRIPS) strip Test blood sugar before meals and bedtime Diagnosis code E11.65 200 Strip 5    citalopram (CELEXA) 10 mg tablet 1 Tab by Per G Tube route daily. 30 Tab 2    magnesium oxide (MAG-OX) 400 mg tablet 1 Tab by Per G Tube route three (3) times daily. 90 Tab 2    Lactose-Free Food with Fiber (JEVITY 1.5 BRANDO) 0.06 gram-1.5 kcal/mL liqd 6 Cans by PEG Tube route daily for 100 days. Bolus Feedings, Jevity 1.5 or comparable, goal 6 cans/day. Pt will need additional 38-42 oz free water daily via PEG. Estimated ARRON greater than 90 days. Indications: DYSPHAGIA 180 Can 4    magic mouthwash (GLADYS) susp Take 10 mL by mouth every four (4) hours. (Patient taking differently: Take 10 mL by mouth every four (4) hours as needed.) 500 mL 3    lidocaine-prilocaine (EMLA) topical cream Apply  to affected area as needed. Apply 1/2 inch amount of cream to port site 90 minutes prior to needle access. 30 g 0    ondansetron hcl (ZOFRAN) 8 mg tablet Take 1 Tab by mouth every eight (8) hours as needed for Nausea.  90 Tab 3    prochlorperazine (COMPAZINE) 10 mg tablet Take 1 Tab by mouth every six (6) hours as needed. 120 Tab 3    aspirin delayed-release 81 mg tablet Take 81 mg by mouth every morning. Take / use AM day of surgery  per anesthesia protocols. Indications: myocardial infarction prevention      losartan (COZAAR) 50 mg tablet 50 mg two (2) times a day. Indications: Hypertension      amLODIPine (NORVASC) 10 mg tablet Take 10 mg by mouth nightly. Take / use AM day of surgery  per anesthesia protocols. Indications: Hypertension      allopurinol (ZYLOPRIM) 300 mg tablet 300 mg nightly. Indications: GOUT      glipiZIDE SR (GLUCOTROL) 10 mg CR tablet Take 10 mg by mouth two (2) times daily as needed. Indications: type 2 diabetes mellitus      omeprazole (PRILOSEC) 20 mg capsule Take 20 mg by mouth every morning. Take / use AM day of surgery  per anesthesia protocols. Indications: GASTROESOPHAGEAL REFLUX       No current facility-administered medications on file prior to encounter. Date Last Reviewed: 5/3/17  Current Dietary Status:  NPO      History of reflux:  YES    Reflux medication:Prilosec  Social History/Home Situation: Lives with wife. Work/Activity History: unknown    OBJECTIVE:  Objective Measure: Tool Used: National Outcomes Measurement System: Functional Communication Measures: SWALLOWING  Score:  Initial: 1 Most Recent: X (Date: -- )   Interpretation of Tool: This measure describes the change in functional communication status subsequent to speech-language pathology treatment of patients with dysphagia.  o Level 1:  Individual is not able to swallow anything safely by mouth. All nutrition and hydration is received through non-oral means (e.g., nasogastric tube, PEG). o Level 2: Individual is not able to swallow safely by mouth for nutrition and hydration, but may take some consistency with consistent maximal cues in therapy only. Alternative method of feeding required.   o Level 3:  Alternative method of feeding required as individual takes less than 50% of nutrition and hydration by mouth, and/or swallowing is safe with consistent use of moderate cues to use compensatory strategies and/or requires maximum diet restriction. o Level 4:  Swallowing is safe, but usually requires moderate cues to use compensatory strategies, and/or the individual has moderate diet restrictions and/or still requires tube feeding and/or oral supplements. o Level 5:  Swallowing is safe with minimal diet restriction and/or occasionally requires minimal cueing to use compensatory strategies. The individual may occasionally self-cue. All nutrition and hydration needs are met by mouth at mealtime. o Level 6:  Swallowing is safe, and the individual eats and drinks independently and may rarely require minimal cueing. The individual usually self-cues when difficulty occurs. May need to avoid specific food items (e.g., popcorn and nuts), or require additional time (due to dysphagia). o Level 7: The individuals ability to eat independently is not limited by swallow function. Swallowing would be safe and efficient for all consistencies. Compensatory strategies are effectively used when needed. Score Level 7 Level 6 Level 5 Level 4 Level 3 Level 2 Level 1   Modifier CH CI CJ CK CL CM CN   ? Swallowing:     - CURRENT STATUS: CN - 100% impaired, limited or restricted    - GOAL STATUS:  CJ - 20%-39% impaired, limited or restricted    - D/C STATUS:  ---------------To be determined---------------    Respiratory Status:  Room air   Room Air  CXR Results:N/A  MRI/CT Results:N/A  Oral Motor Structure/Speech:       Cognitive and Communication Status:                      BEDSIDE SWALLOW EVALUATION  Oral Assessment:     P.O. Trials: The patient was given teaspoon to tablespoon   amounts of the following:           ORAL PHASE:                   PHARYNGEAL PHASE:           Vocal Quality: No impairment                OTHER OBSERVATIONS:  Rate/bite size: WNL   Endurance:   WNL   Coments: TREATMENT:    (In addition to Assessment/Re-Assessment sessions the following treatments were rendered)  Dysphagia Activities: Activities/Procedures listed utilized to improve progress in swallow strength. Required minimal cueing to improve swallow safety. LARYNGEAL / PHARYNGEAL EXERCISES:           Effortful Swallow: Yes  Reps : 10  Sets : 1  Hard Glottal Attack: Yes  Reps : 10  Sets : 1                                Mendelsohn Maneuver: Yes  Reps : 10  Sets : 1                                     Supraglottic Swallow: Yes  Reps : 10  Sets : 1                                         PMV Trial   Application: SLP  Tolerance: Tolerated without changes in vitals, breathing effort or level of anxiety  Vocal Quality: No impairment  Vocal Intensity: No impairment  Duration (minutes): 35 minutes  Oxygen Therapy  O2 Sat (%): 98 %  Pulse via Oximetry: 96 beats per minute  O2 Device: Room air           __________________________________________________________________________________________________  Treatment Assessment:  Dysphagia treatment  Progression/Medical Necessity:   · Patient is expected to demonstrate progress in swallow strength, swallow timeliness, swallow function, diet tolerance and swallow safety to improve swallow safety, work toward diet advancement and decrease aspiration risk. Compliance with Program/Exercises: Will assess as treatment progresses. Reason for Continuation of Services/Other Comments:  · consuming a modified diet  Recommendations/Intent for next treatment session: \"Treatment next visit will focus on laryngeal exercises and PMV trials\". Total Treatment Duration:  Time In: 1010  Time Out: 136 [facility]. Bev Espitia

## 2017-05-10 ENCOUNTER — HOSPITAL ENCOUNTER (OUTPATIENT)
Dept: PHYSICAL THERAPY | Age: 62
Discharge: HOME | End: 2017-05-10
Payer: COMMERCIAL

## 2017-05-10 ENCOUNTER — APPOINTMENT (OUTPATIENT)
Dept: RADIATION ONCOLOGY | Age: 62
End: 2017-05-10
Payer: COMMERCIAL

## 2017-05-10 PROCEDURE — 92526 ORAL FUNCTION THERAPY: CPT | Performed by: SPEECH-LANGUAGE PATHOLOGIST

## 2017-05-10 NOTE — PROGRESS NOTES
Zaheer Sol  : 1955 [facility] at [place] [id number] [facility], [address] [place] [address],[address], [place] [address].  [phone]   [phone]         OUTPATIENT SPEECH LANGUAGE PATHOLOGY: Daily Note  ICD-10: Treatment Diagnosis: Oropharyngeal Dysphagia R13.12  REFERRING PHYSICIAN: Dr. Doretha Holter MD Orders: Evaluate and Treat  PAST MEDICAL HISTORY:   Mr. Tarun Pryor is a 62 y.o. male who  has a past medical history of GERD (gastroesophageal reflux disease); Gout; Hypertension; Morbid obesity ([facility] [address] Squamous cell carcinoma of epiglottis (HCC) (2016); and Type 2 diabetes mellitus ([facility] [address] He also has no past medical history of Adverse effect of anesthesia; Difficult intubation; Malignant hyperthermia due to anesthesia; or Pseudocholinesterase deficiency. He also  has a past surgical history that includes colonoscopy (2016); other surgical (Left); heent; and vascular access. MEDICAL/REFERRING DIAGNOSIS: Dysphagia [R13.10]  DATE OF ONSET: 2017   PRIOR LEVEL OF FUNCTION: Independent  PRECAUTIONS/ALLERGIES: Zofran Lutricia Carlos hcl (pf)]     ASSESSMENT:  Patient attended follow up Dysphagia therapy. He continues with NPO with all nutrition via PEG. Patient continues to report coughing with free water. SLP advised patient to continue with strict NPO recommendations and take all p.o via PEG with the exception of free water. He verbalized understanding and in agreement. Continues to tolerate his trach downsize. Tolerated PMV trials very well this date. No episodes of anxiety observed. Patient's oxygen and RR rate remained steady. Patient has great voicing and tolerate trials for period of 35 minutes with 1 sputum produced coughing episode. See below for specific tasks. Patient was given the PMV to take home with specific instructions on how to clean the PMV as well as taking the PMV off when he's sleeping.  Patient was able to recall all instructions and safely donned his PMV on and off with minimal assist.     Patient will benefit from skilled intervention to address the below impairments. ?????? ? ? This section established at most recent assessment??????????  PROBLEM LIST (Impairments causing functional limitations):  1. Dysphagia  GOALS: (Goals have been discussed and agreed upon with patient.)  SHORT-TERM FUNCTIONAL GOALS: Time Frame: 3 months  Patient will complete laryngeal exercises independently at 100% accuracy. Repeat MBSS at duration of treatment with 100% participation. DISCHARGE GOALS: Time Frame: 3 months  1. Patient will tolerate least restrictive diet without overt clinical s/sx of aspiration observed for a safe and adequate swallow function at 100%. REHABILITATION POTENTIAL FOR STATED GOALS: GoodPLAN OF CARE:  Patient will benefit from skilled intervention to address the following impairments. RECOMMENDATIONS AND PLANNED INTERVENTIONS (Benefits and precautions of therapy have been discussed with the patient.):  · NPO with alternative means of nutrition  MEDICATIONS:  · Non-oral  COMPENSATORY STRATEGIES/MODIFICATIONS INCLUDING:  · None  OTHER RECOMMENDATIONS (including follow up treatment recommendations):   · Laryngeal exercises  RECOMMENDED DIET MODIFICATIONS DISCUSSED WITH:  · Family  · Patient  TREATMENT PLAN EFFECTIVE DATES: 3/1/17 TO 6/1/17  FREQUENCY/DURATION: Continue to follow patient 1 time a week for 12 weeks to address above goals. Regarding Santos Landin's therapy, I certify that the treatment plan above will be carried out by a therapist or under their direction. Thank you for this referral,  TERESA Hardwick Ed CCC-SLP                  Referring Physician Signature: Cliff Forde  Date      SUBJECTIVE:  Cooperative. Present Symptoms: None      Current Medications:   Current Outpatient Prescriptions on File Prior to Encounter   Medication Sig Dispense Refill    levoFLOXacin (LEVAQUIN) 500 mg tablet Take 1 Tab by mouth daily.  5 Tab 0    LORazepam (ATIVAN) 1 mg tablet Take 1 Tab by mouth every eight (8) hours as needed for Anxiety. Max Daily Amount: 3 mg. Indications: CANCER CHEMOTHERAPY-INDUCED NAUSEA AND VOMITING 30 Tab 2    silver sulfADIAZINE (SILVADENE) 1 % topical cream Apply  to affected area three (3) times daily. May stain clothing 400 g 1    oxyCODONE (ROXICODONE) 5 mg/5 mL solution Take 5 mL by mouth every four (4) hours as needed for Pain. Max Daily Amount: 30 mg. 473 mL 0    clindamycin (CLEOCIN T) 1 % lotion Apply  to affected area two (2) times daily as needed. use thin film on affected area 1 Bottle 0    glucose blood VI test strips (RELION PRIME TEST STRIPS) strip Test blood sugar before meals and bedtime Diagnosis code E11.65 200 Strip 5    citalopram (CELEXA) 10 mg tablet 1 Tab by Per G Tube route daily. 30 Tab 2    magnesium oxide (MAG-OX) 400 mg tablet 1 Tab by Per G Tube route three (3) times daily. 90 Tab 2    Lactose-Free Food with Fiber (JEVITY 1.5 BRANDO) 0.06 gram-1.5 kcal/mL liqd 6 Cans by PEG Tube route daily for 100 days. Bolus Feedings, Jevity 1.5 or comparable, goal 6 cans/day. Pt will need additional 38-42 oz free water daily via PEG. Estimated ARRON greater than 90 days. Indications: DYSPHAGIA 180 Can 4    magic mouthwash (GLADYS) susp Take 10 mL by mouth every four (4) hours. (Patient taking differently: Take 10 mL by mouth every four (4) hours as needed.) 500 mL 3    lidocaine-prilocaine (EMLA) topical cream Apply  to affected area as needed. Apply 1/2 inch amount of cream to port site 90 minutes prior to needle access. 30 g 0    ondansetron hcl (ZOFRAN) 8 mg tablet Take 1 Tab by mouth every eight (8) hours as needed for Nausea. 90 Tab 3    prochlorperazine (COMPAZINE) 10 mg tablet Take 1 Tab by mouth every six (6) hours as needed. 120 Tab 3    aspirin delayed-release 81 mg tablet Take 81 mg by mouth every morning. Take / use AM day of surgery  per anesthesia protocols.    Indications: myocardial infarction prevention      losartan (COZAAR) 50 mg tablet 50 mg two (2) times a day. Indications: Hypertension      amLODIPine (NORVASC) 10 mg tablet Take 10 mg by mouth nightly. Take / use AM day of surgery  per anesthesia protocols. Indications: Hypertension      allopurinol (ZYLOPRIM) 300 mg tablet 300 mg nightly. Indications: GOUT      glipiZIDE SR (GLUCOTROL) 10 mg CR tablet Take 10 mg by mouth two (2) times daily as needed. Indications: type 2 diabetes mellitus      omeprazole (PRILOSEC) 20 mg capsule Take 20 mg by mouth every morning. Take / use AM day of surgery  per anesthesia protocols. Indications: GASTROESOPHAGEAL REFLUX       No current facility-administered medications on file prior to encounter. Date Last Reviewed: 5/10/17  Current Dietary Status:  NPO      History of reflux:  YES    Reflux medication:Prilosec  Social History/Home Situation: Lives with wife. Work/Activity History: unknown    OBJECTIVE:  Objective Measure: Tool Used: National Outcomes Measurement System: Functional Communication Measures: SWALLOWING  Score:  Initial: 1 Most Recent: X (Date: -- )   Interpretation of Tool: This measure describes the change in functional communication status subsequent to speech-language pathology treatment of patients with dysphagia.  o Level 1:  Individual is not able to swallow anything safely by mouth. All nutrition and hydration is received through non-oral means (e.g., nasogastric tube, PEG). o Level 2: Individual is not able to swallow safely by mouth for nutrition and hydration, but may take some consistency with consistent maximal cues in therapy only. Alternative method of feeding required. o Level 3:  Alternative method of feeding required as individual takes less than 50% of nutrition and hydration by mouth, and/or swallowing is safe with consistent use of moderate cues to use compensatory strategies and/or requires maximum diet restriction.   o Level 4:  Swallowing is safe, but usually requires moderate cues to use compensatory strategies, and/or the individual has moderate diet restrictions and/or still requires tube feeding and/or oral supplements. o Level 5:  Swallowing is safe with minimal diet restriction and/or occasionally requires minimal cueing to use compensatory strategies. The individual may occasionally self-cue. All nutrition and hydration needs are met by mouth at mealtime. o Level 6:  Swallowing is safe, and the individual eats and drinks independently and may rarely require minimal cueing. The individual usually self-cues when difficulty occurs. May need to avoid specific food items (e.g., popcorn and nuts), or require additional time (due to dysphagia). o Level 7: The individuals ability to eat independently is not limited by swallow function. Swallowing would be safe and efficient for all consistencies. Compensatory strategies are effectively used when needed. Score Level 7 Level 6 Level 5 Level 4 Level 3 Level 2 Level 1   Modifier CH CI CJ CK CL CM CN   ? Swallowing:     - CURRENT STATUS: CN - 100% impaired, limited or restricted    - GOAL STATUS:  CJ - 20%-39% impaired, limited or restricted    - D/C STATUS:  ---------------To be determined---------------    Respiratory Status:      Room Air  CXR Results:N/A  MRI/CT Results:N/A  Oral Motor Structure/Speech:       Cognitive and Communication Status:                      BEDSIDE SWALLOW EVALUATION  Oral Assessment:     P.O. Trials: The patient was given teaspoon to tablespoon   amounts of the following:           ORAL PHASE:                   PHARYNGEAL PHASE:                            OTHER OBSERVATIONS:  Rate/bite size: WNL   Endurance: WNL   Coments:      TREATMENT:    (In addition to Assessment/Re-Assessment sessions the following treatments were rendered)  Dysphagia Activities: Activities/Procedures listed utilized to improve progress in swallow strength.  Required minimal cueing to improve swallow safety. LARYNGEAL / PHARYNGEAL EXERCISES:           Effortful Swallow: Yes  Reps : 10  Sets : 1  Hard Glottal Attack: Yes  Reps : 20  Sets : 1                       Mary: Yes  Reps : 10  Sets : 1  Mendelsohn Maneuver: Yes  Reps : 10  Sets : 1                                     Supraglottic Swallow: Yes  Reps : 10  Sets : 1                           Patient given p.o trials of thin liquids via cup with (+) overt clinical s/sx of aspiration evidenced by immediate coughs and drop in O2. Patient given p.o trials of nectar thick liquids without overt clinical s/sx of aspiration with stable 02 stats. __________________________________________________________________________________________________  Treatment Assessment:  Dysphagia treatment  Progression/Medical Necessity:   · Patient is expected to demonstrate progress in swallow strength, swallow timeliness, swallow function, diet tolerance and swallow safety to improve swallow safety, work toward diet advancement and decrease aspiration risk. Compliance with Program/Exercises: Will assess as treatment progresses. Reason for Continuation of Services/Other Comments:  · consuming a modified diet  Recommendations/Intent for next treatment session: \"Treatment next visit will focus on laryngeal exercises and PMV trials\". Total Treatment Duration:  Time In: 1015  Time Out: [facility]. Dearl Jose

## 2017-05-11 ENCOUNTER — HOSPITAL ENCOUNTER (OUTPATIENT)
Dept: RADIATION ONCOLOGY | Age: 62
Discharge: HOME | End: 2017-05-11
Payer: COMMERCIAL

## 2017-05-11 ENCOUNTER — HOSPITAL ENCOUNTER (OUTPATIENT)
Dept: LAB | Age: 62
Discharge: HOME | End: 2017-05-11
Payer: COMMERCIAL

## 2017-05-11 ENCOUNTER — PATIENT OUTREACH (OUTPATIENT)
Dept: CASE MANAGEMENT | Age: 62
End: 2017-05-11

## 2017-05-11 DIAGNOSIS — C09.9 TONSIL CANCER (HCC): ICD-10-CM

## 2017-05-11 DIAGNOSIS — C14.0 THROAT CANCER (HCC): Primary | ICD-10-CM

## 2017-05-11 LAB
ALBUMIN SERPL BCP-MCNC: 3.3 G/DL (ref 3.2–4.6)
ALBUMIN/GLOB SERPL: 0.9 {RATIO} (ref 1.2–3.5)
ALP SERPL-CCNC: 72 U/L (ref 50–136)
ALT SERPL-CCNC: 21 U/L (ref 12–65)
ANION GAP BLD CALC-SCNC: 6 MMOL/L (ref 7–16)
AST SERPL W P-5'-P-CCNC: 14 U/L (ref 15–37)
BASOPHILS # BLD AUTO: 0 K/UL (ref 0–0.2)
BASOPHILS # BLD: 0 % (ref 0–2)
BILIRUB SERPL-MCNC: 0.7 MG/DL (ref 0.2–1.1)
BUN SERPL-MCNC: 20 MG/DL (ref 8–23)
CALCIUM SERPL-MCNC: 9.1 MG/DL (ref 8.3–10.4)
CHLORIDE SERPL-SCNC: 101 MMOL/L (ref 98–107)
CO2 SERPL-SCNC: 33 MMOL/L (ref 21–32)
CREAT SERPL-MCNC: 1.11 MG/DL (ref 0.8–1.5)
DIFFERENTIAL METHOD BLD: ABNORMAL
EOSINOPHIL # BLD: 0.1 K/UL (ref 0–0.8)
EOSINOPHIL NFR BLD: 2 % (ref 0.5–7.8)
ERYTHROCYTE [DISTWIDTH] IN BLOOD BY AUTOMATED COUNT: 14.1 % (ref 11.9–14.6)
GLOBULIN SER CALC-MCNC: 3.7 G/DL (ref 2.3–3.5)
GLUCOSE SERPL-MCNC: 154 MG/DL (ref 65–100)
HCT VFR BLD AUTO: 26.9 % (ref 41.1–50.3)
HGB BLD-MCNC: 8.9 G/DL (ref 13.6–17.2)
LYMPHOCYTES # BLD AUTO: 8 % (ref 13–44)
LYMPHOCYTES # BLD: 0.5 K/UL (ref 0.5–4.6)
MCH RBC QN AUTO: 29.1 PG (ref 26.1–32.9)
MCHC RBC AUTO-ENTMCNC: 33.1 G/DL (ref 31.4–35)
MCV RBC AUTO: 87.9 FL (ref 79.6–97.8)
MONOCYTES # BLD: 0.3 K/UL (ref 0.1–1.3)
MONOCYTES NFR BLD AUTO: 5 % (ref 4–12)
NEUTS SEG # BLD: 4.7 K/UL (ref 1.7–8.2)
NEUTS SEG NFR BLD AUTO: 85 % (ref 43–78)
NRBC # BLD: 0 K/UL (ref 0–0.2)
PLATELET # BLD AUTO: 145 K/UL (ref 150–450)
PMV BLD AUTO: 8.6 FL (ref 10.8–14.1)
POTASSIUM SERPL-SCNC: 3.6 MMOL/L (ref 3.5–5.1)
PROT SERPL-MCNC: 7 G/DL (ref 6.3–8.2)
RBC # BLD AUTO: 3.06 M/UL (ref 4.23–5.67)
SODIUM SERPL-SCNC: 140 MMOL/L (ref 136–145)
WBC # BLD AUTO: 5.6 K/UL (ref 4.3–11.1)

## 2017-05-11 PROCEDURE — 31575 DIAGNOSTIC LARYNGOSCOPY: CPT

## 2017-05-11 PROCEDURE — 80053 COMPREHEN METABOLIC PANEL: CPT | Performed by: NURSE PRACTITIONER

## 2017-05-11 PROCEDURE — 99211 OFF/OP EST MAY X REQ PHY/QHP: CPT

## 2017-05-11 PROCEDURE — 85025 COMPLETE CBC W/AUTO DIFF WBC: CPT | Performed by: NURSE PRACTITIONER

## 2017-05-11 NOTE — PROGRESS NOTES
Pt here today immediately following his [facility] appt upstairs, VS were taken at [facility]. Pt will have a laryngoscope done today. He is one month out from completing RT. Pt will have a CT neck and return for FUP 6/2017.

## 2017-05-11 NOTE — PROGRESS NOTES
Patient: Zaheer Mims MRN: [id number]  SSN: [id number]    YOB: 1955  Age: 62 y.o. Sex: male      Other Providers: Lisa Lobo MD, Los Hidalgo MD     CHIEF COMPLAINT: Dysphagia and hoarseness     DIAGNOSIS: T3N1M0 Supraglottic SCC, Stage ARMANDO.      PREVIOUS TREATMENT:  1. Debridement and biopsy 11/14/16  2. 2 cycles of neoadjuvant TPF chemotherapy. 3. Completion of concurrent Cetuximab and radiation 4/11/17, 70 Gy in 35 fractions. INTERVAL HISTORY:  Zaheer Mims is a 62 y.o. male being seen back in regular follow up after completion of his radiation 4/11/17. Regarding his history, his only pertinent medical history includes DMII, HTN, and obesity. Beginning in the fall 2016, he began to note some difficulty with dysphagia as well as some change in his voice. Ultimately, there was some left-sided otalgia for which he sought help from his primary care physician. A course of antibiotics did not prove helpful and ultimately he was referred to Dr. Malinda Bracnh for evaluation. He was a prior smoker but quit nearly 11 years prior to presentation. He did not abuse alcohol. Flexible laryngoscopy demonstrated a large mass involving the entire epiglottis. The tumor appeared to involve the aryepiglottic folds. The vocal cords were not visualized. CT scan November 3, 2016 demonstrated 3.9 x 2.6 x 2.3 cm supraglottic mass extending across midline. Inferiorly, it appeared to involve the left true vocal cord. The overlying thyroid cartilage seemed preserved. There was a level 3 lymph node on the left side measuring 1.8 cm in short axis. On 11/14/16, the patient was taken to the OR for panendoscopy where Dr. Malinda Branch indicate that he visualized the true and false vocal cords both of which appeared uninvolved by the tumor. He also did micro-debridement, subsequent to which the patient's voice appeared to be improved. The biopsy was consistent with in situ and infiltrating poorly differentiated squamous carcinoma.  The patient has been seen at [address], where surgery was offered. The patient was averse to the notion of a tracheostomy if that was a possibility and therefore sought evaluation from Dr. Ck Gregorio in oncology who saw him 12/20/16. Per Dr. Leonardo Soliman note, he was a candidate for chemoradiation. He ordered a PET/CT and referred him to our office. I agreed with the plan and completed treatment 4/11/17. Unfortunately prior to commencement with radiation, he did require tracheotomy. With the anticipated delayed in starting radiation, he did start chemotherapy, TPF, and received 2 cycles prior to starting radiation with cetuximab. Overall he did fairly well with treatment, but did have the anticipated irritation and difficulty swallowing. He did get dry mouth and neck erythema worsened on the left compared to the right. He did have Silvadene prescribed for this. He was seen by Dr. Lane Flowers shortly after his radiation and he had history gout ME downsized. He continues to use his feeding tube routinely for nutrition but is beginning to take some food orally but has a swallowing evaluation pending. He continues to follow with medical oncology who saw him prior to her visit today. His only new major complaint today is of 2 days with worsening cough and tightness in his upper chest and pharynx. UPDATED PAST MEDICAL HISTORY:  Since our prior encounter, Odilon Mireles has not been hospitalized. There have been no significant changes to the medical history. MEDICATIONS:     Current Outpatient Prescriptions:     oxyCODONE (ROXICODONE) 5 mg/5 mL solution, Take 5 mL by mouth every four (4) hours as needed for Pain. Max Daily Amount: 30 mg., Disp: 473 mL, Rfl: 0    levoFLOXacin (LEVAQUIN) 500 mg tablet, Take 1 Tab by mouth daily. , Disp: 5 Tab, Rfl: 0    LORazepam (ATIVAN) 1 mg tablet, Take 1 Tab by mouth every eight (8) hours as needed for Anxiety. Max Daily Amount: 3 mg.  Indications: CANCER CHEMOTHERAPY-INDUCED NAUSEA AND VOMITING, Disp: 30 Tab, Rfl: 2    silver sulfADIAZINE (SILVADENE) 1 % topical cream, Apply  to affected area three (3) times daily. May stain clothing, Disp: 400 g, Rfl: 1    clindamycin (CLEOCIN T) 1 % lotion, Apply  to affected area two (2) times daily as needed. use thin film on affected area, Disp: 1 Bottle, Rfl: 0    glucose blood VI test strips (RELION PRIME TEST STRIPS) strip, Test blood sugar before meals and bedtime Diagnosis code E11.65, Disp: 200 Strip, Rfl: 5    citalopram (CELEXA) 10 mg tablet, 1 Tab by Per G Tube route daily. (Patient taking differently: 5 mg by Per G Tube route daily.), Disp: 30 Tab, Rfl: 2    magnesium oxide (MAG-OX) 400 mg tablet, 1 Tab by Per G Tube route three (3) times daily. , Disp: 90 Tab, Rfl: 2    magic mouthwash (GLADYS) susp, Take 10 mL by mouth every four (4) hours. (Patient taking differently: Take 10 mL by mouth every four (4) hours as needed.), Disp: 500 mL, Rfl: 3    lidocaine-prilocaine (EMLA) topical cream, Apply  to affected area as needed. Apply 1/2 inch amount of cream to port site 90 minutes prior to needle access. , Disp: 30 g, Rfl: 0    ondansetron hcl (ZOFRAN) 8 mg tablet, Take 1 Tab by mouth every eight (8) hours as needed for Nausea., Disp: 90 Tab, Rfl: 3    prochlorperazine (COMPAZINE) 10 mg tablet, Take 1 Tab by mouth every six (6) hours as needed. , Disp: 120 Tab, Rfl: 3    aspirin delayed-release 81 mg tablet, Take 81 mg by mouth every morning. Take / use AM day of surgery  per anesthesia protocols. Indications: myocardial infarction prevention, Disp: , Rfl:     losartan (COZAAR) 50 mg tablet, 50 mg two (2) times a day. Indications: Hypertension, Disp: , Rfl:     amLODIPine (NORVASC) 10 mg tablet, Take 10 mg by mouth nightly. Take / use AM day of surgery  per anesthesia protocols. Indications: Hypertension, Disp: , Rfl:     allopurinol (ZYLOPRIM) 300 mg tablet, 300 mg nightly.  Indications: GOUT, Disp: , Rfl:     glipiZIDE SR (GLUCOTROL) 10 mg CR tablet, Take 10 mg by mouth two (2) times daily as needed. Indications: type 2 diabetes mellitus, Disp: , Rfl:     omeprazole (PRILOSEC) 20 mg capsule, Take 20 mg by mouth every morning. Take / use AM day of surgery  per anesthesia protocols. Indications: GASTROESOPHAGEAL REFLUX, Disp: , Rfl:     ALLERGIES:   Allergies   Allergen Reactions    Zofran [Ondansetron Hcl (Pf)] Other (comments)     Gives pt severe HA       PHYSICAL EXAMINATION:   ECOG Performance status 1  VITAL SIGNS: There were no vitals taken for this visit. Temperature 98.5, pulse 96, respirations 16, blood pressure 141/73, O2 sat 96%. GENERAL: The patient is well-developed, ambulatory, alert and in no acute distress. HEENT: Head is normocephalic, atraumatic. Pupils are equal, round and reactive to light and accommodation. Extraocular movement intact. Hearing is intact bilaterally to finger rub. Oral cavity reveals no lesions but dry secretions. NECK: Neck is supple with no masses. There is clear skin irritation although no erythema or desquamation around the tracheostomy site. CARDIOVASCULAR: Heart is regular rate and rhythm. There are no murmurs rubs or gallups. Radial pulses are 2+ RESPIRATORY: Lungs are clear to auscultation and percussion. There is normal respiratory effort. GASTROINTESTINAL: The abdomen is soft, non-tender, nondistended with no hepatospelnomagaly. Digital rectal examination: deferred LYMPHATIC: There is no cervical, supraclavicular or axillary lymphadenopathy bilaterally. PROCEDURE NOTE:  Laryngoscopy. The nares was sprayed with topical anesthetic and the scope inserted and advanced to the nasopharynx and through the oropharynx visualizing normal mucosal structures in the pharyngeal and palatine tonsils. There was a very large amount of submucosal swelling and necrotic debris. Was not able to fully visualize the laryngeal apparatus as result of this. The scope was then withdrawn from the nasal cavity.   The patient tolerated the procedure well and there were no complications. LABORATORY:   Lab Results   Component Value Date/Time    Sodium 140 05/11/2017 09:16 AM    Potassium 3.6 05/11/2017 09:16 AM    Chloride 101 05/11/2017 09:16 AM    CO2 33 05/11/2017 09:16 AM    Anion gap 6 05/11/2017 09:16 AM    Glucose 154 05/11/2017 09:16 AM    BUN 20 05/11/2017 09:16 AM    Creatinine 1.11 05/11/2017 09:16 AM    GFR est AA >60 05/11/2017 09:16 AM    GFR est non-AA >60 05/11/2017 09:16 AM    Calcium 9.1 05/11/2017 09:16 AM    Magnesium 2.4 04/27/2017 09:04 AM    Phosphorus 3.0 01/20/2017 10:57 AM    Albumin 3.3 05/11/2017 09:16 AM    Protein, total 7.0 05/11/2017 09:16 AM    Globulin 3.7 05/11/2017 09:16 AM    A-G Ratio 0.9 05/11/2017 09:16 AM    AST (SGOT) 14 05/11/2017 09:16 AM    ALT (SGPT) 21 05/11/2017 09:16 AM     Lab Results   Component Value Date/Time    WBC 5.6 05/11/2017 09:16 AM    HGB 8.9 05/11/2017 09:16 AM    HCT 26.9 05/11/2017 09:16 AM    PLATELET 419 36/28/5001 09:16 AM       RADIOLOGY:  No results found. TUMOR STATUS:  Recently completed therapy, pending repeat evaluation. IMPRESSION:  Leopold Duhamel is a 62 y.o. male Now 1 month out from treatment. He had what I would coin as typical effects from radiation in the setting of a tracheostomy and a locally advanced head and neck cancer. He is continuing to follow with medical oncology as well as ENT. I'm pleased with his current status despite his issues including nutrition which is being managed by speech. I encouraged him with the good work he has done to date as well as suggested he continue under their care in order to transition back to oral nutrition. With his chest and airway congestion, with the amount of swelling I feel he may benefit from a steroid but this is overly problematic at the current time.   I gave him our card and encouraged him to call tomorrow before the end of the day if he worsens and we may call him in a steroid for this issue, otherwise we will just follow him clinically. I would like a CT to be completed in 4 weeks with his next planned follow-up. He was agreeable with this plan. PLAN:    1) Patient is recovering as anticipated from his prior course of radiotherapy. Will continue to follow with speech therapy and transition to oral nutrition. 2) Future follow up will be coordinated with Dr. Giancarlo Peñaloza and Dr. Radha Aguilar. I will see him back in 4 weeks after CT of the head and neck. Portions of this note were copied from prior encounters and reviewed for accuracy, currency, and represent documentation and tasks completed during this encounter. I verify and attest these portions to be unchanged from prior visits.     Mee Estrada MD  05/11/17

## 2017-05-11 NOTE — PROGRESS NOTES
I saw pt today with Lori Carranza for follow up. He states he is feeling well-starting to eat small amounts with supervision of speech therapy. He is maintaining his weight and staying hydrating. Dr Giancarlo Peñaloza will see him in 2 week with scans in 4 weeks or so. Pt states pain remains under control.  Nurse navigation is following

## 2017-05-17 ENCOUNTER — HOSPITAL ENCOUNTER (OUTPATIENT)
Dept: PHYSICAL THERAPY | Age: 62
Discharge: HOME | End: 2017-05-17
Payer: COMMERCIAL

## 2017-05-17 PROCEDURE — 92526 ORAL FUNCTION THERAPY: CPT | Performed by: SPEECH-LANGUAGE PATHOLOGIST

## 2017-05-17 NOTE — PROGRESS NOTES
Analia Torre  : 1955 [facility]  [address], [address] [facility]  [phone]   [phone]         OUTPATIENT SPEECH LANGUAGE PATHOLOGY: Daily Note  ICD-10: Treatment Diagnosis: Oropharyngeal Dysphagia R13.12  REFERRING PHYSICIAN: Dr. Betina Easton MD Orders: Evaluate and Treat  PAST MEDICAL HISTORY:   Mr. Jenifer Payne is a 62 y.o. male who  has a past medical history of GERD (gastroesophageal reflux disease); Gout; Hypertension; Morbid obesity ([address] Squamous cell carcinoma of epiglottis (HCC) (2016); and Type 2 diabetes mellitus ([address] He also has no past medical history of Adverse effect of anesthesia; Difficult intubation; Malignant hyperthermia due to anesthesia; or Pseudocholinesterase deficiency. He also  has a past surgical history that includes colonoscopy (2016); other surgical (Left); heent; and vascular access. MEDICAL/REFERRING DIAGNOSIS: Dysphagia [R13.10]  DATE OF ONSET: 2017   PRIOR LEVEL OF FUNCTION: Independent  PRECAUTIONS/ALLERGIES: Lei Macdonald hcl (pf)]     ASSESSMENT:  Patient attended follow up Dysphagia therapy. He continues with NPO with all nutrition via PEG. Patient continues to report coughing with free water. SLP advised patient to continue with strict NPO recommendations and take all p.o via PEG with the exception of free water. He verbalized understanding and in agreement. Continues to tolerate his trach downsize. Tolerated PMV trials very well this date. No episodes of anxiety observed. Patient's oxygen and RR rate remained steady. Patient has great voicing and tolerate trials for period of 35 minutes with 1 sputum produced coughing episode. See below for specific tasks. Patient was given the PMV to take home with specific instructions on how to clean the PMV as well as taking the PMV off when he's sleeping.  Patient was able to recall all instructions and safely donned his PMV on and off with minimal assist.     Patient will benefit from skilled intervention to address the below impairments. ?????? ? ? This section established at most recent assessment??????????  PROBLEM LIST (Impairments causing functional limitations):  1. Dysphagia  GOALS: (Goals have been discussed and agreed upon with patient.)  SHORT-TERM FUNCTIONAL GOALS: Time Frame: 3 months  Patient will complete laryngeal exercises independently at 100% accuracy. Repeat MBSS at duration of treatment with 100% participation. DISCHARGE GOALS: Time Frame: 3 months  1. Patient will tolerate least restrictive diet without overt clinical s/sx of aspiration observed for a safe and adequate swallow function at 100%. REHABILITATION POTENTIAL FOR STATED GOALS: GoodPLAN OF CARE:  Patient will benefit from skilled intervention to address the following impairments. RECOMMENDATIONS AND PLANNED INTERVENTIONS (Benefits and precautions of therapy have been discussed with the patient.):  · NPO with alternative means of nutrition  MEDICATIONS:  · Non-oral  COMPENSATORY STRATEGIES/MODIFICATIONS INCLUDING:  · None  OTHER RECOMMENDATIONS (including follow up treatment recommendations):   · Laryngeal exercises  RECOMMENDED DIET MODIFICATIONS DISCUSSED WITH:  · Family  · Patient  TREATMENT PLAN EFFECTIVE DATES: 3/1/17 TO 6/1/17  FREQUENCY/DURATION: Continue to follow patient 1 time a week for 12 weeks to address above goals. Regarding Santos Landin's therapy, I certify that the treatment plan above will be carried out by a therapist or under their direction. Thank you for this referral,  TERESA Pierce Ed CCC-SLP                  Referring Physician Signature: Gypsy Reynaga  Date      SUBJECTIVE:  Cooperative.    Present Symptoms: None      Current Medications:   Current Outpatient Prescriptions on File Prior to Encounter   Medication Sig Dispense Refill    predniSONE (DELTASONE) 20 mg tablet Take 2 tabs daily x10 days then 1 tab daily x4 days then stop 24 Tab 0    oxyCODONE (ROXICODONE) 5 mg/5 mL solution Take 5 mL by mouth every four (4) hours as needed for Pain. Max Daily Amount: 30 mg. 473 mL 0    clindamycin (CLEOCIN T) 1 % lotion Apply  to affected area two (2) times daily as needed. use thin film on affected area 1 Bottle 0    allopurinol (ZYLOPRIM) 300 mg tablet Take 1 Tab by mouth nightly. Indications: GOUT 30 Tab 2    levoFLOXacin (LEVAQUIN) 500 mg tablet Take 1 Tab by mouth daily. 5 Tab 0    LORazepam (ATIVAN) 1 mg tablet Take 1 Tab by mouth every eight (8) hours as needed for Anxiety. Max Daily Amount: 3 mg. Indications: CANCER CHEMOTHERAPY-INDUCED NAUSEA AND VOMITING 30 Tab 2    silver sulfADIAZINE (SILVADENE) 1 % topical cream Apply  to affected area three (3) times daily. May stain clothing 400 g 1    glucose blood VI test strips (RELION PRIME TEST STRIPS) strip Test blood sugar before meals and bedtime Diagnosis code E11.65 200 Strip 5    citalopram (CELEXA) 10 mg tablet 1 Tab by Per G Tube route daily. (Patient taking differently: 5 mg by Per G Tube route daily.) 30 Tab 2    magnesium oxide (MAG-OX) 400 mg tablet 1 Tab by Per G Tube route three (3) times daily. 90 Tab 2    magic mouthwash (GLADYS) susp Take 10 mL by mouth every four (4) hours. (Patient taking differently: Take 10 mL by mouth every four (4) hours as needed.) 500 mL 3    lidocaine-prilocaine (EMLA) topical cream Apply  to affected area as needed. Apply 1/2 inch amount of cream to port site 90 minutes prior to needle access. 30 g 0    ondansetron hcl (ZOFRAN) 8 mg tablet Take 1 Tab by mouth every eight (8) hours as needed for Nausea. 90 Tab 3    prochlorperazine (COMPAZINE) 10 mg tablet Take 1 Tab by mouth every six (6) hours as needed. 120 Tab 3    aspirin delayed-release 81 mg tablet Take 81 mg by mouth every morning. Take / use AM day of surgery  per anesthesia protocols.    Indications: myocardial infarction prevention      losartan (COZAAR) 50 mg tablet 50 mg two (2) times a day. Indications: Hypertension      amLODIPine (NORVASC) 10 mg tablet Take 10 mg by mouth nightly. Take / use AM day of surgery  per anesthesia protocols. Indications: Hypertension      glipiZIDE SR (GLUCOTROL) 10 mg CR tablet Take 10 mg by mouth two (2) times daily as needed. Indications: type 2 diabetes mellitus      omeprazole (PRILOSEC) 20 mg capsule Take 20 mg by mouth every morning. Take / use AM day of surgery  per anesthesia protocols. Indications: GASTROESOPHAGEAL REFLUX       No current facility-administered medications on file prior to encounter. Date Last Reviewed: 5/10/17  Current Dietary Status:  NPO      History of reflux:  YES    Reflux medication:Prilosec  Social History/Home Situation: Lives with wife. Work/Activity History: unknown    OBJECTIVE:  Objective Measure: Tool Used: National Outcomes Measurement System: Functional Communication Measures: SWALLOWING  Score:  Initial: 1 Most Recent: X (Date: -- )   Interpretation of Tool: This measure describes the change in functional communication status subsequent to speech-language pathology treatment of patients with dysphagia.  o Level 1:  Individual is not able to swallow anything safely by mouth. All nutrition and hydration is received through non-oral means (e.g., nasogastric tube, PEG). o Level 2: Individual is not able to swallow safely by mouth for nutrition and hydration, but may take some consistency with consistent maximal cues in therapy only. Alternative method of feeding required. o Level 3:  Alternative method of feeding required as individual takes less than 50% of nutrition and hydration by mouth, and/or swallowing is safe with consistent use of moderate cues to use compensatory strategies and/or requires maximum diet restriction.   o Level 4:  Swallowing is safe, but usually requires moderate cues to use compensatory strategies, and/or the individual has moderate diet restrictions and/or still requires tube feeding and/or oral supplements. o Level 5:  Swallowing is safe with minimal diet restriction and/or occasionally requires minimal cueing to use compensatory strategies. The individual may occasionally self-cue. All nutrition and hydration needs are met by mouth at mealtime. o Level 6:  Swallowing is safe, and the individual eats and drinks independently and may rarely require minimal cueing. The individual usually self-cues when difficulty occurs. May need to avoid specific food items (e.g., popcorn and nuts), or require additional time (due to dysphagia). o Level 7: The individuals ability to eat independently is not limited by swallow function. Swallowing would be safe and efficient for all consistencies. Compensatory strategies are effectively used when needed. Score Level 7 Level 6 Level 5 Level 4 Level 3 Level 2 Level 1   Modifier CH CI CJ CK CL CM CN   ? Swallowing:     - CURRENT STATUS: CN - 100% impaired, limited or restricted    - GOAL STATUS:  CJ - 20%-39% impaired, limited or restricted    - D/C STATUS:  ---------------To be determined---------------    Respiratory Status:      Room Air  CXR Results:N/A  MRI/CT Results:N/A  Oral Motor Structure/Speech:       Cognitive and Communication Status:                      BEDSIDE SWALLOW EVALUATION  Oral Assessment:     P.O. Trials: The patient was given teaspoon to tablespoon   amounts of the following:           ORAL PHASE:                   PHARYNGEAL PHASE:                            OTHER OBSERVATIONS:  Rate/bite size: WNL   Endurance: WNL   Coments:      TREATMENT:    (In addition to Assessment/Re-Assessment sessions the following treatments were rendered)  Dysphagia Activities: Activities/Procedures listed utilized to improve progress in swallow strength. Required minimal cueing to improve swallow safety.           LARYNGEAL / PHARYNGEAL EXERCISES:           Effortful Swallow: Yes  Reps : 10  Sets : 1 Mary: Yes  Reps : 10  Sets : 1  Mendelsohn Maneuver: Yes  Reps : 10  Sets : 1                                     Supraglottic Swallow: Yes  Reps : 10  Sets : 1                            Patient given p.o trials of nectar thick liquids without overt clinical s/sx of aspiration with stable 02 stats. __________________________________________________________________________________________________  Treatment Assessment:  Dysphagia treatment  Progression/Medical Necessity:   · Patient is expected to demonstrate progress in swallow strength, swallow timeliness, swallow function, diet tolerance and swallow safety to improve swallow safety, work toward diet advancement and decrease aspiration risk. Compliance with Program/Exercises: Will assess as treatment progresses. Reason for Continuation of Services/Other Comments:  · consuming a modified diet  Recommendations/Intent for next treatment session: \"Treatment next visit will focus on laryngeal exercises and PMV trials\". Total Treatment Duration:  Time In: 1015  Time Out: 1000 [place], [place]. Himanshu Medrano

## 2017-05-24 ENCOUNTER — HOSPITAL ENCOUNTER (OUTPATIENT)
Dept: PHYSICAL THERAPY | Age: 62
End: 2017-05-24
Payer: COMMERCIAL

## 2017-05-25 ENCOUNTER — HOSPITAL ENCOUNTER (OUTPATIENT)
Dept: LAB | Age: 62
Discharge: HOME | End: 2017-05-25
Payer: COMMERCIAL

## 2017-05-25 ENCOUNTER — APPOINTMENT (OUTPATIENT)
Dept: PHYSICAL THERAPY | Age: 62
End: 2017-05-25
Payer: COMMERCIAL

## 2017-05-25 DIAGNOSIS — C09.9 TONSIL CANCER (HCC): ICD-10-CM

## 2017-05-25 LAB
ALBUMIN SERPL BCP-MCNC: 3.2 G/DL (ref 3.2–4.6)
ALBUMIN/GLOB SERPL: 0.9 {RATIO} (ref 1.2–3.5)
ALP SERPL-CCNC: 60 U/L (ref 50–136)
ALT SERPL-CCNC: 19 U/L (ref 12–65)
ANION GAP BLD CALC-SCNC: 8 MMOL/L (ref 7–16)
AST SERPL W P-5'-P-CCNC: 11 U/L (ref 15–37)
BASOPHILS # BLD AUTO: 0 K/UL (ref 0–0.2)
BASOPHILS # BLD: 0 % (ref 0–2)
BILIRUB SERPL-MCNC: 0.6 MG/DL (ref 0.2–1.1)
BUN SERPL-MCNC: 23 MG/DL (ref 8–23)
CALCIUM SERPL-MCNC: 8.7 MG/DL (ref 8.3–10.4)
CHLORIDE SERPL-SCNC: 101 MMOL/L (ref 98–107)
CO2 SERPL-SCNC: 31 MMOL/L (ref 21–32)
CREAT SERPL-MCNC: 1.18 MG/DL (ref 0.8–1.5)
DIFFERENTIAL METHOD BLD: ABNORMAL
EOSINOPHIL # BLD: 0 K/UL (ref 0–0.8)
EOSINOPHIL NFR BLD: 0 % (ref 0.5–7.8)
ERYTHROCYTE [DISTWIDTH] IN BLOOD BY AUTOMATED COUNT: 13.9 % (ref 11.9–14.6)
GLOBULIN SER CALC-MCNC: 3.5 G/DL (ref 2.3–3.5)
GLUCOSE SERPL-MCNC: 258 MG/DL (ref 65–100)
HCT VFR BLD AUTO: 30.1 % (ref 41.1–50.3)
HGB BLD-MCNC: 10 G/DL (ref 13.6–17.2)
LYMPHOCYTES # BLD AUTO: 6 % (ref 13–44)
LYMPHOCYTES # BLD: 0.4 K/UL (ref 0.5–4.6)
MCH RBC QN AUTO: 28.9 PG (ref 26.1–32.9)
MCHC RBC AUTO-ENTMCNC: 33.2 G/DL (ref 31.4–35)
MCV RBC AUTO: 87 FL (ref 79.6–97.8)
MONOCYTES # BLD: 0.2 K/UL (ref 0.1–1.3)
MONOCYTES NFR BLD AUTO: 3 % (ref 4–12)
NEUTS SEG # BLD: 5.8 K/UL (ref 1.7–8.2)
NEUTS SEG NFR BLD AUTO: 92 % (ref 43–78)
NRBC # BLD: 0 K/UL (ref 0–0.2)
PLATELET # BLD AUTO: 112 K/UL (ref 150–450)
PMV BLD AUTO: 8.8 FL (ref 10.8–14.1)
POTASSIUM SERPL-SCNC: 3.9 MMOL/L (ref 3.5–5.1)
PROT SERPL-MCNC: 6.7 G/DL (ref 6.3–8.2)
RBC # BLD AUTO: 3.46 M/UL (ref 4.23–5.67)
SODIUM SERPL-SCNC: 140 MMOL/L (ref 136–145)
WBC # BLD AUTO: 6.4 K/UL (ref 4.3–11.1)

## 2017-05-25 PROCEDURE — 80053 COMPREHEN METABOLIC PANEL: CPT | Performed by: INTERNAL MEDICINE

## 2017-05-25 PROCEDURE — 85025 COMPLETE CBC W/AUTO DIFF WBC: CPT | Performed by: INTERNAL MEDICINE

## 2017-06-02 ENCOUNTER — APPOINTMENT (OUTPATIENT)
Dept: PHYSICAL THERAPY | Age: 62
End: 2017-06-02
Payer: COMMERCIAL

## 2017-06-07 ENCOUNTER — PATIENT OUTREACH (OUTPATIENT)
Dept: CASE MANAGEMENT | Age: 62
End: 2017-06-07

## 2017-06-07 ENCOUNTER — HOSPITAL ENCOUNTER (OUTPATIENT)
Dept: CT IMAGING | Age: 62
Discharge: HOME | End: 2017-06-07
Attending: RADIOLOGY
Payer: COMMERCIAL

## 2017-06-07 DIAGNOSIS — C14.0 THROAT CANCER (HCC): ICD-10-CM

## 2017-06-07 PROCEDURE — 74011000258 HC RX REV CODE- 258: Performed by: RADIOLOGY

## 2017-06-07 PROCEDURE — 70491 CT SOFT TISSUE NECK W/DYE: CPT

## 2017-06-07 PROCEDURE — 74011636320 HC RX REV CODE- 636/320: Performed by: RADIOLOGY

## 2017-06-07 RX ORDER — SODIUM CHLORIDE 0.9 % (FLUSH) 0.9 %
10 SYRINGE (ML) INJECTION
Status: COMPLETED | OUTPATIENT
Start: 2017-06-07 | End: 2017-06-07

## 2017-06-07 RX ADMIN — SODIUM CHLORIDE 100 ML: 900 INJECTION, SOLUTION INTRAVENOUS at 10:48

## 2017-06-07 RX ADMIN — Medication 10 ML: at 10:48

## 2017-06-07 RX ADMIN — IOPAMIDOL 80 ML: 755 INJECTION, SOLUTION INTRAVENOUS at 10:48

## 2017-06-07 NOTE — PROGRESS NOTES
Pt's wife Abiola Kevin called and stated pt has been having chills. He has not been feverish.  Ispoke to Dr Maria G Malik and labs ordered prior to Davian's visit tomorrow including U/A

## 2017-06-08 ENCOUNTER — HOSPITAL ENCOUNTER (OUTPATIENT)
Dept: RADIATION ONCOLOGY | Age: 62
Discharge: HOME | End: 2017-06-08
Payer: COMMERCIAL

## 2017-06-08 ENCOUNTER — HOSPITAL ENCOUNTER (OUTPATIENT)
Dept: LAB | Age: 62
Discharge: HOME | End: 2017-06-08
Payer: COMMERCIAL

## 2017-06-08 VITALS
WEIGHT: 206.1 LBS | TEMPERATURE: 98.5 F | BODY MASS INDEX: 30.44 KG/M2 | SYSTOLIC BLOOD PRESSURE: 143 MMHG | DIASTOLIC BLOOD PRESSURE: 80 MMHG | HEART RATE: 102 BPM | OXYGEN SATURATION: 97 %

## 2017-06-08 DIAGNOSIS — R39.9 URINARY TRACT INFECTION SYMPTOMS: Primary | ICD-10-CM

## 2017-06-08 DIAGNOSIS — R39.9 URINARY TRACT INFECTION SYMPTOMS: ICD-10-CM

## 2017-06-08 DIAGNOSIS — C09.9 TONSIL CANCER (HCC): ICD-10-CM

## 2017-06-08 LAB
ALBUMIN SERPL BCP-MCNC: 3.4 G/DL (ref 3.2–4.6)
ALBUMIN/GLOB SERPL: 0.9 {RATIO} (ref 1.2–3.5)
ALP SERPL-CCNC: 71 U/L (ref 50–136)
ALT SERPL-CCNC: 17 U/L (ref 12–65)
AMORPH CRY URNS QL MICRO: ABNORMAL
ANION GAP BLD CALC-SCNC: 6 MMOL/L (ref 7–16)
APPEARANCE UR: CLEAR
AST SERPL W P-5'-P-CCNC: 11 U/L (ref 15–37)
BACTERIA URNS QL MICRO: ABNORMAL /HPF
BASOPHILS # BLD AUTO: 0 K/UL (ref 0–0.2)
BASOPHILS # BLD: 0 % (ref 0–2)
BILIRUB SERPL-MCNC: 0.8 MG/DL (ref 0.2–1.1)
BILIRUB UR QL: NEGATIVE
BUN SERPL-MCNC: 20 MG/DL (ref 8–23)
CALCIUM SERPL-MCNC: 9.1 MG/DL (ref 8.3–10.4)
CASTS URNS QL MICRO: ABNORMAL /LPF
CHLORIDE SERPL-SCNC: 101 MMOL/L (ref 98–107)
CO2 SERPL-SCNC: 32 MMOL/L (ref 21–32)
COLOR UR: YELLOW
CREAT SERPL-MCNC: 1.14 MG/DL (ref 0.8–1.5)
CRYSTALS URNS QL MICRO: 0 /LPF
DIFFERENTIAL METHOD BLD: ABNORMAL
EOSINOPHIL # BLD: 0.1 K/UL (ref 0–0.8)
EOSINOPHIL NFR BLD: 2 % (ref 0.5–7.8)
EPI CELLS #/AREA URNS HPF: 0 /HPF
ERYTHROCYTE [DISTWIDTH] IN BLOOD BY AUTOMATED COUNT: 13.4 % (ref 11.9–14.6)
GLOBULIN SER CALC-MCNC: 3.9 G/DL (ref 2.3–3.5)
GLUCOSE SERPL-MCNC: 205 MG/DL (ref 65–100)
GLUCOSE UR STRIP.AUTO-MCNC: NEGATIVE MG/DL
HCT VFR BLD AUTO: 27.1 % (ref 41.1–50.3)
HGB BLD-MCNC: 9.3 G/DL (ref 13.6–17.2)
HGB UR QL STRIP: ABNORMAL
KETONES UR QL STRIP.AUTO: NEGATIVE MG/DL
LEUKOCYTE ESTERASE UR QL STRIP.AUTO: NEGATIVE
LYMPHOCYTES # BLD AUTO: 11 % (ref 13–44)
LYMPHOCYTES # BLD: 0.5 K/UL (ref 0.5–4.6)
MCH RBC QN AUTO: 29.2 PG (ref 26.1–32.9)
MCHC RBC AUTO-ENTMCNC: 34.3 G/DL (ref 31.4–35)
MCV RBC AUTO: 85.2 FL (ref 79.6–97.8)
MONOCYTES # BLD: 0.3 K/UL (ref 0.1–1.3)
MONOCYTES NFR BLD AUTO: 5 % (ref 4–12)
MUCOUS THREADS URNS QL MICRO: 0 /LPF
NEUTS SEG # BLD: 4 K/UL (ref 1.7–8.2)
NEUTS SEG NFR BLD AUTO: 82 % (ref 43–78)
NITRITE UR QL STRIP.AUTO: NEGATIVE
NRBC # BLD: 0.01 K/UL (ref 0–0.2)
PH UR STRIP: 5.5 [PH] (ref 5–9)
PLATELET # BLD AUTO: 167 K/UL (ref 150–450)
PMV BLD AUTO: 8.5 FL (ref 10.8–14.1)
POTASSIUM SERPL-SCNC: 3.8 MMOL/L (ref 3.5–5.1)
PROT SERPL-MCNC: 7.3 G/DL (ref 6.3–8.2)
PROT UR STRIP-MCNC: 100 MG/DL
RBC # BLD AUTO: 3.18 M/UL (ref 4.23–5.67)
RBC #/AREA URNS HPF: ABNORMAL /HPF
SODIUM SERPL-SCNC: 139 MMOL/L (ref 136–145)
SP GR UR REFRACTOMETRY: 1.02 (ref 1–1.02)
UROBILINOGEN UR QL STRIP.AUTO: 1 EU/DL (ref 0.2–1)
WBC # BLD AUTO: 4.8 K/UL (ref 4.3–11.1)
WBC URNS QL MICRO: 0 /HPF

## 2017-06-08 PROCEDURE — 31575 DIAGNOSTIC LARYNGOSCOPY: CPT

## 2017-06-08 PROCEDURE — 81003 URINALYSIS AUTO W/O SCOPE: CPT | Performed by: RADIOLOGY

## 2017-06-08 PROCEDURE — 80053 COMPREHEN METABOLIC PANEL: CPT | Performed by: RADIOLOGY

## 2017-06-08 PROCEDURE — 81015 MICROSCOPIC EXAM OF URINE: CPT | Performed by: RADIOLOGY

## 2017-06-08 PROCEDURE — 99211 OFF/OP EST MAY X REQ PHY/QHP: CPT

## 2017-06-08 PROCEDURE — 85025 COMPLETE CBC W/AUTO DIFF WBC: CPT | Performed by: RADIOLOGY

## 2017-06-08 NOTE — NURSE NAVIGATOR
Pt here for f/u for H/N cancer. CT neck 6-7-17. Pt c/o chills, cough slightly productive mostly at night, fatigue. C/o sore throat. Eating/drinking  Little, using FT. Trach removed one week ago.     Migdalia De La Rosa RN

## 2017-06-08 NOTE — PROGRESS NOTES
Patient: Ariela Carrillo MRN: [id number]  SSN: [id number]    YOB: 1955  Age: 62 y.o. Sex: male      Other Providers: Alphonse Foote MD, Janett Childs MD     CHIEF COMPLAINT: Dysphagia and hoarseness     DIAGNOSIS: T3N1M0 Supraglottic SCC, Stage ARMANDO.      PREVIOUS TREATMENT:  1. Debridement and biopsy 11/14/16  2. 2 cycles of neoadjuvant TPF chemotherapy. 3. Completion of concurrent Cetuximab and radiation 4/11/17, 70 Gy in 35 fractions. INTERVAL HISTORY:  Ariela Carrillo is a 62 y.o. male being seen back in regular follow up after completion of his radiation 4/11/17. Regarding his history, his only pertinent medical history includes DMII, HTN, and obesity. Beginning in the fall 2016, he began to note some difficulty with dysphagia as well as some change in his voice. Ultimately, there was some left-sided otalgia for which he sought help from his primary care physician. A course of antibiotics did not prove helpful and ultimately he was referred to Dr. Tita Styles for evaluation. He was a prior smoker but quit nearly 11 years prior to presentation. He did not abuse alcohol. Flexible laryngoscopy demonstrated a large mass involving the entire epiglottis. The tumor appeared to involve the aryepiglottic folds. The vocal cords were not visualized. CT scan November 3, 2016 demonstrated 3.9 x 2.6 x 2.3 cm supraglottic mass extending across midline. Inferiorly, it appeared to involve the left true vocal cord. The overlying thyroid cartilage seemed preserved. There was a level 3 lymph node on the left side measuring 1.8 cm in short axis. On 11/14/16, the patient was taken to the OR for panendoscopy where Dr. Tita Styles indicate that he visualized the true and false vocal cords both of which appeared uninvolved by the tumor. He also did micro-debridement, subsequent to which the patient's voice appeared to be improved. The biopsy was consistent with in situ and infiltrating poorly differentiated squamous carcinoma.  The patient has been seen at [address], where surgery was offered. The patient was averse to the notion of a tracheostomy if that was a possibility and therefore sought evaluation from Dr. Shakira Pierce in oncology who saw him 12/20/16. Per Dr. Ashlie Sheffield note, he was a candidate for chemoradiation. He ordered a PET/CT and referred him to our office. I agreed with the plan and completed treatment 4/11/17. Unfortunately prior to commencement with radiation, he did require tracheotomy. With the anticipated delayed in starting radiation, he did start chemotherapy, TPF, and received 2 cycles prior to starting radiation with cetuximab. Overall he did fairly well with treatment, but did have the anticipated irritation and difficulty swallowing. He did get dry mouth and neck erythema worsened on the left compared to the right. He did have Silvadene prescribed for this. He was seen by Dr. Gurdeep Mayo shortly after his radiation and had his trach downsized. He continues to use his feeding tube but is able to tolerate most solids and liquids. He continues to follow with medical oncology and ENT who removed his trach 5/31/17. As a result of his issues, he has lost nearly 50 pounds. he is seeing me in regular follow-up today with a CT neck prior. He is having some intermittent chills but no fevers. Overall he feels somewhat weak but this is been pretty consistent. He is also having frequent urination, especially at night. UPDATED PAST MEDICAL HISTORY:  Since our prior encounter, Fadia Rodriguez has not been hospitalized. There have been no significant changes to the medical history. MEDICATIONS:     Current Outpatient Prescriptions:     nystatin (MYCOSTATIN) 100,000 unit/mL suspension, Take 5 mL by mouth four (4) times daily. swish and spit, Disp: 280 mL, Rfl: 0    citalopram (CELEXA) 10 mg tablet, 1 Tab by Per G Tube route daily. , Disp: 30 Tab, Rfl: 2    oxyCODONE (ROXICODONE) 5 mg/5 mL solution, Take 5 mL by mouth every four (4) hours as needed for Pain. Max Daily Amount: 30 mg., Disp: 473 mL, Rfl: 0    clindamycin (CLEOCIN T) 1 % lotion, Apply  to affected area two (2) times daily as needed. use thin film on affected area, Disp: 1 Bottle, Rfl: 0    allopurinol (ZYLOPRIM) 300 mg tablet, Take 1 Tab by mouth nightly. Indications: GOUT, Disp: 30 Tab, Rfl: 2    LORazepam (ATIVAN) 1 mg tablet, Take 1 Tab by mouth every eight (8) hours as needed for Anxiety. Max Daily Amount: 3 mg. Indications: CANCER CHEMOTHERAPY-INDUCED NAUSEA AND VOMITING (Patient taking differently: Take 1 mg by mouth nightly. Indications: CANCER CHEMOTHERAPY-INDUCED NAUSEA AND VOMITING), Disp: 30 Tab, Rfl: 2    glucose blood VI test strips (RELION PRIME TEST STRIPS) strip, Test blood sugar before meals and bedtime Diagnosis code E11.65, Disp: 200 Strip, Rfl: 5    lidocaine-prilocaine (EMLA) topical cream, Apply  to affected area as needed. Apply 1/2 inch amount of cream to port site 90 minutes prior to needle access. , Disp: 30 g, Rfl: 0    prochlorperazine (COMPAZINE) 10 mg tablet, Take 1 Tab by mouth every six (6) hours as needed. , Disp: 120 Tab, Rfl: 3    aspirin delayed-release 81 mg tablet, Take 81 mg by mouth every morning. Take / use AM day of surgery  per anesthesia protocols. Indications: myocardial infarction prevention, Disp: , Rfl:     losartan (COZAAR) 50 mg tablet, 50 mg two (2) times a day. Indications: Hypertension, Disp: , Rfl:     amLODIPine (NORVASC) 10 mg tablet, Take 10 mg by mouth nightly. Take / use AM day of surgery  per anesthesia protocols. Indications: Hypertension, Disp: , Rfl:     glipiZIDE SR (GLUCOTROL) 10 mg CR tablet, Take 10 mg by mouth as needed. Indications: type 2 diabetes mellitus, Disp: , Rfl:     omeprazole (PRILOSEC) 20 mg capsule, Take 20 mg by mouth every morning. Take / use AM day of surgery  per anesthesia protocols.   Indications: GASTROESOPHAGEAL REFLUX, Disp: , Rfl:     ALLERGIES:   Allergies   Allergen Reactions  Zofran [Ondansetron Hcl (Pf)] Other (comments)     Gives pt severe HA       PHYSICAL EXAMINATION:   ECOG Performance status 1  VITAL SIGNS:   Visit Vitals    /80 (BP 1 Location: Right arm, BP Patient Position: Sitting)    Pulse (!) 102    Temp 98.5 °F (36.9 °C) (Oral)    Wt 93.5 kg (206 lb 1.6 oz)    SpO2 97%    BMI 30.44 kg/m2    . GENERAL: The patient is well-developed, ambulatory, alert and in no acute distress. HEENT: Head is normocephalic, atraumatic. Pupils are equal, round and reactive to light and accommodation. Extraocular movement intact. Hearing is intact bilaterally to finger rub. Oral cavity reveals no lesions but dry secretions. NECK: Neck is supple with no masses. There is clear skin irritation although no erythema or desquamation around the tracheostomy site. CARDIOVASCULAR: Heart is regular rate and rhythm. There are no murmurs rubs or gallups. Radial pulses are 2+ RESPIRATORY: Lungs are clear to auscultation and percussion. There is normal respiratory effort. GASTROINTESTINAL: The abdomen is soft, non-tender, nondistended with no hepatospelnomagaly. Digital rectal examination: deferred LYMPHATIC: There is no cervical, supraclavicular or axillary lymphadenopathy bilaterally. PROCEDURE NOTE:  Laryngoscopy. The nares was sprayed with topical anesthetic and the scope inserted and advanced to the nasopharynx and through the oropharynx visualizing normal mucosal structures in the pharyngeal and palatine tonsils. There is again continued shrinking submucosal mass which appears now focused in the right supraglottic region. The vocal cords were normal and approximated at midline. In all, I would call this a nice response to therapy to date. The scope was then withdrawn from the nasal cavity. The patient tolerated the procedure well and there were no complications.       LABORATORY:   Lab Results   Component Value Date/Time    Sodium 139 06/08/2017 01:31 PM    Potassium 3.8 06/08/2017 01:31 PM    Chloride 101 06/08/2017 01:31 PM    CO2 32 06/08/2017 01:31 PM    Anion gap 6 06/08/2017 01:31 PM    Glucose 205 06/08/2017 01:31 PM    BUN 20 06/08/2017 01:31 PM    Creatinine 1.14 06/08/2017 01:31 PM    GFR est AA >60 06/08/2017 01:31 PM    GFR est non-AA >60 06/08/2017 01:31 PM    Calcium 9.1 06/08/2017 01:31 PM    Magnesium 2.4 04/27/2017 09:04 AM    Phosphorus 3.0 01/20/2017 10:57 AM    Albumin 3.4 06/08/2017 01:31 PM    Protein, total 7.3 06/08/2017 01:31 PM    Globulin 3.9 06/08/2017 01:31 PM    A-G Ratio 0.9 06/08/2017 01:31 PM    AST (SGOT) 11 06/08/2017 01:31 PM    ALT (SGPT) 17 06/08/2017 01:31 PM     Lab Results   Component Value Date/Time    WBC 4.8 06/08/2017 01:31 PM    HGB 9.3 06/08/2017 01:31 PM    HCT 27.1 06/08/2017 01:31 PM    PLATELET 322 70/33/0980 01:31 PM       RADIOLOGY:  Ct Neck Soft Tissue W Cont    Result Date: 6/7/2017  CT NECK WITH CONTRAST, 6/7/2017. CLINICAL HISTORY: Follow-up supraglottic cancer status post chemotherapy and radiation treatment. Technique: Multiple contiguous helical CT images were obtained from the frontal sinuses through the lung apices following the uneventful intravenous administration of 80 mL of Isovue-370. All CT scans performed at this facility use one or all of the following: Automated exposure control, adjustment of the mA and/or kVp according to patient's size, iterative reconstruction. COMPARISON STUDY: CT neck with contrast 11/9/2016. FINDINGS: The visualized intracranial compartment shows no significant changes. No definite evolving enhancing lesion is seen. No aggressive osseous lesion is seen. The visualized sinuses are well-aerated. Previously, abnormal enhancing mass was seen in the hypopharynx and larynx extending down to the level of the vocal cords. The prior enhancing mass at this level appears significantly improved. No definite measurable residual mass is currently seen.  Hypodense soft tissue occurs at the posterior aspect of the left vocal cord on image 76 measuring 8 mm which may represent some residual treated tumor a posttreatment scarring. Thickening is seen of the soft tissues in the hypopharynx and larynx felt to be related to interval radiation treatment. Enhancing tissue is seen at the base of the tongue on image 63 measuring 3.8 cm x 1.6 cm which may represent prominent lymphoid tissue although this is incompletely characterized. No evolving adenopathy is seen. Rather, previously enlarged lymph nodes are significantly improved. The largest lymph node currently seen is a left high jugular chain lymph node seen on image 62 measuring 7 mm short axis and previously measured 15 mm short axis. A defect is seen in the lower anterior neck soft tissues on image 92 consistent with prior tracheostomy. Limited imaging of the upper chest shows a right-sided venous port. No adenopathy is seen in the partially visualized mediastinum no evolving nodules or masses are seen in the partially visualized lungs. Only mild to moderate centrilobular emphysematous changes are seen, right greater than left. IMPRESSION: 1. Improving supraglottic tumor, and improving cervical chain adenopathy. No definite evidence for local progression or evolving metastatic disease is seen in the neck or visualized upper chest. 2.  New enhancing soft tissue at the base of the tongue which likely represents benign lymphoid tissue although this should be confirmed with direct visualization. TUMOR STATUS:  Excellent partial response based on CT. IMPRESSION:  Dony Abel is a 62 y.o. male now 2 months out from treatment. He had what I would coin as typical effects from radiation in the setting of a tracheostomy and a locally advanced head and neck cancer. He is continuing to follow with medical oncology as well as ENT. I'm pleased with his current status despite his issues including nutrition which is being managed by speech.   I was encouraged with the favorable CT scan. I would agree the response is favorable at this point without any concern for local progression or site of spread outside of the radiation field. I would like to complete a PET/CT in 6 weeks which I will order today. He will also follow with speech therapy for a formal swallow evaluation Thursday is I'm concerned some of his cough may be from silent aspirations. I cautioned him about eating until this is completed. we will also do a urinalysis today and advised him to call us very probably if he develops fevers. I don't see any sign of infection otherwise, but with the degree of irritation he had in his throat, this is likely contributing to his upper respiratory system leading to his symptoms. PLAN:    1) Patient is recovering as anticipated from his prior course of radiotherapy. Will continue to follow with speech therapy and transition to oral nutrition. Urine analysis today and will call with results. 2) Future follow up will be coordinated with Dr. Chaz Humphreys and Dr. Marcela Collins. I will see him back in 6 weeks after PET/CT. Portions of this note were copied from prior encounters and reviewed for accuracy, currency, and represent documentation and tasks completed during this encounter. I verify and attest these portions to be unchanged from prior visits.     Jael Tipton MD  06/08/17

## 2017-06-09 ENCOUNTER — APPOINTMENT (OUTPATIENT)
Dept: RADIATION ONCOLOGY | Age: 62
End: 2017-06-09
Payer: COMMERCIAL

## 2017-06-09 DIAGNOSIS — C76.0 HEAD AND NECK CANCER (HCC): Primary | ICD-10-CM

## 2017-06-15 ENCOUNTER — HOSPITAL ENCOUNTER (OUTPATIENT)
Dept: GENERAL RADIOLOGY | Age: 62
Discharge: HOME | End: 2017-06-15
Attending: INTERNAL MEDICINE
Payer: COMMERCIAL

## 2017-06-15 DIAGNOSIS — C09.9 TONSIL CANCER (HCC): ICD-10-CM

## 2017-06-15 DIAGNOSIS — Z93.0 TRACHEOSTOMY IN PLACE (HCC): ICD-10-CM

## 2017-06-15 PROCEDURE — 92611 MOTION FLUOROSCOPY/SWALLOW: CPT

## 2017-06-15 PROCEDURE — 74011000255 HC RX REV CODE- 255: Performed by: INTERNAL MEDICINE

## 2017-06-15 PROCEDURE — 74230 X-RAY XM SWLNG FUNCJ C+: CPT

## 2017-06-15 RX ADMIN — BARIUM SULFATE 90 ML: 980 POWDER, FOR SUSPENSION ORAL at 09:20

## 2017-06-15 RX ADMIN — BARIUM SULFATE 20 ML: 400 SUSPENSION ORAL at 09:21

## 2017-06-15 RX ADMIN — BARIUM SULFATE 15 ML: 400 PASTE ORAL at 09:20

## 2017-06-15 RX ADMIN — BARIUM SULFATE 15 ML: 400 SUSPENSION ORAL at 09:21

## 2017-06-15 NOTE — PROGRESS NOTES
Jessica Jerez  : 1955 [facility], [address] [facility], [address]  [phone]   [phone]       OUTPATIENT SPEECH LANGUAGE PATHOLOGY: MODIFIED BARIUM SWALLOW    ICD-10: Treatment Diagnosis: Pharyngeal dysphagia (R13.12)  DATE: 6/15/2017  REFERRING PHYSICIAN: Angelita Vera MD MD Orders: Modified Barium Swallow     PAST MEDICAL HISTORY:   Mr. Sharyle Nipper is a 62 y.o. male who  has a past medical history of GERD (gastroesophageal reflux disease); Gout; Hypertension; Morbid obesity ([facility] Squamous cell carcinoma of epiglottis (HCC) (2016); and Type 2 diabetes mellitus ([facility] He also has no past medical history of Adverse effect of anesthesia; Difficult intubation; Malignant hyperthermia due to anesthesia; or Pseudocholinesterase deficiency. He also  has a past surgical history that includes colonoscopy (2016); other surgical (Left); heent; and vascular access. RADIOLOGIST:  Dr. Jenna Schilder  MEDICAL/REFERRING DIAGNOSIS: Tonsil cancer ([facility] [C09.9]  Tracheostomy in place [facility]) [Z93.0]    PRECAUTIONS/ALLERGIES: Zofran Michelle Carbo hcl (pf)]   ASSESSMENT/PLAN OF CARE:  Based on the objective data described below, the patient presents with SILENT ASPIRATION during swallow with thin and nectar liquids and SILENT ASPIRATION after swallow with nectar and honey-thick liquids. Laryngeal penetration to the vocal cords during swallow with honey thick liquids and to the laryngeal vestibule with pudding. No further textures were given. Attempted modifications of effortful swallow, chin tuck and supraglottic swallow were not effective in eliminating laryngeal penetration or aspiration. Recommend continue strict NPO with alternate method for nutrition. Patient reports he has not been consistently going to swallowing therapy or performing laryngeal strengthening exercises as home because he does not feel like they are working.   Discussed with patient recommendation for NPO and continuing with laryngeal exercises. Patient reports he feels like his swallow has not changed since prior to discovering tumor and he feels he can continue with eating, as he has been despite recommendations for otherwise. Will defer further discussion to MD.  Patient will benefit from skilled intervention to address the above impairments, however he is not interested in pursuing continued therapy. RECOMMENDATIONS AND PLANNED INTERVENTIONS (Benefits and precautions of therapy have been discussed with the patient.):  · NPO with alternative means of nutrition  MEDICATIONS:  · Non-oral  OTHER RECOMMENDATIONS (including follow up treatment recommendations): · Family training/education  · Laryngeal exercises  · Patient education    Thank you for this referral,  Bradly Stevens MA, CCC-SLP  SUBJECTIVE:  Patient accompanied by his wife. Present Symptoms: History of aspiration, PEG, tonsillar cancer     Current Dietary Status:  PEG feedings, but patient reports he has been eating /drinking. For example, he reports he had pizza and Pepsi for dinner last night. Social History/Home Situation: , resides with wife      Work/Activity History: Employed by [employer]    OBJECTIVE:  Objective Measure: Tool Used: National Outcomes Measurement System: Functional Communication Measures: SWALLOWING  Score:  Initial: 1 Most Recent: X (Date: -- )   Interpretation of Tool: This measure describes the change in functional communication status subsequent to speech-language pathology treatment of patients with dysphagia.  o Level 1:  Individual is not able to swallow anything safely by mouth. All nutrition and hydration is received through non-oral means (e.g., nasogastric tube, PEG). o Level 2: Individual is not able to swallow safely by mouth for nutrition and hydration, but may take some consistency with consistent maximal cues in therapy only.  Alternative method of feeding required. o Level 3:  Alternative method of feeding required as individual takes less than 50% of nutrition and hydration by mouth, and/or swallowing is safe with consistent use of moderate cues to use compensatory strategies and/or requires maximum diet restriction. o Level 4:  Swallowing is safe, but usually requires moderate cues to use compensatory strategies, and/or the individual has moderate diet restrictions and/or still requires tube feeding and/or oral supplements. o Level 5:  Swallowing is safe with minimal diet restriction and/or occasionally requires minimal cueing to use compensatory strategies. The individual may occasionally self-cue. All nutrition and hydration needs are met by mouth at mealtime. o Level 6:  Swallowing is safe, and the individual eats and drinks independently and may rarely require minimal cueing. The individual usually self-cues when difficulty occurs. May need to avoid specific food items (e.g., popcorn and nuts), or require additional time (due to dysphagia). o Level 7: The individuals ability to eat independently is not limited by swallow function. Swallowing would be safe and efficient for all consistencies. Compensatory strategies are effectively used when needed. Score Level 7 Level 6 Level 5 Level 4 Level 3 Level 2 Level 1   Modifier CH CI CJ CK CL CM CN   ?  Swallowing:     - CURRENT STATUS: CN - 100% impaired, limited or restricted    - GOAL STATUS:  CJ - 20%-39% impaired, limited or restricted    - D/C STATUS:  CN - 100% impaired, limited or restricted      Cognitive/Communication Status:  Mental Status  Neurologic State: Alert  Orientation Level: Oriented X4  Cognition: Follows commands  Perception: Appears intact  Perseveration: No perseveration noted  Safety/Judgement: Awareness of environment    Oral Assessment:  Oral Assessment  Labial: No impairment  Dentition: Natural, Partials (comment)  Lingual: No impairment  Velum: No impairment  Mandible: No impairment    Vocal Quality: adequate    Patient Viewed:    Film Views: Lateral, Fluoro    Oral Prepatory:  The patient was given the following: Consistency Presented:  Thin liquid, Nectar thick liquid, Honey thick liquid, Pudding  How Presented: Self-fed/presented, SLP-fed/presented, Cup/sip, Spoon    Oral Phase:  Bolus Acceptance: No impairment  Bolus Formation/Control: No impairment  Propulsion: No impairment     Oral Residue: None  Initiation of Swallow: No impairment  Oral Phase Severity: No impairment    Pharyngeal Phase:  Timing: No impairment  Decreased Tongue Base Retraction?: No  Laryngeal Elevation: Incomplete laryngeal closure  Penetration: During swallow, To cords, From initial swallow, From residual  Aspiration/Timing: Silent , Repeated, During, From residual, From initial swallow  Aspiration/Penetration Score: 8 (Aspiration-Contrast passes cords/glottis with no effort to eject, ie/silent aspiration)     Pharyngeal Dysfunction: Decreased elevation/closure  Pharyngeal Phase Severity: Severe  Pharyngeal-Esophageal Segment: No impairment    Assessment/Reassessment only, no treatment provided today   ____________________________________________________________  Recommendations for treatment: laryngeal exercises (if patient will be consistent with home exercises program)  Total Treatment Duration:  Time In: 0900   Time Out: [address], [place], [facility]

## 2017-06-21 ENCOUNTER — HOSPITAL ENCOUNTER (OUTPATIENT)
Dept: PHYSICAL THERAPY | Age: 62
Discharge: HOME | End: 2017-06-21
Payer: COMMERCIAL

## 2017-06-21 PROCEDURE — 92526 ORAL FUNCTION THERAPY: CPT | Performed by: SPEECH-LANGUAGE PATHOLOGIST

## 2017-06-21 NOTE — PROGRESS NOTES
Vijay Saldana  : 1955 [facility] [place] [id number] [facility], [address] [place] [address],[address], [place] [address].  [phone]   [phone]         OUTPATIENT SPEECH LANGUAGE PATHOLOGY: Daily Note  ICD-10: Treatment Diagnosis: Oropharyngeal Dysphagia R13.12  REFERRING PHYSICIAN: Dr. Dandy Cancino MD Orders: Evaluate and Treat  PAST MEDICAL HISTORY:   Mr. Christina Dudley is a [address] [facility] male who  has a past medical history of GERD (gastroesophageal reflux disease); Gout; Hypertension; Morbid obesity ([facility] [address] Squamous cell carcinoma of epiglottis (HCC) (2016); and Type 2 diabetes mellitus ([facility] [address] He also has no past medical history of Adverse effect of anesthesia; Difficult intubation; Malignant hyperthermia due to anesthesia; or Pseudocholinesterase deficiency. He also  has a past surgical history that includes colonoscopy (); other surgical (Left); heent; and vascular access. MEDICAL/REFERRING DIAGNOSIS: Dysphagia [R13.10]  DATE OF ONSET: 2017   PRIOR LEVEL OF FUNCTION: Independent  PRECAUTIONS/ALLERGIES: Zofran Evan Nap hcl (pf)]     ASSESSMENT:  Patient arrived this date for Dysphagia treatment along with his wife. Patient stated that he's continued to eat despite results and recommendations from prior MBSS. Patient with a lot of questions regarding his current swallow function as well as questions regarding his swallowing muscles. Patient could not grasp that although it appeared that he was swallowing fine, he was a silent aspirator thus he would not show any clinical s/sx of aspiration because of this. Patient somewhat adamant on time table for the return of his swallow function. SLP explained to patient that she was unable to give a specific time table however if patient would commit to skilled therapy in addition to completed HEP then swallowing gains could be made.    With much discussion, patient was agreeable to stopping all p.o and going back to PEG feedings however patient is allowed free water as long as good oral care is maintained. Patient has agreed to continued skilled treatment for 3 months and then SLP agreed to re-assess swallow function at duration of three months. SLP encouraged patient to be patient throughout this whole process. He and wife verbalized understanding. Of note, he has history of being non compliant however he does understand the risks if he continues to eat. He also knows that his PEG would not be removed until he is cleared by ST. Patient will benefit from skilled intervention to address the below impairments. ?????? ? ? This section established at most recent assessment??????????  PROBLEM LIST (Impairments causing functional limitations):  1. Dysphagia  GOALS: (Goals have been discussed and agreed upon with patient.)  SHORT-TERM FUNCTIONAL GOALS: Time Frame: 3 months  Patient will complete laryngeal exercises independently at 100% accuracy. Repeat MBSS at duration of treatment with 100% participation. DISCHARGE GOALS: Time Frame: 3 months  1. Patient will tolerate least restrictive diet without overt clinical s/sx of aspiration observed for a safe and adequate swallow function at 100%. REHABILITATION POTENTIAL FOR STATED GOALS: GoodPLAN OF CARE:  Patient will benefit from skilled intervention to address the following impairments. RECOMMENDATIONS AND PLANNED INTERVENTIONS (Benefits and precautions of therapy have been discussed with the patient.):  · NPO with alternative means of nutrition  MEDICATIONS:  · Non-oral  COMPENSATORY STRATEGIES/MODIFICATIONS INCLUDING:  · None  OTHER RECOMMENDATIONS (including follow up treatment recommendations):   · Laryngeal exercises  RECOMMENDED DIET MODIFICATIONS DISCUSSED WITH:  · Family  · Patient  TREATMENT PLAN EFFECTIVE DATES: 3/1/17 TO 6/1/17  FREQUENCY/DURATION: Continue to follow patient 1 time a week for 12 weeks to address above goals.   Regarding Santos Landin's therapy, I certify that the treatment plan above will be carried out by a therapist or under their direction. Thank you for this referral,  TERESA Grijalva Ed CCC-SLP                  Referring Physician Signature: Vijaya Munoz  Date      SUBJECTIVE:  Cooperative. Present Symptoms: None      Current Medications:   Current Outpatient Prescriptions on File Prior to Encounter   Medication Sig Dispense Refill    predniSONE (DELTASONE) 20 mg tablet Every morning; take 3 tabs daily for days 1-3; 2 tabs daily for days 4-6; 1 tab daily for days 7-9; 1/2 tab daily for days 10 & 11 19 Tab 0    amoxicillin-clavulanate (AUGMENTIN) 875-125 mg per tablet Take 1 Tab by mouth two (2) times a day. 20 Tab 0    nystatin (MYCOSTATIN) 100,000 unit/mL suspension Take 5 mL by mouth four (4) times daily. swish and spit 280 mL 0    citalopram (CELEXA) 10 mg tablet 1 Tab by Per G Tube route daily. 30 Tab 2    oxyCODONE (ROXICODONE) 5 mg/5 mL solution Take 5 mL by mouth every four (4) hours as needed for Pain. Max Daily Amount: 30 mg. 473 mL 0    clindamycin (CLEOCIN T) 1 % lotion Apply  to affected area two (2) times daily as needed. use thin film on affected area 1 Bottle 0    allopurinol (ZYLOPRIM) 300 mg tablet Take 1 Tab by mouth nightly. Indications: GOUT 30 Tab 2    LORazepam (ATIVAN) 1 mg tablet Take 1 Tab by mouth every eight (8) hours as needed for Anxiety. Max Daily Amount: 3 mg. Indications: CANCER CHEMOTHERAPY-INDUCED NAUSEA AND VOMITING (Patient taking differently: Take 1 mg by mouth nightly. Indications: CANCER CHEMOTHERAPY-INDUCED NAUSEA AND VOMITING) 30 Tab 2    glucose blood VI test strips (RELION PRIME TEST STRIPS) strip Test blood sugar before meals and bedtime Diagnosis code E11.65 200 Strip 5    lidocaine-prilocaine (EMLA) topical cream Apply  to affected area as needed. Apply 1/2 inch amount of cream to port site 90 minutes prior to needle access.  30 g 0    prochlorperazine (COMPAZINE) 10 mg tablet Take 1 Tab by mouth every six (6) hours as needed. 120 Tab 3    aspirin delayed-release 81 mg tablet Take 81 mg by mouth every morning. Take / use AM day of surgery  per anesthesia protocols. Indications: myocardial infarction prevention      losartan (COZAAR) 50 mg tablet 50 mg two (2) times a day. Indications: Hypertension      amLODIPine (NORVASC) 10 mg tablet Take 10 mg by mouth nightly. Take / use AM day of surgery  per anesthesia protocols. Indications: Hypertension      glipiZIDE SR (GLUCOTROL) 10 mg CR tablet Take 10 mg by mouth as needed. Indications: type 2 diabetes mellitus      omeprazole (PRILOSEC) 20 mg capsule Take 20 mg by mouth every morning. Take / use AM day of surgery  per anesthesia protocols. Indications: GASTROESOPHAGEAL REFLUX       No current facility-administered medications on file prior to encounter. Date Last Reviewed: 6/21/17  Current Dietary Status:  NPO      History of reflux:  YES    Reflux medication:Prilosec  Social History/Home Situation: Lives with wife. Work/Activity History: unknown    OBJECTIVE:  Objective Measure: Tool Used: National Outcomes Measurement System: Functional Communication Measures: SWALLOWING  Score:  Initial: 1 Most Recent: X (Date: -- )   Interpretation of Tool: This measure describes the change in functional communication status subsequent to speech-language pathology treatment of patients with dysphagia.  o Level 1:  Individual is not able to swallow anything safely by mouth. All nutrition and hydration is received through non-oral means (e.g., nasogastric tube, PEG). o Level 2: Individual is not able to swallow safely by mouth for nutrition and hydration, but may take some consistency with consistent maximal cues in therapy only. Alternative method of feeding required.   o Level 3:  Alternative method of feeding required as individual takes less than 50% of nutrition and hydration by mouth, and/or swallowing is safe with consistent use of moderate cues to use compensatory strategies and/or requires maximum diet restriction. o Level 4:  Swallowing is safe, but usually requires moderate cues to use compensatory strategies, and/or the individual has moderate diet restrictions and/or still requires tube feeding and/or oral supplements. o Level 5:  Swallowing is safe with minimal diet restriction and/or occasionally requires minimal cueing to use compensatory strategies. The individual may occasionally self-cue. All nutrition and hydration needs are met by mouth at mealtime. o Level 6:  Swallowing is safe, and the individual eats and drinks independently and may rarely require minimal cueing. The individual usually self-cues when difficulty occurs. May need to avoid specific food items (e.g., popcorn and nuts), or require additional time (due to dysphagia). o Level 7: The individuals ability to eat independently is not limited by swallow function. Swallowing would be safe and efficient for all consistencies. Compensatory strategies are effectively used when needed. Score Level 7 Level 6 Level 5 Level 4 Level 3 Level 2 Level 1   Modifier CH CI CJ CK CL CM CN   ? Swallowing:     - CURRENT STATUS: CN - 100% impaired, limited or restricted    - GOAL STATUS:  CJ - 20%-39% impaired, limited or restricted    - D/C STATUS:  ---------------To be determined---------------    Respiratory Status:      Room Air  CXR Results:N/A  MRI/CT Results:N/A  Oral Motor Structure/Speech:       Cognitive and Communication Status:                      BEDSIDE SWALLOW EVALUATION  Oral Assessment:     P.O. Trials: The patient was given teaspoon to tablespoon   amounts of the following:           ORAL PHASE:                   PHARYNGEAL PHASE:                            OTHER OBSERVATIONS:  Rate/bite size: WNL   Endurance:   WNL   Coments:      TREATMENT:    (In addition to Assessment/Re-Assessment sessions the following treatments were rendered)  Dysphagia Activities: Activities/Procedures listed utilized to improve progress in swallow strength. Required minimal cueing to improve swallow safety. LARYNGEAL / PHARYNGEAL EXERCISES:           Effortful Swallow: Yes  Reps : 5  Sets : 1  Hard Glottal Attack: Yes  Reps : 5  Sets : 1                       Mary: Yes  Reps : 5  Sets : 1  Mendelsohn Maneuver: Yes  Reps : 5  Sets : 1                                     Supraglottic Swallow: Yes  Reps : 5  Sets : 1                             __________________________________________________________________________________________________  Treatment Assessment:  Dysphagia treatment  Progression/Medical Necessity:   · Patient is expected to demonstrate progress in swallow strength, swallow timeliness, swallow function, diet tolerance and swallow safety to improve swallow safety, work toward diet advancement and decrease aspiration risk. Compliance with Program/Exercises: Will assess as treatment progresses. Reason for Continuation of Services/Other Comments:  · consuming a modified diet  Recommendations/Intent for next treatment session: \"Treatment next visit will focus on laryngeal exercises and PMV trials\". Total Treatment Duration:  Time In: 1300  Time Out: [address], [place]. Rodolfo Ruiz

## 2017-06-22 ENCOUNTER — HOSPITAL ENCOUNTER (OUTPATIENT)
Dept: PET IMAGING | Age: 62
Discharge: HOME | End: 2017-06-22
Payer: COMMERCIAL

## 2017-06-22 DIAGNOSIS — C76.0 HEAD AND NECK CANCER (HCC): ICD-10-CM

## 2017-06-22 PROCEDURE — 74011636320 HC RX REV CODE- 636/320: Performed by: RADIOLOGY

## 2017-06-22 PROCEDURE — A9552 F18 FDG: HCPCS

## 2017-06-22 RX ADMIN — DIATRIZOATE MEGLUMINE AND DIATRIZOATE SODIUM 10 ML: 660; 100 LIQUID ORAL; RECTAL at 08:27

## 2017-06-23 ENCOUNTER — PATIENT OUTREACH (OUTPATIENT)
Dept: CASE MANAGEMENT | Age: 62
End: 2017-06-23

## 2017-06-27 ENCOUNTER — HOSPITAL ENCOUNTER (OUTPATIENT)
Dept: PHYSICAL THERAPY | Age: 62
Discharge: HOME | End: 2017-06-27
Payer: COMMERCIAL

## 2017-06-27 PROCEDURE — 92526 ORAL FUNCTION THERAPY: CPT | Performed by: SPEECH-LANGUAGE PATHOLOGIST

## 2017-06-27 NOTE — PROGRESS NOTES
Manish Overton  : 1955 [facility] [place] [address], [place] [address].  [phone]   [phone]         OUTPATIENT SPEECH LANGUAGE PATHOLOGY: Daily Note  ICD-10: Treatment Diagnosis: Oropharyngeal Dysphagia R13.12  REFERRING PHYSICIAN: Dr. Juan Strickland MD Orders: Evaluate and Treat  PAST MEDICAL HISTORY:   Mr. Karena Montejo is a 62 y.o. male who  has a past medical history of GERD (gastroesophageal reflux disease); Gout; Hypertension; Morbid obesity ([facility] [address] Squamous cell carcinoma of epiglottis (HCC) (2016); and Type 2 diabetes mellitus ([facility] [address] He also has no past medical history of Adverse effect of anesthesia; Difficult intubation; Malignant hyperthermia due to anesthesia; or Pseudocholinesterase deficiency. He also  has a past surgical history that includes colonoscopy (2016); other surgical (Left); heent; and vascular access. MEDICAL/REFERRING DIAGNOSIS: Dysphagia [R13.10]  DATE OF ONSET: 2017   PRIOR LEVEL OF FUNCTION: Independent  PRECAUTIONS/ALLERGIES: Zofran Phoenix Koyanagi hcl (pf)]     ASSESSMENT:  Patient attended therapy session. See below for specifics. Patient reported that he has been completing his swallow exercises. He did report that he \"cheated\" one time on his NPO status and ate an ice cream sandwich. Patient requested MD to order another swallow study s/p two weeks of therapy. This is scheduled for . SLP recommends continue with NPO status. Patient will benefit from skilled intervention to address the below impairments. ?????? ? ? This section established at most recent assessment??????????  PROBLEM LIST (Impairments causing functional limitations):  1. Dysphagia  GOALS: (Goals have been discussed and agreed upon with patient.)  SHORT-TERM FUNCTIONAL GOALS: Time Frame: 3 months  Patient will complete laryngeal exercises independently at 100% accuracy.    Repeat MBSS at duration of treatment with 100% participation. DISCHARGE GOALS: Time Frame: 3 months  1. Patient will tolerate least restrictive diet without overt clinical s/sx of aspiration observed for a safe and adequate swallow function at 100%. REHABILITATION POTENTIAL FOR STATED GOALS: GoodPLAN OF CARE:  Patient will benefit from skilled intervention to address the following impairments. RECOMMENDATIONS AND PLANNED INTERVENTIONS (Benefits and precautions of therapy have been discussed with the patient.):  · NPO with alternative means of nutrition  MEDICATIONS:  · Non-oral  COMPENSATORY STRATEGIES/MODIFICATIONS INCLUDING:  · None  OTHER RECOMMENDATIONS (including follow up treatment recommendations):   · Laryngeal exercises  RECOMMENDED DIET MODIFICATIONS DISCUSSED WITH:  · Family  · Patient  TREATMENT PLAN EFFECTIVE DATES: 3/1/17 TO 6/1/17  FREQUENCY/DURATION: Continue to follow patient 1 time a week for 12 weeks to address above goals. Regarding Santos Landin's therapy, I certify that the treatment plan above will be carried out by a therapist or under their direction. Thank you for this referral,  TERESA Perez Ed CCC-SLP                  Referring Physician Signature: Sommer Bender  Date      SUBJECTIVE:  Cooperative. Present Symptoms: None      Current Medications:   Current Outpatient Prescriptions on File Prior to Encounter   Medication Sig Dispense Refill    oxyCODONE (ROXICODONE) 5 mg/5 mL solution Take 5-10 mL by mouth every four (4) hours as needed for Pain. Max Daily Amount: 60 mg. 473 mL 0    clindamycin (CLEOCIN T) 1 % lotion Apply  to affected area two (2) times daily as needed. use thin film on affected area 1 Bottle 0    nystatin (MYCOSTATIN) 100,000 unit/mL suspension Take 5 mL by mouth four (4) times daily. swish and spit 280 mL 0    citalopram (CELEXA) 20 mg tablet Take 1 Tab by mouth daily. 30 Tab 3    LORazepam (ATIVAN) 1 mg tablet Take 1 Tab by mouth every eight (8) hours as needed for Anxiety.  Max Daily Amount: 3 mg. Indications: anxiety 90 Tab 1    predniSONE (DELTASONE) 20 mg tablet Every morning; take 3 tabs daily for days 1-3; 2 tabs daily for days 4-6; 1 tab daily for days 7-9; 1/2 tab daily for days 10 & 11 19 Tab 0    amoxicillin-clavulanate (AUGMENTIN) 875-125 mg per tablet Take 1 Tab by mouth two (2) times a day. 20 Tab 0    allopurinol (ZYLOPRIM) 300 mg tablet Take 1 Tab by mouth nightly. Indications: GOUT 30 Tab 2    glucose blood VI test strips (RELION PRIME TEST STRIPS) strip Test blood sugar before meals and bedtime Diagnosis code E11.65 200 Strip 5    lidocaine-prilocaine (EMLA) topical cream Apply  to affected area as needed. Apply 1/2 inch amount of cream to port site 90 minutes prior to needle access. 30 g 0    prochlorperazine (COMPAZINE) 10 mg tablet Take 1 Tab by mouth every six (6) hours as needed. 120 Tab 3    aspirin delayed-release 81 mg tablet Take 81 mg by mouth every morning. Take / use AM day of surgery  per anesthesia protocols. Indications: myocardial infarction prevention      losartan (COZAAR) 50 mg tablet 50 mg two (2) times a day. Indications: Hypertension      amLODIPine (NORVASC) 10 mg tablet Take 10 mg by mouth nightly. Take / use AM day of surgery  per anesthesia protocols. Indications: Hypertension      glipiZIDE SR (GLUCOTROL) 10 mg CR tablet Take 10 mg by mouth as needed. Indications: type 2 diabetes mellitus      omeprazole (PRILOSEC) 20 mg capsule Take 20 mg by mouth every morning. Take / use AM day of surgery  per anesthesia protocols. Indications: GASTROESOPHAGEAL REFLUX       No current facility-administered medications on file prior to encounter. Date Last Reviewed: 6/27/17  Current Dietary Status:  NPO      History of reflux:  YES    Reflux medication:Prilosec  Social History/Home Situation: Lives with wife. Work/Activity History: unknown    OBJECTIVE:  Objective Measure:   Tool Used: National Outcomes Measurement System: Functional Communication Measures: SWALLOWING  Score:  Initial: 1 Most Recent: X (Date: -- )   Interpretation of Tool: This measure describes the change in functional communication status subsequent to speech-language pathology treatment of patients with dysphagia.  o Level 1:  Individual is not able to swallow anything safely by mouth. All nutrition and hydration is received through non-oral means (e.g., nasogastric tube, PEG). o Level 2: Individual is not able to swallow safely by mouth for nutrition and hydration, but may take some consistency with consistent maximal cues in therapy only. Alternative method of feeding required. o Level 3:  Alternative method of feeding required as individual takes less than 50% of nutrition and hydration by mouth, and/or swallowing is safe with consistent use of moderate cues to use compensatory strategies and/or requires maximum diet restriction. o Level 4:  Swallowing is safe, but usually requires moderate cues to use compensatory strategies, and/or the individual has moderate diet restrictions and/or still requires tube feeding and/or oral supplements. o Level 5:  Swallowing is safe with minimal diet restriction and/or occasionally requires minimal cueing to use compensatory strategies. The individual may occasionally self-cue. All nutrition and hydration needs are met by mouth at mealtime. o Level 6:  Swallowing is safe, and the individual eats and drinks independently and may rarely require minimal cueing. The individual usually self-cues when difficulty occurs. May need to avoid specific food items (e.g., popcorn and nuts), or require additional time (due to dysphagia). o Level 7: The individuals ability to eat independently is not limited by swallow function. Swallowing would be safe and efficient for all consistencies. Compensatory strategies are effectively used when needed. Score Level 7 Level 6 Level 5 Level 4 Level 3 Level 2 Level 1   Modifier CH CI CJ CK CL CM CN   ?  Swallowing:  - CURRENT STATUS: CN - 100% impaired, limited or restricted    - GOAL STATUS:  CJ - 20%-39% impaired, limited or restricted    - D/C STATUS:  ---------------To be determined---------------    Respiratory Status:      Room Air  CXR Results:N/A  MRI/CT Results:N/A  Oral Motor Structure/Speech:       Cognitive and Communication Status:                      BEDSIDE SWALLOW EVALUATION  Oral Assessment:     P.O. Trials: The patient was given teaspoon to tablespoon   amounts of the following:           ORAL PHASE:                   PHARYNGEAL PHASE:                            OTHER OBSERVATIONS:  Rate/bite size: WNL   Endurance: WNL   Coments:      TREATMENT:    (In addition to Assessment/Re-Assessment sessions the following treatments were rendered)  Dysphagia Activities: Activities/Procedures listed utilized to improve progress in swallow strength. Required minimal cueing to improve swallow safety. LARYNGEAL / PHARYNGEAL EXERCISES:           Effortful Swallow: Yes  Reps : 10  Sets : 2  Hard Glottal Attack: Yes  Reps : 10  Sets : 2                                Mendelsohn Maneuver: Yes  Reps : 10  Sets : 1                                     Supraglottic Swallow: Yes  Reps : 10  Sets : 1                             __________________________________________________________________________________________________  Treatment Assessment:  Dysphagia treatment  Progression/Medical Necessity:   · Patient is expected to demonstrate progress in swallow strength, swallow timeliness, swallow function, diet tolerance and swallow safety to improve swallow safety, work toward diet advancement and decrease aspiration risk. Compliance with Program/Exercises: Will assess as treatment progresses. Reason for Continuation of Services/Other Comments:  · consuming a modified diet  Recommendations/Intent for next treatment session: \"Treatment next visit will focus on laryngeal exercises and PMV trials\". Total Treatment Duration:  Time In: 1500  Time Out: [address], [place]. Rohini Watts

## 2017-06-30 ENCOUNTER — HOSPITAL ENCOUNTER (OUTPATIENT)
Dept: PHYSICAL THERAPY | Age: 62
Discharge: HOME | End: 2017-06-30
Payer: COMMERCIAL

## 2017-06-30 PROCEDURE — 92526 ORAL FUNCTION THERAPY: CPT | Performed by: SPEECH-LANGUAGE PATHOLOGIST

## 2017-06-30 NOTE — PROGRESS NOTES
Shannon Gomez  : 1955 [facility]  [address], [address] [facility] [address], [address] [facility]  [phone]   [phone]         OUTPATIENT SPEECH LANGUAGE PATHOLOGY: Daily Note and Recertification  ACR-[id number]: Treatment Diagnosis: Oropharyngeal Dysphagia R13.12  REFERRING PHYSICIAN: Dr. Jeanette Chávez MD Orders: Evaluate and Treat  PAST MEDICAL HISTORY:   Mr. Jose A Carlisle is a 62 y.o. male who  has a past medical history of GERD (gastroesophageal reflux disease); Gout; Hypertension; Morbid obesity ([facility] [address] Squamous cell carcinoma of epiglottis (HCC) (2016); and Type 2 diabetes mellitus ([facility] [address] He also has no past medical history of Adverse effect of anesthesia; Difficult intubation; Malignant hyperthermia due to anesthesia; or Pseudocholinesterase deficiency. He also  has a past surgical history that includes colonoscopy (2016); other surgical (Left); heent; and vascular access. MEDICAL/REFERRING DIAGNOSIS: Dysphagia [R13.10]  DATE OF ONSET: 2017   PRIOR LEVEL OF FUNCTION: Independent  PRECAUTIONS/ALLERGIES: Zofran [ondansetron hcl (pf)]     ASSESSMENT:  Re-evaluation this date. Patient has attended 10/15 therapy session. He continues to present with profound oropharyngeal Dysphagia. He recently had a MBSS in which he silently aspirated all trials. SLP recommended strict NPO. Patient reported that he has been completing his swallow exercises. He did report that he \"cheated\" a second on his NPO status and ate an ice cream sandwich. Patient requested MD to order another swallow study s/p two weeks of therapy. This is scheduled for . SLP recommends continue with NPO status. Patient will benefit from skilled intervention to address the below impairments. ?????? ? ? This section established at most recent assessment??????????  PROBLEM LIST (Impairments causing functional limitations):  1.  Dysphagia  GOALS: (Goals have been discussed and agreed upon with patient.)  SHORT-TERM FUNCTIONAL GOALS: Time Frame: 3 months  Patient will complete laryngeal exercises independently at 100% accuracy. Not Met 6/30/17  Repeat MBSS at duration of treatment with 100% participation. Not Met 6/30/17, however scheduled for 7/6/17  DISCHARGE GOALS: Time Frame: 3 months  1. Patient will tolerate least restrictive diet without overt clinical s/sx of aspiration observed for a safe and adequate swallow function at 100%. Not Met 6/30/17  REHABILITATION POTENTIAL FOR STATED GOALS: GoodPLAN OF CARE:  Patient will benefit from skilled intervention to address the following impairments. RECOMMENDATIONS AND PLANNED INTERVENTIONS (Benefits and precautions of therapy have been discussed with the patient.):  · NPO with alternative means of nutrition  MEDICATIONS:  · Non-oral  COMPENSATORY STRATEGIES/MODIFICATIONS INCLUDING:  · None  OTHER RECOMMENDATIONS (including follow up treatment recommendations):   · Laryngeal exercises  RECOMMENDED DIET MODIFICATIONS DISCUSSED WITH:  · Family  · Patient  TREATMENT PLAN EFFECTIVE DATES: 6/30/17 TO 9/30/17  FREQUENCY/DURATION: Continue to follow patient 1 time a week for 12 weeks to address above goals. Regarding Santos Landin's therapy, I certify that the treatment plan above will be carried out by a therapist or under their direction. Thank you for this referral,  TERESA Wilder Ed CCC-SLP                  Referring Physician Signature: Sona Garcia  Date      SUBJECTIVE:  Cooperative. Present Symptoms: None      Current Medications:   Current Outpatient Prescriptions on File Prior to Encounter   Medication Sig Dispense Refill    predniSONE (DELTASONE) 20 mg tablet Take 1 Tab by mouth two (2) times a day for 5 days. 10 Tab 0    oxyCODONE (ROXICODONE) 5 mg/5 mL solution Take 5-10 mL by mouth every four (4) hours as needed for Pain.  Max Daily Amount: 60 mg. 473 mL 0    clindamycin (CLEOCIN T) 1 % lotion Apply  to affected area two (2) times daily as needed. use thin film on affected area 1 Bottle 0    nystatin (MYCOSTATIN) 100,000 unit/mL suspension Take 5 mL by mouth four (4) times daily. swish and spit 280 mL 0    citalopram (CELEXA) 20 mg tablet Take 1 Tab by mouth daily. 30 Tab 3    LORazepam (ATIVAN) 1 mg tablet Take 1 Tab by mouth every eight (8) hours as needed for Anxiety. Max Daily Amount: 3 mg. Indications: anxiety 90 Tab 1    predniSONE (DELTASONE) 20 mg tablet Every morning; take 3 tabs daily for days 1-3; 2 tabs daily for days 4-6; 1 tab daily for days 7-9; 1/2 tab daily for days 10 & 11 19 Tab 0    amoxicillin-clavulanate (AUGMENTIN) 875-125 mg per tablet Take 1 Tab by mouth two (2) times a day. 20 Tab 0    allopurinol (ZYLOPRIM) 300 mg tablet Take 1 Tab by mouth nightly. Indications: GOUT 30 Tab 2    glucose blood VI test strips (RELION PRIME TEST STRIPS) strip Test blood sugar before meals and bedtime Diagnosis code E11.65 200 Strip 5    lidocaine-prilocaine (EMLA) topical cream Apply  to affected area as needed. Apply 1/2 inch amount of cream to port site 90 minutes prior to needle access. 30 g 0    prochlorperazine (COMPAZINE) 10 mg tablet Take 1 Tab by mouth every six (6) hours as needed. 120 Tab 3    aspirin delayed-release 81 mg tablet Take 81 mg by mouth every morning. Take / use AM day of surgery  per anesthesia protocols. Indications: myocardial infarction prevention      losartan (COZAAR) 50 mg tablet 50 mg two (2) times a day. Indications: Hypertension      amLODIPine (NORVASC) 10 mg tablet Take 10 mg by mouth nightly. Take / use AM day of surgery  per anesthesia protocols. Indications: Hypertension      glipiZIDE SR (GLUCOTROL) 10 mg CR tablet Take 10 mg by mouth as needed. Indications: type 2 diabetes mellitus      omeprazole (PRILOSEC) 20 mg capsule Take 20 mg by mouth every morning. Take / use AM day of surgery  per anesthesia protocols.   Indications: GASTROESOPHAGEAL REFLUX       No current facility-administered medications on file prior to encounter. Date Last Reviewed: 6/30/17  Current Dietary Status:  NPO      History of reflux:  YES    Reflux medication:Prilosec  Social History/Home Situation: Lives with wife. Work/Activity History: unknown    OBJECTIVE:  Objective Measure: Tool Used: National Outcomes Measurement System: Functional Communication Measures: SWALLOWING  Score:  Initial: 1 Most Recent: X (Date: -- )   Interpretation of Tool: This measure describes the change in functional communication status subsequent to speech-language pathology treatment of patients with dysphagia.  o Level 1:  Individual is not able to swallow anything safely by mouth. All nutrition and hydration is received through non-oral means (e.g., nasogastric tube, PEG). o Level 2: Individual is not able to swallow safely by mouth for nutrition and hydration, but may take some consistency with consistent maximal cues in therapy only. Alternative method of feeding required. o Level 3:  Alternative method of feeding required as individual takes less than 50% of nutrition and hydration by mouth, and/or swallowing is safe with consistent use of moderate cues to use compensatory strategies and/or requires maximum diet restriction. o Level 4:  Swallowing is safe, but usually requires moderate cues to use compensatory strategies, and/or the individual has moderate diet restrictions and/or still requires tube feeding and/or oral supplements. o Level 5:  Swallowing is safe with minimal diet restriction and/or occasionally requires minimal cueing to use compensatory strategies. The individual may occasionally self-cue. All nutrition and hydration needs are met by mouth at mealtime. o Level 6:  Swallowing is safe, and the individual eats and drinks independently and may rarely require minimal cueing. The individual usually self-cues when difficulty occurs.  May need to avoid specific food items (e.g., popcorn and nuts), or require additional time (due to dysphagia). o Level 7: The individuals ability to eat independently is not limited by swallow function. Swallowing would be safe and efficient for all consistencies. Compensatory strategies are effectively used when needed. Score Level 7 Level 6 Level 5 Level 4 Level 3 Level 2 Level 1   Modifier CH CI CJ CK CL CM CN   ? Swallowing:     - CURRENT STATUS: CN - 100% impaired, limited or restricted    - GOAL STATUS:  CJ - 20%-39% impaired, limited or restricted    - D/C STATUS:  ---------------To be determined---------------    Respiratory Status:      Room Air  CXR Results:N/A  MRI/CT Results:N/A  Oral Motor Structure/Speech:       Cognitive and Communication Status:                      BEDSIDE SWALLOW EVALUATION  Oral Assessment:     P.O. Trials: The patient was given teaspoon to tablespoon   amounts of the following:           ORAL PHASE:                   PHARYNGEAL PHASE:                            OTHER OBSERVATIONS:  Rate/bite size: WNL   Endurance: WNL   Coments:      TREATMENT:    (In addition to Assessment/Re-Assessment sessions the following treatments were rendered)  Dysphagia Activities: Activities/Procedures listed utilized to improve progress in swallow strength. Required minimal cueing to improve swallow safety. LARYNGEAL / PHARYNGEAL EXERCISES:           Effortful Swallow: Yes  Reps : 5  Sets : 3  Hard Glottal Attack: Yes  Reps : 10  Sets : 3                       Mary:  Yes  Reps : 5  Sets : 3  Mendelsohn Maneuver: Yes  Reps : 5  Sets : 3                                     Supraglottic Swallow: Yes  Reps : 5  Sets : 3                               __________________________________________________________________________________________________  Treatment Assessment:  Dysphagia treatment  Progression/Medical Necessity:   · Patient is expected to demonstrate progress in swallow strength, swallow timeliness, swallow function, diet tolerance and swallow safety to improve swallow safety, work toward diet advancement and decrease aspiration risk. Compliance with Program/Exercises: Will assess as treatment progresses. Reason for Continuation of Services/Other Comments:  · consuming a modified diet  Recommendations/Intent for next treatment session: \"Treatment next visit will focus on laryngeal exercises and PMV trials\". Total Treatment Duration:  Time In: 0930  Time Out: [address], Kristian Sanders. Andrew Primer

## 2017-07-03 ENCOUNTER — HOSPITAL ENCOUNTER (OUTPATIENT)
Dept: PHYSICAL THERAPY | Age: 62
Discharge: HOME | End: 2017-07-03
Payer: COMMERCIAL

## 2017-07-03 PROCEDURE — 92526 ORAL FUNCTION THERAPY: CPT | Performed by: SPEECH-LANGUAGE PATHOLOGIST

## 2017-07-03 NOTE — PROGRESS NOTES
Carol Loving  : 1955 [facility] at [place] [address], [address] [facility]  [phone]   [phone]         OUTPATIENT SPEECH LANGUAGE PATHOLOGY: Daily Note  ICD-10: Treatment Diagnosis: Oropharyngeal Dysphagia R13.12  REFERRING PHYSICIAN: Dr. Ramon Aponte MD Orders: Evaluate and Treat  PAST MEDICAL HISTORY:   Mr. Dawson Gage is a 62 y.o. male who  has a past medical history of GERD (gastroesophageal reflux disease); Gout; Hypertension; Morbid obesity ([facility] [address] Squamous cell carcinoma of epiglottis (HCC) (2016); and Type 2 diabetes mellitus ([facility] [address] He also has no past medical history of Adverse effect of anesthesia; Difficult intubation; Malignant hyperthermia due to anesthesia; or Pseudocholinesterase deficiency. He also  has a past surgical history that includes colonoscopy (2016); other surgical (Left); heent; and vascular access. MEDICAL/REFERRING DIAGNOSIS: Dysphagia [R13.10]  DATE OF ONSET: 2017   PRIOR LEVEL OF FUNCTION: Independent  PRECAUTIONS/ALLERGIES: Zofran [ondansetron hcl (pf)]     ASSESSMENT:  Re-evaluation this date. Patient has attended 10/15 therapy session. He continues to present with profound oropharyngeal Dysphagia. He recently had a MBSS in which he silently aspirated all trials. SLP recommended strict NPO. Patient reported that he has been completing his swallow exercises. He did report that he \"cheated\" a second on his NPO status and ate an ice cream sandwich. Patient requested MD to order another swallow study s/p two weeks of therapy. This is scheduled for . SLP recommends continue with NPO status. Patient will benefit from skilled intervention to address the below impairments. ?????? ? ? This section established at most recent assessment??????????  PROBLEM LIST (Impairments causing functional limitations):  1.  Dysphagia  GOALS: (Goals have been discussed and agreed upon with patient.)  SHORT-TERM FUNCTIONAL GOALS: Time Frame: 3 months  Patient will complete laryngeal exercises independently at 100% accuracy. Not Met 6/30/17  Repeat MBSS at duration of treatment with 100% participation. Not Met 6/30/17, however scheduled for 7/6/17  DISCHARGE GOALS: Time Frame: 3 months  1. Patient will tolerate least restrictive diet without overt clinical s/sx of aspiration observed for a safe and adequate swallow function at 100%. Not Met 6/30/17  REHABILITATION POTENTIAL FOR STATED GOALS: GoodPLAN OF CARE:  Patient will benefit from skilled intervention to address the following impairments. RECOMMENDATIONS AND PLANNED INTERVENTIONS (Benefits and precautions of therapy have been discussed with the patient.):  · NPO with alternative means of nutrition  MEDICATIONS:  · Non-oral  COMPENSATORY STRATEGIES/MODIFICATIONS INCLUDING:  · None  OTHER RECOMMENDATIONS (including follow up treatment recommendations):   · Laryngeal exercises  RECOMMENDED DIET MODIFICATIONS DISCUSSED WITH:  · Family  · Patient  TREATMENT PLAN EFFECTIVE DATES: 6/30/17 TO 9/30/17  FREQUENCY/DURATION: Continue to follow patient 1 time a week for 12 weeks to address above goals. Regarding Santos Landin's therapy, I certify that the treatment plan above will be carried out by a therapist or under their direction. Thank you for this referral,  TERESA Celestin Ed CCC-SLP                  Referring Physician Signature: Jordi Peñaloza  Date      SUBJECTIVE:  Cooperative. Present Symptoms: None      Current Medications:   Current Outpatient Prescriptions on File Prior to Encounter   Medication Sig Dispense Refill    predniSONE (DELTASONE) 20 mg tablet Take 1 Tab by mouth two (2) times a day for 5 days. 10 Tab 0    oxyCODONE (ROXICODONE) 5 mg/5 mL solution Take 5-10 mL by mouth every four (4) hours as needed for Pain. Max Daily Amount: 60 mg. 473 mL 0    clindamycin (CLEOCIN T) 1 % lotion Apply  to affected area two (2) times daily as needed.  use thin film on affected area 1 Bottle 0    nystatin (MYCOSTATIN) 100,000 unit/mL suspension Take 5 mL by mouth four (4) times daily. swish and spit 280 mL 0    citalopram (CELEXA) 20 mg tablet Take 1 Tab by mouth daily. 30 Tab 3    LORazepam (ATIVAN) 1 mg tablet Take 1 Tab by mouth every eight (8) hours as needed for Anxiety. Max Daily Amount: 3 mg. Indications: anxiety 90 Tab 1    predniSONE (DELTASONE) 20 mg tablet Every morning; take 3 tabs daily for days 1-3; 2 tabs daily for days 4-6; 1 tab daily for days 7-9; 1/2 tab daily for days 10 & 11 19 Tab 0    amoxicillin-clavulanate (AUGMENTIN) 875-125 mg per tablet Take 1 Tab by mouth two (2) times a day. 20 Tab 0    allopurinol (ZYLOPRIM) 300 mg tablet Take 1 Tab by mouth nightly. Indications: GOUT 30 Tab 2    glucose blood VI test strips (RELION PRIME TEST STRIPS) strip Test blood sugar before meals and bedtime Diagnosis code E11.65 200 Strip 5    lidocaine-prilocaine (EMLA) topical cream Apply  to affected area as needed. Apply 1/2 inch amount of cream to port site 90 minutes prior to needle access. 30 g 0    prochlorperazine (COMPAZINE) 10 mg tablet Take 1 Tab by mouth every six (6) hours as needed. 120 Tab 3    aspirin delayed-release 81 mg tablet Take 81 mg by mouth every morning. Take / use AM day of surgery  per anesthesia protocols. Indications: myocardial infarction prevention      losartan (COZAAR) 50 mg tablet 50 mg two (2) times a day. Indications: Hypertension      amLODIPine (NORVASC) 10 mg tablet Take 10 mg by mouth nightly. Take / use AM day of surgery  per anesthesia protocols. Indications: Hypertension      glipiZIDE SR (GLUCOTROL) 10 mg CR tablet Take 10 mg by mouth as needed. Indications: type 2 diabetes mellitus      omeprazole (PRILOSEC) 20 mg capsule Take 20 mg by mouth every morning. Take / use AM day of surgery  per anesthesia protocols.   Indications: GASTROESOPHAGEAL REFLUX       No current facility-administered medications on file prior to encounter. Date Last Reviewed: 7/3/17  Current Dietary Status:  NPO      History of reflux:  YES    Reflux medication:Prilosec  Social History/Home Situation: Lives with wife. Work/Activity History: unknown    OBJECTIVE:  Objective Measure: Tool Used: National Outcomes Measurement System: Functional Communication Measures: SWALLOWING  Score:  Initial: 1 Most Recent: X (Date: -- )   Interpretation of Tool: This measure describes the change in functional communication status subsequent to speech-language pathology treatment of patients with dysphagia.  o Level 1:  Individual is not able to swallow anything safely by mouth. All nutrition and hydration is received through non-oral means (e.g., nasogastric tube, PEG). o Level 2: Individual is not able to swallow safely by mouth for nutrition and hydration, but may take some consistency with consistent maximal cues in therapy only. Alternative method of feeding required. o Level 3:  Alternative method of feeding required as individual takes less than 50% of nutrition and hydration by mouth, and/or swallowing is safe with consistent use of moderate cues to use compensatory strategies and/or requires maximum diet restriction. o Level 4:  Swallowing is safe, but usually requires moderate cues to use compensatory strategies, and/or the individual has moderate diet restrictions and/or still requires tube feeding and/or oral supplements. o Level 5:  Swallowing is safe with minimal diet restriction and/or occasionally requires minimal cueing to use compensatory strategies. The individual may occasionally self-cue. All nutrition and hydration needs are met by mouth at mealtime. o Level 6:  Swallowing is safe, and the individual eats and drinks independently and may rarely require minimal cueing. The individual usually self-cues when difficulty occurs.  May need to avoid specific food items (e.g., popcorn and nuts), or require additional time (due to dysphagia). o Level 7: The individuals ability to eat independently is not limited by swallow function. Swallowing would be safe and efficient for all consistencies. Compensatory strategies are effectively used when needed. Score Level 7 Level 6 Level 5 Level 4 Level 3 Level 2 Level 1   Modifier CH CI CJ CK CL CM CN   ? Swallowing:     - CURRENT STATUS: CN - 100% impaired, limited or restricted    - GOAL STATUS:  CJ - 20%-39% impaired, limited or restricted    - D/C STATUS:  ---------------To be determined---------------    Respiratory Status:      Room Air  CXR Results:N/A  MRI/CT Results:N/A  Oral Motor Structure/Speech:       Cognitive and Communication Status:                      BEDSIDE SWALLOW EVALUATION  Oral Assessment:     P.O. Trials: The patient was given teaspoon to tablespoon   amounts of the following:           ORAL PHASE:                   PHARYNGEAL PHASE:                            OTHER OBSERVATIONS:  Rate/bite size: WNL   Endurance: WNL   Coments:      TREATMENT:    (In addition to Assessment/Re-Assessment sessions the following treatments were rendered)  Dysphagia Activities: Activities/Procedures listed utilized to improve progress in swallow strength. Required minimal cueing to improve swallow safety. LARYNGEAL / PHARYNGEAL EXERCISES:           Effortful Swallow: Yes  Reps : 15  Sets : 2  Hard Glottal Attack: Yes  Reps : 15  Sets : 2                       Mary:  Yes  Reps : 15  Sets : 1  Mendelsohn Maneuver: Yes  Reps : 15  Sets : 1                                     Supraglottic Swallow: Yes  Reps : 15  Sets : 1                               __________________________________________________________________________________________________  Treatment Assessment:  Dysphagia treatment  Progression/Medical Necessity:   · Patient is expected to demonstrate progress in swallow strength, swallow timeliness, swallow function, diet tolerance and swallow safety to improve swallow safety, work toward diet advancement and decrease aspiration risk. Compliance with Program/Exercises: Will assess as treatment progresses. Reason for Continuation of Services/Other Comments:  · consuming a modified diet  Recommendations/Intent for next treatment session: \"Treatment next visit will focus on laryngeal exercises and PMV trials\". Total Treatment Duration:  Time In: 1345  Time Out: [address], [place]. Lizabeth Guevara

## 2017-07-06 ENCOUNTER — APPOINTMENT (OUTPATIENT)
Dept: PHYSICAL THERAPY | Age: 62
End: 2017-07-06
Payer: COMMERCIAL

## 2017-07-06 ENCOUNTER — HOSPITAL ENCOUNTER (OUTPATIENT)
Dept: GENERAL RADIOLOGY | Age: 62
Discharge: HOME | End: 2017-07-06
Attending: INTERNAL MEDICINE
Payer: COMMERCIAL

## 2017-07-06 DIAGNOSIS — C76.0 HEAD AND NECK CANCER (HCC): ICD-10-CM

## 2017-07-06 DIAGNOSIS — R13.12 OROPHARYNGEAL DYSPHAGIA: ICD-10-CM

## 2017-07-06 PROCEDURE — G8998 SWALLOW D/C STATUS: HCPCS

## 2017-07-06 PROCEDURE — 74011000255 HC RX REV CODE- 255: Performed by: INTERNAL MEDICINE

## 2017-07-06 PROCEDURE — G8996 SWALLOW CURRENT STATUS: HCPCS

## 2017-07-06 PROCEDURE — 74230 X-RAY XM SWLNG FUNCJ C+: CPT

## 2017-07-06 PROCEDURE — G8997 SWALLOW GOAL STATUS: HCPCS

## 2017-07-06 PROCEDURE — 92611 MOTION FLUOROSCOPY/SWALLOW: CPT

## 2017-07-06 RX ADMIN — BARIUM SULFATE 15 ML: 980 POWDER, FOR SUSPENSION ORAL at 08:54

## 2017-07-06 RX ADMIN — BARIUM SULFATE 15 ML: 400 SUSPENSION ORAL at 08:55

## 2017-07-06 RX ADMIN — BARIUM SULFATE 15 ML: 400 SUSPENSION ORAL at 08:56

## 2017-07-06 NOTE — PROGRESS NOTES
Carol Loving  : 1955 [facility], [facility], [address]  [phone]   [phone]        OUTPATIENT SPEECH LANGUAGE PATHOLOGY: Initial Assessment, Discharge and MODIFIED BARIUM SWALLOW    ICD-10: Treatment Diagnosis: R.13.12 Oropharyngeal dysphagia  DATE: 2017  REFERRING PHYSICIAN: Ramon Aponte MD MD Orders: Modified barium swallow study  PAST MEDICAL HISTORY:  Mr. Dawson Gage is a 62 y.o. male who  has a past medical history of GERD (gastroesophageal reflux disease); Gout; Hypertension; Morbid obesity ([facility] Squamous cell carcinoma of epiglottis (HCC) (2016); and Type 2 diabetes mellitus ([facility] He also has no past medical history of Adverse effect of anesthesia; Difficult intubation; Malignant hyperthermia due to anesthesia; or Pseudocholinesterase deficiency. He also  has a past surgical history that includes colonoscopy (2016); other surgical (Left); heent; and vascular access. RADIOLOGIST:  Kamryn  MEDICAL/REFERRING DIAGNOSIS: Head and neck cancer ([facility] [C76.0]  Oropharyngeal dysphagia [R13.12]   PRIOR LEVEL OF FUNCTION: NPO  PRECAUTIONS/ALLERGIES: Zofran [ondansetron hcl (pf)]   ASSESSMENT/PLAN OF CARE:  Patient previously participated in modified barium swallow study on 6/15/17 with recommendations of NPO secondary to silent aspiration. He reports that he has been participating in outpatient speech pathology to address dysphagia. Despite NPO recommendations, patient reports consuming thin liquids including milkshakes at home. Based on the objective data described below, the patient presents with severe oropharyngeal dysphagia. Patient presented with thin liquid via tsp and cup sip, nectar thick via tsp and cup sip, and honey thick via tsp. Patient exhibited oral phase swallow that was within normal limits. Swallow triggered at the pyriform sinus. Minimal  epiglottic inversion observed with all trials.  No overt aspiration/penetration observed with single sip of thin liquid via tsp. However, gross silent aspiration with thin via cup sip. Patient with gross, silent aspiration of nectar thick liquid. Deep penetration to the level of the vocal cords which was subsequently aspirated after the swallow of honey thick liquids. No pharyngeal stasis with thin or nectar thick liquid. Mild pharyngeal residue observed with honey thick liquid. No additional trials presented secondary to silent aspiration with thin, nectar, and honey thick liquids. Patient will benefit from skilled intervention to address the above impairments. RECOMMENDATIONS AND PLANNED INTERVENTIONS (Benefits and precautions of therapy have been discussed with the patient.):  · NPO  MEDICATIONS:  · Non-oral  COMPENSATORY STRATEGIES/MODIFICATIONS INCLUDING:  · None  OTHER RECOMMENDATIONS (including follow up treatment recommendations): · Family training/education  · Laryngeal exercises  · Patient education    Thank you for this referral,  SELAM Zhu, CCC-SLP, CBISSUBJECTIVE:  Patient alert, cooperative. States \"I hope to get better results this time\". Present Symptoms: history of silent aspiration   Pain Intensity 1: 0  Current Dietary Status:  NPO    OBJECTIVE:  Objective Measure: Tool Used: National Outcomes Measurement System: Functional Communication Measures: SWALLOWING  Score:  Initial: 1 Most Recent: X (Date: -- )   Interpretation of Tool: This measure describes the change in functional communication status subsequent to speech-language pathology treatment of patients with dysphagia.  o Level 1:  Individual is not able to swallow anything safely by mouth. All nutrition and hydration is received through non-oral means (e.g., nasogastric tube, PEG). o Level 2: Individual is not able to swallow safely by mouth for nutrition and hydration, but may take some consistency with consistent maximal cues in therapy only.  Alternative method of feeding required. o Level 3:  Alternative method of feeding required as individual takes less than 50% of nutrition and hydration by mouth, and/or swallowing is safe with consistent use of moderate cues to use compensatory strategies and/or requires maximum diet restriction. o Level 4:  Swallowing is safe, but usually requires moderate cues to use compensatory strategies, and/or the individual has moderate diet restrictions and/or still requires tube feeding and/or oral supplements. o Level 5:  Swallowing is safe with minimal diet restriction and/or occasionally requires minimal cueing to use compensatory strategies. The individual may occasionally self-cue. All nutrition and hydration needs are met by mouth at mealtime. o Level 6:  Swallowing is safe, and the individual eats and drinks independently and may rarely require minimal cueing. The individual usually self-cues when difficulty occurs. May need to avoid specific food items (e.g., popcorn and nuts), or require additional time (due to dysphagia). o Level 7: The individuals ability to eat independently is not limited by swallow function. Swallowing would be safe and efficient for all consistencies. Compensatory strategies are effectively used when needed. Score Level 7 Level 6 Level 5 Level 4 Level 3 Level 2 Level 1   Modifier CH CI CJ CK CL CM CN   ? Swallowing:     - CURRENT STATUS: CN - 100% impaired, limited or restricted    - GOAL STATUS:  CN - 100% impaired, limited or restricted    - D/C STATUS:  CN - 100% impaired, limited or restricted      Vocal Quality: Functional    Oral Prepatory:  The patient was given the following: Consistency Presented:  Thin liquid, Nectar thick liquid, Honey thick liquid  How Presented: Cup/sip, Spoon    Oral Phase:  Bolus Acceptance: No impairment  Bolus Formation/Control: No impairment  Propulsion: No impairment     Oral Residue: None  Initiation of Swallow: No impairment  Oral Phase Severity: No impairment    Pharyngeal Phase:  Timing: No impairment     Laryngeal Elevation: Inadequate epiglottic inversion, Incomplete laryngeal closure, Reduced excursion with laryngeal vestibule gap  Penetration: During swallow, To cords, From residual  Aspiration/Timing: Silent   Aspiration/Penetration Score: 8 (Aspiration-Contrast passes cords/glottis with no effort to eject, ie/silent aspiration)  Pharyngeal Symmetry: Not assessed  Pharyngeal Dysfunction: Decreased elevation/closure  Pharyngeal Phase Severity: Severe  Pharyngeal-Esophageal Segment: No impairment    Assessment/Reassessment only, no treatment provided today  __________________________________________________________________________________________________  Recommendations for treatment: Recommend continue NPO status secondary to continued silent aspiration. Recommend continue with skilled speech therapy services on outpatient basis with additional objective studies as clinically indicated.    Total Treatment Duration:  Time In: 0830   Time Out: 0900    SELAM Cho, CCC-SLP, CBIS

## 2017-07-10 ENCOUNTER — APPOINTMENT (OUTPATIENT)
Dept: PHYSICAL THERAPY | Age: 62
End: 2017-07-10
Payer: COMMERCIAL

## 2017-07-13 ENCOUNTER — HOSPITAL ENCOUNTER (OUTPATIENT)
Dept: RADIATION ONCOLOGY | Age: 62
Discharge: HOME | End: 2017-07-13
Payer: COMMERCIAL

## 2017-07-13 ENCOUNTER — HOSPITAL ENCOUNTER (OUTPATIENT)
Dept: LAB | Age: 62
Discharge: HOME | End: 2017-07-13
Payer: COMMERCIAL

## 2017-07-13 VITALS
RESPIRATION RATE: 18 BRPM | DIASTOLIC BLOOD PRESSURE: 89 MMHG | WEIGHT: 202 LBS | TEMPERATURE: 97.7 F | SYSTOLIC BLOOD PRESSURE: 146 MMHG | BODY MASS INDEX: 29.83 KG/M2 | HEART RATE: 97 BPM | OXYGEN SATURATION: 97 %

## 2017-07-13 DIAGNOSIS — C76.0 HEAD AND NECK CANCER (HCC): ICD-10-CM

## 2017-07-13 DIAGNOSIS — C76.0 CANCER OF HEAD AND NECK (HCC): Primary | ICD-10-CM

## 2017-07-13 LAB
ALBUMIN SERPL BCP-MCNC: 3.6 G/DL (ref 3.2–4.6)
ALBUMIN/GLOB SERPL: 1 {RATIO} (ref 1.2–3.5)
ALP SERPL-CCNC: 63 U/L (ref 50–136)
ALT SERPL-CCNC: 17 U/L (ref 12–65)
ANION GAP BLD CALC-SCNC: 4 MMOL/L (ref 7–16)
AST SERPL W P-5'-P-CCNC: 12 U/L (ref 15–37)
BASOPHILS # BLD AUTO: 0 K/UL (ref 0–0.2)
BASOPHILS # BLD: 0 % (ref 0–2)
BILIRUB SERPL-MCNC: 0.7 MG/DL (ref 0.2–1.1)
BUN SERPL-MCNC: 20 MG/DL (ref 8–23)
CALCIUM SERPL-MCNC: 9 MG/DL (ref 8.3–10.4)
CHLORIDE SERPL-SCNC: 101 MMOL/L (ref 98–107)
CO2 SERPL-SCNC: 34 MMOL/L (ref 21–32)
CREAT SERPL-MCNC: 1.05 MG/DL (ref 0.8–1.5)
DIFFERENTIAL METHOD BLD: ABNORMAL
EOSINOPHIL # BLD: 0.1 K/UL (ref 0–0.8)
EOSINOPHIL NFR BLD: 1 % (ref 0.5–7.8)
ERYTHROCYTE [DISTWIDTH] IN BLOOD BY AUTOMATED COUNT: 14.2 % (ref 11.9–14.6)
GLOBULIN SER CALC-MCNC: 3.6 G/DL (ref 2.3–3.5)
GLUCOSE SERPL-MCNC: 177 MG/DL (ref 65–100)
HCT VFR BLD AUTO: 30.5 % (ref 41.1–50.3)
HGB BLD-MCNC: 10.7 G/DL (ref 13.6–17.2)
LYMPHOCYTES # BLD AUTO: 11 % (ref 13–44)
LYMPHOCYTES # BLD: 0.7 K/UL (ref 0.5–4.6)
MCH RBC QN AUTO: 30.3 PG (ref 26.1–32.9)
MCHC RBC AUTO-ENTMCNC: 35.1 G/DL (ref 31.4–35)
MCV RBC AUTO: 86.4 FL (ref 79.6–97.8)
MONOCYTES # BLD: 0.3 K/UL (ref 0.1–1.3)
MONOCYTES NFR BLD AUTO: 5 % (ref 4–12)
NEUTS SEG # BLD: 5.4 K/UL (ref 1.7–8.2)
NEUTS SEG NFR BLD AUTO: 83 % (ref 43–78)
NRBC # BLD: 0 K/UL (ref 0–0.2)
PLATELET # BLD AUTO: 167 K/UL (ref 150–450)
PMV BLD AUTO: 8.7 FL (ref 10.8–14.1)
POTASSIUM SERPL-SCNC: 4.1 MMOL/L (ref 3.5–5.1)
PROT SERPL-MCNC: 7.2 G/DL (ref 6.3–8.2)
RBC # BLD AUTO: 3.53 M/UL (ref 4.23–5.67)
SODIUM SERPL-SCNC: 139 MMOL/L (ref 136–145)
WBC # BLD AUTO: 6.5 K/UL (ref 4.3–11.1)

## 2017-07-13 PROCEDURE — 99211 OFF/OP EST MAY X REQ PHY/QHP: CPT

## 2017-07-13 PROCEDURE — 85025 COMPLETE CBC W/AUTO DIFF WBC: CPT | Performed by: INTERNAL MEDICINE

## 2017-07-13 PROCEDURE — 80053 COMPREHEN METABOLIC PANEL: CPT | Performed by: INTERNAL MEDICINE

## 2017-07-13 NOTE — PROGRESS NOTES
Pt is here for FUP post RT to the supraglottis which ended 4/2017. Pt has recently failed his swallowing test and according to the notes is refusing to continue the Speech Therapy appts and exercises. Pt still has the tracheostomy opening but does not have a tach. tube in place. Pt is c/o extreme fatigue, and he denies pain. Pt stated that he believes he had an endoscopy exam by Dr. Donna Byrd 6/2017. Pt will have CT scan in 3 mos. And return for FUP, and a referral will be made to [facility] for general strengthening, and lymphedema of his neck.

## 2017-07-13 NOTE — PROGRESS NOTES
Patient: Altagracia Forrest MRN: [id number]  SSN: [id number]    YOB: 1955  Age: 62 y.o. Sex: male      Other Providers: Nguyen Celeste MD, Petar Lozada MD     CHIEF COMPLAINT: Dysphagia and hoarseness     DIAGNOSIS: T3N1M0 Supraglottic SCC, Stage ARMANDO.      PREVIOUS TREATMENT:  1. Debridement and biopsy 11/14/16  2. 2 cycles of neoadjuvant TPF chemotherapy. 3. Completion of concurrent Cetuximab and radiation 4/11/17, 70 Gy in 35 fractions. INTERVAL HISTORY:  Altagracia Forrest is a 62 y.o. male being seen back in regular follow up after completion of his radiation 4/11/17. Regarding his history, his only pertinent medical history includes DMII, HTN, and obesity. Beginning in the fall 2016, he began to note some difficulty with dysphagia as well as some change in his voice. Ultimately, there was some left-sided otalgia for which he sought help from his primary care physician. A course of antibiotics did not prove helpful and ultimately he was referred to Dr. Barbara Curtis for evaluation. He was a prior smoker but quit nearly 11 years prior to presentation. He did not abuse alcohol. Flexible laryngoscopy demonstrated a large mass involving the entire epiglottis. The tumor appeared to involve the aryepiglottic folds. The vocal cords were not visualized. CT scan November 3, 2016 demonstrated 3.9 x 2.6 x 2.3 cm supraglottic mass extending across midline. Inferiorly, it appeared to involve the left true vocal cord. The overlying thyroid cartilage seemed preserved. There was a level 3 lymph node on the left side measuring 1.8 cm in short axis. On 11/14/16, the patient was taken to the OR for panendoscopy where Dr. Barbara Curtis indicate that he visualized the true and false vocal cords both of which appeared uninvolved by the tumor. He also did micro-debridement, subsequent to which the patient's voice appeared to be improved. The biopsy was consistent with in situ and infiltrating poorly differentiated squamous carcinoma.  The patient has been seen at [address], where surgery was offered. The patient was averse to the notion of a tracheostomy if that was a possibility and therefore sought evaluation from Dr. Gordo Montenergo in oncology who saw him 12/20/16. Per Dr. Samreen Haque note, he was a candidate for chemoradiation. He ordered a PET/CT and referred him to our office. I agreed with the plan and completed treatment 4/11/17. Unfortunately prior to commencement with radiation, he did require tracheotomy. With the anticipated delayed in starting radiation, he did start chemotherapy, TPF, and received 2 cycles prior to starting radiation with cetuximab. Overall he did fairly well with treatment, but did have the anticipated irritation and difficulty swallowing. He did get dry mouth and neck erythema worsened on the left compared to the right. He did have Silvadene prescribed for this. He was seen by Dr. Ami Cohen shortly after his radiation and he had history gout ME downsized. He continues to use his feeding tube routinely for nutrition but is beginning to take some food orally but has a swallowing evaluation pending. He continues to follow with medical oncology who saw him prior to her visit today. His only new major complaint today is of 2 days with worsening cough and tightness in his upper chest and pharynx. INTERVAL HISTORY: Mr. Emiliano Humphreys returns today for follow up, 4 months after completing chemo-radiation. His most recent scope, 06/16/17 was negative according to the note of Dr. Ami Cohen and is scheduled for follow up 08/17/17. He continues to drink liquids against the recommendations of Speech Therapy. He reports that he is doing his speech therapy exercises at home, and that his insurance will no longer pay for him to go to speech therapy. He is tolerating 5 cans of Jevity via his feeding tube. His energy is low most days. He is currently on Celexa. He is also followed by palliative care that is managing his anti depressant.      UPDATED PAST MEDICAL HISTORY:  Since our prior encounter, Domo Cisneros has not been hospitalized. There have been no significant changes to the medical history. MEDICATIONS:     Current Outpatient Prescriptions:     oxyCODONE (ROXICODONE) 5 mg/5 mL solution, Take 5-10 mL by mouth every four (4) hours as needed for Pain. Max Daily Amount: 60 mg., Disp: 473 mL, Rfl: 0    clindamycin (CLEOCIN T) 1 % lotion, Apply  to affected area two (2) times daily as needed. use thin film on affected area, Disp: 1 Bottle, Rfl: 0    nystatin (MYCOSTATIN) 100,000 unit/mL suspension, Take 5 mL by mouth four (4) times daily. swish and spit, Disp: 280 mL, Rfl: 0    citalopram (CELEXA) 20 mg tablet, Take 1 Tab by mouth daily. , Disp: 30 Tab, Rfl: 3    LORazepam (ATIVAN) 1 mg tablet, Take 1 Tab by mouth every eight (8) hours as needed for Anxiety. Max Daily Amount: 3 mg. Indications: anxiety, Disp: 90 Tab, Rfl: 1    amoxicillin-clavulanate (AUGMENTIN) 875-125 mg per tablet, Take 1 Tab by mouth two (2) times a day., Disp: 20 Tab, Rfl: 0    allopurinol (ZYLOPRIM) 300 mg tablet, Take 1 Tab by mouth nightly. Indications: GOUT, Disp: 30 Tab, Rfl: 2    glucose blood VI test strips (RELION PRIME TEST STRIPS) strip, Test blood sugar before meals and bedtime Diagnosis code E11.65, Disp: 200 Strip, Rfl: 5    lidocaine-prilocaine (EMLA) topical cream, Apply  to affected area as needed. Apply 1/2 inch amount of cream to port site 90 minutes prior to needle access. , Disp: 30 g, Rfl: 0    prochlorperazine (COMPAZINE) 10 mg tablet, Take 1 Tab by mouth every six (6) hours as needed. , Disp: 120 Tab, Rfl: 3    aspirin delayed-release 81 mg tablet, Take 81 mg by mouth every morning. Take / use AM day of surgery  per anesthesia protocols. Indications: myocardial infarction prevention, Disp: , Rfl:     losartan (COZAAR) 50 mg tablet, 50 mg two (2) times a day.  Indications: Hypertension, Disp: , Rfl:     amLODIPine (NORVASC) 10 mg tablet, Take 10 mg by mouth nightly. Take / use AM day of surgery  per anesthesia protocols. Indications: Hypertension, Disp: , Rfl:     glipiZIDE SR (GLUCOTROL) 10 mg CR tablet, Take 10 mg by mouth as needed. Indications: type 2 diabetes mellitus, Disp: , Rfl:     omeprazole (PRILOSEC) 20 mg capsule, Take 20 mg by mouth every morning. Take / use AM day of surgery  per anesthesia protocols. Indications: GASTROESOPHAGEAL REFLUX, Disp: , Rfl:     ALLERGIES:   Allergies   Allergen Reactions    Zofran [Ondansetron Hcl (Pf)] Other (comments)     Gives pt severe HA       PHYSICAL EXAMINATION:   ECOG Performance status 1  VITAL SIGNS:    Temperature 97.7, respirations 18, pulse 97, blood pressure 146/89, O2 sat 97% ,  Weight 202    GENERAL: The patient is well-developed, ambulatory, alert and in no acute distress. Oral cavity reveals no lesions but dry secretions. NECK: Neck is supple with no masses. There is skin  erythema and lymphedema of the neck. CARDIOVASCULAR: Heart is regular rate and rhythm. RESPIRATORY: Lungs are clear to auscultation. There is normal respiratory effort. LABORATORY: none    RADIOLOGY:  No results found. TUMOR STATUS:  Recently completed therapy, pending repeat evaluation. IMPRESSION:  Latrelle Lanes is a 62 y.o. male [id number] Supraglottic SCC, Stage ARMANDO. He is 4 months following completion of of concurrent Cetuximab and radiation. He had typical side effects from radiation in the setting of a tracheostomy and a locally advanced head and neck cancer. He continues to recover as anticipated from radiation therapy. His most recent scope, 06/16/17 was negative according to the note of Dr. Ronaldo Zuluaga, and is scheduled for follow up 08/17/17. He continues to drink liquids against the recommendations of Speech Therapy. He continues to take 5 cans of Jevity daily via his feeding tube.      PLAN:    1) CT soft tissue neck/chest 3 months, and I will see him shortly there after  2) Refer to [facility] for neck exercises/lymphedema and physical therapy  3) Follow up with Dr. Lorri Nicole and Dr. Ronaldo Zuluaga as scheduled  4) Follow up with palliative care as scheduled       Babita Iglesias NP  07/13/17        Portions of this note were copied from prior encounters and reviewed for accuracy, currency, and represent documentation and tasks completed during this encounter. I verify and attest these portions to be unchanged from prior visits.

## 2017-07-24 ENCOUNTER — HOSPITAL ENCOUNTER (OUTPATIENT)
Dept: PHYSICAL THERAPY | Age: 62
Discharge: HOME | End: 2017-07-24
Payer: COMMERCIAL

## 2017-07-24 PROCEDURE — 97165 OT EVAL LOW COMPLEX 30 MIN: CPT

## 2017-07-24 PROCEDURE — 97140 MANUAL THERAPY 1/> REGIONS: CPT

## 2017-07-24 NOTE — PROGRESS NOTES
Gwendolynariel Angie  : 1955 [facility] [place] [address], [place] [address].  [phone]   [phone]        OUTPATIENT OCCUPATIONAL THERAPY: Initial Assessment 2017    ICD-10: Treatment Diagnosis: Lymphedema, not elsewhere classified (I89.0)  Precautions/Allergies:   Zofran [place] hcl (pf)]   Fall Risk Score: 1 (? 5 = High Risk)  MD Orders: Evaluate and treat lymphedema MEDICAL/REFERRING DIAGNOSIS:   supraglottic cancer POST CHEMO AND RT   DATE OF ONSET: 2016   REFERRING PHYSICIAN: Dionisio Nicholas MD  RETURN PHYSICIAN APPOINTMENT: unknown     INITIAL ASSESSMENT:  Mr. Jamel Betancourt presents with edema in the neck and chin area that is consistent with lymphedema s/p chemotherapy and radiation treatment for supraglottic cancer. Feel he may benefit from skilled occupational therapy to maximize independence with home management of lymphedema. PLAN OF CARE:   PROBLEM LIST:  1. Edema/Girth INTERVENTIONS PLANNED:  1. Manual therapy training  2. Therapeutic activity  3. Therapeutic exercise   TREATMENT PLAN:  Effective Dates: 17 TO 17. Frequency/Duration: 1 time a week for 8 weeks  GOALS: (Goals have been discussed and agreed upon with patient.)  1. The patient/caregiver will verbalize understanding of lymphedema precautions. Goal will be accomplished within 8 week(s). 2. The patient/caregiver will be independent with self-manual lymph drainage techniques and show decrease in volume. Goal will be accomplished within 8 week(s). 3. The patient/caregiver will be independent with home management of lymphedema. Goal will be accomplished in 8 week(s). 4. Patient/caregiver will be independent donning and doffing [employer] Re Facioplasty head and neck compression garment. Goal will be accomplished within 8 week(s).         Rehabilitation Potential For Stated Goals: Good  Regarding Santos Landin's therapy, I certify that the treatment plan above will be carried out by a therapist or under their direction. Thank you for this referral,  Hafsa Peters, OT     Referring Physician Signature: Nicholas Chavez MD _________________________  Date _________            The information in this section was collected on 7/24/17 (except where otherwise noted). OCCUPATIONAL PROFILE & HISTORY:   History of Present Injury/Illness (Reason for Referral):  Patient has completed chemotherapy and radiation treatment for supraglottic cancer and has developed mild edema in the chin and neck region. He has participated in speech therapy and is currently NPO with PEG used for all nutrition. He reports he is doing his home exercises for speech therapy. Past Medical History/Comorbidities:   Mr. 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 He also has no past medical history of Adverse effect of anesthesia; Difficult intubation; Malignant hyperthermia due to anesthesia; or Pseudocholinesterase deficiency. Mr. Job Zeng  has a past surgical history that includes colonoscopy (2016); other surgical (Left); heent; and vascular access. Social History/Living Environment:   Living Alone: No  Support Systems: Spouse/Significant Other/Partner  Current DME Used/Available at Home: None  Prior Level of Function/Work/Activity:  Works part time as a     Current Medications:    Current Outpatient Prescriptions:     amoxicillin-clavulanate (AUGMENTIN) 875-125 mg per tablet, Take 1 Tab by mouth every twelve (12) hours every twelve (12) hours for 14 days. , Disp: 28 Tab, Rfl: 0    sertraline (ZOLOFT) 50 mg tablet, Take 1 Tab by mouth daily. , Disp: 30 Tab, Rfl: 1    glipiZIDE SR (GLUCOTROL XL) 10 mg CR tablet, Take 1 Tab by mouth daily.  Indications: type 2 diabetes mellitus, Disp: 30 Tab, Rfl: 0   oxyCODONE (ROXICODONE) 5 mg/5 mL solution, Take 5-10 mL by mouth every four (4) hours as needed for Pain. Max Daily Amount: 60 mg., Disp: 473 mL, Rfl: 0    clindamycin (CLEOCIN T) 1 % lotion, Apply  to affected area two (2) times daily as needed. use thin film on affected area, Disp: 1 Bottle, Rfl: 0    nystatin (MYCOSTATIN) 100,000 unit/mL suspension, Take 5 mL by mouth four (4) times daily. swish and spit, Disp: 280 mL, Rfl: 0    LORazepam (ATIVAN) 1 mg tablet, Take 1 Tab by mouth every eight (8) hours as needed for Anxiety. Max Daily Amount: 3 mg. Indications: anxiety, Disp: 90 Tab, Rfl: 1    amoxicillin-clavulanate (AUGMENTIN) 875-125 mg per tablet, Take 1 Tab by mouth two (2) times a day., Disp: 20 Tab, Rfl: 0    allopurinol (ZYLOPRIM) 300 mg tablet, Take 1 Tab by mouth nightly. Indications: GOUT, Disp: 30 Tab, Rfl: 2    glucose blood VI test strips (RELION PRIME TEST STRIPS) strip, Test blood sugar before meals and bedtime Diagnosis code E11.65, Disp: 200 Strip, Rfl: 5    lidocaine-prilocaine (EMLA) topical cream, Apply  to affected area as needed. Apply 1/2 inch amount of cream to port site 90 minutes prior to needle access. , Disp: 30 g, Rfl: 0    prochlorperazine (COMPAZINE) 10 mg tablet, Take 1 Tab by mouth every six (6) hours as needed. , Disp: 120 Tab, Rfl: 3    aspirin delayed-release 81 mg tablet, Take 81 mg by mouth every morning. Take / use AM day of surgery  per anesthesia protocols. Indications: myocardial infarction prevention, Disp: , Rfl:     losartan (COZAAR) 50 mg tablet, 50 mg two (2) times a day. Indications: Hypertension, Disp: , Rfl:     amLODIPine (NORVASC) 10 mg tablet, Take 10 mg by mouth nightly. Take / use AM day of surgery  per anesthesia protocols. Indications: Hypertension, Disp: , Rfl:     omeprazole (PRILOSEC) 20 mg capsule, Take 20 mg by mouth every morning. Take / use AM day of surgery  per anesthesia protocols.   Indications: GASTROESOPHAGEAL REFLUX, Disp: , Rfl: Date Last Reviewed:  7/24/2017   Complexity Level : Expanded review of therapy/medical records (1-2):  MODERATE COMPLEXITY   ASSESSMENT OF OCCUPATIONAL PERFORMANCE:   Palpation:          No significant fibrosis noted in soft tissue of the neck  ROM:          WDLs  Skin Integrity:          Small opening from tracheostomy   Edema/Girth:  Mild edema noted in the neck/chin region. Patient reports it is worse at times, especially when he lays flat. Physical Skills Involved:  1. Edema Cognitive Skills Affected (resulting in the inability to perform in a timely and safe manner): 1. none Psychosocial Skills Affected:  1. Habits/Routines   Number of elements that affect the Plan of Care: 1-3:  LOW COMPLEXITY   CLINICAL DECISION MAKING:   Outcome Measure: Tool Used: Lymphedema Life Impact Scale   Score:  Initial: 11 Most Recent: X (Date: -- )   Interpretation of Score: The Lymphedema Life Impact Scale (LLIS) is a validated instrument that measures the physical, functional, and psychosocial concerns pertinent to patients with extremity lymphedema. The Scale's questionnaire is administered to patients to gauge impairments, activity limitations, and participation restrictions resulting from their lymphedema. Score 0 1-13 14-26 27-40 41-54 55-67 68   Modifier CH CI CJ CK CL CM CN       Medical Necessity:   · Patient demonstrates good rehab potential due to higher previous functional level. Reason for Services/Other Comments:  · Patient continues to require skilled intervention due to decreased independence with home management of lymphedema. Assessment process, impact of co-morbidities, assessment modification\need for assistance, and selection of interventions: Analytical Complexity:LOW COMPLEXITY   TREATMENT:   (In addition to Assessment/Re-Assessment sessions the following treatments were rendered)    Pre-treatment Symptoms/Complaints:  Patient states, \"I am tired all the time.  I could go home and sleep right now. \"  Pain: Initial: Pain Intensity 1: 0  Post Session:  0/10     Manual Therapy: (20 mintues): Manual lymphatic drainage was utilized and necessary because of the patient's edema in the neck and chin region. Began with stationary circles in the bilateral axilla x 10 reps, then dynamic stationary circles in beginning in the health upper quadrants posteriorly, then from the posterior neck to the axilla. Stationary circles on the face and chin from midline bilaterally towards the ears to the posterior thorax x 5 minutes on each side. Rework the posterior neck, thorax, and axillary lymph nodes x 3 reps. Educated patient re: use of [facility] compression garment for the head and neck to be worn at night and at home only during the day. Treatment/Session Assessment:    · Response to Treatment:  Patient agreeable to obtain the compression garment then call to schedule the next appointment. · Compliance with Program/Exercises: Will assess as treatment progresses. · Recommendations/Intent for next treatment session: \"Next visit will focus on educating patient's spouse re: manual lymph drainage\".   Total Treatment Duration:OT Patient Time In/Time Out  Time In: 1110  Time Out: Megan Newby 105, OT

## 2017-07-24 NOTE — PROGRESS NOTES
Ambulatory/Rehab Services H2 Model Falls Risk Assessment    Risk Factor Pts. ·   Confusion/Disorientation/Impulsivity  []    4 ·   Symptomatic Depression  []   2 ·   Altered Elimination  []   1 ·   Dizziness/Vertigo  []   1 ·   Gender (Male)  [x]   1 ·   Any administered antiepileptics (anticonvulsants):  []   2 ·   Any administered benzodiazepines:  []   1 ·   Visual Impairment (specify):  []   1 ·   Portable Oxygen Use  []   1 ·   Orthostatic ? BP  []   1 ·   History of Recent Falls (within 3 mos.)  []   5     Ability to Rise from Chair (choose one) Pts. ·   Ability to rise in a single movement  [x]   0 ·   Pushes up, successful in one attempt  []   1 ·   Multiple attempts, but successful  []   3 ·   Unable to rise without assistance  []   4   Total: (5 or greater = High Risk) 1     Falls Prevention Plan:   []                Physical Limitations to Exercise (specify):   []                Mobility Assistance Device (type):   []                Exercise/Equipment Adaptation (specify):    ©2010 [facility] [place] Patent #[id number].  Federal Law prohibits the replication, distribution or use without written permission from [facility] [employer]

## 2017-08-10 ENCOUNTER — HOSPITAL ENCOUNTER (OUTPATIENT)
Dept: LAB | Age: 62
Discharge: HOME | End: 2017-08-10
Payer: COMMERCIAL

## 2017-08-10 DIAGNOSIS — C09.9 TONSIL CANCER (HCC): ICD-10-CM

## 2017-08-10 DIAGNOSIS — E11.65 TYPE 2 DIABETES MELLITUS WITH HYPERGLYCEMIA, UNSPECIFIED LONG TERM INSULIN USE STATUS: ICD-10-CM

## 2017-08-10 LAB
ALBUMIN SERPL BCP-MCNC: 3.3 G/DL (ref 3.2–4.6)
ALBUMIN/GLOB SERPL: 1 {RATIO} (ref 1.2–3.5)
ALP SERPL-CCNC: 60 U/L (ref 50–136)
ALT SERPL-CCNC: 23 U/L (ref 12–65)
ANION GAP BLD CALC-SCNC: 2 MMOL/L (ref 7–16)
AST SERPL W P-5'-P-CCNC: 15 U/L (ref 15–37)
BASOPHILS # BLD AUTO: 0 K/UL (ref 0–0.2)
BASOPHILS # BLD: 0 % (ref 0–2)
BILIRUB SERPL-MCNC: 0.9 MG/DL (ref 0.2–1.1)
BUN SERPL-MCNC: 22 MG/DL (ref 8–23)
CALCIUM SERPL-MCNC: 8.7 MG/DL (ref 8.3–10.4)
CHLORIDE SERPL-SCNC: 101 MMOL/L (ref 98–107)
CO2 SERPL-SCNC: 34 MMOL/L (ref 21–32)
CREAT SERPL-MCNC: 1.05 MG/DL (ref 0.8–1.5)
DIFFERENTIAL METHOD BLD: ABNORMAL
EOSINOPHIL # BLD: 0.1 K/UL (ref 0–0.8)
EOSINOPHIL NFR BLD: 1 % (ref 0.5–7.8)
ERYTHROCYTE [DISTWIDTH] IN BLOOD BY AUTOMATED COUNT: 14.1 % (ref 11.9–14.6)
EST. AVERAGE GLUCOSE BLD GHB EST-MCNC: NORMAL MG/DL
GLOBULIN SER CALC-MCNC: 3.2 G/DL (ref 2.3–3.5)
GLUCOSE SERPL-MCNC: 208 MG/DL (ref 65–100)
HBA1C MFR BLD: 4.9 % (ref 4.8–6)
HCT VFR BLD AUTO: 30.4 % (ref 41.1–50.3)
HGB BLD-MCNC: 10.5 G/DL (ref 13.6–17.2)
LYMPHOCYTES # BLD AUTO: 9 % (ref 13–44)
LYMPHOCYTES # BLD: 0.6 K/UL (ref 0.5–4.6)
MCH RBC QN AUTO: 30.3 PG (ref 26.1–32.9)
MCHC RBC AUTO-ENTMCNC: 34.5 G/DL (ref 31.4–35)
MCV RBC AUTO: 87.9 FL (ref 79.6–97.8)
MONOCYTES # BLD: 0.4 K/UL (ref 0.1–1.3)
MONOCYTES NFR BLD AUTO: 6 % (ref 4–12)
NEUTS SEG # BLD: 5.4 K/UL (ref 1.7–8.2)
NEUTS SEG NFR BLD AUTO: 84 % (ref 43–78)
NRBC # BLD: 0 K/UL (ref 0–0.2)
PLATELET # BLD AUTO: 130 K/UL (ref 150–450)
PMV BLD AUTO: 8.7 FL (ref 10.8–14.1)
POTASSIUM SERPL-SCNC: 3.6 MMOL/L (ref 3.5–5.1)
PROT SERPL-MCNC: 6.5 G/DL (ref 6.3–8.2)
RBC # BLD AUTO: 3.46 M/UL (ref 4.23–5.67)
SODIUM SERPL-SCNC: 137 MMOL/L (ref 136–145)
T4 FREE SERPL-MCNC: 1.1 NG/DL (ref 0.78–1.46)
TSH SERPL DL<=0.005 MIU/L-ACNC: 0.76 UIU/ML (ref 0.36–3.74)
WBC # BLD AUTO: 6.4 K/UL (ref 4.3–11.1)

## 2017-08-10 PROCEDURE — 83036 HEMOGLOBIN GLYCOSYLATED A1C: CPT | Performed by: INTERNAL MEDICINE

## 2017-08-10 PROCEDURE — 84443 ASSAY THYROID STIM HORMONE: CPT | Performed by: INTERNAL MEDICINE

## 2017-08-10 PROCEDURE — 85025 COMPLETE CBC W/AUTO DIFF WBC: CPT | Performed by: INTERNAL MEDICINE

## 2017-08-10 PROCEDURE — 80053 COMPREHEN METABOLIC PANEL: CPT | Performed by: INTERNAL MEDICINE

## 2017-08-10 PROCEDURE — 84439 ASSAY OF FREE THYROXINE: CPT | Performed by: INTERNAL MEDICINE

## 2017-08-14 ENCOUNTER — PATIENT OUTREACH (OUTPATIENT)
Dept: CASE MANAGEMENT | Age: 62
End: 2017-08-14

## 2017-08-16 ENCOUNTER — TELEPHONE (OUTPATIENT)
Dept: CASE MANAGEMENT | Age: 62
End: 2017-08-16

## 2017-09-13 ENCOUNTER — HOSPITAL ENCOUNTER (OUTPATIENT)
Dept: GENERAL RADIOLOGY | Age: 62
Discharge: HOME | End: 2017-09-13
Attending: INTERNAL MEDICINE
Payer: COMMERCIAL

## 2017-09-13 DIAGNOSIS — Z78.9 ON ENTERAL NUTRITION: ICD-10-CM

## 2017-09-13 DIAGNOSIS — C09.9 TONSIL CANCER (HCC): ICD-10-CM

## 2017-09-13 PROCEDURE — 74230 X-RAY XM SWLNG FUNCJ C+: CPT

## 2017-09-13 PROCEDURE — 74011000255 HC RX REV CODE- 255: Performed by: INTERNAL MEDICINE

## 2017-09-13 PROCEDURE — 92611 MOTION FLUOROSCOPY/SWALLOW: CPT

## 2017-09-13 RX ADMIN — BARIUM SULFATE 30 ML: 400 SUSPENSION ORAL at 09:43

## 2017-09-13 RX ADMIN — BARIUM SULFATE 30 ML: 980 POWDER, FOR SUSPENSION ORAL at 09:43

## 2017-09-13 RX ADMIN — BARIUM SULFATE 15 ML: 400 PASTE ORAL at 09:43

## 2017-09-13 NOTE — PROGRESS NOTES
Sierra Masters  : 1955 [facility]  [address] [facility], [address] [place] [address], [address]  [phone]   [phone]       OUTPATIENT SPEECH LANGUAGE PATHOLOGY: MODIFIED BARIUM SWALLOW    ICD-10: Treatment Diagnosis: Oropharyngeal dysphagia (R13.12)  DATE: 2017  REFERRING PHYSICIAN: Casey Lake MD MD Orders: Modified Barium Swallow   PAST MEDICAL HISTORY:   Mr. Mala Alfredo is a 65 y.o. male who  has a past medical history of GERD (gastroesophageal reflux disease); Gout; Hypertension; Morbid obesity ([facility] [address] Squamous cell carcinoma of epiglottis (HCC) (2016); and Type 2 diabetes mellitus ([facility] [address] He also has no past medical history of Adverse effect of anesthesia; Difficult intubation; Malignant hyperthermia due to anesthesia; or Pseudocholinesterase deficiency. He also  has a past surgical history that includes colonoscopy (2016); other surgical (Left); heent; and vascular access. RADIOLOGIST:  Dr. Prashant Parker  MEDICAL/REFERRING DIAGNOSIS: On enteral nutrition [Z78.9]  Tonsil cancer ([facility] [address] [C09.9]    PRECAUTIONS/ALLERGIES: Zofran [facility] [place] hcl (pf)]   ASSESSMENT/PLAN OF CARE:  Based on the objective data described below, the patient presents with improved swallow function compared to prior Modified Barium Swallow (MBS) studies from  and 2017. Patient initially with silent aspiration of thin liquids by cup that improved with use of small sips and supraglottic swallow technique. Subsequent trials of thin liquid by cup, nectar liquid by cup, pudding, mixed consistency and cracker all with laryngeal penetration without aspiration during the swallow that cleared with volitional throat clear when using supraglottic swallow technique. Patient reports he has been eating regular textures and thin liquids despite NPO recommendations. He denies pneumonia, though he has not had a chest xray.  He reports he is no longer attending speech therapy, but is doing his laryngeal exercises daily. At this point, recommend PO for pleasure regular textures and thin liquids using supraglottic swallow technique. Patient should continue to perform laryngeal exercises daily on his own. No further MBS recommended unless patient has decline in medical status. RECOMMENDATIONS AND PLANNED INTERVENTIONS (Benefits and precautions of therapy have been discussed with the patient.):  · PO:  Regular  · Liquids:  regular thin  MEDICATIONS:  · Non-oral  COMPENSATORY STRATEGIES/MODIFICATIONS INCLUDING:  · Cup/sip  · Supraglottic swallow  · Small sips and bites  OTHER RECOMMENDATIONS (including follow up treatment recommendations):   · Laryngeal exercises    Thank you for this referral,  Conception AYAKA Thakur, CCC-SLP  SUBJECTIVE:  Patient pleasant and cooperative. Present Symptoms: epiglottal cancer, history of silent aspiration     Current Dietary Status:  PEG feedings with PO pleasure    OBJECTIVE:  Objective Measure: Tool Used: National Outcomes Measurement System: Functional Communication Measures: SWALLOWING  Score:  Initial: 4 Most Recent: X (Date: -- )   Interpretation of Tool: This measure describes the change in functional communication status subsequent to speech-language pathology treatment of patients with dysphagia.  o Level 1:  Individual is not able to swallow anything safely by mouth. All nutrition and hydration is received through non-oral means (e.g., nasogastric tube, PEG). o Level 2: Individual is not able to swallow safely by mouth for nutrition and hydration, but may take some consistency with consistent maximal cues in therapy only. Alternative method of feeding required. o Level 3:  Alternative method of feeding required as individual takes less than 50% of nutrition and hydration by mouth, and/or swallowing is safe with consistent use of moderate cues to use compensatory strategies and/or requires maximum diet restriction.   o Level 4:  Swallowing is safe, but usually requires moderate cues to use compensatory strategies, and/or the individual has moderate diet restrictions and/or still requires tube feeding and/or oral supplements. o Level 5:  Swallowing is safe with minimal diet restriction and/or occasionally requires minimal cueing to use compensatory strategies. The individual may occasionally self-cue. All nutrition and hydration needs are met by mouth at mealtime. o Level 6:  Swallowing is safe, and the individual eats and drinks independently and may rarely require minimal cueing. The individual usually self-cues when difficulty occurs. May need to avoid specific food items (e.g., popcorn and nuts), or require additional time (due to dysphagia). o Level 7: The individuals ability to eat independently is not limited by swallow function. Swallowing would be safe and efficient for all consistencies. Compensatory strategies are effectively used when needed. Score Level 7 Level 6 Level 5 Level 4 Level 3 Level 2 Level 1   Modifier CH CI CJ CK CL CM CN   ? Swallowing:     - CURRENT STATUS: CK - 40%-59% impaired, limited or restricted    - GOAL STATUS:  CK - 40%-59% impaired, limited or restricted    - D/C STATUS:  CK - 40%-59% impaired, limited or restricted        Cognitive/Communication Status:  Mental Status  Neurologic State: Alert  Orientation Level: Appropriate for age  Cognition: Appropriate decision making  Perception: Appears intact  Perseveration: No perseveration noted  Safety/Judgement: Insight into deficits    Oral Assessment:  Oral Assessment  Labial: No impairment  Dentition: Partials (comment)  Lingual: No impairment  Velum: No impairment    Vocal Quality: adequate; wheezing    Patient Viewed:    Film Views: Lateral, Fluoro    Oral Prepatory:  The patient was given the following: Consistency Presented:  Thin liquid, Nectar thick liquid, Pudding, Mixed consistency, Solid  How Presented: Self-fed/presented, SLP-fed/presented, Cup/sip, Spoon    Oral Phase:  Bolus Acceptance: No impairment  Bolus Formation/Control: No impairment  Propulsion: No impairment     Oral Residue: None  Initiation of Swallow: No impairment  Oral Phase Severity: No impairment    Pharyngeal Phase:  Timing: No impairment  Decreased Tongue Base Retraction?: No  Laryngeal Elevation: Inadequate epiglottic inversion, Incomplete laryngeal closure  Penetration: During swallow, Before swallow, To cords, To laryngeal vestibule, From initial swallow  Aspiration/Timing: Silent , During, From initial swallow  Aspiration/Penetration Score: 8 (Aspiration-Contrast passes cords/glottis with no effort to eject, ie/silent aspiration)     Pharyngeal Dysfunction: Decreased elevation/closure  Pharyngeal Phase Severity: Mild moderate  Pharyngeal-Esophageal Segment: No impairment    Assessment/Reassessment only, no treatment provided today   ____________________________________________________  Recommendations for treatment: continue with laryngeal exercises  Total Treatment Duration:  Time In: 0930   Time Out: 320 Dawit Leach MA, CCC-SLP

## 2017-10-17 ENCOUNTER — HOSPITAL ENCOUNTER (OUTPATIENT)
Dept: CT IMAGING | Age: 62
Discharge: HOME | End: 2017-10-17
Attending: RADIOLOGY
Payer: COMMERCIAL

## 2017-10-17 VITALS — BODY MASS INDEX: 27.09 KG/M2 | HEIGHT: 72 IN | WEIGHT: 200 LBS

## 2017-10-17 DIAGNOSIS — C76.0 CANCER OF HEAD AND NECK (HCC): ICD-10-CM

## 2017-10-17 LAB — CREAT BLD-MCNC: 1.2 MG/DL (ref 0.8–1.5)

## 2017-10-17 PROCEDURE — 70491 CT SOFT TISSUE NECK W/DYE: CPT

## 2017-10-17 PROCEDURE — 74011000258 HC RX REV CODE- 258: Performed by: RADIOLOGY

## 2017-10-17 PROCEDURE — 74011636320 HC RX REV CODE- 636/320: Performed by: RADIOLOGY

## 2017-10-17 PROCEDURE — 82565 ASSAY OF CREATININE: CPT

## 2017-10-17 RX ORDER — SODIUM CHLORIDE 0.9 % (FLUSH) 0.9 %
10 SYRINGE (ML) INJECTION
Status: COMPLETED | OUTPATIENT
Start: 2017-10-17 | End: 2017-10-17

## 2017-10-17 RX ADMIN — Medication 10 ML: at 10:09

## 2017-10-17 RX ADMIN — SODIUM CHLORIDE 100 ML: 900 INJECTION, SOLUTION INTRAVENOUS at 10:09

## 2017-10-17 RX ADMIN — IOPAMIDOL 80 ML: 755 INJECTION, SOLUTION INTRAVENOUS at 10:09

## 2017-10-18 ENCOUNTER — HOSPITAL ENCOUNTER (OUTPATIENT)
Dept: RADIATION ONCOLOGY | Age: 62
Discharge: HOME | End: 2017-10-18
Payer: COMMERCIAL

## 2017-10-18 ENCOUNTER — PATIENT OUTREACH (OUTPATIENT)
Dept: CASE MANAGEMENT | Age: 62
End: 2017-10-18

## 2017-10-18 VITALS
TEMPERATURE: 98 F | OXYGEN SATURATION: 97 % | RESPIRATION RATE: 18 BRPM | BODY MASS INDEX: 28.32 KG/M2 | SYSTOLIC BLOOD PRESSURE: 142 MMHG | HEART RATE: 96 BPM | DIASTOLIC BLOOD PRESSURE: 72 MMHG | WEIGHT: 208.8 LBS

## 2017-10-18 PROCEDURE — 99211 OFF/OP EST MAY X REQ PHY/QHP: CPT

## 2017-10-18 PROCEDURE — 31575 DIAGNOSTIC LARYNGOSCOPY: CPT

## 2017-10-18 RX ORDER — LISINOPRIL AND HYDROCHLOROTHIAZIDE 20; 25 MG/1; MG/1
0.5 TABLET ORAL DAILY
COMMUNITY
End: 2018-09-24

## 2017-10-18 RX ORDER — PREDNISONE 20 MG/1
TABLET ORAL
Qty: 60 TAB | Refills: 1 | Status: SHIPPED | OUTPATIENT
Start: 2017-10-18 | End: 2017-10-18 | Stop reason: ALTCHOICE

## 2017-10-18 RX ORDER — PREDNISONE 20 MG/1
20 TABLET ORAL DAILY
COMMUNITY
Start: 2017-10-18 | End: 2017-12-21 | Stop reason: ALTCHOICE

## 2017-10-18 NOTE — PROGRESS NOTES
Patient: Marya Rodriguez MRN: [id number]  SSN: [id number]    YOB: 1955  Age: 65 y.o. Sex: male      Other Providers: Bibiana Man MD, Keisha Linder MD     CHIEF COMPLAINT: Dysphagia and hoarseness     DIAGNOSIS: [id number] Supraglottic SCC, Stage ARMANDO.      PREVIOUS TREATMENT:  1. Debridement and biopsy 11/14/16  2. 2 cycles of neoadjuvant TPF chemotherapy. 3. Completion of concurrent Cetuximab and radiation 4/11/17, 70 Gy in 35 fractions. INTERVAL HISTORY:  Marya Rodriguez is a 65 y.o. male being seen back in regular follow up after completion of his radiation 4/11/17. Regarding his history, his only pertinent medical history includes DMII, HTN, and obesity. Beginning in the fall 2016, he began to note some difficulty with dysphagia as well as some change in his voice. Ultimately, there was some left-sided otalgia for which he sought help from his primary care physician. A course of antibiotics did not prove helpful and ultimately he was referred to Dr. Claire Herrera for evaluation. He was a prior smoker but quit nearly 11 years prior to presentation. He did not abuse alcohol. Flexible laryngoscopy demonstrated a large mass involving the entire epiglottis. The tumor appeared to involve the aryepiglottic folds. The vocal cords were not visualized. CT scan November 3, 2016 demonstrated 3.9 x 2.6 x 2.3 cm supraglottic mass extending across midline. Inferiorly, it appeared to involve the left true vocal cord. The overlying thyroid cartilage seemed preserved. There was a level 3 lymph node on the left side measuring 1.8 cm in short axis. On 11/14/16, the patient was taken to the OR for panendoscopy where Dr. Claire Herrera indicate that he visualized the true and false vocal cords both of which appeared uninvolved by the tumor. He also did micro-debridement, subsequent to which the patient's voice appeared to be improved. The biopsy was consistent with in situ and infiltrating poorly differentiated squamous carcinoma.  The patient has been seen at [address], where surgery was offered. The patient was averse to the notion of a tracheostomy if that was a possibility and therefore sought evaluation from Dr. Alice Richardson in oncology who saw him 12/20/16. Per Dr. Valerio Krause note, he was a candidate for chemoradiation. He ordered a PET/CT and referred him to our office. I agreed with the plan and completed treatment 4/11/17. Unfortunately prior to commencement with radiation, he did require tracheotomy. With the anticipated delayed in starting radiation, he did start chemotherapy, TPF, and received 2 cycles prior to starting radiation with cetuximab. Overall he did fairly well with treatment, but did have the anticipated irritation and difficulty swallowing. He did get dry mouth and neck erythema worsened on the left compared to the right. He did have Silvadene prescribed for this. He was seen by Dr. Love Dakins shortly after his radiation and had his trach downsized. He continues to use his feeding tube but is able to tolerate most solids and liquids. He continues to follow with medical oncology and ENT who removed his trach 5/31/17. As a result of his issues, he had lost nearly 50 pounds. He was seen 6 months post treatment with continued follow up with Dr. Love Dakins and Dr. Alice Richardson over this time. He is no longer using his feeding tube. UPDATED PAST MEDICAL HISTORY:  Since our prior encounter, Cory Reilly has not been hospitalized. There have been no significant changes to the medical history. MEDICATIONS:     Current Outpatient Prescriptions:     predniSONE (DELTASONE) 20 mg tablet, 1 Tab daily for 30 days, than 1/2 tab for 2 weeks, then stop., Disp: 60 Tab, Rfl: 1    glipiZIDE SR (GLUCOTROL XL) 10 mg CR tablet, Take 1 Tab by mouth daily. Indications: type 2 diabetes mellitus, Disp: 30 Tab, Rfl: 2    omeprazole (PRILOSEC) 20 mg capsule, Take 1 Cap by mouth every morning. Take / use AM day of surgery  per anesthesia protocols. Indications: gastroesophageal reflux disease, Disp: 30 Cap, Rfl: 5    clindamycin (CLEOCIN T) 1 % lotion, Apply  to affected area two (2) times daily as needed. use thin film on affected area, Disp: 1 Bottle, Rfl: 0    allopurinol (ZYLOPRIM) 300 mg tablet, Take 1 Tab by mouth nightly. Indications: GOUT, Disp: 30 Tab, Rfl: 2    glucose blood VI test strips (RELION PRIME TEST STRIPS) strip, Test blood sugar before meals and bedtime Diagnosis code E11.65, Disp: 200 Strip, Rfl: 5    lidocaine-prilocaine (EMLA) topical cream, Apply  to affected area as needed. Apply 1/2 inch amount of cream to port site 90 minutes prior to needle access. , Disp: 30 g, Rfl: 0    aspirin delayed-release 81 mg tablet, Take 81 mg by mouth every morning. Take / use AM day of surgery  per anesthesia protocols. Indications: myocardial infarction prevention, Disp: , Rfl:     losartan (COZAAR) 50 mg tablet, 50 mg two (2) times a day. Indications: Hypertension, Disp: , Rfl:     amLODIPine (NORVASC) 10 mg tablet, Take 10 mg by mouth nightly. Take / use AM day of surgery  per anesthesia protocols. Indications: Hypertension, Disp: , Rfl:     ALLERGIES:   Allergies   Allergen Reactions    Zofran [Ondansetron Hcl (Pf)] Other (comments)     Gives pt severe HA       PHYSICAL EXAMINATION:   ECOG Performance status 1  VITAL SIGNS:   Visit Vitals    /72    Pulse 96    Temp 98 °F (36.7 °C)    Resp 18    Wt 94.7 kg (208 lb 12.8 oz)    SpO2 97%    BMI 28.32 kg/m2    . GENERAL: The patient is well-developed, ambulatory, alert and in no acute distress. HEENT: Head is normocephalic, atraumatic. Pupils are equal, round and reactive to light and accommodation. Extraocular movement intact. Hearing is intact bilaterally to finger rub. Oral cavity reveals no lesions but dry secretions. NECK: Neck is supple with no masses. There is substantial submental edema. CARDIOVASCULAR: Heart is regular rate and rhythm.  There are no murmurs rubs or gallups. Radial pulses are 2+ RESPIRATORY: his grasping and upper airway edema is audible. Lungs are clear to auscultation and percussion. There is normal respiratory effort. GASTROINTESTINAL: The abdomen is soft, non-tender, nondistended with no hepatospelnomagaly. Digital rectal examination: deferred Feeding tube in place. LYMPHATIC: There is no cervical, supraclavicular or axillary lymphadenopathy bilaterally. PROCEDURE NOTE:  Laryngoscopy. The nares was sprayed with topical anesthetic and the scope inserted and advanced to the nasopharynx and through the oropharynx visualizing normal mucosal structures in the pharyngeal and palatine tonsils. There is again edema noted in the supraglottic region perhaps worsened than previously. There is however no sign of local recurrence. The vocal cords were normal and approximated at midline. In all, I would call this a nice response to therapy to date. The scope was then withdrawn from the nasal cavity. The patient tolerated the procedure well and there were no complications.       LABORATORY:   Lab Results   Component Value Date/Time    Sodium 137 08/10/2017 09:28 AM    Potassium 3.6 08/10/2017 09:28 AM    Chloride 101 08/10/2017 09:28 AM    CO2 34 08/10/2017 09:28 AM    Anion gap 2 08/10/2017 09:28 AM    Glucose 208 08/10/2017 09:28 AM    BUN 22 08/10/2017 09:28 AM    Creatinine 1.05 08/10/2017 09:28 AM    GFR est AA >60 08/10/2017 09:28 AM    GFR est non-AA >60 08/10/2017 09:28 AM    Calcium 8.7 08/10/2017 09:28 AM    Magnesium 2.4 04/27/2017 09:04 AM    Phosphorus 3.0 01/20/2017 10:57 AM    Albumin 3.3 08/10/2017 09:28 AM    Protein, total 6.5 08/10/2017 09:28 AM    Globulin 3.2 08/10/2017 09:28 AM    A-G Ratio 1.0 08/10/2017 09:28 AM    AST (SGOT) 15 08/10/2017 09:28 AM    ALT (SGPT) 23 08/10/2017 09:28 AM     Lab Results   Component Value Date/Time    WBC 6.4 08/10/2017 09:28 AM    HGB 10.5 08/10/2017 09:28 AM    HCT 30.4 08/10/2017 09:28 AM    PLATELET 130 08/10/2017 09:28 AM       RADIOLOGY:  I have personally reviewed the imaging and agree with the reports below. Ct Neck Soft Tissue W Cont    Result Date: 10/17/2017  CT of the Neck INDICATION:  Supraglottic cancer, follow-up chemotherapy Multiple axial images were obtained through the neck after intravenous injection of 80mL of Isovue 370. Radiation dose reduction techniques were used for this study: All CT scans performed at this facility use one or all of the following: Automated exposure control, adjustment of the mA and/or kVp according to patient's size, iterative reconstruction. Compared with 06/07/2017. FINDINGS: There is increased supraglottic edema. No new enhancing mass is seen. There are still no cartilage or bone erosion. .  There is no developing adenopathy. . There is normal enhancement of the major vessels. Sinuses are clear. There are no bony lesions. There is mild Siemens. There are no pulmonary nodules in the lung apices. IMPRESSION:  Increased subglottic edema. No recurrent tumor is identified. TUMOR STATUS:  Excellent partial response based on CT. IMPRESSION:  Cory Reilly is a 65 y.o. male now 6 months out from treatment. He had what I would coin as typical effects from radiation in the setting of a tracheostomy and a locally advanced head and neck cancer. He is continuing to follow with medical oncology as well as ENT. I'm pleased with his current status but disappointed with his amount of edema. I was again encouraged with the CT scan despite the increased edema. I would agree the response is favorable at this point without any concern for local progression or site of spread outside of the radiation field. Similarly, his laryngoscopy was favorable for the absence of recurrence, but again noted was significant edema which I hope will diminish over the next few months.   I will go ahead and put him on a short course of steroid with taper and advised him to do massage. I also asked him to be more active so he can gain some energy. We will also have his feeding tube and port removed    PLAN:    1) Patient is recovering as anticipated from his prior course of radiotherapy. Will continue to follow with speech therapy. Feeding tube and port to be removed. Short course of steroids for substantial laryngeal edema. 2) Future follow up will be coordinated with Dr. Efrem Mancilla and Dr. Milton Hunter. I will see him back in 3 months. Portions of this note were copied from prior encounters and reviewed for accuracy, currency, and represent documentation and tasks completed during this encounter. I verify and attest these portions to be unchanged from prior visits.     Nahun Hopson MD  10/18/17

## 2017-10-18 NOTE — PROGRESS NOTES
I called IR and left message to schedule pt for port removal. Sent message to Wei Quinn to arrange feeding tube removal. All per Tiffany Davies/Dr Cari Jeffery.

## 2017-10-18 NOTE — PROGRESS NOTES
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  10/17/17 CT scan indicated increased edema. Pt still has the feeding tube and port a cath in place. He stated he doesn't ever feel good and has no energy. Pt stated he attended [facility] to help reduce edema in his neck, and also for general strengthening, but that \"it didn't do any good. \"  An endoscopy was performed today by Dr. Albania Clifton after pt's right nostril was sprayed with numbing agent.

## 2017-10-19 ENCOUNTER — APPOINTMENT (OUTPATIENT)
Dept: RADIATION ONCOLOGY | Age: 62
End: 2017-10-19
Payer: COMMERCIAL

## 2017-10-20 ENCOUNTER — PATIENT OUTREACH (OUTPATIENT)
Dept: CASE MANAGEMENT | Age: 62
End: 2017-10-20

## 2017-10-26 ENCOUNTER — HOSPITAL ENCOUNTER (OUTPATIENT)
Dept: INTERVENTIONAL RADIOLOGY/VASCULAR | Age: 62
Discharge: HOME | End: 2017-10-26
Attending: RADIOLOGY
Payer: COMMERCIAL

## 2017-10-26 VITALS
TEMPERATURE: 98.2 F | RESPIRATION RATE: 12 BRPM | OXYGEN SATURATION: 96 % | DIASTOLIC BLOOD PRESSURE: 76 MMHG | SYSTOLIC BLOOD PRESSURE: 155 MMHG | HEART RATE: 89 BPM

## 2017-10-26 DIAGNOSIS — C09.9 TONSIL CANCER (HCC): ICD-10-CM

## 2017-10-26 LAB — GLUCOSE BLD STRIP.AUTO-MCNC: 123 MG/DL (ref 65–100)

## 2017-10-26 PROCEDURE — 82962 GLUCOSE BLOOD TEST: CPT

## 2017-10-26 PROCEDURE — 99153 MOD SED SAME PHYS/QHP EA: CPT

## 2017-10-26 PROCEDURE — 77030031139 HC SUT VCRL2 J&J -A

## 2017-10-26 PROCEDURE — 74011250636 HC RX REV CODE- 250/636

## 2017-10-26 PROCEDURE — 77030010507 HC ADH SKN DERMBND J&J -B

## 2017-10-26 PROCEDURE — 36590 REMOVAL TUNNELED CV CATH: CPT

## 2017-10-26 PROCEDURE — 99152 MOD SED SAME PHYS/QHP 5/>YRS: CPT

## 2017-10-26 PROCEDURE — 74011000250 HC RX REV CODE- 250: Performed by: RADIOLOGY

## 2017-10-26 RX ORDER — LIDOCAINE HYDROCHLORIDE AND EPINEPHRINE 20; 5 MG/ML; UG/ML
1.5 INJECTION, SOLUTION EPIDURAL; INFILTRATION; INTRACAUDAL; PERINEURAL ONCE
Status: COMPLETED | OUTPATIENT
Start: 2017-10-26 | End: 2017-10-26

## 2017-10-26 RX ORDER — LIDOCAINE HYDROCHLORIDE 20 MG/ML
2-20 INJECTION, SOLUTION INFILTRATION; PERINEURAL
Status: DISCONTINUED | OUTPATIENT
Start: 2017-10-26 | End: 2017-10-30 | Stop reason: HOSPADM

## 2017-10-26 RX ORDER — MIDAZOLAM HYDROCHLORIDE 1 MG/ML
.5-2 INJECTION, SOLUTION INTRAMUSCULAR; INTRAVENOUS
Status: DISCONTINUED | OUTPATIENT
Start: 2017-10-26 | End: 2017-10-30 | Stop reason: HOSPADM

## 2017-10-26 RX ORDER — FENTANYL CITRATE 50 UG/ML
25-50 INJECTION, SOLUTION INTRAMUSCULAR; INTRAVENOUS
Status: DISCONTINUED | OUTPATIENT
Start: 2017-10-26 | End: 2017-10-30 | Stop reason: HOSPADM

## 2017-10-26 RX ADMIN — FENTANYL CITRATE 50 MCG: 50 INJECTION, SOLUTION INTRAMUSCULAR; INTRAVENOUS at 09:49

## 2017-10-26 RX ADMIN — MIDAZOLAM HYDROCHLORIDE 1 MG: 1 INJECTION, SOLUTION INTRAMUSCULAR; INTRAVENOUS at 09:41

## 2017-10-26 RX ADMIN — MIDAZOLAM HYDROCHLORIDE 1 MG: 1 INJECTION, SOLUTION INTRAMUSCULAR; INTRAVENOUS at 09:49

## 2017-10-26 RX ADMIN — FENTANYL CITRATE 50 MCG: 50 INJECTION, SOLUTION INTRAMUSCULAR; INTRAVENOUS at 09:41

## 2017-10-26 RX ADMIN — LIDOCAINE HYDROCHLORIDE 60 MG: 20 INJECTION, SOLUTION INFILTRATION; PERINEURAL at 09:48

## 2017-10-26 RX ADMIN — LIDOCAINE HYDROCHLORIDE,EPINEPHRINE BITARTRATE 100 MG: 20; .005 INJECTION, SOLUTION EPIDURAL; INFILTRATION; INTRACAUDAL; PERINEURAL at 09:48

## 2017-10-26 NOTE — DISCHARGE INSTRUCTIONS
[place] [address]  Department of Interventional Radiology  [facility]  [phone] Office  [phone] Fax    DRAIN/PORT/CATHETER REMOVAL  DISCHARGE INSTRUCTIONS    General Information:     Your doctor has ordered for us to remove your drain, port, or catheter. This could be that you do not need it anymore, it is not doing its job, your physician has decided on another plan for your treatment and/or it may need replacing. Home Care Instructions: You can resume your regular diet and medication regimen. Do not drink alcohol, drive, or make any important legal decisions in the next 24 hours. Do not lift anything heavier than a gallon of milk, or do anything strenuous for the next 24 hours. You will notice a dressing over the site of the removal. This dressing should stay in place until the site is healed. The dressing should be changed at least every 48 hours. You should change the dressing sooner if it becomes soiled or wet. The nurse who discharges you to home should review with you any wound care instructions. Resume your normal level of activity slowly. You may shower after 24 hours, but do not take a bath, swim or immerse yourself in water until the site has healed, and keep the dressing dry with plastic wrap while showering. The site may ooze for a couple of days. This drainage should lessen with each passing day. Call If:     You should call your Physician and/or the Radiology Nurse if you have any bleeding other than a small spot on your bandage. Call if you have any signs of infection, fever, or increased pain at the site of the tube. Call if the oozing increases, if it changes color, or begins to have an odor. Follow-Up Instructions: Please see your ordering doctor as he/she has requested. To Reach Us: If you have any questions about your procedure, please call the Interventional Radiology department at [phone].  After business hours (5pm) and weekends, call the answering service at [phone] and ask for the Radiologist on call to be paged. Interventional Radiology General Nurse Discharge    After general anesthesia or intravenous sedation, for 24 hours or while taking prescription Narcotics:  · Limit your activities  · Do not drive and operate hazardous machinery  · Do not make important personal or business decisions  · Do  not drink alcoholic beverages  · If you have not urinated within 8 hours after discharge, please contact your surgeon on call. * Please give a list of your current medications to your Primary Care Provider. * Please update this list whenever your medications are discontinued, doses are     changed, or new medications (including over-the-counter products) are added. * Please carry medication information at all times in case of emergency situations. These are general instructions for a healthy lifestyle:    No smoking/ No tobacco products/ Avoid exposure to second hand smoke  Surgeon General's Warning:  Quitting smoking now greatly reduces serious risk to your health. Obesity, smoking, and sedentary lifestyle greatly increases your risk for illness  A healthy diet, regular physical exercise & weight monitoring are important for maintaining a healthy lifestyle    You may be retaining fluid if you have a history of heart failure or if you experience any of the following symptoms:  Weight gain of 3 pounds or more overnight or 5 pounds in a week, increased swelling in our hands or feet or shortness of breath while lying flat in bed. Please call your doctor as soon as you notice any of these symptoms; do not wait until your next office visit.     Recognize signs and symptoms of STROKE:  F-face looks uneven    A-arms unable to move or move unevenly    S-speech slurred or non-existent    T-time-call 911 as soon as signs and symptoms begin-DO NOT go       Back to bed or wait to see if you get better-TIME IS BRAIN.         Patient Signature:  Date: 10/26/2017  Discharging Nurse: Caesar Baires RN

## 2017-10-26 NOTE — IP AVS SNAPSHOT
[address] [facility], [place], operated by [facility] 
 
 
 [address] 
[phone] Patient: Chanel Keller MRN: [id number] SQT:[id number] About your hospitalization You were admitted on:  October 26, 2017 You last received care in the:  [facility] You were discharged on:  October 26, 2017 Why you were hospitalized Your primary diagnosis was:  Not on File Things You Need To Do (next 8 weeks) Wednesday Nov 01, 2017 Follow Up with Andrew Anderson MD at 10:20 AM  
Where:  [address] [facility] ([address] [facility]) Thursday Nov 30, 2017 Office Visit with Jose Francisco Shipley DO at  9:45 AM  
Where:  Jeanna Lim [facility] E[facility]) LAB with Rosemarie Durant at 12:45 PM  
Where:  [address] ([facility]) Follow Up with Rajesh Salvador MD at  1:15 PM  
Where: [facility]) Follow Up with Hakeem Lee RD at  1:15 PM  
Where: [facility]) Follow Up with [facility] at  1:30 PM  
Where: [facility]) Discharge Orders None A check rei indicates which time of day the medication should be taken. My Medications ASK your physician about these medications Instructions Each Dose to Equal  
 Morning Noon Evening Bedtime  
 aspirin delayed-release 81 mg tablet Your last dose was: Your next dose is: Take 81 mg by mouth every morning. Take / use AM day of surgery  per anesthesia protocols. Indications: myocardial infarction prevention 81 mg  
    
   
   
   
  
 clindamycin 1 % lotion Commonly known as:  CLEOCIN T Your last dose was: Your next dose is: Apply  to affected area two (2) times daily as needed. use thin film on affected area  
     
   
   
   
  
 glipiZIDE SR 10 mg CR tablet Commonly known as:  GLUCOTROL XL Your last dose was: Your next dose is: Take 1 Tab by mouth daily. Indications: type 2 diabetes mellitus 10 mg  
    
   
   
   
  
 glucose blood VI test strips strip Commonly known as:  RELION PRIME TEST STRIPS Your last dose was: Your next dose is:    
   
   
 Test blood sugar before meals and bedtime Diagnosis code E11.65  
     
   
   
   
  
 lisinopril-hydroCHLOROthiazide 20-25 mg per tablet Commonly known as:  Katrinka Fat Your last dose was: Your next dose is: Take  by mouth daily. omeprazole 20 mg capsule Commonly known as:  PRILOSEC Your last dose was: Your next dose is: Take 1 Cap by mouth every morning. Take / use AM day of surgery  per anesthesia protocols. Indications: gastroesophageal reflux disease 20 mg  
    
   
   
   
  
 predniSONE 20 mg tablet Commonly known as:  Therman Rail Your last dose was: Your next dose is: Take 20 mg by mouth daily. One tab daily for 30 days, then 1/2 tab for 2 weeks, then stop. #60, 1RF Script escribed to [employer]. 20 mg Discharge Instructions [address] Department of Interventional Radiology [facility] 
[phone] Office 
[phone] Fax DRAIN/PORT/CATHETER REMOVAL DISCHARGE INSTRUCTIONS General Information: 
   Your doctor has ordered for us to remove your drain, port, or catheter. This could be that you do not need it anymore, it is not doing its job, your physician has decided on another plan for your treatment and/or it may need replacing. Home Care Instructions: You can resume your regular diet and medication regimen. Do not drink alcohol, drive, or make any important legal decisions in the next 24 hours. Do not lift anything heavier than a gallon of milk, or do anything strenuous for the next 24 hours. You will notice a dressing over the site of the removal. This dressing should stay in place until the site is healed. The dressing should be changed at least every 48 hours. You should change the dressing sooner if it becomes soiled or wet. The nurse who discharges you to home should review with you any wound care instructions. Resume your normal level of activity slowly. You may shower after 24 hours, but do not take a bath, swim or immerse yourself in water until the site has healed, and keep the dressing dry with plastic wrap while showering. The site may ooze for a couple of days. This drainage should lessen with each passing day. Call If: 
   You should call your Physician and/or the Radiology Nurse if you have any bleeding other than a small spot on your bandage. Call if you have any signs of infection, fever, or increased pain at the site of the tube. Call if the oozing increases, if it changes color, or begins to have an odor. Follow-Up Instructions: Please see your ordering doctor as he/she has requested. To Reach Us: If you have any questions about your procedure, please call the Interventional Radiology department at [phone]. After business hours (5pm) and weekends, call the answering service at [phone] and ask for the Radiologist on call to be paged. Interventional Radiology General Nurse Discharge After general anesthesia or intravenous sedation, for 24 hours or while taking prescription Narcotics: · Limit your activities · Do not drive and operate hazardous machinery · Do not make important personal or business decisions · Do  not drink alcoholic beverages · If you have not urinated within 8 hours after discharge, please contact your surgeon on call. * Please give a list of your current medications to your Primary Care Provider. * Please update this list whenever your medications are discontinued, doses are 
   changed, or new medications (including over-the-counter products) are added. * Please carry medication information at all times in case of emergency situations. These are general instructions for a healthy lifestyle: No smoking/ No tobacco products/ Avoid exposure to second hand smoke Surgeon General's Warning:  Quitting smoking now greatly reduces serious risk to your health. Obesity, smoking, and sedentary lifestyle greatly increases your risk for illness A healthy diet, regular physical exercise & weight monitoring are important for maintaining a healthy lifestyle You may be retaining fluid if you have a history of heart failure or if you experience any of the following symptoms:  Weight gain of 3 pounds or more overnight or 5 pounds in a week, increased swelling in our hands or feet or shortness of breath while lying flat in bed. Please call your doctor as soon as you notice any of these symptoms; do not wait until your next office visit. Recognize signs and symptoms of STROKE: 
F-face looks uneven A-arms unable to move or move unevenly S-speech slurred or non-existent T-time-call 911 as soon as signs and symptoms begin-DO NOT go Back to bed or wait to see if you get better-TIME IS BRAIN. Patient Signature: 
Date: 10/26/2017 Discharging Nurse: Aaron Schultz RN Introducing [facility]! Dear Fidel Manner: 
Thank you for requesting a [employer] account. Our records indicate that you have previously registered for a [employer] account but its currently inactive. Please call our [employer] support line at [phone]. Additional Information If you have questions, please visit the Frequently Asked Questions section of the [employer] website at [email or web address] Remember, MyChart is NOT to be used for urgent needs. For medical emergencies, dial 911. Now available from your iPhone and Android! Providers Seen During Your Hospitalization Provider Specialty Primary office phone Sameer Durbin MD Radiation Oncology [phone] Your Primary Care Physician (PCP) Primary Care Physician Office Phone Office Fax [facility] [facility] [phone] ** None ** You are allergic to the following Allergen Reactions Zofran (Ondansetron Hcl (Pf)) Other (comments) Gives pt severe HA Recent Documentation Smoking Status Former Smoker Emergency Contacts Name Discharge Info Relation Home Work Mobile Meera Landin DISCHARGE CAREGIVER [3] Spouse [3]   [phone] Patient Belongings The following personal items are in your possession at time of discharge: 
     Visual Aid: Glasses, At bedside Please provide this summary of care documentation to your next provider. Signatures-by signing, you are acknowledging that this After Visit Summary has been reviewed with you and you have received a copy. Patient Signature:  ____________________________________________________________ Date:  ____________________________________________________________  
  
Jose Gregory Provider Signature:  ____________________________________________________________ Date:  ____________________________________________________________

## 2017-10-26 NOTE — PROGRESS NOTES
TRANSFER - OUT REPORT:    Verbal report given to Lillian LEMON(name) on [place]  being transferred to IR Recovery(unit) for routine progression of care       Report consisted of patients Situation, Background, Assessment and   Recommendations(SBAR). Information from the following report(s) SBAR, Procedure Summary and MAR was reviewed with the receiving nurse. Lines:   Venous Access Device Venous Chest Port 12/23/16 Upper chest (subclavicular area, right (Active)       Peripheral IV 10/26/17 Left Forearm (Active)   Site Assessment Clean, dry, & intact 10/26/2017  7:30 AM   Phlebitis Assessment 0 10/26/2017  7:30 AM   Infiltration Assessment 0 10/26/2017  7:30 AM   Dressing Status Clean, dry, & intact 10/26/2017  7:30 AM        Opportunity for questions and clarification was provided.       Patient transported with:   Registered Nurse

## 2017-10-26 NOTE — PROGRESS NOTES
Recovery period without difficulty. Pt alert and oriented and denies pain. Dressing is clean, dry, and intact. Reviewed discharge instructions with patient and spouse, both verbalized understanding. Pt escorted to lobby discharge area via wheelchair. Vital signs and Selena score completed.

## 2017-10-26 NOTE — PROGRESS NOTES
TRANSFER - IN REPORT:    Verbal report received from Mohamud Nagy RN(name) on [place]  being received from procedure(unit) for routine progression of care      Report consisted of patients Situation, Background, Assessment and   Recommendations(SBAR). Information from the following report(s) SBAR, Procedure Summary and MAR was reviewed with the receiving nurse. Opportunity for questions and clarification was provided. Assessment completed upon patients arrival to unit and care assumed.

## 2017-10-26 NOTE — PROCEDURES
Department of Interventional Radiology  [phone]        Interventional Radiology Brief Procedure Note    Patient: Jonathan Costa MRN: [id number]  SSN: [id number]    YOB: 1955  Age: 65 y.o.   Sex: male      Date of Procedure: 10/26/2017    Pre-Procedure Diagnosis: Port removal    Post-Procedure Diagnosis: SAME    Procedure(s): Port removal    Description of Procedure: As above    Performed By: Roland White MD     Assistants: None    Anesthesia:Moderate Sedation    Estimated Blood Loss: None    Specimens: Port    Implants: None    Findings: Port removed    Complications: None    Recommendations: Recovery, wound care    Follow Up: PRN    Signed By: Roland White MD     October 26, 2017

## 2017-10-26 NOTE — H&P
Department of Interventional Radiology  [phone]    History and Physical    Patient:  Justina Chris MRN:  [id number]  SSN:  [id number]    YOB: 1955  Age:  65 y.o. Sex:  male      Primary Care Provider:  Christian Bonilla MD  Referring Physician:  Bon Garcia MD    Subjective:     Chief Complaint: port removal    History of the Present Illness: The patient is a 65 y.o. male who presented for venous chest port removal.  Therapy complete for head/neck cancer. Feeding tube placed by Dr. Ivy Calderon and is scheduled to be removed next week in his office. NPO. 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 Current Outpatient Prescriptions   Medication Sig    predniSONE (DELTASONE) 20 mg tablet Take 20 mg by mouth daily. One tab daily for 30 days, then 1/2 tab for 2 weeks, then stop. #60, 1RF  Script escribed to [employer].  lisinopril-hydroCHLOROthiazide (PRINZIDE, ZESTORETIC) 20-25 mg per tablet Take  by mouth daily.  glipiZIDE SR (GLUCOTROL XL) 10 mg CR tablet Take 1 Tab by mouth daily. Indications: type 2 diabetes mellitus    omeprazole (PRILOSEC) 20 mg capsule Take 1 Cap by mouth every morning. Take / use AM day of surgery  per anesthesia protocols.   Indications: gastroesophageal reflux disease    clindamycin (CLEOCIN T) 1 % lotion Apply  to affected area two (2) times daily as needed. use thin film on affected area    glucose blood VI test strips (RELION PRIME TEST STRIPS) strip Test blood sugar before meals and bedtime Diagnosis code E11.65    aspirin delayed-release 81 mg tablet Take 81 mg by mouth every morning. Take / use AM day of surgery  per anesthesia protocols. Indications: myocardial infarction prevention     No current facility-administered medications for this encounter. Allergies   Allergen Reactions    Zofran [Ondansetron Hcl (Pf)] Other (comments)     Gives pt severe HA       Family History   Problem Relation Age of Onset    Stroke Mother     Lung Disease Mother      Social History   Substance Use Topics    Smoking status: Former Smoker     Packs/day: 2.00     Years: 20.00     Quit date: 2005    Smokeless tobacco: Never Used    Alcohol use No        Objective:       Physical Examination:    Vitals:    10/26/17 0719   BP: (!) 135/91   Pulse: 86   Resp: 20   Temp: 98.2 °F (36.8 °C)   SpO2: 95%     Blood pressure (!) 135/91, pulse 86, temperature 98.2 °F (36.8 °C), resp. rate 20, SpO2 95 %.   HEART: regular rate and rhythm  LUNG: coarse bilaterally  ABDOMEN: normal findings: soft, non-tender  EXTREMITIES: warm, no edema    Laboratory:     Lab Results   Component Value Date/Time    Sodium 137 08/10/2017 09:28 AM    Sodium 139 07/13/2017 09:38 AM    Potassium 3.6 08/10/2017 09:28 AM    Potassium 4.1 07/13/2017 09:38 AM    Chloride 101 08/10/2017 09:28 AM    Chloride 101 07/13/2017 09:38 AM    CO2 34 08/10/2017 09:28 AM    CO2 34 07/13/2017 09:38 AM    Anion gap 2 08/10/2017 09:28 AM    Anion gap 4 07/13/2017 09:38 AM    Glucose 208 08/10/2017 09:28 AM    Glucose 177 07/13/2017 09:38 AM    BUN 22 08/10/2017 09:28 AM    BUN 20 07/13/2017 09:38 AM    Creatinine 1.05 08/10/2017 09:28 AM    Creatinine 1.05 07/13/2017 09:38 AM    GFR est AA >60 08/10/2017 09:28 AM    GFR est AA >60 07/13/2017 09:38 AM    GFR est non-AA >60 08/10/2017 09:28 AM    GFR est non-AA >60 07/13/2017 09:38 AM    Calcium 8.7 08/10/2017 09:28 AM    Calcium 9.0 07/13/2017 09:38 AM    Magnesium 2.4 04/27/2017 09:04 AM    Magnesium 2.3 04/04/2017 09:31 AM    Phosphorus 3.0 01/20/2017 10:57 AM    Phosphorus 4.6 01/19/2017 03:30 AM    Albumin 3.3 08/10/2017 09:28 AM    Albumin 3.6 07/13/2017 09:38 AM    Protein, total 6.5 08/10/2017 09:28 AM    Protein, total 7.2 07/13/2017 09:38 AM    Globulin 3.2 08/10/2017 09:28 AM    Globulin 3.6 07/13/2017 09:38 AM    A-G Ratio 1.0 08/10/2017 09:28 AM    A-G Ratio 1.0 07/13/2017 09:38 AM    AST (SGOT) 15 08/10/2017 09:28 AM    AST (SGOT) 12 07/13/2017 09:38 AM    ALT (SGPT) 23 08/10/2017 09:28 AM    ALT (SGPT) 17 07/13/2017 09:38 AM     Lab Results   Component Value Date/Time    WBC 6.4 08/10/2017 09:28 AM    WBC 6.5 07/13/2017 09:38 AM    HGB 10.5 08/10/2017 09:28 AM    HGB 10.7 07/13/2017 09:38 AM    HCT 30.4 08/10/2017 09:28 AM    HCT 30.5 07/13/2017 09:38 AM    PLATELET 032 71/97/8463 09:28 AM    PLATELET 007 66/98/8455 09:38 AM     Lab Results   Component Value Date/Time    aPTT 24.7 12/20/2016 01:44 PM    Prothrombin time 9.8 12/20/2016 01:44 PM    INR 0.9 12/20/2016 01:44 PM       Assessment:     Head/neck cancer, therapy complete    Plan:     Planned Procedure:  Port removal    Risks, benefits, and alternatives reviewed with patient and he agrees to proceed with the procedure.       Signed By: Cricket Cash PA-C     October 26, 2017

## 2017-11-30 ENCOUNTER — HOSPITAL ENCOUNTER (OUTPATIENT)
Dept: LAB | Age: 62
Discharge: HOME | End: 2017-11-30
Payer: COMMERCIAL

## 2017-11-30 DIAGNOSIS — C09.9 TONSIL CANCER (HCC): ICD-10-CM

## 2017-11-30 LAB
ALBUMIN SERPL-MCNC: 3.8 G/DL (ref 3.2–4.6)
ALBUMIN/GLOB SERPL: 1.2 {RATIO} (ref 1.2–3.5)
ALP SERPL-CCNC: 56 U/L (ref 50–136)
ALT SERPL-CCNC: 17 U/L (ref 12–65)
ANION GAP SERPL CALC-SCNC: 8 MMOL/L (ref 7–16)
AST SERPL-CCNC: 8 U/L (ref 15–37)
BASOPHILS # BLD: 0 K/UL (ref 0–0.2)
BASOPHILS NFR BLD: 0 % (ref 0–2)
BILIRUB SERPL-MCNC: 0.5 MG/DL (ref 0.2–1.1)
BUN SERPL-MCNC: 23 MG/DL (ref 8–23)
CALCIUM SERPL-MCNC: 9.1 MG/DL (ref 8.3–10.4)
CHLORIDE SERPL-SCNC: 96 MMOL/L (ref 98–107)
CO2 SERPL-SCNC: 33 MMOL/L (ref 21–32)
CREAT SERPL-MCNC: 1.46 MG/DL (ref 0.8–1.5)
DIFFERENTIAL METHOD BLD: ABNORMAL
EOSINOPHIL # BLD: 0 K/UL (ref 0–0.8)
EOSINOPHIL NFR BLD: 0 % (ref 0.5–7.8)
ERYTHROCYTE [DISTWIDTH] IN BLOOD BY AUTOMATED COUNT: 12.7 % (ref 11.9–14.6)
GLOBULIN SER CALC-MCNC: 3.2 G/DL (ref 2.3–3.5)
GLUCOSE SERPL-MCNC: 284 MG/DL (ref 65–100)
HCT VFR BLD AUTO: 34.8 % (ref 41.1–50.3)
HGB BLD-MCNC: 12.2 G/DL (ref 13.6–17.2)
LYMPHOCYTES # BLD: 0.5 K/UL (ref 0.5–4.6)
LYMPHOCYTES NFR BLD: 6 % (ref 13–44)
MCH RBC QN AUTO: 30 PG (ref 26.1–32.9)
MCHC RBC AUTO-ENTMCNC: 35.1 G/DL (ref 31.4–35)
MCV RBC AUTO: 85.5 FL (ref 79.6–97.8)
MONOCYTES # BLD: 0.2 K/UL (ref 0.1–1.3)
MONOCYTES NFR BLD: 2 % (ref 4–12)
NEUTS SEG # BLD: 7.9 K/UL (ref 1.7–8.2)
NEUTS SEG NFR BLD: 92 % (ref 43–78)
NRBC # BLD: 0 K/UL (ref 0–0.2)
PLATELET # BLD AUTO: 177 K/UL (ref 150–450)
PMV BLD AUTO: 8.7 FL (ref 10.8–14.1)
POTASSIUM SERPL-SCNC: 3.9 MMOL/L (ref 3.5–5.1)
PROT SERPL-MCNC: 7 G/DL (ref 6.3–8.2)
RBC # BLD AUTO: 4.07 M/UL (ref 4.23–5.67)
SODIUM SERPL-SCNC: 137 MMOL/L (ref 136–145)
WBC # BLD AUTO: 8.6 K/UL (ref 4.3–11.1)

## 2017-11-30 PROCEDURE — 80053 COMPREHEN METABOLIC PANEL: CPT | Performed by: INTERNAL MEDICINE

## 2017-11-30 PROCEDURE — 36415 COLL VENOUS BLD VENIPUNCTURE: CPT | Performed by: INTERNAL MEDICINE

## 2017-11-30 PROCEDURE — 85025 COMPLETE CBC W/AUTO DIFF WBC: CPT | Performed by: INTERNAL MEDICINE

## 2017-12-05 ENCOUNTER — HOSPITAL ENCOUNTER (OUTPATIENT)
Dept: SURGERY | Age: 62
Discharge: HOME | End: 2017-12-05
Attending: OTOLARYNGOLOGY
Payer: COMMERCIAL

## 2017-12-05 VITALS
SYSTOLIC BLOOD PRESSURE: 98 MMHG | HEART RATE: 90 BPM | TEMPERATURE: 98.6 F | OXYGEN SATURATION: 94 % | HEIGHT: 70 IN | BODY MASS INDEX: 30.98 KG/M2 | DIASTOLIC BLOOD PRESSURE: 57 MMHG | RESPIRATION RATE: 16 BRPM | WEIGHT: 216.4 LBS

## 2017-12-05 LAB — GLUCOSE BLD STRIP.AUTO-MCNC: 330 MG/DL (ref 65–100)

## 2017-12-05 PROCEDURE — 82962 GLUCOSE BLOOD TEST: CPT

## 2017-12-05 RX ORDER — CITALOPRAM 10 MG/1
10 TABLET ORAL DAILY
COMMUNITY
End: 2018-09-24

## 2017-12-05 NOTE — PERIOP NOTES
Dr David Son:      Patient: Pavithra Calzada, DOS 12/11/17    During a recent visit to the surgical preadmission testing center, the above mentioned patient was found to have a non-fasting blood glucose level of 330 mg/dL. This may indicate inadequate diabetic management and raises concerns that the patient is not medically optimized for surgery. It is our standard practice to postpone elective surgery for patients who present fasting blood glucose level >300 mg/dL on the day of their procedure. The patient has been advised of this policy and counseled on the importance of glucose control. We feel that this patient is at increased risk of cancellation; however, their blood glucose may be in acceptable range when they are NPO. Therefore, we will leave the decision to you whether to delay the surgery and refer the patient to their primary care provider or keep them as currently scheduled. Our goal is to prevent as many delays and cancellations as possible while ensuring patient safety.     Sincerely,    [facility] Anesthesia [facility]

## 2017-12-05 NOTE — PERIOP NOTES
Patient verified name, , and surgery as listed in [facility]. Type 1B surgery, PAT walk in assessment complete. Labs per surgeon: none. Labs per anesthesia protocol: Glucose and K+; results- cbc and bmp results in EMR dated 17 and within anesthesia guidelines. Glucose- 330. EKG: not needed per anesthesia guidelines. EKG dated 17 in EMR if needed for anesthesia reference. Pt with diminished lung sounds bilaterally and audible wheezing without auscultation. Pt denies fever or SOB. SQBS today is 330. Dr. Cash Mayfield notified of the above and states pt should start back taking glucotrol and checking blood sugars daily. Dr. Cash Mayfield states ok for surgery and will recheck glucose on the day of surgery. Antibacterial soap and instructions given per hospital policy. Patient provided with and instructed on educational handouts including Guide to Surgery, Pain Management, Hand Hygiene, Blood Transfusion Education, and [facility] Brochure. Patient answered medical/surgical history questions at their best of ability. All prior to admission medications documented in [facility]. Original medication prescription bottle not visualized during patient appointment. Patient instructed to hold all vitamins 7 days prior to surgery and NSAIDS 5 days prior to surgery, patient verbalized understanding. Medications to be held: none. Patient instructed to continue previous medications as prescribed prior to surgery and to take the following medications the day of surgery according to anesthesia guidelines with a small sip of water: aspirin, celexa, prilosec and prednisone. Patient teach back successful and patient demonstrates knowledge of instructions.

## 2017-12-08 ENCOUNTER — ANESTHESIA EVENT (OUTPATIENT)
Dept: SURGERY | Age: 62
End: 2017-12-08
Payer: COMMERCIAL

## 2017-12-10 RX ORDER — SODIUM CHLORIDE 0.9 % (FLUSH) 0.9 %
5-10 SYRINGE (ML) INJECTION AS NEEDED
Status: CANCELLED | OUTPATIENT
Start: 2017-12-10

## 2017-12-10 RX ORDER — SODIUM CHLORIDE 0.9 % (FLUSH) 0.9 %
5-10 SYRINGE (ML) INJECTION EVERY 8 HOURS
Status: CANCELLED | OUTPATIENT
Start: 2017-12-10

## 2017-12-11 ENCOUNTER — ANESTHESIA (OUTPATIENT)
Dept: SURGERY | Age: 62
End: 2017-12-11
Payer: COMMERCIAL

## 2017-12-11 ENCOUNTER — HOSPITAL ENCOUNTER (OUTPATIENT)
Age: 62
Setting detail: OUTPATIENT SURGERY
Discharge: HOME | End: 2017-12-11
Attending: OTOLARYNGOLOGY | Admitting: OTOLARYNGOLOGY
Payer: COMMERCIAL

## 2017-12-11 VITALS
HEART RATE: 100 BPM | HEIGHT: 70 IN | WEIGHT: 216.4 LBS | OXYGEN SATURATION: 93 % | RESPIRATION RATE: 15 BRPM | DIASTOLIC BLOOD PRESSURE: 83 MMHG | SYSTOLIC BLOOD PRESSURE: 154 MMHG | TEMPERATURE: 97.7 F | BODY MASS INDEX: 30.98 KG/M2

## 2017-12-11 LAB — GLUCOSE BLD STRIP.AUTO-MCNC: 73 MG/DL (ref 65–100)

## 2017-12-11 PROCEDURE — 74011000250 HC RX REV CODE- 250: Performed by: ANESTHESIOLOGY

## 2017-12-11 PROCEDURE — 77030008703 HC TU ET UNCUF COVD -A: Performed by: ANESTHESIOLOGY

## 2017-12-11 PROCEDURE — 74011250636 HC RX REV CODE- 250/636: Performed by: ANESTHESIOLOGY

## 2017-12-11 PROCEDURE — 77030037378 HC BRONCHOSCOPE DISP TRAN -D: Performed by: ANESTHESIOLOGY

## 2017-12-11 PROCEDURE — 88305 TISSUE EXAM BY PATHOLOGIST: CPT | Performed by: OTOLARYNGOLOGY

## 2017-12-11 PROCEDURE — 82962 GLUCOSE BLOOD TEST: CPT

## 2017-12-11 PROCEDURE — 77030018836 HC SOL IRR NACL ICUM -A: Performed by: OTOLARYNGOLOGY

## 2017-12-11 PROCEDURE — 76210000020 HC REC RM PH II FIRST 0.5 HR: Performed by: OTOLARYNGOLOGY

## 2017-12-11 PROCEDURE — 74011250636 HC RX REV CODE- 250/636

## 2017-12-11 PROCEDURE — 76210000006 HC OR PH I REC 0.5 TO 1 HR: Performed by: OTOLARYNGOLOGY

## 2017-12-11 PROCEDURE — 76060000034 HC ANESTHESIA 1.5 TO 2 HR: Performed by: OTOLARYNGOLOGY

## 2017-12-11 PROCEDURE — 74011000250 HC RX REV CODE- 250: Performed by: OTOLARYNGOLOGY

## 2017-12-11 PROCEDURE — 77030038019: Performed by: OTOLARYNGOLOGY

## 2017-12-11 PROCEDURE — 76010000153 HC OR TIME 1.5 TO 2 HR: Performed by: OTOLARYNGOLOGY

## 2017-12-11 PROCEDURE — 77030020782 HC GWN BAIR PAWS FLX 3M -B: Performed by: ANESTHESIOLOGY

## 2017-12-11 PROCEDURE — 74011000250 HC RX REV CODE- 250

## 2017-12-11 RX ORDER — DIPHENHYDRAMINE HYDROCHLORIDE 50 MG/ML
12.5 INJECTION, SOLUTION INTRAMUSCULAR; INTRAVENOUS
Status: DISCONTINUED | OUTPATIENT
Start: 2017-12-11 | End: 2017-12-11 | Stop reason: HOSPADM

## 2017-12-11 RX ORDER — ACETAMINOPHEN 500 MG
1000 TABLET ORAL ONCE
Status: DISCONTINUED | OUTPATIENT
Start: 2017-12-11 | End: 2017-12-11 | Stop reason: HOSPADM

## 2017-12-11 RX ORDER — SODIUM CHLORIDE, SODIUM LACTATE, POTASSIUM CHLORIDE, CALCIUM CHLORIDE 600; 310; 30; 20 MG/100ML; MG/100ML; MG/100ML; MG/100ML
100 INJECTION, SOLUTION INTRAVENOUS CONTINUOUS
Status: DISCONTINUED | OUTPATIENT
Start: 2017-12-11 | End: 2017-12-11 | Stop reason: HOSPADM

## 2017-12-11 RX ORDER — LIDOCAINE HYDROCHLORIDE 40 MG/ML
2 SOLUTION TOPICAL AS NEEDED
Status: DISCONTINUED | OUTPATIENT
Start: 2017-12-11 | End: 2017-12-11 | Stop reason: HOSPADM

## 2017-12-11 RX ORDER — FLUMAZENIL 0.1 MG/ML
0.2 INJECTION INTRAVENOUS
Status: DISCONTINUED | OUTPATIENT
Start: 2017-12-11 | End: 2017-12-11 | Stop reason: HOSPADM

## 2017-12-11 RX ORDER — MIDAZOLAM HYDROCHLORIDE 1 MG/ML
INJECTION, SOLUTION INTRAMUSCULAR; INTRAVENOUS AS NEEDED
Status: DISCONTINUED | OUTPATIENT
Start: 2017-12-11 | End: 2017-12-11 | Stop reason: HOSPADM

## 2017-12-11 RX ORDER — LIDOCAINE HYDROCHLORIDE 10 MG/ML
0.1 INJECTION INFILTRATION; PERINEURAL AS NEEDED
Status: DISCONTINUED | OUTPATIENT
Start: 2017-12-11 | End: 2017-12-11 | Stop reason: HOSPADM

## 2017-12-11 RX ORDER — FENTANYL CITRATE 50 UG/ML
100 INJECTION, SOLUTION INTRAMUSCULAR; INTRAVENOUS ONCE
Status: DISCONTINUED | OUTPATIENT
Start: 2017-12-11 | End: 2017-12-11 | Stop reason: HOSPADM

## 2017-12-11 RX ORDER — DEXAMETHASONE SODIUM PHOSPHATE 4 MG/ML
INJECTION, SOLUTION INTRA-ARTICULAR; INTRALESIONAL; INTRAMUSCULAR; INTRAVENOUS; SOFT TISSUE AS NEEDED
Status: DISCONTINUED | OUTPATIENT
Start: 2017-12-11 | End: 2017-12-11 | Stop reason: HOSPADM

## 2017-12-11 RX ORDER — OXYCODONE HYDROCHLORIDE 5 MG/1
10 TABLET ORAL
Status: DISCONTINUED | OUTPATIENT
Start: 2017-12-11 | End: 2017-12-11 | Stop reason: HOSPADM

## 2017-12-11 RX ORDER — OXYCODONE HYDROCHLORIDE 5 MG/1
5 TABLET ORAL
Status: DISCONTINUED | OUTPATIENT
Start: 2017-12-11 | End: 2017-12-11 | Stop reason: HOSPADM

## 2017-12-11 RX ORDER — NALOXONE HYDROCHLORIDE 0.4 MG/ML
0.2 INJECTION, SOLUTION INTRAMUSCULAR; INTRAVENOUS; SUBCUTANEOUS AS NEEDED
Status: DISCONTINUED | OUTPATIENT
Start: 2017-12-11 | End: 2017-12-11 | Stop reason: HOSPADM

## 2017-12-11 RX ORDER — MIDAZOLAM HYDROCHLORIDE 1 MG/ML
2 INJECTION, SOLUTION INTRAMUSCULAR; INTRAVENOUS
Status: DISCONTINUED | OUTPATIENT
Start: 2017-12-11 | End: 2017-12-11 | Stop reason: HOSPADM

## 2017-12-11 RX ORDER — HYDROMORPHONE HYDROCHLORIDE 2 MG/ML
0.5 INJECTION, SOLUTION INTRAMUSCULAR; INTRAVENOUS; SUBCUTANEOUS
Status: DISCONTINUED | OUTPATIENT
Start: 2017-12-11 | End: 2017-12-11 | Stop reason: HOSPADM

## 2017-12-11 RX ORDER — PROPOFOL 10 MG/ML
INJECTION, EMULSION INTRAVENOUS AS NEEDED
Status: DISCONTINUED | OUTPATIENT
Start: 2017-12-11 | End: 2017-12-11 | Stop reason: HOSPADM

## 2017-12-11 RX ORDER — MIDAZOLAM HYDROCHLORIDE 1 MG/ML
2 INJECTION, SOLUTION INTRAMUSCULAR; INTRAVENOUS ONCE
Status: DISCONTINUED | OUTPATIENT
Start: 2017-12-11 | End: 2017-12-11 | Stop reason: HOSPADM

## 2017-12-11 RX ORDER — SODIUM CHLORIDE 0.9 % (FLUSH) 0.9 %
5-10 SYRINGE (ML) INJECTION AS NEEDED
Status: DISCONTINUED | OUTPATIENT
Start: 2017-12-11 | End: 2017-12-11 | Stop reason: HOSPADM

## 2017-12-11 RX ORDER — FENTANYL CITRATE 50 UG/ML
INJECTION, SOLUTION INTRAMUSCULAR; INTRAVENOUS AS NEEDED
Status: DISCONTINUED | OUTPATIENT
Start: 2017-12-11 | End: 2017-12-11 | Stop reason: HOSPADM

## 2017-12-11 RX ORDER — GLYCOPYRROLATE 0.2 MG/ML
INJECTION INTRAMUSCULAR; INTRAVENOUS AS NEEDED
Status: DISCONTINUED | OUTPATIENT
Start: 2017-12-11 | End: 2017-12-11 | Stop reason: HOSPADM

## 2017-12-11 RX ORDER — ROCURONIUM BROMIDE 10 MG/ML
INJECTION, SOLUTION INTRAVENOUS AS NEEDED
Status: DISCONTINUED | OUTPATIENT
Start: 2017-12-11 | End: 2017-12-11 | Stop reason: HOSPADM

## 2017-12-11 RX ADMIN — MIDAZOLAM HYDROCHLORIDE 1 MG: 1 INJECTION, SOLUTION INTRAMUSCULAR; INTRAVENOUS at 12:47

## 2017-12-11 RX ADMIN — MIDAZOLAM HYDROCHLORIDE 1 MG: 1 INJECTION, SOLUTION INTRAMUSCULAR; INTRAVENOUS at 12:49

## 2017-12-11 RX ADMIN — ROCURONIUM BROMIDE 20 MG: 10 INJECTION, SOLUTION INTRAVENOUS at 13:47

## 2017-12-11 RX ADMIN — FENTANYL CITRATE 50 MCG: 50 INJECTION, SOLUTION INTRAMUSCULAR; INTRAVENOUS at 13:13

## 2017-12-11 RX ADMIN — LIDOCAINE HYDROCHLORIDE 0.1 ML: 10 INJECTION, SOLUTION INFILTRATION; PERINEURAL at 11:35

## 2017-12-11 RX ADMIN — SODIUM CHLORIDE, SODIUM LACTATE, POTASSIUM CHLORIDE, AND CALCIUM CHLORIDE: 600; 310; 30; 20 INJECTION, SOLUTION INTRAVENOUS at 13:29

## 2017-12-11 RX ADMIN — SODIUM CHLORIDE, SODIUM LACTATE, POTASSIUM CHLORIDE, AND CALCIUM CHLORIDE 100 ML/HR: 600; 310; 30; 20 INJECTION, SOLUTION INTRAVENOUS at 11:36

## 2017-12-11 RX ADMIN — LIDOCAINE HYDROCHLORIDE 2 ML: 40 SOLUTION TOPICAL at 12:23

## 2017-12-11 RX ADMIN — FENTANYL CITRATE 50 MCG: 50 INJECTION, SOLUTION INTRAMUSCULAR; INTRAVENOUS at 13:46

## 2017-12-11 RX ADMIN — GLYCOPYRROLATE 0.1 MG: 0.2 INJECTION INTRAMUSCULAR; INTRAVENOUS at 12:37

## 2017-12-11 RX ADMIN — PROPOFOL 100 MG: 10 INJECTION, EMULSION INTRAVENOUS at 13:04

## 2017-12-11 RX ADMIN — DEXAMETHASONE SODIUM PHOSPHATE 10 MG: 4 INJECTION, SOLUTION INTRA-ARTICULAR; INTRALESIONAL; INTRAMUSCULAR; INTRAVENOUS; SOFT TISSUE at 13:14

## 2017-12-11 NOTE — ANESTHESIA POSTPROCEDURE EVALUATION
Post-Anesthesia Evaluation and Assessment    Patient: Mao Shin MRN: [id number]  SSN: [id number]    YOB: 1955  Age: 65 y.o. Sex: male       Cardiovascular Function/Vital Signs  Visit Vitals    /72    Pulse 93    Temp 36.5 °C (97.7 °F)    Resp 15    Ht 5' 10\" (1.778 m)    Wt 98.2 kg (216 lb 6.4 oz)    SpO2 100%    BMI 31.05 kg/m2       Patient is status post general anesthesia for Procedure(s):  DIRECT LARYNGOSCOPY WITH BIOPSY And C02 LASER BRONCHOSCOPY. Nausea/Vomiting: None    Postoperative hydration reviewed and adequate. Pain:  Pain Scale 1: Numeric (0 - 10) (12/11/17 1428)  Pain Intensity 1: 0 (12/11/17 1428)   Managed    Neurological Status:   Neuro (WDL): Exceptions to WDL (12/11/17 1428)  Neuro  Neurologic State: Drowsy (12/11/17 1428)  Cognition: Follows commands (12/11/17 1428)  LUE Motor Response: Purposeful (12/11/17 1428)  LLE Motor Response: Purposeful (12/11/17 1428)  RUE Motor Response: Purposeful (12/11/17 1428)  RLE Motor Response: Purposeful (12/11/17 1428)   At baseline    Mental Status and Level of Consciousness: Arousable    Pulmonary Status:   O2 Device: 02 face tent (12/11/17 1443)   Adequate oxygenation and airway patent    Complications related to anesthesia: None    Post-anesthesia assessment completed.  No concerns    Signed By: Shantel Lozano MD     December 11, 2017

## 2017-12-11 NOTE — DISCHARGE INSTRUCTIONS
Bronchoscopy: What to Expect at [facility]    Bronchoscopy lets your doctor look at your airway through a tube called a bronchoscope. Afterward, you may feel tired for 1 or 2 days. Your mouth may feel very dry for several hours after the procedure. You may also have a sore throat and a hoarse voice for a few days. Sucking on throat lozenges or gargling with warm salt water may help soothe your sore throat. If a sample of tissue (biopsy) was taken, you may spit up a small amount of blood or have bloody saliva. This is normal.  This care sheet gives you a general idea about how long it will take for you to recover. But each person recovers at a different pace. Follow the steps below to get better as quickly as possible. How can you care for yourself at home? Activity  ? · Do not eat anything for 2 hours after the procedure. ? · Rest when you feel tired. Getting enough sleep will help you recover. ? · Avoid strenuous activities, such as bicycle riding, jogging, weight lifting, or aerobic exercise, until your doctor says it is okay. ? · Ask your doctor when you can drive again. Diet  ? · You can eat your normal diet. If your stomach is upset, try bland, low-fat foods like plain rice, broiled chicken, toast, and yogurt. ? · If it is painful to swallow, start out with cold drinks, flavored ice pops, and ice cream. Next, try soft foods like pudding, yogurt, canned or cooked fruit, scrambled eggs, and mashed potatoes. Avoid eating hard or scratchy foods like chips or raw vegetables. Avoid orange or tomato juice and other acidic foods that can sting the throat. ? · Drink plenty of fluids to avoid becoming dehydrated (unless your doctor tells you not to). Medicines  ? · Take pain medicines exactly as directed. ¨ If the doctor gave you a prescription medicine for pain, take it as prescribed.   ¨ If you are not taking a prescription pain medicine, ask your doctor if you can take an over-the-counter medicine. ? · If you think your pain medicine is making you sick to your stomach:  ¨ Take your medicine after meals (unless your doctor has told you not to). ¨ Ask your doctor for a different pain medicine. ? · If your doctor prescribed antibiotics, take them as directed. Do not stop taking them just because you feel better. You need to take the full course of antibiotics. Follow-up care is a key part of your treatment and safety. Be sure to make and go to all appointments, and call your doctor if you are having problems. It's also a good idea to know your test results and keep a list of the medicines you take. When should you call for help? Call 911 anytime you think you may need emergency care. For example, call if:  ? · You passed out (lost consciousness). ? · You have sudden chest pain and shortness of breath. ? · You cough up large amounts of bright red blood. ? · You have severe pain in your chest.   ? · You have severe trouble breathing. ?Call your doctor now or seek immediate medical care if:  ? · You cough up more than a few tablespoons of blood. ? · You have pain that does not get better after you take pain medicine. ? · You have a fever over 100°F.   ? · You still sound hoarse after a few days. ? · You have bubbles under the skin around the collarbone. These may crackle and pop when you press on them. ? Watch closely for changes in your health, and be sure to contact your doctor if you have any problems. Where can you learn more? Go to http://maria c-jeff.info/. Enter Z138 in the search box to learn more about \"Bronchoscopy: What to Expect at Home. \"  Current as of: May 12, 2017  Content Version: 11.4  © 9927-4075 [employer]. Care instructions adapted under license by [employer] (which disclaims liability or warranty for this information).  If you have questions about a medical condition or this instruction, always ask your healthcare professional. [place] [address] any warranty or liability for your use of this information.

## 2017-12-11 NOTE — ANESTHESIA PREPROCEDURE EVALUATION
Anesthetic History     PONV          Review of Systems / Medical History  Patient summary reviewed    Pulmonary  Within defined limits                 Neuro/Psych   Within defined limits           Cardiovascular    Hypertension: well controlled              Exercise tolerance: <4 METS     GI/Hepatic/Renal     GERD: well controlled           Endo/Other    Diabetes: type 2    Morbid obesity     Other Findings            Physical Exam    Airway  Mallampati: III  TM Distance: > 6 cm  Neck ROM: normal range of motion   Mouth opening: Normal     Cardiovascular    Rhythm: regular           Dental  No notable dental hx       Pulmonary        Wheezes         Abdominal         Other Findings            Anesthetic Plan    ASA: 3  Anesthesia type: general          Induction: Intravenous  Anesthetic plan and risks discussed with: Patient and Spouse      Previous trach and epiglottic edema/tumor. After discussion with Dr. Daphne Patel, will plan for AFOI.

## 2017-12-11 NOTE — ANESTHESIA PROCEDURE NOTES
Emergent Intubation  Performed by: Carley Quintanlila  Authorized by: Guillermina TRIVEDI     Emergent Intubation:   Location:  OR procedure room  Date/Time:  12/11/2017 1:01 PM  Indications:  Airway compromise  Spontaneous Ventilation: present    Level of Consciousness: awake  Preoxygenated: Yes      Airway Documentation:   Airway:  ETT - Cuffed  Advanced Technique:  Fiberoptic  Insertion Site:  Oral  ETT size (mm):  7.0  ETT Line Stanislav:  Gumline  ETT Insertion depth (cm):  23  Topicalization: LTA    Placement verified by: auscultation, EtCO2 and fiber optic    Attempts:  3  Difficult airway:  Yes

## 2017-12-11 NOTE — IP AVS SNAPSHOT
[address] [facility] 
 
 
 [id number] 
[phone] Patient: Jonathan Costa MRN: [id number] EDU:4/93/0312 About your hospitalization You were admitted on:  December 11, 2017 You last received care in the:  [facility] PACU You were discharged on:  December 11, 2017 Why you were hospitalized Your primary diagnosis was:  Not on File Things You Need To Do (next 8 weeks) Schedule an appointment with Anderson Bowling DO as soon as possible for a visit in 1 week(s) Phone:  [phone] Where:  [facility] [address] Thursday Dec 21, 2017 POST OP with Anderson Bowling DO at 11:25 AM  
Where:  [facility]) Thursday Jan 18, 2018 [address] [facility] OFFICE VISIT with Aditya Sandoval MD at  8:30 AM  
Where:  [address] [place] [address] Discharge Orders None A check rei indicates which time of day the medication should be taken. My Medications ASK your physician about these medications Instructions Each Dose to Equal  
 Morning Noon Evening Bedtime  
 aspirin delayed-release 81 mg tablet Your last dose was: Your next dose is: Take 81 mg by mouth every morning. Take / use AM day of surgery  per anesthesia protocols. Indications: myocardial infarction prevention 81 mg  
    
   
   
   
  
 CeleXA 10 mg tablet Generic drug:  citalopram  
   
Your last dose was: Your next dose is: Take 10 mg by mouth daily. 10 mg  
    
   
   
   
  
 clindamycin 1 % lotion Commonly known as:  CLEOCIN T Your last dose was: Your next dose is:    
   
   
 Apply  to affected area two (2) times daily as needed. use thin film on affected area  
     
   
   
   
  
 glipiZIDE SR 10 mg CR tablet Commonly known as:  GLUCOTROL XL  
   
 Your last dose was: Your next dose is: Take 1 Tab by mouth daily. Indications: type 2 diabetes mellitus 10 mg  
    
   
   
   
  
 glucose blood VI test strips strip Commonly known as:  RELION PRIME TEST STRIPS Your last dose was: Your next dose is:    
   
   
 Test blood sugar before meals and bedtime Diagnosis code E11.65  
     
   
   
   
  
 lisinopril-hydroCHLOROthiazide 20-25 mg per tablet Commonly known as:  Julieta North Your last dose was: Your next dose is: Take 0.5 Tabs by mouth daily. 0.5 Tab  
    
   
   
   
  
 omeprazole 20 mg capsule Commonly known as:  PRILOSEC Your last dose was: Your next dose is: Take 1 Cap by mouth every morning. Take / use AM day of surgery  per anesthesia protocols. Indications: gastroesophageal reflux disease 20 mg  
    
   
   
   
  
 predniSONE 20 mg tablet Commonly known as:  Krishna Lever Your last dose was: Your next dose is: Take 20 mg by mouth daily. One tab daily for 30 days, then 1/2 tab for 2 weeks, then stop. #60, 1RF Script escribed to [employer]. 20 mg Discharge Instructions Bronchoscopy: What to Expect at [facility] Your Recovery Bronchoscopy lets your doctor look at your airway through a tube called a bronchoscope. Afterward, you may feel tired for 1 or 2 days. Your mouth may feel very dry for several hours after the procedure. You may also have a sore throat and a hoarse voice for a few days. Sucking on throat lozenges or gargling with warm salt water may help soothe your sore throat. If a sample of tissue (biopsy) was taken, you may spit up a small amount of blood or have bloody saliva. This is normal. 
This care sheet gives you a general idea about how long it will take for you to recover. But each person recovers at a different pace.  Follow the steps below to get better as quickly as possible. How can you care for yourself at home? Activity ? · Do not eat anything for 2 hours after the procedure. ? · Rest when you feel tired. Getting enough sleep will help you recover. ? · Avoid strenuous activities, such as bicycle riding, jogging, weight lifting, or aerobic exercise, until your doctor says it is okay. ? · Ask your doctor when you can drive again. Diet ? · You can eat your normal diet. If your stomach is upset, try bland, low-fat foods like plain rice, broiled chicken, toast, and yogurt. ? · If it is painful to swallow, start out with cold drinks, flavored ice pops, and ice cream. Next, try soft foods like pudding, yogurt, canned or cooked fruit, scrambled eggs, and mashed potatoes. Avoid eating hard or scratchy foods like chips or raw vegetables. Avoid orange or tomato juice and other acidic foods that can sting the throat. ? · Drink plenty of fluids to avoid becoming dehydrated (unless your doctor tells you not to). Medicines ? · Take pain medicines exactly as directed. ¨ If the doctor gave you a prescription medicine for pain, take it as prescribed. ¨ If you are not taking a prescription pain medicine, ask your doctor if you can take an over-the-counter medicine. ? · If you think your pain medicine is making you sick to your stomach: 
¨ Take your medicine after meals (unless your doctor has told you not to). ¨ Ask your doctor for a different pain medicine. ? · If your doctor prescribed antibiotics, take them as directed. Do not stop taking them just because you feel better. You need to take the full course of antibiotics. Follow-up care is a key part of your treatment and safety. Be sure to make and go to all appointments, and call your doctor if you are having problems. It's also a good idea to know your test results and keep a list of the medicines you take. When should you call for help? Call 911 anytime you think you may need emergency care. For example, call if: 
? · You passed out (lost consciousness). ? · You have sudden chest pain and shortness of breath. ? · You cough up large amounts of bright red blood. ? · You have severe pain in your chest.  
? · You have severe trouble breathing. ?Call your doctor now or seek immediate medical care if: 
? · You cough up more than a few tablespoons of blood. ? · You have pain that does not get better after you take pain medicine. ? · You have a fever over 100°F.  
? · You still sound hoarse after a few days. ? · You have bubbles under the skin around the collarbone. These may crackle and pop when you press on them. ? Watch closely for changes in your health, and be sure to contact your doctor if you have any problems. Where can you learn more? Go to http://maria c-jeff.info/. Enter C085 in the search box to learn more about \"Bronchoscopy: What to Expect at Home. \" Current as of: May 12, 2017 Content Version: 11.4 © 6724-4001 [employer]. Care instructions adapted under license by [employer] (which disclaims liability or warranty for this information). If you have questions about a medical condition or this instruction, always ask your healthcare professional. [address] any warranty or liability for your use of this information. Introducing [facility] HE[facility] SERVICES! Dear Jeevan Alarcon: 
Thank you for requesting a [employer] account. Our records indicate that you have previously registered for a [employer] account but its currently inactive. Please call our [employer] support line at [phone]. Additional Information If you have questions, please visit the Frequently Asked Questions section of the [employer] website at [email or web address] Remember, [employer] is NOT to be used for urgent needs. For medical emergencies, dial 911. Now available from your iPhone and Android! Providers Seen During Your Hospitalization Provider Specialty Primary office phone Fredy Bejarano DO Otolaryngology [phone] Your Primary Care Physician (PCP) Primary Care Physician Office Phone Office Fax [address] [facility] [phone] ** None ** You are allergic to the following Allergen Reactions Zofran (Ondansetron Hcl (Pf)) Other (comments) Gives pt severe HA Recent Documentation Height Weight BMI Smoking Status 1.778 m 98.2 kg 31.05 kg/m2 Former Smoker Emergency Contacts Name Discharge Info Relation Home Work Mobile MushtaqMeera DISCHARGE CAREGIVER [3] Spouse [3]   [phone] Patient Belongings The following personal items are in your possession at time of discharge: 
  Dental Appliances: None  Visual Aid: Glasses Please provide this summary of care documentation to your next provider. Signatures-by signing, you are acknowledging that this After Visit Summary has been reviewed with you and you have received a copy. Patient Signature:  ____________________________________________________________ Date:  ____________________________________________________________  
  
Moriah Ortega Provider Signature:  ____________________________________________________________ Date:  ____________________________________________________________

## 2017-12-20 NOTE — OP NOTES
[address] REPORT    Thamas Blizzard  MR#: [id number]  : 1955  ACCOUNT #: [de-identified]   DATE OF SERVICE: 2017    DATE OF PROCEDURE:  2017    PREOPERATIVE DIAGNOSES:    1. Dysphagia. 2.  Supraglottic mass. 3.  Hoarseness. POSTOPERATIVE DIAGNOSES:  1. Dysphagia. 2.  Supraglottic mass. 3.  Hoarseness. PROCEDURES PERFORMED:  Direct laryngoscopy with biopsy and CO2 laser and bronchoscopy. SURGEON:  Aldo Bardales. DO Cade     ASSISTANT:  None. ANESTHESIA:  General.    COMPLICATIONS:  None. ESTIMATED BLOOD LOSS:  Less than 5 mL. HISTORY OF PRESENT ILLNESS:  This is a 71-year-old male well known to me, came to see me originally because of stridor and hoarseness. 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 He underwent chemotherapy and radiation initially getting an excellent result, but after radiation was done, then he noticed more of an issue with increasing dysphagia and hoarseness again. This has been fairly stable, slightly worsening with time. He is getting noisier and noisier breathing. He is getting back to a point where he was when he first came to see me. Recent PET CT showed no sign of any malignancy or recurrence and scoping in the office revealed again supraglottic edema, which appeared to be smooth but due to the supraglottic edema, I was not able to visualize any part of the larynx including the vocal cords, so due to the increasing problems with dysphagia, hoarseness, and given the size of the swelling in the supraglottic region, it was my recommendation that he undergo a panendoscopy with possible biopsy. Procedure risks and benefits were discussed with him in the office. All questions were answered and he was agreeable to the surgery. DESCRIPTION OF PROCEDURE:  The patient was identified in the preoperative waiting area.   He was taken back to the operating room where he underwent general anesthesia. The bed was turned 90 degrees. A tooth guard was placed over the upper teeth and then a laryngoscope was used to evaluate the oral cavity. Tongue, lateral portion of the tongue, floor of mouth, oral mucosa, all appear to be completely normal.  Tongue base appeared to be normal.  Oropharynx appeared to be normal.  Getting to the level of the epiglottis, there was very little normal appearing epiglottis. Most of it appeared to be just a round, smooth, enlarged area that did take up the majority of the space within the hypopharynx. The posterior hypopharyngeal wall appeared to be normal.  Once I was able to navigate around the swelling, the true vocal cords themselves appeared to be completely normal along with the arytenoid. Piriform sinuses appear to be normal.  Subglottis was clear along with the rest of the trachea. Again, the issue appeared to be in the supraglottic region involving the epiglottis and the aryepiglottic folds, so the laryngoscope was retracted to the point that the supraglottic mass was in the field. A biopsy was taken of the supraglottic swelling and then the CO2 laser was brought in. This was a flexible CO2 laser. It was brought into the field. The patient was draped with wet towels completely covering the patient. Goggles were placed on all OR staff and on an 8 watt superpulse, the laser was used to split the edema anterior slightly to the right, starting again at the level of the epiglottis and going the whole way down to the level of the vocal cords so that once this was done sitting in the oropharynx, a direct visual line could be seen to the true vocal cords.   Using the laser, there was very little bleeding from the tissue so once that was complete pre-intraoperative and postoperative pictures were taken and then a tooth guard was removed and the patient was awakened and he was taken to the postop recovery room in stable condition.       Zaida Lee DO       TSS / TN  D: 12/16/2017 21:42     T: 12/18/2017 14:52  JOB #: [id number]

## 2018-01-10 NOTE — PROGRESS NOTES
Esperanza Loredo  : 1955 [facility] [place] [address] [facility], [address] [place] [address],[address], [facility] [address]  [phone]   [phone]        OUTPATIENT OCCUPATIONAL THERAPY: Discontinuation Summary 1/10/2018    ICD-10: Treatment Diagnosis: Lymphedema, not elsewhere classified (I89.0)  Precautions/Allergies:   Zofran Nancy Shank hcl (pf)]   Fall Risk Score: 1 (? 5 = High Risk)  MD Orders: Evaluate and treat lymphedema MEDICAL/REFERRING DIAGNOSIS:   supraglottic cancer POST CHEMO AND RT   DATE OF ONSET: 2016   REFERRING PHYSICIAN: Alex Baumann MD  RETURN PHYSICIAN APPOINTMENT: unknown   DISCONTINUATION SUMMARY 1/10/18: Mr. Stefany Yu was evaluated on 17 and education re: compression garment and manual lymph drainage was initiated. He did not return for any subsequent visits. Plan of care will be discontinued; please re-consult if future needs arise and patient is able to attend therapy. Thank you for the opportunity to participate in Mr. Landin's care. INITIAL ASSESSMENT:  Mr. Stefany Yu presents with edema in the neck and chin area that is consistent with lymphedema s/p chemotherapy and radiation treatment for supraglottic cancer. Feel he may benefit from skilled occupational therapy to maximize independence with home management of lymphedema. PLAN OF CARE:   PROBLEM LIST:  1. Edema/Girth INTERVENTIONS PLANNED:  1. Manual therapy training  2. Therapeutic activity  3. Therapeutic exercise   TREATMENT PLAN:  Effective Dates: 17 TO 17. Frequency/Duration: 1 time a week for 8 weeks  GOALS: (Goals have been discussed and agreed upon with patient.) Unable to assess because patient did not continue with therapy. 1. The patient/caregiver will verbalize understanding of lymphedema precautions. Goal will be accomplished within 8 week(s). 2. The patient/caregiver will be independent with self-manual lymph drainage techniques and show decrease in volume.   Goal will be accomplished within 8 week(s). 3. The patient/caregiver will be independent with home management of lymphedema. Goal will be accomplished in 8 week(s). 4. Patient/caregiver will be independent donning and doffing Jobst Re Facioplasty head and neck compression garment. Goal will be accomplished within 8 week(s).         Rehabilitation Potential For Stated Goals: Good    Thank you for this referral,  Claudette Couch, OT

## 2018-02-01 ENCOUNTER — HOSPITAL ENCOUNTER (OUTPATIENT)
Dept: CT IMAGING | Age: 63
Discharge: HOME | End: 2018-02-01
Attending: OTOLARYNGOLOGY
Payer: COMMERCIAL

## 2018-02-01 DIAGNOSIS — C32.1 SQUAMOUS CELL CARCINOMA OF EPIGLOTTIS (HCC): ICD-10-CM

## 2018-02-01 DIAGNOSIS — R22.1 NECK MASS: ICD-10-CM

## 2018-02-01 LAB — CREAT BLD-MCNC: 1.4 MG/DL (ref 0.8–1.5)

## 2018-02-01 PROCEDURE — 70491 CT SOFT TISSUE NECK W/DYE: CPT

## 2018-02-01 PROCEDURE — 74011636320 HC RX REV CODE- 636/320: Performed by: OTOLARYNGOLOGY

## 2018-02-01 PROCEDURE — 82565 ASSAY OF CREATININE: CPT

## 2018-02-01 PROCEDURE — 74011000258 HC RX REV CODE- 258: Performed by: OTOLARYNGOLOGY

## 2018-02-01 RX ORDER — SODIUM CHLORIDE 0.9 % (FLUSH) 0.9 %
10 SYRINGE (ML) INJECTION
Status: COMPLETED | OUTPATIENT
Start: 2018-02-01 | End: 2018-02-01

## 2018-02-01 RX ADMIN — Medication 10 ML: at 08:50

## 2018-02-01 RX ADMIN — IOPAMIDOL 80 ML: 755 INJECTION, SOLUTION INTRAVENOUS at 08:50

## 2018-02-01 RX ADMIN — SODIUM CHLORIDE 100 ML: 900 INJECTION, SOLUTION INTRAVENOUS at 08:50

## 2018-03-05 ENCOUNTER — HOSPITAL ENCOUNTER (OUTPATIENT)
Dept: RADIATION ONCOLOGY | Age: 63
Discharge: HOME | End: 2018-03-05
Payer: COMMERCIAL

## 2018-03-05 VITALS
DIASTOLIC BLOOD PRESSURE: 68 MMHG | WEIGHT: 219.9 LBS | SYSTOLIC BLOOD PRESSURE: 129 MMHG | TEMPERATURE: 97.6 F | OXYGEN SATURATION: 96 % | BODY MASS INDEX: 31.55 KG/M2 | RESPIRATION RATE: 18 BRPM | HEART RATE: 89 BPM

## 2018-03-05 DIAGNOSIS — C32.1 CANCER OF SUPRAGLOTTIS (HCC): Primary | ICD-10-CM

## 2018-03-05 PROCEDURE — 99211 OFF/OP EST MAY X REQ PHY/QHP: CPT

## 2018-03-05 NOTE — PROGRESS NOTES
Patient: Kelechi Gentile MRN: [id number]  SSN: [id number]    YOB: 1955  Age: 65 y.o. Sex: male      Other Providers: Trey Tellez MD, Ronnie Stephenson MD     DIAGNOSIS: T3N1M0 Supraglottic SCC, Stage ARMANDO.      PREVIOUS TREATMENT:  1. Debridement and biopsy 11/14/16  2. 2 cycles of neoadjuvant TPF chemotherapy. 3. Completion of concurrent Cetuximab and radiation 4/11/17, 70 Gy in 35 fractions. INTERVAL HISTORY:  Kelechi Gentile is a 65 y.o. male being seen back in regular follow up after completion of his radiation 4/11/17. Regarding his history, his only pertinent medical history includes DMII, HTN, and obesity. Beginning in the fall 2016, he began to note some difficulty with dysphagia as well as some change in his voice. Ultimately, there was some left-sided otalgia for which he sought help from his primary care physician. A course of antibiotics did not prove helpful and ultimately he was referred to Dr. Asim Castaneda for evaluation. He was a prior smoker but quit nearly 11 years prior to presentation. He did not abuse alcohol. Flexible laryngoscopy demonstrated a large mass involving the entire epiglottis. The tumor appeared to involve the aryepiglottic folds. The vocal cords were not visualized. CT scan November 3, 2016 demonstrated 3.9 x 2.6 x 2.3 cm supraglottic mass extending across midline. Inferiorly, it appeared to involve the left true vocal cord. The overlying thyroid cartilage seemed preserved. There was a level 3 lymph node on the left side measuring 1.8 cm in short axis. On 11/14/16, the patient was taken to the OR for panendoscopy where Dr. Asim Castaneda indicate that he visualized the true and false vocal cords both of which appeared uninvolved by the tumor. He also did micro-debridement, subsequent to which the patient's voice appeared to be improved. The biopsy was consistent with in situ and infiltrating poorly differentiated squamous carcinoma.  The patient has been seen at [address], where surgery was offered. The patient was averse to the notion of a tracheostomy if that was a possibility and therefore sought evaluation from Dr. David Lagos in oncology who saw him 12/20/16. Per Dr. Cole Freire note, he was a candidate for chemoradiation. He ordered a PET/CT and referred him to our office. I agreed with the plan and completed treatment 4/11/17. Unfortunately prior to commencement with radiation, he did require tracheotomy. With the anticipated delayed in starting radiation, he did start chemotherapy, TPF, and received 2 cycles prior to starting radiation with cetuximab. Overall he did fairly well with treatment, but did have the anticipated irritation and difficulty swallowing. He did get dry mouth and neck erythema worsened on the left compared to the right. He did have Silvadene prescribed for this. He was seen by Dr. Bridgette Chacon shortly after his radiation and had his trach downsized. He continues to use his feeding tube but is able to tolerate most solids and liquids. He continues to follow with medical oncology and ENT who removed his trach 5/31/17. As a result of his issues, he had lost nearly 50 pounds. He was seen 6 months post treatment with continued follow up with Dr. Bridgette Chacon and Dr. David Lagos over this time. He was no longer using his feeding tube. Unfortunately he developed increasing dysphagia and hoarseness which was slightly worsening prompting evaluation. Imaging including PET/CT did not show any sign of recurrence. He did complete direct laryngoscopy with biopsy and CO2 laser 12/11/2017. The final pathology showed dysplastic epithelium, but no sign of recurrent disease. By 11 months he was again doing better with only was minimal residual hoarseness but nearly back to baseline. He does [place] out\" easily which is bothersome to him. UPDATED PAST MEDICAL HISTORY:  Since our prior encounter, Krystina Bains has not been hospitalized.   There have been no significant changes to the medical history. MEDICATIONS:     Current Outpatient Prescriptions:     HYDROcodone-acetaminophen (HYCET) 0.5-21.7 mg/mL oral solution, Take 2-3 teaspoons every 4 hours prn pain, Disp: 240 mL, Rfl: 0    amoxicillin-clavulanate (AUGMENTIN) 875-125 mg per tablet, Take 1 Tab by mouth two (2) times a day., Disp: 20 Tab, Rfl: 0    HYDROcodone-acetaminophen (NORCO) 5-325 mg per tablet, Take 1 -2 tablets every 4-6 hours prn pain, Disp: 29 Tab, Rfl: 0    benzocaine 20 % drop, 2 Drops by Otic route three (3) times daily. , Disp: 15 mL, Rfl: 3    citalopram (CELEXA) 10 mg tablet, Take 10 mg by mouth daily. , Disp: , Rfl:     lisinopril-hydroCHLOROthiazide (PRINZIDE, ZESTORETIC) 20-25 mg per tablet, Take 0.5 Tabs by mouth daily. , Disp: , Rfl:     glipiZIDE SR (GLUCOTROL XL) 10 mg CR tablet, Take 1 Tab by mouth daily. Indications: type 2 diabetes mellitus, Disp: 30 Tab, Rfl: 2    omeprazole (PRILOSEC) 20 mg capsule, Take 1 Cap by mouth every morning. Take / use AM day of surgery  per anesthesia protocols. Indications: gastroesophageal reflux disease, Disp: 30 Cap, Rfl: 5    clindamycin (CLEOCIN T) 1 % lotion, Apply  to affected area two (2) times daily as needed. use thin film on affected area, Disp: 1 Bottle, Rfl: 0    glucose blood VI test strips (RELION PRIME TEST STRIPS) strip, Test blood sugar before meals and bedtime Diagnosis code E11.65, Disp: 200 Strip, Rfl: 5    aspirin delayed-release 81 mg tablet, Take 81 mg by mouth every morning. Take / use AM day of surgery  per anesthesia protocols. Indications: myocardial infarction prevention, Disp: , Rfl:     ALLERGIES:   Allergies   Allergen Reactions    Zofran [Ondansetron Hcl (Pf)] Other (comments)     Gives pt severe HA       PHYSICAL EXAMINATION:   ECOG Performance status 1  VITAL SIGNS:   Visit Vitals    /68    Pulse 89    Temp 97.6 °F (36.4 °C)    Resp 18    Wt 99.7 kg (219 lb 14.4 oz)    SpO2 96%    BMI 31.55 kg/m2    .   GENERAL: The patient is well-developed, ambulatory, alert and in no acute distress. HEENT: Head is normocephalic, atraumatic. Pupils are equal, round and reactive to light and accommodation. Extraocular movement intact. Hearing is intact bilaterally to finger rub. Oral cavity reveals no lesions but dry secretions. Audible upper respiratory sounds NECK: Neck is supple with no masses. There is substantial submental edema which is unchanged. CARDIOVASCULAR: Heart is regular rate and rhythm. There are no murmurs rubs or gallups. Radial pulses are 2+ RESPIRATORY: his grasping and upper airway edema is audible. Lungs are clear to auscultation and percussion. There is normal respiratory effort. GASTROINTESTINAL: The abdomen is soft, non-tender, nondistended with no hepatospelnomagaly. Digital rectal examination: deferred  LYMPHATIC: There is no cervical, supraclavicular or axillary lymphadenopathy bilaterally.         LABORATORY:   Lab Results   Component Value Date/Time    Sodium 137 11/30/2017 01:27 PM    Potassium 3.9 11/30/2017 01:27 PM    Chloride 96 (L) 11/30/2017 01:27 PM    CO2 33 (H) 11/30/2017 01:27 PM    Anion gap 8 11/30/2017 01:27 PM    Glucose 284 (H) 11/30/2017 01:27 PM    BUN 23 11/30/2017 01:27 PM    Creatinine 1.46 11/30/2017 01:27 PM    GFR est AA >60 11/30/2017 01:27 PM    GFR est non-AA 52 (L) 11/30/2017 01:27 PM    Calcium 9.1 11/30/2017 01:27 PM    Magnesium 2.4 04/27/2017 09:04 AM    Phosphorus 3.0 01/20/2017 10:57 AM    Albumin 3.8 11/30/2017 01:27 PM    Protein, total 7.0 11/30/2017 01:27 PM    Globulin 3.2 11/30/2017 01:27 PM    A-G Ratio 1.2 11/30/2017 01:27 PM    AST (SGOT) 8 (L) 11/30/2017 01:27 PM    ALT (SGPT) 17 11/30/2017 01:27 PM     Lab Results   Component Value Date/Time    WBC 8.6 11/30/2017 01:27 PM    HGB 12.2 (L) 11/30/2017 01:27 PM    HCT 34.8 (L) 11/30/2017 01:27 PM    PLATELET 695 69/59/6295 01:27 PM       PATHOLOGY:  12/11/17:     DIAGNOSIS   SUPRAGLOTTIS: SQUAMOUS EPITHELIUM HAVING FOCAL CHANGES CONSISTENT WITH MODERATE DYSPLASIA, NONDIAGNOSTIC FOR MALIGNANT TUMOR. RADIOLOGY:  I have personally reviewed the imaging and agree with the reports below. Ct Neck Soft Tissue W Cont    Result Date: 2/1/2018  HISTORY: Follow-up previous throat cancer. Difficulty with shortness of breath over the last 3 months. No palpable mass. EXAM: CT soft tissue neck with IV contrast TECHNIQUE: Thin section axial CT images are obtained through the soft tissues of the neck after the uneventful administration of 80 mL Isovue-370. COMPARISON: 10/17/2017 FINDINGS: There is normal opacification of the major intracranial vessels. There is no abnormal opacification of the paranasal sinuses. There is mucosal irregularity and submucosal edema present within the supraglottic larynx, as was seen previously. There is significant narrowing of the airway within the larynx, as was seen previously. This could be the result of prior intubation. No new abnormality of the oropharynx demonstrated or of the nasopharynx. No abnormal cervical lymphadenopathy demonstrated. The thyroid gland is normal. Evaluation of the upper lungs demonstrates centrilobular emphysematous change. Bone window evaluation demonstrates no aggressive osseous lesions. IMPRESSION: 1. Supraglottic laryngeal edema and mucosal irregularity. There has been no significant interval change when compared with the prior exam. This is likely related to prior treatment, but recurrent malignancy would be difficult to exclude by this modality, and correlation with PET/CT would be indicated. 2. Narrowing of the airway within the larynx, as was seen previously. This could be related to prior intubation. 3. Centrilobular emphysematous change. TUMOR STATUS:  Excellent partial response based on CT. IMPRESSION:  Patti Olvera is a 65 y.o. male now 9 months out from treatment.   He had what I would coin as typical effects from radiation in the setting of a tracheostomy and a locally advanced head and neck cancer. He is continuing to follow with medical oncology as well as ENT. I'm pleased with his current status but disappointed with his amount of edema. I was again encouraged with the CT scan despite the increased edema. I would agree the response is favorable at this point without any concern for local progression or site of spread outside of the radiation field. His edema at this point is being managed locally by ENT and therefore I will not proceed with repeat laryngoscopy today. Considering there is still some concern for local recurrence and the site, radiographically, is difficult to assess, I will proceed with repeat PET/CT with his next follow-up which again will be in 3 months. He was agreeable with this plan. PLAN:    1) Patient is recovering as anticipated from his prior course of radiotherapy. Will continue to follow with ENT for continued laryngeal edema. 2) Future follow up will be coordinated with Dr. Birdie Bates and Dr. Naima Galvez. I will see him back in 3 months with PET/CT. Portions of this note were copied from prior encounters and reviewed for accuracy, currency, and represent documentation and tasks completed during this encounter. I verify and attest these portions to be unchanged from prior visits.     Yasmany Ayon MD  03/05/18

## 2018-06-05 ENCOUNTER — HOSPITAL ENCOUNTER (OUTPATIENT)
Dept: PET IMAGING | Age: 63
Discharge: HOME | End: 2018-06-05
Payer: COMMERCIAL

## 2018-06-05 DIAGNOSIS — C32.1 CANCER OF SUPRAGLOTTIS (HCC): ICD-10-CM

## 2018-06-05 PROCEDURE — A9552 F18 FDG: HCPCS

## 2018-06-05 PROCEDURE — 74011636320 HC RX REV CODE- 636/320: Performed by: RADIOLOGY

## 2018-06-05 RX ORDER — SODIUM CHLORIDE 0.9 % (FLUSH) 0.9 %
10 SYRINGE (ML) INJECTION
Status: COMPLETED | OUTPATIENT
Start: 2018-06-05 | End: 2018-06-05

## 2018-06-05 RX ADMIN — Medication 10 ML: at 07:55

## 2018-06-05 RX ADMIN — DIATRIZOATE MEGLUMINE AND DIATRIZOATE SODIUM 10 ML: 660; 100 LIQUID ORAL; RECTAL at 07:55

## 2018-06-07 ENCOUNTER — HOSPITAL ENCOUNTER (OUTPATIENT)
Dept: RADIATION ONCOLOGY | Age: 63
Discharge: HOME | End: 2018-06-07
Payer: COMMERCIAL

## 2018-06-07 VITALS
BODY MASS INDEX: 32.13 KG/M2 | HEART RATE: 88 BPM | RESPIRATION RATE: 18 BRPM | HEIGHT: 70 IN | SYSTOLIC BLOOD PRESSURE: 145 MMHG | WEIGHT: 224.4 LBS | OXYGEN SATURATION: 95 % | TEMPERATURE: 98.4 F | DIASTOLIC BLOOD PRESSURE: 84 MMHG

## 2018-06-07 PROCEDURE — 99211 OFF/OP EST MAY X REQ PHY/QHP: CPT

## 2018-06-07 NOTE — PROGRESS NOTES
Pt here today for FUP post RT to the supraglottis. T3N1M0 Supraglottic SCC, Stage ARMANDO. Debridement and biopsy 11/14/16.  2 cycles of neoadjuvant TPF chemotherapy. Completion of concurrent Cetuximab and radiation 4/11/17, 70 Gy in 35 fractions. Pt was examined and scoped by Dr. Micky Torrez on 5/16/18. Per Dr. Berenice Flores note, pt's airway was the \"most open his airway has been. \"  Pt denies pain and and is eating normally. The 5/5/18 PET scan was negative. Pt will return in 3 months for FUP with the NP, no scans were ordered for that visit.

## 2018-06-07 NOTE — PROGRESS NOTES
Patient: Korina Harrsi MRN: [id number]  SSN: [id number]    YOB: 1955  Age: 65 y.o. Sex: male      Other Providers: Ridge Jones MD, Hoang Garcia MD     DIAGNOSIS: T3N1M0 Supraglottic SCC, Stage ARMANDO.      PREVIOUS TREATMENT:  1. Debridement and biopsy 11/14/16  2. 2 cycles of neoadjuvant TPF chemotherapy. 3. Completion of concurrent Cetuximab and radiation 4/11/17, 70 Gy in 35 fractions. INTERVAL HISTORY:  Korina Harris is a 65 y.o. male being seen back in regular follow up after completion of his radiation 4/11/17. Regarding his history, his only pertinent medical history includes DMII, HTN, and obesity. Beginning in the fall 2016, he began to note some difficulty with dysphagia as well as some change in his voice. Ultimately, there was some left-sided otalgia for which he sought help from his primary care physician. A course of antibiotics did not prove helpful and ultimately he was referred to Dr. Bernie Loving for evaluation. He was a prior smoker but quit nearly 11 years prior to presentation. He did not abuse alcohol. Flexible laryngoscopy demonstrated a large mass involving the entire epiglottis. The tumor appeared to involve the aryepiglottic folds. The vocal cords were not visualized. CT scan November 3, 2016 demonstrated 3.9 x 2.6 x 2.3 cm supraglottic mass extending across midline. Inferiorly, it appeared to involve the left true vocal cord. The overlying thyroid cartilage seemed preserved. There was a level 3 lymph node on the left side measuring 1.8 cm in short axis. On 11/14/16, the patient was taken to the OR for panendoscopy where Dr. Bernie Loving indicate that he visualized the true and false vocal cords both of which appeared uninvolved by the tumor. He also did micro-debridement, subsequent to which the patient's voice appeared to be improved. The biopsy was consistent with in situ and infiltrating poorly differentiated squamous carcinoma.  The patient has been seen at [facility], where surgery was offered. The patient was averse to the notion of a tracheostomy if that was a possibility and therefore sought evaluation from Dr. Ingrid Jarvis in oncology who saw him 12/20/16. Per Dr. Vincenzo Cruz note, he was a candidate for chemoradiation. He ordered a PET/CT and referred him to our office. I agreed with the plan and completed treatment 4/11/17. Unfortunately prior to commencement with radiation, he did require tracheotomy. With the anticipated delayed in starting radiation, he did start chemotherapy, TPF, and received 2 cycles prior to starting radiation with cetuximab. Overall he did fairly well with treatment, but did have the anticipated irritation and difficulty swallowing. He did get dry mouth and neck erythema worsened on the left compared to the right. He did have Silvadene prescribed for this. He was seen by Dr. Cindi Muniz shortly after his radiation and had his trach downsized. He continued to use his feeding tube but was able to tolerate most solids and liquids. He continues to follow with medical oncology and ENT who removed his trach 5/31/17. As a result of his issues, he had lost nearly 50 pounds. He was seen 6 months post treatment with continued follow up with Dr. Cindi Muniz and Dr. Ingrid Jarvis over this time. He was no longer using his feeding tube. Unfortunately he developed increasing dysphagia and hoarseness which was slightly worsening prompting evaluation. Imaging including PET/CT did not show any sign of recurrence. He did complete direct laryngoscopy with biopsy and CO2 laser 12/11/2017. The final pathology showed dysplastic epithelium, but no sign of recurrent disease. By 11 months he was again doing better with only was minimal residual hoarseness but nearly back to baseline. He did [place] out\" easily which was bothersome to him. At 14 months he was continugin to do well. He denied pain, depression, or further challenges swallowing. He did endorse some mild fatigue.   He was seen by ENT 5/16/2018 with laryngoscopy noting the airway was as opened as it had been since treatment completion. UPDATED PAST MEDICAL HISTORY:  Since our prior encounter, Carleen Schwartz has not been hospitalized. There have been no significant changes to the medical history. MEDICATIONS:     Current Outpatient Prescriptions:     citalopram (CELEXA) 10 mg tablet, Take 10 mg by mouth daily. , Disp: , Rfl:     lisinopril-hydroCHLOROthiazide (PRINZIDE, ZESTORETIC) 20-25 mg per tablet, Take 0.5 Tabs by mouth daily. , Disp: , Rfl:     glipiZIDE SR (GLUCOTROL XL) 10 mg CR tablet, Take 1 Tab by mouth daily. Indications: type 2 diabetes mellitus, Disp: 30 Tab, Rfl: 2    omeprazole (PRILOSEC) 20 mg capsule, Take 1 Cap by mouth every morning. Take / use AM day of surgery  per anesthesia protocols. Indications: gastroesophageal reflux disease, Disp: 30 Cap, Rfl: 5    clindamycin (CLEOCIN T) 1 % lotion, Apply  to affected area two (2) times daily as needed. use thin film on affected area, Disp: 1 Bottle, Rfl: 0    glucose blood VI test strips (RELION PRIME TEST STRIPS) strip, Test blood sugar before meals and bedtime Diagnosis code E11.65, Disp: 200 Strip, Rfl: 5    aspirin delayed-release 81 mg tablet, Take 81 mg by mouth every morning. Take / use AM day of surgery  per anesthesia protocols. Indications: myocardial infarction prevention, Disp: , Rfl:     benzocaine 20 % drop, 2 Drops by Otic route three (3) times daily. , Disp: 15 mL, Rfl: 3    ALLERGIES:   Allergies   Allergen Reactions    Zofran [Ondansetron Hcl (Pf)] Other (comments)     Gives pt severe HA       PHYSICAL EXAMINATION:   ECOG Performance status 1  VITAL SIGNS:   Visit Vitals    /84 (BP 1 Location: Left arm, BP Patient Position: Sitting)    Pulse 88    Temp 98.4 °F (36.9 °C)    Resp 18    Ht 5' 10\" (1.778 m)    Wt 101.8 kg (224 lb 6.4 oz)    SpO2 95%    BMI 32.2 kg/m2    .   GENERAL: The patient is well-developed, ambulatory, alert and in no acute distress. HEENT: Head is normocephalic, atraumatic. Pupils are equal, round and reactive to light and accommodation. Extraocular movement intact. Hearing is intact bilaterally to finger rub. Oral cavity reveals no lesions but dry secretions. Audible upper respiratory sounds NECK: Neck is supple with no masses. There is substantial submental edema which is unchanged. CARDIOVASCULAR: Heart is regular rate and rhythm. There are no murmurs rubs or gallups. Radial pulses are 2+ RESPIRATORY: his grasping and upper airway edema is audible. Lungs are clear to auscultation and percussion. There is normal respiratory effort. GASTROINTESTINAL: The abdomen is soft, non-tender, nondistended with no hepatospelnomagaly. Digital rectal examination: deferred  LYMPHATIC: There is no cervical, supraclavicular or axillary lymphadenopathy bilaterally.         LABORATORY:   Lab Results   Component Value Date/Time    Sodium 137 11/30/2017 01:27 PM    Potassium 3.9 11/30/2017 01:27 PM    Chloride 96 (L) 11/30/2017 01:27 PM    CO2 33 (H) 11/30/2017 01:27 PM    Anion gap 8 11/30/2017 01:27 PM    Glucose 284 (H) 11/30/2017 01:27 PM    BUN 23 11/30/2017 01:27 PM    Creatinine 1.46 11/30/2017 01:27 PM    GFR est AA >60 11/30/2017 01:27 PM    GFR est non-AA 52 (L) 11/30/2017 01:27 PM    Calcium 9.1 11/30/2017 01:27 PM    Magnesium 2.4 04/27/2017 09:04 AM    Phosphorus 3.0 01/20/2017 10:57 AM    Albumin 3.8 11/30/2017 01:27 PM    Protein, total 7.0 11/30/2017 01:27 PM    Globulin 3.2 11/30/2017 01:27 PM    A-G Ratio 1.2 11/30/2017 01:27 PM    AST (SGOT) 8 (L) 11/30/2017 01:27 PM    ALT (SGPT) 17 11/30/2017 01:27 PM     Lab Results   Component Value Date/Time    WBC 8.6 11/30/2017 01:27 PM    HGB 12.2 (L) 11/30/2017 01:27 PM    HCT 34.8 (L) 11/30/2017 01:27 PM    PLATELET 979 14/81/8866 01:27 PM       PATHOLOGY:  12/11/17:     DIAGNOSIS   SUPRAGLOTTIS: SQUAMOUS EPITHELIUM HAVING FOCAL CHANGES CONSISTENT WITH MODERATE DYSPLASIA, NONDIAGNOSTIC FOR MALIGNANT TUMOR. RADIOLOGY:  I have personally reviewed the imaging and agree with the reports below. Pet/ct Tumor Image Skull Thigh (sub)    Result Date: 6/5/2018  Nuclear Medicine Whole Body CT / PET- FDG Study 6/5/2018 9:30 AM INDICATION: Supraglottic squamous cell cancer restaging COMPARISON: PET/CT 6/22/2017. CT neck 2/1/2018. TECHNIQUE:   Radiopharmaceutical: 15.23 mCi F18-FDG IV. This is a whole-body PET- FDG study performed on a dedicated full- ring scanner. Low dose, nondiagnostic CT axial source images are also acquired for PET attenuation correction and are utilized for PET-CT fusion with the emission images. The patient is a diabetic and underwent adequate fasting of at least 4 hours. Blood glucose at the time of tracer administration is 111 mg/dl, which is adequate for imaging. Approximately 60 minutes after administration of the radiopharmaceutical imaging was initiated covering the skull to the thighs. SUV max. Calculated from patient body wt. Noncaloric MDGASTROVIEW dilute oral contrast is given. An additional 2 table position head and neck acquisition is done. FINDINGS:  Examination of the 3D tomographic PET images shows only nonfocal, low intensity FDG uptake at the pharynx and left neck parapharyngeal soft tissues, this is similar to the most recent prior and compatible with only post treatment change. No new discrete [place] hypermetabolic focus is identified. No hypermetabolic lymph node identified. In the chest there is no new suspicious hypermetabolic lung nodule or lymph node. Below the diaphragm within the abdomen and pelvis there is no new suspicious hypermetabolic lymph node, or liver lesion. There is only some asymmetric physiologic tracer activity seen at the crura-incidental. Expected bowel and urinary tract activity otherwise only. Again there is prostate enlargement. No suspicious skeletal lesion. IMPRESSION: 1.  No FDG findings suspicious for recurrence in the head and neck with only low intensity post treatment inflammatory changes noted on the left. 2. No FDG findings for distant metastatic disease. TUMOR STATUS:  Excellent partial response      IMPRESSION:  Eben Zamora is a 65 y.o. male now 14 months out from treatment. He had what I would coin as typical effects from radiation in the setting of a tracheostomy and a locally advanced head and neck cancer. He is continuing to follow with medical oncology as well as ENT. I'm pleased with his current status but disappointed with his amount of edema. However, his airway is open and actually doing as well as it has done since treatment completion. I was pleased with his PET CT imaging. His edema at this point is being managed locally by ENT and therefore I will not proceed with repeat laryngoscopy today. With a favorable imaging characteristics and laryngoscopy by ENT, I would defer future imaging until clinically indicated. He was agreeable with this plan. PLAN:    1) Patient is recovering as anticipated from his prior course of radiotherapy. Will continue to follow with ENT for continued laryngeal edema. 2) Future follow up will be coordinated with Dr. Vikram Mckay and Dr. Linda Hudson. I will see him back in 3 months. Portions of this note were copied from prior encounters and reviewed for accuracy, currency, and represent documentation and tasks completed during this encounter. I verify and attest these portions to be unchanged from prior visits.     Maria Teresa Reyes MD  06/07/18

## 2018-09-06 ENCOUNTER — HOSPITAL ENCOUNTER (OUTPATIENT)
Dept: RADIATION ONCOLOGY | Age: 63
Discharge: HOME | End: 2018-09-06
Payer: COMMERCIAL

## 2018-09-06 VITALS
HEART RATE: 70 BPM | TEMPERATURE: 97.7 F | RESPIRATION RATE: 18 BRPM | HEIGHT: 70 IN | DIASTOLIC BLOOD PRESSURE: 75 MMHG | SYSTOLIC BLOOD PRESSURE: 136 MMHG | BODY MASS INDEX: 32.87 KG/M2 | OXYGEN SATURATION: 95 % | WEIGHT: 229.6 LBS

## 2018-09-06 PROCEDURE — 99211 OFF/OP EST MAY X REQ PHY/QHP: CPT

## 2018-09-06 NOTE — PROGRESS NOTES
Patient: Marya Rodriguez MRN: [id number]  SSN: [id number] YOB: 1955  Age: 65 y.o. Sex: male Other Providers: Bibiana Man MD, Keisha Linder MD 
  
DIAGNOSIS: [id number] Supraglottic SCC, Stage ARMANDO.  
  
PREVIOUS TREATMENT: 
1. Debridement and biopsy 11/14/16 2. 2 cycles of neoadjuvant TPF chemotherapy. 3. Completion of concurrent Cetuximab and radiation 4/11/17, 70 Gy in 35 fractions. INTERVAL HISTORY:  Marya Rodriguez is a 65 y.o. male being seen back in regular follow up after completion of his radiation 4/11/17. Regarding his history, his only pertinent medical history includes DMII, HTN, and obesity. Beginning in the fall 2016, he began to note some difficulty with dysphagia as well as some change in his voice. Ultimately, there was some left-sided otalgia for which he sought help from his primary care physician. A course of antibiotics did not prove helpful and ultimately he was referred to Dr. Claire Herrera for evaluation. He was a prior smoker but quit nearly 11 years prior to presentation. He did not abuse alcohol. Flexible laryngoscopy demonstrated a large mass involving the entire epiglottis. The tumor appeared to involve the aryepiglottic folds. The vocal cords were not visualized. CT scan November 3, 2016 demonstrated 3.9 x 2.6 x 2.3 cm supraglottic mass extending across midline. Inferiorly, it appeared to involve the left true vocal cord. The overlying thyroid cartilage seemed preserved. There was a level 3 lymph node on the left side measuring 1.8 cm in short axis. On 11/14/16, the patient was taken to the OR for panendoscopy where Dr. Claire Herrera indicate that he visualized the true and false vocal cords both of which appeared uninvolved by the tumor. He also did micro-debridement, subsequent to which the patient's voice appeared to be improved. The biopsy was consistent with in situ and infiltrating poorly differentiated squamous carcinoma.  The patient has been seen at [facility], where surgery was offered. The patient was averse to the notion of a tracheostomy if that was a possibility and therefore sought evaluation from Dr. Indira Flores in oncology who saw him 12/20/16. Per Dr. Huong Watson note, he was a candidate for chemoradiation. He ordered a PET/CT and referred him to our office. I agreed with the plan and completed treatment 4/11/17. Unfortunately prior to commencement with radiation, he did require tracheotomy. With the anticipated delayed in starting radiation, he did start chemotherapy, TPF, and received 2 cycles prior to starting radiation with cetuximab. Overall he did fairly well with treatment, but did have the anticipated irritation and difficulty swallowing. He did get dry mouth and neck erythema worsened on the left compared to the right. He did have Silvadene prescribed for this. He was seen by Dr. Vanessa Joshi shortly after his radiation and had his trach downsized. He continued to use his feeding tube but was able to tolerate most solids and liquids. He continues to follow with medical oncology and ENT who removed his trach 5/31/17. As a result of his issues, he had lost nearly 50 pounds. He was seen 6 months post treatment with continued follow up with Dr. Vanessa Joshi and Dr. Indira Flores over this time. He was no longer using his feeding tube. Unfortunately he developed increasing dysphagia and hoarseness which was slightly worsening prompting evaluation. Imaging including PET/CT did not show any sign of recurrence. He did complete direct laryngoscopy with biopsy and CO2 laser 12/11/2017. The final pathology showed dysplastic epithelium, but no sign of recurrent disease. By 11 months he was again doing better with only was minimal residual hoarseness but nearly back to baseline. He did [place] out\" easily which was bothersome to him. At 14 months he was continugin to do well.   He denied pain, depression, or further challenges swallowing. He did endorse some mild fatigue. He was seen by ENT 5/16/2018 with laryngoscopy noting the airway was as opened as it had been since treatment completion. I noted increased swelling and was therefore requiring about the possibility of recurrence and had him see ENT again which took place 8/29/18. He describes improvement based on his visualization through flexible laryngoscopy. There were no concerning lesions. He noted some fullness on the right and felt he could possibly benefit from CO2 laser for the supraglottic swelling. He also states discussed cutting the neck scar and releasing it. This will be planned in the near future. At my follow-up 9/6/2018 he had no new issues or complaints and was optimistic about the possible intervention through Dr. Daniel Roberts UPDATED PAST MEDICAL HISTORY:  Since our prior encounter, Oxana De Leon has not been hospitalized. There have been no significant changes to the medical history. MEDICATIONS:  
 
Current Outpatient Prescriptions:  
  esomeprazole (NEXIUM) 20 mg capsule, Take  by mouth daily. , Disp: , Rfl:  
  citalopram (CELEXA) 10 mg tablet, Take 10 mg by mouth daily. , Disp: , Rfl:  
  lisinopril-hydroCHLOROthiazide (PRINZIDE, ZESTORETIC) 20-25 mg per tablet, Take 0.5 Tabs by mouth daily. , Disp: , Rfl:  
  glucose blood VI test strips (RELION PRIME TEST STRIPS) strip, Test blood sugar before meals and bedtime Diagnosis code E11.65, Disp: 200 Strip, Rfl: 5 
  aspirin delayed-release 81 mg tablet, Take 81 mg by mouth every morning. Take / use AM day of surgery  per anesthesia protocols. Indications: myocardial infarction prevention, Disp: , Rfl:  
  HYDROcodone-homatropine (HYCODAN) 5-1.5 mg/5 mL syrup, 1 Tsp QHS PRN, Disp: 240 mL, Rfl: 0 
  benzocaine 20 % drop, 2 Drops by Otic route three (3) times daily. , Disp: 15 mL, Rfl: 3 
  glipiZIDE SR (GLUCOTROL XL) 10 mg CR tablet, Take 1 Tab by mouth daily. Indications: type 2 diabetes mellitus, Disp: 30 Tab, Rfl: 2 
  omeprazole (PRILOSEC) 20 mg capsule, Take 1 Cap by mouth every morning. Take / use AM day of surgery  per anesthesia protocols. Indications: gastroesophageal reflux disease, Disp: 30 Cap, Rfl: 5 
  clindamycin (CLEOCIN T) 1 % lotion, Apply  to affected area two (2) times daily as needed. use thin film on affected area, Disp: 1 Bottle, Rfl: 0 ALLERGIES:  
Allergies Allergen Reactions  Zofran [Ondansetron Hcl (Pf)] Other (comments) Gives pt severe HA  
 
 
PHYSICAL EXAMINATION:  
ECOG Performance status 1 
VITAL SIGNS:  
Visit Vitals  /75 (BP 1 Location: Left arm, BP Patient Position: Sitting)  Pulse 70  Temp 97.7 °F (36.5 °C)  Resp 18  Ht 5' 9.5\" (1.765 m)  Wt 104.1 kg (229 lb 9.6 oz)  SpO2 95%  BMI 33.42 kg/m2 Katherene Means GENERAL: The patient is well-developed, ambulatory, alert and in no acute distress. HEENT: Head is normocephalic, atraumatic. Pupils are equal, round and reactive to light and accommodation. Extraocular movement intact. Hearing is intact bilaterally to finger rub. Oral cavity reveals no lesions but dry secretions. Audible upper respiratory sounds NECK: Neck is supple with no masses. There is substantial submental edema which is unchanged. CARDIOVASCULAR: Heart is regular rate and rhythm. There are no murmurs rubs or gallups. Radial pulses are 2+ RESPIRATORY: his grasping and upper airway edema is audible. Lungs are clear to auscultation and percussion. There is normal respiratory effort. GASTROINTESTINAL: The abdomen is soft, non-tender, nondistended with no hepatospelnomagaly. Digital rectal examination: deferred  LYMPHATIC: There is no cervical, supraclavicular or axillary lymphadenopathy bilaterally. LABORATORY:  
Lab Results Component Value Date/Time  Sodium 137 11/30/2017 01:27 PM  
 Potassium 3.9 11/30/2017 01:27 PM  
 Chloride 96 (L) 11/30/2017 01:27 PM  
 CO2 33 (H) 11/30/2017 01:27 PM  
 Anion gap 8 11/30/2017 01:27 PM  
 Glucose 284 (H) 11/30/2017 01:27 PM  
 BUN 23 11/30/2017 01:27 PM  
 Creatinine 1.46 11/30/2017 01:27 PM  
 GFR est AA >60 11/30/2017 01:27 PM  
 GFR est non-AA 52 (L) 11/30/2017 01:27 PM  
 Calcium 9.1 11/30/2017 01:27 PM  
 Magnesium 2.4 04/27/2017 09:04 AM  
 Phosphorus 3.0 01/20/2017 10:57 AM  
 Albumin 3.8 11/30/2017 01:27 PM  
 Protein, total 7.0 11/30/2017 01:27 PM  
 Globulin 3.2 11/30/2017 01:27 PM  
 A-G Ratio 1.2 11/30/2017 01:27 PM  
 AST (SGOT) 8 (L) 11/30/2017 01:27 PM  
 ALT (SGPT) 17 11/30/2017 01:27 PM  
 
Lab Results Component Value Date/Time WBC 8.6 11/30/2017 01:27 PM  
 HGB 12.2 (L) 11/30/2017 01:27 PM  
 HCT 34.8 (L) 11/30/2017 01:27 PM  
 PLATELET 304 99/69/7048 01:27 PM  
 
 
PATHOLOGY: 
12/11/17:   
 DIAGNOSIS  
SUPRAGLOTTIS: SQUAMOUS EPITHELIUM HAVING FOCAL CHANGES CONSISTENT WITH MODERATE DYSPLASIA, NONDIAGNOSTIC FOR MALIGNANT TUMOR. RADIOLOGY: 
I have personally reviewed the imaging and agree with the reports below. Pet/ct Tumor Image Skull Thigh (sub) Result Date: 6/5/2018 Nuclear Medicine Whole Body CT / PET- FDG Study 6/5/2018 9:30 AM INDICATION: Supraglottic squamous cell cancer restaging COMPARISON: PET/CT 6/22/2017. CT neck 2/1/2018. TECHNIQUE:   Radiopharmaceutical: 15.23 mCi F18-FDG IV. This is a whole-body PET- FDG study performed on a dedicated full- ring scanner. Low dose, nondiagnostic CT axial source images are also acquired for PET attenuation correction and are utilized for PET-CT fusion with the emission images. The patient is a diabetic and underwent adequate fasting of at least 4 hours. Blood glucose at the time of tracer administration is 111 mg/dl, which is adequate for imaging. Approximately 60 minutes after administration of the radiopharmaceutical imaging was initiated covering the skull to the thighs. SUV max.  Calculated from patient body wt. Noncaloric MDGASTROVIEW dilute oral contrast is given. An additional 2 table position head and neck acquisition is done. FINDINGS:  Examination of the 3D tomographic PET images shows only nonfocal, low intensity FDG uptake at the pharynx and left neck parapharyngeal soft tissues, this is similar to the most recent prior and compatible with only post treatment change. No new discrete [place] hypermetabolic focus is identified. No hypermetabolic lymph node identified. In the chest there is no new suspicious hypermetabolic lung nodule or lymph node. Below the diaphragm within the abdomen and pelvis there is no new suspicious hypermetabolic lymph node, or liver lesion. There is only some asymmetric physiologic tracer activity seen at the crura-incidental. Expected bowel and urinary tract activity otherwise only. Again there is prostate enlargement. No suspicious skeletal lesion. IMPRESSION: 1. No FDG findings suspicious for recurrence in the head and neck with only low intensity post treatment inflammatory changes noted on the left. 2. No FDG findings for distant metastatic disease. TUMOR STATUS:  Excellent partial response IMPRESSION:  Prudence Patel is a 65 y.o. male now 17 months out from treatment. He had what I would coin as typical effects from radiation in the setting of a tracheostomy and a locally advanced head and neck cancer. He is continuing to follow with medical oncology as well as ENT. I'm pleased with his current status but disappointed with his amount of edema. However, his airway is open and actually doing as well as it has done since treatment completion. I was pleased with his PET CT imaging. His edema at this point is being managed locally by ENT and therefore I will again not proceed with repeat laryngoscopy today.  With a favorable imaging characteristics and laryngoscopy by ENT, I would defer future imaging until clinically indicated. I wished him well with his upcoming procedures with Dr. Andrew Gallardo. I advised him we would see him back in 6 months or sooner if needed. I also asked him to help assist with better coordination of appointments between os and ENT. If he has a recent anterior visit, I advised him to call us and we will reschedule hours so as not to have significant overlap. He was agreeable with this plan. PLAN:   
1) Patient is recovering as anticipated from his prior course of radiotherapy. Will continue to follow with ENT for continued laryngeal edema. 2) Future follow up will be coordinated with Dr. Ramu Reese and Dr. Andrew Gallardo. I will see him back in 6 months. He will also meet with our nourished practitioner, Abhilash Romero. Portions of this note were copied from prior encounters and reviewed for accuracy, currency, and represent documentation and tasks completed during this encounter. I verify and attest these portions to be unchanged from prior visits. Jose Carlos Mccoy MD 
09/06/18

## 2018-09-06 NOTE — PROGRESS NOTES
Pt here today for FUP post RT to the epiglottis for SCC. Pt is s/p chemo/rad 4/11/17. Pt had an endoscope by Dr. Ezio Brewster on 8/29/18 and is c/o dysphagia, increased cough, and choking on food. No scans were ordered for this visit. Pt will return in 6 months for FUP.

## 2018-09-24 ENCOUNTER — HOSPITAL ENCOUNTER (OUTPATIENT)
Dept: SURGERY | Age: 63
Discharge: HOME | End: 2018-09-24
Payer: COMMERCIAL

## 2018-09-24 VITALS
OXYGEN SATURATION: 95 % | BODY MASS INDEX: 35.25 KG/M2 | HEART RATE: 60 BPM | HEIGHT: 69 IN | WEIGHT: 238 LBS | TEMPERATURE: 96.6 F | RESPIRATION RATE: 14 BRPM | DIASTOLIC BLOOD PRESSURE: 78 MMHG | SYSTOLIC BLOOD PRESSURE: 137 MMHG

## 2018-09-24 LAB
GLUCOSE BLD STRIP.AUTO-MCNC: 129 MG/DL (ref 65–100)
POTASSIUM SERPL-SCNC: 4 MMOL/L (ref 3.5–5.1)

## 2018-09-24 PROCEDURE — 84132 ASSAY OF SERUM POTASSIUM: CPT

## 2018-09-24 PROCEDURE — 82962 GLUCOSE BLOOD TEST: CPT

## 2018-09-24 RX ORDER — LISINOPRIL AND HYDROCHLOROTHIAZIDE 12.5; 2 MG/1; MG/1
TABLET ORAL
COMMUNITY
End: 2020-12-03

## 2018-09-24 RX ORDER — CITALOPRAM 40 MG/1
40 TABLET, FILM COATED ORAL EVERY EVENING
COMMUNITY
End: 2018-12-07 | Stop reason: SDUPTHER

## 2018-09-24 RX ORDER — AMLODIPINE BESYLATE 5 MG/1
5 TABLET ORAL DAILY
COMMUNITY
End: 2018-12-12 | Stop reason: ALTCHOICE

## 2018-09-24 RX ORDER — METFORMIN HYDROCHLORIDE 500 MG/1
TABLET, FILM COATED, EXTENDED RELEASE ORAL DAILY
COMMUNITY
End: 2020-12-03

## 2018-09-24 NOTE — PERIOP NOTES
Patient verified name, , and surgery as listed in [facility]. Type 1B surgery, PAT assessment complete. Labs per surgeon: none Labs per anesthesia protocol: K+ collected- result pending; SQBS= 129. EKG: not needed Hibiclens and instructions given per hospital policy. Patient provided with and instructed on educational handouts including Guide to Surgery, Pain Management, Hand Hygiene, Blood Transfusion Education, and [facility] Brochure. Patient answered medical/surgical history questions at their best of ability. All prior to admission medications documented in [facility]. Original medication prescription bottles (some) visualized during patient appointment. Patient instructed to hold all vitamins 7 days prior to surgery and NSAIDS 5 days prior to surgery, patient verbalized understanding. Medications to be held: none. Patient instructed to continue previous medications as prescribed prior to surgery and to take the following medications the day of surgery according to anesthesia guidelines with a small sip of water: amlodipine, citalopram, nexium, asa 81mg. Patient teach back successful and patient demonstrates knowledge of instructions.

## 2018-09-24 NOTE — PERIOP NOTES
Potassium result within anesthesia guidelines. Chart filed. Recent Results (from the past 12 hour(s)) GLUCOSE, POC Collection Time: 09/24/18  3:24 PM  
Result Value Ref Range Glucose (POC) 129 (H) 65 - 100 mg/dL POTASSIUM Collection Time: 09/24/18  3:26 PM  
Result Value Ref Range  Potassium 4.0 3.5 - 5.1 mmol/L

## 2018-09-30 ENCOUNTER — ANESTHESIA EVENT (OUTPATIENT)
Dept: SURGERY | Age: 63
End: 2018-09-30
Payer: COMMERCIAL

## 2018-10-01 ENCOUNTER — HOSPITAL ENCOUNTER (OUTPATIENT)
Age: 63
Setting detail: OUTPATIENT SURGERY
Discharge: HOME | End: 2018-10-01
Attending: OTOLARYNGOLOGY | Admitting: OTOLARYNGOLOGY
Payer: COMMERCIAL

## 2018-10-01 ENCOUNTER — ANESTHESIA (OUTPATIENT)
Dept: SURGERY | Age: 63
End: 2018-10-01
Payer: COMMERCIAL

## 2018-10-01 VITALS
OXYGEN SATURATION: 92 % | TEMPERATURE: 98 F | SYSTOLIC BLOOD PRESSURE: 126 MMHG | RESPIRATION RATE: 16 BRPM | DIASTOLIC BLOOD PRESSURE: 66 MMHG | HEART RATE: 78 BPM

## 2018-10-01 LAB — GLUCOSE BLD STRIP.AUTO-MCNC: 113 MG/DL (ref 65–100)

## 2018-10-01 PROCEDURE — 77030008771 HC TU NG SALEM SUMP -A: Performed by: ANESTHESIOLOGY

## 2018-10-01 PROCEDURE — 74011250636 HC RX REV CODE- 250/636

## 2018-10-01 PROCEDURE — 77030008703 HC TU ET UNCUF COVD -A: Performed by: ANESTHESIOLOGY

## 2018-10-01 PROCEDURE — 74011250636 HC RX REV CODE- 250/636: Performed by: ANESTHESIOLOGY

## 2018-10-01 PROCEDURE — 76210000020 HC REC RM PH II FIRST 0.5 HR: Performed by: OTOLARYNGOLOGY

## 2018-10-01 PROCEDURE — 76060000033 HC ANESTHESIA 1 TO 1.5 HR: Performed by: OTOLARYNGOLOGY

## 2018-10-01 PROCEDURE — 82962 GLUCOSE BLOOD TEST: CPT

## 2018-10-01 PROCEDURE — 77030038019: Performed by: OTOLARYNGOLOGY

## 2018-10-01 PROCEDURE — 76210000006 HC OR PH I REC 0.5 TO 1 HR: Performed by: OTOLARYNGOLOGY

## 2018-10-01 PROCEDURE — 77030018836 HC SOL IRR NACL ICUM -A: Performed by: OTOLARYNGOLOGY

## 2018-10-01 PROCEDURE — 77030021678 HC GLIDESCP STAT DISP VERT -B: Performed by: ANESTHESIOLOGY

## 2018-10-01 PROCEDURE — 74011000250 HC RX REV CODE- 250

## 2018-10-01 PROCEDURE — 76010000161 HC OR TIME 1 TO 1.5 HR INTENSV-TIER 1: Performed by: OTOLARYNGOLOGY

## 2018-10-01 RX ORDER — ONDANSETRON 2 MG/ML
4 INJECTION INTRAMUSCULAR; INTRAVENOUS
Status: DISCONTINUED | OUTPATIENT
Start: 2018-10-01 | End: 2018-10-01 | Stop reason: HOSPADM

## 2018-10-01 RX ORDER — LIDOCAINE HYDROCHLORIDE 20 MG/ML
INJECTION, SOLUTION EPIDURAL; INFILTRATION; INTRACAUDAL; PERINEURAL AS NEEDED
Status: DISCONTINUED | OUTPATIENT
Start: 2018-10-01 | End: 2018-10-01 | Stop reason: HOSPADM

## 2018-10-01 RX ORDER — ACETAMINOPHEN 10 MG/ML
1000 INJECTION, SOLUTION INTRAVENOUS ONCE
Status: COMPLETED | OUTPATIENT
Start: 2018-10-01 | End: 2018-10-01

## 2018-10-01 RX ORDER — HYDROMORPHONE HYDROCHLORIDE 2 MG/ML
0.5 INJECTION, SOLUTION INTRAMUSCULAR; INTRAVENOUS; SUBCUTANEOUS
Status: DISCONTINUED | OUTPATIENT
Start: 2018-10-01 | End: 2018-10-01 | Stop reason: HOSPADM

## 2018-10-01 RX ORDER — ROCURONIUM BROMIDE 10 MG/ML
INJECTION, SOLUTION INTRAVENOUS AS NEEDED
Status: DISCONTINUED | OUTPATIENT
Start: 2018-10-01 | End: 2018-10-01 | Stop reason: HOSPADM

## 2018-10-01 RX ORDER — SODIUM CHLORIDE 0.9 % (FLUSH) 0.9 %
5-10 SYRINGE (ML) INJECTION EVERY 8 HOURS
Status: DISCONTINUED | OUTPATIENT
Start: 2018-10-01 | End: 2018-10-01 | Stop reason: HOSPADM

## 2018-10-01 RX ORDER — SODIUM CHLORIDE, SODIUM LACTATE, POTASSIUM CHLORIDE, CALCIUM CHLORIDE 600; 310; 30; 20 MG/100ML; MG/100ML; MG/100ML; MG/100ML
1000 INJECTION, SOLUTION INTRAVENOUS CONTINUOUS
Status: DISCONTINUED | OUTPATIENT
Start: 2018-10-01 | End: 2018-10-01 | Stop reason: HOSPADM

## 2018-10-01 RX ORDER — PROPOFOL 10 MG/ML
INJECTION, EMULSION INTRAVENOUS AS NEEDED
Status: DISCONTINUED | OUTPATIENT
Start: 2018-10-01 | End: 2018-10-01 | Stop reason: HOSPADM

## 2018-10-01 RX ORDER — SODIUM CHLORIDE 0.9 % (FLUSH) 0.9 %
5-10 SYRINGE (ML) INJECTION AS NEEDED
Status: DISCONTINUED | OUTPATIENT
Start: 2018-10-01 | End: 2018-10-01 | Stop reason: HOSPADM

## 2018-10-01 RX ORDER — DEXAMETHASONE SODIUM PHOSPHATE 4 MG/ML
INJECTION, SOLUTION INTRA-ARTICULAR; INTRALESIONAL; INTRAMUSCULAR; INTRAVENOUS; SOFT TISSUE AS NEEDED
Status: DISCONTINUED | OUTPATIENT
Start: 2018-10-01 | End: 2018-10-01 | Stop reason: HOSPADM

## 2018-10-01 RX ORDER — FENTANYL CITRATE 50 UG/ML
INJECTION, SOLUTION INTRAMUSCULAR; INTRAVENOUS AS NEEDED
Status: DISCONTINUED | OUTPATIENT
Start: 2018-10-01 | End: 2018-10-01 | Stop reason: HOSPADM

## 2018-10-01 RX ORDER — ACETAMINOPHEN 500 MG
1000 TABLET ORAL ONCE
Status: DISCONTINUED | OUTPATIENT
Start: 2018-10-01 | End: 2018-10-01 | Stop reason: HOSPADM

## 2018-10-01 RX ORDER — MIDAZOLAM HYDROCHLORIDE 1 MG/ML
2 INJECTION, SOLUTION INTRAMUSCULAR; INTRAVENOUS
Status: DISCONTINUED | OUTPATIENT
Start: 2018-10-01 | End: 2018-10-01 | Stop reason: HOSPADM

## 2018-10-01 RX ORDER — ALBUTEROL SULFATE 0.83 MG/ML
2.5 SOLUTION RESPIRATORY (INHALATION) AS NEEDED
Status: DISCONTINUED | OUTPATIENT
Start: 2018-10-01 | End: 2018-10-01 | Stop reason: HOSPADM

## 2018-10-01 RX ORDER — LIDOCAINE HYDROCHLORIDE 10 MG/ML
0.1 INJECTION INFILTRATION; PERINEURAL AS NEEDED
Status: DISCONTINUED | OUTPATIENT
Start: 2018-10-01 | End: 2018-10-01 | Stop reason: HOSPADM

## 2018-10-01 RX ADMIN — PROPOFOL 200 MG: 10 INJECTION, EMULSION INTRAVENOUS at 15:24

## 2018-10-01 RX ADMIN — FENTANYL CITRATE 50 MCG: 50 INJECTION, SOLUTION INTRAMUSCULAR; INTRAVENOUS at 15:24

## 2018-10-01 RX ADMIN — FENTANYL CITRATE 50 MCG: 50 INJECTION, SOLUTION INTRAMUSCULAR; INTRAVENOUS at 16:18

## 2018-10-01 RX ADMIN — SODIUM CHLORIDE, SODIUM LACTATE, POTASSIUM CHLORIDE, AND CALCIUM CHLORIDE: 600; 310; 30; 20 INJECTION, SOLUTION INTRAVENOUS at 15:52

## 2018-10-01 RX ADMIN — PROPOFOL 50 MG: 10 INJECTION, EMULSION INTRAVENOUS at 15:29

## 2018-10-01 RX ADMIN — ROCURONIUM BROMIDE 10 MG: 10 INJECTION, SOLUTION INTRAVENOUS at 15:24

## 2018-10-01 RX ADMIN — ROCURONIUM BROMIDE 20 MG: 10 INJECTION, SOLUTION INTRAVENOUS at 15:32

## 2018-10-01 RX ADMIN — SODIUM CHLORIDE, SODIUM LACTATE, POTASSIUM CHLORIDE, AND CALCIUM CHLORIDE 1000 ML: 600; 310; 30; 20 INJECTION, SOLUTION INTRAVENOUS at 12:39

## 2018-10-01 RX ADMIN — DEXAMETHASONE SODIUM PHOSPHATE 10 MG: 4 INJECTION, SOLUTION INTRA-ARTICULAR; INTRALESIONAL; INTRAMUSCULAR; INTRAVENOUS; SOFT TISSUE at 15:31

## 2018-10-01 RX ADMIN — PROPOFOL 50 MG: 10 INJECTION, EMULSION INTRAVENOUS at 15:34

## 2018-10-01 RX ADMIN — LIDOCAINE HYDROCHLORIDE 40 MG: 20 INJECTION, SOLUTION EPIDURAL; INFILTRATION; INTRACAUDAL; PERINEURAL at 15:24

## 2018-10-01 RX ADMIN — ACETAMINOPHEN 1000 MG: 10 INJECTION, SOLUTION INTRAVENOUS at 16:54

## 2018-10-01 NOTE — IP AVS SNAPSHOT
[address] [address] 
[phone] Patient: Felix Bahena MRN: [id number] SQI:[id number] About your hospitalization You were admitted on:  October 1, 2018 You last received care in the:  [facility] PACU You were discharged on:  October 1, 2018 Why you were hospitalized Your primary diagnosis was:  Not on File Follow-up Information Follow up With Details Comments Contact Info Nixon Rebolledo, [address] [facility] [address] [phone] Your Scheduled Appointments Wednesday October 10, 2018 12:45 PM EDT  
POST OP with DO SCOTT Dean ENT [facility] E[facility]) [address] [facility] [place] [address]  [id number] [phone] Discharge Orders None A check rei indicates which time of day the medication should be taken. My Medications ASK your doctor about these medications Instructions Each Dose to Equal  
 Morning Noon Evening Bedtime  
 amLODIPine 5 mg tablet Commonly known as:  Shelton Valerio Your last dose was: Your next dose is: Take 5 mg by mouth daily. 5 mg  
    
   
   
   
  
 aspirin delayed-release 81 mg tablet Your last dose was: Your next dose is: Take 81 mg by mouth every morning. Take / use AM day of surgery  per anesthesia protocols. Indications: myocardial infarction prevention 81 mg  
    
   
   
   
  
 benzocaine 20 % Drop Your last dose was: Your next dose is:    
   
   
 2 Drops by Otic route three (3) times daily. 2 Drop  
    
   
   
   
  
 citalopram 40 mg tablet Commonly known as:  Tamala Bosworth Your last dose was: Your next dose is: Take 40 mg by mouth daily. 40 mg  
    
   
   
   
  
 clindamycin 1 % lotion Commonly known as:  CLEOCIN T Your last dose was: Your next dose is: Apply  to affected area two (2) times daily as needed. use thin film on affected area  
     
   
   
   
  
 glipiZIDE SR 10 mg CR tablet Commonly known as:  GLUCOTROL XL Your last dose was: Your next dose is: Take 1 Tab by mouth daily. Indications: type 2 diabetes mellitus 10 mg  
    
   
   
   
  
 glucose blood VI test strips strip Commonly known as:  RELION PRIME TEST STRIPS Your last dose was: Your next dose is:    
   
   
 Test blood sugar before meals and bedtime Diagnosis code E11.65 HYDROcodone-acetaminophen 0.5-21.7 mg/mL oral solution Commonly known as:  HYCET Your last dose was: Your next dose is:    
   
   
 2 tsp Q4-6Hours PRN PAIN  
     
   
   
   
  
 HYDROcodone-homatropine 5-1.5 mg/5 mL syrup Commonly known as:  HYCODAN Your last dose was: Your next dose is:    
   
   
 1 Tsp QHS PRN  
     
   
   
   
  
 lisinopril-hydroCHLOROthiazide 20-12.5 mg per tablet Commonly known as:  Aretta Rist Your last dose was: Your next dose is: Take  by mouth daily. metFORMIN 500 mg Tg24 24 hour tablet Commonly known Radha ROBERTS Your last dose was: Your next dose is: Take  by mouth daily. NexIUM 20 mg capsule Generic drug:  esomeprazole Your last dose was: Your next dose is: Take  by mouth daily. omeprazole 20 mg capsule Commonly known as:  PRILOSEC Your last dose was: Your next dose is: Take 1 Cap by mouth every morning. Take / use AM day of surgery  per anesthesia protocols. Indications: gastroesophageal reflux disease 20 mg  
    
   
   
   
  
 promethazine 25 mg tablet Commonly known as:  PHENERGAN Your last dose was: Your next dose is: Take 1 Tab by mouth every eight (8) hours as needed for Nausea. 25 mg Opioid Education Prescription Opioids: What You Need to Know: 
 
 
ACTIVITY  Bathe or shower as directed by your doctor.  Avoid strenuous work and becoming overheated. Activity as directed by your doctor  Avoid bending over. DIET  Clear, cool liquids the first day; then soft diet the second day  Advance to regular diet on third day, unless your doctor orders otherwise.  No milk products or foods with red color dyes  If nausea and vomiting continues, call your doctor. PAIN 
 Take pain medication as directed by your doctor.  Call your doctor if pain is NOT relieved by medication.  DO NOT take aspirin or blood thinners until directed by your doctor. FOLLOW-UP PHONE CALLS  Calls will be made by nursing staff  If you have any problems, call your doctor as needed. CALL YOUR DOCTOR IF  Bleeding is expected the first few days and should gradually clear.  Temperature of 10 1°F or above  Green or yellow discharge from nose  Stiff neck, changes in vision or mental status, confusion, or excessive drowsiness AFTER ANESTHESIA  For the first 24 hours: DO NOT Drive, Drink alcoholic beverages, or Make important decisions.  Be aware of dizziness following anesthesia and while taking pain medication. Flexible Laryngoscopy: About This Test 
What is it? Flexible laryngoscopy (say \"kigl-sk-RCQ-kuh-pee\") is a test that lets your doctor look at your throat and voice box (larynx).  The doctor uses a thin, flexible tube, called a scope, to look deep into your throat. Why is this test done? This test is done to find the cause of voice problems, ear and throat pain, or swallowing problems. It also may be done to check for throat tumors or injuries. How can you prepare for the test? 
· If you wear dentures, you will remove them just before the test. 
What happens before the test? 
· You will be awake for the test. 
· The doctor may give you medicine to numb the inside of your nose and throat. · The doctor may give you medicine to dry up the mucus in your nose and throat. What happens during the test? 
· The doctor will put the scope into your nose and gently guide it into the back of your throat. · The scope may have a camera on it that sends pictures to a monitor so your doctor can see the inside of your nose and throat. · The doctor may ask you to speak, sing, cough, take deep breaths, or sniff while the scope is in your throat. How long does the test take? · The test will take about 5 minutes. What happens after the test? 
· You may have a sore nose and throat for 1 or 2 days. · You will probably be able to go home right away. · You can go back to your usual activities right away. Follow-up care is a key part of your treatment and safety. Be sure to make and go to all appointments, and call your doctor if you are having problems. It's also a good idea to keep a list of the medicines you take. Ask your doctor when you can expect to have your test results. Where can you learn more? Go to http://maria c-jeff.info/. Enter A919 in the search box to learn more about \"Flexible Laryngoscopy: About This Test.\" Current as of: May 12, 2017 Content Version: 11.7 © 9910-6145 [employer]. Care instructions adapted under license by [employer] (which disclaims liability or warranty for this information).  If you have questions about a medical condition or this instruction, always ask your healthcare professional. [address] [address] any warranty or liability for your use of this information. Introducing \[facility] [place] [facility]\"" & HEALTH SERVICES! Josselin Crenshaw introduces [employer] patient portal. Now you can access parts of your medical record, email your doctor's office, and request medication refills online. 1. In your internet browser, go to [email or web address] 2. Click on the First Time User? Click Here link in the Sign In box. You will see the New Member Sign Up page. 3. Enter your [employer] Access Code exactly as it appears below. You will not need to use this code after youve completed the sign-up process. If you do not sign up before the expiration date, you must request a new code. · [employer] Access Code: [email or web address] Expires: 10/31/2018  2:34 PM 
 
4. Enter the last four digits of your Social Security Number (xxxx) and Date of Birth (mm/dd/yyyy) as indicated and click Submit. You will be taken to the next sign-up page. 5. Create a [employer] ID. This will be your [employer] login ID and cannot be changed, so think of one that is secure and easy to remember. 6. Create a [employer] password. You can change your password at any time. 7. Enter your Password Reset Question and Answer. This can be used at a later time if you forget your password. 8. Enter your e-mail address. You will receive e-mail notification when new information is available in [address] 9. Click Sign Up. You can now view and download portions of your medical record. 10. Click the Download Summary menu link to download a portable copy of your medical information. If you have questions, please visit the Frequently Asked Questions section of the [employer] website. Remember, [employer] is NOT to be used for urgent needs. For medical emergencies, dial 911. Now available from your iPhone and Android! Introducing Vu Gonzalez As a [employer] [facility] patient, I wanted to make you aware of our electronic visit tool called Vu Gonzalez. sevenload allows you to connect within minutes with a medical provider 24 hours a day, seven days a week via a mobile device or tablet or logging into a secure website from your computer. You can access Vu Hinklefin from anywhere in the [place]. A virtual visit might be right for you when you have a simple condition and feel like you just dont want to get out of bed, or cant get away from work for an appointment, when your regular [facility] provider is not available (evenings, weekends or holidays), or when youre out of town and need minor care. Electronic visits cost only $49 and if the Nordicplan/Conjunct provider determines a prescription is needed to treat your condition, one can be electronically transmitted to a nearby pharmacy*. Please take a moment to enroll today if you have not already done so. The enrollment process is free and takes just a few minutes. To enroll, please download the sevenload kayleen to your tablet or phone, or visit [email or web address] to enroll on your computer. And, as an [address] [place] [address] patient with a [employer] account, the results of your visits will be scanned into your electronic medical record and your primary care provider will be able to view the scanned results. We urge you to continue to see your regular [facility] for your ongoing medical care. And while your primary care provider may not be the one available when you seek a Vu Santiagojosefin virtual visit, the peace of mind you get from getting a real diagnosis real time can be priceless. For more information on Vu Jackjosefin, view our Frequently Asked Questions (FAQs) at [email or web address]. Sincerely, 
 
Arian Mendoza MD 
Chief Medical Officer Delaney8 Julissa Power *:  certain medications cannot be prescribed via Vu Gonzalez Providers Seen During Your Hospitalization Provider Specialty Primary office phone Tex Perdomo DO Otolaryngology [phone] Your Primary Care Physician (PCP) Primary Care Physician Office Phone Office Fax [address] [facility] [phone] You are allergic to the following Allergen Reactions Zofran (Ondansetron Hcl (Pf)) Other (comments) Gives pt severe HA Recent Documentation Smoking Status Former Smoker Emergency Contacts Name Discharge Info Relation Home Work Mobile MushtaqMeera DISCHARGE CAREGIVER [3] Spouse [3]   [phone] Patient Belongings The following personal items are in your possession at time of discharge: 
  Dental Appliances: None Please provide this summary of care documentation to your next provider. Signatures-by signing, you are acknowledging that this After Visit Summary has been reviewed with you and you have received a copy. Patient Signature:  ____________________________________________________________ Date:  ____________________________________________________________  
  
[facility] Provider Signature:  ____________________________________________________________ Date:  ____________________________________________________________

## 2018-10-01 NOTE — ANESTHESIA PREPROCEDURE EVALUATION
Anesthetic History PONV Review of Systems / Medical History Patient summary reviewed and pertinent labs reviewed Pulmonary Within defined limits Neuro/Psych Within defined limits Cardiovascular Hypertension: well controlled Exercise tolerance: <4 METS 
  
GI/Hepatic/Renal 
  
GERD: well controlled Endo/Other Diabetes: type 2 Morbid obesity Other Findings Physical Exam 
 
Airway Mallampati: III 
TM Distance: > 6 cm Neck ROM: normal range of motion Mouth opening: Normal 
 
 Cardiovascular Rhythm: regular Pertinent negatives: No murmur Dental 
No notable dental hx Pulmonary Wheezes Abdominal 
 
 
 
 Other Findings Anesthetic Plan ASA: 3 Anesthesia type: general 
 
 
 
 
Induction: Intravenous Anesthetic plan and risks discussed with: Patient and Spouse Previous trach and epiglottic edema/tumor plan is for induction intubation and debulking with ETT in place.

## 2018-10-01 NOTE — ANESTHESIA POSTPROCEDURE EVALUATION
Post-Anesthesia Evaluation and Assessment Patient: Desiree Dowling MRN: [id number]  SSN: [id number] YOB: 1955  Age: 59 y.o. Sex: male Cardiovascular Function/Vital Signs Visit Vitals  /66  Pulse 78  Temp 36.7 °C (98 °F)  Resp 16  SpO2 92% Patient is status post general anesthesia for Procedure(s): 
LARYNGOSCOPY WITH BIOPSY/CO2 LASER OF SUPRAGLOTTIS SWELLING . Nausea/Vomiting: None Postoperative hydration reviewed and adequate. Pain: 
Pain Scale 1: Visual (10/01/18 1706) Pain Intensity 1: 0 (10/01/18 1706) Managed Neurological Status:  
Neuro (WDL): Within Defined Limits (10/01/18 1706) At baseline Mental Status and Level of Consciousness: Arousable Pulmonary Status:  
O2 Device: Room air (10/01/18 1706) Adequate oxygenation and airway patent Complications related to anesthesia: None Post-anesthesia assessment completed. No concerns Signed By: Jennifer Crawford MD   
 October 1, 2018

## 2018-10-01 NOTE — DISCHARGE INSTRUCTIONS
INSTRUCTIONS FOLLOWING SINUS SURGERY    ACTIVITY   Bathe or shower as directed by your doctor.  Avoid strenuous work and becoming overheated. Activity as directed by your doctor   Avoid bending over. DIET   Clear, cool liquids the first day; then soft diet the second day   Advance to regular diet on third day, unless your doctor orders otherwise.  No milk products or foods with red color dyes   If nausea and vomiting continues, call your doctor. PAIN   Take pain medication as directed by your doctor.  Call your doctor if pain is NOT relieved by medication.  DO NOT take aspirin or blood thinners until directed by your doctor. FOLLOW-UP PHONE [address] will be made by nursing staff   If you have any problems, call your doctor as needed. CALL YOUR DOCTOR IF   Bleeding is expected the first few days and should gradually clear.  Temperature of 10 1°F or above   Green or yellow discharge from nose   Stiff neck, changes in vision or mental status, confusion, or excessive drowsiness    AFTER ANESTHESIA   For the first 24 hours: DO NOT Drive, Drink alcoholic beverages, or Make important decisions.  Be aware of dizziness following anesthesia and while taking pain medication. Flexible Laryngoscopy: About This Test  What is it? Flexible laryngoscopy (say \"mchl-mu-GWD-kuh-pee\") is a test that lets your doctor look at your throat and voice box (larynx). The doctor uses a thin, flexible tube, called a scope, to look deep into your throat. Why is this test done? This test is done to find the cause of voice problems, ear and throat pain, or swallowing problems. It also may be done to check for throat tumors or injuries. How can you prepare for the test?  · If you wear dentures, you will remove them just before the test.  What happens before the test?  · You will be awake for the test.  · The doctor may give you medicine to numb the inside of your nose and throat.   · The doctor may give you medicine to dry up the mucus in your nose and throat. What happens during the test?  · The doctor will put the scope into your nose and gently guide it into the back of your throat. · The scope may have a camera on it that sends pictures to a monitor so your doctor can see the inside of your nose and throat. · The doctor may ask you to speak, sing, cough, take deep breaths, or sniff while the scope is in your throat. How long does the test take? · The test will take about 5 minutes. What happens after the test?  · You may have a sore nose and throat for 1 or 2 days. · You will probably be able to go home right away. · You can go back to your usual activities right away. Follow-up care is a key part of your treatment and safety. Be sure to make and go to all appointments, and call your doctor if you are having problems. It's also a good idea to keep a list of the medicines you take. Ask your doctor when you can expect to have your test results. Where can you learn more? Go to http://maria c-jeff.info/. Enter F824 in the search box to learn more about \"Flexible Laryngoscopy: About This Test.\"  Current as of: May 12, 2017  Content Version: 11.7  © 7461-6728 [employer]. Care instructions adapted under license by [employer] (which disclaims liability or warranty for this information). If you have questions about a medical condition or this instruction, always ask your healthcare professional. [address] [place] [address] any warranty or liability for your use of this information.

## 2018-10-01 NOTE — OP NOTES
[address] [place] [address] REPORT    Kate Barreto  MR#: [id number]  : 1955  ACCOUNT #: [de-identified]   DATE OF SERVICE: 10/01/2018    PREOPERATIVE DIAGNOSIS:  Supraglottic swelling and airway obstruction. POSTOPERATIVE DIAGNOSIS:  Supraglottic swelling and airway obstruction. PROCEDURE PERFORMED:  Direct laryngoscopy with CO2 laser of supraglottic swelling. SURGEON:  Shaheed Armas DO    ASSISTANT:  None. ANESTHESIA:  General.    COMPLICATIONS:  None. ESTIMATED BLOOD LOSS:  Zero. HISTORY:  A 52-year-old male well known to me, diagnosed with hypopharyngeal cancer, and status post radiation treatments, he developed supraglottic swelling. He has undergone a debulking procedure once because of the airway obstruction and biphasic stridor and got a lot of improvement, so he came back wondering if I could do that, if I could open it up even more. He still has very noisy breathing, and although he is not having any shortness of breath, there is the noisy breathing and he can feel that it is tight. He is also starting to get some pulling of the tracheostomy incision where the scar tissue has just turned to create a small scar band in the anterior neck, so based on the history and physical exam, it was my recommendation he undergo panendoscopy with CO2 laser of the supraglottic swelling and possible scar revision of the anterior neck. Procedure, risks, and benefits were discussed with him and his wife in the office. All questions were answered and they were agreeable with the surgery. SURGERY ITSELF:  The patient was identified in the preoperative waiting area. He was taken back to the operating room where he underwent general anesthesia, then was turned 90 degrees. Tooth guard was placed over the upper teeth. Initially, I was able to use a laryngoscope inserted into the mouth.   Oral anatomy appeared to be totally normal including buccal mucosa, floor of mouth, tongue, tongue base. Epiglottis was smaller in size overall, but was more edematous, and then just below that, especially along the anterior and lateral walls, just an extreme amount of edema where you could tell which way led to the larynx, but at the same time could not visualize true vocal cords. So I was able to use the scope, go through that narrowed area, was able to identify very edematous true vocal cords approximately 2 cm below the top of the swelling. Subglottis appeared to be normal.  There was a small amount of tracheal stenosis, a small scar band along the anterior portion of the trachea where the previous tracheostomy site was, and pass that down to the level of the neal. It was completely clear. So going back up to the level of the larynx, the patient was placed in suspension. A laser, which was used on power 16 super-pulse continuous was then used, and then I started debulking the supraglottic swelling left to right and anteriorly. I was able to remove approximately 8 mm of swelling going from superior to inferior. This gave a nice reduction and opening of the airway. I took this down to what appeared to be the false vocal cords and I left those intact along with the true vocal cords. A laser tube was used for intubation. So once that was complete, again there was no sign of any bleeding. Airway was widely patent. The laryngoscope was removed and there was no sign of any trauma to the lips, teeth, or gums. The mouthguard was also removed, and again, no damage to lips, teeth, or gums. The patient was then awakened and he was taken to the postop recovery room in stable condition.       DO CRISTAL Pisano  D: 10/01/2018 16:07     T: 10/01/2018 16:52  JOB #: [id number]

## 2018-10-01 NOTE — PROGRESS NOTES
Pre-op prayer request received. Prayer and support given to patient and wife. Rev. Carlos De La Torrelain

## 2018-11-26 ENCOUNTER — HOSPITAL ENCOUNTER (OUTPATIENT)
Dept: SURGERY | Age: 63
Discharge: HOME | End: 2018-11-26
Payer: COMMERCIAL

## 2018-11-26 VITALS
BODY MASS INDEX: 33.85 KG/M2 | TEMPERATURE: 97.5 F | WEIGHT: 236.44 LBS | OXYGEN SATURATION: 95 % | DIASTOLIC BLOOD PRESSURE: 70 MMHG | HEIGHT: 70 IN | SYSTOLIC BLOOD PRESSURE: 117 MMHG | RESPIRATION RATE: 14 BRPM | HEART RATE: 84 BPM

## 2018-11-26 DIAGNOSIS — C32.1 SQUAMOUS CELL CARCINOMA OF EPIGLOTTIS (HCC): Primary | ICD-10-CM

## 2018-11-26 LAB — POTASSIUM SERPL-SCNC: 4.1 MMOL/L (ref 3.5–5.1)

## 2018-11-26 PROCEDURE — 84132 ASSAY OF SERUM POTASSIUM: CPT

## 2018-11-26 NOTE — PERIOP NOTES
Patient verified name and  Order for consent not found in EHR Type 1B surgery, walk in assessment complete. Labs per surgeon: none Labs per anesthesia protocol: K+; results pending EKG: not required Hibiclens and instructions given per hospital policy. Patient provided with and instructed on educational handouts including Guide to Surgery, Pain Management, Hand Hygiene, Blood Transfusion Education, and [place] Anesthesia Brochure. Patient answered medical/surgical history questions at their best of ability. All prior to admission medications documented in [facility]. Original medication prescription bottle not visualized during patient appointment. Patient instructed to hold all vitamins 7 days prior to surgery and NSAIDS 5 days prior to surgery, patient verbalized understanding. Medications to be held: none Patient instructed to continue previous medications as prescribed prior to surgery and to take the following medications the day of surgery according to anesthesia guidelines with a small sip of water: amlodipine,nexium,omeprazole, aspirin. Patient teach back successful and patient demonstrates knowledge of instructions.

## 2018-11-26 NOTE — PERIOP NOTES
Lab results within limits per anesthesia protocol; OK for surgery. Recent Results (from the past 12 hour(s)) POTASSIUM Collection Time: 11/26/18  3:54 PM  
Result Value Ref Range  Potassium 4.1 3.5 - 5.1 mmol/L

## 2018-12-02 ENCOUNTER — ANESTHESIA EVENT (OUTPATIENT)
Dept: SURGERY | Age: 63
End: 2018-12-02
Payer: COMMERCIAL

## 2018-12-03 ENCOUNTER — ANESTHESIA (OUTPATIENT)
Dept: SURGERY | Age: 63
End: 2018-12-03
Payer: COMMERCIAL

## 2018-12-03 ENCOUNTER — HOSPITAL ENCOUNTER (OUTPATIENT)
Age: 63
Setting detail: OUTPATIENT SURGERY
Discharge: HOME | End: 2018-12-03
Attending: OTOLARYNGOLOGY | Admitting: OTOLARYNGOLOGY
Payer: COMMERCIAL

## 2018-12-03 VITALS
HEIGHT: 70 IN | TEMPERATURE: 98.2 F | BODY MASS INDEX: 33.2 KG/M2 | SYSTOLIC BLOOD PRESSURE: 121 MMHG | DIASTOLIC BLOOD PRESSURE: 67 MMHG | WEIGHT: 231.9 LBS | HEART RATE: 89 BPM | RESPIRATION RATE: 14 BRPM | OXYGEN SATURATION: 96 %

## 2018-12-03 DIAGNOSIS — C32.1 SQUAMOUS CELL CARCINOMA OF EPIGLOTTIS (HCC): ICD-10-CM

## 2018-12-03 LAB — GLUCOSE BLD STRIP.AUTO-MCNC: 128 MG/DL (ref 65–100)

## 2018-12-03 PROCEDURE — 74011250636 HC RX REV CODE- 250/636

## 2018-12-03 PROCEDURE — 74011000250 HC RX REV CODE- 250: Performed by: OTOLARYNGOLOGY

## 2018-12-03 PROCEDURE — 76210000016 HC OR PH I REC 1 TO 1.5 HR: Performed by: OTOLARYNGOLOGY

## 2018-12-03 PROCEDURE — 74011250636 HC RX REV CODE- 250/636: Performed by: ANESTHESIOLOGY

## 2018-12-03 PROCEDURE — 77030037088 HC TUBE ENDOTRACH ORAL NSL COVD-A: Performed by: ANESTHESIOLOGY

## 2018-12-03 PROCEDURE — 77030020782 HC GWN BAIR PAWS FLX 3M -B: Performed by: ANESTHESIOLOGY

## 2018-12-03 PROCEDURE — 77030018836 HC SOL IRR NACL ICUM -A: Performed by: OTOLARYNGOLOGY

## 2018-12-03 PROCEDURE — 74011000250 HC RX REV CODE- 250

## 2018-12-03 PROCEDURE — 77030038019: Performed by: OTOLARYNGOLOGY

## 2018-12-03 PROCEDURE — 76210000021 HC REC RM PH II 0.5 TO 1 HR: Performed by: OTOLARYNGOLOGY

## 2018-12-03 PROCEDURE — 76060000033 HC ANESTHESIA 1 TO 1.5 HR: Performed by: OTOLARYNGOLOGY

## 2018-12-03 PROCEDURE — 82962 GLUCOSE BLOOD TEST: CPT

## 2018-12-03 PROCEDURE — 77030031139 HC SUT VCRL2 J&J -A: Performed by: OTOLARYNGOLOGY

## 2018-12-03 PROCEDURE — 88305 TISSUE EXAM BY PATHOLOGIST: CPT

## 2018-12-03 PROCEDURE — 76010000161 HC OR TIME 1 TO 1.5 HR INTENSV-TIER 1: Performed by: OTOLARYNGOLOGY

## 2018-12-03 PROCEDURE — 77030021678 HC GLIDESCP STAT DISP VERT -B: Performed by: ANESTHESIOLOGY

## 2018-12-03 RX ORDER — MIDAZOLAM HYDROCHLORIDE 1 MG/ML
2 INJECTION, SOLUTION INTRAMUSCULAR; INTRAVENOUS
Status: DISCONTINUED | OUTPATIENT
Start: 2018-12-03 | End: 2018-12-03 | Stop reason: HOSPADM

## 2018-12-03 RX ORDER — DEXAMETHASONE SODIUM PHOSPHATE 4 MG/ML
INJECTION, SOLUTION INTRA-ARTICULAR; INTRALESIONAL; INTRAMUSCULAR; INTRAVENOUS; SOFT TISSUE AS NEEDED
Status: DISCONTINUED | OUTPATIENT
Start: 2018-12-03 | End: 2018-12-03 | Stop reason: HOSPADM

## 2018-12-03 RX ORDER — SODIUM CHLORIDE, SODIUM LACTATE, POTASSIUM CHLORIDE, CALCIUM CHLORIDE 600; 310; 30; 20 MG/100ML; MG/100ML; MG/100ML; MG/100ML
100 INJECTION, SOLUTION INTRAVENOUS CONTINUOUS
Status: DISCONTINUED | OUTPATIENT
Start: 2018-12-03 | End: 2018-12-03 | Stop reason: HOSPADM

## 2018-12-03 RX ORDER — LIDOCAINE HYDROCHLORIDE 10 MG/ML
0.1 INJECTION INFILTRATION; PERINEURAL AS NEEDED
Status: DISCONTINUED | OUTPATIENT
Start: 2018-12-03 | End: 2018-12-03 | Stop reason: HOSPADM

## 2018-12-03 RX ORDER — FENTANYL CITRATE 50 UG/ML
INJECTION, SOLUTION INTRAMUSCULAR; INTRAVENOUS AS NEEDED
Status: DISCONTINUED | OUTPATIENT
Start: 2018-12-03 | End: 2018-12-03 | Stop reason: HOSPADM

## 2018-12-03 RX ORDER — NALOXONE HYDROCHLORIDE 0.4 MG/ML
0.04 INJECTION, SOLUTION INTRAMUSCULAR; INTRAVENOUS; SUBCUTANEOUS
Status: DISCONTINUED | OUTPATIENT
Start: 2018-12-03 | End: 2018-12-03 | Stop reason: HOSPADM

## 2018-12-03 RX ORDER — OXYCODONE HYDROCHLORIDE 5 MG/1
5 TABLET ORAL
Status: DISCONTINUED | OUTPATIENT
Start: 2018-12-03 | End: 2018-12-03 | Stop reason: HOSPADM

## 2018-12-03 RX ORDER — MIDAZOLAM HYDROCHLORIDE 1 MG/ML
2 INJECTION, SOLUTION INTRAMUSCULAR; INTRAVENOUS ONCE
Status: DISCONTINUED | OUTPATIENT
Start: 2018-12-03 | End: 2018-12-03 | Stop reason: HOSPADM

## 2018-12-03 RX ORDER — LIDOCAINE HYDROCHLORIDE AND EPINEPHRINE 10; 10 MG/ML; UG/ML
INJECTION, SOLUTION INFILTRATION; PERINEURAL AS NEEDED
Status: DISCONTINUED | OUTPATIENT
Start: 2018-12-03 | End: 2018-12-03 | Stop reason: HOSPADM

## 2018-12-03 RX ORDER — PROPOFOL 10 MG/ML
INJECTION, EMULSION INTRAVENOUS AS NEEDED
Status: DISCONTINUED | OUTPATIENT
Start: 2018-12-03 | End: 2018-12-03 | Stop reason: HOSPADM

## 2018-12-03 RX ORDER — GLYCOPYRROLATE 0.2 MG/ML
INJECTION INTRAMUSCULAR; INTRAVENOUS AS NEEDED
Status: DISCONTINUED | OUTPATIENT
Start: 2018-12-03 | End: 2018-12-03 | Stop reason: HOSPADM

## 2018-12-03 RX ORDER — LIDOCAINE HYDROCHLORIDE 20 MG/ML
INJECTION, SOLUTION EPIDURAL; INFILTRATION; INTRACAUDAL; PERINEURAL AS NEEDED
Status: DISCONTINUED | OUTPATIENT
Start: 2018-12-03 | End: 2018-12-03 | Stop reason: HOSPADM

## 2018-12-03 RX ORDER — FENTANYL CITRATE 50 UG/ML
100 INJECTION, SOLUTION INTRAMUSCULAR; INTRAVENOUS ONCE
Status: DISCONTINUED | OUTPATIENT
Start: 2018-12-03 | End: 2018-12-03 | Stop reason: HOSPADM

## 2018-12-03 RX ORDER — HYDROMORPHONE HYDROCHLORIDE 2 MG/ML
0.5 INJECTION, SOLUTION INTRAMUSCULAR; INTRAVENOUS; SUBCUTANEOUS
Status: DISCONTINUED | OUTPATIENT
Start: 2018-12-03 | End: 2018-12-03 | Stop reason: HOSPADM

## 2018-12-03 RX ORDER — SUCCINYLCHOLINE CHLORIDE 20 MG/ML
INJECTION INTRAMUSCULAR; INTRAVENOUS AS NEEDED
Status: DISCONTINUED | OUTPATIENT
Start: 2018-12-03 | End: 2018-12-03 | Stop reason: HOSPADM

## 2018-12-03 RX ORDER — ROCURONIUM BROMIDE 10 MG/ML
INJECTION, SOLUTION INTRAVENOUS AS NEEDED
Status: DISCONTINUED | OUTPATIENT
Start: 2018-12-03 | End: 2018-12-03 | Stop reason: HOSPADM

## 2018-12-03 RX ADMIN — GLYCOPYRROLATE 0.2 MG: 0.2 INJECTION INTRAMUSCULAR; INTRAVENOUS at 10:20

## 2018-12-03 RX ADMIN — LIDOCAINE HYDROCHLORIDE 0.1 ML: 10 INJECTION, SOLUTION INFILTRATION; PERINEURAL at 08:45

## 2018-12-03 RX ADMIN — FENTANYL CITRATE 50 MCG: 50 INJECTION, SOLUTION INTRAMUSCULAR; INTRAVENOUS at 10:13

## 2018-12-03 RX ADMIN — FENTANYL CITRATE 50 MCG: 50 INJECTION, SOLUTION INTRAMUSCULAR; INTRAVENOUS at 10:17

## 2018-12-03 RX ADMIN — SODIUM CHLORIDE, SODIUM LACTATE, POTASSIUM CHLORIDE, AND CALCIUM CHLORIDE 100 ML/HR: 600; 310; 30; 20 INJECTION, SOLUTION INTRAVENOUS at 08:46

## 2018-12-03 RX ADMIN — DEXAMETHASONE SODIUM PHOSPHATE 10 MG: 4 INJECTION, SOLUTION INTRA-ARTICULAR; INTRALESIONAL; INTRAMUSCULAR; INTRAVENOUS; SOFT TISSUE at 10:23

## 2018-12-03 RX ADMIN — SODIUM CHLORIDE, SODIUM LACTATE, POTASSIUM CHLORIDE, AND CALCIUM CHLORIDE: 600; 310; 30; 20 INJECTION, SOLUTION INTRAVENOUS at 10:31

## 2018-12-03 RX ADMIN — ROCURONIUM BROMIDE 5 MG: 10 INJECTION, SOLUTION INTRAVENOUS at 10:13

## 2018-12-03 RX ADMIN — SUCCINYLCHOLINE CHLORIDE 180 MG: 20 INJECTION INTRAMUSCULAR; INTRAVENOUS at 10:13

## 2018-12-03 RX ADMIN — PROPOFOL 80 MG: 10 INJECTION, EMULSION INTRAVENOUS at 10:49

## 2018-12-03 RX ADMIN — PROPOFOL 170 MG: 10 INJECTION, EMULSION INTRAVENOUS at 10:13

## 2018-12-03 RX ADMIN — PROPOFOL 30 MG: 10 INJECTION, EMULSION INTRAVENOUS at 10:40

## 2018-12-03 RX ADMIN — LIDOCAINE HYDROCHLORIDE 80 MG: 20 INJECTION, SOLUTION EPIDURAL; INFILTRATION; INTRACAUDAL; PERINEURAL at 10:13

## 2018-12-03 NOTE — ANESTHESIA POSTPROCEDURE EVALUATION
Procedure(s): DIRECT  LARYNGOSCOPY/BROCHOSCOPY/ ESOPHAGOSCOPY WITH BIOPSY AND SCAR REVISION. Anesthesia Post Evaluation Multimodal analgesia: multimodal analgesia used between 6 hours prior to anesthesia start to PACU discharge Patient location during evaluation: PACU Patient participation: complete - patient participated Level of consciousness: responsive to verbal stimuli and awake Pain management: adequate Airway patency: patent Anesthetic complications: no 
Cardiovascular status: acceptable Respiratory status: acceptable, spontaneous ventilation and nonlabored ventilation Hydration status: acceptable Post anesthesia nausea and vomiting:  none Visit Vitals BP (P) 140/68 (BP 1 Location: Right arm, BP Patient Position: At rest) Pulse (P) 100 Temp 36.8 °C (98.2 °F) Resp (P) 16 Ht 5' 10\" (1.778 m) Wt 105.2 kg (231 lb 14.4 oz) SpO2 (P) 98% BMI 33.27 kg/m²

## 2018-12-03 NOTE — DISCHARGE INSTRUCTIONS
Direct Rigid Laryngoscopy: What to Expect at [address] [facility]  Direct rigid laryngoscopy (say \"vifd-lt-OVS-kuh-pee\") is a procedure that lets your doctor look at your throat and voice box (larynx). The doctor used a tube, called a scope, to look deep into your throat. The doctor may have used the procedure to take a tissue sample (biopsy), remove growths from the vocal cords, or do other kinds of surgery or laser treatment in the throat. Or the procedure may have been done to remove an object that is stuck in your throat. After the procedure, your throat may feel sore or slightly swollen for 2 to 5 days. You may sound hoarse for 1 to 8 weeks, depending on what was done during the procedure. Your doctor may ask you to speak as little as possible for 1 to 2 weeks after the procedure. If you speak, use your normal tone of voice and do not talk for very long. Whispering or shouting can strain your vocal cords as they are trying to heal. Try to avoid coughing or clearing your throat while your throat heals. These activities can also damage your vocal cords. If the doctor took a sample of tissue for study, it is normal to spit up a small amount of blood after the procedure. Talk to your doctor about how much bleeding to expect and how long the bleeding may last.  If the doctor took a biopsy, the doctor or nurse will call you with the test results. It may take 2 to 5 days to get the results. This care sheet gives you a general idea about how long it will take for you to recover. But each person recovers at a different pace. Follow the steps below to feel better as quickly as possible. How can you care for yourself at home? Activity    · Rest when you feel tired.  Getting enough sleep will help you recover.     · Avoid strenuous activities, such as bicycle riding, jogging, weight lifting, or aerobic exercise, for at least 1 week or until your doctor says it is okay.     · Ask your doctor when you can drive again.     · If your job requires you to use your voice, you may need to take 1 to 2 weeks off from work. Diet    · Drink plenty of fluids to avoid becoming dehydrated.     · If your throat is swollen or sore, drink clear fluids such as water, apple juice, and flavored ice pops. Avoid hot drinks, soda pop, and citrus juices, such as orange juice. These may cause more swelling and pain.     · Start out with cool, clear liquids; flavored ice pops; and ice cream. Next, try soft foods like pudding, yogurt, canned or cooked fruit, scrambled eggs, and mashed potatoes. Do not eat hard or scratchy foods such as chips or raw vegetables until your throat has healed. Medicines    · Your doctor will tell you if and when you can restart your medicines. He or she will also give you instructions about taking any new medicines.     · If you take blood thinners, such as warfarin (Coumadin), clopidogrel (Plavix), or aspirin, be sure to talk to your doctor. He or she will tell you if and when to start taking those medicines again. Make sure that you understand exactly what your doctor wants you to do.     · Be safe with medicines. Take pain medicines exactly as directed. ? If the doctor gave you a prescription medicine for pain, take it as prescribed. ? If you are not taking a prescription pain medicine, ask your doctor if you can take an over-the-counter medicine.     · If you think your pain medicine is making you sick to your stomach:  ? Take your medicine after meals (unless your doctor has told you not to). ? Ask your doctor for a different pain medicine.     · If your doctor prescribed antibiotics, take them as directed. Do not stop taking them just because you feel better.  You need to take the full course of antibiotics.    Throat care    · Suck on throat lozenges or gargle with warm salt water to help your sore throat.     · For several weeks, or until your doctor says it is okay, try to avoid coughing or clearing your throat. If you feel like you need to clear your throat, try taking a few sips of water. Follow-up care is a key part of your treatment and safety. Be sure to make and go to all appointments, and call your doctor if you are having problems. It's also a good idea to know your test results and keep a list of the medicines you take. When should you call for help? Call 911 anytime you think you may need emergency care. For example, call if:    · You passed out (lost consciousness).     · You have severe trouble breathing.     · You have sudden chest pain and shortness of breath, or you cough up blood.    Call your doctor now or seek immediate medical care if:    · You cough up a lot of blood or have bleeding that lasts for 24 hours.     · You have trouble breathing.     · You have symptoms of a blood clot in your leg (called a deep vein thrombosis), such as:  ? Pain in the calf, back of the knee, thigh, or groin. ? Redness and swelling in your leg or groin.     · You feel like you cannot swallow.     · You have pain that does not get better after you take pain medicine.     · You have a fever.    Watch closely for any changes in your health, and be sure to contact your doctor if:    · You do not get better as expected. Where can you learn more? Go to http://maria c-jeff.info/. Enter [id number] in the search box to learn more about \"Direct Rigid Laryngoscopy: What to Expect at Home. \"  Current as of: March 28, 2018  Content Version: 11.8  © 0092-8012 [employer]. Care instructions adapted under license by [employer] (which disclaims liability or warranty for this information). If you have questions about a medical condition or this instruction, always ask your healthcare professional. [address] any warranty or liability for your use of this information.

## 2018-12-04 NOTE — OP NOTES
[address] [facility] REPORT    Ashish Gleason  MR#: [id number]  : 1955  ACCOUNT #: [de-identified]   DATE OF SERVICE: 2018    PREOPERATIVE DIAGNOSES:  Supraglottic swelling. History of epiglottic cancer. Airway obstruction. Neck pain with a tight tracheostomy scar. POSTOPERATIVE DIAGNOSES:  Supraglottic swelling. History of epiglottic cancer. Airway obstruction. Neck pain with a tight tracheostomy scar. PROCEDURE PERFORMED:  1. Revision of tracheostomy scar. 2.  Direct laryngoscopy with biopsy. 3.  Direct laryngoscopy with CO2 laser. SURGEON:  Rome Ordonez DO    ASSISTANT:  None. ANESTHESIA:  General.    COMPLICATIONS:  None. ESTIMATED BLOOD LOSS:  Less than 5 mL. HISTORY OF PRESENT ILLNESS:  This 70-year-old male is well known to me. 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 He had a lot of supraglottic swelling because of the mass itself, but then he also had a lot of swelling afterwards due to radiation changes. He has been doing okay and again I have taken back to the operating room on multiple occasions, and I have slowly been lasering away the supraglottic swelling and opening up the supraglottic airway, which is allowing him to breathe a lot easier. Also during that time period, the trach tube had been removed a while ago and slowly he has developed a tracheostomy scar that is becoming very tight, which is limiting the mobility of his neck, so based on the history and physical exam, it was my recommendation that he undergo revision of the tracheostomy scar. I did remove a lot of the supraglottic swelling at the last surgery, but he still had some that was covering the false vocal cord, so I recommend he again undergo a direct laryngoscopy, a possible biopsy along with a CO2 laser. Procedure risks and benefits were discussed with him.   All questions were answered and he is agreeable to the surgery. SURGERY ITSELF:  The patient was identified in the preoperative waiting area. He was taken back to the operating room, where he underwent general anesthesia. He was prepped and draped in a sterile fashion with the neck extended. I was able to make a horizontal incision through the old tracheostomy scar down below just the skin. There was very little fat in that portion of the neck due to the previous surgery. I was able to get some connective tissue. This was mainly what appeared to be fascia overlying strap muscles and about the level of the sternocleidomastoid muscle and I was able to loosen up the tissue with a blunt dissection enough that I was able to slide this to the middle without any difficulty and then a 5-0 Vicryl stitch was used to sew this between the skin and the trachea. I got a nice easy closure in that area and then I closed the surgical incision using a 5-0 nylon. Antibiotic ointment was placed over the incision. Then, the bed was turned 90 degrees. A tooth guard was placed over the upper teeth. I was able to take a laryngoscope, place this in the right side of the mouth. Oral cavity, tongue, tongue base all appeared to be completely normal.  Epiglottis had a normal appearance, which is not a normal shape and it has normal post-radiation look. 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 The tissue appeared to be slightly friable, so multiple biopsies were done on this area and sent away as left supraglottis. The true vocal cords and false vocal cords were completely obscured by an edematous type looking tissue. Going below that, false vocal cords and true vocal cords both appeared to be completely normal.  Subglottis showed some subglottic stenosis about the level where the tracheostomy tube would have been placed before.   This is not any more narrow than previous inspection, and that appeared to be totally normal down to the level of the neal. Going back up to the level of the larynx and placing in the patient in suspension, I was then able to take a laser, which was set on a power of 16 superpulse, and I was able to reduce the edematous tissue in the supraglottic region down to the level and so I completely exposed the false vocal cords bilaterally. Prior to doing so, I did again do multiple biopsies as I described before. Once I had a good exposure of the false vocal cords even from a higher level pretty much in the hypopharynx, then the laryngoscope was removed. There was no sign of any trauma to the lips, teeth or gums. The patient was then awakened. Then he was taken to postop recovery room in stable condition.       DO CRISTAL Richey /   D: 12/03/2018 11:10     T: 12/03/2018 18:03  JOB #: [id number]

## 2019-01-08 ENCOUNTER — HOSPITAL ENCOUNTER (OUTPATIENT)
Dept: PET IMAGING | Age: 64
Discharge: HOME | End: 2019-01-08
Payer: COMMERCIAL

## 2019-01-08 DIAGNOSIS — C32.1 SQUAMOUS CELL CARCINOMA OF EPIGLOTTIS (HCC): ICD-10-CM

## 2019-01-08 PROCEDURE — 74011636320 HC RX REV CODE- 636/320: Performed by: OTOLARYNGOLOGY

## 2019-01-08 PROCEDURE — A9552 F18 FDG: HCPCS

## 2019-01-08 RX ORDER — SODIUM CHLORIDE 0.9 % (FLUSH) 0.9 %
10 SYRINGE (ML) INJECTION
Status: COMPLETED | OUTPATIENT
Start: 2019-01-08 | End: 2019-01-08

## 2019-01-08 RX ADMIN — DIATRIZOATE MEGLUMINE AND DIATRIZOATE SODIUM 10 ML: 660; 100 LIQUID ORAL; RECTAL at 14:47

## 2019-01-08 RX ADMIN — Medication 10 ML: at 14:47

## 2019-03-04 DIAGNOSIS — C32.1 MALIGNANT NEOPLASM OF SUPRAGLOTTIS (HCC): Primary | ICD-10-CM

## 2019-03-12 ENCOUNTER — HOSPITAL ENCOUNTER (OUTPATIENT)
Dept: SURGERY | Age: 64
Discharge: HOME | End: 2019-03-12

## 2019-03-13 VITALS — HEIGHT: 70 IN | WEIGHT: 232 LBS | BODY MASS INDEX: 33.21 KG/M2

## 2019-03-13 NOTE — PERIOP NOTES
Patient verified name and . Order for consent  found in EHR and matches case posting; patient verifies procedure. Problem chart: Outpatient Lab: random glucose, potassium. Type 1B surgery, Phone assessment complete. Orders  received. Labs per surgeon: none. Labs per anesthesia protocol: potassium, random glucose. Patient answered medical/surgical history questions at their best of ability. All prior to admission medications documented in [facility] Care. Patient instructed to take the following medications the day of surgery according to anesthesia guidelines with a small sip of water: aspirin 81 mg, omeprazole . Hold all vitamins 7 days prior to surgery and NSAIDS 5 days prior to surgery. Prescription meds to hold:none. Patient instructed on the following:  Arrive at A Entrance, time of arrival to be called the day before by 1700  NPO after midnight including gum, mints, and ice chips  Responsible adult must drive patient to the hospital, stay during surgery, and patient will need supervision 24 hours after anesthesia  Use dial soap in shower the night before surgery and on the morning of surgery  All piercings must be removed prior to arrival.    Leave all valuables (money and jewelry) at home but bring insurance card and ID on       DOS. Do not wear make-up, nail polish, lotions, cologne, perfumes, powders, or oil on skin. Patient teach back successful and patient demonstrates knowledge of instruction.

## 2019-03-17 ENCOUNTER — ANESTHESIA EVENT (OUTPATIENT)
Dept: SURGERY | Age: 64
End: 2019-03-17
Payer: COMMERCIAL

## 2019-03-18 ENCOUNTER — HOSPITAL ENCOUNTER (OUTPATIENT)
Age: 64
Setting detail: OUTPATIENT SURGERY
Discharge: HOME | End: 2019-03-18
Attending: OTOLARYNGOLOGY | Admitting: OTOLARYNGOLOGY
Payer: COMMERCIAL

## 2019-03-18 ENCOUNTER — ANESTHESIA (OUTPATIENT)
Dept: SURGERY | Age: 64
End: 2019-03-18
Payer: COMMERCIAL

## 2019-03-18 VITALS
RESPIRATION RATE: 16 BRPM | HEART RATE: 89 BPM | BODY MASS INDEX: 33.21 KG/M2 | DIASTOLIC BLOOD PRESSURE: 58 MMHG | TEMPERATURE: 96.8 F | OXYGEN SATURATION: 91 % | WEIGHT: 232 LBS | HEIGHT: 70 IN | SYSTOLIC BLOOD PRESSURE: 116 MMHG

## 2019-03-18 DIAGNOSIS — C32.1 MALIGNANT NEOPLASM OF SUPRAGLOTTIS (HCC): ICD-10-CM

## 2019-03-18 LAB
GLUCOSE BLD STRIP.AUTO-MCNC: 123 MG/DL (ref 65–100)
POTASSIUM BLD-SCNC: 3.8 MMOL/L (ref 3.5–5.1)

## 2019-03-18 PROCEDURE — 77030021678 HC GLIDESCP STAT DISP VERT -B: Performed by: ANESTHESIOLOGY

## 2019-03-18 PROCEDURE — 77030008703 HC TU ET UNCUF COVD -A: Performed by: ANESTHESIOLOGY

## 2019-03-18 PROCEDURE — 84132 ASSAY OF SERUM POTASSIUM: CPT

## 2019-03-18 PROCEDURE — 76210000016 HC OR PH I REC 1 TO 1.5 HR: Performed by: OTOLARYNGOLOGY

## 2019-03-18 PROCEDURE — 74011250636 HC RX REV CODE- 250/636: Performed by: ANESTHESIOLOGY

## 2019-03-18 PROCEDURE — 77030008477 HC STYL SATN SLP COVD -A: Performed by: ANESTHESIOLOGY

## 2019-03-18 PROCEDURE — 74011250636 HC RX REV CODE- 250/636

## 2019-03-18 PROCEDURE — 76060000032 HC ANESTHESIA 0.5 TO 1 HR: Performed by: OTOLARYNGOLOGY

## 2019-03-18 PROCEDURE — 76010000154 HC OR TIME FIRST 0.5 HR: Performed by: OTOLARYNGOLOGY

## 2019-03-18 PROCEDURE — 76210000020 HC REC RM PH II FIRST 0.5 HR: Performed by: OTOLARYNGOLOGY

## 2019-03-18 PROCEDURE — 88305 TISSUE EXAM BY PATHOLOGIST: CPT

## 2019-03-18 PROCEDURE — 74011000250 HC RX REV CODE- 250

## 2019-03-18 PROCEDURE — 82962 GLUCOSE BLOOD TEST: CPT

## 2019-03-18 PROCEDURE — 77030018836 HC SOL IRR NACL ICUM -A: Performed by: OTOLARYNGOLOGY

## 2019-03-18 PROCEDURE — 94640 AIRWAY INHALATION TREATMENT: CPT

## 2019-03-18 RX ORDER — HYDROMORPHONE HYDROCHLORIDE 2 MG/ML
0.5 INJECTION, SOLUTION INTRAMUSCULAR; INTRAVENOUS; SUBCUTANEOUS
Status: DISCONTINUED | OUTPATIENT
Start: 2019-03-18 | End: 2019-03-18 | Stop reason: HOSPADM

## 2019-03-18 RX ORDER — ALBUTEROL SULFATE 0.83 MG/ML
2.5 SOLUTION RESPIRATORY (INHALATION) AS NEEDED
Status: DISCONTINUED | OUTPATIENT
Start: 2019-03-18 | End: 2019-03-18 | Stop reason: HOSPADM

## 2019-03-18 RX ORDER — DEXAMETHASONE SODIUM PHOSPHATE 4 MG/ML
INJECTION, SOLUTION INTRA-ARTICULAR; INTRALESIONAL; INTRAMUSCULAR; INTRAVENOUS; SOFT TISSUE AS NEEDED
Status: DISCONTINUED | OUTPATIENT
Start: 2019-03-18 | End: 2019-03-18 | Stop reason: HOSPADM

## 2019-03-18 RX ORDER — CELECOXIB 200 MG/1
200 CAPSULE ORAL
Status: DISCONTINUED | OUTPATIENT
Start: 2019-03-18 | End: 2019-03-18 | Stop reason: HOSPADM

## 2019-03-18 RX ORDER — SODIUM CHLORIDE, SODIUM LACTATE, POTASSIUM CHLORIDE, CALCIUM CHLORIDE 600; 310; 30; 20 MG/100ML; MG/100ML; MG/100ML; MG/100ML
50 INJECTION, SOLUTION INTRAVENOUS CONTINUOUS
Status: DISCONTINUED | OUTPATIENT
Start: 2019-03-18 | End: 2019-03-18 | Stop reason: HOSPADM

## 2019-03-18 RX ORDER — FENTANYL CITRATE 50 UG/ML
INJECTION, SOLUTION INTRAMUSCULAR; INTRAVENOUS AS NEEDED
Status: DISCONTINUED | OUTPATIENT
Start: 2019-03-18 | End: 2019-03-18 | Stop reason: HOSPADM

## 2019-03-18 RX ORDER — PROPOFOL 10 MG/ML
INJECTION, EMULSION INTRAVENOUS AS NEEDED
Status: DISCONTINUED | OUTPATIENT
Start: 2019-03-18 | End: 2019-03-18 | Stop reason: HOSPADM

## 2019-03-18 RX ORDER — FENTANYL CITRATE 50 UG/ML
100 INJECTION, SOLUTION INTRAMUSCULAR; INTRAVENOUS ONCE
Status: DISCONTINUED | OUTPATIENT
Start: 2019-03-18 | End: 2019-03-18 | Stop reason: HOSPADM

## 2019-03-18 RX ORDER — SUCCINYLCHOLINE CHLORIDE 20 MG/ML
INJECTION INTRAMUSCULAR; INTRAVENOUS AS NEEDED
Status: DISCONTINUED | OUTPATIENT
Start: 2019-03-18 | End: 2019-03-18 | Stop reason: HOSPADM

## 2019-03-18 RX ORDER — MIDAZOLAM HYDROCHLORIDE 1 MG/ML
2 INJECTION, SOLUTION INTRAMUSCULAR; INTRAVENOUS
Status: DISCONTINUED | OUTPATIENT
Start: 2019-03-18 | End: 2019-03-18 | Stop reason: HOSPADM

## 2019-03-18 RX ORDER — LIDOCAINE HYDROCHLORIDE 10 MG/ML
0.1 INJECTION INFILTRATION; PERINEURAL AS NEEDED
Status: DISCONTINUED | OUTPATIENT
Start: 2019-03-18 | End: 2019-03-18 | Stop reason: HOSPADM

## 2019-03-18 RX ORDER — MIDAZOLAM HYDROCHLORIDE 1 MG/ML
2 INJECTION, SOLUTION INTRAMUSCULAR; INTRAVENOUS ONCE
Status: DISCONTINUED | OUTPATIENT
Start: 2019-03-18 | End: 2019-03-18 | Stop reason: HOSPADM

## 2019-03-18 RX ORDER — OXYCODONE HYDROCHLORIDE 5 MG/1
5 TABLET ORAL
Status: DISCONTINUED | OUTPATIENT
Start: 2019-03-18 | End: 2019-03-18 | Stop reason: HOSPADM

## 2019-03-18 RX ORDER — SODIUM CHLORIDE, SODIUM LACTATE, POTASSIUM CHLORIDE, CALCIUM CHLORIDE 600; 310; 30; 20 MG/100ML; MG/100ML; MG/100ML; MG/100ML
75 INJECTION, SOLUTION INTRAVENOUS CONTINUOUS
Status: DISCONTINUED | OUTPATIENT
Start: 2019-03-18 | End: 2019-03-18 | Stop reason: HOSPADM

## 2019-03-18 RX ORDER — ROCURONIUM BROMIDE 10 MG/ML
INJECTION, SOLUTION INTRAVENOUS AS NEEDED
Status: DISCONTINUED | OUTPATIENT
Start: 2019-03-18 | End: 2019-03-18 | Stop reason: HOSPADM

## 2019-03-18 RX ORDER — LIDOCAINE HYDROCHLORIDE 20 MG/ML
INJECTION, SOLUTION EPIDURAL; INFILTRATION; INTRACAUDAL; PERINEURAL AS NEEDED
Status: DISCONTINUED | OUTPATIENT
Start: 2019-03-18 | End: 2019-03-18 | Stop reason: HOSPADM

## 2019-03-18 RX ADMIN — PROPOFOL 170 MG: 10 INJECTION, EMULSION INTRAVENOUS at 12:22

## 2019-03-18 RX ADMIN — ROCURONIUM BROMIDE 5 MG: 10 INJECTION, SOLUTION INTRAVENOUS at 12:22

## 2019-03-18 RX ADMIN — DEXAMETHASONE SODIUM PHOSPHATE 10 MG: 4 INJECTION, SOLUTION INTRA-ARTICULAR; INTRALESIONAL; INTRAMUSCULAR; INTRAVENOUS; SOFT TISSUE at 12:30

## 2019-03-18 RX ADMIN — LIDOCAINE HYDROCHLORIDE 80 MG: 20 INJECTION, SOLUTION EPIDURAL; INFILTRATION; INTRACAUDAL; PERINEURAL at 12:22

## 2019-03-18 RX ADMIN — SUCCINYLCHOLINE CHLORIDE 190 MG: 20 INJECTION INTRAMUSCULAR; INTRAVENOUS at 12:23

## 2019-03-18 RX ADMIN — FENTANYL CITRATE 100 MCG: 50 INJECTION, SOLUTION INTRAMUSCULAR; INTRAVENOUS at 12:21

## 2019-03-18 RX ADMIN — RACEPINEPHRINE HYDROCHLORIDE 0.25 ML: 11.25 SOLUTION RESPIRATORY (INHALATION) at 14:00

## 2019-03-18 RX ADMIN — SODIUM CHLORIDE, SODIUM LACTATE, POTASSIUM CHLORIDE, AND CALCIUM CHLORIDE: 600; 310; 30; 20 INJECTION, SOLUTION INTRAVENOUS at 12:18

## 2019-03-18 NOTE — ANESTHESIA PREPROCEDURE EVALUATION
Anesthetic History     PONV          Review of Systems / Medical History  Patient summary reviewed and pertinent labs reviewed    Pulmonary  Within defined limits                 Neuro/Psych   Within defined limits           Cardiovascular    Hypertension: well controlled              Exercise tolerance: <4 METS     GI/Hepatic/Renal     GERD: well controlled           Endo/Other    Diabetes: type 2    Obesity and cancer (epiglottic SCC)     Other Findings              Physical Exam    Airway  Mallampati: III  TM Distance: > 6 cm  Neck ROM: normal range of motion   Mouth opening: Normal     Cardiovascular    Rhythm: regular        Pertinent negatives: No murmur   Dental  No notable dental hx       Pulmonary  Breath sounds clear to auscultation               Abdominal         Other Findings            Anesthetic Plan    ASA: 3  Anesthesia type: general          Induction: Intravenous  Anesthetic plan and risks discussed with: Patient and Spouse      Previous trach and epiglottic edema/tumor.   Have glidescope in the room

## 2019-03-18 NOTE — DISCHARGE INSTRUCTIONS
Direct Rigid Laryngoscopy: What to Expect at [address] [facility]  Direct rigid laryngoscopy (say \"isbg-qe-PDI-kuh-pee\") is a procedure that lets your doctor look at your throat and voice box (larynx). The doctor used a tube, called a scope, to look deep into your throat. The doctor may have used the procedure to take a tissue sample (biopsy), remove growths from the vocal cords, or do other kinds of surgery or laser treatment in the throat. Or the procedure may have been done to remove an object that is stuck in your throat. After the procedure, your throat may feel sore or slightly swollen for 2 to 5 days. You may sound hoarse for 1 to 8 weeks, depending on what was done during the procedure. Your doctor may ask you to speak as little as possible for 1 to 2 weeks after the procedure. If you speak, use your normal tone of voice and do not talk for very long. Whispering or shouting can strain your vocal cords as they are trying to heal. Try to avoid coughing or clearing your throat while your throat heals. These activities can also damage your vocal cords. If the doctor took a sample of tissue for study, it is normal to spit up a small amount of blood after the procedure. Talk to your doctor about how much bleeding to expect and how long the bleeding may last.  If the doctor took a biopsy, the doctor or nurse will call you with the test results. It may take 2 to 5 days to get the results. This care sheet gives you a general idea about how long it will take for you to recover. But each person recovers at a different pace. Follow the steps below to feel better as quickly as possible. How can you care for yourself at home? Activity    · Rest when you feel tired. Getting enough sleep will help you recover.     · Avoid strenuous activities, such as bicycle riding, jogging, weight lifting, or aerobic exercise, for at least 1 week or until your doctor says it is okay.     · Ask your doctor when you can drive again.   · If your job requires you to use your voice, you may need to take 1 to 2 weeks off from work. Diet    · Drink plenty of fluids to avoid becoming dehydrated.     · If your throat is swollen or sore, drink clear fluids such as water, apple juice, and flavored ice pops. Avoid hot drinks, soda pop, and citrus juices, such as orange juice. These may cause more swelling and pain.     · Start out with cool, clear liquids; flavored ice pops; and ice cream. Next, try soft foods like pudding, yogurt, canned or cooked fruit, scrambled eggs, and mashed potatoes. Do not eat hard or scratchy foods such as chips or raw vegetables until your throat has healed. Medicines    · Your doctor will tell you if and when you can restart your medicines. He or she will also give you instructions about taking any new medicines.     · If you take blood thinners, such as warfarin (Coumadin), clopidogrel (Plavix), or aspirin, be sure to talk to your doctor. He or she will tell you if and when to start taking those medicines again. Make sure that you understand exactly what your doctor wants you to do.     · Be safe with medicines. Take pain medicines exactly as directed. ? If the doctor gave you a prescription medicine for pain, take it as prescribed. ? If you are not taking a prescription pain medicine, ask your doctor if you can take an over-the-counter medicine.     · If you think your pain medicine is making you sick to your stomach:  ? Take your medicine after meals (unless your doctor has told you not to). ? Ask your doctor for a different pain medicine.     · If your doctor prescribed antibiotics, take them as directed. Do not stop taking them just because you feel better. You need to take the full course of antibiotics.    Throat care    · Suck on throat lozenges or gargle with warm salt water to help your sore throat.     · For several weeks, or until your doctor says it is okay, try to avoid coughing or clearing your throat. If you feel like you need to clear your throat, try taking a few sips of water. Follow-up care is a key part of your treatment and safety. Be sure to make and go to all appointments, and call your doctor if you are having problems. It's also a good idea to know your test results and keep a list of the medicines you take. When should you call for help? Call 911 anytime you think you may need emergency care. For example, call if:    · You passed out (lost consciousness).     · You have severe trouble breathing.     · You have sudden chest pain and shortness of breath, or you cough up blood.    Call your doctor now or seek immediate medical care if:    · You cough up a lot of blood or have bleeding that lasts for 24 hours.     · You have trouble breathing.     · You have symptoms of a blood clot in your leg (called a deep vein thrombosis), such as:  ? Pain in the calf, back of the knee, thigh, or groin. ? Redness and swelling in your leg or groin.     · You feel like you cannot swallow.     · You have pain that does not get better after you take pain medicine.     · You have a fever.    Watch closely for any changes in your health, and be sure to contact your doctor if:    · You do not get better as expected. Where can you learn more? Go to http://maria c-jeff.info/. Enter [id number] [id number] in the search box to learn more about \"Direct Rigid Laryngoscopy: What to Expect at Home. \"  Current as of: March 27, 2018  Content Version: 11.9  © 4374-2664 [employer]. Care instructions adapted under license by [employer] (which disclaims liability or warranty for this information). If you have questions about a medical condition or this instruction, always ask your healthcare professional. [address] any warranty or liability for your use of this information.

## 2019-03-18 NOTE — ANESTHESIA POSTPROCEDURE EVALUATION
Procedure(s):  PANENDOSCOPY WITH BIOPSY.     Anesthesia Post Evaluation      Multimodal analgesia: multimodal analgesia used between 6 hours prior to anesthesia start to PACU discharge  Patient location during evaluation: PACU  Patient participation: complete - patient participated  Level of consciousness: awake and alert  Pain management: adequate  Airway patency: patent  Anesthetic complications: no  Cardiovascular status: acceptable  Respiratory status: acceptable, spontaneous ventilation and nonlabored ventilation  Hydration status: acceptable  Post anesthesia nausea and vomiting:  none      Visit Vitals  /59   Pulse 85   Temp 36 °C (96.8 °F)   Resp 16   Ht 5' 10\" (1.778 m)   Wt 105.2 kg (232 lb)   SpO2 93%   BMI 33.29 kg/m²

## 2019-04-04 ENCOUNTER — HOSPITAL ENCOUNTER (OUTPATIENT)
Dept: RADIATION ONCOLOGY | Age: 64
Discharge: HOME | End: 2019-04-04
Payer: COMMERCIAL

## 2019-04-04 VITALS
BODY MASS INDEX: 35.05 KG/M2 | HEART RATE: 79 BPM | OXYGEN SATURATION: 94 % | DIASTOLIC BLOOD PRESSURE: 78 MMHG | RESPIRATION RATE: 20 BRPM | WEIGHT: 244.8 LBS | HEIGHT: 70 IN | TEMPERATURE: 98.6 F | SYSTOLIC BLOOD PRESSURE: 147 MMHG

## 2019-04-04 PROCEDURE — 99211 OFF/OP EST MAY X REQ PHY/QHP: CPT

## 2019-04-04 RX ORDER — HYDROGEN PEROXIDE 3 %
20 SOLUTION, NON-ORAL MISCELLANEOUS DAILY
COMMUNITY
End: 2020-12-03

## 2019-04-04 NOTE — PROGRESS NOTES
Patient: Etelvina Herman MRN: [id number]  SSN: [id number] YOB: 1955  Age: 59 y.o. Sex: male Other Providers: Dread Jennings MD, Dona Luque MD 
  
DIAGNOSIS: T3N1M0 Supraglottic SCC, Stage ARMANDO.  
  
PREVIOUS TREATMENT: 
1. Debridement and biopsy 11/14/16 2. 2 cycles of neoadjuvant TPF chemotherapy. 3. Completion of concurrent Cetuximab and radiation 4/11/17, 70 Gy in 35 fractions. INTERVAL HISTORY:  Etelvina Herman is a 59 y.o. male being seen back in regular follow up after completion of his radiation 4/11/17. Regarding his history, his only pertinent medical history includes DMII, HTN, and obesity. Beginning in the fall 2016, he began to note some difficulty with dysphagia as well as some change in his voice. Ultimately, there was some left-sided otalgia for which he sought help from his primary care physician. A course of antibiotics did not prove helpful and ultimately he was referred to Dr. Ezio Brewster for evaluation. He was a prior smoker but quit nearly 11 years prior to presentation. He did not abuse alcohol. Flexible laryngoscopy demonstrated a large mass involving the entire epiglottis. The tumor appeared to involve the aryepiglottic folds. The vocal cords were not visualized. CT scan November 3, 2016 demonstrated 3.9 x 2.6 x 2.3 cm supraglottic mass extending across midline. Inferiorly, it appeared to involve the left true vocal cord. The overlying thyroid cartilage seemed preserved. There was a level 3 lymph node on the left side measuring 1.8 cm in short axis. On 11/14/16, the patient was taken to the OR for panendoscopy where Dr. Ezio Brewster indicate that he visualized the true and false vocal cords both of which appeared uninvolved by the tumor. He also did micro-debridement, subsequent to which the patient's voice appeared to be improved. The biopsy was consistent with in situ and infiltrating poorly differentiated squamous carcinoma.  The patient has been seen at [address], where surgery was offered. The patient was averse to the notion of a tracheostomy if that was a possibility and therefore sought evaluation from Dr. Umer Mejia in oncology who saw him 12/20/16. Per Dr. Bibiana Martel note, he was a candidate for chemoradiation. He ordered a PET/CT and referred him to our office. I agreed with the plan and completed treatment 4/11/17. Unfortunately prior to commencement with radiation, he did require tracheotomy. With the anticipated delayed in starting radiation, he did start chemotherapy, TPF, and received 2 cycles prior to starting radiation with cetuximab. Overall he did fairly well with treatment, but did have the anticipated irritation and difficulty swallowing. He did get dry mouth and neck erythema worsened on the left compared to the right. He did have Silvadene prescribed for this. He was seen by Dr. Edwin Lobato shortly after his radiation and had his trach downsized. He continued to use his feeding tube but was able to tolerate most solids and liquids. He continues to follow with medical oncology and ENT who removed his trach 5/31/17. As a result of his issues, he had lost nearly 50 pounds. He was seen 6 months post treatment with continued follow up with Dr. Edwin Lobato and Dr. Umer Mejia over this time. He was no longer using his feeding tube. Unfortunately he developed increasing dysphagia and hoarseness which was slightly worsening prompting evaluation. Imaging including PET/CT did not show any sign of recurrence. He did complete direct laryngoscopy with biopsy and CO2 laser 12/11/2017. The final pathology showed dysplastic epithelium, but no sign of recurrent disease. By 11 months he was again doing better with only was minimal residual hoarseness but nearly back to baseline. He did [place] out\" easily which was bothersome to him. At 14 months he was continuing to do well. He denied pain, depression, or further challenges swallowing. He did endorse some mild fatigue.   He was seen by ENT 5/16/2018 with laryngoscopy noting the airway was as opened as it had been since treatment completion. I noted increased swelling and was therefore requiring about the possibility of recurrence and had him see ENT again which took place 8/29/18. He described improvement based on his visualization through flexible laryngoscopy. There were no concerning lesions. He noted some fullness on the right and felt he could possibly benefit from CO2 laser for the supraglottic swelling. He also states discussed cutting the neck scar and releasing it. At my follow-up 9/6/2018 he had no new issues or complaints and was optimistic about the possible intervention through Dr. Tiki Ellsworth. He was seen again 24 months out from therapy without new issues or complaints. He had an excellent PET/CT completed January 2019 which was reviewed. He continued to complain of the hoarseness and challenges swallowing which were unchanged but still challenging for him. He has lost a few teeth but had no sign of necrosis of the jaw. He again completed laryngoscopy and endoscopy with Dr. Tiki Ellsworth 3/18/2019 with redundant tissue again noted. He noted some subglottic stenosis. Several biopsies were taken and negative. He plans for a repeat procedure in October 2019. UPDATED PAST MEDICAL HISTORY:  Since our prior encounter, Becky Ribeiro has not been hospitalized. There have been no significant changes to the medical history. MEDICATIONS:  
 
Current Outpatient Medications:  
  esomeprazole (NEXIUM) 20 mg capsule, Take 20 mg by mouth daily. , Disp: , Rfl:  
  citalopram (CELEXA) 40 mg tablet, Take 1 Tab by mouth every evening., Disp: 30 Tab, Rfl: 0 
  metFORMIN (GLUMETZA ER) 500 mg TG24 24 hour tablet, Take  by mouth daily. , Disp: , Rfl:  
  lisinopril-hydroCHLOROthiazide (PRINZIDE, ZESTORETIC) 20-12.5 mg per tablet, Take  by mouth daily. , Disp: , Rfl:  
  glucose blood VI test strips (RELION PRIME TEST STRIPS) strip, Test blood sugar before meals and bedtime Diagnosis code E11.65, Disp: 200 Strip, Rfl: 5 
  aspirin delayed-release 81 mg tablet, Take 81 mg by mouth every morning. Take / use AM day of surgery  per anesthesia protocols. Indications: myocardial infarction prevention, Disp: , Rfl:  
  benzocaine 20 % drop, 2 Drops by Otic route three (3) times daily. , Disp: 15 mL, Rfl: 3 
  glipiZIDE SR (GLUCOTROL XL) 10 mg CR tablet, Take 1 Tab by mouth daily. Indications: type 2 diabetes mellitus, Disp: 30 Tab, Rfl: 2 
  omeprazole (PRILOSEC) 20 mg capsule, Take 1 Cap by mouth every morning. Take / use AM day of surgery  per anesthesia protocols. Indications: gastroesophageal reflux disease, Disp: 30 Cap, Rfl: 5 
  clindamycin (CLEOCIN T) 1 % lotion, Apply  to affected area two (2) times daily as needed. use thin film on affected area, Disp: 1 Bottle, Rfl: 0 ALLERGIES:  
Allergies Allergen Reactions  Zofran [Ondansetron Hcl (Pf)] Other (comments) Gives pt severe HA  
 
 
PHYSICAL EXAMINATION:  
ECOG Performance status 1 
VITAL SIGNS:  
Visit Vitals /78 (BP 1 Location: Left arm, BP Patient Position: Sitting) Pulse 79 Temp 98.6 °F (37 °C) Resp 20 Ht 5' 10\" (1.778 m) Wt 111 kg (244 lb 12.8 oz) SpO2 94% BMI 35.13 kg/m² [facility] GENERAL: The patient is well-developed, ambulatory, alert and in no acute distress. HEENT: Head is normocephalic, atraumatic. Pupils are equal, round and reactive to light and accommodation. Extraocular movement intact. Hearing is intact bilaterally to finger rub. Oral cavity reveals no lesions but dry secretions. Audible upper respiratory sounds NECK: Neck is supple with no masses. There is substantial submental edema which is unchanged. CARDIOVASCULAR: Heart is regular rate and rhythm. There are no murmurs rubs or gallups. Radial pulses are 2+ RESPIRATORY: his grasping and upper airway edema is audible. Lungs are clear to auscultation and percussion.  There is normal respiratory effort. GASTROINTESTINAL: The abdomen is soft, non-tender, nondistended with no hepatospelnomagaly. Digital rectal examination: deferred  LYMPHATIC: There is no cervical, supraclavicular or axillary lymphadenopathy bilaterally. LABORATORY:  
Lab Results Component Value Date/Time Sodium 137 11/30/2017 01:27 PM  
 Potassium 4.1 11/26/2018 03:54 PM  
 Chloride 96 (L) 11/30/2017 01:27 PM  
 CO2 33 (H) 11/30/2017 01:27 PM  
 Anion gap 8 11/30/2017 01:27 PM  
 Glucose 284 (H) 11/30/2017 01:27 PM  
 BUN 23 11/30/2017 01:27 PM  
 Creatinine 1.46 11/30/2017 01:27 PM  
 GFR est AA >60 11/30/2017 01:27 PM  
 GFR est non-AA 52 (L) 11/30/2017 01:27 PM  
 Calcium 9.1 11/30/2017 01:27 PM  
 Magnesium 2.4 04/27/2017 09:04 AM  
 Phosphorus 3.0 01/20/2017 10:57 AM  
 Albumin 3.8 11/30/2017 01:27 PM  
 Protein, total 7.0 11/30/2017 01:27 PM  
 Globulin 3.2 11/30/2017 01:27 PM  
 A-G Ratio 1.2 11/30/2017 01:27 PM  
 AST (SGOT) 8 (L) 11/30/2017 01:27 PM  
 ALT (SGPT) 17 11/30/2017 01:27 PM  
 
Lab Results Component Value Date/Time WBC 8.6 11/30/2017 01:27 PM  
 HGB 12.2 (L) 11/30/2017 01:27 PM  
 HCT 34.8 (L) 11/30/2017 01:27 PM  
 PLATELET 050 60/19/0818 01:27 PM  
 
 
PATHOLOGY: 
12/11/17:   
 DIAGNOSIS  
SUPRAGLOTTIS: SQUAMOUS EPITHELIUM HAVING FOCAL CHANGES CONSISTENT WITH MODERATE DYSPLASIA, NONDIAGNOSTIC FOR MALIGNANT TUMOR. RADIOLOGY: 
I have personally reviewed the imaging and agree with the reports below. Nuclear Medicine Whole Body CT / PET- FDG Study 1/8/2019 4:40 PM 
  
INDICATION: Restaging for squamous cell cancer of the epiglottis. Patient had 
epiglottis scar revision procedure done 12/3/2018 which showed a small focus of 
recurrent tumor. 
  
COMPARISON: PET/CT 6/5/2018  
  
TECHNIQUE:   Radiopharmaceutical: 16.27 mCi F18-FDG IV. This is a whole-body PET- FDG study performed on a dedicated full- ring scanner.  Low dose, 
nondiagnostic CT axial source images are also acquired for PET attenuation 
correction and are utilized for PET-CT fusion with the emission images. 
  
The patient is a diabetic and takes metformin and underwent adequate fasting of 
at least 4 hours. Blood glucose at the time of tracer administration is 117 
mg/dl, which is adequate for imaging. Approximately 60 minutes after 
administration of the radiopharmaceutical imaging was initiated covering the 
skull to the thighs. SUV max. Calculated from patient body wt. Noncaloric MDGASTROVIEW dilute oral contrast is given. An additional standard 2 table 
position acquisition at the head and neck is done. 
  
 FINDINGS:  Examination of the 3D tomographic PET images demonstrates no 
significant abnormal FDG tracer activity in the head and neck including at the 
region of the epiglottis. No abnormal suspicious hypermetabolic lymph node is seen.  
  
In the chest there is normal FDG tracer activity as well with no suspicious 
hypermetabolic nodule or lymph node. There is a small amount of inspissated 
material in the tracheal lumen at the neal. 2 or 3 small areas of patchy 
pneumonitis change are seen in the lungs -only the focus in the left lower lobe 
posteriorly seen on image 107 shows low level FDG tracer activity-these are 
probably not significant.  
  
Below the diaphragm in the abdomen and pelvis there is only normal physiologic FDG tracer activity. No suspicious hypermetabolic lymph node or liver lesion. Typical bowel and urinary tract activity. 
  
No suspicious skeletal lesions seen. 
  
IMPRESSION IMPRESSION:  
1. No significant focus of increased FDG tracer activity identified at the oral 
pharynx. No hypermetabolic cervical lymph node seen. 2. No evidence of distant metastatic disease. Incidental mild sites of 
pneumonitis change in the lung fields. TUMOR STATUS:  Excellent partial response IMPRESSION:  Felicia Allen is a 59 y.o. male now 24 months out from treatment.   He had what I would coin as typical effects from radiation in the setting of a tracheostomy and a locally advanced head and neck cancer. He is continuing to follow with medical oncology as well as ENT. I'm pleased with his current status but disappointed with his amount of edema. However, his airway is open and he is still functioning at a high level despite needing multiple procedures being managed by Dr. Milton Hunter. His edema and redundant tissue at this point is being managed locally by ENT and therefore I will again not proceed with repeat laryngoscopy. With a favorable imaging characteristics by PET on several occassions and laryngoscopy by ENT, I would defer future imaging until clinically indicated. I advised him we would see him back in 6 months or sooner if needed. He understands he will also have regular procedures with Dr. Milton Hunter as clinically indicated. He was agreeable with this plan. PLAN:   
1) Patient is recovering as anticipated from his prior course of radiotherapy. Will continue to follow with ENT for continued laryngeal edema. 2) Future follow up will be coordinated with Dr. Efrem Mancilla and Dr. Milton Hunter. I will see him back in 6 months. He will see our NP, Quinn Pepper, back in follow up at that time. Portions of this note were copied from prior encounters and reviewed for accuracy, currency, and represent documentation and tasks completed during this encounter. I verify and attest these portions to be unchanged from prior visits. Nahun Hopson MD 
04/04/19

## 2019-04-04 NOTE — PROGRESS NOTES
RT End: 04/11/2017 S/P Chemo PET (01/18/2019): Negative Saw Blu Jones on 03/27/2019 - Scoped Plan Pandendscopy with possible bx 10/2019 No complaints of pain. Nanci Adams, CMA

## 2019-09-17 ENCOUNTER — HOSPITAL ENCOUNTER (OUTPATIENT)
Dept: SURGERY | Age: 64
Discharge: HOME | End: 2019-09-17

## 2019-09-17 VITALS — WEIGHT: 249 LBS | HEIGHT: 70 IN | BODY MASS INDEX: 35.65 KG/M2

## 2019-09-17 RX ORDER — POTASSIUM CHLORIDE 750 MG/1
TABLET, FILM COATED, EXTENDED RELEASE ORAL
COMMUNITY
End: 2020-12-03

## 2019-09-17 NOTE — PERIOP NOTES
Patient verified name and . Order for consent  found in EHR and matches case posting; patient verifies procedure. Type 1B surgery, phone assessment complete. Orders  received. Labs per surgeon: none. Labs per anesthesia protocol: poc potassium and poc glucose:   pt reports he cannot come prior to DOS to check potassium-states its always within normal limits-check DOS. Patient answered medical/surgical history questions at their best of ability. All prior to admission medications documented in [facility]. Patient instructed to take the following medications the day of surgery according to anesthesia guidelines with a small sip of water: amlodipine, aspirin 81 mg, celexa, nexium, . Hold all vitamins 7 days prior to surgery and NSAIDS 5 days prior to surgery. Prescription meds to hold:none. Patient instructed on the following:  Arrive at A Entrance, time of arrival to be called the day before by 1700  NPO after midnight including gum, mints, and ice chips  Responsible adult must drive patient to the hospital, stay during surgery, and patient will need supervision 24 hours after anesthesia  Use dial soap in shower the night before surgery and on the morning of surgery  All piercings must be removed prior to arrival.    Leave all valuables (money and jewelry) at home but bring insurance card and ID on       DOS. Do not wear make-up, nail polish, lotions, cologne, perfumes, powders, or oil on skin. Patient teach back successful and patient demonstrates knowledge of instruction.

## 2019-09-23 ENCOUNTER — ANESTHESIA EVENT (OUTPATIENT)
Dept: SURGERY | Age: 64
End: 2019-09-23
Payer: COMMERCIAL

## 2019-09-23 RX ORDER — NALOXONE HYDROCHLORIDE 0.4 MG/ML
0.1 INJECTION, SOLUTION INTRAMUSCULAR; INTRAVENOUS; SUBCUTANEOUS AS NEEDED
Status: CANCELLED | OUTPATIENT
Start: 2019-09-23

## 2019-09-23 RX ORDER — HYDROMORPHONE HYDROCHLORIDE 2 MG/ML
0.5 INJECTION, SOLUTION INTRAMUSCULAR; INTRAVENOUS; SUBCUTANEOUS
Status: CANCELLED | OUTPATIENT
Start: 2019-09-23

## 2019-09-23 RX ORDER — FLUMAZENIL 0.1 MG/ML
0.2 INJECTION INTRAVENOUS
Status: CANCELLED | OUTPATIENT
Start: 2019-09-23

## 2019-09-23 RX ORDER — DIPHENHYDRAMINE HYDROCHLORIDE 50 MG/ML
12.5 INJECTION, SOLUTION INTRAMUSCULAR; INTRAVENOUS
Status: CANCELLED | OUTPATIENT
Start: 2019-09-23

## 2019-09-23 RX ORDER — OXYCODONE HYDROCHLORIDE 5 MG/1
5 TABLET ORAL
Status: CANCELLED | OUTPATIENT
Start: 2019-09-23 | End: 2019-09-24

## 2019-09-23 RX ORDER — SODIUM CHLORIDE, SODIUM LACTATE, POTASSIUM CHLORIDE, CALCIUM CHLORIDE 600; 310; 30; 20 MG/100ML; MG/100ML; MG/100ML; MG/100ML
75 INJECTION, SOLUTION INTRAVENOUS CONTINUOUS
Status: CANCELLED | OUTPATIENT
Start: 2019-09-23

## 2019-09-24 ENCOUNTER — HOSPITAL ENCOUNTER (OUTPATIENT)
Age: 64
Setting detail: OUTPATIENT SURGERY
Discharge: HOME | End: 2019-09-24
Attending: OTOLARYNGOLOGY | Admitting: OTOLARYNGOLOGY
Payer: COMMERCIAL

## 2019-09-24 ENCOUNTER — ANESTHESIA (OUTPATIENT)
Dept: SURGERY | Age: 64
End: 2019-09-24
Payer: COMMERCIAL

## 2019-09-24 VITALS
HEART RATE: 97 BPM | RESPIRATION RATE: 16 BRPM | TEMPERATURE: 98 F | SYSTOLIC BLOOD PRESSURE: 146 MMHG | WEIGHT: 246 LBS | OXYGEN SATURATION: 92 % | BODY MASS INDEX: 35.22 KG/M2 | DIASTOLIC BLOOD PRESSURE: 64 MMHG | HEIGHT: 70 IN

## 2019-09-24 DIAGNOSIS — R13.10 DYSPHAGIA, UNSPECIFIED TYPE: ICD-10-CM

## 2019-09-24 DIAGNOSIS — C32.1 SQUAMOUS CELL CARCINOMA OF EPIGLOTTIS (HCC): ICD-10-CM

## 2019-09-24 LAB
GLUCOSE BLD STRIP.AUTO-MCNC: 187 MG/DL (ref 65–100)
POTASSIUM BLD-SCNC: 4 MMOL/L (ref 3.5–5.1)

## 2019-09-24 PROCEDURE — 74011250636 HC RX REV CODE- 250/636

## 2019-09-24 PROCEDURE — 76210000020 HC REC RM PH II FIRST 0.5 HR: Performed by: OTOLARYNGOLOGY

## 2019-09-24 PROCEDURE — 76010000138 HC OR TIME 0.5 TO 1 HR: Performed by: OTOLARYNGOLOGY

## 2019-09-24 PROCEDURE — 76060000032 HC ANESTHESIA 0.5 TO 1 HR: Performed by: OTOLARYNGOLOGY

## 2019-09-24 PROCEDURE — 77030008698 HC TU ET REINF MEDT -D: Performed by: ANESTHESIOLOGY

## 2019-09-24 PROCEDURE — 82962 GLUCOSE BLOOD TEST: CPT

## 2019-09-24 PROCEDURE — 77030018836 HC SOL IRR NACL ICUM -A: Performed by: OTOLARYNGOLOGY

## 2019-09-24 PROCEDURE — 74011000250 HC RX REV CODE- 250: Performed by: ANESTHESIOLOGY

## 2019-09-24 PROCEDURE — 74011250636 HC RX REV CODE- 250/636: Performed by: ANESTHESIOLOGY

## 2019-09-24 PROCEDURE — 76210000016 HC OR PH I REC 1 TO 1.5 HR: Performed by: OTOLARYNGOLOGY

## 2019-09-24 PROCEDURE — 84132 ASSAY OF SERUM POTASSIUM: CPT

## 2019-09-24 PROCEDURE — 74011000250 HC RX REV CODE- 250

## 2019-09-24 PROCEDURE — 77030021678 HC GLIDESCP STAT DISP VERT -B: Performed by: ANESTHESIOLOGY

## 2019-09-24 PROCEDURE — 77030008477 HC STYL SATN SLP COVD -A: Performed by: ANESTHESIOLOGY

## 2019-09-24 RX ORDER — SUCCINYLCHOLINE CHLORIDE 20 MG/ML
INJECTION INTRAMUSCULAR; INTRAVENOUS AS NEEDED
Status: DISCONTINUED | OUTPATIENT
Start: 2019-09-24 | End: 2019-09-24 | Stop reason: HOSPADM

## 2019-09-24 RX ORDER — MIDAZOLAM HYDROCHLORIDE 1 MG/ML
2 INJECTION, SOLUTION INTRAMUSCULAR; INTRAVENOUS
Status: COMPLETED | OUTPATIENT
Start: 2019-09-24 | End: 2019-09-24

## 2019-09-24 RX ORDER — FENTANYL CITRATE 50 UG/ML
INJECTION, SOLUTION INTRAMUSCULAR; INTRAVENOUS AS NEEDED
Status: DISCONTINUED | OUTPATIENT
Start: 2019-09-24 | End: 2019-09-24 | Stop reason: HOSPADM

## 2019-09-24 RX ORDER — SODIUM CHLORIDE 9 MG/ML
INJECTION, SOLUTION INTRAVENOUS
Status: DISCONTINUED | OUTPATIENT
Start: 2019-09-24 | End: 2019-09-24 | Stop reason: HOSPADM

## 2019-09-24 RX ORDER — ACETAMINOPHEN 10 MG/ML
1000 INJECTION, SOLUTION INTRAVENOUS ONCE
Status: COMPLETED | OUTPATIENT
Start: 2019-09-24 | End: 2019-09-24

## 2019-09-24 RX ORDER — SODIUM CHLORIDE, SODIUM LACTATE, POTASSIUM CHLORIDE, CALCIUM CHLORIDE 600; 310; 30; 20 MG/100ML; MG/100ML; MG/100ML; MG/100ML
100 INJECTION, SOLUTION INTRAVENOUS CONTINUOUS
Status: DISCONTINUED | OUTPATIENT
Start: 2019-09-24 | End: 2019-09-24 | Stop reason: HOSPADM

## 2019-09-24 RX ORDER — LIDOCAINE HYDROCHLORIDE 20 MG/ML
INJECTION, SOLUTION EPIDURAL; INFILTRATION; INTRACAUDAL; PERINEURAL AS NEEDED
Status: DISCONTINUED | OUTPATIENT
Start: 2019-09-24 | End: 2019-09-24 | Stop reason: HOSPADM

## 2019-09-24 RX ORDER — LIDOCAINE HYDROCHLORIDE 10 MG/ML
0.1 INJECTION INFILTRATION; PERINEURAL AS NEEDED
Status: DISCONTINUED | OUTPATIENT
Start: 2019-09-24 | End: 2019-09-24 | Stop reason: HOSPADM

## 2019-09-24 RX ORDER — DEXAMETHASONE SODIUM PHOSPHATE 4 MG/ML
INJECTION, SOLUTION INTRA-ARTICULAR; INTRALESIONAL; INTRAMUSCULAR; INTRAVENOUS; SOFT TISSUE AS NEEDED
Status: DISCONTINUED | OUTPATIENT
Start: 2019-09-24 | End: 2019-09-24 | Stop reason: HOSPADM

## 2019-09-24 RX ORDER — EPHEDRINE SULFATE 50 MG/ML
INJECTION, SOLUTION INTRAVENOUS AS NEEDED
Status: DISCONTINUED | OUTPATIENT
Start: 2019-09-24 | End: 2019-09-24 | Stop reason: HOSPADM

## 2019-09-24 RX ORDER — KETOROLAC TROMETHAMINE 30 MG/ML
30 INJECTION, SOLUTION INTRAMUSCULAR; INTRAVENOUS
Status: COMPLETED | OUTPATIENT
Start: 2019-09-24 | End: 2019-09-24

## 2019-09-24 RX ORDER — PROPOFOL 10 MG/ML
INJECTION, EMULSION INTRAVENOUS AS NEEDED
Status: DISCONTINUED | OUTPATIENT
Start: 2019-09-24 | End: 2019-09-24 | Stop reason: HOSPADM

## 2019-09-24 RX ADMIN — LIDOCAINE HYDROCHLORIDE 0.1 ML: 10 INJECTION, SOLUTION INFILTRATION; PERINEURAL at 09:22

## 2019-09-24 RX ADMIN — FENTANYL CITRATE 50 MCG: 50 INJECTION, SOLUTION INTRAMUSCULAR; INTRAVENOUS at 13:04

## 2019-09-24 RX ADMIN — SODIUM CHLORIDE: 9 INJECTION, SOLUTION INTRAVENOUS at 13:26

## 2019-09-24 RX ADMIN — ACETAMINOPHEN 1000 MG: 10 INJECTION, SOLUTION INTRAVENOUS at 14:38

## 2019-09-24 RX ADMIN — DEXAMETHASONE SODIUM PHOSPHATE 4 MG: 4 INJECTION, SOLUTION INTRA-ARTICULAR; INTRALESIONAL; INTRAMUSCULAR; INTRAVENOUS; SOFT TISSUE at 13:17

## 2019-09-24 RX ADMIN — SODIUM CHLORIDE, SODIUM LACTATE, POTASSIUM CHLORIDE, AND CALCIUM CHLORIDE 100 ML/HR: 600; 310; 30; 20 INJECTION, SOLUTION INTRAVENOUS at 11:46

## 2019-09-24 RX ADMIN — SODIUM CHLORIDE, SODIUM LACTATE, POTASSIUM CHLORIDE, AND CALCIUM CHLORIDE 100 ML/HR: 600; 310; 30; 20 INJECTION, SOLUTION INTRAVENOUS at 09:22

## 2019-09-24 RX ADMIN — RACEPINEPHRINE HYDROCHLORIDE 0.5 ML: 11.25 SOLUTION RESPIRATORY (INHALATION) at 14:01

## 2019-09-24 RX ADMIN — EPHEDRINE SULFATE 5 MG: 50 INJECTION, SOLUTION INTRAVENOUS at 13:24

## 2019-09-24 RX ADMIN — SODIUM CHLORIDE, SODIUM LACTATE, POTASSIUM CHLORIDE, AND CALCIUM CHLORIDE: 600; 310; 30; 20 INJECTION, SOLUTION INTRAVENOUS at 12:39

## 2019-09-24 RX ADMIN — MIDAZOLAM 2 MG: 1 INJECTION INTRAMUSCULAR; INTRAVENOUS at 12:54

## 2019-09-24 RX ADMIN — PROPOFOL 200 MG: 10 INJECTION, EMULSION INTRAVENOUS at 13:04

## 2019-09-24 RX ADMIN — EPHEDRINE SULFATE 5 MG: 50 INJECTION, SOLUTION INTRAVENOUS at 13:28

## 2019-09-24 RX ADMIN — KETOROLAC TROMETHAMINE 30 MG: 30 INJECTION, SOLUTION INTRAMUSCULAR at 14:41

## 2019-09-24 RX ADMIN — SUCCINYLCHOLINE CHLORIDE 140 MG: 20 INJECTION INTRAMUSCULAR; INTRAVENOUS at 13:04

## 2019-09-24 RX ADMIN — LIDOCAINE HYDROCHLORIDE 100 MG: 20 INJECTION, SOLUTION EPIDURAL; INFILTRATION; INTRACAUDAL; PERINEURAL at 13:04

## 2019-09-24 NOTE — ANESTHESIA POSTPROCEDURE EVALUATION
Procedure(s):  DIRECT LARYNGOSCOPY WITH CO2 LASER AND BRONCHOSCOPY.     general    Anesthesia Post Evaluation      Multimodal analgesia: multimodal analgesia used between 6 hours prior to anesthesia start to PACU discharge  Patient location during evaluation: PACU  Patient participation: complete - patient participated  Level of consciousness: awake and alert  Pain management: adequate  Airway patency: patent  Anesthetic complications: no  Cardiovascular status: acceptable  Respiratory status: acceptable  Hydration status: acceptable  Post anesthesia nausea and vomiting:  none      Vitals Value Taken Time   /64 9/24/2019  2:54 PM   Temp 36.7 °C (98 °F) 9/24/2019  1:54 PM   Pulse 97 9/24/2019  2:54 PM   Resp 16 9/24/2019  2:54 PM   SpO2 92 % 9/24/2019  2:54 PM

## 2019-09-24 NOTE — DISCHARGE INSTRUCTIONS
Patient Education        Laryngoscopy: What to Expect at Home  Your Recovery  Direct rigid laryngoscopy (say \"eqvp-md-RED-kuh-francisco\") is a procedure that lets your doctor look at your throat and voice box (larynx). The doctor used a tube, called a scope, to look deep into your throat. The doctor may have used the procedure to take a tissue sample (biopsy), remove growths from the vocal cords, or do other kinds of surgery or laser treatment in the throat. Or the procedure may have been done to remove an object that is stuck in your throat. After the procedure, your throat may feel sore or slightly swollen for 2 to 5 days. You may sound hoarse for 1 to 8 weeks, depending on what was done during the procedure. Your doctor may ask you to speak as little as possible for 1 to 2 weeks after the procedure. If you speak, use your normal tone of voice and do not talk for very long. Whispering or shouting can strain your vocal cords as they are trying to heal. Try to avoid coughing or clearing your throat while your throat heals. These activities can also damage your vocal cords. If the doctor took a sample of tissue for study, it is normal to spit up a small amount of blood after the procedure. Talk to your doctor about how much bleeding to expect and how long the bleeding may last.  If the doctor took a biopsy, the doctor or nurse will call you with the test results. It may take 2 to 5 days to get the results. This care sheet gives you a general idea about how long it will take for you to recover. But each person recovers at a different pace. Follow the steps below to feel better as quickly as possible. How can you care for yourself at home? Activity    · Rest when you feel tired.  Getting enough sleep will help you recover.     · Avoid strenuous activities, such as bicycle riding, jogging, weight lifting, or aerobic exercise, for at least 1 week or until your doctor says it is okay.     · Ask your doctor when you can drive again.     · If your job requires you to use your voice, you may need to take 1 to 2 weeks off from work. Diet    · Drink plenty of fluids to avoid becoming dehydrated.     · If your throat is swollen or sore, drink clear fluids such as water, apple juice, and flavored ice pops. Avoid hot drinks, soda pop, and citrus juices, such as orange juice. These may cause more swelling and pain.     · Start out with cool, clear liquids; flavored ice pops; and ice cream. Next, try soft foods like pudding, yogurt, canned or cooked fruit, scrambled eggs, and mashed potatoes. Do not eat hard or scratchy foods such as chips or raw vegetables until your throat has healed. Medicines    · Your doctor will tell you if and when you can restart your medicines. He or she will also give you instructions about taking any new medicines.     · If you take blood thinners, such as warfarin (Coumadin), clopidogrel (Plavix), or aspirin, be sure to talk to your doctor. He or she will tell you if and when to start taking those medicines again. Make sure that you understand exactly what your doctor wants you to do.     · Be safe with medicines. Take pain medicines exactly as directed. ? If the doctor gave you a prescription medicine for pain, take it as prescribed. ? If you are not taking a prescription pain medicine, ask your doctor if you can take an over-the-counter medicine.     · If you think your pain medicine is making you sick to your stomach:  ? Take your medicine after meals (unless your doctor has told you not to). ? Ask your doctor for a different pain medicine.     · If your doctor prescribed antibiotics, take them as directed. Do not stop taking them just because you feel better.  You need to take the full course of antibiotics.    Throat care    · Suck on throat lozenges or gargle with warm salt water to help your sore throat.     · For several weeks, or until your doctor says it is okay, try to avoid coughing or clearing your throat. If you feel like you need to clear your throat, try taking a few sips of water. Follow-up care is a key part of your treatment and safety. Be sure to make and go to all appointments, and call your doctor if you are having problems. It's also a good idea to know your test results and keep a list of the medicines you take. When should you call for help? Call 911 anytime you think you may need emergency care. For example, call if:    · You passed out (lost consciousness).     · You have severe trouble breathing.     · You have sudden chest pain and shortness of breath, or you cough up blood.    Call your doctor now or seek immediate medical care if:    · You cough up a lot of blood or have bleeding that lasts for 24 hours.     · You have trouble breathing.     · You have symptoms of a blood clot in your leg (called a deep vein thrombosis), such as:  ? Pain in the calf, back of the knee, thigh, or groin. ? Redness and swelling in your leg or groin.     · You feel like you cannot swallow.     · You have pain that does not get better after you take pain medicine.     · You have a fever.    Watch closely for any changes in your health, and be sure to contact your doctor if:    · You do not get better as expected. Where can you learn more? Go to http://maria c-jeff.info/. Enter [id number] in the search box to learn more about \"Direct Rigid Laryngoscopy: What to Expect at Home. \"  Current as of: October 21, 2018  Content Version: 12.2  © 6364-6776 [employer]. Care instructions adapted under license by [employer] (which disclaims liability or warranty for this information). If you have questions about a medical condition or this instruction, always ask your healthcare professional. [address] any warranty or liability for your use of this information.

## 2019-09-25 NOTE — OP NOTES
[address] [address]  OPERATIVE REPORT    Name:  Delmi Cherry  MR#:  [id number]  :  1955  ACCOUNT #:  [de-identified]  DATE OF SERVICE:  2019    PREOPERATIVE DIAGNOSES:  1.  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 2.  Stridor. POSTOPERATIVE DIAGNOSES:  1.  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 2.  Stridor. PROCEDURE PERFORMED:  1. Direct laryngoscopy with CO2 laser and widening of the hypopharyngeal airway. 2.  Bronchoscopy. SURGEON:  Dwight Nogueira DO    ASSISTANT:  None. ANESTHESIA:  General.    COMPLICATIONS:  None. SPECIMENS REMOVED:  None. IMPLANTS:  None. ESTIMATED BLOOD LOSS:  Less than 5 mL. HISTORY:  This is a 29-year-old male. He is well known to me. He has been seeing me for several years. He has been diagnosed with an epiglottic squamous cell carcinoma. He had chemotherapy and radiation. He had a lot of supraglottic/epiglottic edema. This was even prior to any physical treatments, but after his chemotherapy and radiation, it did not really improve. So, he has been going through periodic scopings with CO2 laser to debulk the soft tissue. Repeat biopsies in the past have all been benign except for one, which was felt to most likely be abnormal due to a recent lasering. He has noticed some increased difficulty with his breathing. He does feel it is just a little bit more labored and louder. His wife has noticed the same thing. He has developed some submental swelling recently. I do think that this is a lymphedema issue. I did recommend that he undergo his normal panendoscopy with possible CO2 laser procedures, and especially since even though he already had this scheduled, I did move it up a little bit because of his recent increase in noisy breathing. So, the procedure, risks, and benefits were discussed with him and his wife in the office.   All questions were answered and they were agreeable to the surgery. SURGERY DETAILS:  The patient was identified in the preoperative waiting area and taken back to the operating room where he underwent general anesthesia. The bed was turned 90 degrees. He was prepped and draped. I did take wet towels completely surrounding the patient's head, face, completely covering the surgical area. A tooth guard was placed over the upper teeth. I was able to take a laryngoscope. I was able to insert this. There did appear to be some soft tissue edema in the hypopharynx, it was mainly centered again around the epiglottis and the tissues in the supraglottic region just above the level of the false vocal cords. This did completely obscure the vocal cord themselves and I was not able to visualize them at all. So, I did place the patient in suspension just at the level of the epiglottis. I was able to take CO2 laser. Again, the patient was placed in suspension. Prior to using the laser, I did use a 0-degree scope. I was able to place this through the true vocal cords. Pictures were taken of the supraglottis, larynx, and subglottis. 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 This appears to be unchanged from the last couple of procedures. Again, the majority of the obstruction appears to be in the supraglottic region. So, the 0-degree scope was removed again with the laryngoscope at the level of the supraglottis and epiglottis, then took the 0-degree scope in one hand and took the laser in the other, so I used a bendable CO2 laser. I was able to do this at a 12 watt super pulse, starting more superiorly and then working my way deep, I was able to remove all of that full edematous tissue in the supraglottis. I took this down to the level of the false vocal cords and that is where I stopped. I did not open up the airway anymore inferior than that.   I did this on both the left and right side, and even took a picture of left to the right that was done prior to doing the left and then after it was completely finished. Once all laser was done and pictures were done, I did another evaluation. Again, he appeared to be open with no sign of any airway compromise. So, the patient was then awakened and he was taken to the postop recovery room in stable condition.       Ramin Robles DO      [id number]  D:  09/24/2019 13:53  T:  09/25/2019 1:41  JOB #:  [id number]

## 2019-11-07 ENCOUNTER — APPOINTMENT (OUTPATIENT)
Dept: RADIATION ONCOLOGY | Age: 64
End: 2019-11-07

## 2020-02-13 VITALS — HEIGHT: 70 IN | BODY MASS INDEX: 34.36 KG/M2 | WEIGHT: 240 LBS

## 2020-02-13 NOTE — PERIOP NOTES
Patient verified name and . Order for consent not found in EHR. Type 1b surgery, PAT phone assessment complete. Orders not received. Labs per surgeon: none  Labs per anesthesia protocol: Potassium- patient can't come to hospital to have blood drawn prior to surgery. K+ to be drawn DOS. Patient answered medical/surgical history questions at their best of ability. All prior to admission medications documented in [facility]. Patient instructed to take the following medications the day of surgery according to anesthesia guidelines with a small sip of water: Amlodipine, ASA 81mg and Nexium . Hold all vitamins 7 days prior to surgery and NSAIDS 5 days prior to surgery. Prescription meds to hold:none    Patient instructed on the following:  Arrive at A Entrance, time of arrival to be called the day before by 1700  NPO after midnight including gum, mints, and ice chips  Responsible adult must drive patient to the hospital, stay during surgery, and patient will need supervision 24 hours after anesthesia  Use antibacterial soap in shower the night before surgery and on the morning of surgery  All piercings must be removed prior to arrival.    Leave all valuables (money and jewelry) at home but bring insurance card and ID on       DOS. Do not wear make-up, nail polish, lotions, cologne, perfumes, powders, or oil on skin. Patient teach back successful and patient demonstrates knowledge of instruction.

## 2020-02-17 ENCOUNTER — HOSPITAL ENCOUNTER (OUTPATIENT)
Dept: SURGERY | Age: 65
Discharge: HOME | End: 2020-02-17

## 2020-02-20 DIAGNOSIS — R49.0 HOARSENESS: Primary | ICD-10-CM

## 2020-02-20 DIAGNOSIS — C32.1 SQUAMOUS CELL CANCER OF EPIGLOTTIS (HCC): ICD-10-CM

## 2020-02-20 DIAGNOSIS — R13.10 DYSPHAGIA, UNSPECIFIED TYPE: ICD-10-CM

## 2020-02-21 ENCOUNTER — ANESTHESIA EVENT (OUTPATIENT)
Dept: SURGERY | Age: 65
End: 2020-02-21
Payer: COMMERCIAL

## 2020-02-24 ENCOUNTER — ANESTHESIA (OUTPATIENT)
Dept: SURGERY | Age: 65
End: 2020-02-24
Payer: COMMERCIAL

## 2020-02-24 ENCOUNTER — HOSPITAL ENCOUNTER (OUTPATIENT)
Age: 65
Setting detail: OUTPATIENT SURGERY
Discharge: HOME | End: 2020-02-24
Attending: OTOLARYNGOLOGY | Admitting: OTOLARYNGOLOGY
Payer: COMMERCIAL

## 2020-02-24 VITALS
DIASTOLIC BLOOD PRESSURE: 69 MMHG | HEART RATE: 99 BPM | OXYGEN SATURATION: 92 % | TEMPERATURE: 97.2 F | WEIGHT: 240 LBS | RESPIRATION RATE: 18 BRPM | HEIGHT: 70 IN | BODY MASS INDEX: 34.36 KG/M2 | SYSTOLIC BLOOD PRESSURE: 118 MMHG

## 2020-02-24 DIAGNOSIS — C32.1 SQUAMOUS CELL CANCER OF EPIGLOTTIS (HCC): ICD-10-CM

## 2020-02-24 DIAGNOSIS — R13.10 DYSPHAGIA, UNSPECIFIED TYPE: ICD-10-CM

## 2020-02-24 DIAGNOSIS — R49.0 HOARSENESS: ICD-10-CM

## 2020-02-24 LAB
GLUCOSE BLD STRIP.AUTO-MCNC: 114 MG/DL (ref 65–100)
GLUCOSE BLD STRIP.AUTO-MCNC: 128 MG/DL (ref 65–100)

## 2020-02-24 PROCEDURE — 76210000006 HC OR PH I REC 0.5 TO 1 HR: Performed by: OTOLARYNGOLOGY

## 2020-02-24 PROCEDURE — 74011250636 HC RX REV CODE- 250/636: Performed by: ANESTHESIOLOGY

## 2020-02-24 PROCEDURE — 74011000250 HC RX REV CODE- 250: Performed by: NURSE ANESTHETIST, CERTIFIED REGISTERED

## 2020-02-24 PROCEDURE — 77030038019: Performed by: OTOLARYNGOLOGY

## 2020-02-24 PROCEDURE — 77030013520 HC DEV INFL BLN ACCL -B: Performed by: OTOLARYNGOLOGY

## 2020-02-24 PROCEDURE — 77030026438 HC STYL ET INTUB CARD -A: Performed by: ANESTHESIOLOGY

## 2020-02-24 PROCEDURE — 77030036727 HC DEV INFL BLN SNUS SE DISP ACCL -B: Performed by: OTOLARYNGOLOGY

## 2020-02-24 PROCEDURE — 76210000020 HC REC RM PH II FIRST 0.5 HR: Performed by: OTOLARYNGOLOGY

## 2020-02-24 PROCEDURE — 74011250637 HC RX REV CODE- 250/637: Performed by: ANESTHESIOLOGY

## 2020-02-24 PROCEDURE — 77030037088 HC TUBE ENDOTRACH ORAL NSL COVD-A: Performed by: ANESTHESIOLOGY

## 2020-02-24 PROCEDURE — 77030039425 HC BLD LARYNG TRULITE DISP TELE -A: Performed by: ANESTHESIOLOGY

## 2020-02-24 PROCEDURE — 77030021678 HC GLIDESCP STAT DISP VERT -B: Performed by: ANESTHESIOLOGY

## 2020-02-24 PROCEDURE — 77030018390 HC SPNG HEMSTAT2 J&J -B: Performed by: OTOLARYNGOLOGY

## 2020-02-24 PROCEDURE — 77030008698 HC TU ET REINF MEDT -D: Performed by: ANESTHESIOLOGY

## 2020-02-24 PROCEDURE — 77030018836 HC SOL IRR NACL ICUM -A: Performed by: OTOLARYNGOLOGY

## 2020-02-24 PROCEDURE — 77030008477 HC STYL SATN SLP COVD -A: Performed by: ANESTHESIOLOGY

## 2020-02-24 PROCEDURE — C1726 CATH, BAL DIL, NON-VASCULAR: HCPCS | Performed by: OTOLARYNGOLOGY

## 2020-02-24 PROCEDURE — 76060000034 HC ANESTHESIA 1.5 TO 2 HR: Performed by: OTOLARYNGOLOGY

## 2020-02-24 PROCEDURE — 76010000153 HC OR TIME 1.5 TO 2 HR: Performed by: OTOLARYNGOLOGY

## 2020-02-24 PROCEDURE — 88305 TISSUE EXAM BY PATHOLOGIST: CPT

## 2020-02-24 PROCEDURE — 74011250636 HC RX REV CODE- 250/636: Performed by: NURSE ANESTHETIST, CERTIFIED REGISTERED

## 2020-02-24 PROCEDURE — 82962 GLUCOSE BLOOD TEST: CPT

## 2020-02-24 RX ORDER — DEXAMETHASONE SODIUM PHOSPHATE 4 MG/ML
INJECTION, SOLUTION INTRA-ARTICULAR; INTRALESIONAL; INTRAMUSCULAR; INTRAVENOUS; SOFT TISSUE AS NEEDED
Status: DISCONTINUED | OUTPATIENT
Start: 2020-02-24 | End: 2020-02-24 | Stop reason: HOSPADM

## 2020-02-24 RX ORDER — LIDOCAINE HYDROCHLORIDE 20 MG/ML
INJECTION, SOLUTION EPIDURAL; INFILTRATION; INTRACAUDAL; PERINEURAL AS NEEDED
Status: DISCONTINUED | OUTPATIENT
Start: 2020-02-24 | End: 2020-02-24 | Stop reason: HOSPADM

## 2020-02-24 RX ORDER — NEOSTIGMINE METHYLSULFATE 1 MG/ML
INJECTION, SOLUTION INTRAVENOUS AS NEEDED
Status: DISCONTINUED | OUTPATIENT
Start: 2020-02-24 | End: 2020-02-24 | Stop reason: HOSPADM

## 2020-02-24 RX ORDER — SODIUM CHLORIDE, SODIUM LACTATE, POTASSIUM CHLORIDE, CALCIUM CHLORIDE 600; 310; 30; 20 MG/100ML; MG/100ML; MG/100ML; MG/100ML
75 INJECTION, SOLUTION INTRAVENOUS CONTINUOUS
Status: DISCONTINUED | OUTPATIENT
Start: 2020-02-24 | End: 2020-02-24 | Stop reason: HOSPADM

## 2020-02-24 RX ORDER — HYDROCODONE BITARTRATE AND ACETAMINOPHEN 5; 325 MG/1; MG/1
2 TABLET ORAL AS NEEDED
Status: DISCONTINUED | OUTPATIENT
Start: 2020-02-24 | End: 2020-02-24 | Stop reason: HOSPADM

## 2020-02-24 RX ORDER — SUCCINYLCHOLINE CHLORIDE 20 MG/ML
INJECTION INTRAMUSCULAR; INTRAVENOUS AS NEEDED
Status: DISCONTINUED | OUTPATIENT
Start: 2020-02-24 | End: 2020-02-24 | Stop reason: HOSPADM

## 2020-02-24 RX ORDER — LIDOCAINE HYDROCHLORIDE 10 MG/ML
0.1 INJECTION INFILTRATION; PERINEURAL AS NEEDED
Status: DISCONTINUED | OUTPATIENT
Start: 2020-02-24 | End: 2020-02-24 | Stop reason: HOSPADM

## 2020-02-24 RX ORDER — EPHEDRINE SULFATE/0.9% NACL/PF 50 MG/5 ML
SYRINGE (ML) INTRAVENOUS AS NEEDED
Status: DISCONTINUED | OUTPATIENT
Start: 2020-02-24 | End: 2020-02-24 | Stop reason: HOSPADM

## 2020-02-24 RX ORDER — ROCURONIUM BROMIDE 10 MG/ML
INJECTION, SOLUTION INTRAVENOUS AS NEEDED
Status: DISCONTINUED | OUTPATIENT
Start: 2020-02-24 | End: 2020-02-24 | Stop reason: HOSPADM

## 2020-02-24 RX ORDER — MIDAZOLAM HYDROCHLORIDE 1 MG/ML
2 INJECTION, SOLUTION INTRAMUSCULAR; INTRAVENOUS
Status: DISCONTINUED | OUTPATIENT
Start: 2020-02-24 | End: 2020-02-24 | Stop reason: HOSPADM

## 2020-02-24 RX ORDER — HYDROMORPHONE HYDROCHLORIDE 2 MG/ML
0.5 INJECTION, SOLUTION INTRAMUSCULAR; INTRAVENOUS; SUBCUTANEOUS
Status: DISCONTINUED | OUTPATIENT
Start: 2020-02-24 | End: 2020-02-24 | Stop reason: HOSPADM

## 2020-02-24 RX ORDER — GLYCOPYRROLATE 0.2 MG/ML
INJECTION INTRAMUSCULAR; INTRAVENOUS AS NEEDED
Status: DISCONTINUED | OUTPATIENT
Start: 2020-02-24 | End: 2020-02-24 | Stop reason: HOSPADM

## 2020-02-24 RX ORDER — FENTANYL CITRATE 50 UG/ML
INJECTION, SOLUTION INTRAMUSCULAR; INTRAVENOUS AS NEEDED
Status: DISCONTINUED | OUTPATIENT
Start: 2020-02-24 | End: 2020-02-24 | Stop reason: HOSPADM

## 2020-02-24 RX ORDER — PROPOFOL 10 MG/ML
INJECTION, EMULSION INTRAVENOUS AS NEEDED
Status: DISCONTINUED | OUTPATIENT
Start: 2020-02-24 | End: 2020-02-24 | Stop reason: HOSPADM

## 2020-02-24 RX ORDER — OXYCODONE HYDROCHLORIDE 5 MG/1
5 TABLET ORAL
Status: DISCONTINUED | OUTPATIENT
Start: 2020-02-24 | End: 2020-02-24 | Stop reason: HOSPADM

## 2020-02-24 RX ORDER — FENTANYL CITRATE 50 UG/ML
100 INJECTION, SOLUTION INTRAMUSCULAR; INTRAVENOUS ONCE
Status: DISCONTINUED | OUTPATIENT
Start: 2020-02-24 | End: 2020-02-24 | Stop reason: HOSPADM

## 2020-02-24 RX ADMIN — SUCCINYLCHOLINE CHLORIDE 160 MG: 20 INJECTION, SOLUTION INTRAMUSCULAR; INTRAVENOUS at 14:56

## 2020-02-24 RX ADMIN — Medication 10 MG: at 15:24

## 2020-02-24 RX ADMIN — GLYCOPYRROLATE 0.2 MG: 0.2 INJECTION, SOLUTION INTRAMUSCULAR; INTRAVENOUS at 16:16

## 2020-02-24 RX ADMIN — ROCURONIUM BROMIDE 20 MG: 10 INJECTION, SOLUTION INTRAVENOUS at 15:29

## 2020-02-24 RX ADMIN — PHENYLEPHRINE HYDROCHLORIDE 50 MCG: 10 INJECTION INTRAVENOUS at 15:40

## 2020-02-24 RX ADMIN — Medication 1 MG: at 16:14

## 2020-02-24 RX ADMIN — Medication 15 MG: at 15:00

## 2020-02-24 RX ADMIN — SODIUM CHLORIDE, SODIUM LACTATE, POTASSIUM CHLORIDE, AND CALCIUM CHLORIDE 75 ML/HR: 600; 310; 30; 20 INJECTION, SOLUTION INTRAVENOUS at 12:26

## 2020-02-24 RX ADMIN — PROPOFOL 180 MG: 10 INJECTION, EMULSION INTRAVENOUS at 14:55

## 2020-02-24 RX ADMIN — SODIUM CHLORIDE, SODIUM LACTATE, POTASSIUM CHLORIDE, AND CALCIUM CHLORIDE: 600; 310; 30; 20 INJECTION, SOLUTION INTRAVENOUS at 14:46

## 2020-02-24 RX ADMIN — FENTANYL CITRATE 100 MCG: 50 INJECTION INTRAMUSCULAR; INTRAVENOUS at 14:55

## 2020-02-24 RX ADMIN — LIDOCAINE HYDROCHLORIDE 80 MG: 20 INJECTION, SOLUTION EPIDURAL; INFILTRATION; INTRACAUDAL; PERINEURAL at 14:55

## 2020-02-24 RX ADMIN — GLYCOPYRROLATE 0.2 MG: 0.2 INJECTION, SOLUTION INTRAMUSCULAR; INTRAVENOUS at 15:22

## 2020-02-24 RX ADMIN — PHENYLEPHRINE HYDROCHLORIDE 50 MCG: 10 INJECTION INTRAVENOUS at 15:38

## 2020-02-24 RX ADMIN — Medication 1 MG: at 16:16

## 2020-02-24 RX ADMIN — HYDROMORPHONE HYDROCHLORIDE 0.5 MG: 2 INJECTION INTRAMUSCULAR; INTRAVENOUS; SUBCUTANEOUS at 16:40

## 2020-02-24 RX ADMIN — Medication 10 MG: at 15:15

## 2020-02-24 RX ADMIN — SODIUM CHLORIDE, SODIUM LACTATE, POTASSIUM CHLORIDE, AND CALCIUM CHLORIDE: 600; 310; 30; 20 INJECTION, SOLUTION INTRAVENOUS at 15:02

## 2020-02-24 RX ADMIN — ROCURONIUM BROMIDE 5 MG: 10 INJECTION, SOLUTION INTRAVENOUS at 14:55

## 2020-02-24 RX ADMIN — PHENYLEPHRINE HYDROCHLORIDE 50 MCG: 10 INJECTION INTRAVENOUS at 15:46

## 2020-02-24 RX ADMIN — DEXAMETHASONE SODIUM PHOSPHATE 8 MG: 4 INJECTION, SOLUTION INTRAMUSCULAR; INTRAVENOUS at 15:02

## 2020-02-24 RX ADMIN — Medication 12.5 MG: at 15:35

## 2020-02-24 RX ADMIN — HYDROCODONE BITARTRATE AND ACETAMINOPHEN 2 TABLET: 5; 325 TABLET ORAL at 17:00

## 2020-02-24 RX ADMIN — GLYCOPYRROLATE 0.2 MG: 0.2 INJECTION, SOLUTION INTRAMUSCULAR; INTRAVENOUS at 16:14

## 2020-02-24 RX ADMIN — PROPOFOL 20 MG: 10 INJECTION, EMULSION INTRAVENOUS at 15:04

## 2020-02-24 NOTE — ANESTHESIA PREPROCEDURE EVALUATION
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     Swelling to anterior neck - tolerates supine position well Other Findings   Comments: Gout          Physical Exam    Airway  Mallampati: III  TM Distance: > 6 cm  Neck ROM: decreased range of motion   Mouth opening: Normal    Comments: Front of neck is slightly swollen. Dr. Vanessa Joshi made aware and evaluated at bedside - thought to be lymphedema as it improved with pressure and thought to be appropriate to continue with procedure  Cardiovascular    Rhythm: regular  Rate: normal         Dental    Dentition: Edentulous  Comments: Remaining teeth extracted 2 wks ago   Pulmonary      Decreased breath sounds: bilateral           Abdominal  GI exam deferred       Other Findings            Anesthetic Plan    ASA: 3  Anesthesia type: general  ETT  Glidescope         Induction: Intravenous  Anesthetic plan and risks discussed with: Patient and Spouse      Prior hx of successful videolaryngoscopic intubations. Plan same today.

## 2020-02-24 NOTE — ANESTHESIA POSTPROCEDURE EVALUATION
Procedure(s):  BRONCHOSCOPY LARYNGOSCOPY ESOPHAGOSCOPY WITH POSS BIOPSY AND CO2 LASER  TRACHEAL BALLOON DILATION AND CO2 LASER.     general    Anesthesia Post Evaluation      Multimodal analgesia: multimodal analgesia used between 6 hours prior to anesthesia start to PACU discharge  Patient location during evaluation: PACU  Patient participation: complete - patient participated  Level of consciousness: awake and alert  Pain management: adequate  Airway patency: patent  Anesthetic complications: no  Cardiovascular status: acceptable  Respiratory status: acceptable, spontaneous ventilation and nonlabored ventilation  Hydration status: acceptable  Post anesthesia nausea and vomiting:  none      Vitals Value Taken Time   /69 2/24/2020  5:00 PM   Temp 36.2 °C (97.2 °F) 2/24/2020  4:31 PM   Pulse 99 2/24/2020  5:00 PM   Resp 18 2/24/2020  5:00 PM   SpO2 92 % 2/24/2020  5:00 PM

## 2020-02-24 NOTE — DISCHARGE INSTRUCTIONS
RESPIRATORY CARE - BRONCHOSCOPY - DISCHARGE INSTRUCTIONS      You received a lot of numbing medication for your throat and nose, and you also received medication to make you sleepy during your procedure. Because of this and because the bronchoscopy may have irritated your airways, we ask that you follow these directions:    1. Do not eat or drink for 2 hours after your procedure . After that, you may have what you please. You may want to try some liquids first, because your throat may be a little sore. 2. Medication may cause drowsiness for several hours, therefore, do not drive or operate machinery for remainder of the day. No alcohol today. 3. You may cough up more mucus than usual and you may see some blood, but this is expected and should subside by the following day. 4. If severe throat irritation, coughing, or bleeding continue, call your doctor. 5.         If you run a fever greater than 102, call office      After general anesthesia or intravenous sedation, for 24 hours or while taking prescription Narcotics:  · Limit your activities  · A responsible adult needs to be with you for the next 24 hours  · Do not drive and operate hazardous machinery  · Do not make important personal or business decisions  · Do  not drink alcoholic beverages  · If you have not urinated within 8 hours after discharge, please contact your surgeon on call. · If you have sleep apnea and have a CPAP machine, please use it for all naps and sleeping. · Please use caution when taking narcotics and any of your home medications that may cause drowsiness. *  Please give a list of your current medications to your Primary Care Provider. *  Please update this list whenever your medications are discontinued, doses are      changed, or new medications (including over-the-counter products) are added. *  Please carry medication information at all times in case of emergency situations.     These are general instructions for a healthy lifestyle:  No smoking/ No tobacco products/ Avoid exposure to second hand smoke  Surgeon General's Warning:  Quitting smoking now greatly reduces serious risk to your health. Obesity, smoking, and sedentary lifestyle greatly increases your risk for illness  A healthy diet, regular physical exercise & weight monitoring are important for maintaining a healthy lifestyle    You may be retaining fluid if you have a history of heart failure or if you experience any of the following symptoms:  Weight gain of 3 pounds or more overnight or 5 pounds in a week, increased swelling in our hands or feet or shortness of breath while lying flat in bed. Please call your doctor as soon as you notice any of these symptoms; do not wait until your next office visit.

## 2020-02-25 NOTE — OP NOTES
[place]  OPERATIVE REPORT    Name:  Igor Humphries  MR#:  [id number]  :  1955  ACCOUNT #:  [de-identified]  DATE OF SERVICE:  2020    PREOPERATIVE DIAGNOSES:  Stridor, airway obstruction, history of epiglottic cancer, tracheal stenosis. POSTOPERATIVE DIAGNOSES:  Stridor, airway obstruction, history of epiglottic cancer, tracheal stenosis. PROCEDURES PERFORMED:  1. Direct laryngoscopy with biopsy and CO2 laser of supraglottic airway obstruction. 2.  Bronchoscopy with tracheal balloon dilation of tracheal stenosis. SURGEON:  Jacob Weathers DO    ASSISTANT:  None. ANESTHESIA:  General.    COMPLICATIONS:  None. SPECIMENS REMOVED:  Upper supraglottis, lower supraglottis. IMPLANTS:  none. ESTIMATED BLOOD LOSS:  5 mL. HISTORY:  This is a 18-year-old male, very well-known to me. He has been seeing me for a while, first met him with complaint of raspiness, hoarseness, dysphagia found to have squamous cell carcinoma of the epiglottis for which he underwent chemotherapy and radiation. He also had a trach placed at one point. He has been stable. He has had no sign of any recurrence, but he has developed a recurrent swelling in the supraglottic region which has narrowed his airway giving increased issues of swallowing and speaking. Most recently he had this done and although he usually got a lot of good improvement afterwards really noticed little to no improvement. So even though the supraglottic airway appeared to be widely patent this appeared to be an issue with a worsening tracheal stenosis. So over the last couple of months t5his has gotten a lot worse. He wished to proceed with some type of therapy to help the swallowing and breathing and voice so I did recommend he undergo a direct laryngoscopy with possible biopsy along with a bronchoscopy with a possible biopsy. The procedure risks and benefits were discussed with him in the office.   All questions were answered and he is agreeable to the surgery. SURGERY DETAILS:  The patient was identified in the preoperative waiting area. He was taken back to the operating room where he underwent general anesthesia. He was intubated with a 6.5 reinforced endotracheal tube. Bed was turned 90 degrees. Patient is edentulous. No tooth guard was used. Laryngoscope was used. Epiglottis had its standard post treatment shape and appearance, which was almost round, very thin showing an abnormal appearance but was pink and smooth. No sign of any ulceration, mass or lesion. Going below that immediately below that there was no obvious aryepiglottic folds are normal tissue in the supraglottis or the larynx until you get to the level of the vocal cords. There was a lot of redundant rounded tissue on both sides of the throat. You could not see the true vocal cords unless you went through four different layers of inflamed tissue. So I did use a laser on 12 watt super pulse and I was able to slowly remove some of the tissue that was blocking the larynx. There was one area in the anterior midline that was approximately 4 mm in size, it almost had a papillomatous type appearance. So prior to anything else being done that was biopsied and sent away. There is another area slightly superior to that which had a very similar appearance but was more firm despite multiple attempts at biopsy, I was not able to get a full biopsy. I was able to get just a small piece of mucosa off of it, but once that was done, I did again use a laser. Laser was used to debulk the whole supraglottic region giving an obvious pathway to the true vocal cords. Once the true vocal cords were visible then I did use a 0 degree scope, which was used during the supraglottic portion of the procedure and to go into the glottis. True vocal cords appeared to be edematous, but otherwise were white in appearance.   No erythema, no ulceration no obvious masses or lesions. So again looking in the subglottis region almost immediately proximally 1 cm below the glottis is the subglottis/tracheal separation. There was lateral tracheal stenosis. This was going approximately from 1 o'clock to 4 o'clock and then 8 o'clock to 11 o'clock going past that with the scope I was able to see another few centimeters down where approximately the previous tracheostomy tube was placed in the anterior portion of the trachea, and it was just below that point I was in an arching fashion were I was able to locate the majority of the tracheal stenosis. I was able to take a size 12 tracheal balloon dilation kit, and under visualization I was able to place the balloon into the trachea, starting at the level of the glottis going inferiorly into the trachea. The balloon was inflated to a pressure of 6, held for a total of 2 minutes, deflated the balloon was removed. Once the balloon was removed, a postdilation picture was taken. This did seem to totally reduce the subglottic swelling that was seen initially, but it retained a layer of scar tissue down near the original tracheostomy site. Again going from 1 to 4 o'clock and then 7 to 11 o'clock. I did then take the size 12 balloon, placed into the trachea again, placed directly over the areas of stenosis and inflated to a pressure of 12 held in place for 2 minutes, it was then deflated and removed. The tracheal narrowing did not improve at all with the second dilation. Under visualization, I did take a suction, placed this into the trachea and I was able to with pressure pretty  much push the previous scar tissue into a more lateral position giving a very normal appearing trachea, but despite doing that as soon as I removed the suction the indentation into the trachea immediately recurred. So at point it was hard to say if this is scar tissue or some type of collapse of the lateral tracheal wall.   There was no sign of any other trauma, mass or lesion. At that point, I aborted the rest of the procedure. So once that was done and complete the scope was removed. There is no sign of any trauma to the tissue other than the lasering that had been done previously. There was no sign of any bleeding with removal of the scope. There is no sign of any trauma to the lips or gums and then the patient was awakened and taken to the postop recovery room in a stable condition.         DO PHIL [id number]  D:  02/24/2020 22:24  T:  02/25/2020 9:06  JOB #:  [id number]

## 2020-03-04 PROBLEM — R80.9 DIABETES MELLITUS WITH MICROALBUMINURIA (HCC): Status: ACTIVE | Noted: 2017-01-24

## 2020-03-04 PROBLEM — C32.9 MALIGNANT NEOPLASM OF LARYNX (HCC): Status: ACTIVE | Noted: 2018-12-18

## 2020-03-04 PROBLEM — R68.82 DECREASED LIBIDO: Status: ACTIVE | Noted: 2018-04-27

## 2020-03-04 PROBLEM — E11.29 DIABETES MELLITUS WITH MICROALBUMINURIA (HCC): Status: ACTIVE | Noted: 2017-01-24

## 2020-03-10 NOTE — PROGRESS NOTES
END OF SHIFT NOTE:    Intake/Output      Voiding: YES  Catheter: NO  Drain:              Stool:  0 occurrences. Emesis:  0 occurrences. VITAL SIGNS  Patient Vitals for the past 12 hrs:   Temp Pulse Resp BP SpO2   01/16/17 0831 (P) 97.5 °F (36.4 °C) (P) 79 (P) 20 (P) 140/67 (P) 95 %   01/16/17 0417 97.6 °F (36.4 °C) 82 18 131/72 96 %   01/16/17 0010 97.9 °F (36.6 °C) 84 18 128/73 95 %       Pain Assessment  Pain 1  Pain Scale 1: Numeric (0 - 10) (01/16/17 0934)  Pain Intensity 1: 0 (01/16/17 0934)  Patient Stated Pain Goal: 0 (01/16/17 0417)  Pain Reassessment 1: Yes (01/16/17 0417)    Ambulating  YES    Additional Information: Pt very eager to go home - has been NPO for PEG p[lacement today    Shift report given to oncoming nurse at the bedside.     Julianna Sparks RN Include Location In Plan?: No Detail Level: Zone Detail Level: Simple Detail Level: Generalized

## 2020-03-11 ENCOUNTER — HOSPITAL ENCOUNTER (OUTPATIENT)
Dept: CT IMAGING | Age: 65
Discharge: HOME | End: 2020-03-11
Attending: OTOLARYNGOLOGY
Payer: COMMERCIAL

## 2020-03-11 DIAGNOSIS — J39.8 TRACHEAL STENOSIS: ICD-10-CM

## 2020-03-11 LAB — CREAT BLD-MCNC: 1.7 MG/DL (ref 0.8–1.5)

## 2020-03-11 PROCEDURE — 74011636320 HC RX REV CODE- 636/320: Performed by: OTOLARYNGOLOGY

## 2020-03-11 PROCEDURE — 82565 ASSAY OF CREATININE: CPT

## 2020-03-11 PROCEDURE — 70491 CT SOFT TISSUE NECK W/DYE: CPT

## 2020-03-11 PROCEDURE — 74011000258 HC RX REV CODE- 258: Performed by: OTOLARYNGOLOGY

## 2020-03-11 RX ORDER — SODIUM CHLORIDE 0.9 % (FLUSH) 0.9 %
10 SYRINGE (ML) INJECTION
Status: COMPLETED | OUTPATIENT
Start: 2020-03-11 | End: 2020-03-11

## 2020-03-11 RX ADMIN — IOPAMIDOL 100 ML: 755 INJECTION, SOLUTION INTRAVENOUS at 09:00

## 2020-03-11 RX ADMIN — Medication 10 ML: at 09:00

## 2020-03-11 RX ADMIN — SODIUM CHLORIDE 100 ML: 900 INJECTION, SOLUTION INTRAVENOUS at 09:00

## 2020-03-25 NOTE — H&P (VIEW-ONLY)
HPI: 
Tete Garcia is a 62 y.o. male seen for follow up on hoarseness. There is no pain,no bleeding, no SOB. There are no new complaints. He feels like he is at his baseline which is hoarse, SOB with exertion but none with walking or normal movements throughout the day. He had his CT of the chest done which showed a tracheal compression but no sign of tracheal stenosis. There was no obvious recurrence of cancer, no adenopathy. Past Medical History, Past Surgical History, Family history, Social History, and Medications were all reviewed with the patient today and updated as necessary. Allergies Allergen Reactions  Zofran [Ondansetron Hcl (Pf)] Other (comments) Gives pt severe HA Patient Active Problem List  
Diagnosis Code  Renal insufficiency N28.9  Non-intractable cyclical vomiting with nausea R11.15  
 Neutropenic fever (HCC) D70.9, R50.81  Pancytopenia ([facility] [address] M89.007  Type 2 diabetes mellitus with hyperglycemia (HCC) E11.65  
 HTN (hypertension) I10  
 Head and neck cancer (HCC) C76.0  
 Cellulitis of neck L03.221  
 Tracheostomy in place ([facility] [address] Z93.0  Chemotherapy-induced neutropenia (HCC) D70.1, T45.1X5A  Tonsil cancer ([facility] [address] C09.9  Decreased libido R68.82  
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  amLODIPine (NORVASC) 5 mg tablet Take 5 mg by mouth.  esomeprazole (NEXIUM) 20 mg capsule Take 20 mg by mouth daily.  citalopram (CELEXA) 40 mg tablet Take 1 Tab by mouth every evening.  metFORMIN (GLUMETZA ER) 500 mg TG24 24 hour tablet Take  by mouth daily.  lisinopril-hydroCHLOROthiazide (PRINZIDE, ZESTORETIC) 20-12.5 mg per tablet Take  by mouth nightly.  glucose blood VI test strips (RELION PRIME TEST STRIPS) strip Test blood sugar before meals and bedtime Diagnosis code E11.65  
 aspirin delayed-release 81 mg tablet Take 81 mg by mouth every morning. Take / use AM day of surgery  per anesthesia protocols. Indications: myocardial infarction prevention No current facility-administered medications for this visit. Past Medical History:  
Diagnosis Date  Anxiety Controlled with meds  GERD (gastroesophageal reflux disease)   
 managed with medication  Gout   
 symptoms in ankles, no recent episodes  Hypertension   
 managed with medication  Nausea & vomiting  Obesity  Squamous cell carcinoma of epiglottis (HCC) 11/23/2016  Type 2 diabetes mellitus ([facility] [address] oral med, does not check BG at home; hgba1c- 5.6 (2/2018) Social History Tobacco Use  Smoking status: Former Smoker Packs/day: 2.00 Years: 20.00 Pack years: 40.00 Last attempt to quit: 2005 Years since quitting: 15.2  Smokeless tobacco: Never Used Substance Use Topics  Alcohol use: No  
 
Past Surgical History:  
Procedure Laterality Date  HX COLONOSCOPY  2016  HX HEENT  12/2017  
 bronchoscopy  HX HEENT  10/01/2018 Direct laryngoscopy with CO2 laser of supraglottic swelling  HX HEENT  12/03/2018 Panendoscopy w/Scar Revision/SFE/12-03-18  HX HEENT  02/24/2020 S/P SFE Direct laryngoscopy with biopsy, CO2 laser of supraglottic airway obstruction, Bronchoscopy with tracheal balloon dilation of tracheal stenosis 02/24/2020  HX OTHER SURGICAL Left age 9  
 3rd nipple removed  HX OTHER SURGICAL  01/2017 PEG   
 HX TRACHEOSTOMY    
 placement and removal  
 HX VASCULAR ACCESS Family History Problem Relation Age of Onset  Stroke Mother  Lung Disease Mother ROS: 
 
ROS PHYSICAL EXAM: 
 
There were no vitals taken for this visit. Head Head and Face - The head and face are atraumatic, normocephalic. The salivary glands are intact and the facial appearance is symmetric. Head shape - No scars, lesions, or masses. Hoarse, not SOB, no stridor, no labored breathing today. Eye Eyeball - bilateral - extraocular motions intact, equal in size and movement Mouth and Throat Lips - upper lip - normal: no dryness, cracking, pallor, cyanosis, or vesicular eruption. Lower lip: normal: no dryness, cracking, pallor, cyanosis, or vesicular eruption. Neurologic - II - XII Grossly intact bilaterally Cardiac Inspection - Jugular Vein:  Bilateral - non distended, no prominent pulsations Chest and Lung Inspection - Movements:  Chest symmetrical with bilateral expansion, respirations even and non labored ASSESSMENT and PLAN 
 
  ICD-10-CM ICD-9-CM 1. Squamous cell carcinoma of epiglottis (HCC) C32.1 161.1 2. Hoarseness R49.0 784.42   
3. Tracheal compression J39.8 519.19 Pt counseled. I discussed with him over video the findings on his CT scan showing tracheal compression but no tracheal stenosis. I will refer him to Dr. Bradley Ferraro for further evaluation to see if he is a candidate for a tracheal stent. He expressed understanding. All questions answered. He will continue with his scheduled surgery with me in mid June. This visit was completed virtually using [employer] me. Pradip Gastelum DO 
3/25/2020

## 2020-04-01 ENCOUNTER — HOSPITAL ENCOUNTER (OUTPATIENT)
Age: 65
Setting detail: OUTPATIENT SURGERY
Discharge: HOME | End: 2020-04-01
Attending: INTERNAL MEDICINE | Admitting: INTERNAL MEDICINE
Payer: COMMERCIAL

## 2020-04-01 VITALS
TEMPERATURE: 98.3 F | BODY MASS INDEX: 34.36 KG/M2 | OXYGEN SATURATION: 95 % | SYSTOLIC BLOOD PRESSURE: 115 MMHG | RESPIRATION RATE: 16 BRPM | DIASTOLIC BLOOD PRESSURE: 62 MMHG | WEIGHT: 240 LBS | HEIGHT: 70 IN | HEART RATE: 94 BPM

## 2020-04-01 DIAGNOSIS — J39.8 TRACHEAL STENOSIS: ICD-10-CM

## 2020-04-01 LAB — GLUCOSE BLD STRIP.AUTO-MCNC: 133 MG/DL (ref 65–100)

## 2020-04-01 PROCEDURE — 82962 GLUCOSE BLOOD TEST: CPT

## 2020-04-01 PROCEDURE — 31622 DX BRONCHOSCOPE/WASH: CPT | Performed by: INTERNAL MEDICINE

## 2020-04-01 PROCEDURE — 77030012699 HC VLV SUC CNTRL OCOA -A: Performed by: INTERNAL MEDICINE

## 2020-04-01 PROCEDURE — 76040000019: Performed by: INTERNAL MEDICINE

## 2020-04-01 PROCEDURE — 74011250636 HC RX REV CODE- 250/636

## 2020-04-01 PROCEDURE — 99152 MOD SED SAME PHYS/QHP 5/>YRS: CPT | Performed by: INTERNAL MEDICINE

## 2020-04-01 PROCEDURE — 77030041030 HC DEV AEROSZR TRACHBRONCH STNT MRTM -C: Performed by: INTERNAL MEDICINE

## 2020-04-01 PROCEDURE — 99153 MOD SED SAME PHYS/QHP EA: CPT | Performed by: INTERNAL MEDICINE

## 2020-04-01 RX ORDER — FENTANYL CITRATE 50 UG/ML
50 INJECTION, SOLUTION INTRAMUSCULAR; INTRAVENOUS
Status: DISCONTINUED | OUTPATIENT
Start: 2020-04-01 | End: 2020-04-01 | Stop reason: HOSPADM

## 2020-04-01 RX ORDER — LIDOCAINE HYDROCHLORIDE 40 MG/ML
SOLUTION TOPICAL AS NEEDED
Status: CANCELLED | OUTPATIENT
Start: 2020-04-01

## 2020-04-01 RX ORDER — SODIUM CHLORIDE 0.9 % (FLUSH) 0.9 %
5-40 SYRINGE (ML) INJECTION EVERY 8 HOURS
Status: DISCONTINUED | OUTPATIENT
Start: 2020-04-01 | End: 2020-04-01 | Stop reason: HOSPADM

## 2020-04-01 RX ORDER — MIDAZOLAM HYDROCHLORIDE 1 MG/ML
.25-5 INJECTION, SOLUTION INTRAMUSCULAR; INTRAVENOUS
Status: CANCELLED | OUTPATIENT
Start: 2020-04-01

## 2020-04-01 RX ORDER — FENTANYL CITRATE 50 UG/ML
50 INJECTION, SOLUTION INTRAMUSCULAR; INTRAVENOUS
Status: CANCELLED | OUTPATIENT
Start: 2020-04-01

## 2020-04-01 RX ORDER — SODIUM CHLORIDE 9 MG/ML
25 INJECTION, SOLUTION INTRAVENOUS CONTINUOUS
Status: CANCELLED | OUTPATIENT
Start: 2020-04-01

## 2020-04-01 RX ORDER — DIPHENHYDRAMINE HYDROCHLORIDE 50 MG/ML
12.5 INJECTION, SOLUTION INTRAMUSCULAR; INTRAVENOUS
Status: CANCELLED | OUTPATIENT
Start: 2020-04-01

## 2020-04-01 RX ORDER — SODIUM CHLORIDE 0.9 % (FLUSH) 0.9 %
5-40 SYRINGE (ML) INJECTION AS NEEDED
Status: CANCELLED | OUTPATIENT
Start: 2020-04-01

## 2020-04-01 RX ORDER — LIDOCAINE HYDROCHLORIDE 20 MG/ML
JELLY TOPICAL AS NEEDED
Status: CANCELLED | OUTPATIENT
Start: 2020-04-01

## 2020-04-01 RX ORDER — DIPHENHYDRAMINE HYDROCHLORIDE 50 MG/ML
12.5 INJECTION, SOLUTION INTRAMUSCULAR; INTRAVENOUS
Status: DISCONTINUED | OUTPATIENT
Start: 2020-04-01 | End: 2020-04-01 | Stop reason: HOSPADM

## 2020-04-01 RX ORDER — SODIUM CHLORIDE 0.9 % (FLUSH) 0.9 %
5-40 SYRINGE (ML) INJECTION EVERY 8 HOURS
Status: CANCELLED | OUTPATIENT
Start: 2020-04-01

## 2020-04-01 RX ORDER — SODIUM CHLORIDE 0.9 % (FLUSH) 0.9 %
5-40 SYRINGE (ML) INJECTION AS NEEDED
Status: DISCONTINUED | OUTPATIENT
Start: 2020-04-01 | End: 2020-04-01 | Stop reason: HOSPADM

## 2020-04-01 RX ORDER — MIDAZOLAM HYDROCHLORIDE 1 MG/ML
.25-5 INJECTION, SOLUTION INTRAMUSCULAR; INTRAVENOUS
Status: DISCONTINUED | OUTPATIENT
Start: 2020-04-01 | End: 2020-04-01 | Stop reason: HOSPADM

## 2020-04-01 RX ORDER — SODIUM CHLORIDE 9 MG/ML
25 INJECTION, SOLUTION INTRAVENOUS CONTINUOUS
Status: DISCONTINUED | OUTPATIENT
Start: 2020-04-01 | End: 2020-04-01 | Stop reason: HOSPADM

## 2020-04-01 RX ORDER — LIDOCAINE HYDROCHLORIDE 40 MG/ML
SOLUTION TOPICAL AS NEEDED
Status: DISCONTINUED | OUTPATIENT
Start: 2020-04-01 | End: 2020-04-01 | Stop reason: HOSPADM

## 2020-04-01 RX ORDER — LIDOCAINE HYDROCHLORIDE 20 MG/ML
JELLY TOPICAL AS NEEDED
Status: DISCONTINUED | OUTPATIENT
Start: 2020-04-01 | End: 2020-04-01 | Stop reason: HOSPADM

## 2020-04-01 RX ADMIN — FENTANYL CITRATE 50 MCG: 0.05 INJECTION, SOLUTION INTRAMUSCULAR; INTRAVENOUS at 11:02

## 2020-04-01 RX ADMIN — FENTANYL CITRATE 50 MCG: 0.05 INJECTION, SOLUTION INTRAMUSCULAR; INTRAVENOUS at 11:11

## 2020-04-01 RX ADMIN — SODIUM CHLORIDE 25 ML/HR: 900 INJECTION, SOLUTION INTRAVENOUS at 10:51

## 2020-04-01 RX ADMIN — MIDAZOLAM 2 MG: 1 INJECTION INTRAMUSCULAR; INTRAVENOUS at 11:02

## 2020-04-01 RX ADMIN — MIDAZOLAM 1 MG: 1 INJECTION INTRAMUSCULAR; INTRAVENOUS at 11:11

## 2020-04-01 NOTE — INTERVAL H&P NOTE
Update History & Physical 
 
The Patient's History and Physical of March 2020, Chart was reviewed with the patient and I examined the patient. There was no change. The surgical site was confirmed by the patient and me. Plan:  The risk, benefits, expected outcome, and alternative to the recommended procedure have been discussed with the patient. Patient understands and wants to proceed with the procedure.  
 
Electronically signed by Maxime Higuera MD on 4/1/2020 at 10:51 AM

## 2020-04-01 NOTE — DISCHARGE INSTRUCTIONS
RESPIRATORY CARE - BRONCHOSCOPY - DISCHARGE INSTRUCTIONS      You received a lot of numbing medication for your throat and nose, and you also received medication to make you sleepy during your procedure. Because of this and because the bronchoscopy may have irritated your airways, we ask that you follow these directions:    1. Do not eat or drink until  12:30 PM .   After that, you may have what you please. You may want to try some liquids first, because your throat may be a little sore. 2. Medication may cause drowsiness for several hours, therefore:  · Do not drive or operate machinery for remainder of the day. · No alcohol today  · Do not make any important or legal decisions for 24 hours  · Do not sign any legal documents for 24 hours    3. You may cough up more mucus than usual and you may see some blood, but this is expected and should subside by the following day. 4. If severe throat irritation, coughing, or bleeding continue, call your doctor. 5.         If you run a fever greater than 102, call [facility] at [phone]. 6.         Dr. Anca Wheeler has asked that you:                A. Call the doctor's office for any questions or concerns regarding today's procedure                B. Follow up in the office as needed. Discharge Medications:  Resume all normal medications taking caution with pain and sedating medications.

## 2020-04-01 NOTE — PROCEDURES
PROCEDURE    Bronchoscopy with airway inspection. INDICATION   Stridor and tracheal stenosis    EQUIPMENT:  Olympus T 180 Bronchhoscope. ANESTHESIA    Concious sedation with: Fentanyl 100 mcg IV; Versed 3mg IV; Lidocaine 180 mg to tracheo-bronchial tree and vocal cords. AIRWAY INSPECTION    After obtaining informed consent, using a bite block, an [id number] video bronchoscope was  introduced into the trachea through the vocal chords, without complication. 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 Distance from top of stenosis to VC-2.0 cm  Diameter if trachea above stenosis 12mm  Diameter of trachea below stenosis 18mm    Will need a 12-14 mm dumon stent ~30 mm in length, possibly with narrowed reinforced central segment-hour glass stent. Will need balloon dilation pre deployment. Given this is not cancer related per se- a metal stent is relatively contraindicated due to long term complications. MARTINEZ NL    RMSB NL    RUL NL    BI NL    RML NL    SUP SEGM RLL NL    MED BASAL NL    ANTERIOR BASAL NL    LATERAL BASAL NL    POSTERIOR BASAL NL                                              LEFT    LOCATION NORMAL/ABNORMAL TYPE   LMSB NL    TANMAY NL    LINGULA NL    SUPERIOR DIVISION NL    SUPERIOR SEG LLL NL    KERRY-MEDIAL LLL NL    LATERAL LLL NL    POSTERIOR LLL NL        The procedure was completed  without complication and the patient tolerated the procedure well. EBL: none    Recommendations:  Rigid bronchoscopy with dumon silicone tracheal stent placement after balloon dilation .   Discussed with Dr Joel Calderon MD

## 2020-04-08 ENCOUNTER — HOSPITAL ENCOUNTER (OUTPATIENT)
Dept: SURGERY | Age: 65
Discharge: HOME | End: 2020-04-08
Payer: COMMERCIAL

## 2020-04-08 VITALS — WEIGHT: 241.44 LBS | HEIGHT: 70 IN | BODY MASS INDEX: 34.57 KG/M2

## 2020-04-08 LAB
GLUCOSE BLD STRIP.AUTO-MCNC: 168 MG/DL (ref 65–100)
HGB BLD-MCNC: 12.3 G/DL (ref 13.6–17.2)
POTASSIUM SERPL-SCNC: 4.1 MMOL/L (ref 3.5–5.1)

## 2020-04-08 PROCEDURE — 85018 HEMOGLOBIN: CPT

## 2020-04-08 PROCEDURE — 82962 GLUCOSE BLOOD TEST: CPT

## 2020-04-08 PROCEDURE — 84132 ASSAY OF SERUM POTASSIUM: CPT

## 2020-04-08 NOTE — PERIOP NOTES
Patient verified name and     Order for consent was found in EHR and matches case posting; patient verified. Type 1B surgery, walk in assessment complete. Labs per surgeon: none at present time;   Labs per anesthesia protocol: hgb, potassium results in [facility], POC glucose 168 . Instructed  Patient that if blood sugar 300 or > , surgery may be cancelled. ;   EKG: EKG from 2019 in care every where. Patient denies any CAD, chest pain or [facility] approved surgical skin cleanser and instructions given per hospital policy. Patient provided with and instructed on educational handouts including Guide to Surgery, Pain Management, Hand Hygiene, Blood Transfusion Education, and [facility] Brochure. Patient answered medical/surgical history questions at their best of ability. All prior to admission medications documented in [facility]. Original medication prescription bottle not visualized during patient appointment. Patient teach back successful and patient demonstrates knowledge of instructions. PLEASE CONTINUE TAKING ALL PRESCRIPTION MEDICATIONS UP TO THE DAY OF SURGERY UNLESS OTHERWISE DIRECTED BELOW. DISCONTINUE all vitamins and supplements7 days prior to surgery. DISCONTINUE Non-Steriodal Anti-Inflammatory (NSAIDS) such as Advil and Aleve 5 days prior to surgery. Home Medications to take  the day of surgery     Nexium           Home Medications   to Hold Day of surgery   Lisinopril-Hydrochlorothiazide   Metformin    Potassium     Comments         *Visitor policy of 1 visitor per patient discussed. Please do not bring home medications with you on the day of surgery unless otherwise directed by your nurse. If you are instructed to bring home medications, please give them to your nurse as they will be administered by the nursing staff. If you have any questions, please call [facility] [phone] or [facility] [phone].     A copy of this note was provided to the patient for reference.

## 2020-06-05 VITALS — BODY MASS INDEX: 34.36 KG/M2 | WEIGHT: 240 LBS | HEIGHT: 70 IN

## 2020-06-05 NOTE — PERIOP NOTES
Patient verified name and . Order for consent not found in EHR   Type 1b surgery, PAT phone assessment complete. Orders not received. Labs per surgeon: None  Labs per anesthesia protocol: Potassium Patient instructed to come to [address] [facility] [address] Monday-Friday [id number] prior to DOS to have blood drawn. No appointment necessary. Patient verbalized understanding      Patient answered medical/surgical history questions at their best of ability. All prior to admission medications documented in [facility]. Patient instructed to take the following medications the day of surgery according to anesthesia guidelines with a small sip of water:Nexium . Hold all vitamins 7 days prior to surgery and NSAIDS 5 days prior to surgery. Prescription meds to hold:None    Patient instructed on the following:  >A negative Covid swab result is required to proceed with surgery; the swab will be collected 7 days prior to surgery at the [address] at [address]. The patient will be contacted by the Covid swab team for an appointment date and time. For questions or concerns the patient should call [phone]. The clinic is closed from  for lunch and on weekends. Appointment date/time 2020 @ 0820 found in EHR and provided to patient. > 1 visitor allowed at this time. >Arrive at [facility], time of arrival to be called the day before by 1700  >NPO after midnight including gum, mints, and ice chips  >Responsible adult must drive patient to the hospital, stay during surgery, and patient will need supervision 24 hours after anesthesia  >Use antibacterial soap in shower the night before surgery and on the morning of surgery  >All piercings must be removed prior to arrival.    >Leave all valuables (money and jewelry) at home but bring insurance card and ID on       DOS.    >Do not wear make-up, nail polish, lotions, cologne, perfumes, powders, or oil on skin.    Patient teach back successful and patient demonstrates knowledge of instruction.

## 2020-06-08 ENCOUNTER — HOSPITAL ENCOUNTER (OUTPATIENT)
Dept: LAB | Age: 65
Discharge: HOME | End: 2020-06-08
Attending: OTOLARYNGOLOGY
Payer: COMMERCIAL

## 2020-06-08 ENCOUNTER — HOSPITAL ENCOUNTER (OUTPATIENT)
Dept: SURGERY | Age: 65
Discharge: HOME | End: 2020-06-08

## 2020-06-08 LAB — POTASSIUM SERPL-SCNC: 4.2 MMOL/L (ref 3.5–5.1)

## 2020-06-08 PROCEDURE — 84132 ASSAY OF SERUM POTASSIUM: CPT

## 2020-06-08 PROCEDURE — 36415 COLL VENOUS BLD VENIPUNCTURE: CPT

## 2020-06-08 NOTE — PERIOP NOTES
Lab results within limits per anesthesia protocol; OK for surgery.      Recent Results (from the past 12 hour(s))   POTASSIUM    Collection Time: 06/08/20 11:43 AM   Result Value Ref Range    Potassium 4.2 3.5 - 5.1 mmol/L

## 2020-06-09 DIAGNOSIS — J39.8 TRACHEAL COMPRESSION: Primary | ICD-10-CM

## 2020-06-09 DIAGNOSIS — C32.1 SQUAMOUS CELL CANCER OF EPIGLOTTIS (HCC): ICD-10-CM

## 2020-06-09 DIAGNOSIS — R49.0 HOARSENESS: ICD-10-CM

## 2020-06-14 ENCOUNTER — ANESTHESIA EVENT (OUTPATIENT)
Dept: SURGERY | Age: 65
End: 2020-06-14
Payer: COMMERCIAL

## 2020-06-15 ENCOUNTER — ANESTHESIA (OUTPATIENT)
Dept: SURGERY | Age: 65
End: 2020-06-15
Payer: COMMERCIAL

## 2020-06-15 ENCOUNTER — HOSPITAL ENCOUNTER (OUTPATIENT)
Age: 65
Setting detail: OUTPATIENT SURGERY
Discharge: HOME | End: 2020-06-15
Attending: OTOLARYNGOLOGY | Admitting: OTOLARYNGOLOGY
Payer: COMMERCIAL

## 2020-06-15 VITALS
WEIGHT: 242.9 LBS | SYSTOLIC BLOOD PRESSURE: 135 MMHG | HEART RATE: 78 BPM | OXYGEN SATURATION: 93 % | HEIGHT: 70 IN | RESPIRATION RATE: 16 BRPM | BODY MASS INDEX: 34.77 KG/M2 | DIASTOLIC BLOOD PRESSURE: 73 MMHG | TEMPERATURE: 98 F

## 2020-06-15 DIAGNOSIS — J39.8 TRACHEAL COMPRESSION: ICD-10-CM

## 2020-06-15 DIAGNOSIS — R49.0 HOARSENESS: ICD-10-CM

## 2020-06-15 DIAGNOSIS — C32.1 SQUAMOUS CELL CANCER OF EPIGLOTTIS (HCC): ICD-10-CM

## 2020-06-15 LAB
GLUCOSE BLD STRIP.AUTO-MCNC: 124 MG/DL (ref 65–100)
POTASSIUM BLD-SCNC: 4.2 MMOL/L (ref 3.5–5.1)

## 2020-06-15 PROCEDURE — 77030036727 HC DEV INFL BLN SNUS SE DISP ACCL -B: Performed by: OTOLARYNGOLOGY

## 2020-06-15 PROCEDURE — 77030008684 HC TU ET CUF COVD -B: Performed by: ANESTHESIOLOGY

## 2020-06-15 PROCEDURE — 76210000020 HC REC RM PH II FIRST 0.5 HR: Performed by: OTOLARYNGOLOGY

## 2020-06-15 PROCEDURE — 82962 GLUCOSE BLOOD TEST: CPT

## 2020-06-15 PROCEDURE — 77030018836 HC SOL IRR NACL ICUM -A: Performed by: OTOLARYNGOLOGY

## 2020-06-15 PROCEDURE — 74011250636 HC RX REV CODE- 250/636: Performed by: ANESTHESIOLOGY

## 2020-06-15 PROCEDURE — 77030038019: Performed by: OTOLARYNGOLOGY

## 2020-06-15 PROCEDURE — 76010000149 HC OR TIME 1 TO 1.5 HR: Performed by: OTOLARYNGOLOGY

## 2020-06-15 PROCEDURE — 74011250636 HC RX REV CODE- 250/636: Performed by: NURSE ANESTHETIST, CERTIFIED REGISTERED

## 2020-06-15 PROCEDURE — 77030021678 HC GLIDESCP STAT DISP VERT -B: Performed by: ANESTHESIOLOGY

## 2020-06-15 PROCEDURE — 76060000033 HC ANESTHESIA 1 TO 1.5 HR: Performed by: OTOLARYNGOLOGY

## 2020-06-15 PROCEDURE — 76210000063 HC OR PH I REC FIRST 0.5 HR: Performed by: OTOLARYNGOLOGY

## 2020-06-15 PROCEDURE — 77030040361 HC SLV COMPR DVT MDII -B: Performed by: OTOLARYNGOLOGY

## 2020-06-15 PROCEDURE — 74011000250 HC RX REV CODE- 250: Performed by: NURSE ANESTHETIST, CERTIFIED REGISTERED

## 2020-06-15 PROCEDURE — 77030018390 HC SPNG HEMSTAT2 J&J -B: Performed by: OTOLARYNGOLOGY

## 2020-06-15 PROCEDURE — 84132 ASSAY OF SERUM POTASSIUM: CPT

## 2020-06-15 RX ORDER — EPHEDRINE SULFATE/0.9% NACL/PF 50 MG/5 ML
SYRINGE (ML) INTRAVENOUS AS NEEDED
Status: DISCONTINUED | OUTPATIENT
Start: 2020-06-15 | End: 2020-06-15 | Stop reason: HOSPADM

## 2020-06-15 RX ORDER — NALOXONE HYDROCHLORIDE 0.4 MG/ML
0.04 INJECTION, SOLUTION INTRAMUSCULAR; INTRAVENOUS; SUBCUTANEOUS
Status: DISCONTINUED | OUTPATIENT
Start: 2020-06-15 | End: 2020-06-15 | Stop reason: HOSPADM

## 2020-06-15 RX ORDER — SUCCINYLCHOLINE CHLORIDE 20 MG/ML
INJECTION INTRAMUSCULAR; INTRAVENOUS AS NEEDED
Status: DISCONTINUED | OUTPATIENT
Start: 2020-06-15 | End: 2020-06-15 | Stop reason: HOSPADM

## 2020-06-15 RX ORDER — SODIUM CHLORIDE, SODIUM LACTATE, POTASSIUM CHLORIDE, CALCIUM CHLORIDE 600; 310; 30; 20 MG/100ML; MG/100ML; MG/100ML; MG/100ML
100 INJECTION, SOLUTION INTRAVENOUS CONTINUOUS
Status: DISCONTINUED | OUTPATIENT
Start: 2020-06-15 | End: 2020-06-15 | Stop reason: HOSPADM

## 2020-06-15 RX ORDER — ROCURONIUM BROMIDE 10 MG/ML
INJECTION, SOLUTION INTRAVENOUS AS NEEDED
Status: DISCONTINUED | OUTPATIENT
Start: 2020-06-15 | End: 2020-06-15 | Stop reason: HOSPADM

## 2020-06-15 RX ORDER — LIDOCAINE HYDROCHLORIDE 20 MG/ML
INJECTION, SOLUTION EPIDURAL; INFILTRATION; INTRACAUDAL; PERINEURAL AS NEEDED
Status: DISCONTINUED | OUTPATIENT
Start: 2020-06-15 | End: 2020-06-15 | Stop reason: HOSPADM

## 2020-06-15 RX ORDER — DEXAMETHASONE SODIUM PHOSPHATE 4 MG/ML
INJECTION, SOLUTION INTRA-ARTICULAR; INTRALESIONAL; INTRAMUSCULAR; INTRAVENOUS; SOFT TISSUE AS NEEDED
Status: DISCONTINUED | OUTPATIENT
Start: 2020-06-15 | End: 2020-06-15 | Stop reason: HOSPADM

## 2020-06-15 RX ORDER — FENTANYL CITRATE 50 UG/ML
INJECTION, SOLUTION INTRAMUSCULAR; INTRAVENOUS AS NEEDED
Status: DISCONTINUED | OUTPATIENT
Start: 2020-06-15 | End: 2020-06-15 | Stop reason: HOSPADM

## 2020-06-15 RX ORDER — HYDROMORPHONE HYDROCHLORIDE 2 MG/ML
0.5 INJECTION, SOLUTION INTRAMUSCULAR; INTRAVENOUS; SUBCUTANEOUS
Status: DISCONTINUED | OUTPATIENT
Start: 2020-06-15 | End: 2020-06-15 | Stop reason: HOSPADM

## 2020-06-15 RX ORDER — MIDAZOLAM HYDROCHLORIDE 1 MG/ML
2 INJECTION, SOLUTION INTRAMUSCULAR; INTRAVENOUS ONCE
Status: DISCONTINUED | OUTPATIENT
Start: 2020-06-15 | End: 2020-06-15 | Stop reason: HOSPADM

## 2020-06-15 RX ORDER — FENTANYL CITRATE 50 UG/ML
100 INJECTION, SOLUTION INTRAMUSCULAR; INTRAVENOUS ONCE
Status: DISCONTINUED | OUTPATIENT
Start: 2020-06-15 | End: 2020-06-15 | Stop reason: HOSPADM

## 2020-06-15 RX ORDER — PROPOFOL 10 MG/ML
INJECTION, EMULSION INTRAVENOUS AS NEEDED
Status: DISCONTINUED | OUTPATIENT
Start: 2020-06-15 | End: 2020-06-15 | Stop reason: HOSPADM

## 2020-06-15 RX ORDER — OXYCODONE HYDROCHLORIDE 5 MG/1
5 TABLET ORAL
Status: DISCONTINUED | OUTPATIENT
Start: 2020-06-15 | End: 2020-06-15 | Stop reason: HOSPADM

## 2020-06-15 RX ORDER — LIDOCAINE HYDROCHLORIDE 10 MG/ML
0.1 INJECTION INFILTRATION; PERINEURAL AS NEEDED
Status: DISCONTINUED | OUTPATIENT
Start: 2020-06-15 | End: 2020-06-15 | Stop reason: HOSPADM

## 2020-06-15 RX ORDER — MIDAZOLAM HYDROCHLORIDE 1 MG/ML
2 INJECTION, SOLUTION INTRAMUSCULAR; INTRAVENOUS
Status: DISCONTINUED | OUTPATIENT
Start: 2020-06-15 | End: 2020-06-15 | Stop reason: HOSPADM

## 2020-06-15 RX ADMIN — FENTANYL CITRATE 50 MCG: 50 INJECTION INTRAMUSCULAR; INTRAVENOUS at 14:40

## 2020-06-15 RX ADMIN — FENTANYL CITRATE 50 MCG: 50 INJECTION INTRAMUSCULAR; INTRAVENOUS at 14:01

## 2020-06-15 RX ADMIN — PROPOFOL 200 MG: 10 INJECTION, EMULSION INTRAVENOUS at 13:50

## 2020-06-15 RX ADMIN — Medication 10 MG: at 14:10

## 2020-06-15 RX ADMIN — SODIUM CHLORIDE, SODIUM LACTATE, POTASSIUM CHLORIDE, AND CALCIUM CHLORIDE: 600; 310; 30; 20 INJECTION, SOLUTION INTRAVENOUS at 13:34

## 2020-06-15 RX ADMIN — SODIUM CHLORIDE, SODIUM LACTATE, POTASSIUM CHLORIDE, AND CALCIUM CHLORIDE 100 ML/HR: 600; 310; 30; 20 INJECTION, SOLUTION INTRAVENOUS at 13:12

## 2020-06-15 RX ADMIN — DEXAMETHASONE SODIUM PHOSPHATE 10 MG: 4 INJECTION, SOLUTION INTRAMUSCULAR; INTRAVENOUS at 14:04

## 2020-06-15 RX ADMIN — LIDOCAINE HYDROCHLORIDE 100 MG: 20 INJECTION, SOLUTION EPIDURAL; INFILTRATION; INTRACAUDAL; PERINEURAL at 13:50

## 2020-06-15 RX ADMIN — SUCCINYLCHOLINE CHLORIDE 140 MG: 20 INJECTION, SOLUTION INTRAMUSCULAR; INTRAVENOUS at 13:50

## 2020-06-15 RX ADMIN — ROCURONIUM BROMIDE 10 MG: 10 INJECTION, SOLUTION INTRAVENOUS at 13:50

## 2020-06-15 NOTE — ANESTHESIA PREPROCEDURE EVALUATION
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     Swelling to anterior neck - tolerates supine position well Other Findings   Comments: Gout            Physical Exam    Airway  Mallampati: III  TM Distance: > 6 cm  Neck ROM: decreased range of motion   Mouth opening: Normal     Cardiovascular    Rhythm: regular  Rate: normal         Dental    Dentition: Edentulous     Pulmonary      Decreased breath sounds: bilateral           Abdominal  GI exam deferred       Other Findings            Anesthetic Plan    ASA: 3  Anesthesia type: general  ETT  Glidescope         Induction: Intravenous  Anesthetic plan and risks discussed with: Patient and Spouse      Prior hx of successful videolaryngoscopic intubations. Used 6.5 ETT last time. Plan same today.

## 2020-06-15 NOTE — ANESTHESIA POSTPROCEDURE EVALUATION
Procedure(s):  DIRECT LARYNGOSCOPY, Bronchoscopy WITH  CO2 LASER. Matti Jones     general    Anesthesia Post Evaluation        Patient location during evaluation: PACU  Patient participation: complete - patient participated  Level of consciousness: awake  Pain management: satisfactory to patient  Airway patency: patent  Anesthetic complications: no  Cardiovascular status: hemodynamically stable  Respiratory status: spontaneous ventilation  Hydration status: euvolemic  Post anesthesia nausea and vomiting:  none      INITIAL Post-op Vital signs:   Vitals Value Taken Time   /68 6/15/2020  3:02 PM   Temp 36.7 °C (98 °F) 6/15/2020  3:02 PM   Pulse 82 6/15/2020  3:02 PM   Resp 16 6/15/2020  3:02 PM   SpO2 94 % 6/15/2020  3:02 PM

## 2020-06-15 NOTE — DISCHARGE INSTRUCTIONS
Patient Education   Patient Education        Bronchoscopy: What to Expect at Home  Your Recovery     Bronchoscopy lets your doctor look at your airway through a tube called a bronchoscope. Afterward, you may feel tired for 1 or 2 days. Your mouth may feel very dry for several hours after the procedure. You may also have a sore throat and a hoarse voice for a few days. Sucking on throat lozenges or gargling with warm salt water may help soothe your sore throat. If a sample of tissue (biopsy) was taken, you may spit up a small amount of blood or have bloody saliva. This is normal.  Do not drive for at least 8 hours after the procedure. Do not smoke for at least 24 hours. This care sheet gives you a general idea about how long it will take for you to recover. But each person recovers at a different pace. Follow the steps below to get better as quickly as possible. How can you care for yourself at home? Activity  · Do not eat anything for 2 hours after the procedure. · Rest when you feel tired. Getting enough sleep will help you recover. · Avoid strenuous activities, such as bicycle riding, jogging, weight lifting, or aerobic exercise, until your doctor says it is okay. · Ask your doctor when you can drive again. Diet  · You can eat your normal diet. If your stomach is upset, try bland, low-fat foods like plain rice, broiled chicken, toast, and yogurt. · If it is painful to swallow, start out with cold drinks, flavored ice pops, and ice cream. Next, try soft foods like pudding, yogurt, canned or cooked fruit, scrambled eggs, and mashed potatoes. Avoid eating hard or scratchy foods like chips or raw vegetables. Avoid orange or tomato juice and other acidic foods that can sting the throat. · Drink plenty of fluids to avoid becoming dehydrated (unless your doctor tells you not to). Medicines  · Take pain medicines exactly as directed.   ? If the doctor gave you a prescription medicine for pain, take it as prescribed. ? If you are not taking a prescription pain medicine, ask your doctor if you can take an over-the-counter medicine. · If you think your pain medicine is making you sick to your stomach:  ? Take your medicine after meals (unless your doctor has told you not to). ? Ask your doctor for a different pain medicine. · If your doctor prescribed antibiotics, take them as directed. Do not stop taking them just because you feel better. You need to take the full course of antibiotics. Follow-up care is a key part of your treatment and safety. Be sure to make and go to all appointments, and call your doctor if you are having problems. It's also a good idea to know your test results and keep a list of the medicines you take. When should you call for help? [id number] anytime you think you may need emergency care. For example, call if:  · You passed out (lost consciousness). · You have sudden chest pain and shortness of breath. · You cough up large amounts of bright red blood. · You have severe pain in your chest.  · You have severe trouble breathing. Call your doctor now or seek immediate medical care if:  · You cough up more than a few tablespoons of blood. · You have pain that does not get better after you take pain medicine. · You have a fever over 100°F.  · You still sound hoarse after a few days. · You have bubbles under the skin around the collarbone. These may crackle and pop when you press on them. Watch closely for changes in your health, and be sure to contact your doctor if you have any problems. Where can you learn more? Go to http://maria c-jeff.info/  Enter D466 in the search box to learn more about \"Bronchoscopy: What to Expect at Home. \"  Current as of: February 24, 2020               Content Version: 12.5  © 5560-9582 Health[employer].    Care instructions adapted under license by [employer] (which disclaims liability or warranty for this information). If you have questions about a medical condition or this instruction, always ask your healthcare professional. [address] any warranty or liability for your use of this information. Flexible Laryngoscopy: About This Test  What is it? Flexible laryngoscopy (say \"dtfd-hc-KHJ-luis alberto-francisco\") is a test that lets your doctor look at your throat and voice box (larynx). The doctor uses a thin, flexible tube, called a scope, to look deep into your throat. Why is this test done? This test is done to find the cause of voice problems, ear and throat pain, or swallowing problems. It also may be done to check for throat tumors or injuries. How do you prepare for the test?  In general, there's nothing you have to do before this test, unless your doctor tells you to. How is the test done? · You will be awake for the test.  · If you wear dentures, you will remove them just before the test.  · The doctor may give you medicine to numb the inside of your nose and throat and/or to dry up the mucus in your nose and throat. · The doctor will put the scope into your nose and gently guide it into the back of your throat. · The scope may have a camera on it that sends pictures to a monitor so your doctor can see the inside of your nose and throat. · The doctor may ask you to speak, sing, cough, take deep breaths, or sniff while the scope is in your throat. How long does the test take? How long a laryngoscopy takes is similar for the three types:  · An indirect test takes 5 to 10 minutes. · A direct flexible test takes about 5 minutes. · A direct rigid test takes 15 to 30 minutes. What happens after the test?  · You may have a sore nose and throat for 1 or 2 days. · You will probably be able to go home right away. · You can go back to your usual activities right away. Follow-up care is a key part of your treatment and safety.  Be sure to make and go to all appointments, and call your doctor if you are having problems. It's also a good idea to keep a list of the medicines you take. Ask your doctor when you can expect to have your test results. Where can you learn more? Go to http://maria c-jeff.info/  Enter R834 in the search box to learn more about \"Flexible Laryngoscopy: About This Test.\"  Current as of: July 29, 2019               Content Version: 12.5  © 6128-0483 [employer]. Care instructions adapted under license by [employer] (which disclaims liability or warranty for this information). If you have questions about a medical condition or this instruction, always ask your healthcare professional. [address] any warranty or liability for your use of this information. After general anesthesia or intravenous sedation, for 24 hours or while taking prescription Narcotics:  · Limit your activities  · A responsible adult needs to be with you for the next 24 hours  · Do not drive and operate hazardous machinery  · Do not make important personal or business decisions  · Do  not drink alcoholic beverages  · If you have not urinated within 8 hours after discharge, please contact your surgeon on call. · If you have sleep apnea and have a CPAP machine, please use it for all naps and sleeping. · Please use caution when taking narcotics and any of your home medications that may cause drowsiness. *  Please give a list of your current medications to your Primary Care Provider. *  Please update this list whenever your medications are discontinued, doses are      changed, or new medications (including over-the-counter products) are added. *  Please carry medication information at all times in case of emergency situations.     These are general instructions for a healthy lifestyle:  No smoking/ No tobacco products/ Avoid exposure to second hand smoke  Surgeon General's Warning:  Quitting smoking now greatly reduces serious risk to your health. Obesity, smoking, and sedentary lifestyle greatly increases your risk for illness  A healthy diet, regular physical exercise & weight monitoring are important for maintaining a healthy lifestyle    You may be retaining fluid if you have a history of heart failure or if you experience any of the following symptoms:  Weight gain of 3 pounds or more overnight or 5 pounds in a week, increased swelling in our hands or feet or shortness of breath while lying flat in bed. Please call your doctor as soon as you notice any of these symptoms; do not wait until your next office visit.

## 2020-06-16 NOTE — OP NOTES
[address] [address]  OPERATIVE REPORT    Name:  Sami Arroyo  MR#:  [id number]  :  1955  ACCOUNT #:  [de-identified]  DATE OF SERVICE:  06/15/2020    PREOPERATIVE DIAGNOSES:  Airway obstruction, supraglottic swelling, history of squamous cell carcinoma of the epiglottis. POSTOPERATIVE DIAGNOSES:  Airway obstruction, supraglottic swelling, history of squamous cell carcinoma of the epiglottis. PROCEDURE PERFORMED:  Direct laryngoscopy with CO2 laser. SURGEON:  Kashif Garcia DO    ASSISTANT:  None. ANESTHESIA:  General.    COMPLICATIONS:  None. SPECIMENS REMOVED:  None. IMPLANTS:  None. ESTIMATED BLOOD LOSS:  2 mL. HISTORY:  A 26-year-old male, well known to me. He originally saw me because of squamous cell carcinoma of the epiglottis. He had a lot of supraglottic edema at that time. He underwent his treatment and therapies and did very well with them, and there was no sign of residual cancer. At this point, the issue that he has is that he still maintains a lot of supraglottic swelling and edema even after the radiation and chemotherapy was complete. He has been going through serial CO2 lasers to open up the supraglottic airway and get rid of the edematous tissue. So, again, it has been several months since his last procedure. He has noticed again that the skin is just a little bit tighter in the throat and his breathing is becoming louder and the voice is becoming more strained, especially noticed by his wife especially when he is sleeping. So, as we have done in the past, I have recommended that he undergo direct laryngoscopy with CO2 laser. The procedure, risks, and benefits were discussed with him in the office. All questions were answered. He was agreeable to the surgery. SURGERY DETAILS:  The patient was identified in the preoperative waiting area. He was taken back to the operating room where he underwent general anesthesia. The bed was turned 90 degrees. Sponge was placed over the upper lip and teeth, a size 7 laser tube was used for the intubation. Laryngoscope was used first evaluating the tongue, oral cavity, tongue base, vallecula, all appeared to be normal.  Posterior pharyngeal wall, both oropharyngeal and hypopharyngeal, all appeared to be normal.  The epiglottis had an abnormal appearance, smaller than normal and also had an abnormal shape, but this is unchanged for him, came just below, that is where we started to see the edematous tissue circumferentially, which does make it difficult to see any true vocal cords. When you do get to see the true vocal cords, you are going through some of the edematous tissue to fully visualize, anyone you do, it does have more of a pink fleshy appearance to it as opposed to a normal white true vocal cord; this was the same on both sides. There was no ulceration, no leukoplakia, no erythroplakia, no obvious mass or any other abnormal tissue. So, no biopsies were done. Going into the subglottis, you could see the tracheal collapse, but, otherwise, again, no masses or lesions within the trachea. Vallecula also appeared to be normal.  So, I did place the patient in suspension with visualization of the larynx, brought in the CO2 laser, did a 15 watt super pulsed, continuous and I was able to take pre-laser pictures and the post-laser picture, showing the elimination of the edematous tissue and exposure of the true and false vocal cords. Once that was done, the laser was removed. The patient was then awakened. He was taken to the postop recovery room in a stable condition.       DO PHIL [id number]  D:  06/15/2020 14:47  T:  06/16/2020 2:37  JOB #:  [id number]

## 2020-11-29 ENCOUNTER — HOSPITAL ENCOUNTER (INPATIENT)
Age: 65
LOS: 1 days | Discharge: HOME | DRG: 184 | End: 2020-12-03
Attending: EMERGENCY MEDICINE | Admitting: INTERNAL MEDICINE
Payer: COMMERCIAL

## 2020-11-29 ENCOUNTER — APPOINTMENT (OUTPATIENT)
Dept: GENERAL RADIOLOGY | Age: 65
DRG: 184 | End: 2020-11-29
Attending: EMERGENCY MEDICINE
Payer: COMMERCIAL

## 2020-11-29 ENCOUNTER — APPOINTMENT (OUTPATIENT)
Dept: CT IMAGING | Age: 65
DRG: 184 | End: 2020-11-29
Attending: EMERGENCY MEDICINE
Payer: COMMERCIAL

## 2020-11-29 DIAGNOSIS — S22.42XA CLOSED FRACTURE OF MULTIPLE RIBS OF LEFT SIDE, INITIAL ENCOUNTER: ICD-10-CM

## 2020-11-29 DIAGNOSIS — I10 HYPERTENSION, UNSPECIFIED TYPE: ICD-10-CM

## 2020-11-29 DIAGNOSIS — N17.9 ACUTE KIDNEY INJURY (HCC): ICD-10-CM

## 2020-11-29 DIAGNOSIS — J90 PLEURAL EFFUSION: ICD-10-CM

## 2020-11-29 DIAGNOSIS — J18.9 PNEUMONIA OF LEFT LOWER LOBE DUE TO INFECTIOUS ORGANISM: Primary | ICD-10-CM

## 2020-11-29 PROBLEM — S22.39XA RIB FRACTURE: Status: ACTIVE | Noted: 2020-11-29

## 2020-11-29 LAB
ALBUMIN SERPL-MCNC: 3.7 G/DL (ref 3.2–4.6)
ALBUMIN/GLOB SERPL: 1 {RATIO} (ref 1.2–3.5)
ALP SERPL-CCNC: 81 U/L (ref 50–136)
ALT SERPL-CCNC: 89 U/L (ref 12–65)
ANION GAP SERPL CALC-SCNC: 7 MMOL/L (ref 7–16)
AST SERPL-CCNC: 77 U/L (ref 15–37)
BASOPHILS # BLD: 0 K/UL (ref 0–0.2)
BASOPHILS NFR BLD: 1 % (ref 0–2)
BILIRUB SERPL-MCNC: 0.6 MG/DL (ref 0.2–1.1)
BUN SERPL-MCNC: 26 MG/DL (ref 8–23)
CALCIUM SERPL-MCNC: 8.7 MG/DL (ref 8.3–10.4)
CHLORIDE SERPL-SCNC: 94 MMOL/L (ref 98–107)
CO2 SERPL-SCNC: 31 MMOL/L (ref 21–32)
CREAT SERPL-MCNC: 1.97 MG/DL (ref 0.8–1.5)
DIFFERENTIAL METHOD BLD: ABNORMAL
EOSINOPHIL # BLD: 0.1 K/UL (ref 0–0.8)
EOSINOPHIL NFR BLD: 2 % (ref 0.5–7.8)
ERYTHROCYTE [DISTWIDTH] IN BLOOD BY AUTOMATED COUNT: 12.9 % (ref 11.9–14.6)
GLOBULIN SER CALC-MCNC: 3.6 G/DL (ref 2.3–3.5)
GLUCOSE BLD STRIP.AUTO-MCNC: 157 MG/DL (ref 65–100)
GLUCOSE BLD STRIP.AUTO-MCNC: 249 MG/DL (ref 65–100)
GLUCOSE SERPL-MCNC: 189 MG/DL (ref 65–100)
HCT VFR BLD AUTO: 33.8 % (ref 41.1–50.3)
HGB BLD-MCNC: 12 G/DL (ref 13.6–17.2)
IMM GRANULOCYTES # BLD AUTO: 0 K/UL (ref 0–0.5)
IMM GRANULOCYTES NFR BLD AUTO: 0 % (ref 0–5)
LACTATE SERPL-SCNC: 1.6 MMOL/L (ref 0.4–2)
LIPASE SERPL-CCNC: 246 U/L (ref 73–393)
LYMPHOCYTES # BLD: 0.5 K/UL (ref 0.5–4.6)
LYMPHOCYTES NFR BLD: 8 % (ref 13–44)
MCH RBC QN AUTO: 30 PG (ref 26.1–32.9)
MCHC RBC AUTO-ENTMCNC: 35.5 G/DL (ref 31.4–35)
MCV RBC AUTO: 84.5 FL (ref 79.6–97.8)
MONOCYTES # BLD: 0.4 K/UL (ref 0.1–1.3)
MONOCYTES NFR BLD: 6 % (ref 4–12)
NEUTS SEG # BLD: 5.7 K/UL (ref 1.7–8.2)
NEUTS SEG NFR BLD: 84 % (ref 43–78)
NRBC # BLD: 0 K/UL (ref 0–0.2)
PLATELET # BLD AUTO: 200 K/UL (ref 150–450)
PMV BLD AUTO: 9 FL (ref 9.4–12.3)
POTASSIUM SERPL-SCNC: 4 MMOL/L (ref 3.5–5.1)
PROT SERPL-MCNC: 7.3 G/DL (ref 6.3–8.2)
RBC # BLD AUTO: 4 M/UL (ref 4.23–5.6)
SODIUM SERPL-SCNC: 132 MMOL/L (ref 136–145)
WBC # BLD AUTO: 6.9 K/UL (ref 4.3–11.1)

## 2020-11-29 PROCEDURE — 74011636637 HC RX REV CODE- 636/637: Performed by: FAMILY MEDICINE

## 2020-11-29 PROCEDURE — 83605 ASSAY OF LACTIC ACID: CPT

## 2020-11-29 PROCEDURE — 71260 CT THORAX DX C+: CPT

## 2020-11-29 PROCEDURE — 85025 COMPLETE CBC W/AUTO DIFF WBC: CPT

## 2020-11-29 PROCEDURE — 96366 THER/PROPH/DIAG IV INF ADDON: CPT

## 2020-11-29 PROCEDURE — 80053 COMPREHEN METABOLIC PANEL: CPT

## 2020-11-29 PROCEDURE — 82962 GLUCOSE BLOOD TEST: CPT

## 2020-11-29 PROCEDURE — 99218 HC RM OBSERVATION: CPT

## 2020-11-29 PROCEDURE — 77030027138 HC INCENT SPIROMETER -A

## 2020-11-29 PROCEDURE — 96365 THER/PROPH/DIAG IV INF INIT: CPT

## 2020-11-29 PROCEDURE — 83690 ASSAY OF LIPASE: CPT

## 2020-11-29 PROCEDURE — 99283 EMERGENCY DEPT VISIT LOW MDM: CPT

## 2020-11-29 PROCEDURE — 74011000258 HC RX REV CODE- 258: Performed by: EMERGENCY MEDICINE

## 2020-11-29 PROCEDURE — 71046 X-RAY EXAM CHEST 2 VIEWS: CPT

## 2020-11-29 PROCEDURE — 74011250636 HC RX REV CODE- 250/636: Performed by: EMERGENCY MEDICINE

## 2020-11-29 PROCEDURE — 74011250637 HC RX REV CODE- 250/637: Performed by: FAMILY MEDICINE

## 2020-11-29 PROCEDURE — 96361 HYDRATE IV INFUSION ADD-ON: CPT

## 2020-11-29 PROCEDURE — 96375 TX/PRO/DX INJ NEW DRUG ADDON: CPT

## 2020-11-29 PROCEDURE — 87040 BLOOD CULTURE FOR BACTERIA: CPT

## 2020-11-29 PROCEDURE — 74011000636 HC RX REV CODE- 636: Performed by: EMERGENCY MEDICINE

## 2020-11-29 RX ORDER — INSULIN LISPRO 100 [IU]/ML
INJECTION, SOLUTION INTRAVENOUS; SUBCUTANEOUS
Status: DISCONTINUED | OUTPATIENT
Start: 2020-11-29 | End: 2020-12-03 | Stop reason: HOSPADM

## 2020-11-29 RX ORDER — AMLODIPINE BESYLATE 10 MG/1
TABLET ORAL
COMMUNITY
Start: 2020-09-08 | End: 2020-12-03

## 2020-11-29 RX ORDER — PROMETHAZINE HYDROCHLORIDE 25 MG/1
12.5 TABLET ORAL
Status: DISCONTINUED | OUTPATIENT
Start: 2020-11-29 | End: 2020-12-03 | Stop reason: HOSPADM

## 2020-11-29 RX ORDER — POLYETHYLENE GLYCOL 3350 17 G/17G
17 POWDER, FOR SOLUTION ORAL DAILY PRN
Status: DISCONTINUED | OUTPATIENT
Start: 2020-11-29 | End: 2020-12-03 | Stop reason: HOSPADM

## 2020-11-29 RX ORDER — DOXYCYCLINE 100 MG/1
CAPSULE ORAL
COMMUNITY
Start: 2020-10-29 | End: 2020-12-10 | Stop reason: CLARIF

## 2020-11-29 RX ORDER — VALACYCLOVIR HYDROCHLORIDE 1 G/1
TABLET, FILM COATED ORAL
Status: ON HOLD | COMMUNITY
Start: 2020-11-16 | End: 2020-12-11

## 2020-11-29 RX ORDER — HYDROCODONE BITARTRATE AND ACETAMINOPHEN 10; 325 MG/1; MG/1
1 TABLET ORAL
COMMUNITY
Start: 2020-10-26 | End: 2020-12-06

## 2020-11-29 RX ORDER — ALLOPURINOL 300 MG/1
1 TABLET ORAL
COMMUNITY
End: 2022-02-28

## 2020-11-29 RX ORDER — ONDANSETRON 2 MG/ML
4 INJECTION INTRAMUSCULAR; INTRAVENOUS
Status: DISCONTINUED | OUTPATIENT
Start: 2020-11-29 | End: 2020-12-03 | Stop reason: HOSPADM

## 2020-11-29 RX ORDER — ACETAMINOPHEN 325 MG/1
650 TABLET ORAL
Status: DISCONTINUED | OUTPATIENT
Start: 2020-11-29 | End: 2020-11-30

## 2020-11-29 RX ORDER — TRIAMTERENE AND HYDROCHLOROTHIAZIDE 37.5; 25 MG/1; MG/1
1 CAPSULE ORAL
COMMUNITY
End: 2020-12-03

## 2020-11-29 RX ORDER — ACETAMINOPHEN 650 MG/1
650 SUPPOSITORY RECTAL
Status: DISCONTINUED | OUTPATIENT
Start: 2020-11-29 | End: 2020-11-30

## 2020-11-29 RX ORDER — MORPHINE SULFATE 4 MG/ML
4 INJECTION INTRAVENOUS
Status: COMPLETED | OUTPATIENT
Start: 2020-11-29 | End: 2020-11-29

## 2020-11-29 RX ORDER — SODIUM CHLORIDE 0.9 % (FLUSH) 0.9 %
5-40 SYRINGE (ML) INJECTION AS NEEDED
Status: DISCONTINUED | OUTPATIENT
Start: 2020-11-29 | End: 2020-12-03 | Stop reason: HOSPADM

## 2020-11-29 RX ORDER — DICLOFENAC POTASSIUM 50 MG/1
TABLET, FILM COATED ORAL
COMMUNITY
Start: 2020-11-16 | End: 2020-12-03

## 2020-11-29 RX ORDER — SODIUM CHLORIDE 0.9 % (FLUSH) 0.9 %
10 SYRINGE (ML) INJECTION
Status: COMPLETED | OUTPATIENT
Start: 2020-11-29 | End: 2020-11-29

## 2020-11-29 RX ORDER — IBUPROFEN 600 MG/1
600 TABLET ORAL 4 TIMES DAILY
Status: DISCONTINUED | OUTPATIENT
Start: 2020-11-29 | End: 2020-12-02

## 2020-11-29 RX ORDER — AMLODIPINE BESYLATE 5 MG/1
5 TABLET ORAL
Status: DISCONTINUED | OUTPATIENT
Start: 2020-11-29 | End: 2020-12-03 | Stop reason: HOSPADM

## 2020-11-29 RX ORDER — GLIPIZIDE 10 MG/1
1 TABLET ORAL
Status: ON HOLD | COMMUNITY
End: 2020-12-11

## 2020-11-29 RX ORDER — SODIUM CHLORIDE 0.9 % (FLUSH) 0.9 %
5-40 SYRINGE (ML) INJECTION EVERY 8 HOURS
Status: DISCONTINUED | OUTPATIENT
Start: 2020-11-29 | End: 2020-12-03 | Stop reason: HOSPADM

## 2020-11-29 RX ORDER — HYDROCODONE BITARTRATE AND ACETAMINOPHEN 10; 325 MG/1; MG/1
1 TABLET ORAL
Status: DISCONTINUED | OUTPATIENT
Start: 2020-11-29 | End: 2020-11-30

## 2020-11-29 RX ORDER — CYCLOBENZAPRINE HCL 10 MG
TABLET ORAL
Status: ON HOLD | COMMUNITY
Start: 2020-10-29 | End: 2020-12-11

## 2020-11-29 RX ADMIN — IBUPROFEN 600 MG: 600 TABLET, FILM COATED ORAL at 17:35

## 2020-11-29 RX ADMIN — IOPAMIDOL 80 ML: 755 INJECTION, SOLUTION INTRAVENOUS at 13:01

## 2020-11-29 RX ADMIN — HYDROCODONE BITARTRATE AND ACETAMINOPHEN 1 TABLET: 10; 325 TABLET ORAL at 19:49

## 2020-11-29 RX ADMIN — CEFTRIAXONE 1 G: 1 INJECTION, POWDER, FOR SOLUTION INTRAMUSCULAR; INTRAVENOUS at 14:06

## 2020-11-29 RX ADMIN — MORPHINE SULFATE 4 MG: 4 INJECTION INTRAVENOUS at 17:07

## 2020-11-29 RX ADMIN — SODIUM CHLORIDE 100 ML: 900 INJECTION, SOLUTION INTRAVENOUS at 13:01

## 2020-11-29 RX ADMIN — INSULIN LISPRO 4 UNITS: 100 INJECTION, SOLUTION INTRAVENOUS; SUBCUTANEOUS at 21:04

## 2020-11-29 RX ADMIN — SODIUM CHLORIDE 500 ML: 900 INJECTION, SOLUTION INTRAVENOUS at 10:55

## 2020-11-29 RX ADMIN — SODIUM CHLORIDE 500 ML: 900 INJECTION, SOLUTION INTRAVENOUS at 13:12

## 2020-11-29 RX ADMIN — Medication 10 ML: at 13:01

## 2020-11-29 RX ADMIN — AZITHROMYCIN MONOHYDRATE 500 MG: 500 INJECTION, POWDER, LYOPHILIZED, FOR SOLUTION INTRAVENOUS at 14:45

## 2020-11-29 RX ADMIN — Medication 10 ML: at 21:05

## 2020-11-29 RX ADMIN — AMLODIPINE BESYLATE 5 MG: 5 TABLET ORAL at 21:04

## 2020-11-29 RX ADMIN — Medication 10 ML: at 17:33

## 2020-11-29 RX ADMIN — IBUPROFEN 600 MG: 600 TABLET, FILM COATED ORAL at 21:04

## 2020-11-29 NOTE — ED TRIAGE NOTES
Presents with upper left abdominal pain. Previous abdominal surgery and cancer. Mask applied. Denies urinary sx, nausea, vomiting, diarrhea, constipation.

## 2020-11-29 NOTE — PROGRESS NOTES
TRANSFER - IN REPORT:    Verbal report received from Trevor on [address]  being received from ED for routine progression of care      Report consisted of patients Situation, Background, Assessment and   Recommendations(SBAR). Information from the following report(s) SBAR was reviewed with the receiving nurse. Opportunity for questions and clarification was provided. Assessment completed upon patients arrival to unit and care assumed.

## 2020-11-29 NOTE — ED PROVIDER NOTES
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 He states it has been constant, waxes and wanes worse when he takes a deep breath or moves a certain way or coughs. The pain started out in his left mid back and has migrated around to his left upper quadrant. He denies any shortness of breath or nausea or diaphoresis. He was seen at [facility] 3 weeks ago, had a CT scan of his abdomen which was normal.  He states he continues to have pain. He also has been seen at [facility], [place], operated by [facility] twice for this. He denies any lower extremity swelling.            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    Type 2 diabetes mellitus ([facility] [address]     oral med, does not check BG at home; unknown last A1c       Past Surgical History:   Procedure Laterality Date    HX COLONOSCOPY  2016    HX HEENT  12/2017    bronchoscopy    HX HEENT  10/01/2018    Direct laryngoscopy with CO2 laser of supraglottic swelling    HX HEENT  12/03/2018    Panendoscopy w/Scar Revision/SFE/12-03-18    HX HEENT  02/24/2020    S/P SFE Direct laryngoscopy with biopsy, CO2 laser of supraglottic airway obstruction, Bronchoscopy with tracheal balloon dilation of tracheal stenosis 02/24/2020    HX HEENT  06/15/2020    Direct laryngoscopy with CO2 laser    HX OTHER SURGICAL Left age 11    2rd nipple removed    HX OTHER SURGICAL  01/2017    PEG placement and removal    HX TRACHEOSTOMY      placement and removal    HX VASCULAR ACCESS      placement and removal         Family History: Problem Relation Age of Onset    Stroke Mother     Lung Disease Mother        Social History     Socioeconomic History    Marital status:      Spouse name: Not on file    Number of children: Not on file    Years of education: Not on file    Highest education level: Not on file   Occupational History    Not on file   Social Needs    Financial resource strain: Not on file    Food insecurity     Worry: Not on file     Inability: Not on file    Transportation needs     Medical: Not on file     Non-medical: Not on file   Tobacco Use    Smoking status: Former Smoker     Packs/day: 2.00     Years: 20.00     Pack years: 40.00     Last attempt to quit: 2005     Years since quitting: 15.9    Smokeless tobacco: Never Used   Substance and Sexual Activity    Alcohol use: No    Drug use: No    Sexual activity: Not on file   Lifestyle    Physical activity     Days per week: Not on file     Minutes per session: Not on file    Stress: Not on file   Relationships    Social connections     Talks on phone: Not on file     Gets together: Not on file     Attends Worship service: Not on file     Active member of club or organization: Not on file     Attends meetings of clubs or organizations: Not on file     Relationship status: Not on file    Intimate partner violence     Fear of current or ex partner: Not on file     Emotionally abused: Not on file     Physically abused: Not on file     Forced sexual activity: Not on file   Other Topics Concern    Not on file   Social History Narrative    Not on file         ALLERGIES: Zofran [ondansetron hcl (pf)]    Review of Systems   Constitutional: Negative for chills and fever. Gastrointestinal: Negative for nausea and vomiting. All other systems reviewed and are negative.       Vitals:    11/29/20 0829   BP: 137/70   Pulse: 86   Resp: 20   Temp: 97.7 °F (36.5 °C)   SpO2: 95%   Weight: 113.4 kg (250 lb)   Height: 5' 10\" (1.778 m)            Physical Exam  Vitals signs and nursing note reviewed. Constitutional:       Appearance: He is well-developed. He is obese. HENT:      Head: Normocephalic and atraumatic. Eyes:      Conjunctiva/sclera: Conjunctivae normal.      Pupils: Pupils are equal, round, and reactive to light. Neck:      Musculoskeletal: Normal range of motion and neck supple. Cardiovascular:      Rate and Rhythm: Normal rate and regular rhythm. Pulmonary:      Effort: Pulmonary effort is normal. No respiratory distress. Breath sounds: No wheezing. Abdominal:      Palpations: Abdomen is soft. Comments: Mild tenderness to palpation left upper quadrant as indicated. Exam is limited by his body habitus. Musculoskeletal: Normal range of motion. Skin:     General: Skin is warm and dry. Neurological:      Mental Status: He is alert and oriented to person, place, and time. MDM  Number of Diagnoses or Management Options  Acute kidney injury [facility]): new and requires workup  Closed fracture of multiple ribs of left side, initial encounter: new and requires workup  Hypertension, unspecified type:   Pleural effusion: new and requires workup  Pneumonia of left lower lobe due to infectious organism: new and requires workup  Diagnosis management comments: 2:07 PM discussed results with patient, presence of pneumonia with pleural effusion and multiple rib fractures on the left side. Per old records, his creatinine has bumped from a 1-1.4 baseline to 1.97 today. Hospitalist has been paged for admission.        Amount and/or Complexity of Data Reviewed  Clinical lab tests: ordered and reviewed  Tests in the radiology section of CPT®: ordered and reviewed  Discuss the patient with other providers: yes    Risk of Complications, Morbidity, and/or Mortality  Presenting problems: moderate  Diagnostic procedures: moderate  Management options: moderate    Patient Progress  Patient progress: improved         Procedures

## 2020-11-29 NOTE — H&P
HOSPITALIST H&P/CONSULT  NAME:  Reena Ramos   Age:  60 y.o.  :   1955   MRN:   [id number]  PCP: Giovani Villarreal MD  Consulting MD:  Treatment Team: Attending Provider: Wesley Pressley MD; Primary Nurse: Manoj Collier  HPI:   Patient is a 57yo M with hx HTN, DM, and remote SCC of epiglottis who presents with L back/abdominal pain. The patient was seen at outside ER on 10/26 after pain began, evaluation including CXR and ct abdomen/pelvis normal. Pain did not improve. Seen again at urgent care and given antibiotics for pneumonia, pain improved for a few days and returned. Has been hearing popping sound in left side at times, associated with increased discomfort. Was sore and coughing this morning and felt another significant pop and worse pain so came in. No dyspnea, no fevers. Cough at baseline - has some aspiration sx since radiation. CTA with multiple left sided rib fractures, consolidation and effusion.      Complete ROS done and is as stated in HPI or otherwise negative  Past Medical History:   Diagnosis Date    Anxiety     Controlled with meds     GERD (gastroesophageal reflux disease)     managed with medication     Gout     symptoms in ankles, no recent episodes    Hypertension     managed with medication     Nausea & vomiting     Obesity     Squamous cell carcinoma of epiglottis ([facility] [address] 2016    Type 2 diabetes mellitus ([facility] [address]     oral med, does not check BG at home; unknown last A1c      Past Surgical History:   Procedure Laterality Date    HX COLONOSCOPY      HX HEENT  2017    bronchoscopy    HX HEENT  10/01/2018    Direct laryngoscopy with CO2 laser of supraglottic swelling    HX HEENT  2018    Panendoscopy w/Scar Revision/SFE/18    HX HEENT  2020    S/P SFE Direct laryngoscopy with biopsy, CO2 laser of supraglottic airway obstruction, Bronchoscopy with tracheal balloon dilation of tracheal stenosis 2020    HX HEENT 06/15/2020    Direct laryngoscopy with CO2 laser    HX OTHER SURGICAL Left age 11    2rd nipple removed    HX OTHER SURGICAL  01/2017    PEG placement and removal    HX TRACHEOSTOMY      placement and removal    HX VASCULAR ACCESS      placement and removal    reviewed  Prior to Admission Medications   Prescriptions Last Dose Informant Patient Reported? Taking? HYDROcodone-acetaminophen (NORCO)  mg tablet   Yes Yes   Sig: Take 1 Tab by mouth every four (4) hours as needed. allopurinoL (ZYLOPRIM) 300 mg tablet   Yes No   Sig: Take 1 Tab by mouth. amLODIPine (NORVASC) 10 mg tablet   Yes No   amLODIPine (NORVASC) 5 mg tablet   Yes No   Sig: Take 5 mg by mouth nightly. aspirin delayed-release 81 mg tablet   Yes No   Sig: Take 81 mg by mouth every morning. Take / use AM day of surgery  per anesthesia protocols. Indications: myocardial infarction prevention   citalopram (CELEXA) 40 mg tablet   No No   Sig: Take 1 Tab by mouth every evening. cyclobenzaprine (FLEXERIL) 10 mg tablet   Yes No   diclofenac potassium (CATAFLAM) 50 mg tablet   Yes No   doxycycline (VIBRAMYCIN) 100 mg capsule   Yes No   esomeprazole (NEXIUM) 20 mg capsule   Yes No   Sig: Take 20 mg by mouth daily. glipiZIDE (GLUCOTROL) 10 mg tablet   Yes No   Sig: Take 1 Tab by mouth.   lisinopril-hydroCHLOROthiazide (PRINZIDE, ZESTORETIC) 20-12.5 mg per tablet   Yes No   Sig: Take  by mouth nightly. metFORMIN (GLUMETZA ER) 500 mg TG24 24 hour tablet   Yes No   Sig: Take  by mouth daily. potassium chloride SR (KLOR-CON 10) 10 mEq tablet   Yes No   Sig: Take  by mouth every Monday and Friday. triamterene-hydroCHLOROthiazide (Dyazide) 37.5-25 mg per capsule   Yes No   Sig: Take 1 Cap by mouth.    valACYclovir (VALTREX) 1 gram tablet   Yes No      Facility-Administered Medications: None     Allergies   Allergen Reactions    Zofran [Ondansetron Hcl (Pf)] Other (comments)     Gives pt severe HA      Social History     Tobacco Use    Smoking status: Former Smoker     Packs/day: 2.00     Years: 20.00     Pack years: 40.00     Last attempt to quit: 2005     Years since quitting: 15.9    Smokeless tobacco: Never Used   Substance Use Topics    Alcohol use: No      Family History   Problem Relation Age of Onset    Stroke Mother     Lung Disease Mother     reviewed  Objective:     Visit Vitals  /70   Pulse 86   Temp 97.7 °F (36.5 °C)   Resp 20   Ht 5' 10\" (1.778 m)   Wt 113.4 kg (250 lb)   SpO2 95%   BMI 35.87 kg/m²      Temp (24hrs), Av.7 °F (36.5 °C), Min:97.7 °F (36.5 °C), Max:97.7 °F (36.5 °C)    Oxygen Therapy  O2 Sat (%): 95 % (20)  O2 Device: Room air (20)  Physical Exam:  General:    Alert, cooperative, no distress, appears stated age. Head:   Normocephalic, without obvious abnormality, atraumatic. Nose:  Nares normal. No drainage or sinus tenderness. Lungs:   Diminished left base. Left chest wall tenderness lower, paradoxical movement noted corresponding to rib separation on CT  Heart:   Regular rate and rhythm,  no murmur, rub or gallop. Abdomen:   Soft, non-tender. Not distended. Bowel sounds normal.   Extremities: No cyanosis. No edema. No clubbing  Skin:     Texture, turgor normal. No rashes or lesions.   Not Jaundiced  Neurologic: Alert and oriented x 3, no focal deficits   Data Review:   Recent Results (from the past 24 hour(s))   METABOLIC PANEL, COMPREHENSIVE    Collection Time: 20  8:32 AM   Result Value Ref Range    Sodium 132 (L) 136 - 145 mmol/L    Potassium 4.0 3.5 - 5.1 mmol/L    Chloride 94 (L) 98 - 107 mmol/L    CO2 31 21 - 32 mmol/L    Anion gap 7 7 - 16 mmol/L    Glucose 189 (H) 65 - 100 mg/dL    BUN 26 (H) 8 - 23 MG/DL    Creatinine 1.97 (H) 0.8 - 1.5 MG/DL    GFR est AA 44 (L) >60 ml/min/1.73m2    GFR est non-AA 37 (L) >60 ml/min/1.73m2    Calcium 8.7 8.3 - 10.4 MG/DL    Bilirubin, total 0.6 0.2 - 1.1 MG/DL    ALT (SGPT) 89 (H) 12 - 65 U/L    AST (SGOT) 77 (H) 15 - 37 U/L Alk. phosphatase 81 50 - 136 U/L    Protein, total 7.3 6.3 - 8.2 g/dL    Albumin 3.7 3.2 - 4.6 g/dL    Globulin 3.6 (H) 2.3 - 3.5 g/dL    A-G Ratio 1.0 (L) 1.2 - 3.5     LACTIC ACID    Collection Time: 11/29/20  8:32 AM   Result Value Ref Range    Lactic acid 1.6 0.4 - 2.0 MMOL/L   CBC WITH AUTOMATED DIFF    Collection Time: 11/29/20  9:35 AM   Result Value Ref Range    WBC 6.9 4.3 - 11.1 K/uL    RBC 4.00 (L) 4.23 - 5.6 M/uL    HGB 12.0 (L) 13.6 - 17.2 g/dL    HCT 33.8 (L) 41.1 - 50.3 %    MCV 84.5 79.6 - 97.8 FL    MCH 30.0 26.1 - 32.9 PG    MCHC 35.5 (H) 31.4 - 35.0 g/dL    RDW 12.9 11.9 - 14.6 %    PLATELET 911 643 - 317 K/uL    MPV 9.0 (L) 9.4 - 12.3 FL    ABSOLUTE NRBC 0.00 0.0 - 0.2 K/uL    DF AUTOMATED      NEUTROPHILS 84 (H) 43 - 78 %    LYMPHOCYTES 8 (L) 13 - 44 %    MONOCYTES 6 4.0 - 12.0 %    EOSINOPHILS 2 0.5 - 7.8 %    BASOPHILS 1 0.0 - 2.0 %    IMMATURE GRANULOCYTES 0 0.0 - 5.0 %    ABS. NEUTROPHILS 5.7 1.7 - 8.2 K/UL    ABS. LYMPHOCYTES 0.5 0.5 - 4.6 K/UL    ABS. MONOCYTES 0.4 0.1 - 1.3 K/UL    ABS. EOSINOPHILS 0.1 0.0 - 0.8 K/UL    ABS. BASOPHILS 0.0 0.0 - 0.2 K/UL    ABS. IMM. GRANS. 0.0 0.0 - 0.5 K/UL   LIPASE    Collection Time: 11/29/20  9:35 AM   Result Value Ref Range    Lipase 246 73 - 393 U/L     Imaging /Procedures /Studies   CT CHEST W CONT   Final Result   IMPRESSION:    1. Limited evaluation of the pulmonary arteries. No definite pulmonary   embolus. 2.  Left lower lobe infiltrate and effusion. 3.  Left seventh, eighth, ninth rib fractures. Separation of the eighth and   ninth ribs. ** If there are any questions about this report, I can be reached on   [employer] or at [phone] **      XR CHEST PA LAT   Final Result   IMPRESSION:   Moderate left lower lobe opacity which can reflect pneumonia in the correct   clinical context. Assessment and Plan:      Active Hospital Problems    Diagnosis Date Noted    Rib fracture 11/29/2020    Malignant neoplasm of larynx ([facility] [address] 12/18/2018    Diabetes mellitus with microalbuminuria ([facility] [address] 01/24/2017     Last Assessment & Plan:   His A1c is well controlled but his trend of weight gain is very concerning. We discussed at length healthy diet for diabetes, exercise, and careful monitoring. Follow-up in 3 months      Head and neck cancer ([facility] [address] 01/24/2017    HTN (hypertension) 01/24/2017     Last Assessment & Plan:   Inadequate control. Intensify therapy      Renal insufficiency 01/10/2017       PLAN:  Rib fractures  Left 7th, 8th, and 9th (x2) rib fractures with large separation between 8th and 9th ribs. Exam reveals paradoxical chest wall movement in this area. No respiratory compromise to necessitate surgical correction at this time  Has lung consolidation in this area but no clinical signs pneumonia. Unclear etiology for fractures. No trauma, may have occurred over several incidents of deep cough per history. Has chronic cough related to difficulty managing secretions after laryngeal cancer/radiation.    No obvious signs of metastasis/cancer recurrence on CT but may benefit from PET at some point   IS, schedule ibuprofen, Norco    HTN  Home norvasc    DM  SSI    CKD  Baseline per recent labs at [facility]      Anticipated discharge: less than two midnights     Signed By: Denzel Hopson MD     November 29, 2020

## 2020-11-30 ENCOUNTER — APPOINTMENT (OUTPATIENT)
Dept: GENERAL RADIOLOGY | Age: 65
DRG: 184 | End: 2020-11-30
Attending: FAMILY MEDICINE
Payer: COMMERCIAL

## 2020-11-30 LAB
ANION GAP SERPL CALC-SCNC: 5 MMOL/L (ref 7–16)
BUN SERPL-MCNC: 22 MG/DL (ref 8–23)
CALCIUM SERPL-MCNC: 8.2 MG/DL (ref 8.3–10.4)
CHLORIDE SERPL-SCNC: 95 MMOL/L (ref 98–107)
CO2 SERPL-SCNC: 31 MMOL/L (ref 21–32)
CREAT SERPL-MCNC: 1.89 MG/DL (ref 0.8–1.5)
ERYTHROCYTE [DISTWIDTH] IN BLOOD BY AUTOMATED COUNT: 13.2 % (ref 11.9–14.6)
GLUCOSE BLD STRIP.AUTO-MCNC: 136 MG/DL (ref 65–100)
GLUCOSE BLD STRIP.AUTO-MCNC: 200 MG/DL (ref 65–100)
GLUCOSE BLD STRIP.AUTO-MCNC: 215 MG/DL (ref 65–100)
GLUCOSE SERPL-MCNC: 217 MG/DL (ref 65–100)
HCT VFR BLD AUTO: 31.4 % (ref 41.1–50.3)
HGB BLD-MCNC: 10.9 G/DL (ref 13.6–17.2)
INR PPP: 1
MCH RBC QN AUTO: 30.2 PG (ref 26.1–32.9)
MCHC RBC AUTO-ENTMCNC: 34.7 G/DL (ref 31.4–35)
MCV RBC AUTO: 87 FL (ref 79.6–97.8)
NRBC # BLD: 0 K/UL (ref 0–0.2)
PLATELET # BLD AUTO: 222 K/UL (ref 150–450)
PMV BLD AUTO: 8.6 FL (ref 9.4–12.3)
POTASSIUM SERPL-SCNC: 4.7 MMOL/L (ref 3.5–5.1)
PROTHROMBIN TIME: 14.1 SEC (ref 12.5–14.7)
RBC # BLD AUTO: 3.61 M/UL (ref 4.23–5.6)
SODIUM SERPL-SCNC: 131 MMOL/L (ref 136–145)
WBC # BLD AUTO: 9.5 K/UL (ref 4.3–11.1)

## 2020-11-30 PROCEDURE — 82962 GLUCOSE BLOOD TEST: CPT

## 2020-11-30 PROCEDURE — 80048 BASIC METABOLIC PNL TOTAL CA: CPT

## 2020-11-30 PROCEDURE — 85027 COMPLETE CBC AUTOMATED: CPT

## 2020-11-30 PROCEDURE — 92610 EVALUATE SWALLOWING FUNCTION: CPT

## 2020-11-30 PROCEDURE — 74011636637 HC RX REV CODE- 636/637: Performed by: FAMILY MEDICINE

## 2020-11-30 PROCEDURE — 36415 COLL VENOUS BLD VENIPUNCTURE: CPT

## 2020-11-30 PROCEDURE — 74011250637 HC RX REV CODE- 250/637: Performed by: FAMILY MEDICINE

## 2020-11-30 PROCEDURE — 99218 HC RM OBSERVATION: CPT

## 2020-11-30 PROCEDURE — 71046 X-RAY EXAM CHEST 2 VIEWS: CPT

## 2020-11-30 PROCEDURE — 85610 PROTHROMBIN TIME: CPT

## 2020-11-30 RX ORDER — OXYCODONE HYDROCHLORIDE 5 MG/1
5 TABLET ORAL
Status: DISCONTINUED | OUTPATIENT
Start: 2020-11-30 | End: 2020-12-03 | Stop reason: HOSPADM

## 2020-11-30 RX ORDER — ACETAMINOPHEN 325 MG/1
650 TABLET ORAL EVERY 6 HOURS
Status: DISCONTINUED | OUTPATIENT
Start: 2020-11-30 | End: 2020-12-03 | Stop reason: HOSPADM

## 2020-11-30 RX ADMIN — Medication 10 ML: at 06:00

## 2020-11-30 RX ADMIN — IBUPROFEN 600 MG: 600 TABLET, FILM COATED ORAL at 08:16

## 2020-11-30 RX ADMIN — Medication 10 ML: at 14:00

## 2020-11-30 RX ADMIN — HYDROCODONE BITARTRATE AND ACETAMINOPHEN 1 TABLET: 10; 325 TABLET ORAL at 00:15

## 2020-11-30 RX ADMIN — INSULIN LISPRO 4 UNITS: 100 INJECTION, SOLUTION INTRAVENOUS; SUBCUTANEOUS at 12:14

## 2020-11-30 RX ADMIN — HYDROCODONE BITARTRATE AND ACETAMINOPHEN 1 TABLET: 10; 325 TABLET ORAL at 04:29

## 2020-11-30 RX ADMIN — ACETAMINOPHEN 650 MG: 325 TABLET, FILM COATED ORAL at 17:21

## 2020-11-30 RX ADMIN — INSULIN LISPRO 4 UNITS: 100 INJECTION, SOLUTION INTRAVENOUS; SUBCUTANEOUS at 21:38

## 2020-11-30 RX ADMIN — AMLODIPINE BESYLATE 5 MG: 5 TABLET ORAL at 21:38

## 2020-11-30 RX ADMIN — INSULIN LISPRO 4 UNITS: 100 INJECTION, SOLUTION INTRAVENOUS; SUBCUTANEOUS at 16:47

## 2020-11-30 RX ADMIN — Medication 10 ML: at 21:38

## 2020-11-30 RX ADMIN — IBUPROFEN 600 MG: 600 TABLET, FILM COATED ORAL at 12:16

## 2020-11-30 RX ADMIN — IBUPROFEN 600 MG: 600 TABLET, FILM COATED ORAL at 17:21

## 2020-11-30 RX ADMIN — HYDROCODONE BITARTRATE AND ACETAMINOPHEN 1 TABLET: 10; 325 TABLET ORAL at 08:15

## 2020-11-30 RX ADMIN — IBUPROFEN 600 MG: 600 TABLET, FILM COATED ORAL at 21:38

## 2020-11-30 RX ADMIN — ACETAMINOPHEN 650 MG: 325 TABLET, FILM COATED ORAL at 23:23

## 2020-11-30 NOTE — PROGRESS NOTES
Care Management Interventions  PCP Verified by CM: Yes(see every 3 months )  Palliative Care Criteria Met (RRAT>21 & CHF Dx)?: No(Dx Rib fractures )  Transition of Care Consult (CM Consult): Discharge Planning  Discharge Durable Medical Equipment: No(none)  Physical Therapy Consult: No  Occupational Therapy Consult: No  Speech Therapy Consult: No  Current Support Network: Lives with Spouse(wife Mayola Gowers [phone])  Confirm Follow Up Transport: Family  Discharge Location  Discharge Placement: Home  Met with patient for d/c planning. Patient alert and oriented x 3 independent of ADL's and lives with his wife Mayola Gowers in one story home with 4 steps to entrance. He requires no DME and has transportation and able to drive. He has  [phone]. His PCP is Dr. Mehreen Gee and he f/u with him every 3 months for his B/P. He voices no concerns or needs for d/c. Current d/c plan is home with wife when medically stable.

## 2020-11-30 NOTE — PROGRESS NOTES
Problem: Dysphagia (Adult)  Goal: *Speech Goal: (INSERT TEXT)  Description: LTG: Patient will tolerate least restrictive diet without overt signs or symptoms of airway compromise by discharge. STG: Patient will tolerate regular texture diet and nectar liquids without overt signs or symptoms of aspiration 95% of the time. STG: Patient will utilize compensatory strategies of slow rate and small bites/sips with min cues to improve swallow safety. STG: Patient will perform laryngeal strengthening exercises x10 each with 80% accuracy to improve strength and coordination of swallowing function. STG: Patient will participate in modified barium swallow study as clinically indicated.    Outcome: Progressing Towards Goal     SPEECH LANGUAGE PATHOLOGY: DYSPHAGIA- Initial Assessment   OBSERVATION PATIENT    NAME/AGE/GENDER: Thompson Halsted is a 60 y.o. male  DATE: 11/30/2020  PRIMARY DIAGNOSIS: Rib fracture [S22.39XA]      ICD-10: Treatment Diagnosis: R13.12 Dysphagia, Oropharyngeal Phase    RECOMMENDATIONS   DIET:    PO diet texture:  Regular   Liquids:  nectar    MEDICATIONS: With thickened liquids     COMPENSATORY STRATEGIES/MODIFICATIONS INCLUDING:  · Upright for all PO  · Small bites and sips  · Clear throat periodically and dry swallow  · Remain upright for 20-30 min after any PO  · Slow rate of PO intake     OTHER RECOMMENDATIONS (including follow up treatment recommendations):   · Treatment to improve/facilitate oral/pharyngeal skills   · Training in laryngeal strengthening and coordination exercises  · Training in use of compensatory safe swallowing strategies/feeding guidelines  · Patient/family education       EDUCATION:  · Recommendations discussed with Patient     CONTINUATION OF SKILLED SERVICES/MEDICAL NECESSITY:   Patient is expected to demonstrate progress in  swallow function, diet tolerance and swallow safety in order to  improve swallow safety, work toward diet advancement and decrease aspiration risk.   Patient continues to require skilled intervention due to dysphagia. RECOMMENDATIONS for CONTINUED SPEECH THERAPY:   YES: Anticipate need for ongoing speech therapy during this hospitalization. ASSESSMENT   Patient presents with immediate strong cough with trials of water and delayed cough with trials of carbonated beverages. No overt signs or symptoms of aspiration observed with nectar thick liquids, applesauce or solomon cracker. Patient declined trial of mixed consistency due to lack of dentition and inability to adequately masticate that fruit during lunch earlier. Discussed mechanical soft diet due to lack of dentition and patient declined stating he would rather have the regular diet with option to order what he knows he can chew. Discussed history of aspiration with patient who reports he has been eating regular textures and thin liquids for 3 years and has not had pneumonia. He admits that the MD has mentioned he might have pneumonia currently, but he is not interested in pursuing further objective assessment of swallow via Modified Barium Swallow study. Patient is willing to have liquids thickened to nectar consistency while he is in the hospital.    Recommend regular diet and nectar thick liquids. Give meds with thickened liquid or whole in puree. REHABILITATION POTENTIAL FOR STATED GOALS: Good    PLAN    FREQUENCY/DURATION: Continue to follow patient 2 times a week for duration of hospital stay to address above goals. - Recommendations for next treatment session: Next treatment will address diet tolerance and laryngeal exercises    SUBJECTIVE   Patient pleasant and cooperative. However, he states he is not interested in swallowing intervention. He was willing to have his swallow assessed at bedside and is willing to try thickened liquids, as his cough hurts his ribs.     History of Present Injury/Illness: Mr. Flor Martinez  has a past medical history of Anxiety, GERD (gastroesophageal reflux disease), Gout, Hypertension, Nausea & vomiting, Obesity, Squamous cell carcinoma of epiglottis ([facility] [address] (11/23/2016), and Type 2 diabetes mellitus ([facility] He also has no past medical history of Malignant hyperthermia due to anesthesia or Pseudocholinesterase deficiency. Oliver Austin He also  has a past surgical history that includes hx colonoscopy (2016); hx tracheostomy; hx vascular access; hx other surgical (Left, age 12); hx other surgical (01/2017); hx heent (12/2017); hx heent (10/01/2018); hx heent (12/03/2018); hx heent (02/24/2020); and hx heent (06/15/2020). Problem List:  (Impairments causing functional limitations):  1. dysphagia    Previous Dysphagia: YES Patient has a history of silent aspiration. He was noncompliant with recommendations for NPO in the past and inconsistently participated in outpatient swallowing therapy. His last MBS was 9/13/17 which recommended initiated regular diet and thin liquids using supraglottic swallow. Diet Prior to Evaluation: regular texture, thin liquids    Orientation:   Person  Place  Time  Situation    Pain: Pain Scale 1: Numeric (0 - 10)  Pain Intensity 1: 0  Pain Location 1: Abdomen  Pain Intervention(s) 1: Medication (see MAR)    Cognitive-Linguistic Screening:   Speech Production:   o WFL   Expressive Language:  o WFL   Receptive Language:  o WFL   Cognition:   o WFL  Prior Level of Function: WFL  Recommendations: Given results of screening, cognition appears to be within normal limits. No further assessment indicated at this time.        OBJECTIVE   Oral Motor:   · Labial: No impairment  · Dentition: Edentulous  · Oral Hygiene: Adequate  · Lingual: No impairment    Swallow evaluation:   Patient consumed trials of thin liquids, carbonated liquids, nectar liquids, applesauce, cracker    PRECAUTIONS/ALLERGIES: Zofran [ondansetron hcl (pf)]     Tool Used: Dysphagia Outcome and Severity Scale (GUI)    Score Comments   Normal Diet  [] 7 With no strategies or extra time needed   Functional Swallow  [] 6 May have mild oral or pharyngeal delay   Mild Dysphagia  [] 5 Which may require one diet consistency restricted    Mild-Moderate Dysphagia  [] 4 With 1-2 diet consistencies restricted   Moderate Dysphagia  [] 3 With 2 or more diet consistencies restricted   Moderate-Severe Dysphagia  [] 2 With partial PO strategies (trials with ST only)   Severe Dysphagia  [] 1 With inability to tolerate any PO safely      Score:  Initial: 5 Most Recent: 5 (Date 11/30/20 )   Interpretation of Tool: The Dysphagia Outcome and Severity Scale (GUI) is a simple, easy-to-use, 7-point scale developed to systematically rate the functional severity of dysphagia based on objective assessment and make recommendations for diet level, independence level, and type of nutrition. Current Medications:   No current facility-administered medications on file prior to encounter. Current Outpatient Medications on File Prior to Encounter   Medication Sig Dispense Refill    allopurinoL (ZYLOPRIM) 300 mg tablet Take 1 Tab by mouth.  triamterene-hydroCHLOROthiazide (Dyazide) 37.5-25 mg per capsule Take 1 Cap by mouth.  amLODIPine (NORVASC) 10 mg tablet       potassium chloride SR (KLOR-CON 10) 10 mEq tablet Take  by mouth every Monday and Friday.  amLODIPine (NORVASC) 5 mg tablet Take 5 mg by mouth nightly.  esomeprazole (NEXIUM) 20 mg capsule Take 20 mg by mouth daily.  citalopram (CELEXA) 40 mg tablet Take 1 Tab by mouth every evening. 30 Tab 0    metFORMIN (GLUMETZA ER) 500 mg TG24 24 hour tablet Take  by mouth daily.  lisinopril-hydroCHLOROthiazide (PRINZIDE, ZESTORETIC) 20-12.5 mg per tablet Take  by mouth nightly.  cyclobenzaprine (FLEXERIL) 10 mg tablet       diclofenac potassium (CATAFLAM) 50 mg tablet       doxycycline (VIBRAMYCIN) 100 mg capsule       glipiZIDE (GLUCOTROL) 10 mg tablet Take 1 Tab by mouth.       HYDROcodone-acetaminophen (NORCO)  mg tablet Take 1 Tab by mouth every four (4) hours as needed.  valACYclovir (VALTREX) 1 gram tablet       aspirin delayed-release 81 mg tablet Take 81 mg by mouth every morning. Take / use AM day of surgery  per anesthesia protocols.    Indications: myocardial infarction prevention         After treatment position/precautions:  · Upright in bed  · Call light within reach    Total Treatment Duration:   Time In: 1235  Time Out: Alejandra Smith MA, CCC-SLP

## 2020-11-30 NOTE — PROGRESS NOTES
Hospitalist Progress Note    2020  Admit Date: 2020  8:44 AM   NAME: Jacy Garcia   :  1955   MRN:  [id number]   Attending: Erika Arellano MD  PCP:  Lon Garcia MD    SUBJECTIVE:   Patient is a 57yo M with hx DM, HTN, CKD, and remote SCC of epiglottus (s/p chemo, rads) who presented with L side pain, found to have multiple rib fractures. No trauma. The patient has a history of aspiration symptoms and deep cough since cancer treatment, no infections.   Follow-up XR with increased effusion. Oxygen concentration trending lower. Reviewed CT findings with pulm, general surgery. Review of Systems negative with exception of pertinent positives noted above  PHYSICAL EXAM     Visit Vitals  /75 (BP 1 Location: Left arm, BP Patient Position: Head of bed elevated (Comment degrees)) Comment (BP Patient Position): 45   Pulse 90   Temp 98 °F (36.7 °C)   Resp 16   Ht 5' 10\" (1.778 m)   Wt 113.4 kg (250 lb)   SpO2 92%   BMI 35.87 kg/m²      Temp (24hrs), Av °F (36.7 °C), Min:97.2 °F (36.2 °C), Max:98.4 °F (36.9 °C)    Oxygen Therapy  O2 Sat (%): 92 % (20 1425)  Pulse via Oximetry: 85 beats per minute (20 1616)  O2 Device: Room air (20 1427)    Intake/Output Summary (Last 24 hours) at 2020 1614  Last data filed at 2020 1252  Gross per 24 hour   Intake 410 ml   Output    Net 410 ml      General:          Alert, cooperative, no distress, appears stated age. Head:               Normocephalic, without obvious abnormality, atraumatic. Nose:               Nares normal. No drainage or sinus tenderness. Lungs:             Diminished left base. Left chest wall tenderness lower, paradoxical movement noted corresponding to rib separation on CT  Heart:              Regular rate and rhythm,  no murmur, rub or gallop. Abdomen:        Soft, non-tender. Not distended. Bowel sounds normal.   Extremities:     No cyanosis. No edema.  No clubbing  Skin: Texture, turgor normal. No rashes or lesions. Not Jaundiced  Neurologic:      Alert and oriented x 3, no focal deficits     XR CHEST PA LAT   Final Result   IMPRESSION: Larger left pleural effusion. CT CHEST W CONT   Final Result   IMPRESSION:    1. Limited evaluation of the pulmonary arteries. No definite pulmonary   embolus. 2.  Left lower lobe infiltrate and effusion. 3.  Left seventh, eighth, ninth rib fractures. Separation of the eighth and   ninth ribs. ** If there are any questions about this report, I can be reached on   [employer] or at [phone] **      XR CHEST PA LAT   Final Result   IMPRESSION:   Moderate left lower lobe opacity which can reflect pneumonia in the correct   clinical context. ASSESSMENT      Active Hospital Problems    Diagnosis Date Noted    Rib fracture 11/29/2020    Malignant neoplasm of larynx ([facility] [address] 12/18/2018    Diabetes mellitus with microalbuminuria ([facility] [address] 01/24/2017     Last Assessment & Plan:   His A1c is well controlled but his trend of weight gain is very concerning. We discussed at length healthy diet for diabetes, exercise, and careful monitoring. Follow-up in 3 months      Head and neck cancer ([facility] [address] 01/24/2017    HTN (hypertension) 01/24/2017     Last Assessment & Plan:   Inadequate control. Intensify therapy      Renal insufficiency 01/10/2017     Plan:    Rib fractures  Left 7th, 8th, and 9th (x2) rib fractures with large separation between 8th and 9th ribs. Exam reveals paradoxical chest wall movement in this area. No respiratory compromise to necessitate surgical correction at this time. Reviewed the images with pulm and general surgery. Has lung consolidation in this area but no clinical signs pneumonia. Unclear etiology for fractures. No trauma, may have occurred over several incidents of deep cough per history. Has chronic cough related to difficulty managing secretions after laryngeal cancer/radiation.    No obvious signs of metastasis/cancer recurrence on CT but may benefit from PET at some point   IS, scheduled ibuprofen/tylenol. Prn oxycodone     HTN  Home norvasc     DM  SSI     CKD  Baseline per recent labs at [facility]    In light of lower oxygen sat (90 at rest) and increase in effusions (possibly blood) will continue to observe. May need thoracentesis or evaluation with chest surgeon pending progress.      Signed By: Dolores Dawn MD     November 30, 2020

## 2020-11-30 NOTE — PROGRESS NOTES
's initial visit to explore spiritual needs and convey care and concern. The visit was welcomed and I offered spiritual interventions including active listening, affirmation of ann & emotions, and prayer as requested. Patient expressed gratitude for the visit. Chaplains remain available for follow-up care.     Sudarshan 86, Dawit 68  Board Certified

## 2020-12-01 ENCOUNTER — APPOINTMENT (OUTPATIENT)
Dept: GENERAL RADIOLOGY | Age: 65
DRG: 184 | End: 2020-12-01
Attending: FAMILY MEDICINE
Payer: COMMERCIAL

## 2020-12-01 LAB
ANION GAP SERPL CALC-SCNC: 7 MMOL/L (ref 7–16)
BUN SERPL-MCNC: 37 MG/DL (ref 8–23)
CALCIUM SERPL-MCNC: 8.5 MG/DL (ref 8.3–10.4)
CHLORIDE SERPL-SCNC: 97 MMOL/L (ref 98–107)
CO2 SERPL-SCNC: 28 MMOL/L (ref 21–32)
CREAT SERPL-MCNC: 2.55 MG/DL (ref 0.8–1.5)
ERYTHROCYTE [DISTWIDTH] IN BLOOD BY AUTOMATED COUNT: 13.2 % (ref 11.9–14.6)
GLUCOSE BLD STRIP.AUTO-MCNC: 173 MG/DL (ref 65–100)
GLUCOSE BLD STRIP.AUTO-MCNC: 174 MG/DL (ref 65–100)
GLUCOSE BLD STRIP.AUTO-MCNC: 194 MG/DL (ref 65–100)
GLUCOSE BLD STRIP.AUTO-MCNC: 202 MG/DL (ref 65–100)
GLUCOSE BLD STRIP.AUTO-MCNC: 216 MG/DL (ref 65–100)
GLUCOSE SERPL-MCNC: 144 MG/DL (ref 65–100)
HCT VFR BLD AUTO: 29.7 % (ref 41.1–50.3)
HGB BLD-MCNC: 10 G/DL (ref 13.6–17.2)
MCH RBC QN AUTO: 29.5 PG (ref 26.1–32.9)
MCHC RBC AUTO-ENTMCNC: 33.7 G/DL (ref 31.4–35)
MCV RBC AUTO: 87.6 FL (ref 79.6–97.8)
NRBC # BLD: 0 K/UL (ref 0–0.2)
PLATELET # BLD AUTO: 220 K/UL (ref 150–450)
PMV BLD AUTO: 9.1 FL (ref 9.4–12.3)
POTASSIUM SERPL-SCNC: 4.1 MMOL/L (ref 3.5–5.1)
RBC # BLD AUTO: 3.39 M/UL (ref 4.23–5.6)
SODIUM SERPL-SCNC: 132 MMOL/L (ref 136–145)
WBC # BLD AUTO: 8.1 K/UL (ref 4.3–11.1)

## 2020-12-01 PROCEDURE — 82962 GLUCOSE BLOOD TEST: CPT

## 2020-12-01 PROCEDURE — 36415 COLL VENOUS BLD VENIPUNCTURE: CPT

## 2020-12-01 PROCEDURE — 71045 X-RAY EXAM CHEST 1 VIEW: CPT

## 2020-12-01 PROCEDURE — 74011636637 HC RX REV CODE- 636/637: Performed by: FAMILY MEDICINE

## 2020-12-01 PROCEDURE — 97161 PT EVAL LOW COMPLEX 20 MIN: CPT

## 2020-12-01 PROCEDURE — 74011250636 HC RX REV CODE- 250/636: Performed by: INTERNAL MEDICINE

## 2020-12-01 PROCEDURE — 2709999900 HC NON-CHARGEABLE SUPPLY

## 2020-12-01 PROCEDURE — 80048 BASIC METABOLIC PNL TOTAL CA: CPT

## 2020-12-01 PROCEDURE — 92526 ORAL FUNCTION THERAPY: CPT

## 2020-12-01 PROCEDURE — 99218 HC RM OBSERVATION: CPT

## 2020-12-01 PROCEDURE — 85027 COMPLETE CBC AUTOMATED: CPT

## 2020-12-01 PROCEDURE — 74011250637 HC RX REV CODE- 250/637: Performed by: FAMILY MEDICINE

## 2020-12-01 RX ORDER — SODIUM CHLORIDE 9 MG/ML
100 INJECTION, SOLUTION INTRAVENOUS CONTINUOUS
Status: DISCONTINUED | OUTPATIENT
Start: 2020-12-01 | End: 2020-12-03 | Stop reason: HOSPADM

## 2020-12-01 RX ADMIN — ACETAMINOPHEN 650 MG: 325 TABLET, FILM COATED ORAL at 12:11

## 2020-12-01 RX ADMIN — SODIUM CHLORIDE 100 ML/HR: 900 INJECTION, SOLUTION INTRAVENOUS at 12:13

## 2020-12-01 RX ADMIN — IBUPROFEN 600 MG: 600 TABLET, FILM COATED ORAL at 17:42

## 2020-12-01 RX ADMIN — IBUPROFEN 600 MG: 600 TABLET, FILM COATED ORAL at 08:55

## 2020-12-01 RX ADMIN — OXYCODONE HYDROCHLORIDE 5 MG: 5 TABLET ORAL at 04:09

## 2020-12-01 RX ADMIN — IBUPROFEN 600 MG: 600 TABLET, FILM COATED ORAL at 12:12

## 2020-12-01 RX ADMIN — POLYETHYLENE GLYCOL 3350 17 G: 17 POWDER, FOR SOLUTION ORAL at 08:55

## 2020-12-01 RX ADMIN — Medication 10 ML: at 21:45

## 2020-12-01 RX ADMIN — INSULIN LISPRO 4 UNITS: 100 INJECTION, SOLUTION INTRAVENOUS; SUBCUTANEOUS at 21:43

## 2020-12-01 RX ADMIN — SODIUM CHLORIDE 100 ML/HR: 900 INJECTION, SOLUTION INTRAVENOUS at 22:22

## 2020-12-01 RX ADMIN — Medication 10 ML: at 06:00

## 2020-12-01 RX ADMIN — INSULIN LISPRO 2 UNITS: 100 INJECTION, SOLUTION INTRAVENOUS; SUBCUTANEOUS at 12:10

## 2020-12-01 RX ADMIN — INSULIN LISPRO 2 UNITS: 100 INJECTION, SOLUTION INTRAVENOUS; SUBCUTANEOUS at 09:02

## 2020-12-01 RX ADMIN — IBUPROFEN 600 MG: 600 TABLET, FILM COATED ORAL at 21:43

## 2020-12-01 RX ADMIN — AMLODIPINE BESYLATE 5 MG: 5 TABLET ORAL at 21:43

## 2020-12-01 RX ADMIN — INSULIN LISPRO 4 UNITS: 100 INJECTION, SOLUTION INTRAVENOUS; SUBCUTANEOUS at 16:55

## 2020-12-01 NOTE — PROGRESS NOTES
Care Management Interventions  PCP Verified by CM: Yes(see every 3 months )  Palliative Care Criteria Met (RRAT>21 & CHF Dx)?: No(Dx Rib fractures )  Transition of Care Consult (CM Consult): Discharge Planning  Discharge Durable Medical Equipment: No(none)  Physical Therapy Consult: No  Occupational Therapy Consult: No  Speech Therapy Consult: No  Current Support Network: Lives with Spouse(wife Elder Medici [phone])  Confirm Follow Up Transport: Family  Discharge Location  Discharge Placement: Home  Interdisciplinary rounds with Dr Mario Rivas. Patient with some pleural effusion and episodes of lower oxygenation, Creatinine up plan hydrate patient more and monitor ambulation and oxygen. Current d/c plan is home with spouse when medically stable.

## 2020-12-01 NOTE — PROGRESS NOTES
Progress Note    Patient: Armaan Mistry MRN: [id number]  SSN: [id number]    YOB: 1955  Age: 60 y.o. Sex: male      Admit Date: 11/29/2020    LOS: 0 days     Subjective:     Patient with past medical history of DM, HTN, CKD, and remote SCC of epiglottus (s/p chemo, rads) and obesity     Admitted due to left side pain. Found to have multiple rib fractures. No trauma. History of deep coughing. Some pleural effusion and episodes of lower oxygenation. Patient is being monitored. No intervention surgically thus far. Objective:     Vitals:    11/30/20 1950 11/30/20 2329 12/01/20 0415 12/01/20 0750   BP: 101/69 95/62 105/65 (!) 107/59   Pulse: 86 71 79 77   Resp: 16 15 16 16   Temp: 97.5 °F (36.4 °C) 98.1 °F (36.7 °C) 97.4 °F (36.3 °C) 97.4 °F (36.3 °C)   SpO2: 92% 94% 95% 91%   Weight:       Height:            Intake and Output:  Current Shift: No intake/output data recorded. Last three shifts: 11/29 1901 - 12/01 0700  In: 480 [P.O.:480]  Out: -     Physical Exam:     General:                    The patient is a pleasant male in no acute respiratory distress. Obese. Head:                                   Normocephalic/atraumatic. Eyes:                                   No palpebral pallor or scleral icterus. ENT:                                    External auricular and nasal exam within normal limits. Mucous membranes are moist.  Neck:                                   Supple, non-tender, no JVD. Lungs:                       Clear to auscultation bilaterally without wheezes or crackles. Tender on the left side of rib cage. No respiratory distress or accessory muscle use. Heart:                                  Regular rate and rhythm, without murmurs, rubs, or gallops. Abdomen:                  Soft, non-tender, distended due to truncal obesity with normoactive bowel sounds. Genitourinary:           No tenderness over the bladder or bilateral CVAs. Extremities:               Without clubbing, cyanosis, or edema. Skin:                                    Normal color, texture, and turgor. No rashes, lesions, or jaundice. Pulses:                      Radial and dorsalis pedis pulses present 2+ bilaterally. Capillary refill <2s. Neurologic:                CN II-XII grossly intact and symmetrical.                                               Moving all four extremities well with no focal deficits. Psychiatric:                Pleasant demeanor, appropriate affect.  Alert and oriented x 3      Lab/Data Review:    Recent Results (from the past 24 hour(s))   CBC W/O DIFF    Collection Time: 11/30/20 11:01 AM   Result Value Ref Range    WBC 9.5 4.3 - 11.1 K/uL    RBC 3.61 (L) 4.23 - 5.6 M/uL    HGB 10.9 (L) 13.6 - 17.2 g/dL    HCT 31.4 (L) 41.1 - 50.3 %    MCV 87.0 79.6 - 97.8 FL    MCH 30.2 26.1 - 32.9 PG    MCHC 34.7 31.4 - 35.0 g/dL    RDW 13.2 11.9 - 14.6 %    PLATELET 821 056 - 965 K/uL    MPV 8.6 (L) 9.4 - 12.3 FL    ABSOLUTE NRBC 0.00 0.0 - 0.2 K/uL   METABOLIC PANEL, BASIC    Collection Time: 11/30/20 11:01 AM   Result Value Ref Range    Sodium 131 (L) 136 - 145 mmol/L    Potassium 4.7 3.5 - 5.1 mmol/L    Chloride 95 (L) 98 - 107 mmol/L    CO2 31 21 - 32 mmol/L    Anion gap 5 (L) 7 - 16 mmol/L    Glucose 217 (H) 65 - 100 mg/dL    BUN 22 8 - 23 MG/DL    Creatinine 1.89 (H) 0.8 - 1.5 MG/DL    GFR est AA 46 (L) >60 ml/min/1.73m2    GFR est non-AA 38 (L) >60 ml/min/1.73m2    Calcium 8.2 (L) 8.3 - 10.4 MG/DL   PROTHROMBIN TIME + INR    Collection Time: 11/30/20 11:01 AM   Result Value Ref Range    Prothrombin time 14.1 12.5 - 14.7 sec    INR 1.0     GLUCOSE, POC    Collection Time: 11/30/20  4:40 PM   Result Value Ref Range    Glucose (POC) 215 (H) 65 - 100 mg/dL   GLUCOSE, POC    Collection Time: 11/30/20  8:48 PM   Result Value Ref Range Glucose (POC) 200 (H) 65 - 708 mg/dL   METABOLIC PANEL, BASIC    Collection Time: 12/01/20  5:55 AM   Result Value Ref Range    Sodium 132 (L) 136 - 145 mmol/L    Potassium 4.1 3.5 - 5.1 mmol/L    Chloride 97 (L) 98 - 107 mmol/L    CO2 28 21 - 32 mmol/L    Anion gap 7 7 - 16 mmol/L    Glucose 144 (H) 65 - 100 mg/dL    BUN 37 (H) 8 - 23 MG/DL    Creatinine 2.55 (H) 0.8 - 1.5 MG/DL    GFR est AA 33 (L) >60 ml/min/1.73m2    GFR est non-AA 27 (L) >60 ml/min/1.73m2    Calcium 8.5 8.3 - 10.4 MG/DL   CBC W/O DIFF    Collection Time: 12/01/20  5:55 AM   Result Value Ref Range    WBC 8.1 4.3 - 11.1 K/uL    RBC 3.39 (L) 4.23 - 5.6 M/uL    HGB 10.0 (L) 13.6 - 17.2 g/dL    HCT 29.7 (L) 41.1 - 50.3 %    MCV 87.6 79.6 - 97.8 FL    MCH 29.5 26.1 - 32.9 PG    MCHC 33.7 31.4 - 35.0 g/dL    RDW 13.2 11.9 - 14.6 %    PLATELET 564 836 - 719 K/uL    MPV 9.1 (L) 9.4 - 12.3 FL    ABSOLUTE NRBC 0.00 0.0 - 0.2 K/uL   GLUCOSE, POC    Collection Time: 12/01/20  7:55 AM   Result Value Ref Range    Glucose (POC) 173 (H) 65 - 100 mg/dL     CT chest   11-  IMPRESSION:   1. Limited evaluation of the pulmonary arteries. No definite pulmonary  embolus. 2.  Left lower lobe infiltrate and effusion. 3.  Left seventh, eighth, ninth rib fractures. Separation of the eighth and  ninth ribs. XR chest   12-1-2020  IMPRESSION: Left lower lobe atelectasis or pneumonia unchanged.       Current Facility-Administered Medications:     acetaminophen (TYLENOL) tablet 650 mg, 650 mg, Oral, Q6H, Gene Martino MD, Stopped at 12/01/20 0600    oxyCODONE IR (ROXICODONE) tablet 5 mg, 5 mg, Oral, Q6H PRN, Gene Martino MD, 5 mg at 12/01/20 0409    amLODIPine (NORVASC) tablet 5 mg, 5 mg, Oral, QHS, Gene Martino MD, 5 mg at 11/30/20 2138    insulin lispro (HUMALOG) injection, , SubCUTAneous, AC&HS, Gene Martino MD, 2 Units at 12/01/20 0902    ibuprofen (MOTRIN) tablet 600 mg, 600 mg, Oral, QID, Gene Martino MD, 600 mg at 12/01/20 0855    sodium chloride (NS) flush 5-40 mL, 5-40 mL, IntraVENous, Q8H, Neena Pelayo MD, 10 mL at 12/01/20 0600    sodium chloride (NS) flush 5-40 mL, 5-40 mL, IntraVENous, PRN, Neena Pelayo MD    polyethylene glycol [facility]) packet 17 g, 17 g, Oral, DAILY PRN, Neena Pelayo MD, 17 g at 12/01/20 0855    promethazine (PHENERGAN) tablet 12.5 mg, 12.5 mg, Oral, Q6H PRN **OR** ondansetron (ZOFRAN) injection 4 mg, 4 mg, IntraVENous, Q6H PRN, Neena Pelayo MD             Assessment:     Principal Problem:    Rib fracture (11/29/2020)    Active Problems:    Renal insufficiency (1/10/2017)      HTN (hypertension) (1/24/2017)      Overview: Last Assessment & Plan:       Inadequate control. Intensify therapy      Head and neck cancer ([facility] [address] (1/24/2017)      Diabetes mellitus with microalbuminuria ([facility] [address] (1/24/2017)      Overview: Last Assessment & Plan:       His A1c is well controlled but his trend of weight gain is very       concerning. We discussed at length healthy diet for diabetes, exercise,       and careful monitoring. Follow-up in 3 months      Malignant neoplasm of larynx ([facility] [address] (12/18/2018)        Plan:     Multiple rib fractures   Non-traumatic   Likely from frequent deep coughing   Some episodes of lower oxygenation with exertion. Will continue to monitor and have physical therapy work with him to improve ambulation and strength and be monitored with activities. Hypertension  Monitor blood pressure and manage accordingly. Continue home medications. Diabetes mellitus type 2  Monitor blood sugar. Cover with insulin sliding scale accordingly. ISI   Creatinine is up. Will hydrate patient more. Monitor renal function and intake and output. Avoid nephrotoxic agents. I have discussed the plan of care with patient.       DVT prophylaxis : SCD     Signed By: Vidhi Mckoy MD     December 1, 2020

## 2020-12-01 NOTE — PROGRESS NOTES
Problem: Mobility Impaired (Adult and Pediatric)  Goal: *Acute Goals and Plan of Care (Insert Text)  Outcome: Resolved/Met     PHYSICAL THERAPY: Initial Assessment and Discharge 12/1/2020  OBSERVATION: PT Visit Days : 1  Payor: [employer] / Plan: [place] [employer] LAM / Product Type: LAM /       NAME/AGE/GENDER: Armaan Mistry is a 60 y.o. male   PRIMARY DIAGNOSIS: Rib fracture [S22.39XA] Rib fracture Rib fracture        ICD-10: Treatment Diagnosis:    Generalized Muscle Weakness (M62.81)   Precaution/Allergies:  Zofran Marlin Ray hcl (pf)]      ASSESSMENT:     Mr. Karen Terrell presents with L sided 7th, 8th, and 9th rib fractures not due to trauma. 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 He is independent at baseline and demonstrated continued independence with all mobility at time of assessment. Patient denied any therapy concerns/needs, just wants to know why his ribs fractured. Patient will be discharged from acute therapy services. This section established at most recent assessment   PROBLEM LIST (Impairments causing functional limitations):  None    INTERVENTIONS PLANNED: (Benefits and precautions of physical therapy have been discussed with the patient.)  None      TREATMENT PLAN: Frequency/Duration: One time visit for assessment       REHAB RECOMMENDATIONS (at time of discharge pending progress):    Placement: It is my opinion, based on this patient's performance to date, that Mr. Karen Terrell may benefit from being discharged with NO further skilled therapy due to a proven ability to function at baseline. Equipment:   None at this time              HISTORY:   History of Present Injury/Illness (Reason for Referral):  Patient is a 57yo M with hx HTN, DM, and remote SCC of epiglottis who presents with L back/abdominal pain. The patient was seen at outside ER on 10/26 after pain began, evaluation including CXR and ct abdomen/pelvis normal. Pain did not improve. Seen again at urgent care and given antibiotics for pneumonia, pain improved for a few days and returned. Has been hearing popping sound in left side at times, associated with increased discomfort. Was sore and coughing this morning and felt another significant pop and worse pain so came in. No dyspnea, no fevers. Cough at baseline - has some aspiration sx since radiation. CTA with multiple left sided rib fractures, consolidation and effusion. Past Medical History/Comorbidities:   Mr. Sudhir King  has a past medical history of Anxiety, GERD (gastroesophageal reflux disease), Gout, Hypertension, Nausea & vomiting, Obesity, Squamous cell carcinoma of epiglottis ([facility] [address] (11/23/2016), and Type 2 diabetes mellitus ([facility] [address] He also has no past medical history of Malignant hyperthermia due to anesthesia or Pseudocholinesterase deficiency. Mr. Sudhir King  has a past surgical history that includes hx colonoscopy (2016); hx tracheostomy; hx vascular access; hx other surgical (Left, age 12); hx other surgical (01/2017); hx heent (12/2017); hx heent (10/01/2018); hx heent (12/03/2018); hx heent (02/24/2020); and hx heent (06/15/2020).   Social History/Living Environment:   Home Environment: Trailer/mobile home  # Steps to Enter: 4  One/Two Story Residence: One story  Living Alone: No  Support Systems: Child(lizette), Family member(s)  Patient Expects to be Discharged to[de-identified] Trailer/mobile home  Current DME Used/Available at Home: None  Prior Level of Function/Work/Activity:  Independent     Number of Personal Factors/Comorbidities that affect the Plan of Care: 0: LOW COMPLEXITY   EXAMINATION:   Most Recent Physical Functioning:   Gross Assessment:  AROM: Generally decreased, functional  Strength: Generally decreased, functional  Coordination: Within functional limits               Posture:     Balance:  Sitting: Intact  Standing: Intact Bed Mobility:  Supine to Sit: Independent  Sit to Supine: Independent  Wheelchair Mobility: Transfers:  Sit to Stand: Independent  Stand to Sit: Independent  Gait:     Distance (ft): (room distance)  Ambulation - Level of Assistance: Independent      Body Structures Involved:  Thoracic Cage Body Functions Affected:  None Activities and Participation Affected:  None   Number of elements that affect the Plan of Care: 1-2: LOW COMPLEXITY   CLINICAL PRESENTATION:   Presentation: Stable and uncomplicated: LOW COMPLEXITY   CLINICAL DECISION MAKIN [facility] Inpatient Short Form  How much difficulty does the patient currently have. .. Unable A Lot A Little None   1. Turning over in bed (including adjusting bedclothes, sheets and blankets)? [] 1   [] 2   [] 3   [x] 4   2. Sitting down on and standing up from a chair with arms ( e.g., wheelchair, bedside commode, etc.)   [] 1   [] 2   [] 3   [x] 4   3. Moving from lying on back to sitting on the side of the bed? [] 1   [] 2   [] 3   [x] 4   How much help from another person does the patient currently need. .. Total A Lot A Little None   4. Moving to and from a bed to a chair (including a wheelchair)? [] 1   [] 2   [] 3   [x] 4   5. Need to walk in hospital room? [] 1   [] 2   [] 3   [x] 4   6. Climbing 3-5 steps with a railing? [] 1   [] 2   [] 3   [x] 4   © , Trustees of [facility] [employer], under license to [employer]. All rights reserved      Score:  Initial: 24 Most Recent: X (Date: -- )    Interpretation of Tool:  Represents activities that are increasingly more difficult (i.e. Bed mobility, Transfers, Gait). Medical Necessity:     none. Reason for Services/Other Comments:  none. Use of outcome tool(s) and clinical judgement create a POC that gives a: Clear prediction of patient's progress: LOW COMPLEXITY            TREATMENT:   (In addition to Assessment/Re-Assessment sessions the following treatments were rendered)   Pre-treatment Symptoms/Complaints:  Patient agreeable.   Reports he isn't having any trouble with mobility. Pain: Initial:   Pain Intensity 1: 2  Pain Location 1: Rib cage  Pain Orientation 1: Left  Post Session:  2/10     Assessment/Reassessment only, no treatment provided today    Braces/Orthotics/Lines/Etc:   O2 Device: Room air  Treatment/Session Assessment:    Response to Treatment:  Participated well and moved well. Independent with no therapy needs. Interdisciplinary Collaboration:   Physical Therapist  Registered Nurse  After treatment position/precautions:   Supine in bed  Bed/Chair-wheels locked  Call light within reach   Recommendations/Intent for next treatment session:  Will discharge Physical Therapy services at this time.   Total Treatment Duration:  PT Patient Time In/Time Out  Time In: 1140  Time Out: ROLANDO Ambriz

## 2020-12-01 NOTE — PROGRESS NOTES
Problem: Dysphagia (Adult)  Goal: *Speech Goal: (INSERT TEXT)  Description: LTG: Patient will tolerate least restrictive diet without overt signs or symptoms of airway compromise by discharge. Dicountinued 12/1/20  STG: Patient will tolerate regular texture diet and thin liquids  without overt signs or symptoms of aspiration 95% of the time. Discontinued 12/1/20  STG: Patient will utilize compensatory strategies of slow rate and small bites/sips with min cues to improve swallow safety. MET 12/1/20. STG: Patient will perform laryngeal strengthening exercises x10 each with 80% accuracy to improve strength and coordination of swallowing function. Patient declined/discontinued 12/1/20  STG: Patient will participate in modified barium swallow study as clinically indicated.  Patient declined/discontinued 12/1/20  Outcome: Progressing Towards Goal     SPEECH LANGUAGE PATHOLOGY: DYSPHAGIA- Daily Note 1 and Discharge   OBSERVATION PATIENT    NAME/AGE/GENDER: Jamey Peterson is a 60 y.o. male  DATE: 12/1/2020  PRIMARY DIAGNOSIS: Rib fracture [S22.39XA]      ICD-10: Treatment Diagnosis: R13.12 Dysphagia, Oropharyngeal Phase    RECOMMENDATIONS   DIET: Safest oral diet with known risk of aspiration:    PO diet texture:  Regular   Liquids:  regular thin    MEDICATIONS: 1 at a time with liquid rinse     COMPENSATORY STRATEGIES/MODIFICATIONS INCLUDING:  · Upright for all PO  · Small bites and sips  · Clear throat periodically and dry swallow  · Remain upright for 20-30 min after any PO  · Slow rate of PO intake     OTHER RECOMMENDATIONS (including follow up treatment recommendations):   · Treatment to improve/facilitate oral/pharyngeal skills   · Training in laryngeal strengthening and coordination exercises  · Training in use of compensatory safe swallowing strategies/feeding guidelines  · Patient/family education       EDUCATION:  · Recommendations discussed with Patient     CONTINUATION OF SKILLED SERVICES/MEDICAL NECESSITY:   Patient with history of severe pharyngeal dysphagia following radiation/chemo for epiglottic cancer. Declined repeat modified barium swallow study and wishes to continue to eat by mouth despite known risk for aspiration. RECOMMENDATIONS for CONTINUED SPEECH THERAPY:   No further speech therapy indicated at this time. Patient declined outpatient speech therapy for laryngeal strengthening exercises. ASSESSMENT   Patient with known history of severe pharyngeal dysphagia post chemo/radiation treatment for SCC of epiglottis. Last modified barium swallow study (MBSS)completed 1/2019 at [facility] revelaed aspiration/penetration of all consistencies (per radiology report). Patient continues to exhibit overt s/sx airway compromise with thin liquids. Reports he prefers thin versus nectar thick liquids. As patient it as risk for aspiration with any po intake per prior MBSS, recommend patient resume consumption of thin liquid as research shows aspiration of thin liquids is less likely to result in pneumonia than aspiration of thickened liquids. Patient continues to decline repeat modified barium swallow study and states he wishes to eat by mouth with known risk for aspiration and complications associated with aspiration. He reports if he begins to have recurrent pneumonia he will then consider alternate means of nutrition. Recommend diet with known risk for aspiration: regular diet/thin liquids. No further speech therapy needs at this time as patient polietely declined laryngeal strengthening exercises during acute hospitalization and at next level of care. REHABILITATION POTENTIAL FOR STATED GOALS: Good    PLAN    FREQUENCY/DURATION: Patient politely declined instrumental swallowing assessment and laryngeal strengthening exercises. - Recommendations for next treatment session: No additional speech therapy indicated at this time.     SUBJECTIVE   Patient alert and upright in bed for session. Pleasant and friendly. History of Present Injury/Illness: Mr. Mary Morgan  has a past medical history of Anxiety, GERD (gastroesophageal reflux disease), Gout, Hypertension, Nausea & vomiting, Obesity, Squamous cell carcinoma of epiglottis ([facility] [address] (11/23/2016), and Type 2 diabetes mellitus ([facility] [address] He also has no past medical history of Malignant hyperthermia due to anesthesia or Pseudocholinesterase deficiency. Geralene Juani He also  has a past surgical history that includes hx colonoscopy (2016); hx tracheostomy; hx vascular access; hx other surgical (Left, age 12); hx other surgical (01/2017); hx heent (12/2017); hx heent (10/01/2018); hx heent (12/03/2018); hx heent (02/24/2020); and hx heent (06/15/2020). Problem List:  (Impairments causing functional limitations):  1. dysphagia    Orientation:   Person  Place  Time  Situation    Pain: Pain Scale 1: Numeric (0 - 10)  Pain Intensity 1: 0  Pain Location 1: Rib cage        OBJECTIVE   Swallow treatment:   Patient consumed trials of thin by cup and solid consistency. Delayed coughing with thin liquids by cup on 2 out of 6 trials. No overt s/sx airway compromise with cracker. Prompt oral prep time and adequate oral clearance. Reports he no longer wants to consume nectar thick liquids.     PRECAUTIONS/ALLERGIES: Lei Hernandez Folk hcl (pf)]     Tool Used: Dysphagia Outcome and Severity Scale (GUI)    Score Comments   Normal Diet  [] 7 With no strategies or extra time needed   Functional Swallow  [] 6 May have mild oral or pharyngeal delay   Mild Dysphagia  [] 5 Which may require one diet consistency restricted    Mild-Moderate Dysphagia  [] 4 With 1-2 diet consistencies restricted   Moderate Dysphagia  [] 3 With 2 or more diet consistencies restricted   Moderate-Severe Dysphagia  [] 2 With partial PO strategies (trials with ST only)   Severe Dysphagia  [] 1 With inability to tolerate any PO safely      Score:  Initial: 5 Most Recent: 3 (Date 12/01/20 ) Interpretation of Tool: The Dysphagia Outcome and Severity Scale (GUI) is a simple, easy-to-use, 7-point scale developed to systematically rate the functional severity of dysphagia based on objective assessment and make recommendations for diet level, independence level, and type of nutrition. Current Medications:   No current facility-administered medications on file prior to encounter. Current Outpatient Medications on File Prior to Encounter   Medication Sig Dispense Refill    allopurinoL (ZYLOPRIM) 300 mg tablet Take 1 Tab by mouth.  triamterene-hydroCHLOROthiazide (Dyazide) 37.5-25 mg per capsule Take 1 Cap by mouth.  amLODIPine (NORVASC) 10 mg tablet       potassium chloride SR (KLOR-CON 10) 10 mEq tablet Take  by mouth every Monday and Friday.  amLODIPine (NORVASC) 5 mg tablet Take 5 mg by mouth nightly.  esomeprazole (NEXIUM) 20 mg capsule Take 20 mg by mouth daily.  citalopram (CELEXA) 40 mg tablet Take 1 Tab by mouth every evening. 30 Tab 0    metFORMIN (GLUMETZA ER) 500 mg TG24 24 hour tablet Take  by mouth daily.  lisinopril-hydroCHLOROthiazide (PRINZIDE, ZESTORETIC) 20-12.5 mg per tablet Take  by mouth nightly.  cyclobenzaprine (FLEXERIL) 10 mg tablet       diclofenac potassium (CATAFLAM) 50 mg tablet       doxycycline (VIBRAMYCIN) 100 mg capsule       glipiZIDE (GLUCOTROL) 10 mg tablet Take 1 Tab by mouth.  HYDROcodone-acetaminophen (NORCO)  mg tablet Take 1 Tab by mouth every four (4) hours as needed.  valACYclovir (VALTREX) 1 gram tablet       aspirin delayed-release 81 mg tablet Take 81 mg by mouth every morning. Take / use AM day of surgery  per anesthesia protocols.    Indications: myocardial infarction prevention         After treatment position/precautions:  · Upright in bed  · Call light within reach    Total Treatment Duration:   Time In: 1410  Time Out: [address] [facility] [address], [address] [facility]

## 2020-12-01 NOTE — PROGRESS NOTES
END OF SHIFT NOTE:  Patient medication with  Norco 10 mg po once , he receive Motrin 600 mg po as directed. SSI coverage with Humalog given as directed, highest blood sugar was 217 this shift. No complain of nausea. Labs and Chest xray completed. Intake/Output  No intake/output data recorded. Voiding: Yes   Catheter: No   Drain:              Stool:   No  occurrences. Emesis:   No occurrences. VITAL SIGNS  Patient Vitals for the past 12 hrs:   Temp Pulse Resp BP SpO2   11/30/20 1950 97.5 °F (36.4 °C) 86 16 101/69 92 %   11/30/20 1425 98 °F (36.7 °C) 90 16 138/75 92 %   11/30/20 1200 97.2 °F (36.2 °C) 98 14 106/67 90 %       Pain Assessment  Pain 1  Pain Scale 1: Numeric (0 - 10) (11/30/20 1427)  Pain Intensity 1: 0 (11/30/20 1427)  Patient Stated Pain Goal: 1 (11/29/20 1741)  Pain Reassessment 1: Patient resting w/respiratory rate greater than 10 (11/29/20 2137)  Pain Location 1: Abdomen (11/30/20 0818)  Pain Orientation 1: Lower;Left;Upper (11/30/20 0818)  Pain Description 1: Aching (11/30/20 0818)  Pain Intervention(s) 1: Medication (see MAR) (11/30/20 0818)    Ambulating  Yes in room     Additional Information:  See above    Shift report given to oncoming nurse  Monty Romero RN at the bedside.     Jasmin Silverman RN

## 2020-12-02 ENCOUNTER — APPOINTMENT (OUTPATIENT)
Dept: ULTRASOUND IMAGING | Age: 65
DRG: 184 | End: 2020-12-02
Attending: INTERNAL MEDICINE
Payer: COMMERCIAL

## 2020-12-02 PROBLEM — N17.9 AKI (ACUTE KIDNEY INJURY) (HCC): Status: ACTIVE | Noted: 2020-12-02

## 2020-12-02 LAB
ANION GAP SERPL CALC-SCNC: 5 MMOL/L (ref 7–16)
APPEARANCE UR: ABNORMAL
BACTERIA URNS QL MICRO: NORMAL /HPF
BILIRUB UR QL: NEGATIVE
BUN SERPL-MCNC: 42 MG/DL (ref 8–23)
CALCIUM SERPL-MCNC: 8.1 MG/DL (ref 8.3–10.4)
CASTS URNS QL MICRO: 0 /LPF
CHLORIDE SERPL-SCNC: 100 MMOL/L (ref 98–107)
CK SERPL-CCNC: 153 U/L (ref 21–215)
CO2 SERPL-SCNC: 28 MMOL/L (ref 21–32)
COLOR UR: YELLOW
CREAT SERPL-MCNC: 2.74 MG/DL (ref 0.8–1.5)
CREAT UR-MCNC: 108 MG/DL
CRYSTALS URNS QL MICRO: 0 /LPF
EPI CELLS #/AREA URNS HPF: NORMAL /HPF
GLUCOSE BLD STRIP.AUTO-MCNC: 147 MG/DL (ref 65–100)
GLUCOSE BLD STRIP.AUTO-MCNC: 176 MG/DL (ref 65–100)
GLUCOSE BLD STRIP.AUTO-MCNC: 208 MG/DL (ref 65–100)
GLUCOSE BLD STRIP.AUTO-MCNC: 215 MG/DL (ref 65–100)
GLUCOSE SERPL-MCNC: 127 MG/DL (ref 65–100)
GLUCOSE UR STRIP.AUTO-MCNC: 100 MG/DL
HGB UR QL STRIP: ABNORMAL
KETONES UR QL STRIP.AUTO: NEGATIVE MG/DL
LEUKOCYTE ESTERASE UR QL STRIP.AUTO: NEGATIVE
MUCOUS THREADS URNS QL MICRO: 0 /LPF
NITRITE UR QL STRIP.AUTO: NEGATIVE
PH UR STRIP: 5.5 [PH] (ref 5–9)
POTASSIUM SERPL-SCNC: 4.3 MMOL/L (ref 3.5–5.1)
PROT UR STRIP-MCNC: NEGATIVE MG/DL
PROT UR-MCNC: 23 MG/DL
PROT/CREAT UR-RTO: 0.2
RBC #/AREA URNS HPF: NORMAL /HPF
SODIUM SERPL-SCNC: 133 MMOL/L (ref 136–145)
SODIUM UR-SCNC: 43 MMOL/L
SP GR UR REFRACTOMETRY: 1.01 (ref 1–1.02)
UROBILINOGEN UR QL STRIP.AUTO: 1 EU/DL (ref 0.2–1)
WBC URNS QL MICRO: NORMAL /HPF

## 2020-12-02 PROCEDURE — 84156 ASSAY OF PROTEIN URINE: CPT

## 2020-12-02 PROCEDURE — 76775 US EXAM ABDO BACK WALL LIM: CPT

## 2020-12-02 PROCEDURE — 81015 MICROSCOPIC EXAM OF URINE: CPT

## 2020-12-02 PROCEDURE — 80048 BASIC METABOLIC PNL TOTAL CA: CPT

## 2020-12-02 PROCEDURE — 2709999900 HC NON-CHARGEABLE SUPPLY

## 2020-12-02 PROCEDURE — 82550 ASSAY OF CK (CPK): CPT

## 2020-12-02 PROCEDURE — 74011636637 HC RX REV CODE- 636/637: Performed by: FAMILY MEDICINE

## 2020-12-02 PROCEDURE — 74011250636 HC RX REV CODE- 250/636: Performed by: INTERNAL MEDICINE

## 2020-12-02 PROCEDURE — 81001 URINALYSIS AUTO W/SCOPE: CPT

## 2020-12-02 PROCEDURE — 36415 COLL VENOUS BLD VENIPUNCTURE: CPT

## 2020-12-02 PROCEDURE — 65270000029 HC RM PRIVATE

## 2020-12-02 PROCEDURE — 99218 HC RM OBSERVATION: CPT

## 2020-12-02 PROCEDURE — 82962 GLUCOSE BLOOD TEST: CPT

## 2020-12-02 PROCEDURE — 74011250637 HC RX REV CODE- 250/637: Performed by: FAMILY MEDICINE

## 2020-12-02 PROCEDURE — 84300 ASSAY OF URINE SODIUM: CPT

## 2020-12-02 RX ADMIN — Medication 10 ML: at 13:19

## 2020-12-02 RX ADMIN — Medication 10 ML: at 06:00

## 2020-12-02 RX ADMIN — OXYCODONE HYDROCHLORIDE 5 MG: 5 TABLET ORAL at 22:15

## 2020-12-02 RX ADMIN — ACETAMINOPHEN 650 MG: 325 TABLET, FILM COATED ORAL at 11:43

## 2020-12-02 RX ADMIN — OXYCODONE HYDROCHLORIDE 5 MG: 5 TABLET ORAL at 02:09

## 2020-12-02 RX ADMIN — SODIUM CHLORIDE 100 ML/HR: 900 INJECTION, SOLUTION INTRAVENOUS at 09:15

## 2020-12-02 RX ADMIN — AMLODIPINE BESYLATE 5 MG: 5 TABLET ORAL at 21:38

## 2020-12-02 RX ADMIN — INSULIN LISPRO 4 UNITS: 100 INJECTION, SOLUTION INTRAVENOUS; SUBCUTANEOUS at 11:43

## 2020-12-02 RX ADMIN — POLYETHYLENE GLYCOL 3350 17 G: 17 POWDER, FOR SOLUTION ORAL at 09:21

## 2020-12-02 RX ADMIN — ACETAMINOPHEN 650 MG: 325 TABLET, FILM COATED ORAL at 22:15

## 2020-12-02 RX ADMIN — SODIUM CHLORIDE 100 ML/HR: 900 INJECTION, SOLUTION INTRAVENOUS at 22:15

## 2020-12-02 RX ADMIN — INSULIN LISPRO 2 UNITS: 100 INJECTION, SOLUTION INTRAVENOUS; SUBCUTANEOUS at 17:39

## 2020-12-02 RX ADMIN — INSULIN LISPRO 4 UNITS: 100 INJECTION, SOLUTION INTRAVENOUS; SUBCUTANEOUS at 21:38

## 2020-12-02 RX ADMIN — ACETAMINOPHEN 650 MG: 325 TABLET, FILM COATED ORAL at 17:40

## 2020-12-02 RX ADMIN — Medication 10 ML: at 21:39

## 2020-12-02 RX ADMIN — OXYCODONE HYDROCHLORIDE 5 MG: 5 TABLET ORAL at 13:17

## 2020-12-02 NOTE — PROGRESS NOTES
END OF SHIFT NOTE:  Patient had uneventful day. IV fluids infusing Normal Saline @ 100 cc / hr as directed. No complains of break - trough pain  . SSI coverage given highest blood sugar 202 this shift. Afebrile with fair po intake. Intake/Output  No intake/output data recorded. Voiding: Yes   Catheter: No   Drain:              Stool:  No , Miralax given for complain of constipation occurrences. Emesis:   No  occurrences. VITAL SIGNS  Patient Vitals for the past 12 hrs:   Temp Pulse Resp BP SpO2   12/01/20 1923 98.3 °F (36.8 °C) 88 16 107/72 96 %   12/01/20 1524 98.3 °F (36.8 °C) 84 16 109/65 96 %   12/01/20 1203    122/68    12/01/20 1136 97.7 °F (36.5 °C) 83 17 105/64 92 %   12/01/20 0750 97.4 °F (36.3 °C) 77 16 (!) 107/59 91 %       Pain Assessment  Pain 1  Pain Scale 1: Numeric (0 - 10) (12/01/20 1830)  Pain Intensity 1: 0 (12/01/20 1830)  Patient Stated Pain Goal: 1 (11/29/20 1741)  Pain Reassessment 1: Patient resting w/respiratory rate greater than 10 (11/30/20 2022)  Pain Location 1: Rib cage (12/01/20 1210)  Pain Orientation 1: Left (12/01/20 1210)  Pain Description 1: Aching (11/30/20 0818)  Pain Intervention(s) 1: Medication (see MAR) (11/30/20 0818)    Ambulating  In room     Additional Information:   Shift report given to oncoming nurse Eddie Massey RN  at the bedside.     Jose Leone RN

## 2020-12-02 NOTE — CONSULTS
Nephrology consult    Admission Date:  11/29/2020    Admission Diagnosis  Rib fracture [S22.39XA]  Rib fracture [S22.39XA]  ISI (acute kidney injury) ([facility] [address] [N17.9]    We are asked by Dr. Gaston Barber     History of Present Illness: Mr. Kareem Alfredo is a 60 y.o male with PMH significant for DM, HTN, CKD, SCC of epiglottis s/p chemo, radiation and obesity admitted with complaints of left sided thoracic pain found with left 7th, 8th, 9th rib fractures on chest contrasted CT. We are consulted for ISI/CKD. From renal standpoint Cr on admission was 1.97->1.89->2.55->2.74, baseline Cr 1.9-2.0. Contrast exposure 11/29 with associated rising of Cr. Renal US pending, no recorded hypotension since admission, non oliguric. Home meds significant for PPI, HCTZ, ACEi and metformin. Patient seen and examined on rounds he reports having left sided thoracic pain for about the last 3 weeks, was on an abx 2 weeks ago, denies trauma or falls. Denies change in UOP, dysuria, no urinary hesitancy/ urgency, no SOB, N/V/D, fever/chills or edema. Occasional NSAID use, good intake, + constipation.      Past Medical History:   Diagnosis Date    Anxiety     Controlled with meds     GERD (gastroesophageal reflux disease)     managed with medication     Gout     symptoms in ankles, no recent episodes    Hypertension     managed with medication     Nausea & vomiting     Obesity     Squamous cell carcinoma of epiglottis ([facility] [address] 11/23/2016    Type 2 diabetes mellitus ([facility]     oral med, does not check BG at home; unknown last A1c      Past Surgical History:   Procedure Laterality Date    HX COLONOSCOPY  2016    HX HEENT  12/2017    bronchoscopy    HX HEENT  10/01/2018    Direct laryngoscopy with CO2 laser of supraglottic swelling    HX HEENT  12/03/2018    Panendoscopy w/Scar Revision/SFE/12-03-18    HX HEENT  02/24/2020    S/P SFE Direct laryngoscopy with biopsy, CO2 laser of supraglottic airway obstruction, Bronchoscopy with tracheal balloon dilation of tracheal stenosis 02/24/2020    HX HEENT  06/15/2020    Direct laryngoscopy with CO2 laser    HX OTHER SURGICAL Left age 11    2rd nipple removed    HX OTHER SURGICAL  01/2017    PEG placement and removal    HX TRACHEOSTOMY      placement and removal    HX VASCULAR ACCESS      placement and removal      Current Facility-Administered Medications   Medication Dose Route Frequency    0.9% sodium chloride infusion  100 mL/hr IntraVENous CONTINUOUS    acetaminophen (TYLENOL) tablet 650 mg  650 mg Oral Q6H    oxyCODONE IR (ROXICODONE) tablet 5 mg  5 mg Oral Q6H PRN    amLODIPine (NORVASC) tablet 5 mg  5 mg Oral QHS    insulin lispro (HUMALOG) injection   SubCUTAneous AC&HS    sodium chloride (NS) flush 5-40 mL  5-40 mL IntraVENous Q8H    sodium chloride (NS) flush 5-40 mL  5-40 mL IntraVENous PRN    polyethylene glycol (MIRALAX) packet 17 g  17 g Oral DAILY PRN    promethazine (PHENERGAN) tablet 12.5 mg  12.5 mg Oral Q6H PRN    Or    ondansetron (ZOFRAN) injection 4 mg  4 mg IntraVENous Q6H PRN     Allergies   Allergen Reactions    Zofran [Ondansetron Hcl (Pf)] Other (comments)     Gives pt severe HA      Social History     Tobacco Use    Smoking status: Former Smoker     Packs/day: 2.00     Years: 20.00     Pack years: 40.00     Last attempt to quit: 2005     Years since quitting: 15.9    Smokeless tobacco: Never Used   Substance Use Topics    Alcohol use: No      Family History   Problem Relation Age of Onset    Stroke Mother     Lung Disease Mother         Review of Systems  Gen - no fever, no chills, appetite okay  HEENT - no sore throat, no decreased vision, no hearing loss  CV - no chest pain, no palpitation, no orthopnea  Lung - no shortness of breath, + cough, no hemoptysis, left chest wall pain 2/10  Abd - no tenderness, no nausea/vomiting, no bloody stool, + constipation   Ext - no edema, no clubbing, no cyanosis  Musculoskeletal - no joint pain, no back pain  Neurologic - no headaches, no dizziness, no seizures  Psychiatric - no anxiety, no depression  Skin - no rashes, no pupura  Genitourinary - no decreased urine output, no hematuria, no dysuria    Objective:     Vitals:    12/01/20 2305 12/02/20 0350 12/02/20 0817 12/02/20 1147   BP: 112/61 105/62 (!) 153/82 106/68   Pulse: 79 77 85 98   Resp: 16 16 16 17   Temp: 97.1 °F (36.2 °C) 97.5 °F (36.4 °C) 98.5 °F (36.9 °C) 97.7 °F (36.5 °C)   SpO2: 95% 95% 96% 95%   Weight:       Height:           Intake/Output Summary (Last 24 hours) at 12/2/2020 1255  Last data filed at 12/2/2020 1102  Gross per 24 hour   Intake 2509 ml   Output 0 ml   Net 2509 ml       Physical Exam  GEN :in no distress, alert and oriented  HEENT: anicteric sclerae, Mucous membranes are moist.  Neck - supple without JVD  CV - regular rate, S1, S2, no Rub   Lung - clear bilaterally, lungs expand symmetrically  Abd - soft, obese, nontender, bowel sounds present, no hepatosplenomegaly  Ext - no clubbing, no cyanosis, no edema  Neurologic - nonfocal  Genitourinary - bladder nonpalpable  Skin - no rashes, no purpura  Psychiatric: Normal mood and affect. Data Review:   Recent Labs     12/01/20  0555 11/30/20  1101   WBC 8.1 9.5   HGB 10.0* 10.9*   HCT 29.7* 31.4*    222     Recent Labs     12/02/20  0513 12/01/20  0555 11/30/20  1101   * 132* 131*   K 4.3 4.1 4.7    97* 95*   CO2 28 28 31   BUN 42* 37* 22   CREA 2.74* 2.55* 1.89*   * 144* 217*   CA 8.1* 8.5 8.2*     No results for input(s): PH, PCO2, PO2, PCO2 in the last 72 hours.     Problem List:     Patient Active Problem List    Diagnosis Date Noted    ISI (acute kidney injury) ([facility] [address] 12/02/2020    Rib fracture 11/29/2020    Tracheal stenosis 04/01/2020    Malignant neoplasm of larynx ([facility] [address] 12/18/2018    Decreased libido 04/27/2018    Tonsil cancer ([facility] [address] 04/27/2017    Cellulitis of neck 02/01/2017    Tr[facility] in place [facility]) 02/01/2017    Chemotherapy-induced neutropenia ([facility] [address] 02/01/2017    Type 2 diabetes mellitus with hyperglycemia ([facility] [address] 01/24/2017    HTN (hypertension) 01/24/2017    [facility] and neck cancer ([facility] 01/24/2017    Diabetes mellitus with microalbuminuria ([facility] 01/24/2017    Neutropenic fever ([facility] 01/12/2017    Pancytopenia ([facility] 01/12/2017    Renal insufficiency 01/10/2017    Non-intractable cyclical vomiting with nausea 01/10/2017       Impression:    Plan:   1. ISI/CKD 3 likely ROXANNE with rise in Cr s/p contrast exposure 11/29. Cr baseline 1.9-2.0, Cr rate of rise appears to be slowing, continue IVF for now. Hold ACEi, metformin, HCTZ  -renal US pending, check UA, UCr, Kathya, P/C ratio and CK- further workup pending clinical course  -Trend renal function with strict I&O    2. Left multiple non traumatic rib fractures- PT per primary     3. HTN- stable hold ACEi, HCTZ in setting of #1    4. DM type II- SSI per hosp     5. Anemia stable, trend    6.  Hx SCC of epiglottis s/p chemo and radiation

## 2020-12-02 NOTE — PROGRESS NOTES
Progress Note    Patient: Khadra Harman MRN: [id number]  SSN: [id number]    YOB: 1955  Age: 60 y.o. Sex: male      Admit Date: 11/29/2020    LOS: 0 days     Subjective:     Patient with past medical history of DM, HTN, CKD, and remote SCC of epiglottus (s/p chemo, rads) and obesity     Admitted due to left side pain. Found to have multiple rib fractures. No trauma. History of deep coughing. Some pleural effusion and episodes of lower oxygenation. Patient is being monitored. No intervention surgically thus far. Patient is able to ambulate better. However continue to have rib pain and Ibuprofen was given for a few days. Creatinine is up now. Objective:     Vitals:    12/01/20 1923 12/01/20 2305 12/02/20 0350 12/02/20 0817   BP: 107/72 112/61 105/62 (!) 153/82   Pulse: 88 79 77 85   Resp: 16 16 16 16   Temp: 98.3 °F (36.8 °C) 97.1 °F (36.2 °C) 97.5 °F (36.4 °C) 98.5 °F (36.9 °C)   SpO2: 96% 95% 95% 96%   Weight:       Height:            Intake and Output:  Current Shift: No intake/output data recorded. Last three shifts: 11/30 1901 - 12/02 0700  In: 1167 [P.O.:480; I.V.:687]  Out: -     Physical Exam:     General:                    The patient is a pleasant male in no acute respiratory distress. Obese. Lying flat in bed. Head:                                   Normocephalic/atraumatic. Eyes:                                   No palpebral pallor or scleral icterus. ENT:                                    External auricular and nasal exam within normal limits. Mucous membranes are moist.  Neck:                                   Supple, non-tender, no JVD. Lungs:                       Clear to auscultation bilaterally without wheezes or crackles. Tender on the left side of rib cage. No respiratory distress or accessory muscle use.   Heart:                                  Regular rate and rhythm, without murmurs, rubs, or gallops. Abdomen:                  Soft, non-tender, distended due to truncal obesity with normoactive bowel sounds. Genitourinary:           No tenderness over the bladder or bilateral CVAs. Extremities:               Without clubbing, cyanosis, or edema. Skin:                                    Normal color, texture, and turgor. No rashes, lesions, or jaundice. Pulses:                      Radial and dorsalis pedis pulses present 2+ bilaterally. Capillary refill <2s. Neurologic:                CN II-XII grossly intact and symmetrical.                                               Moving all four extremities well with no focal deficits. Psychiatric:                Pleasant demeanor, appropriate affect.  Alert and oriented x 3      Lab/Data Review:    Recent Results (from the past 24 hour(s))   GLUCOSE, POC    Collection Time: 12/01/20 11:34 AM   Result Value Ref Range    Glucose (POC) 174 (H) 65 - 100 mg/dL   GLUCOSE, POC    Collection Time: 12/01/20 11:59 AM   Result Value Ref Range    Glucose (POC) 194 (H) 65 - 100 mg/dL   GLUCOSE, POC    Collection Time: 12/01/20  4:38 PM   Result Value Ref Range    Glucose (POC) 202 (H) 65 - 100 mg/dL   GLUCOSE, POC    Collection Time: 12/01/20  8:22 PM   Result Value Ref Range    Glucose (POC) 216 (H) 65 - 410 mg/dL   METABOLIC PANEL, BASIC    Collection Time: 12/02/20  5:13 AM   Result Value Ref Range    Sodium 133 (L) 136 - 145 mmol/L    Potassium 4.3 3.5 - 5.1 mmol/L    Chloride 100 98 - 107 mmol/L    CO2 28 21 - 32 mmol/L    Anion gap 5 (L) 7 - 16 mmol/L    Glucose 127 (H) 65 - 100 mg/dL    BUN 42 (H) 8 - 23 MG/DL    Creatinine 2.74 (H) 0.8 - 1.5 MG/DL    GFR est AA 30 (L) >60 ml/min/1.73m2    GFR est non-AA 25 (L) >60 ml/min/1.73m2    Calcium 8.1 (L) 8.3 - 10.4 MG/DL   GLUCOSE, POC    Collection Time: 12/02/20  7:57 AM   Result Value Ref Range    Glucose (POC) 147 (H) 65 - 100 mg/dL     CT chest   11-  IMPRESSION:   1. Limited evaluation of the pulmonary arteries. No definite pulmonary  embolus. 2.  Left lower lobe infiltrate and effusion. 3.  Left seventh, eighth, ninth rib fractures. Separation of the eighth and  ninth ribs. XR chest   12-1-2020  IMPRESSION: Left lower lobe atelectasis or pneumonia unchanged. Current Facility-Administered Medications:     0.9% sodium chloride infusion, 100 mL/hr, IntraVENous, CONTINUOUS, Valerie Morales MD, Last Rate: 100 mL/hr at 12/02/20 0915, 100 mL/hr at 12/02/20 0915    acetaminophen (TYLENOL) tablet 650 mg, 650 mg, Oral, Q6H, Gene Martino MD, 650 mg at 12/01/20 1211    oxyCODONE IR (ROXICODONE) tablet 5 mg, 5 mg, Oral, Q6H PRN, Gene Martino MD, 5 mg at 12/02/20 0209    amLODIPine (NORVASC) tablet 5 mg, 5 mg, Oral, QHS, Gene Martino MD, 5 mg at 12/01/20 2143    insulin lispro (HUMALOG) injection, , SubCUTAneous, AC&HS, Gene Martino MD, Stopped at 12/02/20 0730    sodium chloride (NS) flush 5-40 mL, 5-40 mL, IntraVENous, Q8H, Gene Martino MD, 10 mL at 12/02/20 0600    sodium chloride (NS) flush 5-40 mL, 5-40 mL, IntraVENous, PRN, Gene Martino MD    polyethylene glycol [facility]) packet 17 g, 17 g, Oral, DAILY PRN, Gene Martino MD, 17 g at 12/02/20 0921    promethazine (PHENERGAN) tablet 12.5 mg, 12.5 mg, Oral, Q6H PRN **OR** ondansetron (ZOFRAN) injection 4 mg, 4 mg, IntraVENous, Q6H PRN, Gene Martino MD             Assessment:     Principal Problem:    Rib fracture (11/29/2020)    Active Problems:    Renal insufficiency (1/10/2017)      HTN (hypertension) (1/24/2017)      Overview: Last Assessment & Plan:       Inadequate control. Intensify therapy      Head and neck cancer ([address] (1/24/2017)      Diabetes mellitus with microalbuminuria ([facility] (1/24/2017)      Overview: Last Assessment & Plan:       His A1c is well controlled but his trend of weight gain is very       concerning.   We discussed at length healthy diet for diabetes, exercise,       and careful monitoring. Follow-up in 3 months      Malignant neoplasm of larynx ([facility] [address] (12/18/2018)      ISI (acute kidney injury) ([facility] [address] (12/2/2020)        Plan:     Multiple rib fractures   Non-traumatic   Likely from frequent deep coughing   Some episodes of lower oxygenation with exertion. Able to ambulate better. Hypertension  Monitor blood pressure and manage accordingly. Continue home medications. Diabetes mellitus type 2  Monitor blood sugar. Cover with insulin sliding scale accordingly. ISI   Creatinine is up. Will continue to hydrate patient  Monitor renal function and intake and output. Avoid nephrotoxic agents. Possibly from NSAID exposure. Stop Ibuprofen and ask nephrology to see. Check retroperitoneum US      I have discussed the plan of care with patient.       DVT prophylaxis : SCD     Signed By: Burt Epley, MD     December 2, 2020

## 2020-12-03 VITALS
WEIGHT: 250 LBS | SYSTOLIC BLOOD PRESSURE: 150 MMHG | HEART RATE: 84 BPM | DIASTOLIC BLOOD PRESSURE: 71 MMHG | HEIGHT: 70 IN | RESPIRATION RATE: 20 BRPM | TEMPERATURE: 97.8 F | OXYGEN SATURATION: 96 % | BODY MASS INDEX: 35.79 KG/M2

## 2020-12-03 LAB
ALBUMIN SERPL-MCNC: 3 G/DL (ref 3.2–4.6)
ANION GAP SERPL CALC-SCNC: 3 MMOL/L (ref 7–16)
BUN SERPL-MCNC: 33 MG/DL (ref 8–23)
CALCIUM SERPL-MCNC: 7.9 MG/DL (ref 8.3–10.4)
CHLORIDE SERPL-SCNC: 103 MMOL/L (ref 98–107)
CO2 SERPL-SCNC: 29 MMOL/L (ref 21–32)
CREAT SERPL-MCNC: 2.02 MG/DL (ref 0.8–1.5)
ERYTHROCYTE [DISTWIDTH] IN BLOOD BY AUTOMATED COUNT: 13.6 % (ref 11.9–14.6)
GLUCOSE BLD STRIP.AUTO-MCNC: 154 MG/DL (ref 65–100)
GLUCOSE BLD STRIP.AUTO-MCNC: 208 MG/DL (ref 65–100)
GLUCOSE SERPL-MCNC: 122 MG/DL (ref 65–100)
HCT VFR BLD AUTO: 24.4 % (ref 41.1–50.3)
HGB BLD-MCNC: 8.4 G/DL (ref 13.6–17.2)
MCH RBC QN AUTO: 30.8 PG (ref 26.1–32.9)
MCHC RBC AUTO-ENTMCNC: 34.4 G/DL (ref 31.4–35)
MCV RBC AUTO: 89.4 FL (ref 79.6–97.8)
NRBC # BLD: 0 K/UL (ref 0–0.2)
PHOSPHATE SERPL-MCNC: 3.3 MG/DL (ref 2.3–3.7)
PLATELET # BLD AUTO: 190 K/UL (ref 150–450)
PMV BLD AUTO: 9.2 FL (ref 9.4–12.3)
POTASSIUM SERPL-SCNC: 3.9 MMOL/L (ref 3.5–5.1)
RBC # BLD AUTO: 2.73 M/UL (ref 4.23–5.6)
SODIUM SERPL-SCNC: 135 MMOL/L (ref 136–145)
WBC # BLD AUTO: 5 K/UL (ref 4.3–11.1)

## 2020-12-03 PROCEDURE — 36415 COLL VENOUS BLD VENIPUNCTURE: CPT

## 2020-12-03 PROCEDURE — 74011636637 HC RX REV CODE- 636/637: Performed by: FAMILY MEDICINE

## 2020-12-03 PROCEDURE — 80069 RENAL FUNCTION PANEL: CPT

## 2020-12-03 PROCEDURE — 74011250636 HC RX REV CODE- 250/636: Performed by: INTERNAL MEDICINE

## 2020-12-03 PROCEDURE — 85027 COMPLETE CBC AUTOMATED: CPT

## 2020-12-03 PROCEDURE — 82962 GLUCOSE BLOOD TEST: CPT

## 2020-12-03 PROCEDURE — 74011250637 HC RX REV CODE- 250/637: Performed by: FAMILY MEDICINE

## 2020-12-03 RX ADMIN — ACETAMINOPHEN 650 MG: 325 TABLET, FILM COATED ORAL at 05:37

## 2020-12-03 RX ADMIN — INSULIN LISPRO 4 UNITS: 100 INJECTION, SOLUTION INTRAVENOUS; SUBCUTANEOUS at 11:35

## 2020-12-03 RX ADMIN — INSULIN LISPRO 2 UNITS: 100 INJECTION, SOLUTION INTRAVENOUS; SUBCUTANEOUS at 08:36

## 2020-12-03 RX ADMIN — ACETAMINOPHEN 650 MG: 325 TABLET, FILM COATED ORAL at 11:38

## 2020-12-03 RX ADMIN — SODIUM CHLORIDE 100 ML/HR: 900 INJECTION, SOLUTION INTRAVENOUS at 11:35

## 2020-12-03 NOTE — DISCHARGE SUMMARY
Discharge Summary     Patient: Dinesh Jaimes MRN: [id number]  SSN: [id number]    YOB: 1955  Age: 60 y.o. 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  ICD-9-CM: 519.19  4/1/2020 - Present        Malignant neoplasm of larynx ([facility] ICD-10-CM: C32.9  ICD-9-CM: 161.9  12/18/2018 - Present        Decreased libido ICD-10-CM: R68.82  ICD-9-CM: 799.81  4/27/2018 - Present        Tonsil cancer ([facility] ICD-10-CM: C09.9  ICD-9-CM: 146.0  4/27/2017 - Present        Cellulitis of neck ICD-10-CM: G01.765  ICD-9-CM: 682.1  2/1/2017 - Present        Tracheostomy in place [facility]) ICD-10-CM: Z93.0  ICD-9-CM: V44.0  2/1/2017 - Present        Chemotherapy-induced neutropenia (HCC) ICD-10-CM: D70.1, T45.1X5A  ICD-9-CM: 288.03, E933.1  2/1/2017 - Present        Type 2 diabetes mellitus with hyperglycemia (HCC) ICD-10-CM: E11.65  ICD-9-CM: 250.00  1/24/2017 - Present        HTN (hypertension) ICD-10-CM: I10  ICD-9-CM: 401.9  1/24/2017 - Present    Overview Signed 3/4/2020 12:26 PM by Marcio Rosenberg CMA     Last Assessment & Plan:   Inadequate control.   Intensify therapy             Head and neck cancer ([facility] ICD-10-CM: C76.0  ICD-9-CM: 195.0  1/24/2017 - Present        Diabetes mellitus with microalbuminuria ([facility] ICD-10-CM: E11.29, R80.9  ICD-9-CM: 250.40, 791.0  1/24/2017 - Present    Overview Signed 3/4/2020 12:26 PM by Marcio Rosenberg CMA     Last Assessment & Plan:   His A1c is well controlled but his trend of weight gain is very concerning. We discussed at length healthy diet for diabetes, exercise, and careful monitoring. Follow-up in 3 months             Neutropenic fever ([facility] ICD-10-CM: D70.9, R50.81  ICD-9-CM: 288.00, 780.61  1/12/2017 - Present        Pancytopenia ([facility] ICD-10-CM: M71.260  ICD-9-CM: 284.19  1/12/2017 - Present        Renal insufficiency ICD-10-CM: N28.9  ICD-9-CM: 593.9  1/10/2017 - Present        Non-intractable cyclical vomiting with nausea ICD-10-CM: R11.15  ICD-9-CM: 536.2  1/10/2017 - Present               Discharge Condition: Stable    Hospital Course:     Patient with past medical history of DM, HTN, CKD, and remote SCC of epiglottus (s/p chemo, rads) and obesity      Admitted due to left side pain.      Found to have multiple rib fractures. No trauma. History of deep coughing.      Some pleural effusion and episodes of lower oxygenation.      Patient is being monitored. No intervention surgically thus far. Patient has no need for oxygen supplementation.      Patient is able to ambulate better. However continue to have rib pain and Ibuprofen was given for a few days. Creatinine was up. Ibuprofen was stopped. Patient was hydrated with IV fluid. It is thought that his ISI is due to IV contrast exposure and Ibuprofen use. Follow-up creatine showed improvement. Nephrology service will follow up with patient as out-patient. Physical Exam:      General:                    The patient is a pleasant male in no acute respiratory distress. Obese. Lying flat in bed. SEGO:                                   [id number]. Eyes:                                   No palpebral pallor or scleral icterus. ENT:                                    External auricular and nasal exam within normal limits.                                             RHTRXY membranes are moist.  Neck:                                   Supple, non-tender, no JVD.   Lungs:                       Clear to auscultation bilaterally without wheezes or crackles. Tender on the left side of rib cage.                                               No respiratory distress or accessory muscle use. Heart:                                  Regular rate and rhythm, without murmurs, rubs, or gallops. Abdomen:                  Soft, non-tender, distended due to truncal obesity with normoactive bowel sounds. Genitourinary:           No tenderness over the bladder or bilateral CVAs. Extremities:               Without clubbing, cyanosis, or edema. Skin:                                    Normal color, texture, and turgor. No rashes, lesions, or jaundice. Pulses:                      Radial and dorsalis pedis pulses present 2+ bilaterally.                                               Capillary refill <2s. Neurologic:                CN II-XII grossly intact and symmetrical.                                               Moving all four extremities well with no focal deficits. Psychiatric:                Pleasant demeanor, appropriate affect.  Alert and oriented x 3    Consults: Nephrology    Significant Diagnostic Studies:     Recent Results (from the past 24 hour(s))   GLUCOSE, POC    Collection Time: 12/02/20  4:35 PM   Result Value Ref Range    Glucose (POC) 176 (H) 65 - 100 mg/dL   GLUCOSE, POC    Collection Time: 12/02/20  8:49 PM   Result Value Ref Range    Glucose (POC) 208 (H) 65 - 100 mg/dL   RENAL FUNCTION PANEL    Collection Time: 12/03/20  5:22 AM   Result Value Ref Range    Sodium 135 (L) 136 - 145 mmol/L    Potassium 3.9 3.5 - 5.1 mmol/L    Chloride 103 98 - 107 mmol/L    CO2 29 21 - 32 mmol/L    Anion gap 3 (L) 7 - 16 mmol/L    Glucose 122 (H) 65 - 100 mg/dL    BUN 33 (H) 8 - 23 MG/DL    Creatinine 2.02 (H) 0.8 - 1.5 MG/DL    GFR est AA 43 (L) >60 ml/min/1.73m2    GFR est non-AA 36 (L) >60 ml/min/1.73m2    Calcium 7.9 (L) 8.3 - 10.4 MG/DL    Phosphorus 3.3 2.3 - 3.7 MG/DL    Albumin 3.0 (L) 3.2 - 4.6 g/dL   CBC W/O DIFF    Collection Time: 12/03/20  5:22 AM   Result Value Ref Range    WBC 5.0 4.3 - 11.1 K/uL    RBC 2.73 (L) 4.23 - 5.6 M/uL    HGB 8.4 (L) 13.6 - 17.2 g/dL    HCT 24.4 (L) 41.1 - 50.3 %    MCV 89.4 79.6 - 97.8 FL    MCH 30.8 26.1 - 32.9 PG    MCHC 34.4 31.4 - 35.0 g/dL    RDW 13.6 11.9 - 14.6 %    PLATELET 271 982 - 259 K/uL    MPV 9.2 (L) 9.4 - 12.3 FL    ABSOLUTE NRBC 0.00 0.0 - 0.2 K/uL   GLUCOSE, POC    Collection Time: 12/03/20  7:53 AM   Result Value Ref Range    Glucose (POC) 154 (H) 65 - 100 mg/dL   GLUCOSE, POC    Collection Time: 12/03/20 11:07 AM   Result Value Ref Range    Glucose (POC) 208 (H) 65 - 100 mg/dL     CT chest   11-  IMPRESSION:   1.  Limited evaluation of the pulmonary arteries.  No definite pulmonary  embolus. 2.  Left lower lobe infiltrate and effusion. 3.  Left seventh, eighth, ninth rib fractures.  Separation of the eighth and  ninth ribs.     XR chest   12-1-2020  IMPRESSION: Left lower lobe atelectasis or pneumonia unchanged. Disposition: home    Discharge Medications:   Current Discharge Medication List      CONTINUE these medications which have NOT CHANGED    Details   allopurinoL (ZYLOPRIM) 300 mg tablet Take 1 Tab by mouth. amLODIPine (NORVASC) 5 mg tablet Take 5 mg by mouth nightly. citalopram (CELEXA) 40 mg tablet Take 1 Tab by mouth every evening. Qty: 30 Tab, Refills: 0      cyclobenzaprine (FLEXERIL) 10 mg tablet       doxycycline (VIBRAMYCIN) 100 mg capsule       glipiZIDE (GLUCOTROL) 10 mg tablet Take 1 Tab by mouth. HYDROcodone-acetaminophen (NORCO)  mg tablet Take 1 Tab by mouth every four (4) hours as needed. valACYclovir (VALTREX) 1 gram tablet       aspirin delayed-release 81 mg tablet Take 81 mg by mouth every morning. Take / use AM day of surgery  per anesthesia protocols.    Indications: myocardial infarction prevention         STOP taking these medications       triamterene-hydroCHLOROthiazide (Dyazide) 37.5-25 mg per capsule Comments:   Reason for Stopping:         potassium chloride SR (KLOR-CON 10) 10 mEq tablet Comments:   Reason for Stopping:         esomeprazole (NEXIUM) 20 mg capsule Comments:   Reason for Stopping:         metFORMIN (GLUMETZA ER) 500 mg TG24 24 hour tablet Comments:   Reason for Stopping:         lisinopril-hydroCHLOROthiazide (PRINZIDE, ZESTORETIC) 20-12.5 mg per tablet Comments:   Reason for Stopping:         diclofenac potassium (CATAFLAM) 50 mg tablet Comments:   Reason for Stopping:               Activity: Activity as tolerated  Diet: Diabetic Diet  Wound Care: Keep wound clean and dry    Follow-up Appointments   Procedures    FOLLOW UP VISIT Appointment in: One Month Please set up 1 month hospital FU with [facility] upon hosp d/c with renal panel and cbc w/o diff prior to appointment. [phone] Thanks     Please set up 1 month hospital FU with [facility] upon hosp d/c with renal panel and cbc w/o diff prior to appointment. [phone]  Thanks     Standing Status:   Standing     Number of Occurrences:   1     Order Specific Question:   Appointment in     Answer:   One Month    FOLLOW UP VISIT Appointment in: 3 - 5 Days See your primary doctor. See your primary doctor. Standing Status:   Standing     Number of Occurrences:   1     Standing Expiration Date:   12/4/2020     Order Specific Question:   Appointment in     Answer:   3 - 5 Days     I have discussed the plan of care with patient. Time spent on discharge is 37 minutes.       Signed By: Lilian Doss MD     December 3, 2020

## 2020-12-03 NOTE — PROGRESS NOTES
Progress Note    Patient: Armaan Mistry MRN: [id number]  SSN: [id number]    YOB: 1955  Age: 60 y.o. Sex: male      Admit Date: 11/29/2020    LOS: 1 day     Subjective:     Patient with past medical history of DM, HTN, CKD, and remote SCC of epiglottus (s/p chemo, rads) and obesity     Admitted due to left side pain. Found to have multiple rib fractures. No trauma. History of deep coughing. Some pleural effusion and episodes of lower oxygenation. Patient is being monitored. No intervention surgically thus far. Patient has no need for oxygen supplementation. Patient is able to ambulate better. However continue to have rib pain and Ibuprofen was given for a few days. Creatinine was up now. Objective:     Vitals:    12/02/20 1955 12/02/20 2305 12/03/20 0340 12/03/20 0729   BP: 125/70 132/62 132/66 138/71   Pulse: 79 76 75 80   Resp: 20 18 20 20   Temp: 98.1 °F (36.7 °C) 97.3 °F (36.3 °C) 97.2 °F (36.2 °C) 97.8 °F (36.6 °C)   SpO2: 94% 95% 97% 94%   Weight:       Height:            Intake and Output:  Current Shift: No intake/output data recorded. Last three shifts: 12/01 1901 - 12/03 0700  In: 5228 [P.O.:1920; I.V.:3308]  Out: 1320 [Urine:1320]    Physical Exam:     General:                    The patient is a pleasant male in no acute respiratory distress. Obese. Lying flat in bed. Head:                                   Normocephalic/atraumatic. Eyes:                                   No palpebral pallor or scleral icterus. ENT:                                    External auricular and nasal exam within normal limits. Mucous membranes are moist.  Neck:                                   Supple, non-tender, no JVD. Lungs:                       Clear to auscultation bilaterally without wheezes or crackles. Tender on the left side of rib cage.                                                No respiratory distress or accessory muscle use. Heart:                                  Regular rate and rhythm, without murmurs, rubs, or gallops. Abdomen:                  Soft, non-tender, distended due to truncal obesity with normoactive bowel sounds. Genitourinary:           No tenderness over the bladder or bilateral CVAs. Extremities:               Without clubbing, cyanosis, or edema. Skin:                                    Normal color, texture, and turgor. No rashes, lesions, or jaundice. Pulses:                      Radial and dorsalis pedis pulses present 2+ bilaterally. Capillary refill <2s. Neurologic:                CN II-XII grossly intact and symmetrical.                                               Moving all four extremities well with no focal deficits. Psychiatric:                Pleasant demeanor, appropriate affect. Alert and oriented x 3      Lab/Data Review:    Recent Results (from the past 24 hour(s))   GLUCOSE, POC    Collection Time: 12/02/20 10:59 AM   Result Value Ref Range    Glucose (POC) 215 (H) 65 - 100 mg/dL   URINALYSIS W/ RFLX MICROSCOPIC    Collection Time: 12/02/20  1:25 PM   Result Value Ref Range    Color YELLOW      Appearance CLOUDY      Specific gravity 1.012 1.001 - 1.023      pH (UA) 5.5 5.0 - 9.0      Protein Negative NEG mg/dL    Glucose 100 mg/dL    Ketone Negative NEG mg/dL    Bilirubin Negative NEG      Blood TRACE (A) NEG      Urobilinogen 1.0 0.2 - 1.0 EU/dL    Nitrites Negative NEG      Leukocyte Esterase Negative NEG     PROTEIN/CREATININE RATIO, URINE    Collection Time: 12/02/20  1:25 PM   Result Value Ref Range    Protein, urine random 23 mg/dL    Creatinine, urine 108.00 mg/dL    Protein/Creat.  urine Ratio 0.2     SODIUM, UR, RANDOM    Collection Time: 12/02/20  1:25 PM   Result Value Ref Range    Sodium,urine random 43 MMOL/L   URINE MICROSCOPIC    Collection Time: 12/02/20  1:25 PM   Result Value Ref Range    WBC 5-10 0 /hpf    RBC 0-3 0 /hpf    Epithelial cells 0-3 0 /hpf    Bacteria TRACE 0 /hpf    Casts 0 0 /lpf    Crystals, urine 0 0 /LPF    Mucus 0 0 /lpf   GLUCOSE, POC    Collection Time: 12/02/20  4:35 PM   Result Value Ref Range    Glucose (POC) 176 (H) 65 - 100 mg/dL   GLUCOSE, POC    Collection Time: 12/02/20  8:49 PM   Result Value Ref Range    Glucose (POC) 208 (H) 65 - 100 mg/dL   RENAL FUNCTION PANEL    Collection Time: 12/03/20  5:22 AM   Result Value Ref Range    Sodium 135 (L) 136 - 145 mmol/L    Potassium 3.9 3.5 - 5.1 mmol/L    Chloride 103 98 - 107 mmol/L    CO2 29 21 - 32 mmol/L    Anion gap 3 (L) 7 - 16 mmol/L    Glucose 122 (H) 65 - 100 mg/dL    BUN 33 (H) 8 - 23 MG/DL    Creatinine 2.02 (H) 0.8 - 1.5 MG/DL    GFR est AA 43 (L) >60 ml/min/1.73m2    GFR est non-AA 36 (L) >60 ml/min/1.73m2    Calcium 7.9 (L) 8.3 - 10.4 MG/DL    Phosphorus 3.3 2.3 - 3.7 MG/DL    Albumin 3.0 (L) 3.2 - 4.6 g/dL   CBC W/O DIFF    Collection Time: 12/03/20  5:22 AM   Result Value Ref Range    WBC 5.0 4.3 - 11.1 K/uL    RBC 2.73 (L) 4.23 - 5.6 M/uL    HGB 8.4 (L) 13.6 - 17.2 g/dL    HCT 24.4 (L) 41.1 - 50.3 %    MCV 89.4 79.6 - 97.8 FL    MCH 30.8 26.1 - 32.9 PG    MCHC 34.4 31.4 - 35.0 g/dL    RDW 13.6 11.9 - 14.6 %    PLATELET 653 038 - 366 K/uL    MPV 9.2 (L) 9.4 - 12.3 FL    ABSOLUTE NRBC 0.00 0.0 - 0.2 K/uL   GLUCOSE, POC    Collection Time: 12/03/20  7:53 AM   Result Value Ref Range    Glucose (POC) 154 (H) 65 - 100 mg/dL     CT chest   11-  IMPRESSION:   1. Limited evaluation of the pulmonary arteries. No definite pulmonary  embolus. 2.  Left lower lobe infiltrate and effusion. 3.  Left seventh, eighth, ninth rib fractures. Separation of the eighth and  ninth ribs. XR chest   12-1-2020  IMPRESSION: Left lower lobe atelectasis or pneumonia unchanged.       Current Facility-Administered Medications:     0.9% sodium chloride infusion, 100 mL/hr, IntraVENous, CONTINUOUS, Valerie Morales MD, Last Rate: 100 mL/hr at 12/02/20 2215, 100 mL/hr at 12/02/20 2215    acetaminophen (TYLENOL) tablet 650 mg, 650 mg, Oral, Q6H, Raheem Clement MD, 650 mg at 12/03/20 0537    oxyCODONE IR (ROXICODONE) tablet 5 mg, 5 mg, Oral, Q6H PRN, Raheem Clement MD, 5 mg at 12/02/20 2215    amLODIPine (NORVASC) tablet 5 mg, 5 mg, Oral, QHS, Raheem Clement MD, 5 mg at 12/02/20 2138    insulin lispro (HUMALOG) injection, , SubCUTAneous, AC&HS, Raheem Clement MD, 2 Units at 12/03/20 0836    sodium chloride (NS) flush 5-40 mL, 5-40 mL, IntraVENous, Q8H, Raheem Clement MD, 10 mL at 12/02/20 2139    sodium chloride (NS) flush 5-40 mL, 5-40 mL, IntraVENous, PRN, Raheem Clement MD    polyethylene glycol [facility]) packet 17 g, 17 g, Oral, DAILY PRN, Raheem Clement MD, 17 g at 12/02/20 0921    promethazine (PHENERGAN) tablet 12.5 mg, 12.5 mg, Oral, Q6H PRN **OR** ondansetron (ZOFRAN) injection 4 mg, 4 mg, IntraVENous, Q6H PRN, Raheem Clement MD             Assessment:     Principal Problem:    Rib fracture (11/29/2020)    Active Problems:    Renal insufficiency (1/10/2017)      HTN (hypertension) (1/24/2017)      Overview: Last Assessment & Plan:       Inadequate control. Intensify therapy      Head and neck cancer ([facility] (1/24/2017)      Diabetes mellitus with microalbuminuria ([facility] (1/24/2017)      Overview: Last Assessment & Plan:       His A1c is well controlled but his trend of weight gain is very       concerning. We discussed at length healthy diet for diabetes, exercise,       and careful monitoring. Follow-up in 3 months      Malignant neoplasm of larynx ([facility] (12/18/2018)      ISI (acute kidney injury) ([facility] (12/2/2020)        Plan:     Multiple rib fractures   Non-traumatic   Likely from frequent deep coughing   Some episodes of lower oxygenation with exertion. However, patient is able to ambulate well without oxygen now. Hypertension  Monitor blood pressure and manage accordingly. Continue home medications.       Diabetes mellitus type 2  Monitor blood sugar.  Cover with insulin sliding scale accordingly. ISI   Creatinine is up. Likely from IV contrast exposure and recent NSAID use. Will continue to hydrate patient  Monitor renal function and intake and output. Avoid nephrotoxic agents. Retroperitoneum US did not show obstruction. Urine protein is low. Nephrology service is following. I have discussed the plan of care with patient.       DVT prophylaxis : SCD     Disposition plan :  Likely in 1-2 days especially if OK with nephrology service     Signed By: Daniel Saab MD     December 3, 2020

## 2020-12-03 NOTE — PROGRESS NOTES
I have reviewed discharge instructions with the patient and spouse. The patient and spouse verbalized understanding. All follow up appointments and medications reviewed. Time allowed for questions and all concerns addressed. Patient discharged via wheelchair to private residence.     Jailyn Grajeda RN

## 2020-12-03 NOTE — PROGRESS NOTES
Riri Carlson  Admission Date: 11/29/2020             Renal Daily Progress Note: 12/3/2020    The patient's chart and labs are reviewed- renal function improving, non oliguric, renal US normal size kidneys bilaterally, no evidence of obstructive nephropathy. [place] UA, P/C ratio 0.2, FENa 0.8%. Cr down to baseline. Will sign off and order outpt nephrology FU upon hosp d/c. Please call with additional Nephrology questions. Thanks for the consultation. Objective:     Vitals:    12/02/20 2305 12/03/20 0340 12/03/20 0729 12/03/20 1100   BP: 132/62 132/66 138/71 (!) 150/71   Pulse: 76 75 80 84   Resp: 18 20 20 20   Temp: 97.3 °F (36.3 °C) 97.2 °F (36.2 °C) 97.8 °F (36.6 °C) 97.8 °F (36.6 °C)   SpO2: 95% 97% 94% 96%   Weight:       Height:         Intake and Output:   12/01 1901 - 12/03 0700  In: 5228 [P.O.:1920;  I.V.:3308]  Out: 1320 [YELTQ:6354]  12/03 0701 - 12/03 1900  In: 120 [P.O.:120]  Out: -     LAB  Recent Labs     12/03/20  0522 12/01/20  0555   WBC 5.0 8.1   HGB 8.4* 10.0*   HCT 24.4* 29.7*    220     Recent Labs     12/03/20  0522 12/02/20  0513 12/01/20  0555   * 133* 132*   K 3.9 4.3 4.1    100 97*   CO2 29 28 28   * 127* 144*   BUN 33* 42* 37*   CREA 2.02* 2.74* 2.55*   PHOS 3.3  --   --    ALB 3.0*  --   --          Lula Nicholas NP

## 2020-12-04 LAB
BACTERIA SPEC CULT: NORMAL
BACTERIA SPEC CULT: NORMAL
SERVICE CMNT-IMP: NORMAL
SERVICE CMNT-IMP: NORMAL

## 2020-12-06 ENCOUNTER — APPOINTMENT (OUTPATIENT)
Dept: CT IMAGING | Age: 65
End: 2020-12-06
Attending: EMERGENCY MEDICINE
Payer: COMMERCIAL

## 2020-12-06 ENCOUNTER — APPOINTMENT (OUTPATIENT)
Dept: GENERAL RADIOLOGY | Age: 65
End: 2020-12-06
Attending: EMERGENCY MEDICINE
Payer: COMMERCIAL

## 2020-12-06 ENCOUNTER — HOSPITAL ENCOUNTER (EMERGENCY)
Age: 65
Discharge: HOME | End: 2020-12-06
Attending: EMERGENCY MEDICINE
Payer: COMMERCIAL

## 2020-12-06 VITALS
HEART RATE: 81 BPM | SYSTOLIC BLOOD PRESSURE: 119 MMHG | TEMPERATURE: 98.3 F | WEIGHT: 250 LBS | RESPIRATION RATE: 21 BRPM | DIASTOLIC BLOOD PRESSURE: 67 MMHG | BODY MASS INDEX: 35.79 KG/M2 | HEIGHT: 70 IN | OXYGEN SATURATION: 96 %

## 2020-12-06 DIAGNOSIS — S22.42XK CLOSED FRACTURE OF MULTIPLE RIBS OF LEFT SIDE WITH NONUNION, SUBSEQUENT ENCOUNTER: Primary | ICD-10-CM

## 2020-12-06 LAB
ALBUMIN SERPL-MCNC: 3.4 G/DL (ref 3.2–4.6)
ALBUMIN/GLOB SERPL: 1 {RATIO} (ref 1.2–3.5)
ALP SERPL-CCNC: 77 U/L (ref 50–136)
ALT SERPL-CCNC: 30 U/L (ref 12–65)
ANION GAP SERPL CALC-SCNC: 6 MMOL/L (ref 7–16)
AST SERPL-CCNC: 25 U/L (ref 15–37)
ATRIAL RATE: 93 BPM
BASOPHILS # BLD: 0 K/UL (ref 0–0.2)
BASOPHILS NFR BLD: 0 % (ref 0–2)
BILIRUB SERPL-MCNC: 1.1 MG/DL (ref 0.2–1.1)
BUN SERPL-MCNC: 26 MG/DL (ref 8–23)
CALCIUM SERPL-MCNC: 8.5 MG/DL (ref 8.3–10.4)
CALCULATED P AXIS, ECG09: 59 DEGREES
CALCULATED R AXIS, ECG10: 61 DEGREES
CALCULATED T AXIS, ECG11: 50 DEGREES
CHLORIDE SERPL-SCNC: 99 MMOL/L (ref 98–107)
CO2 SERPL-SCNC: 28 MMOL/L (ref 21–32)
CREAT SERPL-MCNC: 1.88 MG/DL (ref 0.8–1.5)
DIAGNOSIS, 93000: NORMAL
DIFFERENTIAL METHOD BLD: ABNORMAL
EOSINOPHIL # BLD: 0.2 K/UL (ref 0–0.8)
EOSINOPHIL NFR BLD: 2 % (ref 0.5–7.8)
ERYTHROCYTE [DISTWIDTH] IN BLOOD BY AUTOMATED COUNT: 14.4 % (ref 11.9–14.6)
GLOBULIN SER CALC-MCNC: 3.3 G/DL (ref 2.3–3.5)
GLUCOSE SERPL-MCNC: 249 MG/DL (ref 65–100)
HCT VFR BLD AUTO: 24 % (ref 41.1–50.3)
HGB BLD-MCNC: 8.1 G/DL (ref 13.6–17.2)
IMM GRANULOCYTES # BLD AUTO: 0.1 K/UL (ref 0–0.5)
IMM GRANULOCYTES NFR BLD AUTO: 1 % (ref 0–5)
LYMPHOCYTES # BLD: 0.7 K/UL (ref 0.5–4.6)
LYMPHOCYTES NFR BLD: 8 % (ref 13–44)
MCH RBC QN AUTO: 30.5 PG (ref 26.1–32.9)
MCHC RBC AUTO-ENTMCNC: 33.8 G/DL (ref 31.4–35)
MCV RBC AUTO: 90.2 FL (ref 79.6–97.8)
MONOCYTES # BLD: 0.3 K/UL (ref 0.1–1.3)
MONOCYTES NFR BLD: 3 % (ref 4–12)
NEUTS SEG # BLD: 7.7 K/UL (ref 1.7–8.2)
NEUTS SEG NFR BLD: 86 % (ref 43–78)
NRBC # BLD: 0 K/UL (ref 0–0.2)
P-R INTERVAL, ECG05: 168 MS
PLATELET # BLD AUTO: 241 K/UL (ref 150–450)
PMV BLD AUTO: 9.2 FL (ref 9.4–12.3)
POTASSIUM SERPL-SCNC: 3.9 MMOL/L (ref 3.5–5.1)
PROT SERPL-MCNC: 6.7 G/DL (ref 6.3–8.2)
Q-T INTERVAL, ECG07: 368 MS
QRS DURATION, ECG06: 86 MS
QTC CALCULATION (BEZET), ECG08: 457 MS
RBC # BLD AUTO: 2.66 M/UL (ref 4.23–5.6)
SODIUM SERPL-SCNC: 133 MMOL/L (ref 136–145)
TROPONIN-HIGH SENSITIVITY: 11.1 PG/ML (ref 0–14)
VENTRICULAR RATE, ECG03: 93 BPM
WBC # BLD AUTO: 9 K/UL (ref 4.3–11.1)

## 2020-12-06 PROCEDURE — 74011250636 HC RX REV CODE- 250/636: Performed by: EMERGENCY MEDICINE

## 2020-12-06 PROCEDURE — 71045 X-RAY EXAM CHEST 1 VIEW: CPT

## 2020-12-06 PROCEDURE — 84484 ASSAY OF TROPONIN QUANT: CPT

## 2020-12-06 PROCEDURE — 96374 THER/PROPH/DIAG INJ IV PUSH: CPT

## 2020-12-06 PROCEDURE — 80053 COMPREHEN METABOLIC PANEL: CPT

## 2020-12-06 PROCEDURE — 93005 ELECTROCARDIOGRAM TRACING: CPT | Performed by: EMERGENCY MEDICINE

## 2020-12-06 PROCEDURE — 85025 COMPLETE CBC W/AUTO DIFF WBC: CPT

## 2020-12-06 PROCEDURE — 99284 EMERGENCY DEPT VISIT MOD MDM: CPT

## 2020-12-06 PROCEDURE — 71250 CT THORAX DX C-: CPT

## 2020-12-06 RX ORDER — MORPHINE SULFATE 4 MG/ML
4 INJECTION INTRAVENOUS ONCE
Status: COMPLETED | OUTPATIENT
Start: 2020-12-06 | End: 2020-12-06

## 2020-12-06 RX ORDER — OXYCODONE HYDROCHLORIDE 5 MG/1
5 TABLET ORAL
Qty: 12 TAB | Refills: 0 | Status: SHIPPED | OUTPATIENT
Start: 2020-12-06 | End: 2020-12-17

## 2020-12-06 RX ADMIN — MORPHINE SULFATE 4 MG: 4 INJECTION INTRAVENOUS at 09:23

## 2020-12-06 NOTE — ED PROVIDER NOTES
Patient is a 36-year-old male with a history of hypertension and chronic renal insufficiency who comes to the emergency department today complaining of left-sided chest pain. Patient was admitted to the hospital ago with multiple left-sided rib fractures from coughing and a questionable pneumonia versus atelectasis. He went home from the hospital 3 days ago. No longer on antibiotics. Only taking Tylenol for pain. This morning he had increased pain in the left lateral ribs and the left anterior chest.  Worse with coughing. Worse with movement. No fall or injury. No radiation of the pain. No diaphoresis. The history is provided by the patient. Chest Pain    This is a new problem. The current episode started more than 1 week ago. The problem has not changed since onset. The pain is associated with coughing and movement. The pain is present in the left side. The pain is moderate. The quality of the pain is described as sharp. The pain does not radiate. Associated symptoms include shortness of breath. Pertinent negatives include no abdominal pain, no back pain, no cough, no diaphoresis, no fever, no hemoptysis, no irregular heartbeat, no nausea, no palpitations, no vomiting and no weakness. Treatments tried: tylenol. The treatment provided no relief. Risk factors include hypertension and male gender. Pertinent negatives include no cardiac catheterization. Shortness of Breath   Associated symptoms include chest pain. Pertinent negatives include no fever, no rhinorrhea, no sore throat, no neck pain, no cough, no hemoptysis, no vomiting, no abdominal pain and no rash.         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/2016  Type 2 diabetes mellitus ([facility]     oral med, does not check BG at home; unknown last A1c       Past Surgical History:   Procedure Laterality Date    HX COLONOSCOPY  2016    HX HEENT  12/2017    bronchoscopy    HX HEENT  10/01/2018    Direct laryngoscopy with CO2 laser of supraglottic swelling    HX HEENT  12/03/2018    Panendoscopy w/Scar Revision/SFE/12-03-18    HX HEENT  02/24/2020    S/P SFE Direct laryngoscopy with biopsy, CO2 laser of supraglottic airway obstruction, Bronchoscopy with tracheal balloon dilation of tracheal stenosis 02/24/2020    HX HEENT  06/15/2020    Direct laryngoscopy with CO2 laser    HX OTHER SURGICAL Left age 11    2rd nipple removed    HX OTHER SURGICAL  01/2017    PEG placement and removal    HX TRACHEOSTOMY      placement and removal    HX VASCULAR ACCESS      placement and removal         Family History:   Problem Relation Age of Onset    Stroke Mother     Lung Disease Mother        Social History     Socioeconomic History    Marital status:      Spouse name: Not on file    Number of children: Not on file    Years of education: Not on file    Highest education level: Not on file   Occupational History    Not on file   Social Needs    Financial resource strain: Not on file    Food insecurity     Worry: Not on file     Inability: Not on file    Transportation needs     Medical: Not on file     Non-medical: Not on file   Tobacco Use    Smoking status: Former Smoker     Packs/day: 2.00     Years: 20.00     Pack years: 40.00     Last attempt to quit: 2005     Years since quitting: 15.9    Smokeless tobacco: Never Used   Substance and Sexual Activity    Alcohol use: No    Drug use: No    Sexual activity: Not on file   Lifestyle    Physical activity     Days per week: Not on file     Minutes per session: Not on file    Stress: Not on file   Relationships    Social connections     Talks on phone: Not on file     Gets together: Not on file     Attends Faith service: Not on file     Active member of club or organization: Not on file     Attends meetings of clubs or organizations: Not on file     Relationship status: Not on file    Intimate partner violence     Fear of current or ex partner: Not on file     Emotionally abused: Not on file     Physically abused: Not on file     Forced sexual activity: Not on file   Other Topics Concern    Not on file   Social History Narrative    Not on file         ALLERGIES: Zofran [ondansetron hcl (pf)]    Review of Systems   Constitutional: Negative for chills, diaphoresis, fatigue and fever. HENT: Negative for congestion, rhinorrhea and sore throat. Eyes: Negative for pain, discharge and visual disturbance. Respiratory: Positive for shortness of breath. Negative for cough and hemoptysis. Cardiovascular: Positive for chest pain. Negative for palpitations. Gastrointestinal: Negative for abdominal pain, diarrhea, nausea and vomiting. Endocrine: Negative for polydipsia and polyuria. Genitourinary: Negative for dysuria, frequency and urgency. Musculoskeletal: Negative for back pain and neck pain. Skin: Negative for rash. Neurological: Negative for seizures, syncope and weakness. Hematological: Negative. Vitals:    12/06/20 0841   BP: (!) 184/89   Pulse: 95   Resp: 16   Temp: 98.3 °F (36.8 °C)   SpO2: 96%   Weight: 113.4 kg (250 lb)   Height: 5' 10\" (1.778 m)            Physical Exam  Vitals signs and nursing note reviewed. Constitutional:       Appearance: Normal appearance. He is well-developed. He is obese. He is ill-appearing. HENT:      Head: Normocephalic and atraumatic. Nose: Nose normal.   Eyes:      Extraocular Movements: Extraocular movements intact. Conjunctiva/sclera: Conjunctivae normal.      Pupils: Pupils are equal, round, and reactive to light. Neck:      Musculoskeletal: Normal range of motion and neck supple.    Cardiovascular:      Rate and Rhythm: Normal rate and regular rhythm. Heart sounds: Heart sounds are distant. Pulmonary:      Effort: Pulmonary effort is normal.      Breath sounds: Normal breath sounds. Chest:      Chest wall: Tenderness present. No crepitus. Comments: Tender on the left lower lateral ribs. Very large area of ecchymosis on the entire left lateral chest wall. Abdominal:      Palpations: Abdomen is soft. Tenderness: There is abdominal tenderness. There is no guarding or rebound. Musculoskeletal: Normal range of motion. General: No tenderness. Right lower leg: No edema. Left lower leg: No edema. Lymphadenopathy:      Cervical: No cervical adenopathy. Skin:     General: Skin is warm and dry. Findings: Ecchymosis present. No rash. Neurological:      General: No focal deficit present. Mental Status: He is alert and oriented to person, place, and time. GCS: GCS eye subscore is 4. GCS verbal subscore is 5. GCS motor subscore is 6. Cranial Nerves: No cranial nerve deficit. Sensory: No sensory deficit. Motor: Motor function is intact. MDM  Number of Diagnoses or Management Options  Diagnosis management comments: I wore appropriate PPE throughout this patient's ED visit. Nisha Gutierrez MD, 9:41 AM    EKG reviewed by me shows normal sinus rhythm rate of 93, no acute ischemia    Portable chest x-ray shows atelectasis versus infiltrate on the left base similar to x-rays 1 week ago. White count normal but hemoglobin down to 8.1 this is a 4 g drop from admission 1 week ago. Chemistries show his chronic renal insufficiency. High-sensitivity troponin negative. 11:00 AM  CT scan of the chest abdomen and pelvis does show persistent left-sided rib fractures but no change from previous scan. There is some subpleural fluid and some fluid noted in the chest and abdominal wall all likely the hematoma and bruising which is noted clinically.     Patient is feeling better after some IV morphine for pain. He has only been taking Tylenol at home which is not controlling his pain. He is not tachycardic, tachypneic, or hypoxic here there is no indication for admission. I will prescribe him something stronger for pain and advised him to follow-up with his doctor as scheduled. Voice dictation software was used during the making of this note. This software is not perfect and grammatical and other typographical errors may be present. This note has been proofread, but may still contain errors.   Celina Lira MD; 12/6/2020 @11:01 AM   ===================================================================         Amount and/or Complexity of Data Reviewed  Clinical lab tests: ordered and reviewed  Tests in the radiology section of CPT®: ordered and reviewed  Tests in the medicine section of CPT®: ordered and reviewed  Review and summarize past medical records: yes  Independent visualization of images, tracings, or specimens: yes    Risk of Complications, Morbidity, and/or Mortality  Presenting problems: moderate  Diagnostic procedures: moderate  Management options: moderate    Patient Progress  Patient progress: improved         Procedures

## 2020-12-06 NOTE — ED NOTES
I have reviewed discharge instructions with the patient and wife. The patient and wife verbalized understanding. Patient left ED via Discharge Method: ambulatory to Home with wife. Opportunity for questions and clarification provided. Patient given 1 scripts. To continue your aftercare when you leave the hospital, you may receive an automated call from our care team to check in on how you are doing. This is a free service and part of our promise to provide the best care and service to meet your aftercare needs.  If you have questions, or wish to unsubscribe from this service please call [phone]. Thank you for Choosing our [facility] Emergency Department.

## 2020-12-06 NOTE — ED TRIAGE NOTES
Patient advises being admitted recently for pneumonia and this morning was having increased pain to left side of chest with movement and talking only. Patient advises remaining short of breath with exertion only. Mask on during triage. Patient advises 3 broken ribs to left side of chest while admitted, significant bruising to left side of torso.

## 2020-12-06 NOTE — DISCHARGE INSTRUCTIONS
Rest and use the additional pain medication as needed. Follow-up with your doctor or return to the emergency department for any other acute concerns.

## 2020-12-10 ENCOUNTER — APPOINTMENT (OUTPATIENT)
Dept: GENERAL RADIOLOGY | Age: 65
DRG: 187 | End: 2020-12-10
Attending: EMERGENCY MEDICINE
Payer: COMMERCIAL

## 2020-12-10 ENCOUNTER — HOSPITAL ENCOUNTER (INPATIENT)
Age: 65
LOS: 7 days | Discharge: HOME | DRG: 187 | End: 2020-12-17
Attending: EMERGENCY MEDICINE | Admitting: HOSPITALIST
Payer: COMMERCIAL

## 2020-12-10 ENCOUNTER — APPOINTMENT (OUTPATIENT)
Dept: CT IMAGING | Age: 65
DRG: 187 | End: 2020-12-10
Attending: EMERGENCY MEDICINE
Payer: COMMERCIAL

## 2020-12-10 DIAGNOSIS — S22.42XG CLOSED FRACTURE OF MULTIPLE RIBS OF LEFT SIDE WITH DELAYED HEALING, SUBSEQUENT ENCOUNTER: ICD-10-CM

## 2020-12-10 DIAGNOSIS — J94.2 HEMOTHORAX ON LEFT: Primary | ICD-10-CM

## 2020-12-10 DIAGNOSIS — S22.42XK CLOSED FRACTURE OF MULTIPLE RIBS OF LEFT SIDE WITH NONUNION, SUBSEQUENT ENCOUNTER: ICD-10-CM

## 2020-12-10 DIAGNOSIS — D62 ACUTE BLOOD LOSS ANEMIA: ICD-10-CM

## 2020-12-10 PROBLEM — N18.9 CKD (CHRONIC KIDNEY DISEASE): Status: ACTIVE | Noted: 2017-01-10

## 2020-12-10 LAB
ANION GAP SERPL CALC-SCNC: 6 MMOL/L (ref 7–16)
BASOPHILS # BLD: 0 K/UL (ref 0–0.2)
BASOPHILS NFR BLD: 0 % (ref 0–2)
BUN SERPL-MCNC: 34 MG/DL (ref 8–23)
CALCIUM SERPL-MCNC: 8.8 MG/DL (ref 8.3–10.4)
CHLORIDE SERPL-SCNC: 101 MMOL/L (ref 98–107)
CO2 SERPL-SCNC: 28 MMOL/L (ref 21–32)
CREAT SERPL-MCNC: 1.81 MG/DL (ref 0.8–1.5)
DIFFERENTIAL METHOD BLD: ABNORMAL
EOSINOPHIL # BLD: 0 K/UL (ref 0–0.8)
EOSINOPHIL NFR BLD: 0 % (ref 0.5–7.8)
ERYTHROCYTE [DISTWIDTH] IN BLOOD BY AUTOMATED COUNT: 15.6 % (ref 11.9–14.6)
GLUCOSE SERPL-MCNC: 190 MG/DL (ref 65–100)
HCT VFR BLD AUTO: 20.2 % (ref 41.1–50.3)
HCT VFR BLD AUTO: 20.7 % (ref 41.1–50.3)
HGB BLD-MCNC: 6.7 G/DL (ref 13.6–17.2)
HGB BLD-MCNC: 6.8 G/DL (ref 13.6–17.2)
HISTORY CHECKED?,CKHIST: NORMAL
IMM GRANULOCYTES # BLD AUTO: 0.1 K/UL (ref 0–0.5)
IMM GRANULOCYTES NFR BLD AUTO: 1 % (ref 0–5)
INR PPP: 0.9
LYMPHOCYTES # BLD: 0.4 K/UL (ref 0.5–4.6)
LYMPHOCYTES NFR BLD: 5 % (ref 13–44)
MCH RBC QN AUTO: 30.7 PG (ref 26.1–32.9)
MCHC RBC AUTO-ENTMCNC: 33.2 G/DL (ref 31.4–35)
MCV RBC AUTO: 92.7 FL (ref 79.6–97.8)
MONOCYTES # BLD: 0.3 K/UL (ref 0.1–1.3)
MONOCYTES NFR BLD: 3 % (ref 4–12)
NEUTS SEG # BLD: 7.8 K/UL (ref 1.7–8.2)
NEUTS SEG NFR BLD: 91 % (ref 43–78)
NRBC # BLD: 0 K/UL (ref 0–0.2)
PLATELET # BLD AUTO: 250 K/UL (ref 150–450)
PMV BLD AUTO: 8.6 FL (ref 9.4–12.3)
POTASSIUM SERPL-SCNC: 4.5 MMOL/L (ref 3.5–5.1)
PROTHROMBIN TIME: 12.8 SEC (ref 12.5–14.7)
RBC # BLD AUTO: 2.18 M/UL (ref 4.23–5.6)
SODIUM SERPL-SCNC: 135 MMOL/L (ref 136–145)
WBC # BLD AUTO: 8.6 K/UL (ref 4.3–11.1)

## 2020-12-10 PROCEDURE — 74011250636 HC RX REV CODE- 250/636: Performed by: INTERNAL MEDICINE

## 2020-12-10 PROCEDURE — 80048 BASIC METABOLIC PNL TOTAL CA: CPT

## 2020-12-10 PROCEDURE — 74011000636 HC RX REV CODE- 636: Performed by: EMERGENCY MEDICINE

## 2020-12-10 PROCEDURE — 71046 X-RAY EXAM CHEST 2 VIEWS: CPT

## 2020-12-10 PROCEDURE — 85610 PROTHROMBIN TIME: CPT

## 2020-12-10 PROCEDURE — 36430 TRANSFUSION BLD/BLD COMPNT: CPT

## 2020-12-10 PROCEDURE — 74011250637 HC RX REV CODE- 250/637: Performed by: INTERNAL MEDICINE

## 2020-12-10 PROCEDURE — 36416 COLLJ CAPILLARY BLOOD SPEC: CPT

## 2020-12-10 PROCEDURE — 96374 THER/PROPH/DIAG INJ IV PUSH: CPT

## 2020-12-10 PROCEDURE — 99284 EMERGENCY DEPT VISIT MOD MDM: CPT

## 2020-12-10 PROCEDURE — 85025 COMPLETE CBC W/AUTO DIFF WBC: CPT

## 2020-12-10 PROCEDURE — 86923 COMPATIBILITY TEST ELECTRIC: CPT

## 2020-12-10 PROCEDURE — 30233N1 TRANSFUSION OF NONAUTOLOGOUS RED BLOOD CELLS INTO PERIPHERAL VEIN, PERCUTANEOUS APPROACH: ICD-10-PCS | Performed by: INTERNAL MEDICINE

## 2020-12-10 PROCEDURE — 94664 DEMO&/EVAL PT USE INHALER: CPT

## 2020-12-10 PROCEDURE — 94640 AIRWAY INHALATION TREATMENT: CPT

## 2020-12-10 PROCEDURE — P9040 RBC LEUKOREDUCED IRRADIATED: HCPCS

## 2020-12-10 PROCEDURE — 77010033678 HC OXYGEN DAILY

## 2020-12-10 PROCEDURE — 94762 N-INVAS EAR/PLS OXIMTRY CONT: CPT

## 2020-12-10 PROCEDURE — 74011250636 HC RX REV CODE- 250/636: Performed by: EMERGENCY MEDICINE

## 2020-12-10 PROCEDURE — 65610000001 HC ROOM ICU GENERAL

## 2020-12-10 PROCEDURE — 74011000258 HC RX REV CODE- 258: Performed by: EMERGENCY MEDICINE

## 2020-12-10 PROCEDURE — 86644 CMV ANTIBODY: CPT

## 2020-12-10 PROCEDURE — 86900 BLOOD TYPING SEROLOGIC ABO: CPT

## 2020-12-10 PROCEDURE — 99223 1ST HOSP IP/OBS HIGH 75: CPT | Performed by: SURGERY

## 2020-12-10 PROCEDURE — 85018 HEMOGLOBIN: CPT

## 2020-12-10 PROCEDURE — 74011000250 HC RX REV CODE- 250: Performed by: EMERGENCY MEDICINE

## 2020-12-10 PROCEDURE — 71260 CT THORAX DX C+: CPT

## 2020-12-10 RX ORDER — PREDNISONE 10 MG/1
TABLET ORAL
Status: ON HOLD | COMMUNITY
End: 2020-12-11

## 2020-12-10 RX ORDER — SODIUM CHLORIDE 9 MG/ML
250 INJECTION, SOLUTION INTRAVENOUS AS NEEDED
Status: DISCONTINUED | OUTPATIENT
Start: 2020-12-10 | End: 2020-12-17 | Stop reason: HOSPADM

## 2020-12-10 RX ORDER — SODIUM CHLORIDE 0.9 % (FLUSH) 0.9 %
10 SYRINGE (ML) INJECTION
Status: COMPLETED | OUTPATIENT
Start: 2020-12-10 | End: 2020-12-10

## 2020-12-10 RX ORDER — CITALOPRAM 20 MG/1
40 TABLET, FILM COATED ORAL EVERY EVENING
Status: DISCONTINUED | OUTPATIENT
Start: 2020-12-10 | End: 2020-12-17 | Stop reason: HOSPADM

## 2020-12-10 RX ORDER — PROMETHAZINE HYDROCHLORIDE 25 MG/1
12.5 TABLET ORAL
Status: DISCONTINUED | OUTPATIENT
Start: 2020-12-10 | End: 2020-12-17 | Stop reason: HOSPADM

## 2020-12-10 RX ORDER — IPRATROPIUM BROMIDE AND ALBUTEROL SULFATE 2.5; .5 MG/3ML; MG/3ML
3 SOLUTION RESPIRATORY (INHALATION)
Status: COMPLETED | OUTPATIENT
Start: 2020-12-10 | End: 2020-12-10

## 2020-12-10 RX ORDER — SODIUM CHLORIDE 0.9 % (FLUSH) 0.9 %
5-40 SYRINGE (ML) INJECTION EVERY 8 HOURS
Status: DISCONTINUED | OUTPATIENT
Start: 2020-12-10 | End: 2020-12-17 | Stop reason: HOSPADM

## 2020-12-10 RX ORDER — ACETAMINOPHEN 650 MG/1
650 SUPPOSITORY RECTAL
Status: DISCONTINUED | OUTPATIENT
Start: 2020-12-10 | End: 2020-12-17 | Stop reason: HOSPADM

## 2020-12-10 RX ORDER — SODIUM CHLORIDE, SODIUM LACTATE, POTASSIUM CHLORIDE, CALCIUM CHLORIDE 600; 310; 30; 20 MG/100ML; MG/100ML; MG/100ML; MG/100ML
75 INJECTION, SOLUTION INTRAVENOUS CONTINUOUS
Status: DISCONTINUED | OUTPATIENT
Start: 2020-12-10 | End: 2020-12-11

## 2020-12-10 RX ORDER — POLYETHYLENE GLYCOL 3350 17 G/17G
17 POWDER, FOR SOLUTION ORAL DAILY PRN
Status: DISCONTINUED | OUTPATIENT
Start: 2020-12-10 | End: 2020-12-17 | Stop reason: HOSPADM

## 2020-12-10 RX ORDER — MORPHINE SULFATE 2 MG/ML
2 INJECTION, SOLUTION INTRAMUSCULAR; INTRAVENOUS ONCE
Status: COMPLETED | OUTPATIENT
Start: 2020-12-10 | End: 2020-12-10

## 2020-12-10 RX ORDER — SODIUM CHLORIDE 0.9 % (FLUSH) 0.9 %
5-40 SYRINGE (ML) INJECTION AS NEEDED
Status: DISCONTINUED | OUTPATIENT
Start: 2020-12-10 | End: 2020-12-17 | Stop reason: HOSPADM

## 2020-12-10 RX ORDER — ALLOPURINOL 300 MG/1
300 TABLET ORAL DAILY
Status: DISCONTINUED | OUTPATIENT
Start: 2020-12-11 | End: 2020-12-11

## 2020-12-10 RX ORDER — ACETAMINOPHEN 325 MG/1
650 TABLET ORAL
Status: DISCONTINUED | OUTPATIENT
Start: 2020-12-10 | End: 2020-12-17 | Stop reason: HOSPADM

## 2020-12-10 RX ORDER — AMLODIPINE BESYLATE 5 MG/1
5 TABLET ORAL
Status: DISCONTINUED | OUTPATIENT
Start: 2020-12-10 | End: 2020-12-11

## 2020-12-10 RX ORDER — MORPHINE SULFATE 2 MG/ML
2 INJECTION, SOLUTION INTRAMUSCULAR; INTRAVENOUS
Status: DISCONTINUED | OUTPATIENT
Start: 2020-12-10 | End: 2020-12-11

## 2020-12-10 RX ORDER — NALOXONE HYDROCHLORIDE 0.4 MG/ML
0.4 INJECTION, SOLUTION INTRAMUSCULAR; INTRAVENOUS; SUBCUTANEOUS
Status: DISCONTINUED | OUTPATIENT
Start: 2020-12-10 | End: 2020-12-17 | Stop reason: HOSPADM

## 2020-12-10 RX ORDER — OXYCODONE HYDROCHLORIDE 5 MG/1
5 TABLET ORAL
Status: DISCONTINUED | OUTPATIENT
Start: 2020-12-10 | End: 2020-12-17 | Stop reason: HOSPADM

## 2020-12-10 RX ADMIN — SODIUM CHLORIDE, SODIUM LACTATE, POTASSIUM CHLORIDE, AND CALCIUM CHLORIDE 125 ML/HR: 600; 310; 30; 20 INJECTION, SOLUTION INTRAVENOUS at 18:28

## 2020-12-10 RX ADMIN — Medication 10 ML: at 15:52

## 2020-12-10 RX ADMIN — SODIUM CHLORIDE 1000 ML: 900 INJECTION, SOLUTION INTRAVENOUS at 16:21

## 2020-12-10 RX ADMIN — IOPAMIDOL 80 ML: 755 INJECTION, SOLUTION INTRAVENOUS at 15:52

## 2020-12-10 RX ADMIN — IPRATROPIUM BROMIDE AND ALBUTEROL SULFATE 3 ML: .5; 3 SOLUTION RESPIRATORY (INHALATION) at 13:25

## 2020-12-10 RX ADMIN — Medication 10 ML: at 23:27

## 2020-12-10 RX ADMIN — SODIUM CHLORIDE 100 ML: 900 INJECTION, SOLUTION INTRAVENOUS at 15:52

## 2020-12-10 RX ADMIN — AMLODIPINE BESYLATE 5 MG: 5 TABLET ORAL at 23:24

## 2020-12-10 RX ADMIN — CITALOPRAM HYDROBROMIDE 40 MG: 20 TABLET ORAL at 23:24

## 2020-12-10 RX ADMIN — OXYCODONE HYDROCHLORIDE 5 MG: 5 TABLET ORAL at 23:24

## 2020-12-10 RX ADMIN — MORPHINE SULFATE 2 MG: 2 INJECTION, SOLUTION INTRAMUSCULAR; INTRAVENOUS at 18:30

## 2020-12-10 NOTE — PROGRESS NOTES
SW met with patient who confirmed demographic information, states that he lives with his wife Lizz Pino and has three children who live locally. Patient seen by Dr. Birgit Ospina and is current. Patient currently on oxygen, denies requiring it at his baseline. Patient does not use assistive devices to ambulate, denies any falls, and does not require ADL assistance. Patient does not have HCPOA documents, states that his wife would be his decision maker and has told her \"what I would want. \" Patient declined to formally complete documents.      KIRIT Heard    [address] [facility]    * [email or web address]

## 2020-12-10 NOTE — ED TRIAGE NOTES
Pt states he has three broken ribs for a few weeks. States he is getting more SOB and cannot due any walking due to it. States he started prednisone yesterday. Pt was evaluated here on Sunday due to the pain in his ribs. Pt has a mask for triage.

## 2020-12-10 NOTE — CONSULTS
H&P/Consult Note/Progress Note/Office Note:   Angel Kam  MRN: [id number]  XCV:5/46/5220  Age:65 y.o.    HPI: Angel Kam is a 60 y.o. male who we are asked by Dr. Niki Man to see for left sided hemothorax. The patient has fracture of ribs 6-9 from presumably a coughing spell back in November. He denies any trauma or fall. He was initially seen at [facility] 10/26 with c/o left sided chest and abdominal pain. The workup there was essentially negative. He continued to worsen and was seen again at urgent care and given antibiotics for pneumonia, pain improved for a few days and returned. He then started hearing popping sound in left side at times, associated with increased discomfort along with pain and coughing so he came to [facility] ER on 11/29. He had a CTA which showed multiple left sided rib fxs, consolidation and effusion. He was admitted, treated, and then discharged on 12/3. After discharge he continued to have left sided pain and developed shortness of breath so he returned to [facility] ER 12/6. CT scan of the chest abdomen and pelvis does show persistent left-sided rib fractures but no change from previous scan. There was some subpleural fluid and some fluid noted in the chest and abdominal wall all likely the hematoma and bruising which is noted clinically. He felt better after pain medication and was sent home. He did not improve so he returned once again on 12/10. He was found to be hypoxic with hgb 6.7 and CXR showing left pleural fluid increasing concerning for possible enlarging hemothorax. F/U chest CT showed significant interval enlargement of the left hemothorax with appearance of transthoracic herniation of pleura secondary to the large size of the hematoma and increasing displacement of left-sided rib fractures thought to be secondary to the expanding hematoma. He is being admitted by the hospitalist service and we have requested transfer to [facility].        Past Medical History:   Diagnosis Date    Anxiety     Controlled with meds     GERD (gastroesophageal reflux disease)     managed with medication     Gout     symptoms in ankles, no recent episodes    Hypertension     managed with medication     Nausea & vomiting     Obesity     Squamous cell carcinoma of epiglottis ([facility] 11/23/2016    Type 2 diabetes mellitus ([facility] [address] [facility]     oral med, does not check BG at home; unknown last A1c     Past Surgical History:   Procedure Laterality Date    HX COLONOSCOPY  2016    HX HEENT  12/2017    bronchoscopy    HX HEENT  10/01/2018    Direct laryngoscopy with CO2 laser of supraglottic swelling    HX HEENT  12/03/2018    Panendoscopy w/Scar Revision/SFE/12-03-18    HX HEENT  02/24/2020    S/P SFE Direct laryngoscopy with biopsy, CO2 laser of supraglottic airway obstruction, Bronchoscopy with tracheal balloon dilation of tracheal stenosis 02/24/2020    HX HEENT  06/15/2020    Direct laryngoscopy with CO2 laser    HX OTHER SURGICAL Left age 11    2rd nipple removed    HX OTHER SURGICAL  01/2017    PEG placement and removal    HX TRACHEOSTOMY      placement and removal    HX VASCULAR ACCESS      placement and removal     Current Facility-Administered Medications   Medication Dose Route Frequency    0.9% sodium chloride infusion 250 mL  250 mL IntraVENous PRN    sodium chloride 0.9 % bolus infusion 1,000 mL  1,000 mL IntraVENous ONCE     Current Outpatient Medications   Medication Sig    predniSONE (DELTASONE) 10 mg tablet Take  by mouth daily (with breakfast).  oxyCODONE IR (ROXICODONE) 5 mg immediate release tablet Take 1 Tab by mouth every eight (8) hours as needed for Pain for up to 4 days. Max Daily Amount: 15 mg.    allopurinoL (ZYLOPRIM) 300 mg tablet Take 1 Tab by mouth.  cyclobenzaprine (FLEXERIL) 10 mg tablet     glipiZIDE (GLUCOTROL) 10 mg tablet Take 1 Tab by mouth.  valACYclovir (VALTREX) 1 gram tablet     amLODIPine (NORVASC) 5 mg tablet Take 5 mg by mouth nightly.     citalopram (CELEXA) 40 mg tablet Take 1 Tab by mouth every evening.  aspirin delayed-release 81 mg tablet Take 81 mg by mouth every morning. Take / use AM day of surgery  per anesthesia protocols. Indications: myocardial infarction prevention     Zofran [ondansetron hcl (pf)]  Social History     Socioeconomic History    Marital status:      Spouse name: Not on file    Number of children: Not on file    Years of education: Not on file    Highest education level: Not on file   Tobacco Use    Smoking status: Former Smoker     Packs/day: 2.00     Years: 20.00     Pack years: 40.00     Last attempt to quit: 2005     Years since quitting: 15.9    Smokeless tobacco: Never Used   Substance and Sexual Activity    Alcohol use: No    Drug use: No     Social History     Tobacco Use   Smoking Status Former Smoker    Packs/day: 2.00    Years: 20.00    Pack years: 40.00    Last attempt to quit: 2005    Years since quitting: 15.9   Smokeless Tobacco Never Used     Family History   Problem Relation Age of Onset    Stroke Mother     Lung Disease Mother      ROS: The patient has no difficulty with chest pain or shortness of breath. No fever or chills. Comprehensive review of systems was otherwise unremarkable except as noted above. Physical Exam:   Visit Vitals  BP (!) 143/71   Pulse (!) 104   Temp 98.8 °F (37.1 °C)   Resp 25   Ht 5' 11\" (1.803 m)   Wt 245 lb (111.1 kg)   SpO2 94%   BMI 34.17 kg/m²     Constitutional: Alert, oriented, cooperative patient in no acute distress; appears stated age    Eyes:Sclera are clear. EOMs intact  ENMT: no external lesions gross hearing normal; no obvious neck masses, no ear or lip lesions, nares normal  CV: RRR. Normal perfusion; ecchymosis to left lateral trunk, +guarding  Resp: No JVD. Breathing is  non-labored; no audible wheezing. Respirations are shallow   GI: soft and non-distended     Musculoskeletal: unremarkable with normal function. No embolic signs or cyanosis. Neuro: Oriented; moves all 4; no focal deficits  Psychiatric: normal affect and mood, no memory impairment    Recent vitals (if inpt):  Patient Vitals for the past 24 hrs:   BP Temp Pulse Resp SpO2 Height Weight   12/10/20 1345     94 %     12/10/20 1232 (!) 143/71 98.8 °F (37.1 °C) (!) 104 25 96 % 5' 11\" (1.803 m) 245 lb (111.1 kg)       Labs:  Recent Labs     12/10/20  1353   WBC 8.6   HGB 6.7*      *   K 4.5      CO2 28   BUN 34*   CREA 1.81*   *       Lab Results   Component Value Date/Time    WBC 8.6 12/10/2020 01:53 PM    HGB 6.7 (LL) 12/10/2020 01:53 PM    PLATELET 414 28/59/9574 01:53 PM    Sodium 135 (L) 12/10/2020 01:53 PM    Potassium 4.5 12/10/2020 01:53 PM    Chloride 101 12/10/2020 01:53 PM    CO2 28 12/10/2020 01:53 PM    BUN 34 (H) 12/10/2020 01:53 PM    Creatinine 1.81 (H) 12/10/2020 01:53 PM    Glucose 190 (H) 12/10/2020 01:53 PM    INR 1.0 11/30/2020 11:01 AM    aPTT 24.7 12/20/2016 01:44 PM    Bilirubin, total 1.1 12/06/2020 08:49 AM    ALT (SGPT) 30 12/06/2020 08:49 AM    Alk. phosphatase 77 12/06/2020 08:49 AM    Lipase 246 11/29/2020 09:35 AM       I reviewed recent labs and recent radiologic studies. CT Results (most recent):  Results from Hospital Encounter encounter on 12/10/20   CT CHEST W CONT    Narrative EXAM: CT CHEST WITH CONTRAST    INDICATION: further eval increasing probable hemothorax. COMPARISON: Chest x-ray 12/10/2020, CT from 12/6/2020    TECHNIQUE:  CT imaging was performed of the chest after intravenous injection of  100 mL Isovue 370. Intravenous contrast was used for better evaluation of solid  organs and vascular structures. Coronal reformatted imaging provided. Radiation  dose reduction techniques were used for this study. Our CT scanners use one or  all of the following: Automated exposure control, adjustment of the mA and/or kV  according to patient size, iterative reconstruction.     FINDINGS:    Mediastinum and visualized thyroid: Normal.    Heart: Normal.    Large Vessels: Normal.    Pleura: Significant interval increase in left pleural fluid since the CT from  12/6/2020, which appears complex and is concerning for enlarging hemothorax. The  pleural lining appears to extend beyond the left lateral chest wall likely  indicating transthoracic herniation. Lungs: The lungs appear emphysematous. Passive atelectasis of the left lung. Airways: Normal.    Lymph nodes: Normal.    Bones/Soft tissues: Increasing displacement of left-sided rib fractures thought  to be secondary to be expanding left hemothorax. These include displaced  fractures of the sixth through ninth ribs. The ninth rib fracture is a segmental  fracture. Visualized abdomen: Normal.      Impression IMPRESSION:  1. Significant interval enlargement of the left hemothorax with appearance of  transthoracic herniation of pleura secondary to the large size of the hematoma. 2.  Increasing displacement of left-sided rib fractures thought to be secondary  to the expanding hematoma. XR Results (most recent):  Results from Hospital Encounter encounter on 12/10/20   XR CHEST PA LAT    Narrative EXAM: CHEST X-RAY, 2 VIEWS    INDICATION: worsening shortness of breath    COMPARISON: CT chest, abdomen, and pelvis 12/6/2020    TECHNIQUE: Frontal and lateral views of the chest were obtained. FINDINGS: Multiple left-sided rib fractures better appreciated by CT. Increasing  left pleural fluid. There is associated airspace opacification in the left lung  base. No pneumothorax is evident. Right lung is clear. Impression IMPRESSION:  Left pleural fluid is increasing concerning for possible enlarging hemothorax. I independently reviewed radiology images for studies I described above or studies I have ordered.    Admission date (for inpatients): 12/10/2020   * No surgery found *  * No surgery found *    ASSESSMENT/PLAN:  Problem List  Date Reviewed: 11/18/2020          Codes Class Noted    ISI (acute kidney injury) ([facility] ICD-10-CM: N17.9  ICD-9-CM: 584.9  12/2/2020        Rib fracture ICD-10-CM: S22.39XA  ICD-9-CM: 807.00  11/29/2020        Tracheal stenosis ICD-10-CM: J39.8  ICD-9-CM: 519.19  4/1/2020        Malignant neoplasm of larynx (HCC) ICD-10-CM: C32.9  ICD-9-CM: 161.9  12/18/2018        Decreased libido ICD-10-CM: R68.82  ICD-9-CM: 799.81  4/27/2018        Tonsil cancer ([facility] ICD-10-CM: C09.9  ICD-9-CM: 146.0  4/27/2017        Cellulitis of neck ICD-10-CM: D41.108  ICD-9-CM: 682.1  2/1/2017        Tracheostomy in place [facility]) ICD-10-CM: Z93.0  ICD-9-CM: V44.0  2/1/2017        Chemotherapy-induced neutropenia (HCC) ICD-10-CM: D70.1, T45.1X5A  ICD-9-CM: 288.03, E933.1  2/1/2017        Type 2 diabetes mellitus with hyperglycemia (HCC) ICD-10-CM: E11.65  ICD-9-CM: 250.00  1/24/2017        HTN (hypertension) ICD-10-CM: I10  ICD-9-CM: 401.9  1/24/2017    Overview Signed 3/4/2020 12:26 PM by Deanne Campbell CMA     Last Assessment & Plan:   Inadequate control. Intensify therapy             Head and neck cancer ([facility] ICD-10-CM: C76.0  ICD-9-CM: 195.0  1/24/2017        Diabetes mellitus with microalbuminuria ([facility] ICD-10-CM: E11.29, R80.9  ICD-9-CM: 250.40, 791.0  1/24/2017    Overview Signed 3/4/2020 12:26 PM by Deanne Campbell CMA     Last Assessment & Plan:   His A1c is well controlled but his trend of weight gain is very concerning. We discussed at length healthy diet for diabetes, exercise, and careful monitoring. Follow-up in 3 months             Neutropenic fever ([facility] ICD-10-CM: D70.9, R50.81  ICD-9-CM: 288.00, 780.61  1/12/2017        Pancytopenia ([facility] ICD-10-CM: Q72.459  ICD-9-CM: 284.19  1/12/2017        Renal insufficiency ICD-10-CM: N28.9  ICD-9-CM: 593.9  1/10/2017        Non-intractable cyclical vomiting with nausea ICD-10-CM: R11.15  ICD-9-CM: 536.2  1/10/2017            Active Problems:    * No active hospital problems.  *     Transfer downtown  Surgical plan per Dr. Kandice Ennis    Signed:  Agnieszka Cloud NP     I have seen and examined the patient with the Nurse Practitioner and agree with the above assessment and plan. Large left hemothorax with rib fracture. Will place chest tube at bedside.      Marielos Davidson MD

## 2020-12-10 NOTE — ACP (ADVANCE CARE PLANNING)
Met with patient to talk about [address] and wishes for CPR and ventilation. Patient/family have refused to have this discussion with . Advised patient/family to let me know if they change their mind.        KIRIT Mckee    [address] [facility]    * [email or web address]

## 2020-12-10 NOTE — ED PROVIDER NOTES
Patient with no baseline longstanding cardiopulmonary illness to his knowledge but with recent rib fractures and pneumonia and was seen within the last several days for similar. Has noticed increasing shortness of breath even with simple activity of walking just a few feet becoming profoundly winded. He states that when he has checked his oxygen sat is with activity it is often gone to 86%. Of note on his most recent trending on his lab work he has had progressive decreasing hemoglobins with each evaluation. He has extensive bruising noted to the entire left chest and abdomen. Large bruising left lateral chest/pectoral and lateral abdomen    The history is provided by the patient and the spouse. Shortness of Breath   The current episode started more than 2 days ago. The problem has been gradually worsening. Associated symptoms include wheezing. Pertinent negatives include no fever, no sputum production and no hemoptysis. Associated medical issues do not include chronic lung disease or PE.         Past Medical History:   Diagnosis Date    Anxiety     Controlled with meds     GERD (gastroesophageal reflux disease)     managed with medication     Gout     symptoms in ankles, no recent episodes    Hypertension     managed with medication     Nausea & vomiting     Obesity     Squamous cell carcinoma of epiglottis ([facility] [address] 11/23/2016    Type 2 diabetes mellitus ([facility] [address]     oral med, does not check BG at home; unknown last A1c       Past Surgical History:   Procedure Laterality Date    HX COLONOSCOPY  2016    HX HEENT  12/2017    bronchoscopy    HX HEENT  10/01/2018    Direct laryngoscopy with CO2 laser of supraglottic swelling    HX HEENT  12/03/2018    Panendoscopy w/Scar Revision/SFE/12-03-18    HX HEENT  02/24/2020    S/P SFE Direct laryngoscopy with biopsy, CO2 laser of supraglottic airway obstruction, Bronchoscopy with tracheal balloon dilation of tracheal stenosis 02/24/2020    HX HEENT  06/15/2020 Direct laryngoscopy with CO2 laser    HX OTHER SURGICAL Left age 11    2rd nipple removed    HX OTHER SURGICAL  01/2017    PEG placement and removal    HX TRACHEOSTOMY      placement and removal    HX VASCULAR ACCESS      placement and removal         Family History:   Problem Relation Age of Onset    Stroke Mother     Lung Disease Mother        Social History     Socioeconomic History    Marital status:      Spouse name: Not on file    Number of children: Not on file    Years of education: Not on file    Highest education level: Not on file   Occupational History    Not on file   Social Needs    Financial resource strain: Not on file    Food insecurity     Worry: Not on file     Inability: Not on file    Transportation needs     Medical: Not on file     Non-medical: Not on file   Tobacco Use    Smoking status: Former Smoker     Packs/day: 2.00     Years: 20.00     Pack years: 40.00     Last attempt to quit: 2005     Years since quitting: 15.9    Smokeless tobacco: Never Used   Substance and Sexual Activity    Alcohol use: No    Drug use: No    Sexual activity: Not on file   Lifestyle    Physical activity     Days per week: Not on file     Minutes per session: Not on file    Stress: Not on file   Relationships    Social connections     Talks on phone: Not on file     Gets together: Not on file     Attends [employer] witness service: Not on file     Active member of club or organization: Not on file     Attends meetings of clubs or organizations: Not on file     Relationship status: Not on file    Intimate partner violence     Fear of current or ex partner: Not on file     Emotionally abused: Not on file     Physically abused: Not on file     Forced sexual activity: Not on file   Other Topics Concern    Not on file   Social History Narrative    Not on file         ALLERGIES: Zofran [ondansetron hcl (pf)]    Review of Systems   Constitutional: Negative for chills and fever.    Respiratory: Positive for shortness of breath and wheezing. Negative for hemoptysis and sputum production. Gastrointestinal: Negative. Hematological: Bruises/bleeds easily. Psychiatric/Behavioral: Negative for confusion. All other systems reviewed and are negative. Vitals:    12/10/20 1232   BP: (!) 143/71   Pulse: (!) 104   Resp: 25   Temp: 98.8 °F (37.1 °C)   SpO2: 96%   Weight: 111.1 kg (245 lb)   Height: 5' 11\" (1.803 m)            Physical Exam  Vitals signs and nursing note reviewed. Constitutional:       General: He is not in acute distress. Appearance: He is well-developed. Comments: Somewhat uncomfortable but not toxic or septic   HENT:      Head: Atraumatic. Eyes:      General: No scleral icterus. Neck:      Musculoskeletal: Neck supple. Cardiovascular:      Rate and Rhythm: Normal rate. Pulmonary:      Effort: Pulmonary effort is normal. No respiratory distress. Breath sounds: No wheezing or rales. Chest:      Chest wall: Tenderness present. Abdominal:      Palpations: Abdomen is soft. Tenderness: There is no abdominal tenderness. There is no guarding or rebound. Musculoskeletal:      Right lower leg: He exhibits no tenderness. Left lower leg: He exhibits no tenderness. Skin:     General: Skin is warm and dry. Findings: Ecchymosis present. No erythema. Neurological:      General: No focal deficit present. Psychiatric:         Mood and Affect: Mood normal.         Behavior: Behavior normal.         Thought Content: Thought content normal.          MDM  Number of Diagnoses or Management Options  Diagnosis management comments: Rib fracture from late November now with delayed bleeding after hospital discharge had felt well enough to \"go rabbit hunting\". Much more SOB the last 2 days.  Has large /increased probable hemothorax       Amount and/or Complexity of Data Reviewed  Clinical lab tests: ordered and reviewed  Tests in the radiology section of CPT®: reviewed and ordered  Obtain history from someone other than the patient: yes  Review and summarize past medical records: yes  Discuss the patient with other providers: yes  Independent visualization of images, tracings, or specimens: yes    Risk of Complications, Morbidity, and/or Mortality  Presenting problems: high  Diagnostic procedures: low  Management options: moderate           Critical Care  Performed by: Gladys Levin MD  Authorized by:  Gladys Levin MD     Critical care provider statement:     Critical care time (minutes):  45    Critical care was necessary to treat or prevent imminent or life-threatening deterioration of the following conditions:  Respiratory failure    Critical care was time spent personally by me on the following activities:  Ordering and performing treatments and interventions, ordering and review of laboratory studies, ordering and review of radiographic studies, pulse oximetry, re-evaluation of patient's condition, review of old charts, examination of patient and discussions with consultants (consult to hospitalist x 3/ surgery/pulmonary (states do not deal with traumatic source))

## 2020-12-10 NOTE — H&P
Hospitalist Note     Admit Date:  12/10/2020 12:33 PM   Name:  Riri Carlson   Age:  60 y.o.  :  1955   MRN:  [id number]   PCP:  Toney Lovell MD  Treatment Team: Attending Provider: Liyah Schwartz MD; Primary Nurse: Asa Ramirez RN; : Rhonda Gary Consulting Provider: Adams Cornejo MD    HPI/Subjective:   Mr. Cristina Gordon is a nice 61 y/o WM with a h/o SCC of the epiglottis who initially presented here on  with left sided chest pain. There was no clear inciting trauma, but he recalls a few days prior he rolled over in bed, coughed a lot and felt a pop on the left side. CXR showed LLL opacity. CT c/a/p showed left lower lobe infiltrate and effusion along with fractures of the left 7-9th ribs (with separation of 8th and 9th). Hb on presentation was 12. CXR the following day showed larger effusion. He developed ISI which improved, suspected due to IV contrast. His pain was controlled and he was on RA so he was discharged home on 12/3. Hb that day was 8.4. He returned to the ER on  with left sided chest pain. Hb 8.1. CXR showed persistent LLL atelectasis vs infiltrate, unchanged. CT c/a/p showed left sided rib fractures, subpleural fluid collection and likely some blood in the abdominal wall. He was discharged home. His pain and SOB progressed so he came back tonight. CXR shows increasing left sided effusion. CT chest shows enlargement of a left sided hemothorax with increasing displacement of his rib fractures. His Hb is 6.7. He is on RA. Labs otherwise at baseline. Pulmonology and General Surgery were both called initially and it was ultimately recommended that he be admitted by our service and transferred downtown. I have notified Dr. Emily Miller down[facility] who kindly accepts the patient in transfer. Unfortunately, there are no beds available right now.  I spoke to the house supervisor who will keep him on the transfer list. I also spoke to Dr. Tien Torres in the ER and he plans to place a chest tube at the bedside. 10 systems reviewed and negative except as noted in HPI. 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    Type 2 diabetes mellitus ([facility] [address]     oral med, does not check BG at home; unknown last A1c      Past Surgical History:   Procedure Laterality Date    HX COLONOSCOPY  2016    HX HEENT  12/2017    bronchoscopy    HX HEENT  10/01/2018    Direct laryngoscopy with CO2 laser of supraglottic swelling    HX HEENT  12/03/2018    Panendoscopy w/Scar Revision/SFE/12-03-18    HX HEENT  02/24/2020    S/P SFE Direct laryngoscopy with biopsy, CO2 laser of supraglottic airway obstruction, Bronchoscopy with tracheal balloon dilation of tracheal stenosis 02/24/2020    HX HEENT  06/15/2020    Direct laryngoscopy with CO2 laser    HX OTHER SURGICAL Left age 11    2rd nipple removed    HX OTHER SURGICAL  01/2017    PEG placement and removal    HX TRACHEOSTOMY      placement and removal    HX VASCULAR ACCESS      placement and removal      Allergies   Allergen Reactions    Zofran [Ondansetron Hcl (Pf)] Other (comments)     Gives pt severe HA      Social History     Tobacco Use    Smoking status: Former Smoker     Packs/day: 2.00     Years: 20.00     Pack years: 40.00     Last attempt to quit: 2005     Years since quitting: 15.9    Smokeless tobacco: Never Used   Substance Use Topics    Alcohol use: No      Family History   Problem Relation Age of Onset    Stroke Mother     Lung Disease Mother       Family history reviewed and noncontributory. There is no immunization history for the selected administration types on file for this patient.   PTA Medications:  Current Outpatient Medications Medication Instructions    allopurinoL (ZYLOPRIM) 300 mg tablet 1 Tab, Oral    amLODIPine (NORVASC) 5 mg, Oral, EVERY BEDTIME    aspirin delayed-release 81 mg, Oral, 7AM, Take / use AM day of surgery  per anesthesia protocols.  citalopram (CELEXA) 40 mg, Oral, EVERY EVENING    cyclobenzaprine (FLEXERIL) 10 mg tablet No dose, route, or frequency recorded.  glipiZIDE (GLUCOTROL) 10 mg tablet 1 Tab, Oral    oxyCODONE IR (ROXICODONE) 5 mg, Oral, EVERY 8 HOURS AS NEEDED    predniSONE (DELTASONE) 10 mg tablet Oral, DAILY WITH BREAKFAST    valACYclovir (VALTREX) 1 gram tablet No dose, route, or frequency recorded. Objective:     Patient Vitals for the past 24 hrs:   Temp Pulse Resp BP SpO2   12/10/20 1345     94 %   12/10/20 1232 98.8 °F (37.1 °C) (!) 104 25 (!) 143/71 96 %     Oxygen Therapy  O2 Sat (%): 94 % (12/10/20 1345)  Pulse via Oximetry: 95 beats per minute (12/10/20 1345)  O2 Device: Room air (12/10/20 1345)    Estimated body mass index is 34.17 kg/m² as calculated from the following:    Height as of this encounter: 5' 11\" (1.803 m). Weight as of this encounter: 111.1 kg (245 lb). Intake/Output Summary (Last 24 hours) at 12/10/2020 1731  Last data filed at 12/10/2020 1644  Gross per 24 hour   Intake 1000 ml   Output    Net 1000 ml       *Note that automatically entered I/Os may not be accurate; dependent on patient compliance with collection and accurate  by assistants. Physical Exam:  General:    Well nourished. No overt distress. Obese. Eyes:   Normal sclerae. Extraocular movements intact. HENT:  Normocephalic, atraumatic. Moist mucous membranes  CV:   RRR. No m/r/g. No edema  Lungs:  Decreased on the left. Clear on the right. RA O2, bedside sats low 90s. Tenderness to palpation over the left chest wall with extensive ecchymosis from the axilla down the left flank/abdomen. There is paradoxical motion of a small area of the left chest wall.    Abdomen: Soft, nontender, nondistended. Extremities: Warm and dry. No cyanosis or clubbing  Neurologic: CN II-XII grossly intact. Sensation intact. Skin:     No rashes. No jaundice. Normal coloration  Psych:  Normal mood and affect. I reviewed the labs, imaging, EKGs, telemetry, and other studies done this admission. Data Reviewed:   Recent Results (from the past 24 hour(s))   CBC WITH AUTOMATED DIFF    Collection Time: 12/10/20  1:53 PM   Result Value Ref Range    WBC 8.6 4.3 - 11.1 K/uL    RBC 2.18 (L) 4.23 - 5.6 M/uL    HGB 6.7 (LL) 13.6 - 17.2 g/dL    HCT 20.2 (L) 41.1 - 50.3 %    MCV 92.7 79.6 - 97.8 FL    MCH 30.7 26.1 - 32.9 PG    MCHC 33.2 31.4 - 35.0 g/dL    RDW 15.6 (H) 11.9 - 14.6 %    PLATELET 308 214 - 348 K/uL    MPV 8.6 (L) 9.4 - 12.3 FL    ABSOLUTE NRBC 0.00 0.0 - 0.2 K/uL    DF AUTOMATED      NEUTROPHILS 91 (H) 43 - 78 %    LYMPHOCYTES 5 (L) 13 - 44 %    MONOCYTES 3 (L) 4.0 - 12.0 %    EOSINOPHILS 0 (L) 0.5 - 7.8 %    BASOPHILS 0 0.0 - 2.0 %    IMMATURE GRANULOCYTES 1 0.0 - 5.0 %    ABS. NEUTROPHILS 7.8 1.7 - 8.2 K/UL    ABS. LYMPHOCYTES 0.4 (L) 0.5 - 4.6 K/UL    ABS. MONOCYTES 0.3 0.1 - 1.3 K/UL    ABS. EOSINOPHILS 0.0 0.0 - 0.8 K/UL    ABS. BASOPHILS 0.0 0.0 - 0.2 K/UL    ABS. IMM. GRANS. 0.1 0.0 - 0.5 K/UL   METABOLIC PANEL, BASIC    Collection Time: 12/10/20  1:53 PM   Result Value Ref Range    Sodium 135 (L) 136 - 145 mmol/L    Potassium 4.5 3.5 - 5.1 mmol/L    Chloride 101 98 - 107 mmol/L    CO2 28 21 - 32 mmol/L    Anion gap 6 (L) 7 - 16 mmol/L    Glucose 190 (H) 65 - 100 mg/dL    BUN 34 (H) 8 - 23 MG/DL    Creatinine 1.81 (H) 0.8 - 1.5 MG/DL    GFR est AA 49 (L) >60 ml/min/1.73m2    GFR est non-AA 40 (L) >60 ml/min/1.73m2    Calcium 8.8 8.3 - 10.4 MG/DL   PROTHROMBIN TIME + INR    Collection Time: 12/10/20  1:53 PM   Result Value Ref Range    Prothrombin time 12.8 12.5 - 14.7 sec    INR 0.9     RBC, ALLOCATE    Collection Time: 12/10/20  3:15 PM   Result Value Ref Range    HISTORY CHECKED? Historical check performed    TYPE & SCREEN    Collection Time: 12/10/20  4:27 PM   Result Value Ref Range    Crossmatch Expiration 12/13/2020,2359     ABO/Rh(D) A POSITIVE     Antibody screen NEG     Unit number [id number]     Blood component type RC LRIR     Unit division 00     Status of unit ALLOCATED     Crossmatch result Compatible        All Micro Results     None          Medications Administered     albuterol-ipratropium (DUO-NEB) 2.5 MG-0.5 MG/3 ML     Admin Date  12/10/2020 Action  Given Dose  3 mL Route  Nebulization Administered By  RT Mesfin          iopamidoL (ISOVUE-370) 76 % injection 80 mL     Admin Date  12/10/2020 Action  Given Dose  80 mL Route  IntraVENous Administered By  Silvano VENTURA          saline peripheral flush soln 10 mL     Admin Date  12/10/2020 Action  Given Dose  10 mL Route  InterCATHeter Administered By  Silvano VENTURA          sodium chloride 0.9 % bolus infusion 1,000 mL     Admin Date  12/10/2020 Action  New Bag Dose  1000 mL Rate  500 mL/hr Route  IntraVENous Administered By  Scarlet Ochoa RN          sodium chloride 0.9 % bolus infusion 100 mL     Admin Date  12/10/2020 Action  New Bag Dose  100 mL Rate   Route  IntraVENous Administered By  Carolyn Grande                Other Studies:  No results found for this visit on 12/10/20. Xr Chest Pa Lat    Result Date: 12/10/2020  EXAM: CHEST X-RAY, 2 VIEWS INDICATION: worsening shortness of breath COMPARISON: CT chest, abdomen, and pelvis 12/6/2020 TECHNIQUE: Frontal and lateral views of the chest were obtained. FINDINGS: Multiple left-sided rib fractures better appreciated by CT. Increasing left pleural fluid. There is associated airspace opacification in the left lung base. No pneumothorax is evident. Right lung is clear. IMPRESSION: Left pleural fluid is increasing concerning for possible enlarging hemothorax.     Ct Chest W Cont    Result Date: 12/10/2020  EXAM: CT CHEST WITH CONTRAST INDICATION: further eval increasing probable hemothorax. COMPARISON: Chest x-ray 12/10/2020, CT from 12/6/2020 TECHNIQUE:  CT imaging was performed of the chest after intravenous injection of 100 mL Isovue 370. Intravenous contrast was used for better evaluation of solid organs and vascular structures. Coronal reformatted imaging provided. Radiation dose reduction techniques were used for this study. Our CT scanners use one or all of the following: Automated exposure control, adjustment of the mA and/or kV according to patient size, iterative reconstruction. FINDINGS: Mediastinum and visualized thyroid: Normal. Heart: Normal. Large Vessels: Normal. Pleura: Significant interval increase in left pleural fluid since the CT from 12/6/2020, which appears complex and is concerning for enlarging hemothorax. The pleural lining appears to extend beyond the left lateral chest wall likely indicating transthoracic herniation. Lungs: The lungs appear emphysematous. Passive atelectasis of the left lung. Airways: Normal. Lymph nodes: Normal. Bones/Soft tissues: Increasing displacement of left-sided rib fractures thought to be secondary to be expanding left hemothorax. These include displaced fractures of the sixth through ninth ribs. The ninth rib fracture is a segmental fracture. Visualized abdomen: Normal.     IMPRESSION: 1. Significant interval enlargement of the left hemothorax with appearance of transthoracic herniation of pleura secondary to the large size of the hematoma. 2.  Increasing displacement of left-sided rib fractures thought to be secondary to the expanding hematoma.       SARS-CoV-2 Lab Results  \"Novel Coronavirus\" Test: No results found for: COV2NT   \"Emergent Disease\" Test: No results found for: EDPR  \"SARS-COV-2\" Test: No results found for: XGCOVT  Rapid Test: No results found for: COVR           Assessment and Plan:     Hospital Problems as of 12/10/2020 Date Reviewed: 11/18/2020          Codes Class Noted - Resolved POA * (Principal) Hemothorax on left ICD-10-CM: J94.2  ICD-9-CM: 511.89  12/10/2020 - Present Yes        Acute blood loss anemia ICD-10-CM: D62  ICD-9-CM: 285.1  12/10/2020 - Present Yes        Hemothorax ICD-10-CM: J94.2  ICD-9-CM: 511.89  12/10/2020 - Present Unknown        Rib fracture ICD-10-CM: S22.39XA  ICD-9-CM: 807.00  11/29/2020 - Present Yes        Tracheal stenosis ICD-10-CM: J39.8  ICD-9-CM: 519.19  4/1/2020 - Present Yes        Malignant neoplasm of larynx (HCC) ICD-10-CM: C32.9  ICD-9-CM: 161.9  12/18/2018 - Present Yes        Tonsil cancer ([facility] [address] ICD-10-CM: C09.9  ICD-9-CM: 146.0  4/27/2017 - Present Yes        HTN (hypertension) ICD-10-CM: I10  ICD-9-CM: 401.9  1/24/2017 - Present Yes    Overview Signed 3/4/2020 12:26 PM by Jose Guevara CMA     Last Assessment & Plan:   Inadequate control. Intensify therapy             CKD (chronic kidney disease) ICD-10-CM: N18.9  ICD-9-CM: 585.9  1/10/2017 - Present Yes              Plan:  # Enlarging left hemothorax with rib fractures   - RA with sats low 90s. CT shows enlarging hemothorax with progressed rib fractures. Surgery aware and planning chest tube. Continuous pulse ox, pain control. # Acute blood loss anemia   - Hb 12 on 11/29 when he was initially admitted. 6.7 tonight. 1U RBCs ordered, repeat H/H after and in AM. Sooner if any clinical decline or bleeding. # CKD   - Baseline. IVFs post contrasted CT. # HTN   - Norvasc    # History of SCC of epiglottis    Discharge planning: Patient accepted in transfer to [facility] by Dr. Deandra Alonso, however no beds are available at the moment. Will transfer when bed is available and con't management here in the meantime. DVT ppx ordered: SCDs only. Code status:  Full code. Wife is POA.   Estimated LOS:  Greater than 2 midnights  Risk:  high    Signed:  Danni Rey MD

## 2020-12-11 ENCOUNTER — APPOINTMENT (OUTPATIENT)
Dept: GENERAL RADIOLOGY | Age: 65
DRG: 187 | End: 2020-12-11
Attending: SURGERY
Payer: COMMERCIAL

## 2020-12-11 LAB
ABO + RH BLD: NORMAL
ANION GAP SERPL CALC-SCNC: 5 MMOL/L (ref 7–16)
BASOPHILS # BLD: 0 K/UL (ref 0–0.2)
BASOPHILS NFR BLD: 0 % (ref 0–2)
BLD PROD TYP BPU: NORMAL
BLOOD GROUP ANTIBODIES SERPL: NORMAL
BPU ID: NORMAL
BUN SERPL-MCNC: 30 MG/DL (ref 8–23)
CALCIUM SERPL-MCNC: 8.5 MG/DL (ref 8.3–10.4)
CHLORIDE SERPL-SCNC: 101 MMOL/L (ref 98–107)
CO2 SERPL-SCNC: 29 MMOL/L (ref 21–32)
CREAT SERPL-MCNC: 1.69 MG/DL (ref 0.8–1.5)
CROSSMATCH RESULT,%XM: NORMAL
DIFFERENTIAL METHOD BLD: ABNORMAL
EOSINOPHIL # BLD: 0.1 K/UL (ref 0–0.8)
EOSINOPHIL NFR BLD: 1 % (ref 0.5–7.8)
ERYTHROCYTE [DISTWIDTH] IN BLOOD BY AUTOMATED COUNT: 15.4 % (ref 11.9–14.6)
GLUCOSE BLD STRIP.AUTO-MCNC: 133 MG/DL (ref 65–100)
GLUCOSE SERPL-MCNC: 109 MG/DL (ref 65–100)
HCT VFR BLD AUTO: 24 % (ref 41.1–50.3)
HCT VFR BLD AUTO: 31.5 % (ref 41.1–50.3)
HGB BLD-MCNC: 10.9 G/DL (ref 13.6–17.2)
HGB BLD-MCNC: 7.8 G/DL (ref 13.6–17.2)
IMM GRANULOCYTES # BLD AUTO: 0.1 K/UL (ref 0–0.5)
IMM GRANULOCYTES NFR BLD AUTO: 1 % (ref 0–5)
LYMPHOCYTES # BLD: 0.6 K/UL (ref 0.5–4.6)
LYMPHOCYTES NFR BLD: 8 % (ref 13–44)
MCH RBC QN AUTO: 30.8 PG (ref 26.1–32.9)
MCHC RBC AUTO-ENTMCNC: 32.5 G/DL (ref 31.4–35)
MCV RBC AUTO: 94.9 FL (ref 79.6–97.8)
MONOCYTES # BLD: 0.4 K/UL (ref 0.1–1.3)
MONOCYTES NFR BLD: 5 % (ref 4–12)
NEUTS SEG # BLD: 6.5 K/UL (ref 1.7–8.2)
NEUTS SEG NFR BLD: 85 % (ref 43–78)
NRBC # BLD: 0 K/UL (ref 0–0.2)
PLATELET # BLD AUTO: 244 K/UL (ref 150–450)
PMV BLD AUTO: 8.7 FL (ref 9.4–12.3)
POTASSIUM SERPL-SCNC: 4.5 MMOL/L (ref 3.5–5.1)
RBC # BLD AUTO: 2.53 M/UL (ref 4.23–5.6)
SODIUM SERPL-SCNC: 135 MMOL/L (ref 136–145)
SPECIMEN EXP DATE BLD: NORMAL
STATUS OF UNIT,%ST: NORMAL
UNIT DIVISION, %UDIV: 0
WBC # BLD AUTO: 7.7 K/UL (ref 4.3–11.1)

## 2020-12-11 PROCEDURE — 36415 COLL VENOUS BLD VENIPUNCTURE: CPT

## 2020-12-11 PROCEDURE — 99233 SBSQ HOSP IP/OBS HIGH 50: CPT | Performed by: SURGERY

## 2020-12-11 PROCEDURE — 74011250637 HC RX REV CODE- 250/637: Performed by: HOSPITALIST

## 2020-12-11 PROCEDURE — 80048 BASIC METABOLIC PNL TOTAL CA: CPT

## 2020-12-11 PROCEDURE — 71045 X-RAY EXAM CHEST 1 VIEW: CPT

## 2020-12-11 PROCEDURE — 65610000001 HC ROOM ICU GENERAL

## 2020-12-11 PROCEDURE — 32551 INSERTION OF CHEST TUBE: CPT | Performed by: SURGERY

## 2020-12-11 PROCEDURE — 74011250636 HC RX REV CODE- 250/636: Performed by: HOSPITALIST

## 2020-12-11 PROCEDURE — 82962 GLUCOSE BLOOD TEST: CPT

## 2020-12-11 PROCEDURE — 0W9B30Z DRAINAGE OF LEFT PLEURAL CAVITY WITH DRAINAGE DEVICE, PERCUTANEOUS APPROACH: ICD-10-PCS | Performed by: SURGERY

## 2020-12-11 PROCEDURE — 74011250636 HC RX REV CODE- 250/636: Performed by: INTERNAL MEDICINE

## 2020-12-11 PROCEDURE — 94760 N-INVAS EAR/PLS OXIMETRY 1: CPT

## 2020-12-11 PROCEDURE — 74011250637 HC RX REV CODE- 250/637: Performed by: INTERNAL MEDICINE

## 2020-12-11 PROCEDURE — 2709999900 HC NON-CHARGEABLE SUPPLY

## 2020-12-11 PROCEDURE — 85025 COMPLETE CBC W/AUTO DIFF WBC: CPT

## 2020-12-11 PROCEDURE — 74011250636 HC RX REV CODE- 250/636: Performed by: SURGERY

## 2020-12-11 RX ORDER — METFORMIN HYDROCHLORIDE 500 MG/1
500 TABLET ORAL DAILY
COMMUNITY
End: 2022-02-28

## 2020-12-11 RX ORDER — HYDROMORPHONE HYDROCHLORIDE 1 MG/ML
1 INJECTION, SOLUTION INTRAMUSCULAR; INTRAVENOUS; SUBCUTANEOUS ONCE
Status: COMPLETED | OUTPATIENT
Start: 2020-12-11 | End: 2020-12-11

## 2020-12-11 RX ORDER — HYDROMORPHONE HYDROCHLORIDE 1 MG/ML
1 INJECTION, SOLUTION INTRAMUSCULAR; INTRAVENOUS; SUBCUTANEOUS
Status: COMPLETED | OUTPATIENT
Start: 2020-12-11 | End: 2020-12-11

## 2020-12-11 RX ORDER — LORAZEPAM 1 MG/1
1 TABLET ORAL
Status: DISCONTINUED | OUTPATIENT
Start: 2020-12-11 | End: 2020-12-17 | Stop reason: HOSPADM

## 2020-12-11 RX ORDER — LISINOPRIL AND HYDROCHLOROTHIAZIDE 20; 25 MG/1; MG/1
1 TABLET ORAL DAILY
COMMUNITY
End: 2022-02-28

## 2020-12-11 RX ORDER — HYDROMORPHONE HYDROCHLORIDE 1 MG/ML
INJECTION, SOLUTION INTRAMUSCULAR; INTRAVENOUS; SUBCUTANEOUS
Status: ACTIVE
Start: 2020-12-11 | End: 2020-12-12

## 2020-12-11 RX ORDER — MORPHINE SULFATE 2 MG/ML
2 INJECTION, SOLUTION INTRAMUSCULAR; INTRAVENOUS
Status: DISCONTINUED | OUTPATIENT
Start: 2020-12-11 | End: 2020-12-17 | Stop reason: HOSPADM

## 2020-12-11 RX ORDER — POTASSIUM CHLORIDE 750 MG/1
10 TABLET, FILM COATED, EXTENDED RELEASE ORAL DAILY
COMMUNITY
End: 2022-02-28

## 2020-12-11 RX ADMIN — MORPHINE SULFATE 2 MG: 2 INJECTION, SOLUTION INTRAMUSCULAR; INTRAVENOUS at 07:17

## 2020-12-11 RX ADMIN — HYDROMORPHONE HYDROCHLORIDE 1 MG: 1 INJECTION, SOLUTION INTRAMUSCULAR; INTRAVENOUS; SUBCUTANEOUS at 14:53

## 2020-12-11 RX ADMIN — SODIUM CHLORIDE, SODIUM LACTATE, POTASSIUM CHLORIDE, AND CALCIUM CHLORIDE 125 ML/HR: 600; 310; 30; 20 INJECTION, SOLUTION INTRAVENOUS at 11:09

## 2020-12-11 RX ADMIN — HYDROMORPHONE HYDROCHLORIDE 1 MG: 1 INJECTION, SOLUTION INTRAMUSCULAR; INTRAVENOUS; SUBCUTANEOUS at 14:34

## 2020-12-11 RX ADMIN — LORAZEPAM 1 MG: 1 TABLET ORAL at 14:10

## 2020-12-11 RX ADMIN — OXYCODONE HYDROCHLORIDE 5 MG: 5 TABLET ORAL at 10:07

## 2020-12-11 RX ADMIN — MORPHINE SULFATE 2 MG: 2 INJECTION, SOLUTION INTRAMUSCULAR; INTRAVENOUS at 11:59

## 2020-12-11 RX ADMIN — SODIUM CHLORIDE, SODIUM LACTATE, POTASSIUM CHLORIDE, AND CALCIUM CHLORIDE 125 ML/HR: 600; 310; 30; 20 INJECTION, SOLUTION INTRAVENOUS at 02:59

## 2020-12-11 RX ADMIN — Medication 10 ML: at 07:09

## 2020-12-11 RX ADMIN — Medication 10 ML: at 11:58

## 2020-12-11 RX ADMIN — MORPHINE SULFATE 2 MG: 2 INJECTION, SOLUTION INTRAMUSCULAR; INTRAVENOUS at 18:22

## 2020-12-11 RX ADMIN — MORPHINE SULFATE 2 MG: 2 INJECTION, SOLUTION INTRAMUSCULAR; INTRAVENOUS at 03:00

## 2020-12-11 RX ADMIN — OXYCODONE HYDROCHLORIDE 5 MG: 5 TABLET ORAL at 19:37

## 2020-12-11 RX ADMIN — Medication 10 ML: at 21:33

## 2020-12-11 RX ADMIN — CITALOPRAM HYDROBROMIDE 40 MG: 20 TABLET ORAL at 20:11

## 2020-12-11 NOTE — PROGRESS NOTES
Patient to unit via stretcher accompanied by ED RN. Patient on cardiac monitor and O2 at @ L/min. Patient is alert and oriented upon arrival. Patient bathed according to unit policy and Optifoam placed on his sacrum/coccyx. Patient's skin is intact but large amounts of bruising noted to the left flank, chest and abdomen. Area is tender to the touch and patient guards area.

## 2020-12-11 NOTE — PROGRESS NOTES
Bedside report received from Norm Mendez RN. Reviewed chart and assumed care of pt. Pt is awake and oriented x 4. C/o pain in L side that is sharp and shooting. States frequency of shooting pain decreases with pain medication but that his pain has kept him from resting. VSS. NSR per monitor. Respirations regular but shallow. Chest expansion symmetrical. Lung sounds diminished. Large area of bruising on L side/flank. No crepitus noted. See complete assessment as charted.

## 2020-12-11 NOTE — ED NOTES
TRANSFER - OUT REPORT:    Verbal report given to ION Cantu on Jacy Garcia  being transferred to Room 375 for routine progression of care       Report consisted of patients Situation, Background, Assessment and   Recommendations(SBAR). Information from the following report(s) SBAR was reviewed with the receiving nurse. Lines:   Peripheral IV 12/10/20 Right Antecubital (Active)       Peripheral IV 12/10/20 Left Antecubital (Active)   Site Assessment Clean, dry, & intact 12/10/20 1626   Phlebitis Assessment 0 12/10/20 1626   Infiltration Assessment 0 12/10/20 1626   Dressing Status Clean, dry, & intact 12/10/20 1626   Dressing Type 4 X 4 12/10/20 1626        Opportunity for questions and clarification was provided.       Patient transported with:   Registered Nurse

## 2020-12-11 NOTE — PROGRESS NOTES
[facility]  [address] [place] [address]  [facility] [place], [address] [facility]  [phone]    Office Note/H&P/Consult Note   Reena Ramos   MRN: [id number]     : 1955        HPI: Reena Ramos is a 60 y.o. male who we are asked by Dr. Martin Hewitt to see for left sided hemothorax. The patient has fracture of ribs 6-9 from presumably a coughing spell back in November. He denies any trauma or fall.      He was initially seen at [facility] 10/26 with c/o left sided chest and abdominal pain. The workup there was essentially negative.      He continued to worsen and was seen again at urgent care and given antibiotics for pneumonia, pain improved for a few days and returned. He then started hearing popping sound in left side at times, associated with increased discomfort along with pain and coughing so he came to [facility] ER on . He had a CTA which showed multiple left sided rib fxs, consolidation and effusion. He was admitted, treated, and then discharged on 12/3.      After discharge he continued to have left sided pain and developed shortness of breath so he returned to [facility] ER . CT scan of the chest abdomen and pelvis does show persistent left-sided rib fractures but no change from previous scan. There was some subpleural fluid and some fluid noted in the chest and abdominal wall all likely the hematoma and bruising which is noted clinically. He felt better after pain medication and was sent home.      He did not improve so he returned once again on 12/10. He was found to be hypoxic with hgb 6.7 and CXR showing left pleural fluid increasing concerning for possible enlarging hemothorax. F/U chest CT showed significant interval enlargement of the left hemothorax with appearance of transthoracic herniation of pleura secondary to the large size of the hematoma and increasing displacement of left-sided rib fractures thought to be secondary to the expanding hematoma.  He is being admitted by the hospitalist service and we have requested transfer to [address] [place] [address]. 12/11/2010. More labored breathing, hemodynamically stable. Hb drops more after transfusion.        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    Type 2 diabetes mellitus ([facility]     oral med, does not check BG at home; unknown last A1c     Past Surgical History:   Procedure Laterality Date    HX COLONOSCOPY  2016    HX HEENT  12/2017    bronchoscopy    HX HEENT  10/01/2018    Direct laryngoscopy with CO2 laser of supraglottic swelling    HX HEENT  12/03/2018    Panendoscopy w/Scar Revision/SFE/12-03-18    HX HEENT  02/24/2020    S/P SFE Direct laryngoscopy with biopsy, CO2 laser of supraglottic airway obstruction, Bronchoscopy with tracheal balloon dilation of tracheal stenosis 02/24/2020    HX HEENT  06/15/2020    Direct laryngoscopy with CO2 laser    HX OTHER SURGICAL Left age 11    2rd nipple removed    HX OTHER SURGICAL  01/2017    PEG placement and removal    HX TRACHEOSTOMY      placement and removal    HX VASCULAR ACCESS      placement and removal     Current Facility-Administered Medications   Medication Dose Route Frequency    morphine injection 2 mg  2 mg IntraVENous Q3H PRN    lip protectant (BLISTEX) ointment 1 Each  1 Each Topical PRN    LORazepam (ATIVAN) tablet 1 mg  1 mg Oral Q6H PRN    HYDROmorphone (PF) (DILAUDID) 1 mg/mL injection        0.9% sodium chloride infusion 250 mL  250 mL IntraVENous PRN    citalopram (CELEXA) tablet 40 mg  40 mg Oral QPM    oxyCODONE IR (ROXICODONE) tablet 5 mg  5 mg Oral Q4H PRN    sodium chloride (NS) flush 5-40 mL  5-40 mL IntraVENous Q8H    sodium chloride (NS) flush 5-40 mL  5-40 mL IntraVENous PRN    acetaminophen (TYLENOL) tablet 650 mg  650 mg Oral Q6H PRN    Or    acetaminophen (TYLENOL) suppository 650 mg  650 mg Rectal Q6H PRN    polyethylene glycol (MIRALAX) packet 17 g  17 g Oral DAILY PRN    promethazine (PHENERGAN) tablet 12.5 mg  12.5 mg Oral Q6H PRN    naloxone (NARCAN) injection 0.4 mg  0.4 mg IntraVENous EVERY 2 MINUTES AS NEEDED    lactated Ringers infusion  75 mL/hr IntraVENous CONTINUOUS     ALLERGIES:  Zofran [ondansetron hcl (pf)]    Social History     Socioeconomic History    Marital status:      Spouse name: Not on file    Number of children: Not on file    Years of education: Not on file    Highest education level: Not on file   Tobacco Use    Smoking status: Former Smoker     Packs/day: 2.00     Years: 20.00     Pack years: 40.00     Last attempt to quit: 2005     Years since quitting: 15.9    Smokeless tobacco: Never Used   Substance and Sexual Activity    Alcohol use: No    Drug use: No     Social History     Tobacco Use   Smoking Status Former Smoker    Packs/day: 2.00    Years: 20.00    Pack years: 40.00    Last attempt to quit: 2005    Years since quitting: 15.9   Smokeless Tobacco Never Used     Family History   Problem Relation Age of Onset    Stroke Mother     Lung Disease Mother        ROS: The patient has no difficulty with chest pain or shortness of breath. No fever or chills. Comprehensive review of systems was otherwise unremarkable except as noted above. Physical Exam:   Constitutional: Alert oriented cooperative patient in no acute distress. Visit Vitals  BP (!) 146/73   Pulse 88   Temp 98.9 °F (37.2 °C)   Resp 17   Ht 5' 11\" (1.803 m)   Wt 252 lb 3.3 oz (114.4 kg)   SpO2 96%   BMI 35.18 kg/m²     Eyes:Sclera are clear, pupils symmetric   ENMT: ears have no obvious external lesions; no obvious neck masses; no lip lesions  CV: RRR. Normal perfusion; no embolic signs  Resp: No JVD. Breathing is  non-labored.  No audible wheezing   GI: soft and non-distended     Musculoskeletal: no significant asymmetry; normal tone; unremarkable with normal function. Neuro:  No obvious focal deficits; moves all 4; A&O x3  Psychiatric: normal affect and mood, no memory impairment      Labs:  Lab Results   Component Value Date/Time    WBC 7.7 12/11/2020 03:24 AM    HGB 7.8 (L) 12/11/2020 03:24 AM    PLATELET 620 95/01/3553 03:24 AM    Sodium 135 (L) 12/11/2020 03:24 AM    Potassium 4.5 12/11/2020 03:24 AM    Chloride 101 12/11/2020 03:24 AM    CO2 29 12/11/2020 03:24 AM    BUN 30 (H) 12/11/2020 03:24 AM    Creatinine 1.69 (H) 12/11/2020 03:24 AM    Glucose 109 (H) 12/11/2020 03:24 AM    INR 0.9 12/10/2020 01:53 PM    aPTT 24.7 12/20/2016 01:44 PM    Bilirubin, total 1.1 12/06/2020 08:49 AM    ALT (SGPT) 30 12/06/2020 08:49 AM    Alk. phosphatase 77 12/06/2020 08:49 AM    Lipase 246 11/29/2020 09:35 AM       No results found for this or any previous visit. I reviewed patient's medications, labs, and independently reviewed relevant radiologic studies if available. Labs/radiology studies as ordered in connect care or in scanned in media tab if pt is pre-op.   Amt of data reviewed moderate-extensive          Assessment/Plan:     )Thompson Halsted is a 60 y.o. male who has signs and symptoms consistent with the below issues:  Problem List  Date Reviewed: 11/18/2020          Codes Class Noted    * (Principal) Hemothorax on left ICD-10-CM: J94.2  ICD-9-CM: 511.89  12/10/2020        Acute blood loss anemia ICD-10-CM: D62  ICD-9-CM: 285.1  12/10/2020        Hemothorax ICD-10-CM: J94.2  ICD-9-CM: 511.89  12/10/2020        ISI (acute kidney injury) ([facility] ICD-10-CM: N17.9  ICD-9-CM: 584.9  12/2/2020        Rib fracture ICD-10-CM: S22.39XA  ICD-9-CM: 807.00  11/29/2020        Tracheal stenosis ICD-10-CM: J39.8  ICD-9-CM: 519.19  4/1/2020        Malignant neoplasm of larynx ([facility] ICD-10-CM: C32.9  ICD-9-CM: 161.9  12/18/2018        Decreased libido ICD-10-CM: S91.36  ICD-9-CM: 799.81 4/27/2018        Tonsil cancer ([facility] ICD-10-CM: C09.9  ICD-9-CM: 146.0  4/27/2017        Cellulitis of neck ICD-10-CM: K66.070  ICD-9-CM: 682.1  2/1/2017        Tracheostomy in place [facility]) ICD-10-CM: Z93.0  ICD-9-CM: V44.0  2/1/2017        Chemotherapy-induced neutropenia (HCC) ICD-10-CM: D70.1, T45.1X5A  ICD-9-CM: 288.03, E933.1  2/1/2017        Type 2 diabetes mellitus with hyperglycemia (HCC) ICD-10-CM: E11.65  ICD-9-CM: 250.00  1/24/2017        HTN (hypertension) ICD-10-CM: I10  ICD-9-CM: 401.9  1/24/2017    Overview Signed 3/4/2020 12:26 PM by David Quiroz CMA     Last Assessment & Plan:   Inadequate control. Intensify therapy             Head and neck cancer ([facility] ICD-10-CM: C76.0  ICD-9-CM: 195.0  1/24/2017        Diabetes mellitus with microalbuminuria ([facility] ICD-10-CM: E11.29, R80.9  ICD-9-CM: 250.40, 791.0  1/24/2017    Overview Signed 3/4/2020 12:26 PM by David Quiroz CMA     Last Assessment & Plan:   His A1c is well controlled but his trend of weight gain is very concerning. We discussed at length healthy diet for diabetes, exercise, and careful monitoring. Follow-up in 3 months             Neutropenic fever ([facility] ICD-10-CM: D70.9, R50.81  ICD-9-CM: 288.00, 780.61  1/12/2017        Pancytopenia ([facility] ICD-10-CM: K62.396  ICD-9-CM: 284.19  1/12/2017        CKD (chronic kidney disease) ICD-10-CM: N18.9  ICD-9-CM: 585.9  1/10/2017        Non-intractable cyclical vomiting with nausea ICD-10-CM: R11.15  ICD-9-CM: 536.2  1/10/2017            Large left hemothorax, more difficulty to breath. Will place chest tube today at bedside.          Rebecca Hammer MD,  FACS

## 2020-12-11 NOTE — PROGRESS NOTES
Pt states that he feels some relief from pain medication but not enough. Spoke with Dr. Waqas Middleton. Received orders and verified via verbal read back.

## 2020-12-11 NOTE — PROGRESS NOTES
Care Management Interventions  PCP Verified by CM: Yes(Dr. Андрей Torrez)  Discharge Durable Medical Equipment: No  Physical Therapy Consult: No  Occupational Therapy Consult: No  Current Support Network: Own Home, Lives with Spouse  Discharge Location  Discharge Placement: Home with family assistance    Chart reviewed by CM for discharge planning. Patient lives with wife. Independent with ADLs and ambulates without assistive devices. No CM needs identified at this time. Please notify CM if needs arise; CM will follow.

## 2020-12-11 NOTE — PROGRESS NOTES
TRANSFER - IN REPORT:    Verbal report received from ION Kwan(name) on [address]  being received from ED(unit) for routine progression of care      Report consisted of patients Situation, Background, Assessment and   Recommendations(SBAR). Information from the following report(s) SBAR, ED Summary, Recent Results and Cardiac Rhythm NSR was reviewed with the receiving nurse. Opportunity for questions and clarification was provided. Assessment completed upon patients arrival to unit and care assumed.

## 2020-12-11 NOTE — PROGRESS NOTES
Dr. Angel Hawkins placed chest tube at bedside. O2 increased to 4L via NC pre procedure. Pt tolerated procedure. O2 saturations dropped to 85% and O2 was increased to 10 L via HF NC. Weaned to 8L post procedure. HR NSR, BP WDL. Pt drowsy after procedure but awakens to voice. 1600 ML sanguinous drainage out immediately. No air leak noted. Some crepitus noted at CT insertion site. Dr. Angel Hawkins aware. CXR ordered.

## 2020-12-11 NOTE — PROGRESS NOTES
Hospitalist Note     Admit Date:  12/10/2020 12:33 PM   Name:  Yunior Clifford   Age:  60 y.o.  :  1955   MRN:  [id number]   PCP:  Max Moody MD  Treatment Team: Attending Provider: Chica Duran MD; : Lalo Jackman Consulting Provider: Ольга Merritt MD; Primary Nurse: Yudith Nichols, RN; Utilization Review: Pavithra Box RN; Care Manager: Rola Quintero RN    HPI/Subjective: This is a nice 61 y/o WM with a h/o SCC of the epiglottis who initially presented here on  with left sided chest pain. There was no clear inciting trauma, but he recalls a few days prior he rolled over in bed, coughed a lot and felt a pop on the left side. CXR showed LLL opacity. CT c/a/p showed left lower lobe infiltrate and effusion along with fractures of the left 7-9th ribs (with separation of 8th and 9th). Hb on presentation was 12. CXR the following day showed larger effusion. He developed ISI which improved, suspected due to IV contrast. His pain was controlled and he was on RA so he was discharged home on 12/3. Hb that day was 8.4. He returned to the ER on  with left sided chest pain. Hb 8.1. CXR showed persistent LLL atelectasis vs infiltrate, unchanged. CT c/a/p showed left sided rib fractures, subpleural fluid collection and likely some blood in the abdominal wall. He was discharged home. His pain and SOB progressed so he came back tonight. CXR shows increasing left sided effusion. CT chest shows enlargement of a left sided hemothorax with increasing displacement of his rib fractures. His Hb is 6.7. He is on RA. Labs otherwise at baseline. Pulmonology and General Surgery were both called initially and it was ultimately recommended that he be admitted by our service and transferred downtown. I have notified Dr. Michelle Villarreal [facility] who kindly accepts the patient in transfer. Unfortunately, there are no beds available right now.  I spoke to the house supervisor who will keep him on the transfer list. I also spoke to Dr. Theresa Ferraro in the ER and he plans to place a chest tube at the bedside.     10 systems reviewed and negative except as noted in HPI.    12/11 patient still has left-sided chest pain and shortness of breath. Hemothorax as evidenced on CT scan on admission. General surgery consulted. Plan for chest tube placement today. Afebrile.     Objective:     Patient Vitals for the past 24 hrs:   Temp Pulse Resp BP SpO2   12/11/20 1330  88 17 (!) 146/73 96 %   12/11/20 1300  84 18 (!) 158/76 97 %   12/11/20 1230  81 17 (!) 155/78 96 %   12/11/20 1200  86 18 (!) 155/75 98 %   12/11/20 1130 98.9 °F (37.2 °C) 93 17 (!) 148/82 97 %   12/11/20 1128  84      12/11/20 1100  86 19 (!) 142/74 96 %   12/11/20 1000  82 15 (!) 146/75 94 %   12/11/20 0900  88 17 (!) 145/79 95 %   12/11/20 0800  87 18 (!) 157/76 96 %   12/11/20 0709 97.6 °F (36.4 °C) 87 17 (!) 153/78 95 %   12/11/20 0700  87 19 (!) 153/78 96 %   12/11/20 0600  89 26 119/69 96 %   12/11/20 0530  96 (!) 40 (!) 143/85 (!) 88 %   12/11/20 0500  88 (!) 32 118/81 94 %   12/11/20 0430  89 25 124/82 95 %   12/11/20 0400  92 29 (!) 150/70 93 %   12/11/20 0330 97.6 °F (36.4 °C) 87 15 137/70 95 %   12/11/20 0300  90 16 (!) 144/104 96 %   12/11/20 0230  86 21 (!) 152/81 97 %   12/11/20 0200  89 22 (!) 151/66 96 %   12/11/20 0130  88 18 138/73 99 %   12/11/20 0100  90 23 134/82 (!) 89 %   12/11/20 0030    123/73    12/11/20 0029  91 (!) 32  97 %   12/11/20 0000  92 17 123/74 96 %   12/10/20 2330 98.3 °F (36.8 °C) 95 15 (!) 151/85 92 %   12/10/20 2324  93  (!) 168/82    12/10/20 2240  89 17 (!) 152/81 95 %   12/10/20 2230    (!) 150/80 92 %   12/10/20 2220  96 15 (!) 149/73 94 %   12/10/20 2200  88 16 (!) 153/70 95 %   12/10/20 2150  87 17 (!) 158/74 95 %   12/10/20 2140  94 17 (!) 145/83 97 %   12/10/20 2130  90 17 132/75 96 %   12/10/20 2120  90 17 134/73 94 %   12/10/20 2110  90 15 (!) 147/73 95 % 12/10/20 2100  90 17 (!) 148/75 96 %   12/10/20 2050  90 16 133/66 95 %   12/10/20 2040  91 16 136/66 96 %   12/10/20 2030  93 15 135/65 94 %   12/10/20 2020  92 17 (!) 142/70 96 %   12/10/20 2010  90 18 134/68 97 %   12/10/20 2000  92 17 136/70 95 %   12/10/20 1950  91 17 (!) 142/72 90 %   12/10/20 1949 98.3 °F (36.8 °C) 92 16 (!) 143/70    12/10/20 1944  91 (!) 32 (!) 143/70    12/10/20 1812  96 18 (!) 156/76      Oxygen Therapy  O2 Sat (%): 96 % (12/11/20 1330)  Pulse via Oximetry: 87 beats per minute (12/11/20 1330)  O2 Device: Nasal cannula (12/11/20 0709)  O2 Flow Rate (L/min): 2 l/min (12/11/20 0709)      Intake/Output Summary (Last 24 hours) at 12/11/2020 1425  Last data filed at 12/11/2020 0934  Gross per 24 hour   Intake 2500 ml   Output 900 ml   Net 1600 ml       *Note that automatically entered I/Os may not be accurate; dependent on patient compliance with collection and accurate  by techs. General:    Obese, mild increased work of breathing, tired looking,  HEENT atraumatic, normocephalic, PERRLA, EOMI, moist mucous membranes,  CV:   RRR. No murmur, rub, or gallop. Lungs:   Diminished breath sounds on the left lung, no wheezing, rhonchi, or rales. Abdomen:   Soft, nontender, nondistended. Extremities: Warm and dry. No cyanosis or edema. Skin:     No rashes or jaundice. Neuro:  No gross focal deficits    Data Review:  I have reviewed all labs, meds, and studies from the last 24 hours:    Recent Results (from the past 24 hour(s))   RBC, ALLOCATE    Collection Time: 12/10/20  3:15 PM   Result Value Ref Range    HISTORY CHECKED?  Historical check performed    TYPE & SCREEN    Collection Time: 12/10/20  4:27 PM   Result Value Ref Range    Crossmatch Expiration 12/13/2020,2359     ABO/Rh(D) A POSITIVE     Antibody screen NEG     Unit number [id number]     Blood component type RC LRIR     Unit division 00     Status of unit TRANSFUSED     Crossmatch result Compatible HGB & HCT    Collection Time: 12/10/20  7:39 PM   Result Value Ref Range    HGB 6.8 (LL) 13.6 - 17.2 g/dL    HCT 20.7 (L) 41.1 - 50.3 %   HGB & HCT    Collection Time: 12/10/20 11:37 PM   Result Value Ref Range    HGB 10.9 (L) 13.6 - 17.2 g/dL    HCT 31.5 (L) 41.1 - 76.5 %   METABOLIC PANEL, BASIC    Collection Time: 12/11/20  3:24 AM   Result Value Ref Range    Sodium 135 (L) 136 - 145 mmol/L    Potassium 4.5 3.5 - 5.1 mmol/L    Chloride 101 98 - 107 mmol/L    CO2 29 21 - 32 mmol/L    Anion gap 5 (L) 7 - 16 mmol/L    Glucose 109 (H) 65 - 100 mg/dL    BUN 30 (H) 8 - 23 MG/DL    Creatinine 1.69 (H) 0.8 - 1.5 MG/DL    GFR est AA 53 (L) >60 ml/min/1.73m2    GFR est non-AA 44 (L) >60 ml/min/1.73m2    Calcium 8.5 8.3 - 10.4 MG/DL   CBC WITH AUTOMATED DIFF    Collection Time: 12/11/20  3:24 AM   Result Value Ref Range    WBC 7.7 4.3 - 11.1 K/uL    RBC 2.53 (L) 4.23 - 5.6 M/uL    HGB 7.8 (L) 13.6 - 17.2 g/dL    HCT 24.0 (L) 41.1 - 50.3 %    MCV 94.9 79.6 - 97.8 FL    MCH 30.8 26.1 - 32.9 PG    MCHC 32.5 31.4 - 35.0 g/dL    RDW 15.4 (H) 11.9 - 14.6 %    PLATELET 170 419 - 767 K/uL    MPV 8.7 (L) 9.4 - 12.3 FL    ABSOLUTE NRBC 0.00 0.0 - 0.2 K/uL    DF AUTOMATED      NEUTROPHILS 85 (H) 43 - 78 %    LYMPHOCYTES 8 (L) 13 - 44 %    MONOCYTES 5 4.0 - 12.0 %    EOSINOPHILS 1 0.5 - 7.8 %    BASOPHILS 0 0.0 - 2.0 %    IMMATURE GRANULOCYTES 1 0.0 - 5.0 %    ABS. NEUTROPHILS 6.5 1.7 - 8.2 K/UL    ABS. LYMPHOCYTES 0.6 0.5 - 4.6 K/UL    ABS. MONOCYTES 0.4 0.1 - 1.3 K/UL    ABS. EOSINOPHILS 0.1 0.0 - 0.8 K/UL    ABS. BASOPHILS 0.0 0.0 - 0.2 K/UL    ABS. IMM. GRANS. 0.1 0.0 - 0.5 K/UL        All Micro Results     None          No results found for this visit on 12/10/20.     Current Meds:  Current Facility-Administered Medications   Medication Dose Route Frequency    morphine injection 2 mg  2 mg IntraVENous Q3H PRN    lip protectant (BLISTEX) ointment 1 Each  1 Each Topical PRN    LORazepam (ATIVAN) tablet 1 mg  1 mg Oral Q6H PRN  0.9% sodium chloride infusion 250 mL  250 mL IntraVENous PRN    citalopram (CELEXA) tablet 40 mg  40 mg Oral QPM    oxyCODONE IR (ROXICODONE) tablet 5 mg  5 mg Oral Q4H PRN    sodium chloride (NS) flush 5-40 mL  5-40 mL IntraVENous Q8H    sodium chloride (NS) flush 5-40 mL  5-40 mL IntraVENous PRN    acetaminophen (TYLENOL) tablet 650 mg  650 mg Oral Q6H PRN    Or    acetaminophen (TYLENOL) suppository 650 mg  650 mg Rectal Q6H PRN    polyethylene glycol (MIRALAX) packet 17 g  17 g Oral DAILY PRN    promethazine (PHENERGAN) tablet 12.5 mg  12.5 mg Oral Q6H PRN    naloxone (NARCAN) injection 0.4 mg  0.4 mg IntraVENous EVERY 2 MINUTES AS NEEDED    lactated Ringers infusion  125 mL/hr IntraVENous CONTINUOUS       Other Studies (last 24 hours):  Ct Chest W Cont    Result Date: 12/10/2020  EXAM: CT CHEST WITH CONTRAST INDICATION: further eval increasing probable hemothorax. COMPARISON: Chest x-ray 12/10/2020, CT from 12/6/2020 TECHNIQUE:  CT imaging was performed of the chest after intravenous injection of 100 mL Isovue 370. Intravenous contrast was used for better evaluation of solid organs and vascular structures. Coronal reformatted imaging provided. Radiation dose reduction techniques were used for this study. Our CT scanners use one or all of the following: Automated exposure control, adjustment of the mA and/or kV according to patient size, iterative reconstruction. FINDINGS: Mediastinum and visualized thyroid: Normal. Heart: Normal. Large Vessels: Normal. Pleura: Significant interval increase in left pleural fluid since the CT from 12/6/2020, which appears complex and is concerning for enlarging hemothorax. The pleural lining appears to extend beyond the left lateral chest wall likely indicating transthoracic herniation. Lungs: The lungs appear emphysematous. Passive atelectasis of the left lung.  Airways: Normal. Lymph nodes: Normal. Bones/Soft tissues: Increasing displacement of left-sided rib fractures thought to be secondary to be expanding left hemothorax. These include displaced fractures of the sixth through ninth ribs. The ninth rib fracture is a segmental fracture. Visualized abdomen: Normal.     IMPRESSION: 1. Significant interval enlargement of the left hemothorax with appearance of transthoracic herniation of pleura secondary to the large size of the hematoma. 2.  Increasing displacement of left-sided rib fractures thought to be secondary to the expanding hematoma. Assessment and Plan:     Hospital Problems as of 12/11/2020 Date Reviewed: 11/18/2020          Codes Class Noted - Resolved POA    * (Principal) Hemothorax on left ICD-10-CM: J94.2  ICD-9-CM: 511.89  12/10/2020 - Present Yes        Acute blood loss anemia ICD-10-CM: D62  ICD-9-CM: 285.1  12/10/2020 - Present Yes        Hemothorax ICD-10-CM: J94.2  ICD-9-CM: 511.89  12/10/2020 - Present Unknown        Rib fracture ICD-10-CM: S22.39XA  ICD-9-CM: 807.00  11/29/2020 - Present Yes        Tracheal stenosis ICD-10-CM: J39.8  ICD-9-CM: 519.19  4/1/2020 - Present Yes        Malignant neoplasm of larynx (HCC) ICD-10-CM: C32.9  ICD-9-CM: 161.9  12/18/2018 - Present Yes        Tonsil cancer ([facility] ICD-10-CM: C09.9  ICD-9-CM: 146.0  4/27/2017 - Present Yes        HTN (hypertension) ICD-10-CM: I10  ICD-9-CM: 401.9  1/24/2017 - Present Yes    Overview Signed 3/4/2020 12:26 PM by Carolyn Arreguin CMA     Last Assessment & Plan:   Inadequate control. Intensify therapy             CKD (chronic kidney disease) ICD-10-CM: N18.9  ICD-9-CM: 585.9  1/10/2017 - Present Yes              Plan: This is a 52-year-old male with    Enlarging left hemothorax with rib fractures  General surgery consulted. CT shows enlarging hemothorax with progressive fractures. NPO. Plan for chest tube placement today. Continuous pulse oximetry, pain control.     Acute blood loss anemia  Hemoglobin is 7.8 today from 6.7 on admission status post 1 unit of packed red blood cells. Goal to keep hemoglobin above 7. Baseline hemoglobin of approximately 12 on 11/29. Chronic kidney disease stage III  IV fluids. Creatinine at baseline. 1.69 this am.    Hypertension  Amlodipine    Squamous cell carcinoma of the epiglottis    DC planning/Dispo: Patient has a bed at downtown facility. Will check with general surgery to see if the patient needs to be transferred downtown after chest tube placement or continue current management at [address]. Diet:  DIET NPO  DVT ppx: SCDs    Current plan of care discussed with patient and wife at bedside.     Signed:  Isa Hurtado MD

## 2020-12-11 NOTE — OP NOTES
Left chest tube placement\    Pt is appropriately positioned, and consent obtained. .    The left chest wall is prepped, draped. 1% lido is used along with 1 mg dilaudid. The skin incision is made at anterior axillary line 6th -7th intercostal space, he does have very thick chest wall, I run my finger in between intercostal space, pleural cavity is entered with the assistance of a long clamp, large amount of old blood is immediately evacuated. Then a 28 [place] chest tube is inserted aiming posteriorly and superiorly, and connected a pleurovac. About 2 liter old blood was immediately drained. Patient tolerated procedure well. Will get CXR.

## 2020-12-12 ENCOUNTER — APPOINTMENT (OUTPATIENT)
Dept: GENERAL RADIOLOGY | Age: 65
DRG: 187 | End: 2020-12-12
Attending: SURGERY
Payer: COMMERCIAL

## 2020-12-12 LAB
ANION GAP SERPL CALC-SCNC: 6 MMOL/L (ref 7–16)
BASOPHILS # BLD: 0 K/UL (ref 0–0.2)
BASOPHILS NFR BLD: 0 % (ref 0–2)
BUN SERPL-MCNC: 33 MG/DL (ref 8–23)
CALCIUM SERPL-MCNC: 8.3 MG/DL (ref 8.3–10.4)
CHLORIDE SERPL-SCNC: 101 MMOL/L (ref 98–107)
CO2 SERPL-SCNC: 28 MMOL/L (ref 21–32)
CREAT SERPL-MCNC: 1.67 MG/DL (ref 0.8–1.5)
DIFFERENTIAL METHOD BLD: ABNORMAL
EOSINOPHIL # BLD: 0.1 K/UL (ref 0–0.8)
EOSINOPHIL NFR BLD: 1 % (ref 0.5–7.8)
ERYTHROCYTE [DISTWIDTH] IN BLOOD BY AUTOMATED COUNT: 15.5 % (ref 11.9–14.6)
GLUCOSE SERPL-MCNC: 157 MG/DL (ref 65–100)
HCT VFR BLD AUTO: 22.5 % (ref 41.1–50.3)
HGB BLD-MCNC: 7.3 G/DL (ref 13.6–17.2)
IMM GRANULOCYTES # BLD AUTO: 0 K/UL (ref 0–0.5)
IMM GRANULOCYTES NFR BLD AUTO: 0 % (ref 0–5)
LYMPHOCYTES # BLD: 0.3 K/UL (ref 0.5–4.6)
LYMPHOCYTES NFR BLD: 5 % (ref 13–44)
MCH RBC QN AUTO: 30.5 PG (ref 26.1–32.9)
MCHC RBC AUTO-ENTMCNC: 32.4 G/DL (ref 31.4–35)
MCV RBC AUTO: 94.1 FL (ref 79.6–97.8)
MONOCYTES # BLD: 0.4 K/UL (ref 0.1–1.3)
MONOCYTES NFR BLD: 6 % (ref 4–12)
NEUTS SEG # BLD: 6 K/UL (ref 1.7–8.2)
NEUTS SEG NFR BLD: 87 % (ref 43–78)
NRBC # BLD: 0 K/UL (ref 0–0.2)
PLATELET # BLD AUTO: 224 K/UL (ref 150–450)
PMV BLD AUTO: 8.8 FL (ref 9.4–12.3)
POTASSIUM SERPL-SCNC: 4.5 MMOL/L (ref 3.5–5.1)
RBC # BLD AUTO: 2.39 M/UL (ref 4.23–5.6)
SODIUM SERPL-SCNC: 135 MMOL/L (ref 136–145)
WBC # BLD AUTO: 6.9 K/UL (ref 4.3–11.1)

## 2020-12-12 PROCEDURE — 71045 X-RAY EXAM CHEST 1 VIEW: CPT

## 2020-12-12 PROCEDURE — 94760 N-INVAS EAR/PLS OXIMETRY 1: CPT

## 2020-12-12 PROCEDURE — 74011250636 HC RX REV CODE- 250/636: Performed by: HOSPITALIST

## 2020-12-12 PROCEDURE — 85025 COMPLETE CBC W/AUTO DIFF WBC: CPT

## 2020-12-12 PROCEDURE — 36415 COLL VENOUS BLD VENIPUNCTURE: CPT

## 2020-12-12 PROCEDURE — 80048 BASIC METABOLIC PNL TOTAL CA: CPT

## 2020-12-12 PROCEDURE — 65610000001 HC ROOM ICU GENERAL

## 2020-12-12 PROCEDURE — 74011250637 HC RX REV CODE- 250/637: Performed by: INTERNAL MEDICINE

## 2020-12-12 PROCEDURE — 77010033678 HC OXYGEN DAILY

## 2020-12-12 RX ADMIN — MORPHINE SULFATE 2 MG: 2 INJECTION, SOLUTION INTRAMUSCULAR; INTRAVENOUS at 00:35

## 2020-12-12 RX ADMIN — CITALOPRAM HYDROBROMIDE 40 MG: 20 TABLET ORAL at 20:47

## 2020-12-12 RX ADMIN — MORPHINE SULFATE 2 MG: 2 INJECTION, SOLUTION INTRAMUSCULAR; INTRAVENOUS at 20:44

## 2020-12-12 RX ADMIN — OXYCODONE HYDROCHLORIDE 5 MG: 5 TABLET ORAL at 21:49

## 2020-12-12 RX ADMIN — OXYCODONE HYDROCHLORIDE 5 MG: 5 TABLET ORAL at 06:25

## 2020-12-12 RX ADMIN — OXYCODONE HYDROCHLORIDE 5 MG: 5 TABLET ORAL at 12:19

## 2020-12-12 RX ADMIN — Medication 10 ML: at 14:00

## 2020-12-12 RX ADMIN — Medication 10 ML: at 08:13

## 2020-12-12 RX ADMIN — Medication 10 ML: at 21:49

## 2020-12-12 NOTE — PROGRESS NOTES
Patient is alert and oriented. Lung sounds are coarse crackles noted over left base. Oxygen saturation 98% on 4 L hiflow. Pleuravac replaced. No air leak noted. Drainage serosanguinous. Serosanguinous drainage noted on dressing as well. Shift assessment complete. See doc flow sheet for full assessment findings.

## 2020-12-12 NOTE — PROGRESS NOTES
Bedside and Verbal shift change report given to Chris Soliz RN  (oncoming nurse) by Corie Alejandra RN  (offgoing nurse). Report included the following information SBAR, Kardex, ED Summary, Procedure Summary, Intake/Output, Recent Results, Cardiac Rhythm NSR and Alarm Parameters .

## 2020-12-12 NOTE — PROGRESS NOTES
Surgery On Call (for Dr. Brady Morel)  The patient says he feels \"fine\" this morning. No air leak from chest tube. Chest tube had 450 cc's of drainage overnight. Plan: Continue chest tube to drainage. Medical care as per hospitalists.   Khushboo Daniel MD.

## 2020-12-12 NOTE — PROGRESS NOTES
Hospitalist Note     Admit Date:  12/10/2020 12:33 PM   Name:  Almetta Goltz   Age:  60 y.o.  :  1955   MRN:  [id number]   PCP:  Kristen Vallejo MD  Treatment Team: Attending Provider: Handy Newsome MD; : Christa [employer] Consulting Provider: Yue Mendez MD; Utilization Review: Taina Collins RN; Care Manager: Susan Menchaca RN; Primary Nurse: Corrine Dueñas    HPI/Subjective: This is a nice 61 y/o WM with a h/o SCC of the epiglottis who initially presented here on  with left sided chest pain. There was no clear inciting trauma, but he recalls a few days prior he rolled over in bed, coughed a lot and felt a pop on the left side. CXR showed LLL opacity. CT c/a/p showed left lower lobe infiltrate and effusion along with fractures of the left 7-9th ribs (with separation of 8th and 9th). Hb on presentation was 12. CXR the following day showed larger effusion. He developed ISI which improved, suspected due to IV contrast. His pain was controlled and he was on RA so he was discharged home on 12/3. Hb that day was 8.4. He returned to the ER on  with left sided chest pain. Hb 8.1. CXR showed persistent LLL atelectasis vs infiltrate, unchanged. CT c/a/p showed left sided rib fractures, subpleural fluid collection and likely some blood in the abdominal wall. He was discharged home. His pain and SOB progressed so he came back tonight. CXR shows increasing left sided effusion. CT chest shows enlargement of a left sided hemothorax with increasing displacement of his rib fractures. His Hb is 6.7. He is on RA. Labs otherwise at baseline. Pulmonology and General Surgery were both called initially and it was ultimately recommended that he be admitted by our service and transferred downtown. [facility]. Unfortunately, there are no beds available right now.  I spoke to the house supervisor who will keep him on the transfer list. I also spoke to Dr. Mariusz Palumbo in the ER and he plans to place a chest tube at the bedside.     10 systems reviewed and negative except as noted in HPI.    12/11 patient still has left-sided chest pain and shortness of breath. Hemothorax as evidenced on CT scan on admission. General surgery consulted. Plan for chest tube placement today. Afebrile. 12/12 patient reports his shortness of breath is improved. Left-sided chest tube was placed yesterday afternoon by general surgery. Chest tube drain output 2200 mL since insertion. Patient is afebrile. Still has some pain at the site of the chest tube. No nausea no vomiting no abdominal pain. Hemoglobin is 7.3 this morning from 7.8 yesterday. Chest x-ray repeated this morning showed stable left-sided chest tube with no visible pneumothorax. Left pleural effusion slightly decreased.     Objective:     Patient Vitals for the past 24 hrs:   Temp Pulse Resp BP SpO2   12/12/20 0814 98.5 °F (36.9 °C) 83 13 95/68 92 %   12/12/20 0800  84      12/12/20 0700  83 13 123/65 95 %   12/12/20 0600  91 17 (!) 144/75 97 %   12/12/20 0500  84 13 124/70 98 %   12/12/20 0400  81 12 (!) 111/55 97 %   12/12/20 0352 97.9 °F (36.6 °C) 83 12 (!) 102/56 98 %   12/12/20 0300  89 13 126/65 97 %   12/12/20 0200  94 16 (!) 106/56 99 %   12/12/20 0110  90 22 132/82 99 %   12/12/20 0000 99.1 °F (37.3 °C) 90 13 118/66 98 %   12/11/20 2300  90 13 131/76 93 %   12/11/20 2200  90 13 (!) 141/78 98 %   12/11/20 2100  90 15 134/81 99 %   12/11/20 2055     100 %   12/11/20 2014 98.4 °F (36.9 °C) 89 14 (!) 175/70 98 %   12/11/20 2000  91 16 (!) 178/98 94 %   12/11/20 1937  88 18  98 %   12/11/20 1900  92 17 124/73 (!) 87 %   12/11/20 1800  87 14 134/80 91 %   12/11/20 1730  89 12 (!) 145/89 97 %   12/11/20 1700  90 10 (!) 158/79 96 %   12/11/20 1630 97.6 °F (36.4 °C) 91 15 (!) 143/73 95 %   12/11/20 1609 97.6 °F (36.4 °C)       12/11/20 1603  90 10  97 % 12/11/20 1602  89 11  98 %   12/11/20 1600  90 9 117/83 96 %   12/11/20 1530  88 9 (!) 155/77 97 %   12/11/20 1508  90  104/75 92 %   12/11/20 1430  96 (!) 38 (!) 151/84 100 %   12/11/20 1400  92  (!) 142/94 96 %   12/11/20 1330  88 17 (!) 146/73 96 %   12/11/20 1300  84 18 (!) 158/76 97 %   12/11/20 1230  81 17 (!) 155/78 96 %   12/11/20 1200  86 18 (!) 155/75 98 %   12/11/20 1130 98.9 °F (37.2 °C) 93 17 (!) 148/82 97 %   12/11/20 1128  84      12/11/20 1100  86 19 (!) 142/74 96 %   12/11/20 1000  82 15 (!) 146/75 94 %     Oxygen Therapy  O2 Sat (%): 92 % (12/12/20 0814)  Pulse via Oximetry: 83 beats per minute (12/12/20 0700)  O2 Device: Hi flow nasal cannula (12/12/20 0352)  O2 Flow Rate (L/min): 4 l/min (12/12/20 0352)      Intake/Output Summary (Last 24 hours) at 12/12/2020 0934  Last data filed at 12/12/2020 3615  Gross per 24 hour   Intake 2015 ml   Output 2685 ml   Net -670 ml       *Note that automatically entered I/Os may not be accurate; dependent on patient compliance with collection and accurate  by [facility]. General:    Obese, alert,awake, no overt distress, on nasal cannula,  HEENT atraumatic, normocephalic, PERRLA, EOMI, moist mucous membranes,  CV:   RRR. No murmur, rub, or gallop. Chest: Left sided chest tube in place. Lungs:   Diminished breath sounds on the left lung, no wheezing, rhonchi, or rales. Abdomen:   Soft, nontender, nondistended. Extremities: Warm and dry. No cyanosis or edema. Skin:     No rashes or jaundice.    Neuro:  No gross focal deficits    Data Review:  I have reviewed all labs, meds, and studies from the last 24 hours:    Recent Results (from the past 24 hour(s))   GLUCOSE, POC    Collection Time: 12/11/20  3:20 PM   Result Value Ref Range    Glucose (POC) 133 (H) 65 - 668 mg/dL   METABOLIC PANEL, BASIC    Collection Time: 12/12/20  3:15 AM   Result Value Ref Range    Sodium 135 (L) 136 - 145 mmol/L    Potassium 4.5 3.5 - 5.1 mmol/L Chloride 101 98 - 107 mmol/L    CO2 28 21 - 32 mmol/L    Anion gap 6 (L) 7 - 16 mmol/L    Glucose 157 (H) 65 - 100 mg/dL    BUN 33 (H) 8 - 23 MG/DL    Creatinine 1.67 (H) 0.8 - 1.5 MG/DL    GFR est AA 54 (L) >60 ml/min/1.73m2    GFR est non-AA 44 (L) >60 ml/min/1.73m2    Calcium 8.3 8.3 - 10.4 MG/DL   CBC WITH AUTOMATED DIFF    Collection Time: 12/12/20  3:15 AM   Result Value Ref Range    WBC 6.9 4.3 - 11.1 K/uL    RBC 2.39 (L) 4.23 - 5.6 M/uL    HGB 7.3 (L) 13.6 - 17.2 g/dL    HCT 22.5 (L) 41.1 - 50.3 %    MCV 94.1 79.6 - 97.8 FL    MCH 30.5 26.1 - 32.9 PG    MCHC 32.4 31.4 - 35.0 g/dL    RDW 15.5 (H) 11.9 - 14.6 %    PLATELET 995 843 - 356 K/uL    MPV 8.8 (L) 9.4 - 12.3 FL    ABSOLUTE NRBC 0.00 0.0 - 0.2 K/uL    DF AUTOMATED      NEUTROPHILS 87 (H) 43 - 78 %    LYMPHOCYTES 5 (L) 13 - 44 %    MONOCYTES 6 4.0 - 12.0 %    EOSINOPHILS 1 0.5 - 7.8 %    BASOPHILS 0 0.0 - 2.0 %    IMMATURE GRANULOCYTES 0 0.0 - 5.0 %    ABS. NEUTROPHILS 6.0 1.7 - 8.2 K/UL    ABS. LYMPHOCYTES 0.3 (L) 0.5 - 4.6 K/UL    ABS. MONOCYTES 0.4 0.1 - 1.3 K/UL    ABS. EOSINOPHILS 0.1 0.0 - 0.8 K/UL    ABS. BASOPHILS 0.0 0.0 - 0.2 K/UL    ABS. IMM. GRANS. 0.0 0.0 - 0.5 K/UL        All Micro Results     None          No results found for this visit on 12/10/20.     Current Meds:  Current Facility-Administered Medications   Medication Dose Route Frequency    morphine injection 2 mg  2 mg IntraVENous Q3H PRN    lip protectant (BLISTEX) ointment 1 Each  1 Each Topical PRN    LORazepam (ATIVAN) tablet 1 mg  1 mg Oral Q6H PRN    0.9% sodium chloride infusion 250 mL  250 mL IntraVENous PRN    citalopram (CELEXA) tablet 40 mg  40 mg Oral QPM    oxyCODONE IR (ROXICODONE) tablet 5 mg  5 mg Oral Q4H PRN    sodium chloride (NS) flush 5-40 mL  5-40 mL IntraVENous Q8H    sodium chloride (NS) flush 5-40 mL  5-40 mL IntraVENous PRN    acetaminophen (TYLENOL) tablet 650 mg  650 mg Oral Q6H PRN    Or    acetaminophen (TYLENOL) suppository 650 mg  650 mg Rectal Q6H PRN    polyethylene glycol (MIRALAX) packet 17 g  17 g Oral DAILY PRN    promethazine (PHENERGAN) tablet 12.5 mg  12.5 mg Oral Q6H PRN    naloxone (NARCAN) injection 0.4 mg  0.4 mg IntraVENous EVERY 2 MINUTES AS NEEDED       Other Studies (last 24 hours):  Xr Chest Sngl V    Result Date: 12/12/2020   Portable view of the chest COMPARISON: December 11, 2020 CLINICAL HISTORY: Chest tube FINDINGS: Stable left-sided chest tube. There is no visible pneumothorax. There is left pleural effusion. No significant pulmonary edema. Heart is enlarged. Mediastinal contour is within normal limits. IMPRESSION: 1. Stable left-sided chest tube. No visible pneumothorax on today's study. 2. Left pleural effusion appears slightly decreased. Xr Chest Sngl V    Result Date: 12/11/2020  PORTABLE CHEST, December 11, 2020 at 1517 hours CLINICAL HISTORY:  Left chest tube placement. COMPARISON:  Radiographs and CT of December 10, 2020. FINDINGS:  AP erect image demonstrates a new chest tube in the left mid chest. Small left pleural fluid collection is significantly decreased from yesterday with a small left apical pneumothorax. Left basilar atelectasis/infiltrate is present. The right lung appears clear. Heart size is unchanged. There are overlying radiopaque support devices. IMPRESSION:  MARKED INTERVAL DECREASE IN VOLUME OF LEFT PLEURAL FLUID STATUS POST PLACEMENT OF A CHEST TUBE WITH A SMALL APICAL PNEUMOTHORAX AND BASILAR ATELECTASIS/INFILTRATE.       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 Intensify therapy             CKD (chronic kidney disease) ICD-10-CM: N18.9  ICD-9-CM: 585.9  1/10/2017 - Present Yes              Plan: This is a 29-year-old male with:     Enlarging left hemothorax with rib fractures s/p chest tube placement 12/11  General surgery consulted. CT shows enlarging hemothorax with progressive fractures. Continuous pulse oximetry, pain control. Patient had chest tube placed on 12/11 by general surgery. Chest x-ray this morning showed stable left-sided chest tube with no visible pneumothorax (small apical pneumothorax on chest x-ray on 12/11 which resolved) left pleural effusion appears slightly decreased. Acute blood loss anemia  Hemoglobin is 7.3 today from 7.8 yesterday   status post 1 unit of packed red blood cells on 12/10. Goal to keep hemoglobin above 7. Baseline hemoglobin of approximately 12 on 11/29. Chronic kidney disease stage III  IV fluids. Creatinine at baseline. 1.67 this am.    Hypertension:    Squamous cell carcinoma of the epiglottis    DC planning/Dispo: Anticipate inpatient hospital stay for at least 24 hours until cleared by general surgery. Diet:  DIET CARDIAC  DVT ppx: SCDs    Current plan of care discussed with patient and wife at bedside.     Signed:  Nivia Keen MD

## 2020-12-12 NOTE — PROGRESS NOTES
Problem: Falls - Risk of  Goal: *Absence of Falls  Description: Document Herrera Denise Fall Risk and appropriate interventions in the flowsheet. Outcome: Progressing Towards Goal  Note: Fall Risk Interventions:            Medication Interventions: Assess postural VS orthostatic hypotension, Bed/chair exit alarm, Evaluate medications/consider consulting pharmacy, Patient to call before getting OOB, Teach patient to arise slowly    Elimination Interventions: Bed/chair exit alarm, Call light in reach, Patient to call for help with toileting needs, Toilet paper/wipes in reach, Urinal in reach              Problem: Patient Education: Go to Patient Education Activity  Goal: Patient/Family Education  Outcome: Progressing Towards Goal     Problem: Pain  Goal: *Control of Pain  Outcome: Progressing Towards Goal     Problem: Patient Education: Go to Patient Education Activity  Goal: Patient/Family Education  Outcome: Progressing Towards Goal     Problem:  Activity Intolerance  Goal: *Oxygen saturation during activity within specified parameters  Outcome: Progressing Towards Goal  Goal: *Able to remain out of bed as prescribed  Outcome: Progressing Towards Goal     Problem: Patient Education: Go to Patient Education Activity  Goal: Patient/Family Education  Outcome: Progressing Towards Goal

## 2020-12-13 LAB
ANION GAP SERPL CALC-SCNC: 5 MMOL/L (ref 7–16)
BASOPHILS # BLD: 0 K/UL (ref 0–0.2)
BASOPHILS NFR BLD: 0 % (ref 0–2)
BUN SERPL-MCNC: 34 MG/DL (ref 8–23)
CALCIUM SERPL-MCNC: 8.2 MG/DL (ref 8.3–10.4)
CHLORIDE SERPL-SCNC: 100 MMOL/L (ref 98–107)
CO2 SERPL-SCNC: 29 MMOL/L (ref 21–32)
CREAT SERPL-MCNC: 1.61 MG/DL (ref 0.8–1.5)
DIFFERENTIAL METHOD BLD: ABNORMAL
EOSINOPHIL # BLD: 0.2 K/UL (ref 0–0.8)
EOSINOPHIL NFR BLD: 2 % (ref 0.5–7.8)
ERYTHROCYTE [DISTWIDTH] IN BLOOD BY AUTOMATED COUNT: 15.3 % (ref 11.9–14.6)
GLUCOSE SERPL-MCNC: 156 MG/DL (ref 65–100)
HCT VFR BLD AUTO: 23.1 % (ref 41.1–50.3)
HGB BLD-MCNC: 7.7 G/DL (ref 13.6–17.2)
IMM GRANULOCYTES # BLD AUTO: 0 K/UL (ref 0–0.5)
IMM GRANULOCYTES NFR BLD AUTO: 0 % (ref 0–5)
LYMPHOCYTES # BLD: 0.3 K/UL (ref 0.5–4.6)
LYMPHOCYTES NFR BLD: 3 % (ref 13–44)
MCH RBC QN AUTO: 31.3 PG (ref 26.1–32.9)
MCHC RBC AUTO-ENTMCNC: 33.3 G/DL (ref 31.4–35)
MCV RBC AUTO: 93.9 FL (ref 79.6–97.8)
MONOCYTES # BLD: 0.4 K/UL (ref 0.1–1.3)
MONOCYTES NFR BLD: 4 % (ref 4–12)
NEUTS SEG # BLD: 8.4 K/UL (ref 1.7–8.2)
NEUTS SEG NFR BLD: 90 % (ref 43–78)
NRBC # BLD: 0 K/UL (ref 0–0.2)
PLATELET # BLD AUTO: 205 K/UL (ref 150–450)
PMV BLD AUTO: 9 FL (ref 9.4–12.3)
POTASSIUM SERPL-SCNC: 4.3 MMOL/L (ref 3.5–5.1)
RBC # BLD AUTO: 2.46 M/UL (ref 4.23–5.6)
SODIUM SERPL-SCNC: 134 MMOL/L (ref 136–145)
WBC # BLD AUTO: 9.3 K/UL (ref 4.3–11.1)

## 2020-12-13 PROCEDURE — 65610000001 HC ROOM ICU GENERAL

## 2020-12-13 PROCEDURE — 74011250636 HC RX REV CODE- 250/636: Performed by: HOSPITALIST

## 2020-12-13 PROCEDURE — 74011250637 HC RX REV CODE- 250/637: Performed by: INTERNAL MEDICINE

## 2020-12-13 PROCEDURE — 74011250637 HC RX REV CODE- 250/637: Performed by: HOSPITALIST

## 2020-12-13 PROCEDURE — 94760 N-INVAS EAR/PLS OXIMETRY 1: CPT

## 2020-12-13 PROCEDURE — 85025 COMPLETE CBC W/AUTO DIFF WBC: CPT

## 2020-12-13 PROCEDURE — 77010033678 HC OXYGEN DAILY

## 2020-12-13 PROCEDURE — 80048 BASIC METABOLIC PNL TOTAL CA: CPT

## 2020-12-13 PROCEDURE — 36415 COLL VENOUS BLD VENIPUNCTURE: CPT

## 2020-12-13 RX ADMIN — OXYCODONE HYDROCHLORIDE 5 MG: 5 TABLET ORAL at 19:40

## 2020-12-13 RX ADMIN — Medication 10 ML: at 14:00

## 2020-12-13 RX ADMIN — CITALOPRAM HYDROBROMIDE 40 MG: 20 TABLET ORAL at 19:40

## 2020-12-13 RX ADMIN — LORAZEPAM 1 MG: 1 TABLET ORAL at 22:36

## 2020-12-13 RX ADMIN — MORPHINE SULFATE 2 MG: 2 INJECTION, SOLUTION INTRAMUSCULAR; INTRAVENOUS at 22:37

## 2020-12-13 RX ADMIN — Medication 10 ML: at 06:36

## 2020-12-13 RX ADMIN — OXYCODONE HYDROCHLORIDE 5 MG: 5 TABLET ORAL at 08:17

## 2020-12-13 RX ADMIN — Medication 10 ML: at 19:41

## 2020-12-13 NOTE — PROGRESS NOTES
Sean Carty  Admission Date: 12/10/2020   POD: * No surgery found *            Daily Progress Note: 12/13/2020  Subjective:   Feels better overall   Objective:   AVSS  Physical Exam:          GEN: well developed and in no acute distress  HEENT:  PERRL, EOMI, no alar flaring or epistaxis, oral mucosa moist without cyanosis,   NECK:  no JVD, no retractions, no thyromegaly or masses  LUNGS:  Clear  Chest tube output estimated at 450ml over last 24 hours. [place]. No air leak   HEART:  RRR  ABDOMEN:  soft with no tenderness; positive bowel sounds present  EXTREMITIES:  warm with no cyanosis,  SKIN:  no jaundice or ecchymosis,   NEURO:  alert and oriented, grossly non-focal      LAB  Recent Labs     12/13/20  0311 12/12/20  0315 12/11/20  0324  12/10/20  1353   WBC 9.3 6.9 7.7  --  8.6   HGB 7.7* 7.3* 7.8*   < > 6.7*   HCT 23.1* 22.5* 24.0*   < > 20.2*    224 244  --  250   * 135* 135*  --  135*   K 4.3 4.5 4.5  --  4.5    101 101  --  101   CO2 29 28 29  --  28   BUN 34* 33* 30*  --  34*   CREA 1.61* 1.67* 1.69*  --  1.81*   * 157* 109*  --  190*   PTP  --   --   --   --  12.8   INR  --   --   --   --  0.9    < > = values in this interval not displayed.        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   Intensify therapy             CKD (chronic kidney disease) ICD-10-CM: N18.9  ICD-9-CM: 585.9  1/10/2017 Yes            Plan:     Chest tube draining well with improvement in patient's breathing and pain   Continue same for today since drainage remains high.  '    Semaj Taylor MD

## 2020-12-13 NOTE — PROGRESS NOTES
Bedside and Verbal shift change report given to Ebony Mcrae RN  (oncoming nurse) by Liala Lopez RN  (offgoing nurse). Report included the following information SBAR, Kardex, ED Summary, Procedure Summary, Intake/Output, Recent Results, Cardiac Rhythm NSR and Alarm Parameters .

## 2020-12-13 NOTE — PROGRESS NOTES
Hospitalist Note     Admit Date:  12/10/2020 12:33 PM   Name:  Kusum Rangel   Age:  60 y.o.  :  1955   MRN:  [id number]   PCP:  William Givens MD  Treatment Team: Attending Provider: Sharon Herrera MD; : Yonis Poe Consulting Provider: Saundra Angulo MD; Utilization Review: Ousmane Chandra RN; Care Manager: Esme Elliott RN; Primary Nurse: Keisha Saab    HPI/Subjective: This is a nice 61 y/o WM with a h/o SCC of the epiglottis who initially presented here on  with left sided chest pain. There was no clear inciting trauma, but he recalls a few days prior he rolled over in bed, coughed a lot and felt a pop on the left side. CXR showed LLL opacity. CT c/a/p showed left lower lobe infiltrate and effusion along with fractures of the left 7-9th ribs (with separation of 8th and 9th). Hb on presentation was 12. CXR the following day showed larger effusion. He developed ISI which improved, suspected due to IV contrast. His pain was controlled and he was on RA so he was discharged home on 12/3. Hb that day was 8.4. He returned to the ER on  with left sided chest pain. Hb 8.1. CXR showed persistent LLL atelectasis vs infiltrate, unchanged. CT c/a/p showed left sided rib fractures, subpleural fluid collection and likely some blood in the abdominal wall. He was discharged home. His pain and SOB progressed so he came back tonight. CXR shows increasing left sided effusion. CT chest shows enlargement of a left sided hemothorax with increasing displacement of his rib fractures. His Hb is 6.7. He is on RA. Labs otherwise at baseline. Pulmonology and General Surgery were both called and admitted to hospitalist service. Initially plan was to transfer patient downtown but no beds available. Chest tube was placed at bedside on . Postprocedure there was small apical pneumothorax which resolved on repeat chest x-ray on .   He still has some drainage from the chest tube.    10 systems reviewed and negative except as noted in HPI.    12/11 patient still has left-sided chest pain and shortness of breath. Hemothorax as evidenced on CT scan on admission. General surgery consulted. Plan for chest tube placement today. Afebrile. 12/12 patient reports his shortness of breath is improved. Left-sided chest tube was placed yesterday afternoon by general surgery. Chest tube drain output 2200 mL since insertion. Patient is afebrile. Still has some pain at the site of the chest tube. No nausea no vomiting no abdominal pain. Hemoglobin is 7.3 this morning from 7.8 yesterday. Chest x-ray repeated this morning showed stable left-sided chest tube with no visible pneumothorax. Left pleural effusion slightly decreased. 12/13 chest pain at the site of the chest tube improved. No shortness of breath. No cough. No fever. Hemoglobin stable at 7.7. Chest tube drain output 450 ml since yesterday.      Objective:     Patient Vitals for the past 24 hrs:   Temp Pulse Resp BP SpO2   12/13/20 1023     91 %   12/13/20 1010  92 22 104/60 97 %   12/13/20 0900  93 19 (!) 97/58 96 %   12/13/20 0809 99.9 °F (37.7 °C) 92 18 129/61 95 %   12/13/20 0800  91 20 129/61 96 %   12/13/20 0700  90 18 (!) 124/54 97 %   12/13/20 0600  91 17 119/61 97 %   12/13/20 0500  91 16 (!) 118/56 97 %   12/13/20 0329 99.8 °F (37.7 °C) 96 22 126/67 97 %   12/13/20 0300  92 16 (!) 111/55 98 %   12/13/20 0200  92 17 112/68 97 %   12/13/20 0100  93 16 (!) 112/55 97 %   12/13/20 0000  92 16 125/69 98 %   12/12/20 2339 99.1 °F (37.3 °C) 92 13 125/62 98 %   12/12/20 2300  93 25 124/64 98 %   12/12/20 2200  95 21 139/72 98 %   12/12/20 2146  90 17 (!) 142/62 95 %   12/12/20 2100  95 15 139/70 97 %   12/12/20 2000  98 28 133/75 98 %   12/12/20 1939 99.1 °F (37.3 °C) 93 15 119/70 99 %   12/12/20 1900  90 14 112/65    12/12/20 1700  85 15 130/67 94 %   12/12/20 1600  87 12 122/65 99 % 12/12/20 1554 97.8 °F (36.6 °C) 89 15 105/67 100 %   12/12/20 1549  82      12/12/20 1534     100 %   12/12/20 1500  83 11 105/67 100 %   12/12/20 1400  82 10 (!) 160/79 98 %   12/12/20 1300  87 13 (!) 143/65 97 %   12/12/20 1219  90 14 134/63 98 %   12/12/20 1214 97.1 °F (36.2 °C) 90 21 134/63 98 %   12/12/20 1213  90 12 134/63 97 %   12/12/20 1200  90      12/12/20 1100  87 22 (!) 111/58 99 %     Oxygen Therapy  O2 Sat (%): 91 % (12/13/20 1023)  Pulse via Oximetry: 91 beats per minute (12/13/20 1023)  O2 Device: Hi flow nasal cannula (12/13/20 1023)  O2 Flow Rate (L/min): 5 l/min (12/13/20 1023)      Intake/Output Summary (Last 24 hours) at 12/13/2020 1034  Last data filed at 12/13/2020 1013  Gross per 24 hour   Intake    Output 1725 ml   Net -1725 ml       *Note that automatically entered I/Os may not be accurate; dependent on patient compliance with collection and accurate  by techs. General:    Obese, alert,awake, no overt distress, on nasal cannula,  HEENT atraumatic, normocephalic, PERRLA, EOMI, moist mucous membranes,  CV:   RRR. No murmur, rub, or gallop. Chest: Left sided chest tube in place,   Lungs:   Diminished breath sounds on the left lung, no wheezing, rhonchi, or rales. Abdomen:   Soft, nontender, nondistended. Extremities: Warm and dry. No cyanosis or edema. Skin:     No rashes or jaundice.    Neuro:  No gross focal deficits    Data Review:  I have reviewed all labs, meds, and studies from the last 24 hours:    Recent Results (from the past 24 hour(s))   METABOLIC PANEL, BASIC    Collection Time: 12/13/20  3:11 AM   Result Value Ref Range    Sodium 134 (L) 136 - 145 mmol/L    Potassium 4.3 3.5 - 5.1 mmol/L    Chloride 100 98 - 107 mmol/L    CO2 29 21 - 32 mmol/L    Anion gap 5 (L) 7 - 16 mmol/L    Glucose 156 (H) 65 - 100 mg/dL    BUN 34 (H) 8 - 23 MG/DL    Creatinine 1.61 (H) 0.8 - 1.5 MG/DL    GFR est AA 56 (L) >60 ml/min/1.73m2    GFR est non-AA 46 (L) >60 ml/min/1.73m2    Calcium 8.2 (L) 8.3 - 10.4 MG/DL   CBC WITH AUTOMATED DIFF    Collection Time: 12/13/20  3:11 AM   Result Value Ref Range    WBC 9.3 4.3 - 11.1 K/uL    RBC 2.46 (L) 4.23 - 5.6 M/uL    HGB 7.7 (L) 13.6 - 17.2 g/dL    HCT 23.1 (L) 41.1 - 50.3 %    MCV 93.9 79.6 - 97.8 FL    MCH 31.3 26.1 - 32.9 PG    MCHC 33.3 31.4 - 35.0 g/dL    RDW 15.3 (H) 11.9 - 14.6 %    PLATELET 432 376 - 477 K/uL    MPV 9.0 (L) 9.4 - 12.3 FL    ABSOLUTE NRBC 0.00 0.0 - 0.2 K/uL    DF AUTOMATED      NEUTROPHILS 90 (H) 43 - 78 %    LYMPHOCYTES 3 (L) 13 - 44 %    MONOCYTES 4 4.0 - 12.0 %    EOSINOPHILS 2 0.5 - 7.8 %    BASOPHILS 0 0.0 - 2.0 %    IMMATURE GRANULOCYTES 0 0.0 - 5.0 %    ABS. NEUTROPHILS 8.4 (H) 1.7 - 8.2 K/UL    ABS. LYMPHOCYTES 0.3 (L) 0.5 - 4.6 K/UL    ABS. MONOCYTES 0.4 0.1 - 1.3 K/UL    ABS. EOSINOPHILS 0.2 0.0 - 0.8 K/UL    ABS. BASOPHILS 0.0 0.0 - 0.2 K/UL    ABS. IMM. GRANS. 0.0 0.0 - 0.5 K/UL        All Micro Results     None          No results found for this visit on 12/10/20.     Current Meds:  Current Facility-Administered Medications   Medication Dose Route Frequency    morphine injection 2 mg  2 mg IntraVENous Q3H PRN    lip protectant (BLISTEX) ointment 1 Each  1 Each Topical PRN    LORazepam (ATIVAN) tablet 1 mg  1 mg Oral Q6H PRN    0.9% sodium chloride infusion 250 mL  250 mL IntraVENous PRN    citalopram (CELEXA) tablet 40 mg  40 mg Oral QPM    oxyCODONE IR (ROXICODONE) tablet 5 mg  5 mg Oral Q4H PRN    sodium chloride (NS) flush 5-40 mL  5-40 mL IntraVENous Q8H    sodium chloride (NS) flush 5-40 mL  5-40 mL IntraVENous PRN    acetaminophen (TYLENOL) tablet 650 mg  650 mg Oral Q6H PRN    Or    acetaminophen (TYLENOL) suppository 650 mg  650 mg Rectal Q6H PRN    polyethylene glycol (MIRALAX) packet 17 g  17 g Oral DAILY PRN    promethazine (PHENERGAN) tablet 12.5 mg  12.5 mg Oral Q6H PRN    naloxone (NARCAN) injection 0.4 mg  0.4 mg IntraVENous EVERY 2 MINUTES AS NEEDED       Other Studies (last 24 hours):  No results found. 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 Intensify therapy             CKD (chronic kidney disease) ICD-10-CM: N18.9  ICD-9-CM: 585.9  1/10/2017 - Present Yes            Hospital course    Patient was admitted for enlarging left hemothorax with rib fractures. General surgery was consulted. CT on admission showed enlarging hemothorax with progressive rib fractures. Chest tube was placed at bedside on 12/11 by general surgery . Postprocedure chest x-ray showed he had a small apical pneumothorax which resolved on repeat chest x-ray on 12/12. He still has significant drainage from the chest tube. Postprocedure patient's respiratory status significantly improved. He has acute blood loss anemia for which he received 1 unit of packed red blood cells on 12/10. Hemoglobin stable at 7.7. Other chronic medical problems which are stable includes hypertension, CKD stage III, squamous cell carcinoma of the epiglottis. Plan:     This is a 71-year-old male with:     Enlarging left hemothorax with rib fractures s/p chest tube placement 12/11  General surgery on board. CT shows enlarging hemothorax with progressive fractures. Continuous pulse oximetry, pain control. Patient had chest tube placed on 12/11 by general surgery. Chest x-ray 12/12 showed stable left-sided chest tube with no visible pneumothorax (small apical pneumothorax on chest x-ray on 12/11 which resolved) left pleural effusion appears slightly decreased. Plan to repeat chest x-ray in a.m. Acute blood loss anemia  Hemoglobin stable at 7.7 this am.   status post 1 unit of packed red blood cells on 12/10. Goal to keep hemoglobin above 7. Baseline hemoglobin of approximately 12 on 11/29. Chronic kidney disease stage III  IV fluids. Creatinine at baseline. 1.61 this am.    Hypertension:  BP stable off of meds. Squamous cell carcinoma of the epiglottis    DC planning/Dispo: Anticipate inpatient hospital stay for at least 24 hours until cleared by general surgery. Diet:  DIET CARDIAC  DVT ppx: SCDs    Current plan of care discussed with patient at bedside.     Signed:  Fozia Stephens MD

## 2020-12-14 ENCOUNTER — APPOINTMENT (OUTPATIENT)
Dept: GENERAL RADIOLOGY | Age: 65
DRG: 187 | End: 2020-12-14
Attending: SURGERY
Payer: COMMERCIAL

## 2020-12-14 LAB
ANION GAP SERPL CALC-SCNC: 7 MMOL/L (ref 7–16)
BASOPHILS # BLD: 0 K/UL (ref 0–0.2)
BASOPHILS NFR BLD: 0 % (ref 0–2)
BUN SERPL-MCNC: 35 MG/DL (ref 8–23)
CALCIUM SERPL-MCNC: 8.2 MG/DL (ref 8.3–10.4)
CHLORIDE SERPL-SCNC: 98 MMOL/L (ref 98–107)
CO2 SERPL-SCNC: 27 MMOL/L (ref 21–32)
CREAT SERPL-MCNC: 1.87 MG/DL (ref 0.8–1.5)
DIFFERENTIAL METHOD BLD: ABNORMAL
EOSINOPHIL # BLD: 0.2 K/UL (ref 0–0.8)
EOSINOPHIL NFR BLD: 2 % (ref 0.5–7.8)
ERYTHROCYTE [DISTWIDTH] IN BLOOD BY AUTOMATED COUNT: 15.1 % (ref 11.9–14.6)
GLUCOSE SERPL-MCNC: 140 MG/DL (ref 65–100)
HCT VFR BLD AUTO: 21.1 % (ref 41.1–50.3)
HCT VFR BLD AUTO: 23.1 % (ref 41.1–50.3)
HGB BLD-MCNC: 6.6 G/DL (ref 13.6–17.2)
HGB BLD-MCNC: 7.8 G/DL (ref 13.6–17.2)
HISTORY CHECKED?,CKHIST: NORMAL
IMM GRANULOCYTES # BLD AUTO: 0.1 K/UL (ref 0–0.5)
IMM GRANULOCYTES NFR BLD AUTO: 1 % (ref 0–5)
LYMPHOCYTES # BLD: 0.5 K/UL (ref 0.5–4.6)
LYMPHOCYTES NFR BLD: 5 % (ref 13–44)
MCH RBC QN AUTO: 29.9 PG (ref 26.1–32.9)
MCHC RBC AUTO-ENTMCNC: 31.3 G/DL (ref 31.4–35)
MCV RBC AUTO: 95.5 FL (ref 79.6–97.8)
MONOCYTES # BLD: 0.5 K/UL (ref 0.1–1.3)
MONOCYTES NFR BLD: 5 % (ref 4–12)
NEUTS SEG # BLD: 8 K/UL (ref 1.7–8.2)
NEUTS SEG NFR BLD: 87 % (ref 43–78)
NRBC # BLD: 0 K/UL (ref 0–0.2)
PLATELET # BLD AUTO: 231 K/UL (ref 150–450)
PMV BLD AUTO: 9.1 FL (ref 9.4–12.3)
POTASSIUM SERPL-SCNC: 4.3 MMOL/L (ref 3.5–5.1)
RBC # BLD AUTO: 2.21 M/UL (ref 4.23–5.6)
SODIUM SERPL-SCNC: 132 MMOL/L (ref 136–145)
WBC # BLD AUTO: 9.2 K/UL (ref 4.3–11.1)

## 2020-12-14 PROCEDURE — 74011250637 HC RX REV CODE- 250/637: Performed by: HOSPITALIST

## 2020-12-14 PROCEDURE — 85018 HEMOGLOBIN: CPT

## 2020-12-14 PROCEDURE — 74011636637 HC RX REV CODE- 636/637: Performed by: HOSPITALIST

## 2020-12-14 PROCEDURE — 86644 CMV ANTIBODY: CPT

## 2020-12-14 PROCEDURE — 94760 N-INVAS EAR/PLS OXIMETRY 1: CPT

## 2020-12-14 PROCEDURE — 80048 BASIC METABOLIC PNL TOTAL CA: CPT

## 2020-12-14 PROCEDURE — 36415 COLL VENOUS BLD VENIPUNCTURE: CPT

## 2020-12-14 PROCEDURE — 86900 BLOOD TYPING SEROLOGIC ABO: CPT

## 2020-12-14 PROCEDURE — 85025 COMPLETE CBC W/AUTO DIFF WBC: CPT

## 2020-12-14 PROCEDURE — 99232 SBSQ HOSP IP/OBS MODERATE 35: CPT | Performed by: SURGERY

## 2020-12-14 PROCEDURE — 74011250637 HC RX REV CODE- 250/637: Performed by: INTERNAL MEDICINE

## 2020-12-14 PROCEDURE — 71045 X-RAY EXAM CHEST 1 VIEW: CPT

## 2020-12-14 PROCEDURE — P9040 RBC LEUKOREDUCED IRRADIATED: HCPCS

## 2020-12-14 PROCEDURE — 36430 TRANSFUSION BLD/BLD COMPNT: CPT

## 2020-12-14 PROCEDURE — 65610000001 HC ROOM ICU GENERAL

## 2020-12-14 PROCEDURE — 86923 COMPATIBILITY TEST ELECTRIC: CPT

## 2020-12-14 RX ORDER — PREDNISONE 20 MG/1
40 TABLET ORAL
Status: DISCONTINUED | OUTPATIENT
Start: 2020-12-14 | End: 2020-12-17 | Stop reason: HOSPADM

## 2020-12-14 RX ORDER — FAMOTIDINE 20 MG/1
20 TABLET, FILM COATED ORAL DAILY
Status: DISCONTINUED | OUTPATIENT
Start: 2020-12-14 | End: 2020-12-17 | Stop reason: HOSPADM

## 2020-12-14 RX ORDER — SODIUM CHLORIDE 9 MG/ML
250 INJECTION, SOLUTION INTRAVENOUS AS NEEDED
Status: DISCONTINUED | OUTPATIENT
Start: 2020-12-14 | End: 2020-12-17 | Stop reason: HOSPADM

## 2020-12-14 RX ADMIN — Medication 10 ML: at 14:00

## 2020-12-14 RX ADMIN — Medication 10 ML: at 21:45

## 2020-12-14 RX ADMIN — OXYCODONE HYDROCHLORIDE 5 MG: 5 TABLET ORAL at 03:23

## 2020-12-14 RX ADMIN — OXYCODONE HYDROCHLORIDE 5 MG: 5 TABLET ORAL at 09:07

## 2020-12-14 RX ADMIN — CITALOPRAM HYDROBROMIDE 40 MG: 20 TABLET ORAL at 21:45

## 2020-12-14 RX ADMIN — FAMOTIDINE 20 MG: 20 TABLET ORAL at 09:07

## 2020-12-14 RX ADMIN — Medication 10 ML: at 03:25

## 2020-12-14 RX ADMIN — PREDNISONE 40 MG: 20 TABLET ORAL at 09:07

## 2020-12-14 RX ADMIN — OXYCODONE HYDROCHLORIDE 5 MG: 5 TABLET ORAL at 21:47

## 2020-12-14 NOTE — PROGRESS NOTES
Care Management Interventions  PCP Verified by CM: Yes(Dr. Marielos Arnold)  Discharge Durable Medical Equipment: No  Physical Therapy Consult: No  Occupational Therapy Consult: No  Current Support Network: Own Home, Lives with Spouse  Discharge Location  Discharge Placement: Home with family assistance  Prior to admission patient lives with his wife and was independent of ADL's until rib fracture. He was recently d/c 12/3 and returned with hemothroax and anemia now with chest tube. Patient requires no DME and has trasnportation. He has [email or web address] and obtain medications at [facility]. IDT rounds with Dr. Stephy Sevilla and patient plans to return home with wife when medically stable. He is currently on 4 liters of O2 and does not have O2 at home. CM will follow for possible d/c with home O2. Per Dr Stephy Sevilla likely will need sleep study arranged at d/c. CM following.

## 2020-12-14 NOTE — PROGRESS NOTES
Hospitalist Note     Admit Date:  12/10/2020 12:33 PM   Name:  Olivia Vogel   Age:  60 y.o.  :  1955   MRN:  [id number]   PCP:  Violetta Cox MD  Treatment Team: Attending Provider: Theresa De La Rosa MD; : Geovanna Macdonald Consulting Provider: Ezekiel Stern MD; Utilization Review: Madelaine Canas RN; Care Manager: Stalin Tate RN; Primary Nurse: Geoffrey Torres    HPI/Subjective: This is a nice 59 y/o WM with a h/o SCC of the epiglottis who initially presented here on  with left sided chest pain. There was no clear inciting trauma, but he recalls a few days prior he rolled over in bed, coughed a lot and felt a pop on the left side. CXR showed LLL opacity. CT c/a/p showed left lower lobe infiltrate and effusion along with fractures of the left 7-9th ribs (with separation of 8th and 9th). Hb on presentation was 12. CXR the following day showed larger effusion. He developed ISI which improved, suspected due to IV contrast. His pain was controlled and he was on RA so he was discharged home on 12/3. Hb that day was 8.4. He returned to the ER on  with left sided chest pain. Hb 8.1. CXR showed persistent LLL atelectasis vs infiltrate, unchanged. CT c/a/p showed left sided rib fractures, subpleural fluid collection and likely some blood in the abdominal wall. He was discharged home. His pain and SOB progressed so he came back tonight. CXR shows increasing left sided effusion. CT chest shows enlargement of a left sided hemothorax with increasing displacement of his rib fractures. His Hb is 6.7. He is on RA. Labs otherwise at baseline. Pulmonology and General Surgery were both called and admitted to hospitalist service. Initially plan was to transfer patient downtown but no beds available. Chest tube was placed at bedside on . Postprocedure there was small apical pneumothorax which resolved on repeat chest x-ray on .   He still has some drainage from the chest tube.    10 systems reviewed and negative except as noted in HPI.    12/11 patient still has left-sided chest pain and shortness of breath. Hemothorax as evidenced on CT scan on admission. General surgery consulted. Plan for chest tube placement today. Afebrile. 12/12 patient reports his shortness of breath is improved. Left-sided chest tube was placed yesterday afternoon by general surgery. Chest tube drain output 2200 mL since insertion. Patient is afebrile. Still has some pain at the site of the chest tube. No nausea no vomiting no abdominal pain. Hemoglobin is 7.3 this morning from 7.8 yesterday. Chest x-ray repeated this morning showed stable left-sided chest tube with no visible pneumothorax. Left pleural effusion slightly decreased. 12/13 chest pain at the site of the chest tube improved. No shortness of breath. No cough. No fever. Hemoglobin stable at 7.7. Chest tube drain output 450 ml since yesterday. 12/14 patient's oxygen desaturated to 50s while asleep. He may have undiagnosed sleep apnea. He will need urgent modified sleep study as outpatient. Discussed with patient. Hemoglobin dropped to 6.6 this morning. I ordered a unit of packed red blood cells. Left-sided chest tube drainage 550 mL over the last 24 hours. General surgery on board. Patient also complained of pain in his left ankle and stated he has prior history of gout with similar attacks.     Objective:     Patient Vitals for the past 24 hrs:   Temp Pulse Resp BP SpO2   12/14/20 0800  (!) 105 24 (!) 97/56 (!) 86 %   12/14/20 0721 98 °F (36.7 °C) 100 18 117/62 90 %   12/14/20 0700  96 21 117/62 98 %   12/14/20 0600  (!) 101 21 117/63 96 %   12/14/20 0500  96 16 117/61 96 %   12/14/20 0400  (!) 103 21 (!) 112/52 95 %   12/14/20 0300 99 °F (37.2 °C) 95 15 120/61 97 %   12/14/20 0200  98 30 (!) 112/53 96 %   12/14/20 0100  96 28 106/64 99 %   12/14/20 0034  96 16 (!) 114/58 (!) 82 %   12/14/20 0000  95 18  (!) 88 %   12/13/20 2300  93 18  97 %   12/13/20 2224 100 °F (37.8 °C) 92 16 111/60 96 %   12/13/20 2200  92 23  96 %   12/13/20 2113     97 %   12/13/20 2100  92 19  98 %   12/13/20 2000  91 19  97 %   12/13/20 1930 98.3 °F (36.8 °C) 90 24 (!) 140/62 100 %   12/13/20 1600 99.9 °F (37.7 °C) 89 14 123/67 100 %   12/13/20 1500  89 24 (!) 114/57 96 %   12/13/20 1400  93 15 (!) 136/58 99 %   12/13/20 1300  92 16 136/63 98 %   12/13/20 1200  86 13 116/64 97 %   12/13/20 1126 99.7 °F (37.6 °C) 92 15 115/61 93 %     Oxygen Therapy  O2 Sat (%): (!) 86 % (12/14/20 0800)  Pulse via Oximetry: 91 beats per minute (12/14/20 0800)  O2 Device: Hi flow nasal cannula (12/14/20 0721)  O2 Flow Rate (L/min): 4 l/min (12/14/20 0721)      Intake/Output Summary (Last 24 hours) at 12/14/2020 1124  Last data filed at 12/14/2020 0542  Gross per 24 hour   Intake    Output 800 ml   Net -800 ml       *Note that automatically entered I/Os may not be accurate; dependent on patient compliance with collection and accurate  by techs. General:    Obese, alert,awake, no overt distress, on nasal cannula,  HEENT atraumatic, normocephalic, PERRLA, EOMI, moist mucous membranes,  CV:   RRR. No murmur, rub, or gallop. Chest: Left sided chest tube in place,   Lungs:   Diminished breath sounds on the left lung, no wheezing, rhonchi, or rales. Abdomen:   Soft, nontender, nondistended. Extremities: Warm and dry. No cyanosis or edema. Skin:     No rashes or jaundice.    Neuro:  No gross focal deficits    Data Review:  I have reviewed all labs, meds, and studies from the last 24 hours:    Recent Results (from the past 24 hour(s))   CBC WITH AUTOMATED DIFF    Collection Time: 12/14/20  8:19 AM   Result Value Ref Range    WBC 9.2 4.3 - 11.1 K/uL    RBC 2.21 (L) 4.23 - 5.6 M/uL    HGB 6.6 (LL) 13.6 - 17.2 g/dL    HCT 21.1 (L) 41.1 - 50.3 %    MCV 95.5 79.6 - 97.8 FL    MCH 29.9 26.1 - 32.9 PG    MCHC 31.3 (L) 31.4 - 35.0 g/dL    RDW 15.1 (H) 11.9 - 14.6 %    PLATELET 347 300 - 965 K/uL    MPV 9.1 (L) 9.4 - 12.3 FL    ABSOLUTE NRBC 0.00 0.0 - 0.2 K/uL    DF AUTOMATED      NEUTROPHILS 87 (H) 43 - 78 %    LYMPHOCYTES 5 (L) 13 - 44 %    MONOCYTES 5 4.0 - 12.0 %    EOSINOPHILS 2 0.5 - 7.8 %    BASOPHILS 0 0.0 - 2.0 %    IMMATURE GRANULOCYTES 1 0.0 - 5.0 %    ABS. NEUTROPHILS 8.0 1.7 - 8.2 K/UL    ABS. LYMPHOCYTES 0.5 0.5 - 4.6 K/UL    ABS. MONOCYTES 0.5 0.1 - 1.3 K/UL    ABS. EOSINOPHILS 0.2 0.0 - 0.8 K/UL    ABS. BASOPHILS 0.0 0.0 - 0.2 K/UL    ABS. IMM. GRANS. 0.1 0.0 - 0.5 K/UL   METABOLIC PANEL, BASIC    Collection Time: 12/14/20  8:19 AM   Result Value Ref Range    Sodium 132 (L) 136 - 145 mmol/L    Potassium 4.3 3.5 - 5.1 mmol/L    Chloride 98 98 - 107 mmol/L    CO2 27 21 - 32 mmol/L    Anion gap 7 7 - 16 mmol/L    Glucose 140 (H) 65 - 100 mg/dL    BUN 35 (H) 8 - 23 MG/DL    Creatinine 1.87 (H) 0.8 - 1.5 MG/DL    GFR est AA 47 (L) >60 ml/min/1.73m2    GFR est non-AA 39 (L) >60 ml/min/1.73m2    Calcium 8.2 (L) 8.3 - 10.4 MG/DL   RBC, ALLOCATE    Collection Time: 12/14/20  8:45 AM   Result Value Ref Range    HISTORY CHECKED? Historical check performed    TYPE & SCREEN    Collection Time: 12/14/20  9:06 AM   Result Value Ref Range    Crossmatch Expiration 12/17/2020,4835     ABO/Rh(D) A POSITIVE     Antibody screen NEG     Unit number [id number]     Blood component type RC LRIR     Unit division 00     Status of unit ALLOCATED     Crossmatch result Compatible         All Micro Results     None          No results found for this visit on 12/10/20.     Current Meds:  Current Facility-Administered Medications   Medication Dose Route Frequency    predniSONE (DELTASONE) tablet 40 mg  40 mg Oral DAILY WITH BREAKFAST    famotidine (PEPCID) tablet 20 mg  20 mg Oral DAILY    0.9% sodium chloride infusion 250 mL  250 mL IntraVENous PRN    morphine injection 2 mg  2 mg IntraVENous Q3H PRN    lip protectant (BLISTEX) ointment 1 Each  1 Each Topical PRN    LORazepam (ATIVAN) tablet 1 mg  1 mg Oral Q6H PRN    0.9% sodium chloride infusion 250 mL  250 mL IntraVENous PRN    citalopram (CELEXA) tablet 40 mg  40 mg Oral QPM    oxyCODONE IR (ROXICODONE) tablet 5 mg  5 mg Oral Q4H PRN    sodium chloride (NS) flush 5-40 mL  5-40 mL IntraVENous Q8H    sodium chloride (NS) flush 5-40 mL  5-40 mL IntraVENous PRN    acetaminophen (TYLENOL) tablet 650 mg  650 mg Oral Q6H PRN    Or    acetaminophen (TYLENOL) suppository 650 mg  650 mg Rectal Q6H PRN    polyethylene glycol (MIRALAX) packet 17 g  17 g Oral DAILY PRN    promethazine (PHENERGAN) tablet 12.5 mg  12.5 mg Oral Q6H PRN    naloxone (NARCAN) injection 0.4 mg  0.4 mg IntraVENous EVERY 2 MINUTES AS NEEDED       Other Studies (last 24 hours):  Xr Chest Sngl V    Result Date: 12/14/2020   Portable view of the chest COMPARISON: December 12, 2020 CLINICAL HISTORY: Left chest tube. FINDINGS: There is stable left-sided chest tube. There is no visible pneumothorax. There is left lung base opacity. No pulmonary edema. Heart is enlarged. Mediastinal contour is within normal limits. IMPRESSION: 1. Stable left chest tube. No significant pneumothorax. 2. Stable left basilar opacity, likely combination of small effusion and atelectasis.       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 Intensify therapy             CKD (chronic kidney disease) ICD-10-CM: N18.9  ICD-9-CM: 585.9  1/10/2017 - Present Yes            Hospital course    Patient was admitted for enlarging left hemothorax with rib fractures. General surgery was consulted. CT on admission showed enlarging hemothorax with progressive rib fractures. Chest tube was placed at bedside on 12/11 by general surgery . Postprocedure chest x-ray showed he had a small apical pneumothorax which resolved on repeat chest x-ray on 12/12. He still has significant drainage from the chest tube. Postprocedure patient's respiratory status significantly improved. He has acute blood loss anemia for which he received 1 unit of packed red blood cells on 12/10. Hemoglobin dropped to 6.6 this morning 12/14 and ordered another unit of packed red blood cells. He has gout flareup for which he was started on prednisone. Other chronic medical problems which are stable includes hypertension, CKD stage III, squamous cell carcinoma of the epiglottis. Plan: This is a 78-year-old male with:     Enlarging left hemothorax with rib fractures s/p chest tube placement 12/11  General surgery on board. CT shows enlarging hemothorax with progressive fractures. Continuous pulse oximetry, pain control. Patient had chest tube placed on 12/11 by general surgery. Chest tube still with significant drainage of 550 mL in the last 24 hours. Chest x-ray repeated this morning showed stable left chest tube with no significant pneumothorax. Stable left basilar opacity likely small effusion and atelectasis.      Acute blood loss anemia  Patient received 1 unit of packed red blood cells on 12/10. Hemoglobin dropped to 6.6 this morning. Ordered another unit of packed red blood cells. Goal to keep hemoglobin above 7. Baseline hemoglobin of approximately 12 on 11/29. Chronic kidney disease stage III  Cr is 1.87 this am. (Baseline of approximately 1.5-1.8. Hypertension:  BP stable off of meds. Squamous cell carcinoma of the epiglottis    Gout flareup  Added prednisone 40 mg daily. Undiagnosed sleep apnea  Patient has significant desaturations during sleep. He will need urgent modified sleep study as outpatient within a week of discharge. DC planning/Dispo: Anticipate inpatient hospital stay for at least 24 to 48 hours until cleared by general surgery. Diet:  DIET CARDIAC  DVT ppx: SCDs    Current plan of care discussed with patient at bedside.     Signed:  Leno Cortez MD

## 2020-12-14 NOTE — ROUTINE PROCESS
Bedside and Verbal shift change report given to Regi Alexandra Rodriguez RN  (oncoming nurse) by Curt Gonzalez RN  (offgoing nurse). Report included the following information SBAR, Kardex, ED Summary, Procedure Summary, Intake/Output, Recent Results, Cardiac Rhythm NSR and Alarm Parameters .

## 2020-12-14 NOTE — PROGRESS NOTES
Problem: Falls - Risk of  Goal: *Absence of Falls  Description: Document Joselyn Rubin Fall Risk and appropriate interventions in the flowsheet. Outcome: Progressing Towards Goal  Note: Fall Risk Interventions:  Mobility Interventions: Bed/chair exit alarm, Assess mobility with egress test, Communicate number of staff needed for ambulation/transfer, OT consult for ADLs, Patient to call before getting OOB, PT Consult for mobility concerns, Strengthening exercises (ROM-active/passive)         Medication Interventions: Assess postural VS orthostatic hypotension, Bed/chair exit alarm, Evaluate medications/consider consulting pharmacy, Patient to call before getting OOB, Teach patient to arise slowly    Elimination Interventions: Bed/chair exit alarm, Call light in reach, Patient to call for help with toileting needs, Stay With Me (per policy), Toilet paper/wipes in reach, Urinal in reach              Problem: Patient Education: Go to Patient Education Activity  Goal: Patient/Family Education  Outcome: Progressing Towards Goal     Problem: Pain  Goal: *Control of Pain  Outcome: Progressing Towards Goal     Problem: Patient Education: Go to Patient Education Activity  Goal: Patient/Family Education  Outcome: Progressing Towards Goal     Problem:  Activity Intolerance  Goal: *Oxygen saturation during activity within specified parameters  Outcome: Progressing Towards Goal  Goal: *Able to remain out of bed as prescribed  Outcome: Progressing Towards Goal     Problem: Patient Education: Go to Patient Education Activity  Goal: Patient/Family Education  Outcome: Progressing Towards Goal

## 2020-12-14 NOTE — PROGRESS NOTES
Bedside and Verbal shift change report given to ION Rojas  (oncoming nurse) by Amol Alvarado RN  (offgoing nurse). Report included the following information SBAR, Kardex, ED Summary, Procedure Summary, Intake/Output, Recent Results, Cardiac Rhythm NSR and Alarm Parameters .

## 2020-12-15 LAB
ABO + RH BLD: NORMAL
ANION GAP SERPL CALC-SCNC: 6 MMOL/L (ref 7–16)
BASOPHILS # BLD: 0 K/UL (ref 0–0.2)
BASOPHILS NFR BLD: 0 % (ref 0–2)
BLD PROD TYP BPU: NORMAL
BLOOD GROUP ANTIBODIES SERPL: NORMAL
BPU ID: NORMAL
BUN SERPL-MCNC: 34 MG/DL (ref 8–23)
CALCIUM SERPL-MCNC: 8 MG/DL (ref 8.3–10.4)
CHLORIDE SERPL-SCNC: 98 MMOL/L (ref 98–107)
CO2 SERPL-SCNC: 28 MMOL/L (ref 21–32)
CREAT SERPL-MCNC: 1.71 MG/DL (ref 0.8–1.5)
CROSSMATCH RESULT,%XM: NORMAL
DIFFERENTIAL METHOD BLD: ABNORMAL
EOSINOPHIL # BLD: 0.1 K/UL (ref 0–0.8)
EOSINOPHIL NFR BLD: 1 % (ref 0.5–7.8)
ERYTHROCYTE [DISTWIDTH] IN BLOOD BY AUTOMATED COUNT: 14.4 % (ref 11.9–14.6)
GLUCOSE SERPL-MCNC: 180 MG/DL (ref 65–100)
HCT VFR BLD AUTO: 21.3 % (ref 41.1–50.3)
HGB BLD-MCNC: 7.2 G/DL (ref 13.6–17.2)
IMM GRANULOCYTES # BLD AUTO: 0 K/UL (ref 0–0.5)
IMM GRANULOCYTES NFR BLD AUTO: 0 % (ref 0–5)
LYMPHOCYTES # BLD: 0.4 K/UL (ref 0.5–4.6)
LYMPHOCYTES NFR BLD: 5 % (ref 13–44)
MCH RBC QN AUTO: 30 PG (ref 26.1–32.9)
MCHC RBC AUTO-ENTMCNC: 33.8 G/DL (ref 31.4–35)
MCV RBC AUTO: 88.8 FL (ref 79.6–97.8)
MONOCYTES # BLD: 0.5 K/UL (ref 0.1–1.3)
MONOCYTES NFR BLD: 7 % (ref 4–12)
NEUTS SEG # BLD: 6.4 K/UL (ref 1.7–8.2)
NEUTS SEG NFR BLD: 88 % (ref 43–78)
NRBC # BLD: 0 K/UL (ref 0–0.2)
PLATELET # BLD AUTO: 224 K/UL (ref 150–450)
PMV BLD AUTO: 9 FL (ref 9.4–12.3)
POTASSIUM SERPL-SCNC: 4.2 MMOL/L (ref 3.5–5.1)
RBC # BLD AUTO: 2.4 M/UL (ref 4.23–5.6)
SODIUM SERPL-SCNC: 132 MMOL/L (ref 136–145)
SPECIMEN EXP DATE BLD: NORMAL
STATUS OF UNIT,%ST: NORMAL
UNIT DIVISION, %UDIV: 0
WBC # BLD AUTO: 7.3 K/UL (ref 4.3–11.1)

## 2020-12-15 PROCEDURE — 74011250637 HC RX REV CODE- 250/637: Performed by: HOSPITALIST

## 2020-12-15 PROCEDURE — 85025 COMPLETE CBC W/AUTO DIFF WBC: CPT

## 2020-12-15 PROCEDURE — 97165 OT EVAL LOW COMPLEX 30 MIN: CPT

## 2020-12-15 PROCEDURE — 36415 COLL VENOUS BLD VENIPUNCTURE: CPT

## 2020-12-15 PROCEDURE — 99232 SBSQ HOSP IP/OBS MODERATE 35: CPT | Performed by: SURGERY

## 2020-12-15 PROCEDURE — 74011636637 HC RX REV CODE- 636/637: Performed by: HOSPITALIST

## 2020-12-15 PROCEDURE — 80048 BASIC METABOLIC PNL TOTAL CA: CPT

## 2020-12-15 PROCEDURE — 74011250637 HC RX REV CODE- 250/637: Performed by: INTERNAL MEDICINE

## 2020-12-15 PROCEDURE — 65610000001 HC ROOM ICU GENERAL

## 2020-12-15 PROCEDURE — 97161 PT EVAL LOW COMPLEX 20 MIN: CPT

## 2020-12-15 PROCEDURE — 77010033678 HC OXYGEN DAILY

## 2020-12-15 RX ORDER — SENNOSIDES 8.6 MG/1
2 TABLET ORAL 2 TIMES DAILY
Status: DISCONTINUED | OUTPATIENT
Start: 2020-12-15 | End: 2020-12-17 | Stop reason: HOSPADM

## 2020-12-15 RX ORDER — POLYETHYLENE GLYCOL 3350 17 G/17G
17 POWDER, FOR SOLUTION ORAL 2 TIMES DAILY
Status: DISCONTINUED | OUTPATIENT
Start: 2020-12-15 | End: 2020-12-17 | Stop reason: HOSPADM

## 2020-12-15 RX ADMIN — POLYETHYLENE GLYCOL 3350 17 G: 17 POWDER, FOR SOLUTION ORAL at 10:08

## 2020-12-15 RX ADMIN — FAMOTIDINE 20 MG: 20 TABLET ORAL at 08:07

## 2020-12-15 RX ADMIN — Medication 10 ML: at 05:34

## 2020-12-15 RX ADMIN — POLYETHYLENE GLYCOL 3350 17 G: 17 POWDER, FOR SOLUTION ORAL at 17:49

## 2020-12-15 RX ADMIN — CITALOPRAM HYDROBROMIDE 40 MG: 20 TABLET ORAL at 20:00

## 2020-12-15 RX ADMIN — OXYCODONE HYDROCHLORIDE 5 MG: 5 TABLET ORAL at 17:49

## 2020-12-15 RX ADMIN — PREDNISONE 40 MG: 20 TABLET ORAL at 08:07

## 2020-12-15 RX ADMIN — OXYCODONE HYDROCHLORIDE 5 MG: 5 TABLET ORAL at 21:54

## 2020-12-15 RX ADMIN — OXYCODONE HYDROCHLORIDE 5 MG: 5 TABLET ORAL at 05:38

## 2020-12-15 RX ADMIN — SENNOSIDES 17.2 MG: 8.6 TABLET, FILM COATED ORAL at 17:49

## 2020-12-15 RX ADMIN — SENNOSIDES 17.2 MG: 8.6 TABLET, FILM COATED ORAL at 10:07

## 2020-12-15 RX ADMIN — Medication 10 ML: at 14:45

## 2020-12-15 RX ADMIN — Medication 10 ML: at 21:35

## 2020-12-15 NOTE — PROGRESS NOTES
Hospitalist Note     Admit Date:  12/10/2020 12:33 PM   Name:  Angel Kam   Age:  60 y.o.  :  1955   MRN:  [id number]   PCP:  Todd Hernandez MD  Treatment Team: Attending Provider: Erik Morrissey MD; : Rozanne Holstein Consulting Provider: Mary Kay Price MD; Utilization Review: Monica Benites RN; Care Manager: Philip Pink RN; Care Manager: Ray Montes De Oca RN; Primary Nurse: Forest Martinez RN; Primary Nurse: Fernanda Back RN    HPI/Subjective:   Mr. Diane Valdes is a nice 59 y/o WM with a h/o SCC of the epiglottis who initially presented here on  with left sided chest pain. No trauma. Had rib fractures. Hb was 12. Was discharged home on 12/3, Hb 8.4. Completed abx. ISI resolved. Came back to ER , CT c/a/p with rib fractures and some blood in abdominal wall, he was discharged home. Back again on 12/10 with progressive pain. CT chest with enlarging hemothorax. Surgery consulted. Hospitalist admitted, originally with plans to transfer to [facility], but kept at  due to capacity issues. Hb 6.7, transfused. Chest tube placed .    12/15: Feeling better, O2 98% on 2L. Pain controlled. Chest tube output slowing, still bloody. Hb 7.2. No BM since he's been here, not passing gas, though no N/V and tolerating his appetite. Has not ambulated since admission either.     No other complaints  Objective:     Patient Vitals for the past 24 hrs:   Temp Pulse Resp BP SpO2   12/15/20 0800  93 22 (!) 153/72 98 %   12/15/20 0726     99 %   12/15/20 0719 98.1 °F (36.7 °C) 91 13 (!) 144/78 100 %   12/15/20 0600  88 13 126/61 99 %   12/15/20 0400 97.7 °F (36.5 °C) 98 26 138/74 99 %   12/15/20 0200  90 14 120/69 99 %   12/15/20 0000 97.9 °F (36.6 °C) 88 13 (!) 111/59 100 %   20 2300  92 12  99 %   20 2200  96 16 118/68 98 %   20 2103     99 %   20 2000 99.1 °F (37.3 °C) 96 15 118/64 98 %   20 1600  91 14 137/70 97 %   20 1555 98.4 °F (36.9 °C) 94 19 134/67 96 %   12/14/20 1400  93 19 134/67 (!) 85 %   12/14/20 1217 98.1 °F (36.7 °C) 97 26 (!) 102/58 96 %   12/14/20 1200  100 24 (!) 102/58 96 %   12/14/20 1156  93 15 (!) 117/54 99 %   12/14/20 1155 98.1 °F (36.7 °C) 94 14  98 %     Oxygen Therapy  O2 Sat (%): 98 % (12/15/20 0800)  Pulse via Oximetry: 79 beats per minute (12/15/20 0800)  O2 Device: Nasal cannula (12/15/20 0759)  O2 Flow Rate (L/min): 2 l/min (12/15/20 0759)    Estimated body mass index is 35.36 kg/m² as calculated from the following:    Height as of this encounter: 5' 11\" (1.803 m). Weight as of this encounter: 115 kg (253 lb 8.5 oz). Intake/Output Summary (Last 24 hours) at 12/15/2020 2573  Last data filed at 12/15/2020 0600  Gross per 24 hour   Intake 338 ml   Output 1460 ml   Net -1122 ml       *Note that automatically entered I/Os may not be accurate; dependent on patient compliance with collection and accurate  by techs. General:    Well nourished. No overt distress. Obese. NAD. CV:   RRR. No m/r/g. No edema. No JVD  Lungs:   Decreased left side, 2L NC O2, chest tube on left with blood in atrium. Abdomen:   Soft, nontender, nondistended. Extremities: Warm and dry. No cyanosis   Skin:     No rashes. No jaundice. Normal coloration. Large bruise left flank/abdomen. Neuro:  No gross focal deficits. Alert    Data Reviewed:  I have reviewed all labs, meds, and studies from the last 24 hours:  Recent Results (from the past 24 hour(s))   RBC, ALLOCATE    Collection Time: 12/14/20  8:45 AM   Result Value Ref Range    HISTORY CHECKED?  Historical check performed    TYPE & SCREEN    Collection Time: 12/14/20  9:06 AM   Result Value Ref Range    Crossmatch Expiration 12/17/2020,2359     ABO/Rh(D) A POSITIVE     Antibody screen NEG     Unit number [id number]     Blood component type  LRIR     Unit division 00     Status of unit TRANSFUSED     Crossmatch result Compatible    HGB & HCT Collection Time: 12/14/20  4:38 PM   Result Value Ref Range    HGB 7.8 (L) 13.6 - 17.2 g/dL    HCT 23.1 (L) 41.1 - 50.3 %   CBC WITH AUTOMATED DIFF    Collection Time: 12/15/20  3:45 AM   Result Value Ref Range    WBC 7.3 4.3 - 11.1 K/uL    RBC 2.40 (L) 4.23 - 5.6 M/uL    HGB 7.2 (L) 13.6 - 17.2 g/dL    HCT 21.3 (L) 41.1 - 50.3 %    MCV 88.8 79.6 - 97.8 FL    MCH 30.0 26.1 - 32.9 PG    MCHC 33.8 31.4 - 35.0 g/dL    RDW 14.4 11.9 - 14.6 %    PLATELET 871 406 - 260 K/uL    MPV 9.0 (L) 9.4 - 12.3 FL    ABSOLUTE NRBC 0.00 0.0 - 0.2 K/uL    DF AUTOMATED      NEUTROPHILS 88 (H) 43 - 78 %    LYMPHOCYTES 5 (L) 13 - 44 %    MONOCYTES 7 4.0 - 12.0 %    EOSINOPHILS 1 0.5 - 7.8 %    BASOPHILS 0 0.0 - 2.0 %    IMMATURE GRANULOCYTES 0 0.0 - 5.0 %    ABS. NEUTROPHILS 6.4 1.7 - 8.2 K/UL    ABS. LYMPHOCYTES 0.4 (L) 0.5 - 4.6 K/UL    ABS. MONOCYTES 0.5 0.1 - 1.3 K/UL    ABS. EOSINOPHILS 0.1 0.0 - 0.8 K/UL    ABS. BASOPHILS 0.0 0.0 - 0.2 K/UL    ABS. IMM.  GRANS. 0.0 0.0 - 0.5 K/UL   METABOLIC PANEL, BASIC    Collection Time: 12/15/20  3:45 AM   Result Value Ref Range    Sodium 132 (L) 136 - 145 mmol/L    Potassium 4.2 3.5 - 5.1 mmol/L    Chloride 98 98 - 107 mmol/L    CO2 28 21 - 32 mmol/L    Anion gap 6 (L) 7 - 16 mmol/L    Glucose 180 (H) 65 - 100 mg/dL    BUN 34 (H) 8 - 23 MG/DL    Creatinine 1.71 (H) 0.8 - 1.5 MG/DL    GFR est AA 52 (L) >60 ml/min/1.73m2    GFR est non-AA 43 (L) >60 ml/min/1.73m2    Calcium 8.0 (L) 8.3 - 10.4 MG/DL       Current Meds:  Current Facility-Administered Medications   Medication Dose Route Frequency    polyethylene glycol (MIRALAX) packet 17 g  17 g Oral BID    senna (SENOKOT) tablet 17.2 mg  2 Tab Oral BID    predniSONE (DELTASONE) tablet 40 mg  40 mg Oral DAILY WITH BREAKFAST    famotidine (PEPCID) tablet 20 mg  20 mg Oral DAILY    0.9% sodium chloride infusion 250 mL  250 mL IntraVENous PRN    morphine injection 2 mg  2 mg IntraVENous Q3H PRN    lip protectant (BLISTEX) ointment 1 Each  1 Each Topical PRN    LORazepam (ATIVAN) tablet 1 mg  1 mg Oral Q6H PRN    0.9% sodium chloride infusion 250 mL  250 mL IntraVENous PRN    citalopram (CELEXA) tablet 40 mg  40 mg Oral QPM    oxyCODONE IR (ROXICODONE) tablet 5 mg  5 mg Oral Q4H PRN    sodium chloride (NS) flush 5-40 mL  5-40 mL IntraVENous Q8H    sodium chloride (NS) flush 5-40 mL  5-40 mL IntraVENous PRN    acetaminophen (TYLENOL) tablet 650 mg  650 mg Oral Q6H PRN    Or    acetaminophen (TYLENOL) suppository 650 mg  650 mg Rectal Q6H PRN    polyethylene glycol (MIRALAX) packet 17 g  17 g Oral DAILY PRN    promethazine (PHENERGAN) tablet 12.5 mg  12.5 mg Oral Q6H PRN    naloxone (NARCAN) injection 0.4 mg  0.4 mg IntraVENous EVERY 2 MINUTES AS NEEDED       Other Studies:  No results found for this visit on 12/10/20. No results found.     All Micro Results     None          SARS-CoV-2 Lab Results  \"Novel Coronavirus\" Test: No results found for: COV2NT   \"Emergent Disease\" Test: No results found for: EDPR  \"SARS-COV-2\" Test: No results found for: XGCOVT  Rapid Test: No results found for: COVR         Assessment and Plan:     Hospital Problems as of 12/15/2020 Date Reviewed: 11/18/2020          Codes Class Noted - Resolved POA    * (Principal) Hemothorax on left ICD-10-CM: J94.2  ICD-9-CM: 511.89  12/10/2020 - Present Yes        Acute blood loss anemia ICD-10-CM: D62  ICD-9-CM: 285.1  12/10/2020 - Present Yes        Hemothorax ICD-10-CM: J94.2  ICD-9-CM: 511.89  12/10/2020 - Present Unknown        Rib fracture ICD-10-CM: S22.39XA  ICD-9-CM: 807.00  11/29/2020 - Present Yes        Tracheal stenosis ICD-10-CM: J39.8  ICD-9-CM: 519.19  4/1/2020 - Present Yes        Malignant neoplasm of larynx ([facility] [address] ICD-10-CM: C32.9  ICD-9-CM: 161.9  12/18/2018 - Present Yes        Tonsil cancer ([facility] [address] ICD-10-CM: C09.9  ICD-9-CM: 146.0  4/27/2017 - Present Yes        HTN (hypertension) ICD-10-CM: I10  ICD-9-CM: 401.9  1/24/2017 - Present Yes    Overview Signed 3/4/2020 12:26 PM by Silvana Rodriguez CMA     Last Assessment & Plan:   Inadequate control. Intensify therapy             CKD (chronic kidney disease) ICD-10-CM: N18.9  ICD-9-CM: 585.9  1/10/2017 - Present Yes              Plan:  # Left sided hemothorax   - With rib fractures, unclear etiology. Acute anemia on admission requiring transfusion and chest tube placement on 12/11. Surgery managing chest tube. # Constipation   - Start bowel regimen, increase mobility. Therapy consults. # Acute blood loss anemia   - Hb stable but down from yesterday. # CKD3   - Stable    # HTN   - Off meds, BPs ok. May need to resume Norvasc if persistently elevated. # MDD/anxiety   - Lexapro    # Suspected CORETTA   - Outpatient sleep study    DC planning/Dispo: Pending. Diet:  DIET CARDIAC  DVT ppx: SCDs only. Other listed chronic conditions stable, cont current management.     Signed:  Reena Nagel MD

## 2020-12-15 NOTE — PROGRESS NOTES
PLAN:  Care management per primary  Continue CT to suction    ASSESSMENT:  Admit Date: 12/10/2020     Principal Problem:    Hemothorax on left (12/10/2020)    Active Problems:    CKD (chronic kidney disease) (1/10/2017)      HTN (hypertension) (1/24/2017)      Overview: Last Assessment & Plan:       Inadequate control. Intensify therapy      Tonsil cancer ([facility] [address] (4/27/2017)      Malignant neoplasm of larynx ([facility] [address] (12/18/2018)      Tracheal stenosis (4/1/2020)      Rib fracture (11/29/2020)      Acute blood loss anemia (12/10/2020)      Hemothorax (12/10/2020)         SUBJECTIVE:  12/14/20 Awake in bed, complains of foot pain. Tachy in the 100s. CT with 550mL/24h serosang. output. 12/15/20 Awake in bed, no complaints. AF VSS. CT with 335mL/24h serosang. output. Intake/Output Summary (Last 24 hours) at 12/15/2020 0806  Last data filed at 12/15/2020 0600  Gross per 24 hour   Intake 338 ml   Output 1460 ml   Net -1122 ml     OBJECTIVE:  Constitutional: Alert oriented cooperative patient in no acute distress; appears stated age   Visit Vitals  /61   Pulse 88   Temp 97.7 °F (36.5 °C)   Resp 13   Ht 5' 11\" (1.803 m)   Wt 253 lb 8.5 oz (115 kg)   SpO2 99%   BMI 35.36 kg/m²     Eyes:Sclera are clear. ENMT: no external lesions gross hearing normal; no obvious neck masses, no ear or lip lesions  CV: RRR. Normal perfusion  Resp: No JVD. Breathing is  non-labored; no audible wheezing. Left CT to suction; no air leak, dressing c/d/i   GI: soft and non-distended     Musculoskeletal: unremarkable with normal function. No embolic signs or cyanosis.    Neuro:  Oriented; moves all 4; no focal deficits  Psychiatric: normal affect and mood, no memory impairment      Patient Vitals for the past 24 hrs:   BP Temp Pulse Resp SpO2 Weight   12/15/20 0726     99 %    12/15/20 0600 126/61  88 13 99 %    12/15/20 0400 138/74 97.7 °F (36.5 °C) 98 26 99 %    12/15/20 0200 120/69  90 14 99 %    12/15/20 0000 (!) 111/59 97.9 °F (36.6 °C) 88 13 100 %    12/14/20 2300   92 12 99 %    12/14/20 2200 118/68  96 16 98 %    12/14/20 2103     99 %    12/14/20 2000 118/64 99.1 °F (37.3 °C) 96 15 98 %    12/14/20 1953      253 lb 8.5 oz (115 kg)   12/14/20 1600 137/70  91 14 97 %    12/14/20 1555 134/67 98.4 °F (36.9 °C) 94 19 96 %    12/14/20 1400 134/67  93 19 (!) 85 %    12/14/20 1217 (!) 102/58 98.1 °F (36.7 °C) 97 26 96 %    12/14/20 1200 (!) 102/58  100 24 96 %    12/14/20 1156 (!) 117/54  93 15 99 %    12/14/20 1155  98.1 °F (36.7 °C) 94 14 98 %      Labs:    Recent Labs     12/15/20  0345   WBC 7.3   HGB 7.2*      *   K 4.2   CL 98   CO2 28   BUN 34*   CREA 1.71*   *       Signed:  Winsome Bernardo NP

## 2020-12-15 NOTE — PROGRESS NOTES
Problem: Mobility Impaired (Adult and Pediatric)  Goal: *Acute Goals and Plan of Care (Insert Text)  Outcome: Progressing Towards Goal  Note: STG:  (1.)Mr. Meek Scott will move from supine to sit and sit to supine  with MIN A of 1-2-CGA within 1-2 treatment day(s). (2.)Mr. Meek Scott will transfer from bed to chair and chair to bed with MINIMAL ASSIST-CGA using the least restrictive device within 1-2 treatment day(s). (3.)Mr. Meek Scott will ambulate with MINIMAL ASSIST-CGA for 10-55 feet with the least restrictive device within 1-2 treatment day(s). LTG:  (1.)Mr. Meek Scott will move from supine to sit and sit to supine  in bed with CONTACT GUARD ASSIST-INDEPENDENT within 3-10 treatment day(s). (2.)Mr. Meek Scott will transfer from bed to chair and chair to bed with CONTACT GUARD ASSIST-INDEPENDENT using the least restrictive device within 3-10 treatment day(s). (3.)Mr. Meek Scott will ambulate with CONTACT GUARD ASSIST-INDEPENDENT for  feet with the least restrictive device within 3-10 treatment day(s). ________________________________________________________________________________________________      PHYSICAL THERAPY: Initial Assessment 12/15/2020  INPATIENT: PT Visit Days : 1  Payor: Melville Cranker / [place] BLUE [place]SSENTIAL[place] LAM / Product Type: LAM /       NAME/AGE/GENDER: Khadra Harman is a 60 y.o. male   PRIMARY DIAGNOSIS: Hemothorax [J94.2]  Acute blood loss anemia [D62]  Hemothorax on left [J94.2]  Acute blood loss anemia [D62] Hemothorax on left Hemothorax on left        ICD-10: Treatment Diagnosis:    Difficulty in walking, Not elsewhere classified (R26.2)  Other abnormalities of gait and mobility (R26.89)   Precaution/Allergies:  Zofran Iraida [place] hcl (pf)]      ASSESSMENT:     Mr. Meek Scott presents with decreased independent with functional mobility. Pt performed supine to sit to stand. Pt took steps to bedside chair. Pt in bedside chair with needs in reach.     This section established at most recent assessment   PROBLEM LIST (Impairments causing functional limitations):  Decreased Strength  Decreased Transfer Abilities  Decreased Ambulation Ability/Technique  Decreased Balance  Increased Pain  Decreased Activity Tolerance  Decreased Flexibility/Joint Mobility   INTERVENTIONS PLANNED: (Benefits and precautions of physical therapy have been discussed with the patient.)  Bed Mobility  Gait Training  Therapeutic Activites  Therapeutic Exercise/Strengthening  Transfer Training     TREATMENT PLAN: Frequency/Duration: daily for duration of hospital stay  Rehabilitation Potential For Stated Goals: Good     REHAB RECOMMENDATIONS (at time of discharge pending progress):    Placement: It is my opinion, based on this patient's performance to date, that Mr. Kareem Alfredo may benefit from participating in 1-2 additional therapy sessions in order to continue to assess for rehab potential and then make recommendation for disposition at discharge. Equipment:   None at this time              HISTORY:   History of Present Injury/Illness (Reason for Referral):  Pt admitted with above diagnosis. Past Medical History/Comorbidities:   Mr. Kareem Alfredo  has a past medical history of Anxiety, GERD (gastroesophageal reflux disease), Gout, Hypertension, Nausea & vomiting, Obesity, Squamous cell carcinoma of epiglottis ([facility] [address] (11/23/2016), and Type 2 diabetes mellitus ([facility] [address] He also has no past medical history of Malignant hyperthermia due to anesthesia or Pseudocholinesterase deficiency. Mr. Kareem Alfredo  has a past surgical history that includes hx colonoscopy (2016); hx tracheostomy; hx vascular access; hx other surgical (Left, age 12); hx other surgical (01/2017); hx heent (12/2017); hx heent (10/01/2018); hx heent (12/03/2018); hx heent (02/24/2020); and hx heent (06/15/2020).   Social History/Living Environment:   Home Environment: Private residence  One/Two Story Residence: One story  Living Alone: Yes  Support Systems: Family member(s)  Patient Expects to be Discharged to[de-identified] Private residence  Current DME Used/Available at Home: None  Prior Level of Function/Work/Activity:  Pt is independent with ambulation. Number of Personal Factors/Comorbidities that affect the Plan of Care: 0: LOW COMPLEXITY   EXAMINATION:   Most Recent Physical Functioning:   Gross Assessment:  AROM: Generally decreased, functional  Strength: Generally decreased, functional  Coordination: Generally decreased, functional               Posture:  Posture (WDL): Within defined limits  Balance:  Sitting: Intact  Standing: Intact Bed Mobility:  Supine to Sit: Minimum assistance  Sit to Supine: Minimum assistance  Wheelchair Mobility:     Transfers:  Sit to Stand: Minimum assistance;Assist x2  Stand to Sit: Minimum assistance;Assist x2  Gait:     Gait Abnormalities: Antalgic  Distance (ft): 10 Feet (ft)  Assistive Device: Walker, rolling  Ambulation - Level of Assistance: Minimal assistance;Assist x2      Body Structures Involved:  Bones  Joints  Muscles  Ligaments Body Functions Affected:  Neuromusculoskeletal  Movement Related Activities and Participation Affected: Mobility   Number of elements that affect the Plan of Care: 4+: HIGH COMPLEXITY   CLINICAL PRESENTATION:   Presentation: Stable and uncomplicated: LOW COMPLEXITY   CLINICAL DECISION MAKIN[address] [place] [address] AM-PAC 6 Clicks   Basic Mobility Inpatient Short Form  How much difficulty does the patient currently have. .. Unable A Lot A Little None   1. Turning over in bed (including adjusting bedclothes, sheets and blankets)? [] 1   [] 2   [x] 3   [] 4   2. Sitting down on and standing up from a chair with arms ( e.g., wheelchair, bedside commode, etc.)   [] 1   [] 2   [x] 3   [] 4   3. Moving from lying on back to sitting on the side of the bed? [] 1   [] 2   [x] 3   [] 4   How much help from another person does the patient currently need. .. Total A Lot A Little None   4.   Moving to and from a bed to a chair (including a wheelchair)? [] 1   [] 2   [x] 3   [] 4   5. Need to walk in hospital room? [] 1   [] 2   [x] 3   [] 4   6. Climbing 3-5 steps with a railing? [] 1   [] 2   [x] 3   [] 4   © 2007, Trustees of [address] [place] [address] [address] [address], under license to [employer]. All rights reserved      Score:  Initial: 18 Most Recent: X (Date: -- )    Interpretation of Tool:  Represents activities that are increasingly more difficult (i.e. Bed mobility, Transfers, Gait). Medical Necessity:     Skilled intervention continues to be required due to decreased mobility ability. Reason for Services/Other Comments:  Patient continues to require skilled intervention due to   Decreased mobility ability. .   Use of outcome tool(s) and clinical judgement create a POC that gives a: Clear prediction of patient's progress: LOW COMPLEXITY            TREATMENT:   (In addition to Assessment/Re-Assessment sessions the following treatments were rendered)   Pre-treatment Symptoms/Complaints:    Pain: Initial:      Post Session:  no complaints     Assessment/Reassessment only, no treatment provided today    Braces/Orthotics/Lines/Etc:   O2 Device: Nasal cannula  Treatment/Session Assessment:    Response to Treatment:  pt agreeable to ambulate with therapy. Interdisciplinary Collaboration:   Physical Therapist  Occupational Therapist  Registered Nurse  After treatment position/precautions:   Up in chair  Bed/Chair-wheels locked  Bed in low position  Call light within reach  RN notified   Compliance with Program/Exercises: Compliant most of the time, Will assess as treatment progresses  Recommendations/Intent for next treatment session: \"Next visit will focus on advancements to more challenging activities and reduction in assistance provided\".   Total Treatment Duration:  PT Patient Time In/Time Out  Time In: 1505  Time Out: [facility],

## 2020-12-15 NOTE — PROGRESS NOTES
Problem: Self Care Deficits Care Plan (Adult)  Goal: *Acute Goals and Plan of Care (Insert Text)  Description: Patient will complete lower body dressing with supervision to increase self care independence. Patient will complete toileting with supervision to increase self care independence. Patient will tolerate 40 minutes of OT treatment with self incorporated rest breaks to increase activity tolerance to enhance participation in hobbies. Patient will complete all functional transfers with supervision using adaptive equipment as needed. Timeframe: 7 visits       OCCUPATIONAL THERAPY: Initial Assessment and Daily Note 12/15/2020  INPATIENT: OT Visit Days: 1  Payor: BLUE CROSS / [place]UE [place]ENTIAL[place] LAM / Product Type: LAM /      NAME/AGE/GENDER: Jamey Peterson is a 60 y.o. male   PRIMARY DIAGNOSIS:  Hemothorax [J94.2]  Acute blood loss anemia [D62]  Hemothorax on left [J94.2]  Acute blood loss anemia [D62] Hemothorax on left Hemothorax on left        ICD-10: Treatment Diagnosis:    Generalized Muscle Weakness (M62.81)  Other lack of cordination (R27.8)  Difficulty in walking, Not elsewhere classified (R26.2)   Precautions/Allergies:     Lei Lamas hcl (pf)]      ASSESSMENT:     Mr. Thomas Lynn presents in ICU with hemathorax and chest tube to suction. Rib fx 4 weeks ago. Normally independent with ADL's and mobility. Active in community. Lives with spouse. Currently, min assist for ADL's and mobility. Some gout pain in left foot. Lines in ICU made mobility and ADL's difficult. Patient appears they will recovery quickly.    Initiate OT POC    This section established at most recent assessment   PROBLEM LIST (Impairments causing functional limitations):  Decreased Strength  Decreased ADL/Functional Activities  Decreased Balance   INTERVENTIONS PLANNED: (Benefits and precautions of occupational therapy have been discussed with the patient.)  Activities of daily living training  Neuromuscular re-eduation  Therapeutic activity  Therapeutic exercise     TREATMENT PLAN: Frequency/Duration: Follow patient 1-2tx to address above goals. Rehabilitation Potential For Stated Goals: Good     REHAB RECOMMENDATIONS (at time of discharge pending progress):    Placement: It is my opinion, based on this patient's performance to date, that Mr. Lyna Dakins may benefit from participating in 1-2 additional therapy sessions in order to continue to assess for rehab potential and then make recommendation for disposition at discharge. Equipment:   None at this time              OCCUPATIONAL PROFILE AND HISTORY:   History of Present Injury/Illness (Reason for Referral):  See H&P  Past Medical History/Comorbidities:   Mr. Lyna Dakins  has a past medical history of Anxiety, GERD (gastroesophageal reflux disease), Gout, Hypertension, Nausea & vomiting, Obesity, Squamous cell carcinoma of epiglottis ([facility] [address] (11/23/2016), and Type 2 diabetes mellitus ([facility] [address] He also has no past medical history of Malignant hyperthermia due to anesthesia or Pseudocholinesterase deficiency. Mr. Lyna Dakins  has a past surgical history that includes hx colonoscopy (2016); hx tracheostomy; hx vascular access; hx other surgical (Left, age 12); hx other surgical (01/2017); hx heent (12/2017); hx heent (10/01/2018); hx heent (12/03/2018); hx heent (02/24/2020); and hx heent (06/15/2020).   Social History/Living Environment:   Home Environment: Private residence  # Steps to Enter: 0  One/Two Story Residence: One story  Living Alone: No  Support Systems: Spouse/Significant Other/Partner  Patient Expects to be Discharged to[de-identified] Private residence  Current DME Used/Available at Home: None  Prior Level of Function/Work/Activity:  Good, sitting up in recliner     Number of Personal Factors/Comorbidities that affect the Plan of Care: Brief history (0):  LOW COMPLEXITY   ASSESSMENT OF OCCUPATIONAL PERFORMANCE[de-identified]   Activities of Daily Living:   Basic ADLs (From Assessment) Complex ADLs (From Assessment)   Feeding: Independent  Oral Facial Hygiene/Grooming: Setup  Bathing: Minimum assistance  Upper Body Dressing: Setup  Lower Body Dressing: Minimum assistance  Toileting: Minimum assistance     Grooming/Bathing/Dressing Activities of Daily Living                             Bed/Mat Mobility  Supine to Sit: Minimum assistance  Sit to Supine: Minimum assistance  Sit to Stand: Minimum assistance;Assist x2  Stand to Sit: Minimum assistance;Assist x2  Bed to Chair: Minimum assistance;Assist x2     Most Recent Physical Functioning:   Gross Assessment:                  Posture:  Posture (WDL): Within defined limits  Balance:  Sitting: Intact  Standing: Intact Bed Mobility:  Supine to Sit: Minimum assistance  Sit to Supine: Minimum assistance  Wheelchair Mobility:     Transfers:  Sit to Stand: Minimum assistance;Assist x2  Stand to Sit: Minimum assistance;Assist x2  Bed to Chair: Minimum assistance;Assist x2            Patient Vitals for the past 6 hrs:   BP BP Patient Position SpO2 Pulse   12/15/20 1102 (!) 145/70 At rest 94 % 95   12/15/20 1200 136/71 -- 93 % 89   12/15/20 1400 (!) 149/70 -- 93 % 88   12/15/20 1507 -- -- 93 % --   12/15/20 1547 121/77 -- -- --   12/15/20 1548 -- -- 90 % --   12/15/20 1549 -- -- 90 % --   12/15/20 1550 -- -- 90 % --   12/15/20 1551 -- -- 90 % --   12/15/20 1600 139/64 -- 90 % --       Mental Status  Neurologic State: Alert  Orientation Level: Oriented X4  Cognition: Appropriate decision making  Perception: Appears intact                          Physical Skills Involved:  Range of Motion  Balance  Strength  Activity Tolerance  Pain (acute) Cognitive Skills Affected (resulting in the inability to perform in a timely and safe manner):  WFL Psychosocial Skills Affected:  WFL   Number of elements that affect the Plan of Care: 1-3:  LOW COMPLEXITY   CLINICAL DECISION MAKIN [facility] [address] [address] AM-PAC 6 [facility]   Daily Activity Inpatient Short Form  How much help from another person does the patient currently need. .. Total A Lot A Little None   1. Putting on and taking off regular lower body clothing? [] 1   [x] 2   [] 3   [] 4   2. Bathing (including washing, rinsing, drying)? [] 1   [] 2   [x] 3   [] 4   3. Toileting, which includes using toilet, bedpan or urinal?   [] 1   [] 2   [x] 3   [] 4   4. Putting on and taking off regular upper body clothing? [] 1   [] 2   [x] 3   [] 4   5. Taking care of personal grooming such as brushing teeth? [] 1   [] 2   [x] 3   [] 4   6. Eating meals? [] 1   [] 2   [] 3   [x] 4   © 2007, Trustees of [address] [place] [address] [address] [address], under license to [email or web address]. All rights reserved      Score:  Initial: 18 Most Recent: X (Date: -- )    Interpretation of Tool:  Represents activities that are increasingly more difficult (i.e. Bed mobility, Transfers, Gait). Medical Necessity:     Skilled intervention continues to be required due to Deficits noted above. Reason for Services/Other Comments:  Patient continues to require skilled intervention due to   New DX  . Use of outcome tool(s) and clinical judgement create a POC that gives a: MODERATE COMPLEXITY         TREATMENT:   (In addition to Assessment/Re-Assessment sessions the following treatments were rendered)     Pre-treatment Symptoms/Complaints:    Pain: Initial:   Pain Intensity 1: 2  Post Session:  2     Assessment/Reassessment only, no treatment provided today    Braces/Orthotics/Lines/Etc:   O2 Device: Nasal cannula  Treatment/Session Assessment:    Response to Treatment:  Good, sitting up in recliner  Interdisciplinary Collaboration:   Physical Therapist  Occupational Therapist  After treatment position/precautions:   Up in chair  Bed in low position  Call light within reach   Compliance with Program/Exercises: Compliant all of the time, Will assess as treatment progresses. Recommendations/Intent for next treatment session:   \"Next visit will focus on advancements to more challenging activities and reduction in assistance provided\".   Total Treatment Duration:  OT Patient Time In/Time Out  Time In: 1450  Time Out: Sonali Nolen [address], OT

## 2020-12-16 LAB
ERYTHROCYTE [DISTWIDTH] IN BLOOD BY AUTOMATED COUNT: 14 % (ref 11.9–14.6)
HCT VFR BLD AUTO: 23.2 % (ref 41.1–50.3)
HGB BLD-MCNC: 7.9 G/DL (ref 13.6–17.2)
MCH RBC QN AUTO: 30.4 PG (ref 26.1–32.9)
MCHC RBC AUTO-ENTMCNC: 34.1 G/DL (ref 31.4–35)
MCV RBC AUTO: 89.2 FL (ref 79.6–97.8)
NRBC # BLD: 0 K/UL (ref 0–0.2)
PLATELET # BLD AUTO: 252 K/UL (ref 150–450)
PMV BLD AUTO: 8.9 FL (ref 9.4–12.3)
RBC # BLD AUTO: 2.6 M/UL (ref 4.23–5.6)
WBC # BLD AUTO: 7 K/UL (ref 4.3–11.1)

## 2020-12-16 PROCEDURE — 85027 COMPLETE CBC AUTOMATED: CPT

## 2020-12-16 PROCEDURE — 74011636637 HC RX REV CODE- 636/637: Performed by: HOSPITALIST

## 2020-12-16 PROCEDURE — 2709999900 HC NON-CHARGEABLE SUPPLY

## 2020-12-16 PROCEDURE — 94760 N-INVAS EAR/PLS OXIMETRY 1: CPT

## 2020-12-16 PROCEDURE — 36415 COLL VENOUS BLD VENIPUNCTURE: CPT

## 2020-12-16 PROCEDURE — 65270000029 HC RM PRIVATE

## 2020-12-16 PROCEDURE — 97530 THERAPEUTIC ACTIVITIES: CPT

## 2020-12-16 PROCEDURE — 77010033678 HC OXYGEN DAILY

## 2020-12-16 PROCEDURE — 74011250637 HC RX REV CODE- 250/637: Performed by: HOSPITALIST

## 2020-12-16 PROCEDURE — 74011250637 HC RX REV CODE- 250/637: Performed by: INTERNAL MEDICINE

## 2020-12-16 RX ORDER — AMLODIPINE BESYLATE 5 MG/1
5 TABLET ORAL
Status: DISCONTINUED | OUTPATIENT
Start: 2020-12-16 | End: 2020-12-17 | Stop reason: HOSPADM

## 2020-12-16 RX ADMIN — PREDNISONE 40 MG: 20 TABLET ORAL at 09:10

## 2020-12-16 RX ADMIN — POLYETHYLENE GLYCOL 3350 17 G: 17 POWDER, FOR SOLUTION ORAL at 09:00

## 2020-12-16 RX ADMIN — SENNOSIDES 17.2 MG: 8.6 TABLET, FILM COATED ORAL at 09:10

## 2020-12-16 RX ADMIN — CITALOPRAM HYDROBROMIDE 40 MG: 20 TABLET ORAL at 21:05

## 2020-12-16 RX ADMIN — Medication 10 ML: at 06:00

## 2020-12-16 RX ADMIN — Medication 10 ML: at 14:00

## 2020-12-16 RX ADMIN — AMLODIPINE BESYLATE 5 MG: 5 TABLET ORAL at 21:05

## 2020-12-16 RX ADMIN — Medication 10 ML: at 21:05

## 2020-12-16 RX ADMIN — FAMOTIDINE 20 MG: 20 TABLET ORAL at 09:10

## 2020-12-16 NOTE — PROGRESS NOTES
PLAN:  Care management per primary  Chest tube removed at the end of inspiration, patient tolerated  CXR in AM  Will sign off  Call if needed    ASSESSMENT:  Admit Date: 12/10/2020     Principal Problem:    Hemothorax on left (12/10/2020)    Active Problems:    CKD (chronic kidney disease) (1/10/2017)      HTN (hypertension) (1/24/2017)      Overview: Last Assessment & Plan:       Inadequate control. Intensify therapy      Tonsil cancer ([facility] [address] (4/27/2017)      Malignant neoplasm of larynx ([facility] [address] (12/18/2018)      Tracheal stenosis (4/1/2020)      Rib fracture (11/29/2020)      Acute blood loss anemia (12/10/2020)      Hemothorax (12/10/2020)         SUBJECTIVE:  12/14/20 Awake in bed, complains of foot pain. Tachy in the 100s. CT with 550mL/24h serosang. output. 12/15/20 Awake in bed, no complaints. AF VSS. CT with 335mL/24h serosang. output. 12/16/20 Awake in bed, no complaints. AF VSS. CT with 150mL/24h serosang. output. Intake/Output Summary (Last 24 hours) at 12/16/2020 1252  Last data filed at 12/16/2020 0717  Gross per 24 hour   Intake    Output 1575 ml   Net -1575 ml     OBJECTIVE:  Constitutional: Alert oriented cooperative patient in no acute distress; appears stated age   Visit Vitals  BP (!) 160/90   Pulse 81   Temp 97.4 °F (36.3 °C)   Resp 20   Ht 5' 11\" (1.803 m)   Wt 253 lb 8.5 oz (115 kg)   SpO2 93%   BMI 35.36 kg/m²     Eyes:Sclera are clear. ENMT: no external lesions gross hearing normal; no obvious neck masses, no ear or lip lesions  CV: RRR. Normal perfusion  Resp: No JVD. Breathing is  non-labored; no audible wheezing. Left CT to water seal; no air leak, dressing c/d/i   GI: soft and non-distended     Musculoskeletal: unremarkable with normal function. No embolic signs or cyanosis.    Neuro:  Oriented; moves all 4; no focal deficits  Psychiatric: normal affect and mood, no memory impairment      Patient Vitals for the past 24 hrs:   BP Temp Pulse Resp SpO2   12/16/20 1205 (!) 160/90 97.4 °F (36.3 °C) 81 20 93 %   12/16/20 1200 (!) 160/90  84 18 (!) 89 %   12/16/20 1100 (!) 153/81  80 15 90 %   12/16/20 1000 (!) 142/64  79 16 90 %   12/16/20 0913     92 %   12/16/20 0900 133/75  79 18 90 %   12/16/20 0800 (!) 155/76  81 19 91 %   12/16/20 0717 127/73 97.9 °F (36.6 °C) 81 22 93 %   12/16/20 0700 127/73  76 15 93 %   12/16/20 0600 132/82  75 29 92 %   12/16/20 0500 (!) 116/59  78 24 94 %   12/16/20 0400 126/76 97.7 °F (36.5 °C) 77 (!) 100 92 %   12/16/20 0300 135/74  77 14 99 %   12/16/20 0200 133/70  76 13 98 %   12/16/20 0001 (!) 151/80 97.9 °F (36.6 °C) 85 16 98 %   12/15/20 2301 (!) 140/78  86 15 96 %   12/15/20 2201 (!) 141/71  88 (!) 35 95 %   12/15/20 2101 (!) 146/69  87 18 96 %   12/15/20 2001 115/70 99.4 °F (37.4 °C) 88 15 95 %   12/15/20 1900 (!) 152/70  90 24 90 %   12/15/20 1822 (!) 148/74    92 %   12/15/20 1700 (!) 142/76    90 %   12/15/20 1600 139/64    90 %   12/15/20 1551     90 %   12/15/20 1550     90 %   12/15/20 1549     90 %   12/15/20 1548     90 %   12/15/20 1547 121/77       12/15/20 1507     93 %   12/15/20 1400 (!) 149/70  88 18 93 %     Labs:    Recent Labs     12/16/20  0339 12/15/20  0345   WBC 7.0 7.3   HGB 7.9* 7.2*    224   NA  --  132*   K  --  4.2   CL  --  98   CO2  --  28   BUN  --  34*   CREA  --  1.71*   GLU  --  180*       Signed:  Tania Blanton NP

## 2020-12-16 NOTE — PROGRESS NOTES
Bedside and Verbal shift change report given to Regi Rodriguez RN  (oncoming nurse) by Javy Villa RN  (offgoing nurse). Report included the following information SBAR, Kardex, ED Summary, Procedure Summary, Intake/Output, Recent Results, Cardiac Rhythm NSR and Alarm Parameters .

## 2020-12-16 NOTE — PROGRESS NOTES
Problem: Mobility Impaired (Adult and Pediatric)  Goal: *Acute Goals and Plan of Care (Insert Text)  Outcome: Progressing Towards Goal  Note:  GOALS MODIFIED FOR DISCHARGE 12/16/20 :  (1.)Mr. Ana Pal will move from supine to sit and sit to supine with supervision. (2.)Mr. Ana Pal will transfer from bed to chair and chair to bed with SBA & device PRN.  (3.)Mr. Ana Pal will ambulate with SBA & device PRN for 200 ft.  4) pt able to go up & down 5 steps with rail & CGA.  5) pt able to perform HEP with written guidelines. PHYSICAL THERAPY: Daily Note and AM 12/16/2020  INPATIENT: PT Visit Days : 2  Payor: BLUE CROSS / Plan[place] BLUE [employer]ENTIAL[employer] LAM / Product Type: LAM /       NAME/AGE/GENDER: Yunior Clifford is a 60 y.o. male   PRIMARY DIAGNOSIS: Hemothorax [J94.2]  Acute blood loss anemia [D62]  Hemothorax on left [J94.2]  Acute blood loss anemia [D62] Hemothorax on left Hemothorax on left       ICD-10: Treatment Diagnosis:    · Difficulty in walking, Not elsewhere classified (R26.2)  · Other abnormalities of gait and mobility (R26.89)   Precaution/Allergies:  Zofran Flako [place] hcl (pf)]      ASSESSMENT:     Mr. Ana Pal showed improved tolerance to progressive activity. Pt has a good DC plan to home with caregiver. Need to get pt on a HEP ASAP & encourage more out of bed activity to build strength & endurance. This section established at most recent assessment   PROBLEM LIST (Impairments causing functional limitations):  1. Decreased Strength  2. Decreased Transfer Abilities  3. Decreased Ambulation Ability/Technique  4. Decreased Balance  5. Increased Pain  6. Decreased Activity Tolerance  7. Decreased Flexibility/Joint Mobility   INTERVENTIONS PLANNED: (Benefits and precautions of physical therapy have been discussed with the patient.)  1. Bed Mobility  2. Gait Training  3. Therapeutic Activites  4. Therapeutic Exercise/Strengthening  5.  Transfer Training     TREATMENT PLAN: Frequency/Duration: daily for duration of hospital stay  Rehabilitation Potential For Stated Goals: Good     REHAB RECOMMENDATIONS (at time of discharge pending progress):    Placement: It is my opinion, based on this patient's performance to date, that Mr. Haim Carmona may benefit from participating in 1-2 additional therapy sessions in order to continue to assess for rehab potential and then make recommendation for disposition at discharge. Equipment:    None at this time              HISTORY:   History of Present Injury/Illness (Reason for Referral):  Pt admitted with above diagnosis. Past Medical History/Comorbidities:   Mr. Haim Carmona  has a past medical history of Anxiety, GERD (gastroesophageal reflux disease), Gout, Hypertension, Nausea & vomiting, Obesity, Squamous cell carcinoma of epiglottis ([facility] [address] (11/23/2016), and Type 2 diabetes mellitus ([facility] [address] He also has no past medical history of Malignant hyperthermia due to anesthesia or Pseudocholinesterase deficiency. Mr. Haim Carmona  has a past surgical history that includes hx colonoscopy (2016); hx tracheostomy; hx vascular access; hx other surgical (Left, age 12); hx other surgical (01/2017); hx heent (12/2017); hx heent (10/01/2018); hx heent (12/03/2018); hx heent (02/24/2020); and hx heent (06/15/2020). Social History/Living Environment:   Home Environment: Private residence  # Steps to Enter: 0  One/Two Story Residence: One story  Living Alone: No  Support Systems: Spouse/Significant Other/Partner  Patient Expects to be Discharged to[de-identified] Private residence  Current DME Used/Available at Home: None  Prior Level of Function/Work/Activity:  Pt is independent with ambulation. Number of Personal Factors/Comorbidities that affect the Plan of Care: 0: LOW COMPLEXITY   EXAMINATION:   Most Recent Physical Functioning:   Gross Assessment: 3-/5 throughout                       Balance:  Sitting: Intact; Without support  Standing: Impaired; With support(manual) Bed Mobility:  Supine to Sit: Contact guard assistance; Additional time  Sit to Supine: (NT)  Scooting: Contact guard assistance       Transfers:  Sit to Stand: Minimum assistance  Stand to Sit: Minimum assistance  Bed to Chair: Minimum assistance  Duration: 23 Minutes(extra time to work through activity noted)  Gait:     Step Length: Left shortened; Other (comment)  Gait Abnormalities: Decreased step clearance  Distance (ft): 25 Feet (ft)  Assistive Device: (none)  Ambulation - Level of Assistance: Minimal assistance      Body Structures Involved:  1. Bones  2. Joints  3. Muscles  4. Ligaments Body Functions Affected:  1. Neuromusculoskeletal  2. Movement Related Activities and Participation Affected:  1. Mobility   Number of elements that affect the Plan of Care: 4+: HIGH COMPLEXITY   CLINICAL PRESENTATION:   Presentation: Stable and uncomplicated: LOW COMPLEXITY   CLINICAL DECISION MAKING:   [facility] AM-PAC 6 Clicks   Basic Mobility Inpatient Short Form  How much difficulty does the patient currently have. .. Unable A Lot A Little None   1. Turning over in bed (including adjusting bedclothes, sheets and blankets)? [] 1   [] 2   [x] 3   [] 4   2. Sitting down on and standing up from a chair with arms ( e.g., wheelchair, bedside commode, etc.)   [] 1   [] 2   [x] 3   [] 4   3. Moving from lying on back to sitting on the side of the bed? [] 1   [] 2   [x] 3   [] 4   How much help from another person does the patient currently need. .. Total A Lot A Little None   4. Moving to and from a bed to a chair (including a wheelchair)? [] 1   [] 2   [x] 3   [] 4   5. Need to walk in hospital room? [] 1   [] 2   [x] 3   [] 4   6. Climbing 3-5 steps with a railing? [] 1   [] 2   [x] 3   [] 4   © 2007, Trustees of [facility], under license to [employer].  All rights reserved      Score:  Initial: 18 Most Recent: X (Date: -- )    Interpretation of Tool:  Represents activities that are increasingly more difficult (i.e. Bed mobility, Transfers, Gait). Medical Necessity:     · Skilled intervention continues to be required due to decreased mobility ability. Reason for Services/Other Comments:  · Patient continues to require skilled intervention due to   · Decreased mobility ability. · .   Use of outcome tool(s) and clinical judgement create a POC that gives a: Clear prediction of patient's progress: LOW COMPLEXITY            TREATMENT:   (In addition to Assessment/Re-Assessment sessions the following treatments were rendered)   Pre-treatment Symptoms/Complaints:  Pt eager to work with PT  Pain: Initial:   Pain Intensity 1: 0  Post Session:  no complaints     Therapeutic Activity: (  23 Minutes(extra time to work through activity noted) ):  Therapeutic activities including bed mobility, EOB UE AROM warm up exercises, transfers & also transfer to [facility], cool down exercises with AROM to LE's along with quad & gluteal isometrics to improve mobility, strength, balance, coordination and dynamic movement of arm - bilateral and leg - bilateral to improve functional stability & endurance. Braces/Orthotics/Lines/Etc:   · IV  · ICU monitors  Treatment/Session Assessment:    · Response to Treatment:  Easily SOB with minimal exertion  · Interdisciplinary Collaboration:   o Registered Nurse  · After treatment position/precautions:   o Up in chair  o Bed/Chair-wheels locked  o Call light within reach  o RN notified   · Compliance with Program/Exercises: Will assess as treatment progresses  · Recommendations/Intent for next treatment session: \"Next visit will focus on reduction in assistance provided\".   Total Treatment Duration:  PT Patient Time In/Time Out  Time In: 0907  Time Out: [address], PT

## 2020-12-16 NOTE — PROGRESS NOTES
Bedside and Verbal shift change report given to ION Mays  (oncoming nurse) by Usha Rodriguez RN (offgoing nurse). Report included the following information SBAR, Kardex, ED Summary, Procedure Summary, Intake/Output, Recent Results, Cardiac Rhythm NSR and Alarm Parameters .

## 2020-12-16 NOTE — PROGRESS NOTES
Hospitalist Note     Admit Date:  12/10/2020 12:33 PM   Name:  Ginger Valdivia   Age:  60 y.o.  :  1955   MRN:  [id number]   PCP:  Jennifer Barron MD  Treatment Team: Attending Provider: Jung Orellana MD; : Zurdo Jeronimo Consulting Provider: Quinn Meza MD; Utilization Review: Umesh Lu RN; Care Manager: Irasema Vargas RN; Care Manager: Britt Hung RN; Physical Therapist: Lili Officer, PT; Primary Nurse: Aminata Resendiz; Occupational Therapist: Alisa Barreto OT    HPI/Subjective:   Mr. Kenrick Anaya is a nice 59 y/o WM with a h/o SCC of the epiglottis who initially presented here on  with left sided chest pain. No trauma. Had rib fractures. Hb was 12. Was discharged home on 12/3, Hb 8.4. Completed abx. ISI resolved. Came back to ER , CT c/a/p with rib fractures and some blood in abdominal wall, he was discharged home. Back again on 12/10 with progressive pain. CT chest with enlarging hemothorax. Surgery consulted. Hospitalist admitted, originally with plans to transfer to [facility], but kept at  due to capacity issues. Hb 6.7, transfused. Chest tube placed .    : Up to chair. Passing gas and tolerating PO, though still no BM. On RA O2. Per RN no chest tube output overnight. Up with PT and was unsteady but did ok otherwise.     No other complaints  Objective:     Patient Vitals for the past 24 hrs:   Temp Pulse Resp BP SpO2   20 1000  79 16 (!) 142/64 90 %   20 0913     92 %   20 0900  79 18 133/75 90 %   20 0800  81 19 (!) 155/76 91 %   20 0717 97.9 °F (36.6 °C) 81 22 127/73 93 %   20 0700  76 15 127/73 93 %   20 0600  75 29 132/82 92 %   20 0500  78 24 (!) 116/59 94 %   20 0400 97.7 °F (36.5 °C) 77 (!) 100 126/76 92 %   20 0300  77 14 135/74 99 %   20 0200  76 13 133/70 98 %   20 0001 97.9 °F (36.6 °C) 85 16 (!) 151/80 98 %   12/15/20 2301  86 15 (!) 140/78 96 %   12/15/20 2201  88 (!) 35 (!) 141/71 95 %   12/15/20 2101  87 18 (!) 146/69 96 %   12/15/20 2001 99.4 °F (37.4 °C) 88 15 115/70 95 %   12/15/20 1900  90 24 (!) 152/70 90 %   12/15/20 1822    (!) 148/74 92 %   12/15/20 1700    (!) 142/76 90 %   12/15/20 1600    139/64 90 %   12/15/20 1551     90 %   12/15/20 1550     90 %   12/15/20 1549     90 %   12/15/20 1548     90 %   12/15/20 1547    121/77    12/15/20 1507     93 %   12/15/20 1400  88 18 (!) 149/70 93 %   12/15/20 1200  89 11 136/71 93 %     Oxygen Therapy  O2 Sat (%): 90 % (12/16/20 1000)  Pulse via Oximetry: 80 beats per minute (12/16/20 1000)  O2 Device: Room air (12/16/20 0913)  O2 Flow Rate (L/min): 0 l/min (12/16/20 0913)    Estimated body mass index is 35.36 kg/m² as calculated from the following:    Height as of this encounter: 5' 11\" (1.803 m). Weight as of this encounter: 115 kg (253 lb 8.5 oz). Intake/Output Summary (Last 24 hours) at 12/16/2020 1138  Last data filed at 12/16/2020 8881  Gross per 24 hour   Intake    Output 1575 ml   Net -1575 ml       *Note that automatically entered I/Os may not be accurate; dependent on patient compliance with collection and accurate  by [employer]. General:    Well nourished. No overt distress. Obese. CV:   RRR. No m/r/g. No edema. No JVD  Lungs:   Decreased left base, otherwise CTAB. No wheezing, rhonchi, or rales. Unlabored. RA O2. Chest tube on left side. Abdomen:   Soft, nontender, nondistended. Extremities: Warm and dry. No cyanosis   Skin:     No rashes. No jaundice. Normal coloration  Neuro:  No gross focal deficits.   Alert    Data Reviewed:  I have reviewed all labs, meds, and studies from the last 24 hours:  Recent Results (from the past 24 hour(s))   CBC W/O DIFF    Collection Time: 12/16/20  3:39 AM   Result Value Ref Range    WBC 7.0 4.3 - 11.1 K/uL    RBC 2.60 (L) 4.23 - 5.6 M/uL    HGB 7.9 (L) 13.6 - 17.2 g/dL    HCT 23.2 (L) 41.1 - 50.3 %    MCV 89.2 79.6 - 97.8 FL    MCH 30.4 26.1 - 32.9 PG    MCHC 34.1 31.4 - 35.0 g/dL    RDW 14.0 11.9 - 14.6 %    PLATELET 668 805 - 201 K/uL    MPV 8.9 (L) 9.4 - 12.3 FL    ABSOLUTE NRBC 0.00 0.0 - 0.2 K/uL       Current Meds:  Current Facility-Administered Medications   Medication Dose Route Frequency    amLODIPine (NORVASC) tablet 5 mg  5 mg Oral QHS    polyethylene glycol (MIRALAX) packet 17 g  17 g Oral BID    senna (SENOKOT) tablet 17.2 mg  2 Tab Oral BID    predniSONE (DELTASONE) tablet 40 mg  40 mg Oral DAILY WITH BREAKFAST    famotidine (PEPCID) tablet 20 mg  20 mg Oral DAILY    0.9% sodium chloride infusion 250 mL  250 mL IntraVENous PRN    morphine injection 2 mg  2 mg IntraVENous Q3H PRN    lip protectant (BLISTEX) ointment 1 Each  1 Each Topical PRN    LORazepam (ATIVAN) tablet 1 mg  1 mg Oral Q6H PRN    0.9% sodium chloride infusion 250 mL  250 mL IntraVENous PRN    citalopram (CELEXA) tablet 40 mg  40 mg Oral QPM    oxyCODONE IR (ROXICODONE) tablet 5 mg  5 mg Oral Q4H PRN    sodium chloride (NS) flush 5-40 mL  5-40 mL IntraVENous Q8H    sodium chloride (NS) flush 5-40 mL  5-40 mL IntraVENous PRN    acetaminophen (TYLENOL) tablet 650 mg  650 mg Oral Q6H PRN    Or    acetaminophen (TYLENOL) suppository 650 mg  650 mg Rectal Q6H PRN    polyethylene glycol (MIRALAX) packet 17 g  17 g Oral DAILY PRN    promethazine (PHENERGAN) tablet 12.5 mg  12.5 mg Oral Q6H PRN    naloxone (NARCAN) injection 0.4 mg  0.4 mg IntraVENous EVERY 2 MINUTES AS NEEDED       Other Studies:  No results found for this visit on 12/10/20. No results found.     All Micro Results     None          SARS-CoV-2 Lab Results  \"Novel Coronavirus\" Test: No results found for: COV2NT   \"Emergent Disease\" Test: No results found for: EDPR  \"SARS-COV-2\" Test: No results found for: XGCOVT  Rapid Test: No results found for: COVR         Assessment and Plan:     Hospital Problems as of 12/16/2020 Date Reviewed: 11/18/2020          Codes Class Noted - Resolved POA    * (Principal) Hemothorax on left ICD-10-CM: J94.2  ICD-9-CM: 511.89  12/10/2020 - Present Yes        Acute blood loss anemia ICD-10-CM: D62  ICD-9-CM: 285.1  12/10/2020 - Present Yes        Hemothorax ICD-10-CM: J94.2  ICD-9-CM: 511.89  12/10/2020 - Present Unknown        Rib fracture ICD-10-CM: S22.39XA  ICD-9-CM: 807.00  11/29/2020 - Present Yes        Tracheal stenosis ICD-10-CM: J39.8  ICD-9-CM: 519.19  4/1/2020 - Present Yes        Malignant neoplasm of larynx (HCC) ICD-10-CM: C32.9  ICD-9-CM: 161.9  12/18/2018 - Present Yes        Tonsil cancer ([address] ICD-10-CM: C09.9  ICD-9-CM: 146.0  4/27/2017 - Present Yes        HTN (hypertension) ICD-10-CM: I10  ICD-9-CM: 401.9  1/24/2017 - Present Yes    Overview Signed 3/4/2020 12:26 PM by Sloan Liu CMA     Last Assessment & Plan:   Inadequate control. Intensify therapy             CKD (chronic kidney disease) ICD-10-CM: N18.9  ICD-9-CM: 585.9  1/10/2017 - Present Yes              Plan:  # Left sided hemothorax              - With rib fractures, unclear etiology. Acute anemia on admission requiring transfusion and chest tube placement on 12/11. Chest tube output minimal overnight, general management per surgery.     # Constipation              - Con't bowel regimen and ambulation.     # Acute blood loss anemia              - Hb stable up.      # CKD3              - Stable     # HTN              - Resume Norvasc today.     # MDD/anxiety              - Lexapro     # Suspected CORETTA              - Outpatient sleep study    DC planning/Dispo: Home when able. Diet:  DIET CARDIAC  DVT ppx: SCDs only. Other listed chronic conditions stable, cont current management.     Signed:  Izzy Raymond MD

## 2020-12-16 NOTE — PROGRESS NOTES
Care Management Interventions  PCP Verified by CM: Yes  Palliative Care Criteria Met (RRAT>21 & CHF Dx)?: No(Dx Hemothorax Risk 21%)  Transition of Care Consult (CM Consult): Discharge Planning  Discharge Durable Medical Equipment: No  Physical Therapy Consult: No  Occupational Therapy Consult: No  Speech Therapy Consult: No  Current Support Network: Lives with Spouse  Discharge Location  Discharge Placement: Home with family assistance  Patient to transfer to medical floor when bed available. Chest tube was d/c today. Patient working with PT. Current d/c plan is home with family assistance. CM Following.

## 2020-12-17 ENCOUNTER — APPOINTMENT (OUTPATIENT)
Dept: GENERAL RADIOLOGY | Age: 65
DRG: 187 | End: 2020-12-17
Attending: NURSE PRACTITIONER
Payer: COMMERCIAL

## 2020-12-17 VITALS
TEMPERATURE: 98 F | HEART RATE: 94 BPM | RESPIRATION RATE: 18 BRPM | WEIGHT: 246.47 LBS | BODY MASS INDEX: 34.51 KG/M2 | OXYGEN SATURATION: 93 % | DIASTOLIC BLOOD PRESSURE: 78 MMHG | SYSTOLIC BLOOD PRESSURE: 162 MMHG | HEIGHT: 71 IN

## 2020-12-17 LAB
ERYTHROCYTE [DISTWIDTH] IN BLOOD BY AUTOMATED COUNT: 13.8 % (ref 11.9–14.6)
HCT VFR BLD AUTO: 24.1 % (ref 41.1–50.3)
HGB BLD-MCNC: 8 G/DL (ref 13.6–17.2)
MCH RBC QN AUTO: 29.3 PG (ref 26.1–32.9)
MCHC RBC AUTO-ENTMCNC: 33.2 G/DL (ref 31.4–35)
MCV RBC AUTO: 88.3 FL (ref 79.6–97.8)
NRBC # BLD: 0 K/UL (ref 0–0.2)
PLATELET # BLD AUTO: 272 K/UL (ref 150–450)
PMV BLD AUTO: 8.6 FL (ref 9.4–12.3)
RBC # BLD AUTO: 2.73 M/UL (ref 4.23–5.6)
WBC # BLD AUTO: 6 K/UL (ref 4.3–11.1)

## 2020-12-17 PROCEDURE — 74011250637 HC RX REV CODE- 250/637: Performed by: INTERNAL MEDICINE

## 2020-12-17 PROCEDURE — 74011250637 HC RX REV CODE- 250/637: Performed by: HOSPITALIST

## 2020-12-17 PROCEDURE — 36415 COLL VENOUS BLD VENIPUNCTURE: CPT

## 2020-12-17 PROCEDURE — 74011636637 HC RX REV CODE- 636/637: Performed by: HOSPITALIST

## 2020-12-17 PROCEDURE — 85027 COMPLETE CBC AUTOMATED: CPT

## 2020-12-17 PROCEDURE — 71045 X-RAY EXAM CHEST 1 VIEW: CPT

## 2020-12-17 PROCEDURE — 2709999900 HC NON-CHARGEABLE SUPPLY

## 2020-12-17 PROCEDURE — 94760 N-INVAS EAR/PLS OXIMETRY 1: CPT

## 2020-12-17 PROCEDURE — 94761 N-INVAS EAR/PLS OXIMETRY MLT: CPT

## 2020-12-17 RX ORDER — LISINOPRIL AND HYDROCHLOROTHIAZIDE 20; 25 MG/1; MG/1
1 TABLET ORAL DAILY
Status: DISCONTINUED | OUTPATIENT
Start: 2020-12-17 | End: 2020-12-17 | Stop reason: HOSPADM

## 2020-12-17 RX ORDER — OXYCODONE HYDROCHLORIDE 5 MG/1
5 TABLET ORAL
Qty: 18 TAB | Refills: 0 | Status: SHIPPED | OUTPATIENT
Start: 2020-12-17 | End: 2020-12-20

## 2020-12-17 RX ADMIN — Medication 10 ML: at 13:29

## 2020-12-17 RX ADMIN — Medication 10 ML: at 05:50

## 2020-12-17 RX ADMIN — FAMOTIDINE 20 MG: 20 TABLET ORAL at 09:20

## 2020-12-17 RX ADMIN — LISINOPRIL AND HYDROCHLOROTHIAZIDE 1 TABLET: 25; 20 TABLET ORAL at 13:29

## 2020-12-17 RX ADMIN — OXYCODONE HYDROCHLORIDE 5 MG: 5 TABLET ORAL at 15:12

## 2020-12-17 RX ADMIN — PREDNISONE 40 MG: 20 TABLET ORAL at 08:02

## 2020-12-17 RX ADMIN — Medication 1 EACH: at 15:13

## 2020-12-17 NOTE — PROGRESS NOTES
I have reviewed discharge instructions with the patient and spouse. The patient and spouse verbalized understanding. Patient left ED via Discharge Method: wheelchair to Home with wife. Opportunity for questions and clarification provided. Patient given 2 scripts. To continue your aftercare when you leave the hospital, you may receive an automated call from our care team to check in on how you are doing. This is a free service and part of our promise to provide the best care and service to meet your aftercare needs.  If you have questions, or wish to unsubscribe from this service please call [phone]. Thank you for Choosing our [facility].

## 2020-12-17 NOTE — PROGRESS NOTES
Care Management Interventions  PCP Verified by CM: Yes  Palliative Care Criteria Met (RRAT>21 & CHF Dx)?: No(Dx Hemothorax Risk 21%)  Transition of Care Consult (CM Consult): Discharge Planning  Discharge Durable Medical Equipment: No  Physical Therapy Consult: No  Occupational Therapy Consult: No  Speech Therapy Consult: No  Current Support Network: Lives with Spouse  Discharge Location  Discharge Placement: Home with family assistance    Sw reviewed chart and talked with Md this am. RT evaluated pt and he does not need home 02. Pt did not desat at rest or with ambulation. Pt worked well with PT and is being discharged today. No new needs iden.  Caro Schroeder

## 2020-12-17 NOTE — PROGRESS NOTES
PT note:    Pt sitting up in chair on arrival. He just walked with respiratory and does not want to walk right now. He states his wife is going to bring shoes since his ankles are hurting. Later in day, wife still has not brought shoes. Will check back on pt later as schedule allows.     Jina Mathis, PTA

## 2020-12-17 NOTE — PROGRESS NOTES
Hospitalist Note     Admit Date:  12/10/2020 12:33 PM   Name:  Marlen Cervantes   Age:  60 y.o.  :  1955   MRN:  [id number]   PCP:  Fabien Arora MD  Treatment Team: Attending Provider: Lorenza Harrison MD; : Stacey Lafleur; Utilization Review: Keyla Kidd RN; Care Manager: Reva Bangura, RN; Care Manager: Nayely Tang, RN; Occupational Therapist: Cani Magallanes OT; Physical Therapy Assistant: Barbara Wolff; Primary Nurse: Guido Caraballo RN    HPI/Subjective:   Mr. Chelo Graves is a nice 61 y/o WM with a h/o SCC of the epiglottis who initially presented here on  with left sided chest pain. No trauma. Had rib fractures. Hb was 12. Was discharged home on 12/3, Hb 8.4. Completed abx. ISI resolved. Came back to ER , CT c/a/p with rib fractures and some blood in abdominal wall, he was discharged home. Back again on 12/10 with progressive pain. CT chest with enlarging hemothorax. Surgery consulted. Hospitalist admitted, originally with plans to transfer to [facility], but kept at  due to capacity issues. Hb 6.7, transfused. Chest tube placed .    : Up to chair. CT out yesterday. RA O2. Walk test pending. CXR stable.     No other complaints  Objective:     Patient Vitals for the past 24 hrs:   Temp Pulse Resp BP SpO2   20 0823     95 %   20 0740  88 19 (!) 170/86 91 %   20 0601  84 17 (!) 154/79 92 %   20 0340 98.3 °F (36.8 °C) 83 23 (!) 153/77 90 %   20 0010 98.4 °F (36.9 °C)       20 2340  86 16 (!) 144/83 94 %   20 2105  85 18 (!) 145/80 94 %   20 1941 98.2 °F (36.8 °C) 86 18 (!) 145/80 93 %   20 1534 97.5 °F (36.4 °C) 91 18 136/66 90 %   20 1500  86 18 136/66 90 %   20 1400  85 16 138/77 91 %   20 1300  90 19 (!) 168/81 (!) 89 %   20 1205 97.4 °F (36.3 °C) 81 20 (!) 160/90 93 %   20 1200  84 18 (!) 160/90 (!) 89 %   20 1100  80 15 (!) 153/81 90 %     Oxygen Therapy  O2 Sat (%): 95 % (12/17/20 0823)  Pulse via Oximetry: 83 beats per minute (12/17/20 0823)  O2 Device: Room air (12/17/20 0823)  O2 Flow Rate (L/min): 0 l/min (12/17/20 0823)    Estimated body mass index is 34.38 kg/m² as calculated from the following:    Height as of this encounter: 5' 11\" (1.803 m). Weight as of this encounter: 111.8 kg (246 lb 7.6 oz). Intake/Output Summary (Last 24 hours) at 12/17/2020 1003  Last data filed at 12/17/2020 0558  Gross per 24 hour   Intake    Output 1725 ml   Net -1725 ml       *Note that automatically entered I/Os may not be accurate; dependent on patient compliance with collection and accurate  by techs. General:    Well nourished. No overt distress. Obese. CV:   RRR. No m/r/g. No edema. No JVD  Lungs:   Decreased left side but CTA otherwise. No wheezing, rhonchi, or rales. Unlabored. CT area with dressing, CT removed. Abdomen:   Soft, nontender, nondistended. Extremities: Warm and dry. No cyanosis   Skin:     No rashes. No jaundice. Normal coloration. Extensive left flank ecchymoses. Neuro:  No gross focal deficits.   Alert    Data Reviewed:  I have reviewed all labs, meds, and studies from the last 24 hours:  Recent Results (from the past 24 hour(s))   CBC W/O DIFF    Collection Time: 12/17/20  3:12 AM   Result Value Ref Range    WBC 6.0 4.3 - 11.1 K/uL    RBC 2.73 (L) 4.23 - 5.6 M/uL    HGB 8.0 (L) 13.6 - 17.2 g/dL    HCT 24.1 (L) 41.1 - 50.3 %    MCV 88.3 79.6 - 97.8 FL    MCH 29.3 26.1 - 32.9 PG    MCHC 33.2 31.4 - 35.0 g/dL    RDW 13.8 11.9 - 14.6 %    PLATELET 505 797 - 492 K/uL    MPV 8.6 (L) 9.4 - 12.3 FL    ABSOLUTE NRBC 0.00 0.0 - 0.2 K/uL       Current Meds:  Current Facility-Administered Medications   Medication Dose Route Frequency    lisinopril-hydroCHLOROthiazide (PRINZIDE, ZESTORETIC) 20-25 mg per tablet 1 Tab  1 Tab Oral DAILY    amLODIPine (NORVASC) tablet 5 mg  5 mg Oral QHS    polyethylene glycol (MIRALAX) packet 17 g  17 g Oral BID    senna (SENOKOT) tablet 17.2 mg  2 Tab Oral BID    predniSONE (DELTASONE) tablet 40 mg  40 mg Oral DAILY WITH BREAKFAST    famotidine (PEPCID) tablet 20 mg  20 mg Oral DAILY    0.9% sodium chloride infusion 250 mL  250 mL IntraVENous PRN    morphine injection 2 mg  2 mg IntraVENous Q3H PRN    lip protectant (BLISTEX) ointment 1 Each  1 Each Topical PRN    LORazepam (ATIVAN) tablet 1 mg  1 mg Oral Q6H PRN    0.9% sodium chloride infusion 250 mL  250 mL IntraVENous PRN    citalopram (CELEXA) tablet 40 mg  40 mg Oral QPM    oxyCODONE IR (ROXICODONE) tablet 5 mg  5 mg Oral Q4H PRN    sodium chloride (NS) flush 5-40 mL  5-40 mL IntraVENous Q8H    sodium chloride (NS) flush 5-40 mL  5-40 mL IntraVENous PRN    acetaminophen (TYLENOL) tablet 650 mg  650 mg Oral Q6H PRN    Or    acetaminophen (TYLENOL) suppository 650 mg  650 mg Rectal Q6H PRN    polyethylene glycol (MIRALAX) packet 17 g  17 g Oral DAILY PRN    promethazine (PHENERGAN) tablet 12.5 mg  12.5 mg Oral Q6H PRN    naloxone (NARCAN) injection 0.4 mg  0.4 mg IntraVENous EVERY 2 MINUTES AS NEEDED       Other Studies:  No results found for this visit on 12/10/20. Xr Chest Sngl V    Result Date: 12/17/2020  Portable chest x-ray CLINICAL INDICATION: Left-sided chest tube removal FINDINGS: Single AP view the chest compared to a similar chest x-ray from three days prior shows removal of the left-sided chest tube. There is no pneumothorax. There is persistent airspace opacity in the left midlung. The right lung is clear. IMPRESSION: Status post removal of left-sided chest tube. No pneumothorax noted. Stable airspace opacity in the left midlung.       All Micro Results     None          SARS-CoV-2 Lab Results  \"Novel Coronavirus\" Test: No results found for: COV2NT   \"Emergent Disease\" Test: No results found for: EDPR  \"SARS-COV-2\" Test: No results found for: XGCOVT  Rapid Test: No results found for: COVR Assessment and Plan:     Hospital Problems as of 12/17/2020 Date Reviewed: 11/18/2020          Codes Class Noted - Resolved POA    * (Principal) Hemothorax on left ICD-10-CM: J94.2  ICD-9-CM: 511.89  12/10/2020 - Present Yes        Acute blood loss anemia ICD-10-CM: D62  ICD-9-CM: 285.1  12/10/2020 - Present Yes        Hemothorax ICD-10-CM: J94.2  ICD-9-CM: 511.89  12/10/2020 - Present Unknown        Rib fracture ICD-10-CM: S22.39XA  ICD-9-CM: 807.00  11/29/2020 - Present Yes        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 Intensify therapy             CKD (chronic kidney disease) ICD-10-CM: N18.9  ICD-9-CM: 585.9  1/10/2017 - Present Yes              Plan:  # Left sided hemothorax              - With rib fractures, unclear etiology. Acute anemia on admission requiring transfusion and chest tube placement on 12/11, removed 12/17. CXR stable. Ambulatory oximetry pending.     # Constipation              - Con't bowel regimen and ambulation.     # Acute blood loss anemia              - Hb stable up.      # CKD3              - Stable     # HTN              - Resume Norvasc today.     # MDD/anxiety              - Lexapro     # Suspected CORETTA              - Outpatient sleep study     DC planning/Dispo: Home today v tomorrow. Diet:  DIET CARDIAC  DVT ppx: SCDs    Other listed chronic conditions stable, cont current management.     Signed:  Narciso Giordano MD

## 2020-12-17 NOTE — DISCHARGE SUMMARY
Hospitalist Discharge Summary     Admit Date:  12/10/2020 12:33 PM   DC note date: 2020  Name:  Jamey Peterson   Age:  60 y.o.  :  1955   MRN:  [id number]   PCP:  Burgess Jone MD  Treatment Team: Attending Provider: Zheng Hwang MD; : Aric Mohan; Utilization Review: Shahid Pinon RN; Care Manager: Dimas Marinelli RN; Care Manager: Roxanne Regalado RN; Occupational Therapist: Olga Linn OT; Physical Therapy Assistant: Lencho Hale; Primary Nurse: Iván Vargas RN    Problem List for this Hospitalization:  Hospital Problems as of 2020 Date Reviewed: 2020          Codes Class Noted - Resolved POA    * (Principal) Hemothorax on left ICD-10-CM: J94.2  ICD-9-CM: 511.89  12/10/2020 - Present Yes        Acute blood loss anemia ICD-10-CM: D62  ICD-9-CM: 285.1  12/10/2020 - Present Yes        Hemothorax ICD-10-CM: J94.2  ICD-9-CM: 511.89  12/10/2020 - Present Unknown        Rib fracture ICD-10-CM: S22.39XA  ICD-9-CM: 807.00  2020 - Present Yes        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 ICD-10-CM: C09.9  ICD-9-CM: 146.0  2017 - Present Yes        HTN (hypertension) ICD-10-CM: I10  ICD-9-CM: 401.9  2017 - Present Yes    Overview Signed 3/4/2020 12:26 PM by Charlene Edgar CMA     Last Assessment & Plan:   Inadequate control. Intensify therapy             CKD (chronic kidney disease) ICD-10-CM: N18.9  ICD-9-CM: 585.9  1/10/2017 - Present Yes            Hospital Course:  Mr. Thomas Lynn is a nice 61 y/o WM with a h/o SCC of the epiglottis who initially presented here on  with left sided chest pain. No trauma. He was found to have rib fractures. Hb at that time was 12. He was admitted for pain control and also for ISI.  He was subsequently discharged home on 12/3, Hb at discharge was 8.4. He completed a course of antibiotics. ISI resolved. He came back to the ER on 12/6 due to continued pain. CT c/a/p again showed rib fractures and some blood in abdominal wall, he was discharged home. His pain persisted so he was back in the ER on 12/10. CT chest that evening showed an enlarging hemothorax with displacement of his rib fractures. General Surgery was consulted and he was admitted to the Hospitalist service. Hb was 6.7 and he was transfused. Initially there were plans to transfer to [facility] however due to capacity issues he stayed and was managed at [facility]. Chest tube was placed by Dr. Lilliam Adams on 12/11 with evacuation of copious amounts of old blood. He was transfused again once more on 12/14. He was on supplemental oxygen. He did have nocturnal desaturations thought due to undiagnosed CORETTA. He needs an outpatient sleep study at discharge. His oxygen needs were weaned. He was tolerating a diet. Bowel regimen was added for constipation. His chest tube output slowed and eventually halted so this was removed uneventfully on 12/16. Follow up CXR on 12/17 showed a stable opacity in the left midlung, otherwise no PTX or other changes. He did not desaturate on ambulatory oximetry testing. His Hb is stable at 8. His hospital course was otherwise unremarkable and he is medically stable for discharge home with PCP f/u within 1 week. We discussed warning signs that would prompt a call to PCP or return to ER. Disposition: Home or Self Care  Activity: Activity as tolerated  Diet: DIET CARDIAC Regular  Code Status: Full Code    Follow Up Orders:  No orders of the defined types were placed in this encounter. Follow-up Information     Follow up With Specialties Details Why Contact Info    Tiffanie Valencia MD Family Medicine In 1 week Hospital follow up. Hemothorax. [facility]  [phone]            Discharge meds at bottom of this note.   Plan was discussed with patient, RNABIDA. All questions answered. Patient was stable at time of discharge. Given instructions to call a physician or return if any concerns. Discharge summary and encounter summary was sent to PCP electronically via \"Comm Mgt\" link in [facility], if possible. Diagnostic Imaging/Tests:   Xr Chest Sngl V    Result Date: 12/17/2020  Portable chest x-ray CLINICAL INDICATION: Left-sided chest tube removal FINDINGS: Single AP view the chest compared to a similar chest x-ray from three days prior shows removal of the left-sided chest tube. There is no pneumothorax. There is persistent airspace opacity in the left midlung. The right lung is clear. IMPRESSION: Status post removal of left-sided chest tube. No pneumothorax noted. Stable airspace opacity in the left midlung. Xr Chest Sngl V    Result Date: 12/14/2020   Portable view of the chest COMPARISON: December 12, 2020 CLINICAL HISTORY: Left chest tube. FINDINGS: There is stable left-sided chest tube. There is no visible pneumothorax. There is left lung base opacity. No pulmonary edema. Heart is enlarged. Mediastinal contour is within normal limits. IMPRESSION: 1. Stable left chest tube. No significant pneumothorax. 2. Stable left basilar opacity, likely combination of small effusion and atelectasis. Xr Chest Sngl V    Result Date: 12/12/2020   Portable view of the chest COMPARISON: December 11, 2020 CLINICAL HISTORY: Chest tube FINDINGS: Stable left-sided chest tube. There is no visible pneumothorax. There is left pleural effusion. No significant pulmonary edema. Heart is enlarged. Mediastinal contour is within normal limits. IMPRESSION: 1. Stable left-sided chest tube. No visible pneumothorax on today's study. 2. Left pleural effusion appears slightly decreased. Xr Chest Sngl V    Result Date: 12/11/2020  PORTABLE CHEST, December 11, 2020 at 1517 hours CLINICAL HISTORY:  Left chest tube placement.  COMPARISON: Radiographs and CT of December 10, 2020. FINDINGS:  AP erect image demonstrates a new chest tube in the left mid chest. Small left pleural fluid collection is significantly decreased from yesterday with a small left apical pneumothorax. Left basilar atelectasis/infiltrate is present. The right lung appears clear. Heart size is unchanged. There are overlying radiopaque support devices. IMPRESSION:  MARKED INTERVAL DECREASE IN VOLUME OF LEFT PLEURAL FLUID STATUS POST PLACEMENT OF A CHEST TUBE WITH A SMALL APICAL PNEUMOTHORAX AND BASILAR ATELECTASIS/INFILTRATE. Xr Chest Sngl V    Result Date: 12/1/2020  EXAM: XR CHEST SNGL V INDICATION: Pneumonia COMPARISON: 11/30/2020 FINDINGS: A portable AP radiograph of the chest was obtained at 0537 hours. The patient is on a cardiac monitor. Left lower lobe airspace disease unchanged. . The cardiac and mediastinal contours and pulmonary vascularity are normal.  The bones and soft tissues are grossly within normal limits. IMPRESSION: Left lower lobe atelectasis or pneumonia unchanged. Xr Chest Pa Lat    Result Date: 12/10/2020  EXAM: CHEST X-RAY, 2 VIEWS INDICATION: worsening shortness of breath COMPARISON: CT chest, abdomen, and pelvis 12/6/2020 TECHNIQUE: Frontal and lateral views of the chest were obtained. FINDINGS: Multiple left-sided rib fractures better appreciated by CT. Increasing left pleural fluid. There is associated airspace opacification in the left lung base. No pneumothorax is evident. Right lung is clear. IMPRESSION: Left pleural fluid is increasing concerning for possible enlarging hemothorax. Xr Chest Pa Lat    Result Date: 11/30/2020  PA AND LATERAL CHEST X-RAY. Clinical Indication: Pleural effusion, follow-up exam Comparison: Chest x-ray dated 11/29/2020 Findings: 2 views of the chest submitted demonstrate the left pleural effusion to be larger. The right lung is clear. The cardiac silhouette and mediastinum are stable.      IMPRESSION: Larger left pleural effusion. Xr Chest Pa Lat    Result Date: 11/29/2020  EXAMINATION: XR CHEST PA LAT 11/29/2020 9:38 AM INDICATION: Left upper abdominal pain, previous abdominal surgery and cancer COMPARISON: Chest radiograph January 11, 2017 and PET CT January 2019 TECHNIQUE: PA and lateral views of the chest were obtained. FINDINGS: Osseous: No acute abnormality. Cardiomediastinal Silhouette: Normal in size. Lungs: Moderate Left lower lobe opacity. Normal pulmonary vascularity. Pleura: No pleural fluid or pneumothorax. Upper Abdomen: No abnormality. IMPRESSION: Moderate left lower lobe opacity which can reflect pneumonia in the correct clinical context. Ct Chest Abd Pelv Wo Cont    Result Date: 12/6/2020  CT OF THE CHEST ABDOMEN AND PELVIS with contrast INDICATION: Shortness of breath, chest pain and dropping hemoglobin. TECHNIQUE:  Multiple axial images were obtained through the chest, abdomen and pelvis, without contrast.  Radiation dose reduction techniques were used for this study. All CT scans performed at this facility use one or all of the following: Automated exposure control, adjustment of the mA and/or kVp according to patient's size, iterative reconstruction. COMPARISON: Chest CT 29 November 2020 FINDINGS: CHEST: -LUNGS: Partial left lower lobe atelectasis. -AIRWAYS: Trachea and proximal bronchi grossly patent. -PLEURA: No effusion or thickening or calcifications. -LYMPH NODES: No enlarged axillary, hilar or mediastinal lymph nodes. -HEART: Normal size. -CORONARIES: Mild  calcifications. -SKELETAL/CHEST WALL: Subpleural fluid collection associated with left rib fractures similar. Stranding densities in the chest wall. Displaced left rib fractures redemonstrated ABDOMEN/PELVIS: -LIVER: Normal in size and appearance. -GALLBLADDER/BILE DUCTS: Possible gallstone, no bile duct dilation.  -PANCREAS: Normal. -SPLEEN: Normal. -ADRENALS: Normal. -KIDNEYS/URETERS: No hydronephrosis or significant mass. -BLADDER: Unremarkable -REPRODUCTIVE ORGANS: Prostate is enlarged, 6.3 x 4.6 cm -BOWEL: Normal caliber. No inflammatory changes. -LYMPH NODES: No significant retroperitoneal, mesenteric, or pelvic adenopathy. -BONES: Thoracic DJD -VASCULATURE: Aorta is normal caliber and calcified -OTHER: No ascites. IMPRESSION: Left-sided rib fractures with associated subpleural fluid collection and stranding densities in the abdominal wall, probably some blood. Ct Chest W Cont    Result Date: 12/10/2020  EXAM: CT CHEST WITH CONTRAST INDICATION: further eval increasing probable hemothorax. COMPARISON: Chest x-ray 12/10/2020, CT from 12/6/2020 TECHNIQUE:  CT imaging was performed of the chest after intravenous injection of 100 mL Isovue 370. Intravenous contrast was used for better evaluation of solid organs and vascular structures. Coronal reformatted imaging provided. Radiation dose reduction techniques were used for this study. Our CT scanners use one or all of the following: Automated exposure control, adjustment of the mA and/or kV according to patient size, iterative reconstruction. FINDINGS: Mediastinum and visualized thyroid: Normal. Heart: Normal. Large Vessels: Normal. Pleura: Significant interval increase in left pleural fluid since the CT from 12/6/2020, which appears complex and is concerning for enlarging hemothorax. The pleural lining appears to extend beyond the left lateral chest wall likely indicating transthoracic herniation. Lungs: The lungs appear emphysematous. Passive atelectasis of the left lung. Airways: Normal. Lymph nodes: Normal. Bones/Soft tissues: Increasing displacement of left-sided rib fractures thought to be secondary to be expanding left hemothorax. These include displaced fractures of the sixth through ninth ribs. The ninth rib fracture is a segmental fracture. Visualized abdomen: Normal.     IMPRESSION: 1.   Significant interval enlargement of the left hemothorax with appearance of transthoracic herniation of pleura secondary to the large size of the hematoma. 2.  Increasing displacement of left-sided rib fractures thought to be secondary to the expanding hematoma. Ct Chest W Cont    Result Date: 11/29/2020  CHEST CT INDICATION: Left-sided chest pain Multiple axial images were obtained through the chest during intravenous infusion of 100mL of Isovue 370. Coronal reformats were also evaluated. Radiation dose reduction techniques were used for this study: All CT scans performed at this facility use one or all of the following: Automated exposure control, adjustment of the mA and/or kVp according to patient's size, iterative reconstruction. COMPARISON: Earlier chest x-ray FINDINGS: -LUNGS: There is infiltrate in the left lung base and a small left pleural effusion. Right lung is clear. -HEART/VESSELS: Limited evaluation of pulmonary arteries due to poor contrast timing. No definite pulmonary embolus is seen. There is mild vascular calcification. Heart size is normal. -MEDIASTINUM/AXILLA: No significant adenopathy. -CHEST WALL/BONES: There are acute fractures of the left seventh, eighth, ninth ribs. Ninth rib is fractured in 2 areas. There is also separation of the eighth and ninth ribs. -UPPER ABDOMEN: Steatosis. No acute findings. IMPRESSION: 1. Limited evaluation of the pulmonary arteries. No definite pulmonary embolus. 2.  Left lower lobe infiltrate and effusion. 3.  Left seventh, eighth, ninth rib fractures. Separation of the eighth and ninth ribs. ** If there are any questions about this report, I can be reached on [employer] or at [phone] **    [address]    Result Date: 12/2/2020  Retroperitoneal ultrasound HISTORY: Acute renal insufficiency Real-time sonography of the kidneys was performed. Comparison: None FINDINGS: The right kidney measures approximately 12.0cm. The left kidney measures approximately 12.3cm. There is no hydronephrosis or mass.  Renal echogenicity is within normal limits. The urinary bladder is anechoic. There is diffuse hepatic steatosis. The spleen appears enlarged at 13.7 cm. IMPRESSION: 1. Unremarkable sonographic appearance of the kidneys. 2. Hepatic steatosis. 3. Mild symmetrically. Xr Chest Port    Result Date: 12/6/2020  CHEST X-RAY, one view. HISTORY:  Chest pain  Shortness of breath TECHNIQUE:  AP upright portable view. COMPARISON: 1 December 2020 FINDINGS: Lungs: Left hemidiaphragm remains obscured with increased retrocardiac density. Left upper lung is clear. Right lung is clear. Costophrenic angles: Blunted on the. Heart size: is normal. Pulmonary vasculature: is unremarkable. Aorta: Unremarkable. Included portion of the upper abdomen: is unremarkable. Bones: No gross bony lesions. Other: None. IMPRESSION:  Persistent left lower lobe atelectasis and/or infiltrate without significant change. Echocardiogram/EKG results:  No results found for this visit on 12/10/20.     Results for orders placed or performed during the hospital encounter of 12/06/20   EKG, 12 LEAD, INITIAL   Result Value Ref Range    Ventricular Rate 93 BPM    Atrial Rate 93 BPM    P-R Interval 168 ms    QRS Duration 86 ms    Q-T Interval 368 ms    QTC Calculation (Bezet) 457 ms    Calculated P Axis 59 degrees    Calculated R Axis 61 degrees    Calculated T Axis 50 degrees    Diagnosis       Normal sinus rhythm  Low voltage QRS  Nonspecific T wave abnormality  Abnormal ECG  When compared with ECG of 11-JAN-2017 23:47,  Nonspecific T wave abnormality now evident in Lateral leads  Confirmed by Christen Garay MD (), TAHIR ([id number]) on 12/6/2020 3:44:00 PM         Procedures done this admission:  * No surgery found *    All Micro Results     None          SARS-CoV-2 Lab Results  \"Novel Coronavirus\" Test: No results found for: COV2NT   \"Emergent Disease\" Test: No results found for: EDPR  \"SARS-COV-2\" Test: No results found for: XGCOVT  Rapid Test: No results found for: COVR         Labs: Results:       BMP, Mg, Phos Recent Labs     12/15/20  0345   *   K 4.2   CL 98   CO2 28   AGAP 6*   BUN 34*   CREA 1.71*   CA 8.0*   *      CBC Recent Labs     12/17/20  0312 12/16/20  0339 12/15/20  0345   WBC 6.0 7.0 7.3   RBC 2.73* 2.60* 2.40*   HGB 8.0* 7.9* 7.2*   HCT 24.1* 23.2* 21.3*    252 224   GRANS  --   --  88*   LYMPH  --   --  5*   EOS  --   --  1   MONOS  --   --  7   BASOS  --   --  0   IG  --   --  0   ANEU  --   --  6.4   ABL  --   --  0.4*   MAHI  --   --  0.1   ABM  --   --  0.5   ABB  --   --  0.0   AIG  --   --  0.0      LFT No results for input(s): ALT, TBIL, AP, TP, ALB, GLOB, AGRAT in the last 72 hours.     No lab exists for component: SGOT, GPT   Cardiac Testing Lab Results   Component Value Date/Time     12/02/2020 05:13 AM      Coagulation Tests Lab Results   Component Value Date/Time    Prothrombin time 12.8 12/10/2020 01:53 PM    Prothrombin time 14.1 11/30/2020 11:01 AM    Prothrombin time 9.8 12/20/2016 01:44 PM    INR 0.9 12/10/2020 01:53 PM    INR 1.0 11/30/2020 11:01 AM    INR 0.9 12/20/2016 01:44 PM    aPTT 24.7 12/20/2016 01:44 PM      A1c Lab Results   Component Value Date/Time    Hemoglobin A1c 4.9 08/10/2017 09:28 AM    Hemoglobin A1c 7.8 (H) 01/24/2017 04:00 AM    Hemoglobin A1c 7.4 (H) 11/15/2016 04:30 AM      Lipid Panel Lab Results   Component Value Date/Time    Cholesterol, total 104 01/20/2017 10:57 AM    HDL Cholesterol 20 (L) 01/20/2017 10:57 AM    LDL, calculated 41.6 01/20/2017 10:57 AM    VLDL, calculated 42.4 (H) 01/20/2017 10:57 AM    Triglyceride 212 (H) 01/20/2017 10:57 AM    CHOL/HDL Ratio 5.2 01/20/2017 10:57 AM      Thyroid Panel Lab Results   Component Value Date/Time    TSH 0.762 08/10/2017 09:28 AM    T4, Free 1.1 08/10/2017 09:28 AM        Most Recent UA Lab Results   Component Value Date/Time    Color YELLOW 12/02/2020 01:25 PM    Appearance CLOUDY 12/02/2020 01:25 PM    Specific gravity 1.012 12/02/2020 01:25 PM    pH (UA) 5.5 12/02/2020 01:25 PM    Protein Negative 12/02/2020 01:25 PM    Glucose 100 12/02/2020 01:25 PM    Ketone Negative 12/02/2020 01:25 PM    Bilirubin Negative 12/02/2020 01:25 PM    Blood TRACE (A) 12/02/2020 01:25 PM    Urobilinogen 1.0 12/02/2020 01:25 PM    Nitrites Negative 12/02/2020 01:25 PM    Leukocyte Esterase Negative 12/02/2020 01:25 PM    WBC 5-10 12/02/2020 01:25 PM    RBC 0-3 12/02/2020 01:25 PM    Epithelial cells 0-3 12/02/2020 01:25 PM    Bacteria TRACE 12/02/2020 01:25 PM    Casts 0 12/02/2020 01:25 PM    Crystals, urine 0 12/02/2020 01:25 PM    Mucus 0 12/02/2020 01:25 PM        Allergies   Allergen Reactions    Zofran [Ondansetron Hcl (Pf)] Other (comments)     Gives pt severe HA     There is no immunization history for the selected administration types on file for this patient.     All Labs from Last 24 Hrs:  Recent Results (from the past 24 hour(s))   CBC W/O DIFF    Collection Time: 12/17/20  3:12 AM   Result Value Ref Range    WBC 6.0 4.3 - 11.1 K/uL    RBC 2.73 (L) 4.23 - 5.6 M/uL    HGB 8.0 (L) 13.6 - 17.2 g/dL    HCT 24.1 (L) 41.1 - 50.3 %    MCV 88.3 79.6 - 97.8 FL    MCH 29.3 26.1 - 32.9 PG    MCHC 33.2 31.4 - 35.0 g/dL    RDW 13.8 11.9 - 14.6 %    PLATELET 084 928 - 463 K/uL    MPV 8.6 (L) 9.4 - 12.3 FL    ABSOLUTE NRBC 0.00 0.0 - 0.2 K/uL       Discharge Exam:  Patient Vitals for the past 24 hrs:   Temp Pulse Resp BP SpO2   12/17/20 1335     93 %   12/17/20 1140  89 17 (!) 185/87 92 %   12/17/20 1127 98.1 °F (36.7 °C) 88 18 (!) 170/92 94 %   12/17/20 0823     95 %   12/17/20 0740 98.5 °F (36.9 °C) 88 19 (!) 170/86 91 %   12/17/20 0601  84 17 (!) 154/79 92 %   12/17/20 0340 98.3 °F (36.8 °C) 83 23 (!) 153/77 90 %   12/17/20 0010 98.4 °F (36.9 °C)       12/16/20 2340  86 16 (!) 144/83 94 %   12/16/20 2105  85 18 (!) 145/80 94 %   12/16/20 1941 98.2 °F (36.8 °C) 86 18 (!) 145/80 93 %   12/16/20 1534 97.5 °F (36.4 °C) 91 18 136/66 90 %   12/16/20 1500  86 18 136/66 90 %     Oxygen Therapy  O2 Sat (%): 93 % (12/17/20 1335)  Pulse via Oximetry: 88 beats per minute (12/17/20 1335)  O2 Device: Room air (12/17/20 1335)  O2 Flow Rate (L/min): 0 l/min (12/17/20 0823)  FIO2 (%): 21 % (12/17/20 1335)    Estimated body mass index is 34.38 kg/m² as calculated from the following:    Height as of this encounter: 5' 11\" (1.803 m). Weight as of this encounter: 111.8 kg (246 lb 7.6 oz). Intake/Output Summary (Last 24 hours) at 12/17/2020 1406  Last data filed at 12/17/2020 1138  Gross per 24 hour   Intake    Output 1975 ml   Net -1975 ml       *Note that automatically entered I/Os may not be accurate; dependent on patient compliance with collection and accurate  by assistants. General:    Well nourished. No overt distress. Obese. Eyes:   Normal sclerae. Extraocular movements intact. ENT:  Normocephalic, atraumatic. Moist mucous membranes. Old trach scar. CV:   Regular rate and rhythm. No m/r/g. No edema. Lungs:  CTAB. No wheezing, rhonchi, or rales. Unlabored. Old PEG scar. Abdomen: Soft, nontender, nondistended. Large area of ecchymosis over left flank/abdomen. Left chest wall tenderness to palpation. Extremities: Warm and dry. No cyanosis or clubbing  Neurologic: CN II-XII grossly intact. No gross focal deficits. Alert. Skin:     No rashes. No jaundice. Psych:  Normal mood and affect.     Current Med List in Hospital:   Current Facility-Administered Medications   Medication Dose Route Frequency    lisinopril-hydroCHLOROthiazide (PRINZIDE, ZESTORETIC) 20-25 mg per tablet 1 Tab  1 Tab Oral DAILY    amLODIPine (NORVASC) tablet 5 mg  5 mg Oral QHS    polyethylene glycol (MIRALAX) packet 17 g  17 g Oral BID    senna (SENOKOT) tablet 17.2 mg  2 Tab Oral BID    predniSONE (DELTASONE) tablet 40 mg  40 mg Oral DAILY WITH BREAKFAST    famotidine (PEPCID) tablet 20 mg  20 mg Oral DAILY    0.9% sodium chloride infusion 250 mL  250 mL IntraVENous PRN    morphine injection 2 mg  2 mg IntraVENous Q3H PRN    lip protectant (BLISTEX) ointment 1 Each  1 Each Topical PRN    LORazepam (ATIVAN) tablet 1 mg  1 mg Oral Q6H PRN    0.9% sodium chloride infusion 250 mL  250 mL IntraVENous PRN    citalopram (CELEXA) tablet 40 mg  40 mg Oral QPM    oxyCODONE IR (ROXICODONE) tablet 5 mg  5 mg Oral Q4H PRN    sodium chloride (NS) flush 5-40 mL  5-40 mL IntraVENous Q8H    sodium chloride (NS) flush 5-40 mL  5-40 mL IntraVENous PRN    acetaminophen (TYLENOL) tablet 650 mg  650 mg Oral Q6H PRN    Or    acetaminophen (TYLENOL) suppository 650 mg  650 mg Rectal Q6H PRN    polyethylene glycol (MIRALAX) packet 17 g  17 g Oral DAILY PRN    promethazine (PHENERGAN) tablet 12.5 mg  12.5 mg Oral Q6H PRN    naloxone (NARCAN) injection 0.4 mg  0.4 mg IntraVENous EVERY 2 MINUTES AS NEEDED       Discharge Info:   Current Discharge Medication List      CONTINUE these medications which have CHANGED    Details   oxyCODONE IR (ROXICODONE) 5 mg immediate release tablet Take 1 Tab by mouth every four (4) hours as needed for Pain for up to 3 days. Max Daily Amount: 30 mg.  Qty: 18 Tab, Refills: 0    Associated Diagnoses: Hemothorax on left; Closed fracture of multiple ribs of left side with delayed healing, subsequent encounter         CONTINUE these medications which have NOT CHANGED    Details   lisinopril-hydroCHLOROthiazide (PRINZIDE, ZESTORETIC) 20-25 mg per tablet Take 1 Tab by mouth daily. metFORMIN (GLUCOPHAGE) 500 mg tablet Take 500 mg by mouth daily. potassium chloride SR (KLOR-CON 10) 10 mEq tablet Take 10 mEq by mouth daily. Pt takes three or four times a week      allopurinoL (ZYLOPRIM) 300 mg tablet Take 1 Tab by mouth. amLODIPine (NORVASC) 5 mg tablet Take 5 mg by mouth nightly. citalopram (CELEXA) 40 mg tablet Take 1 Tab by mouth every evening.   Qty: 30 Tab, Refills: 0      aspirin delayed-release 81 mg tablet Take 81 mg by mouth every morning. Take / use AM day of surgery  per anesthesia protocols. Indications: myocardial infarction prevention               Time spent in patient discharge planning and coordination 35 minutes.     Signed:  Melvyn Curling, MD

## 2020-12-17 NOTE — PROGRESS NOTES
12/17/20 1335   Oxygen Therapy   O2 Sat (%) 93 %   Pulse via Oximetry 88 beats per minute   O2 Device Room air   FIO2 (%) 21 %     Patient's sat while resting was 93% on room air before ambulation. While ambulating patient required no oxygen to maintain sat above protocol. Patient denied SOB, and no distress noted while ambulating. Respiratory Care Services     Policy Number: -[id number]    Title: Home Oxygen Assessment Protocol    Effective Date:  4/9/14    Reviewed Date: 5/28/2018    Revised: 07/2019       I. Policy: The Respiratory Care Practitioner (RCP) shall assess all patients who meet Medicare's Home Oxygen Diagnostic Requirements. If a patient is unable to wean to room air within 48 hours of discharge, the RCP shall order a 3 step ambulatory oxygen saturation (SpO2) per protocol and instruct the patient in completing the test. The RCP shall document results of the test as outlined in this protocol. II. Purpose: Oxygen is used for short-term hospitalized patients and long-term therapy in patients with chronic lung disease and recurring congestive heart failure. Centers for [facility] [employer] (CMS) has very specific guidelines and indications for its short-term use in the acute care setting and the long-term use in the home or other facility. This protocol will address the rationale and requirements for long-term oxygen therapy. Long-term oxygen therapy is delivered to reduce long-term complications of chronic hypoxemia, particularly cor pulmonale. Oxygen supplementation in patients with chronic hypoxemia has been shown to improve survival, pulmonary hemodynamics, exercise capacity and neuropsychological performance. 1    III. Responsibility: Director of [address] [place] [address] and Respiratory Care Practitioners. IV. Home Oxygen Diagnostic Requirements:   A. Covered health conditions:  1. Severe primary lung disease  a. Chronic obstructive pulmonary disease  b.  Diffuse interstitial lung disease  c. Cystic fibrosis  2. Bronchiectasis  3. Pulmonary neoplasm, primary or metastatic  4. Chronic bronchitis  5. Emphysema  6. Hypoxia-related symptoms or conditions that may improve with oxygen therapy such as  a. Pulmonary hypertension  b. Recurring congestive heart failure due to chronic cor pulmonale  c. Erythocytosis/erythrocythemia  B. Qualifying testing requirements  1. Testing must be performed within two days prior to discharge. 2. Test results must be documented in patient's medical record in approved format. C. Testing can be done under three conditions  1. A test during rest.  a. Oxygen is considered medically necessary if SpO2 is less than or equal to 88%. b. If SpO2 is greater than 88%, proceed to step 2.  2. A test taken on room air during exercise  a. If patient meets qualifying threshold of SpO2 less than or equal to 88% while exercising, all three of the required tests below must be performed within same testing session and be recorded in the patient's medical record. i. A test taken during rest while patient breathes room air. ii. A test during exercise while patient breathes room air.  iii. A test taken during exercise with oxygen applied. (to demonstrate improvement of hypoxia). 3. A test taken during sleep.  a. If patient is tested during sleep, test must have at least two hours of recorded time. b. Test must indicate arterial oxygen saturation of 88% or less for at least 5 minutes of   testing period. c. A patient tested during sleep will not qualify for portable oxygen. d. A physician order will be required for an overnight oximetry test.  D. Regardless of test condition, the following values apply to all:  1. Group I: (Requires annual recertification)  a. Patient is on room air while at rest (awake) when tested. b.  Arterial oxygen saturation (SaO2) is at or below 88% or  c. PaO2 is at or below <55mm Hg   2.  Group II: (Recertification and retesting are required 61-90 days after initial start)  a. Patient is on room air at rest while awake when tested. b. PaO2 = 5659 mmHg or  c. SaO2 89% acceptable only with secondary diagnosis of  - Edema suggesting congestive heart failure  - Pulmonary hypertension/cor pulmonale with P wave > 3mm in lead II, III, or AVF  - Erythrocythemia with Hct >56%  3. Group III:   If PaO2 > 60 mmHg or   - SaO2 >90% there is a presumption of noncoverage. 4. If liter flow is greater than 4LPM, patient must meet Group 1 or Group II criteria while patient is receiving oxygen at a rate of 4LPM or higher  5. All patients must be tested in a stable state including those discharged from the hospital.  6. All coexisting diseases or conditions that can cause hypoxia must be treated and patient must be tested in a chronic stable state before oxygen therapy is qualified. V. Procedure for SpO2 testing at rest.  A. Obtain a 30 second room air SpO2 check while patient is on room air, rested and awake. 1. If SpO2 is 88% increase O2 by 1 L to maintain SpO2 of 90%. 2. Document results in patient's EMR.    VI. Procedure for ambulatory SpO2 testing  A. For patient who meets the Covered Health Conditions and is unable to wean to room air within 48 hours of discharge, complete an ambulatory SaO2. B. Preparation  1. Write an order for 3 step ambulatory oxygen saturation test per protocol. 2. Obtain pulse oximeter and insure that it is working accurately. 3. Perform hand hygiene per hospital policy utilizing Standard Precautions for all patients and following transmission-based isolation as indicated per hospital policy. 4. Identify patient, verify name and birth date via ID bracelet. 5. Introduce self and identify department. 6. Explain procedure to patient and family and confirm understanding. C. Assessment  1. Assure that patient is on room air prior to test.  2. Fingernail polish if worn by patient must be removed before testing.    3. Ask patient to sit in an upright position. 4. Resting SaO2 assessment  a. Perform a 30 second resting room air SaO2.  b.  If SaO2 is 88% or less Medicare considers oxygen is medically necessary  c. Document findings in patient EMR. d.  If SaO2 is greater than 88%, proceed to the next step. 5. Ambulatory Room Air SaO2 check  a. Ask patient to walk for 5 minutes on room air or as long as tolerated. b. If patients ordinarily use a cane, walker or rollator, they should be used during test.  c. Instruct patient to walk at a comfortable pace and to breathe naturally during test.  d. Monitor and record patient's heart rate, SaO2 and exercise endurance.  e. If SaO2 is 88% or less post exercise, an ambulatory test on oxygen must be performed. 6. Ambulatory SaO2 on Oxygen  a. The RCP shall place the patient on oxygen to achieve a SaO2 of 90%. b. Johana Sinner patient to walk for 5 minutes or as long as tolerated. c. If patients ordinarily use a cane, walker or rollator, they should be used during test  d. Monitor and record patient's heart rate, SaO2 and exercise endurance.  e. If Sa02 decreases to the range of 80% to 84% the flow shall be increased by 2LPM.   f. If SaO2 decreases to the range of 85%-89% increased oxygen by 1 LPM.  g. If SaO2 is less than 80%, the patient is not appropriate for ambulation. VII. Documentation  A. Medicare has specific guidelines for documentation of ambulatory oxygen saturation testing, therefore all test results must be documented in the patient's EMR as outlined below. Room air: SpO2 with O2 and liter flow   Resting SpO2  93%  N/A   Ambulating SpO2  90%  N/A     VIII. Reference:       Amber Dong [facility] for [facility]. Home Oxygen Therapy.  Medicare        Part B- Oxygen Coverage

## 2021-01-07 ENCOUNTER — HOSPITAL ENCOUNTER (OUTPATIENT)
Dept: GENERAL RADIOLOGY | Age: 66
Discharge: HOME | End: 2021-01-07
Attending: NURSE PRACTITIONER
Payer: COMMERCIAL

## 2021-01-07 ENCOUNTER — HOSPITAL ENCOUNTER (EMERGENCY)
Age: 66
Discharge: HOME | End: 2021-01-07
Attending: EMERGENCY MEDICINE
Payer: COMMERCIAL

## 2021-01-07 ENCOUNTER — HOSPITAL ENCOUNTER (OUTPATIENT)
Dept: GENERAL RADIOLOGY | Age: 66
Discharge: HOME | End: 2021-01-07
Attending: EMERGENCY MEDICINE
Payer: COMMERCIAL

## 2021-01-07 ENCOUNTER — HOSPITAL ENCOUNTER (OUTPATIENT)
Dept: LAB | Age: 66
Discharge: HOME | End: 2021-01-07
Attending: NURSE PRACTITIONER
Payer: COMMERCIAL

## 2021-01-07 VITALS
DIASTOLIC BLOOD PRESSURE: 78 MMHG | OXYGEN SATURATION: 95 % | SYSTOLIC BLOOD PRESSURE: 132 MMHG | BODY MASS INDEX: 33.6 KG/M2 | TEMPERATURE: 98.9 F | RESPIRATION RATE: 16 BRPM | HEIGHT: 71 IN | HEART RATE: 94 BPM | WEIGHT: 240 LBS

## 2021-01-07 DIAGNOSIS — R06.02 SHORTNESS OF BREATH: ICD-10-CM

## 2021-01-07 DIAGNOSIS — S93.492A SPRAIN OF ANTERIOR TALOFIBULAR LIGAMENT OF LEFT ANKLE, INITIAL ENCOUNTER: Primary | ICD-10-CM

## 2021-01-07 LAB
ERYTHROCYTE [DISTWIDTH] IN BLOOD BY AUTOMATED COUNT: 14.3 % (ref 11.9–14.6)
FERRITIN SERPL-MCNC: 608 NG/ML (ref 8–388)
HCT VFR BLD AUTO: 26.9 % (ref 41.1–50.3)
HGB BLD-MCNC: 8.4 G/DL (ref 13.6–17.2)
IRON SATN MFR SERPL: 17 %
IRON SERPL-MCNC: 40 UG/DL (ref 35–150)
MCH RBC QN AUTO: 27.6 PG (ref 26.1–32.9)
MCHC RBC AUTO-ENTMCNC: 31.2 G/DL (ref 31.4–35)
MCV RBC AUTO: 88.5 FL (ref 79.6–97.8)
NRBC # BLD: 0 K/UL (ref 0–0.2)
PLATELET # BLD AUTO: 330 K/UL (ref 150–450)
PMV BLD AUTO: 8.6 FL (ref 9.4–12.3)
RBC # BLD AUTO: 3.04 M/UL (ref 4.23–5.6)
TIBC SERPL-MCNC: 230 UG/DL (ref 250–450)
WBC # BLD AUTO: 6 K/UL (ref 4.3–11.1)

## 2021-01-07 PROCEDURE — 85027 COMPLETE CBC AUTOMATED: CPT

## 2021-01-07 PROCEDURE — 82728 ASSAY OF FERRITIN: CPT

## 2021-01-07 PROCEDURE — 83550 IRON BINDING TEST: CPT

## 2021-01-07 PROCEDURE — 99282 EMERGENCY DEPT VISIT SF MDM: CPT

## 2021-01-07 PROCEDURE — 36415 COLL VENOUS BLD VENIPUNCTURE: CPT

## 2021-01-07 PROCEDURE — 73610 X-RAY EXAM OF ANKLE: CPT

## 2021-01-07 PROCEDURE — 71046 X-RAY EXAM CHEST 2 VIEWS: CPT

## 2021-01-07 RX ORDER — MELOXICAM 15 MG/1
15 TABLET ORAL DAILY
Qty: 10 TAB | Refills: 0 | Status: SHIPPED | OUTPATIENT
Start: 2021-01-07 | End: 2022-02-28

## 2021-01-07 NOTE — ED TRIAGE NOTES
Patient advises left ankle pain for a while and today so severe he cannot stand on it. Patient with high rise boot on in triage and denies any swelling or known trauma. Mask on during triage.

## 2021-01-07 NOTE — ED PROVIDER NOTES
Mask was worn during the entire patient examination. Sumit Lopez is a 60 y.o. male who presents to the ED with a chief complaint of left ankle pain. States he has difficulty standing on it. It has hurt him for about 2 weeks now. He has had no injuries that he knows of. No fever or chills. He does have a history of gout. The foot hurts mainly just anterior to the ankle. Pain is a 5 out of 10 hurts worse for the first few steps and then loosens up.              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    Type 2 diabetes mellitus ([facility] [address]     oral med, does not check BG at home; unknown last A1c       Past Surgical History:   Procedure Laterality Date    HX COLONOSCOPY  2016    HX HEENT  12/2017    bronchoscopy    HX HEENT  10/01/2018    Direct laryngoscopy with CO2 laser of supraglottic swelling    HX HEENT  12/03/2018    Panendoscopy w/Scar Revision/SFE/12-03-18    HX HEENT  02/24/2020    S/P SFE Direct laryngoscopy with biopsy, CO2 laser of supraglottic airway obstruction, Bronchoscopy with tracheal balloon dilation of tracheal stenosis 02/24/2020    HX HEENT  06/15/2020    Direct laryngoscopy with CO2 laser    HX OTHER SURGICAL Left age 11    2rd nipple removed    HX OTHER SURGICAL  01/2017    PEG placement and removal    HX TRACHEOSTOMY      placement and removal    HX VASCULAR ACCESS      placement and removal         Family History:   Problem Relation Age of Onset    Stroke Mother     Lung Disease Mother        Social History     Socioeconomic History    Marital status:      Spouse name: Not on file    Number of children: Not on file    Years of education: Not on file   [facility] education level: Not on file   Occupational History    Not on file   Social Needs    Financial resource strain: Not on file    Food insecurity     Worry: Not on file     Inability: Not on file    Transportation needs     Medical: Not on file     Non-medical: Not on file   Tobacco Use    Smoking status: Former Smoker     Packs/day: 2.00     Years: 20.00     Pack years: 40.00     Quit date:      Years since quittin.0    Smokeless tobacco: Never Used   Substance and Sexual Activity    Alcohol use: No    Drug use: No    Sexual activity: Not on file   Lifestyle    Physical activity     Days per week: Not on file     Minutes per session: Not on file    Stress: Not on file   Relationships    Social connections     Talks on phone: Not on file     Gets together: Not on file     Attends Rastafari service: Not on file     Active member of club or organization: Not on file     Attends meetings of clubs or organizations: Not on file     Relationship status: Not on file    Intimate partner violence     Fear of current or ex partner: Not on file     Emotionally abused: Not on file     Physically abused: Not on file     Forced sexual activity: Not on file   Other Topics Concern    Not on file   Social History Narrative    Not on file         ALLERGIES: Zofran [ondansetron hcl (pf)]    Review of Systems   Constitutional: Negative for chills and fever. Respiratory: Negative for cough, chest tightness, shortness of breath, wheezing and stridor. Cardiovascular: Negative for chest pain and palpitations. Musculoskeletal: Positive for arthralgias and gait problem. Negative for joint swelling, myalgias, neck pain and neck stiffness. Skin: Negative for color change, pallor and wound. Neurological: Negative for weakness and numbness. All other systems reviewed and are negative.       Vitals:    21 1155   BP: 132/78   Pulse: 94   Resp: 16   Temp: 98.9 °F (37.2 °C)   SpO2: 95%   Weight: 108.9 kg (240 lb)   Height: 5' 11\" (1.803 m)            Physical Exam  Vitals signs and nursing note reviewed. Constitutional:       General: He is not in acute distress. Appearance: He is well-developed. He is not ill-appearing, toxic-appearing or diaphoretic. HENT:      Head: Normocephalic and atraumatic. Mouth/Throat:      Mouth: Mucous membranes are moist.   Eyes:      General: No scleral icterus. Conjunctiva/sclera: Conjunctivae normal.   Neck:      Trachea: No tracheal deviation. Pulmonary:      Effort: Pulmonary effort is normal. No respiratory distress. Breath sounds: No stridor. No wheezing, rhonchi or rales. Abdominal:      General: There is no distension. Tenderness: There is no abdominal tenderness. There is no guarding or rebound. Musculoskeletal:      Comments: Pain anterior to the medial malleolus. Skin:     Capillary Refill: Capillary refill takes less than 2 seconds. Findings: No erythema or rash. Neurological:      General: No focal deficit present. Mental Status: He is alert and oriented to person, place, and time. Mental status is at baseline. Psychiatric:         Mood and Affect: Mood normal.         Behavior: Behavior normal.          MDM  Number of Diagnoses or Management Options  Diagnosis management comments: Patient has negative x-ray no signs of abnormality on exam good pulses intact I will refer to orthopedics and treat with splint and NSAIDs. Everton Morrissey MD; 1/7/2021 @1:43 PM Voice dictation software was used during the making of this note. This software is not perfect and grammatical and other typographical errors may be present.   This note has not been proofread for errors.  ===================================================================          Amount and/or Complexity of Data Reviewed  Tests in the radiology section of CPT®: ordered and reviewed (Xr Chest Pa Lat    Result Date: 1/7/2021  Exam: XR CHEST PA LAT on 1/7/2021 11:36 AM Clinical History: The Male patient is 60years old  presenting for shortness of breath. Comparison:  Chest x-ray 12/17/2020 Findings:  Frontal and lateral views of the chest were obtained. There is a persistent/recurrent small left basilar effusion. There are underlying changes of COPD. .  The cardiomediastinal silhouette is within normal limits. There are no acute osseous abnormalities. Impression:  1. Persistent/recurrent small left basilar effusion superimposed on COPD. CPT code(s) 70343     Xr Ankle Lt Min 3 V    Result Date: 1/7/2021  Exam: XR ANKLE LT MIN 3 V on 1/7/2021 12:13 PM Clinical History: The Male patient is 60years old  presenting for pain. Comparison:  none Findings: 3 views of the left ankle were obtained. No fracture or dislocation is identified. There is prominent ventral and dorsal calcaneal spurring. Joint spaces are well-maintained. Impression: 1.  No acute osseous or joint abnormalities.    )           Procedures

## 2021-02-08 ENCOUNTER — HOSPITAL ENCOUNTER (OUTPATIENT)
Dept: GENERAL RADIOLOGY | Age: 66
Discharge: HOME | End: 2021-02-08
Payer: COMMERCIAL

## 2021-02-08 DIAGNOSIS — J90 PLEURAL EFFUSION: ICD-10-CM

## 2021-02-08 DIAGNOSIS — R06.02 SOB (SHORTNESS OF BREATH): ICD-10-CM

## 2021-02-08 PROCEDURE — 71046 X-RAY EXAM CHEST 2 VIEWS: CPT

## 2021-06-10 ENCOUNTER — APPOINTMENT (OUTPATIENT)
Dept: GENERAL RADIOLOGY | Age: 66
End: 2021-06-10
Attending: PHYSICIAN ASSISTANT
Payer: COMMERCIAL

## 2021-06-10 ENCOUNTER — HOSPITAL ENCOUNTER (EMERGENCY)
Age: 66
Discharge: HOME | End: 2021-06-10
Attending: EMERGENCY MEDICINE
Payer: COMMERCIAL

## 2021-06-10 VITALS
BODY MASS INDEX: 35.07 KG/M2 | HEART RATE: 86 BPM | SYSTOLIC BLOOD PRESSURE: 148 MMHG | HEIGHT: 70 IN | WEIGHT: 245 LBS | TEMPERATURE: 97.9 F | RESPIRATION RATE: 16 BRPM | DIASTOLIC BLOOD PRESSURE: 77 MMHG | OXYGEN SATURATION: 95 %

## 2021-06-10 DIAGNOSIS — M94.0 COSTOCHONDRITIS: Primary | ICD-10-CM

## 2021-06-10 PROCEDURE — 71101 X-RAY EXAM UNILAT RIBS/CHEST: CPT

## 2021-06-10 PROCEDURE — 99282 EMERGENCY DEPT VISIT SF MDM: CPT

## 2021-06-10 RX ORDER — PREDNISONE 20 MG/1
20 TABLET ORAL DAILY
Qty: 10 TABLET | Refills: 0 | Status: SHIPPED | OUTPATIENT
Start: 2021-06-10 | End: 2021-06-20

## 2021-06-10 RX ORDER — METHOCARBAMOL 750 MG/1
750 TABLET, FILM COATED ORAL 3 TIMES DAILY
Qty: 30 TABLET | Refills: 0 | Status: SHIPPED | OUTPATIENT
Start: 2021-06-10 | End: 2021-06-20

## 2021-06-10 NOTE — ED PROVIDER NOTES
Patient to ER complaining of continued left lower rib pain on coughing worse over the last 3 days. Patient has a history of rib fracture that resulted in hemothorax about 6 months ago. Has been seen by primary care and pulmonary. Has any fever, cough is nonproductive has a heavy smoking history but states quit 15 years ago. he has no chest pain or shortness of breath    The history is provided by the patient. Rib Pain  This is a recurrent problem. The average episode lasts 3 days. The current episode started more than 2 days ago. The problem has not changed since onset. Associated symptoms include cough. Pertinent negatives include no fever. Precipitated by: History of rib fractures. He has tried nothing for the symptoms. The treatment provided no relief. He has had no prior hospitalizations. He has had no prior ED visits. He has had prior ICU admissions.         Past Medical History:   Diagnosis Date    Anxiety     Controlled with meds     GERD (gastroesophageal reflux disease)     managed with medication     Gout     symptoms in ankles, no recent episodes    Hypertension     managed with medication     Nausea & vomiting     Obesity     Squamous cell carcinoma of epiglottis ([facility] [address] 11/23/2016    Type 2 diabetes mellitus ([facility] [address]     oral med, does not check BG at home; unknown last A1c       Past Surgical History:   Procedure Laterality Date    HX COLONOSCOPY  2016    HX HEENT  12/2017    bronchoscopy    HX HEENT  10/01/2018    Direct laryngoscopy with CO2 laser of supraglottic swelling    HX HEENT  12/03/2018    Panendoscopy w/Scar Revision/SFE/12-03-18    HX HEENT  02/24/2020    S/P SFE Direct laryngoscopy with biopsy, CO2 laser of supraglottic airway obstruction, Bronchoscopy with tracheal balloon dilation of tracheal stenosis 02/24/2020    HX HEENT  06/15/2020    Direct laryngoscopy with CO2 laser    HX OTHER SURGICAL Left age 11    2rd nipple removed    HX OTHER SURGICAL  01/2017    PEG placement and removal    HX TRACHEOSTOMY      placement and removal    HX VASCULAR ACCESS      placement and removal         Family History:   Problem Relation Age of Onset    Stroke Mother     Lung Disease Mother        Social History     Socioeconomic History    Marital status:      Spouse name: Not on file    Number of children: Not on file    Years of education: Not on file    Highest education level: Not on file   Occupational History    Not on file   Tobacco Use    Smoking status: Former Smoker     Packs/day: 2.00     Years: 20.00     Pack years: 40.00     Quit date:      Years since quittin.4    Smokeless tobacco: Never Used   Substance and Sexual Activity    Alcohol use: No    Drug use: No    Sexual activity: Not on file   Other Topics Concern    Not on file   Social History Narrative    Not on file     Social Determinants of Health     Financial Resource Strain:     Difficulty of Paying Living Expenses:    Food Insecurity:     Worried About [employer] in the Last Year:     [employer] in the Last Year:    Transportation Needs:     Lack of Transportation (Medical):  Lack of Transportation (Non-Medical):    Physical Activity:     Days of Exercise per Week:     Minutes of Exercise per Session:    Stress:     Feeling of Stress :    Social Connections:     Frequency of Communication with Friends and Family:     Frequency of Social Gatherings with Friends and Family:     Attends Voodoo Services:     Active Member of Clubs or Organizations:     Attends Club or Organization Meetings:     Marital Status:    Intimate Partner Violence:     Fear of Current or Ex-Partner:     Emotionally Abused:     Physically Abused:     Sexually Abused: ALLERGIES: Zofran [ondansetron hcl (pf)]    Review of Systems   Constitutional: Negative for fever. Respiratory: Positive for cough. All other systems reviewed and are negative.       Vitals:    06/10/21 1219   BP: (!) 148/77   Pulse: 86   Resp: 16   Temp: 97.9 °F (36.6 °C)   SpO2: 95%   Weight: 111.1 kg (245 lb)   Height: 5' 10\" (1.778 m)            Physical Exam  Vitals and nursing note reviewed. Constitutional:       General: He is not in acute distress. Appearance: Normal appearance. He is well-developed. He is obese. He is not diaphoretic. HENT:      Head: Normocephalic and atraumatic. Eyes:      Pupils: Pupils are equal, round, and reactive to light. Cardiovascular:      Rate and Rhythm: Normal rate and regular rhythm. Pulmonary:      Effort: Pulmonary effort is normal.      Breath sounds: Normal breath sounds. Comments: Lungs are clear no pain to deep inspiration. Patient points to the left lower rib area as tender when coughing. No skin changes noted no crepitus appreciated  Chest:      Chest wall: Tenderness present. Abdominal:      General: Bowel sounds are normal.      Palpations: Abdomen is soft. Musculoskeletal:         General: Normal range of motion. Cervical back: Normal range of motion and neck supple. Skin:     General: Skin is warm. Neurological:      Mental Status: He is alert and oriented to person, place, and time. MDM  Number of Diagnoses or Management Options  Diagnosis management comments: XR RIBS LT W PA CXR MIN 3 V   Final Result    Displaced rib fractures again seen on the left. Patient's O2 sats are good lungs are clear I still feel this is just residual pain from chronic rib fractures due to displacement will likely never completely heal.  We will treat with muscle relaxers and anti-inflammatories.   Stressed to follow-up with primary care for routine recheck       Amount and/or Complexity of Data Reviewed  Tests in the radiology section of CPT®: ordered and reviewed  Review and summarize past medical records: yes    Risk of Complications, Morbidity, and/or Mortality  Presenting problems: moderate  Diagnostic procedures: moderate  Management options: low    Patient Progress  Patient progress: improved         Procedures

## 2021-06-10 NOTE — ED TRIAGE NOTES
Pt states he had broken rib on left side about six months ago and is still having pain in the area. States he had surgery in the area in the past few months because it started bleeding in that area after the broken rib and needed a drain. States he coughs a good bit at night and the pain is getting worse. Denies SOB. Masked for triage.

## 2021-06-10 NOTE — ED NOTES
I have reviewed discharge instructions with the patient. The patient verbalized understanding. Patient left ED via Discharge Method: ambulatory to Home. Opportunity for questions and clarification provided. Patient given 0 scripts. Robaxin and prednisone e-scribed to patient's pharmacy. Deep breathing exercises. To continue your aftercare when you leave the hospital, you may receive an automated call from our care team to check in on how you are doing. This is a free service and part of our promise to provide the best care and service to meet your aftercare needs.  If you have questions, or wish to unsubscribe from this service please call [phone]. Thank you for Choosing our [facility].

## 2021-06-21 ENCOUNTER — HOSPITAL ENCOUNTER (EMERGENCY)
Age: 66
Discharge: HOME | End: 2021-06-21
Attending: EMERGENCY MEDICINE
Payer: COMMERCIAL

## 2021-06-21 ENCOUNTER — APPOINTMENT (OUTPATIENT)
Dept: GENERAL RADIOLOGY | Age: 66
End: 2021-06-21
Attending: EMERGENCY MEDICINE
Payer: COMMERCIAL

## 2021-06-21 VITALS
WEIGHT: 248 LBS | HEIGHT: 70 IN | SYSTOLIC BLOOD PRESSURE: 123 MMHG | DIASTOLIC BLOOD PRESSURE: 72 MMHG | RESPIRATION RATE: 18 BRPM | OXYGEN SATURATION: 99 % | BODY MASS INDEX: 35.5 KG/M2 | HEART RATE: 83 BPM | TEMPERATURE: 98.5 F

## 2021-06-21 DIAGNOSIS — M65.9 TENOSYNOVITIS OF FINGER AND HAND: Primary | ICD-10-CM

## 2021-06-21 LAB
ANION GAP SERPL CALC-SCNC: 7 MMOL/L (ref 7–16)
BASOPHILS # BLD: 0 K/UL (ref 0–0.2)
BASOPHILS NFR BLD: 1 % (ref 0–2)
BUN SERPL-MCNC: 23 MG/DL (ref 8–23)
CALCIUM SERPL-MCNC: 8.2 MG/DL (ref 8.3–10.4)
CHLORIDE SERPL-SCNC: 97 MMOL/L (ref 98–107)
CO2 SERPL-SCNC: 28 MMOL/L (ref 21–32)
CREAT SERPL-MCNC: 1.61 MG/DL (ref 0.8–1.5)
CRP SERPL-MCNC: 2.5 MG/DL (ref 0–0.9)
DIFFERENTIAL METHOD BLD: ABNORMAL
EOSINOPHIL # BLD: 0.2 K/UL (ref 0–0.8)
EOSINOPHIL NFR BLD: 3 % (ref 0.5–7.8)
ERYTHROCYTE [DISTWIDTH] IN BLOOD BY AUTOMATED COUNT: 13.7 % (ref 11.9–14.6)
GLUCOSE SERPL-MCNC: 335 MG/DL (ref 65–100)
HCT VFR BLD AUTO: 30.1 % (ref 41.1–50.3)
HGB BLD-MCNC: 10.3 G/DL (ref 13.6–17.2)
IMM GRANULOCYTES # BLD AUTO: 0 K/UL (ref 0–0.5)
IMM GRANULOCYTES NFR BLD AUTO: 0 % (ref 0–5)
LYMPHOCYTES # BLD: 0.7 K/UL (ref 0.5–4.6)
LYMPHOCYTES NFR BLD: 11 % (ref 13–44)
MCH RBC QN AUTO: 28.2 PG (ref 26.1–32.9)
MCHC RBC AUTO-ENTMCNC: 34.2 G/DL (ref 31.4–35)
MCV RBC AUTO: 82.5 FL (ref 79.6–97.8)
MONOCYTES # BLD: 0.3 K/UL (ref 0.1–1.3)
MONOCYTES NFR BLD: 6 % (ref 4–12)
NEUTS SEG # BLD: 4.6 K/UL (ref 1.7–8.2)
NEUTS SEG NFR BLD: 79 % (ref 43–78)
NRBC # BLD: 0 K/UL (ref 0–0.2)
PLATELET # BLD AUTO: 153 K/UL (ref 150–450)
PMV BLD AUTO: 9.2 FL (ref 9.4–12.3)
POTASSIUM SERPL-SCNC: 3.8 MMOL/L (ref 3.5–5.1)
RBC # BLD AUTO: 3.65 M/UL (ref 4.23–5.6)
SODIUM SERPL-SCNC: 132 MMOL/L (ref 136–145)
URATE SERPL-MCNC: 3.7 MG/DL (ref 2.6–6)
WBC # BLD AUTO: 5.8 K/UL (ref 4.3–11.1)

## 2021-06-21 PROCEDURE — 73130 X-RAY EXAM OF HAND: CPT

## 2021-06-21 PROCEDURE — 99283 EMERGENCY DEPT VISIT LOW MDM: CPT

## 2021-06-21 PROCEDURE — 86140 C-REACTIVE PROTEIN: CPT

## 2021-06-21 PROCEDURE — 84550 ASSAY OF BLOOD/URIC ACID: CPT

## 2021-06-21 PROCEDURE — 74011250637 HC RX REV CODE- 250/637: Performed by: EMERGENCY MEDICINE

## 2021-06-21 PROCEDURE — 85025 COMPLETE CBC W/AUTO DIFF WBC: CPT

## 2021-06-21 PROCEDURE — 80048 BASIC METABOLIC PNL TOTAL CA: CPT

## 2021-06-21 RX ORDER — DOXYCYCLINE HYCLATE 100 MG
100 TABLET ORAL 2 TIMES DAILY
Qty: 14 TABLET | Refills: 0 | Status: SHIPPED | OUTPATIENT
Start: 2021-06-21 | End: 2022-02-28

## 2021-06-21 RX ORDER — AMOXICILLIN AND CLAVULANATE POTASSIUM 875; 125 MG/1; MG/1
1 TABLET, FILM COATED ORAL 2 TIMES DAILY
Qty: 20 TABLET | Refills: 0 | Status: SHIPPED | OUTPATIENT
Start: 2021-06-21

## 2021-06-21 RX ORDER — HYDROCODONE BITARTRATE AND ACETAMINOPHEN 5; 325 MG/1; MG/1
1 TABLET ORAL
Qty: 10 TABLET | Refills: 0 | Status: SHIPPED | OUTPATIENT
Start: 2021-06-21 | End: 2021-06-24

## 2021-06-21 RX ORDER — DOXYCYCLINE 100 MG/1
100 CAPSULE ORAL
Status: COMPLETED | OUTPATIENT
Start: 2021-06-21 | End: 2021-06-21

## 2021-06-21 RX ORDER — AMOXICILLIN AND CLAVULANATE POTASSIUM 875; 125 MG/1; MG/1
1 TABLET, FILM COATED ORAL
Status: COMPLETED | OUTPATIENT
Start: 2021-06-21 | End: 2021-06-21

## 2021-06-21 RX ADMIN — AMOXICILLIN AND CLAVULANATE POTASSIUM 1 TABLET: 875; 125 TABLET, FILM COATED ORAL at 17:29

## 2021-06-21 RX ADMIN — DOXYCYCLINE HYCLATE 100 MG: 100 CAPSULE ORAL at 17:29

## 2021-06-21 NOTE — ED PROVIDER NOTES
61-year-old male has a history of hypertension and borderline diabetes. He is left-handed. He has noticed 1 week history of increasing pain in the dorsum of his hand. There is been some increasing swelling and warmth as well. Denies any wounds or any trauma. No history of gout. No numbness or weakness. No rashes or drainage. The history is provided by the patient. Hand Pain   This is a new problem. The current episode started more than 2 days ago. The problem occurs constantly. The problem has been gradually worsening. The pain is present in the left hand. The quality of the pain is described as dull and aching. The pain is moderate. Associated symptoms include limited range of motion. Pertinent negatives include no numbness, no back pain and no neck pain. The symptoms are aggravated by movement. He has tried nothing for the symptoms. There has been no history of extremity trauma.         Past Medical History:   Diagnosis Date    Anxiety     Controlled with meds     GERD (gastroesophageal reflux disease)     managed with medication     Gout     symptoms in ankles, no recent episodes    Hypertension     managed with medication     Nausea & vomiting     Obesity     Squamous cell carcinoma of epiglottis ([facility] [address] 11/23/2016    Type 2 diabetes mellitus ([facility] [address]     oral med, does not check BG at home; unknown last A1c       Past Surgical History:   Procedure Laterality Date    HX COLONOSCOPY  2016    HX HEENT  12/2017    bronchoscopy    HX HEENT  10/01/2018    Direct laryngoscopy with CO2 laser of supraglottic swelling    HX HEENT  12/03/2018    Panendoscopy w/Scar Revision/[facility]/12-03-18    HX HEENT  02/24/2020    S/P [facility] Direct laryngoscopy with biopsy, CO2 laser of supraglottic airway obstruction, Bronchoscopy with tracheal balloon dilation of tracheal stenosis 02/24/2020    HX HEENT  06/15/2020    Direct laryngoscopy with CO2 laser    HX OTHER SURGICAL Left age 11    2rd nipple removed    HX OTHER SURGICAL  2017    PEG placement and removal    HX TRACHEOSTOMY      placement and removal    HX VASCULAR ACCESS      placement and removal         Family History:   Problem Relation Age of Onset    Stroke Mother     Lung Disease Mother        Social History     Socioeconomic History    Marital status:      Spouse name: Not on file    Number of children: Not on file    Years of education: Not on file    Highest education level: Not on file   Occupational History    Not on file   Tobacco Use    Smoking status: Former Smoker     Packs/day: 2.00     Years: 20.00     Pack years: 40.00     Quit date:      Years since quittin.4    Smokeless tobacco: Never Used   Substance and Sexual Activity    Alcohol use: No    Drug use: No    Sexual activity: Not on file   Other Topics Concern    Not on file   Social History Narrative    Not on file     Social Determinants of Health     Financial Resource Strain:     Difficulty of Paying Living Expenses:    Food Insecurity:     Worried About Running Out of Food in the Last Year:     [address] in the Last Year:    Transportation Needs:     Lack of Transportation (Medical):  Lack of Transportation (Non-Medical):    Physical Activity:     Days of Exercise per Week:     Minutes of Exercise per Session:    Stress:     Feeling of Stress :    Social Connections:     Frequency of Communication with Friends and Family:     Frequency of Social Gatherings with Friends and Family:     Attends [employer] Services:     Active Member of Clubs or Organizations:     Attends Club or Organization Meetings:     Marital Status:    Intimate Partner Violence:     Fear of Current or Ex-Partner:     Emotionally Abused:     Physically Abused:     Sexually Abused: ALLERGIES: Zofran [ondansetron hcl (pf)]    Review of Systems   Constitutional: Negative for chills and fever. Musculoskeletal: Negative for back pain and neck pain.    Skin: Positive for color change. Negative for rash and wound. Neurological: Negative for weakness and numbness. Vitals:    06/21/21 1457   BP: 123/72   Pulse: 83   Resp: 18   Temp: 98.5 °F (36.9 °C)   SpO2: 99%   Weight: 112.5 kg (248 lb)   Height: 5' 10\" (1.778 m)            Physical Exam  Vitals and nursing note reviewed. Constitutional:       Appearance: He is not ill-appearing. Eyes:      General: No scleral icterus. Conjunctiva/sclera: Conjunctivae normal.   Musculoskeletal:      Comments: Lamination the left hand reveals some soft tissue swelling dorsally. Point of maximal tenderness appears to be over the third metacarpal head. Some tenderness along the tendon proximal finger into the dorsum of the hand. No palmar tenderness. Minimal pain with passive extension. No crepitus. No fluctuance. Distal neurovascular intact. Some warmth and erythema. Skin:     General: Skin is warm and dry. Comments: Number of abrasions on both forearms of varying age with some scattered areas of ecchymosis as well. Slight erythema left hand dorsum. Neurological:      Mental Status: He is alert. MDM  Number of Diagnoses or Management Options  Diagnosis management comments: Will get imaging. No evidence for flexor tenosynovitis. Potential for dorsal tenosynovitis. Assess for gout. Check CRP and CBC.        Amount and/or Complexity of Data Reviewed  Clinical lab tests: ordered and reviewed  Tests in the radiology section of CPT®: ordered and reviewed  Discuss the patient with other providers: yes  Independent visualization of images, tracings, or specimens: yes    Risk of Complications, Morbidity, and/or Mortality  Presenting problems: moderate  Diagnostic procedures: minimal  Management options: low    Patient Progress  Patient progress: stable         Bedside US    Date/Time: 6/21/2021 5:12 PM  Performed by: Alma Martin MD  Authorized by: Alma Martin MD     Written consent obtained: Yes    Type of procedure: Focused soft tissue  Left leg: Indications:  Swelling and pain    Skin and subcutaneous tissue:  Adequate    Subcutaneous Collection:  Present    Ultrasound appears to show some fluid collection overlying the extensor tendon of the third finger. No such collection either exceeded extension tendons otherwise. Results Include:    Recent Results (from the past 24 hour(s))   CBC WITH AUTOMATED DIFF    Collection Time: 06/21/21  4:05 PM   Result Value Ref Range    WBC 5.8 4.3 - 11.1 K/uL    RBC 3.65 (L) 4.23 - 5.6 M/uL    HGB 10.3 (L) 13.6 - 17.2 g/dL    HCT 30.1 (L) 41.1 - 50.3 %    MCV 82.5 79.6 - 97.8 FL    MCH 28.2 26.1 - 32.9 PG    MCHC 34.2 31.4 - 35.0 g/dL    RDW 13.7 11.9 - 14.6 %    PLATELET 934 867 - 616 K/uL    MPV 9.2 (L) 9.4 - 12.3 FL    ABSOLUTE NRBC 0.00 0.0 - 0.2 K/uL    DF AUTOMATED      NEUTROPHILS 79 (H) 43 - 78 %    LYMPHOCYTES 11 (L) 13 - 44 %    MONOCYTES 6 4.0 - 12.0 %    EOSINOPHILS 3 0.5 - 7.8 %    BASOPHILS 1 0.0 - 2.0 %    IMMATURE GRANULOCYTES 0 0.0 - 5.0 %    ABS. NEUTROPHILS 4.6 1.7 - 8.2 K/UL    ABS. LYMPHOCYTES 0.7 0.5 - 4.6 K/UL    ABS. MONOCYTES 0.3 0.1 - 1.3 K/UL    ABS. EOSINOPHILS 0.2 0.0 - 0.8 K/UL    ABS. BASOPHILS 0.0 0.0 - 0.2 K/UL    ABS. IMM.  GRANS. 0.0 0.0 - 0.5 K/UL   METABOLIC PANEL, BASIC    Collection Time: 06/21/21  4:05 PM   Result Value Ref Range    Sodium 132 (L) 136 - 145 mmol/L    Potassium 3.8 3.5 - 5.1 mmol/L    Chloride 97 (L) 98 - 107 mmol/L    CO2 28 21 - 32 mmol/L    Anion gap 7 7 - 16 mmol/L    Glucose 335 (H) 65 - 100 mg/dL    BUN 23 8 - 23 MG/DL    Creatinine 1.61 (H) 0.8 - 1.5 MG/DL    GFR est AA 56 (L) >60 ml/min/1.73m2    GFR est non-AA 46 (L) >60 ml/min/1.73m2    Calcium 8.2 (L) 8.3 - 10.4 MG/DL   C REACTIVE PROTEIN, QT    Collection Time: 06/21/21  4:05 PM   Result Value Ref Range    C-Reactive protein 2.5 (H) 0.0 - 0.9 mg/dL   URIC ACID    Collection Time: 06/21/21  4:05 PM   Result Value Ref Range    Uric acid 3.7 2.6 - 6.0 MG/DL     XR HAND LT MIN 3 V    Result Date: 6/21/2021  Left hand CLINICAL INDICATION: Left hand swelling and pain for three days, no known injury FINDINGS: Three views of the left hand show the bones to be well mineralized. There is no fracture. The joint spaces are well-maintained. No erosions evident. No acute osseous abnormality or joint derangement of the left hand. We will discussed with orthopedics. Anticipate splint, antibiotics and office follow-up. Ultrasound showing fluid collection immediately above the third extensor tendon on the left hand.

## 2021-06-21 NOTE — DISCHARGE INSTRUCTIONS
Rest.  Elevate hand. Use warm compresses. Take both antibiotics. Tylenol or ibuprofen or prescription pain medication. Call orthopedic doctor in the morning for appointment to check in the next 24 hours. Wear splint until recheck. Recheck for high fever shaking chills or much worse pain or numbness.

## 2022-02-28 ENCOUNTER — HOSPITAL ENCOUNTER (OUTPATIENT)
Dept: SURGERY | Age: 67
Discharge: HOME | End: 2022-02-28

## 2022-02-28 VITALS — BODY MASS INDEX: 28.28 KG/M2 | WEIGHT: 176 LBS | HEIGHT: 66 IN

## 2022-02-28 RX ORDER — INSULIN GLARGINE AND LIXISENATIDE 100; 33 U/ML; UG/ML
23 INJECTION, SOLUTION SUBCUTANEOUS
COMMUNITY

## 2022-02-28 RX ORDER — LOSARTAN POTASSIUM AND HYDROCHLOROTHIAZIDE 12.5; 5 MG/1; MG/1
1 TABLET ORAL DAILY
COMMUNITY

## 2022-02-28 RX ORDER — HYDROGEN PEROXIDE 3 %
20 SOLUTION, NON-ORAL MISCELLANEOUS DAILY
COMMUNITY

## 2022-02-28 RX ORDER — ROSUVASTATIN CALCIUM 20 MG/1
20 TABLET, COATED ORAL DAILY
COMMUNITY

## 2022-02-28 NOTE — PERIOP NOTES
Patient verified name and . Pt requests assessment be completed with wife - Mony Corea. Order for consent NOT found in EHR ; patient's wife verifies procedure. Type 1b surgery, Phone assessment complete. Orders not received. Labs per surgeon: none  Labs per anesthesia protocol: K+ - pt to go to out pt lab prior. Pt with hx of difficult intubations. Most recent sx 6/15/20 @ [facility] - will have MDA review. Pt seen by pulm 21 - rx'd inhalers but spouse states pt does not have or use inhalers and no longer sees pulm. Patient's wife answered medical/surgical history questions at their best of ability. All prior to admission medications documented in [facility]. Patient's wife instructed to take the following medications the day of surgery according to anesthesia guidelines with a small sip of water: nexium, crestor. Will use 18 units (80%) insulin night before surgery. Hold all vitamins 7 days prior to surgery and NSAIDS 5 days prior to surgery. Prescription meds to hold:none. Pt will call PAT if she notes any med changes after speaking with pt tonight. Patient instructed on the following:    > Arrive at [address], time of arrival to be called the day before by 1700  > NPO after midnight including gum, mints, and ice chips  > Responsible adult must drive patient to the hospital, stay during surgery, and patient will need supervision 24 hours after anesthesia  > Use antibacterial soap in shower the night before surgery and on the morning of surgery  > All piercings must be removed prior to arrival.    > Leave all valuables (money and jewelry) at home but bring insurance card and ID on DOS.   > Do not wear make-up, nail polish, lotions, cologne, perfumes, powders, or oil on skin. Artificial nails are not permitted.

## 2022-03-01 NOTE — PERIOP NOTES
Dr. Danii Salcido reviewed chart - history of difficult intubation and Pulmonary Office Note 02/08/21. No order received. Ok to proceed.

## 2022-03-02 ENCOUNTER — HOSPITAL ENCOUNTER (OUTPATIENT)
Dept: LAB | Age: 67
Discharge: HOME | End: 2022-03-02
Attending: ANESTHESIOLOGY
Payer: MEDICARE

## 2022-03-02 LAB — POTASSIUM SERPL-SCNC: 3.1 MMOL/L (ref 3.5–5.1)

## 2022-03-02 PROCEDURE — 84132 ASSAY OF SERUM POTASSIUM: CPT

## 2022-03-02 PROCEDURE — 36415 COLL VENOUS BLD VENIPUNCTURE: CPT

## 2022-03-04 NOTE — PERIOP NOTES
[address] [facility],[address] [facility] Phone Call (performed day before scheduled surgery):    1. Have you have any exposure to anyone who has tested positive for COVID19 in the last 7 days? Patient Response: no      2.    Have you experienced any of the below symptoms in the last 48 hours?      -New onset respiratory symptoms                  -Fever or chills                  -Cough / Congestion / running nose                  -Shortness of breath or difficulty breathing                  -Headache                 -Sore Throat                 -New loss or taste or smell                 -Nausea / Vomiting / Diarrhea           Patient Response: no    Comments:

## 2022-03-06 ENCOUNTER — ANESTHESIA EVENT (OUTPATIENT)
Dept: SURGERY | Age: 67
End: 2022-03-06
Payer: MEDICARE

## 2022-03-07 ENCOUNTER — ANESTHESIA (OUTPATIENT)
Dept: SURGERY | Age: 67
End: 2022-03-07
Payer: MEDICARE

## 2022-03-07 ENCOUNTER — HOSPITAL ENCOUNTER (OUTPATIENT)
Age: 67
Setting detail: OUTPATIENT SURGERY
Discharge: HOME | End: 2022-03-07
Attending: OTOLARYNGOLOGY | Admitting: OTOLARYNGOLOGY
Payer: MEDICARE

## 2022-03-07 VITALS
HEART RATE: 75 BPM | HEIGHT: 66 IN | WEIGHT: 251 LBS | SYSTOLIC BLOOD PRESSURE: 134 MMHG | BODY MASS INDEX: 40.34 KG/M2 | OXYGEN SATURATION: 92 % | TEMPERATURE: 98 F | RESPIRATION RATE: 16 BRPM | DIASTOLIC BLOOD PRESSURE: 72 MMHG

## 2022-03-07 LAB
GLUCOSE BLD STRIP.AUTO-MCNC: 215 MG/DL (ref 65–100)
SERVICE CMNT-IMP: ABNORMAL

## 2022-03-07 PROCEDURE — 77030040922 HC BLNKT HYPOTHRM STRY -A: Performed by: ANESTHESIOLOGY

## 2022-03-07 PROCEDURE — 77030018836 HC SOL IRR NACL ICUM -A: Performed by: OTOLARYNGOLOGY

## 2022-03-07 PROCEDURE — 74011250637 HC RX REV CODE- 250/637: Performed by: ANESTHESIOLOGY

## 2022-03-07 PROCEDURE — 76210000020 HC REC RM PH II FIRST 0.5 HR: Performed by: OTOLARYNGOLOGY

## 2022-03-07 PROCEDURE — 2709999900 HC NON-CHARGEABLE SUPPLY: Performed by: OTOLARYNGOLOGY

## 2022-03-07 PROCEDURE — 76210000006 HC OR PH I REC 0.5 TO 1 HR: Performed by: OTOLARYNGOLOGY

## 2022-03-07 PROCEDURE — 77030008703 HC TU ET UNCUF COVD -A: Performed by: ANESTHESIOLOGY

## 2022-03-07 PROCEDURE — 74011250636 HC RX REV CODE- 250/636: Performed by: ANESTHESIOLOGY

## 2022-03-07 PROCEDURE — 76060000033 HC ANESTHESIA 1 TO 1.5 HR: Performed by: OTOLARYNGOLOGY

## 2022-03-07 PROCEDURE — 74011250636 HC RX REV CODE- 250/636: Performed by: NURSE ANESTHETIST, CERTIFIED REGISTERED

## 2022-03-07 PROCEDURE — 74011000250 HC RX REV CODE- 250: Performed by: ANESTHESIOLOGY

## 2022-03-07 PROCEDURE — 74011000250 HC RX REV CODE- 250: Performed by: NURSE ANESTHETIST, CERTIFIED REGISTERED

## 2022-03-07 PROCEDURE — 77030021678 HC GLIDESCP STAT DISP VERT -B: Performed by: ANESTHESIOLOGY

## 2022-03-07 PROCEDURE — 82962 GLUCOSE BLOOD TEST: CPT

## 2022-03-07 PROCEDURE — 76010000161 HC OR TIME 1 TO 1.5 HR INTENSV-TIER 1: Performed by: OTOLARYNGOLOGY

## 2022-03-07 RX ORDER — DIPHENHYDRAMINE HYDROCHLORIDE 50 MG/ML
12.5 INJECTION, SOLUTION INTRAMUSCULAR; INTRAVENOUS
Status: DISCONTINUED | OUTPATIENT
Start: 2022-03-07 | End: 2022-03-07 | Stop reason: HOSPADM

## 2022-03-07 RX ORDER — FENTANYL CITRATE 50 UG/ML
INJECTION, SOLUTION INTRAMUSCULAR; INTRAVENOUS AS NEEDED
Status: DISCONTINUED | OUTPATIENT
Start: 2022-03-07 | End: 2022-03-07 | Stop reason: HOSPADM

## 2022-03-07 RX ORDER — ALBUTEROL SULFATE 0.83 MG/ML
2.5 SOLUTION RESPIRATORY (INHALATION) AS NEEDED
Status: DISCONTINUED | OUTPATIENT
Start: 2022-03-07 | End: 2022-03-07 | Stop reason: HOSPADM

## 2022-03-07 RX ORDER — OXYCODONE HYDROCHLORIDE 5 MG/1
5 TABLET ORAL
Status: DISCONTINUED | OUTPATIENT
Start: 2022-03-07 | End: 2022-03-07 | Stop reason: HOSPADM

## 2022-03-07 RX ORDER — DEXAMETHASONE SODIUM PHOSPHATE 4 MG/ML
INJECTION, SOLUTION INTRA-ARTICULAR; INTRALESIONAL; INTRAMUSCULAR; INTRAVENOUS; SOFT TISSUE AS NEEDED
Status: DISCONTINUED | OUTPATIENT
Start: 2022-03-07 | End: 2022-03-07 | Stop reason: HOSPADM

## 2022-03-07 RX ORDER — NEOSTIGMINE METHYLSULFATE 1 MG/ML
INJECTION, SOLUTION INTRAVENOUS AS NEEDED
Status: DISCONTINUED | OUTPATIENT
Start: 2022-03-07 | End: 2022-03-07 | Stop reason: HOSPADM

## 2022-03-07 RX ORDER — PROPOFOL 10 MG/ML
INJECTION, EMULSION INTRAVENOUS AS NEEDED
Status: DISCONTINUED | OUTPATIENT
Start: 2022-03-07 | End: 2022-03-07 | Stop reason: HOSPADM

## 2022-03-07 RX ORDER — LIDOCAINE HYDROCHLORIDE 20 MG/ML
INJECTION, SOLUTION EPIDURAL; INFILTRATION; INTRACAUDAL; PERINEURAL AS NEEDED
Status: DISCONTINUED | OUTPATIENT
Start: 2022-03-07 | End: 2022-03-07 | Stop reason: HOSPADM

## 2022-03-07 RX ORDER — HYDROMORPHONE HYDROCHLORIDE 2 MG/ML
0.5 INJECTION, SOLUTION INTRAMUSCULAR; INTRAVENOUS; SUBCUTANEOUS
Status: DISCONTINUED | OUTPATIENT
Start: 2022-03-07 | End: 2022-03-07 | Stop reason: HOSPADM

## 2022-03-07 RX ORDER — SODIUM CHLORIDE, SODIUM LACTATE, POTASSIUM CHLORIDE, CALCIUM CHLORIDE 600; 310; 30; 20 MG/100ML; MG/100ML; MG/100ML; MG/100ML
100 INJECTION, SOLUTION INTRAVENOUS CONTINUOUS
Status: DISCONTINUED | OUTPATIENT
Start: 2022-03-07 | End: 2022-03-07 | Stop reason: HOSPADM

## 2022-03-07 RX ORDER — LIDOCAINE HYDROCHLORIDE 10 MG/ML
0.1 INJECTION INFILTRATION; PERINEURAL AS NEEDED
Status: DISCONTINUED | OUTPATIENT
Start: 2022-03-07 | End: 2022-03-07 | Stop reason: HOSPADM

## 2022-03-07 RX ORDER — EPHEDRINE SULFATE/0.9% NACL/PF 50 MG/5 ML
SYRINGE (ML) INTRAVENOUS AS NEEDED
Status: DISCONTINUED | OUTPATIENT
Start: 2022-03-07 | End: 2022-03-07 | Stop reason: HOSPADM

## 2022-03-07 RX ORDER — NALOXONE HYDROCHLORIDE 0.4 MG/ML
0.1 INJECTION, SOLUTION INTRAMUSCULAR; INTRAVENOUS; SUBCUTANEOUS AS NEEDED
Status: DISCONTINUED | OUTPATIENT
Start: 2022-03-07 | End: 2022-03-07 | Stop reason: HOSPADM

## 2022-03-07 RX ORDER — GLYCOPYRROLATE 0.2 MG/ML
INJECTION INTRAMUSCULAR; INTRAVENOUS AS NEEDED
Status: DISCONTINUED | OUTPATIENT
Start: 2022-03-07 | End: 2022-03-07 | Stop reason: HOSPADM

## 2022-03-07 RX ORDER — SUCCINYLCHOLINE CHLORIDE 20 MG/ML
INJECTION INTRAMUSCULAR; INTRAVENOUS AS NEEDED
Status: DISCONTINUED | OUTPATIENT
Start: 2022-03-07 | End: 2022-03-07 | Stop reason: HOSPADM

## 2022-03-07 RX ORDER — FENTANYL CITRATE 50 UG/ML
100 INJECTION, SOLUTION INTRAMUSCULAR; INTRAVENOUS ONCE
Status: DISCONTINUED | OUTPATIENT
Start: 2022-03-07 | End: 2022-03-07 | Stop reason: HOSPADM

## 2022-03-07 RX ORDER — MIDAZOLAM HYDROCHLORIDE 1 MG/ML
2 INJECTION, SOLUTION INTRAMUSCULAR; INTRAVENOUS ONCE
Status: DISCONTINUED | OUTPATIENT
Start: 2022-03-07 | End: 2022-03-07 | Stop reason: HOSPADM

## 2022-03-07 RX ORDER — MIDAZOLAM HYDROCHLORIDE 1 MG/ML
2 INJECTION, SOLUTION INTRAMUSCULAR; INTRAVENOUS
Status: COMPLETED | OUTPATIENT
Start: 2022-03-07 | End: 2022-03-07

## 2022-03-07 RX ORDER — OXYCODONE HYDROCHLORIDE 5 MG/1
10 TABLET ORAL
Status: COMPLETED | OUTPATIENT
Start: 2022-03-07 | End: 2022-03-07

## 2022-03-07 RX ORDER — ROCURONIUM BROMIDE 10 MG/ML
INJECTION, SOLUTION INTRAVENOUS AS NEEDED
Status: DISCONTINUED | OUTPATIENT
Start: 2022-03-07 | End: 2022-03-07 | Stop reason: HOSPADM

## 2022-03-07 RX ADMIN — ROCURONIUM BROMIDE 5 MG: 50 INJECTION, SOLUTION INTRAVENOUS at 12:09

## 2022-03-07 RX ADMIN — LIDOCAINE HYDROCHLORIDE 0.1 ML: 10 INJECTION, SOLUTION INFILTRATION; PERINEURAL at 10:49

## 2022-03-07 RX ADMIN — FENTANYL CITRATE 50 MCG: 50 INJECTION INTRAMUSCULAR; INTRAVENOUS at 12:09

## 2022-03-07 RX ADMIN — Medication 3 MG: at 12:41

## 2022-03-07 RX ADMIN — MIDAZOLAM 2 MG: 1 INJECTION INTRAMUSCULAR; INTRAVENOUS at 11:52

## 2022-03-07 RX ADMIN — SUCCINYLCHOLINE CHLORIDE 140 MG: 20 INJECTION, SOLUTION INTRAMUSCULAR; INTRAVENOUS at 12:09

## 2022-03-07 RX ADMIN — OXYCODONE 10 MG: 5 TABLET ORAL at 13:52

## 2022-03-07 RX ADMIN — PROPOFOL 150 MG: 10 INJECTION, EMULSION INTRAVENOUS at 12:09

## 2022-03-07 RX ADMIN — LIDOCAINE HYDROCHLORIDE 100 MG: 20 INJECTION, SOLUTION EPIDURAL; INFILTRATION; INTRACAUDAL; PERINEURAL at 12:09

## 2022-03-07 RX ADMIN — ROCURONIUM BROMIDE 20 MG: 50 INJECTION, SOLUTION INTRAVENOUS at 12:14

## 2022-03-07 RX ADMIN — SODIUM CHLORIDE, SODIUM LACTATE, POTASSIUM CHLORIDE, AND CALCIUM CHLORIDE: 600; 310; 30; 20 INJECTION, SOLUTION INTRAVENOUS at 12:46

## 2022-03-07 RX ADMIN — DEXAMETHASONE SODIUM PHOSPHATE 10 MG: 4 INJECTION, SOLUTION INTRAMUSCULAR; INTRAVENOUS at 12:26

## 2022-03-07 RX ADMIN — Medication 10 MG: at 12:13

## 2022-03-07 RX ADMIN — FENTANYL CITRATE 50 MCG: 50 INJECTION INTRAMUSCULAR; INTRAVENOUS at 12:21

## 2022-03-07 RX ADMIN — PHENYLEPHRINE HYDROCHLORIDE 100 MCG: 10 INJECTION INTRAVENOUS at 12:15

## 2022-03-07 RX ADMIN — SODIUM CHLORIDE, SODIUM LACTATE, POTASSIUM CHLORIDE, AND CALCIUM CHLORIDE 100 ML/HR: 600; 310; 30; 20 INJECTION, SOLUTION INTRAVENOUS at 10:50

## 2022-03-07 RX ADMIN — GLYCOPYRROLATE 0.4 MG: 0.2 INJECTION, SOLUTION INTRAMUSCULAR; INTRAVENOUS at 12:41

## 2022-03-07 NOTE — ANESTHESIA PREPROCEDURE EVALUATION
Anesthetic History     PONV          Review of Systems / Medical History  Patient summary reviewed, nursing notes reviewed and pertinent labs reviewed    Pulmonary  Within defined limits              Comments: H/o trach    Neuro/Psych         Psychiatric history (anxiety )     Cardiovascular    Hypertension: well controlled              Exercise tolerance[de-identified] Borderline 4 METS     GI/Hepatic/Renal     GERD: well controlled           Endo/Other    Diabetes: well controlled, type 2    Obesity and cancer (epiglottic SCC)    Comments: History of SCCA of epiglottis s/p radiation and chemo - chronic hoarseness     Swelling to anterior neck - tolerates supine position well Other Findings   Comments: Gout            Physical Exam    Airway  Mallampati: III  TM Distance: > 6 cm  Neck ROM: decreased range of motion   Mouth opening: Normal     Cardiovascular    Rhythm: regular  Rate: normal         Dental    Dentition: Edentulous     Pulmonary      Decreased breath sounds: bilateral           Abdominal  GI exam deferred       Other Findings            Anesthetic Plan    ASA: 3  Anesthesia type: general  ETT  Glidescope         Induction: Intravenous  Anesthetic plan and risks discussed with: Patient and Spouse      Prior hx of successful videolaryngoscopic intubations. Used 6.5 ETT last time. Plan same today.

## 2022-03-07 NOTE — PERIOP NOTES
Dr. Santi Flores in phase 2 speaking with patient. No discharge restrictions. Patient has fwp appt in 1-2 weeks.

## 2022-03-07 NOTE — DISCHARGE INSTRUCTIONS
Direct Rigid Laryngoscopy: What to Expect at [facility]     Direct rigid laryngoscopy (say \"rwls-gb-YIM-kuh-francisco\") is a procedure that lets your doctor look at your throat and voice box (larynx). The doctor used a tube, called a scope, to look deep into your throat. The doctor may have used the procedure to take a tissue sample (biopsy), remove growths from the vocal cords, or do other kinds of surgery or laser treatment in the throat. Or the procedure may have been done to remove an object that is stuck in your throat. After the procedure, your throat may feel sore or slightly swollen for 2 to 5 days. You may sound hoarse for 1 to 8 weeks, depending on what was done during the procedure. Your doctor may ask you to speak as little as possible for 1 to 2 weeks after the procedure. If you speak, use your normal tone of voice and do not talk for very long. Whispering or shouting can strain your vocal cords as they are trying to heal. Try to avoid coughing or clearing your throat while your throat heals. These activities can also damage your vocal cords. If the doctor took a sample of tissue for study, it is normal to spit up a small amount of blood after the procedure. Talk to your doctor about how much bleeding to expect and how long the bleeding may last.    If the doctor took a biopsy, the doctor or nurse will call you with the test results. It may take 2 to 5 days to get the results. This care sheet gives you a general idea about how long it will take for you to recover. But each person recovers at a different pace. Follow the steps below to feel better as quickly as possible. How can you care for yourself at home? Activity  Rest when you feel tired. Getting enough sleep will help you recover. Avoid strenuous activities, such as bicycle riding, jogging, weight lifting, or aerobic exercise, for at least 1 week or until your doctor says it is okay.   Ask your doctor when you can drive again.  If your job requires you to use your voice, you may need to take 1 to 2 weeks off from work. Diet  Drink plenty of fluids to avoid becoming dehydrated. If your throat is swollen or sore, drink clear fluids such as water, apple juice, and Popsicles. Avoid hot drinks, soda pop, and citrus juices, such as orange juice. These may cause more swelling and pain. Start out with cool, clear liquids; Popsicles; and ice cream. Next, try soft foods like pudding, yogurt, canned or cooked fruit, scrambled eggs, and mashed potatoes. Do not eat hard or scratchy foods such as chips or raw vegetables until your throat has healed. Medicines    * You were given 10 mg Oxycodone at 1:52 pm.  You may take pain medicine again after 7:52 pm.  Take pain medicines exactly as directed. If the doctor gave you a prescription medicine for pain, take it as prescribed. If you are not taking a prescription pain medicine, ask your doctor if you can take an over-the-counter medicine. Do not take two or more pain medicines at the same time unless the doctor told you to. Many pain medicines have acetaminophen, which is Tylenol. Too much acetaminophen (Tylenol) can be harmful. If you think your pain medicine is making you sick to your stomach: Take your medicine after meals (unless your doctor has told you not to). Ask your doctor for a different pain medicine. If your doctor prescribed antibiotics, take them as directed. Do not stop taking them just because you feel better. You need to take the full course of antibiotics. Throat care  Suck on throat lozenges or gargle with warm salt water to help your sore throat. For several weeks, or until your doctor says it is okay, try to avoid coughing or clearing your throat. If you feel like you need to clear your throat, try taking a few sips of water. Follow-up care is a key part of your treatment and safety.  Be sure to make and go to all appointments, and call your doctor if you are having problems. Its also a good idea to know your test results and keep a list of the medicines you take. When should you call for help? Call 911 anytime you think you may need emergency care. For example, call if:  You pass out (lose consciousness). You have severe trouble breathing. You have sudden chest pain and shortness of breath, or you cough up blood. Call your doctor now or seek immediate medical care if:  You have a lot of bleeding or have bleeding that lasts for 24 hours. Your throat feels like it is swelling. You hear a noise in your throat when you breathe, or it feels hard to breathe. You feel like you cannot swallow. You have pain that does not get better after you take pain medicine. You have a fever over 100°F. Watch closely for any changes in your health, and be sure to contact your doctor if:  Your voice is still hoarse after your follow-up appointment with your doctor. Where can you learn more? Go to [email or web address]    Enter U313  in the search box to learn more about \"Direct Rigid Laryngoscopy: What to Expect at Home\". © 2006 - 2009 [employer]. Care instructions adapted under license by [facility] [employer] (which disclaims liability or warranty for this information) . This care instruction is for use with your licensed healthcare professional. If you have questions about a medical condition or this instruction, always ask your healthcare professional. Stefanie Pitt any warranty or liability for your use of this information.     After general anesthesia or intravenous sedation, for 24 hours or while taking prescription Narcotics:  · Limit your activities  · A responsible adult needs to be with you for the next 24 hours  · Do not drive and operate hazardous machinery  · Do not make important personal or business decisions  · Do not drink alcoholic beverages  · If you have not urinated within 8 hours after discharge, please contact your surgeon on call. · If you have sleep apnea and have a CPAP machine, please use it for all naps and sleeping. · Please use caution when taking narcotics and any of your home medications that may cause drowsiness. *  Please give a list of your current medications to your Primary Care Provider. *  Please update this list whenever your medications are discontinued, doses are      changed, or new medications (including over-the-counter products) are added. *  Please carry medication information at all times in case of emergency situations. These are general instructions for a healthy lifestyle:  No smoking/ No tobacco products/ Avoid exposure to second hand smoke  Surgeon General's Warning:  Quitting smoking now greatly reduces serious risk to your health. Obesity, smoking, and sedentary lifestyle greatly increases your risk for illness  A healthy diet, regular physical exercise & weight monitoring are important for maintaining a healthy lifestyle    You may be retaining fluid if you have a history of heart failure or if you experience any of the following symptoms:  Weight gain of 3 pounds or more overnight or 5 pounds in a week, increased swelling in our hands or feet or shortness of breath while lying flat in bed. Please call your doctor as soon as you notice any of these symptoms; do not wait until your next office visit.

## 2022-03-08 NOTE — OP NOTES
[address] [address]  OPERATIVE REPORT    Name:  Pola Ruiz  MR#:  [id number]  :  1955  ACCOUNT #:  [de-identified]  DATE OF SERVICE:  2022    PREOPERATIVE DIAGNOSES:  Supraglottic swelling, hoarseness, history of epiglottic cancer. POSTOPERATIVE DIAGNOSES:  Supraglottic swelling, hoarseness, history of epiglottic cancer. PROCEDURES PERFORMED:  Esophagoscopy, direct laryngoscopy with CO2 laser. SURGEON:  Christie Paez. DO Cade    ASSISTANT:  None. ANESTHESIA:  General.    COMPLICATIONS:  None. SPECIMENS REMOVED:  None. IMPLANTS:  None. ESTIMATED BLOOD LOSS:  2 mL. HISTORY:  This is a 52-year-old male who has been seeing me for about 5 years now. He initially came to see me because of dysphonia, possible cancer. I did scope him. He had a large fungating mass in the supraglottic region, most likely coming from the epiglottis. Biopsies proved to be squamous cell carcinoma and he underwent chemotherapy and radiation afterwards and since that time has been maintaining regular followups, but he has had no sign of any recurrence of any cancer. Since chemotherapy and radiation, he has also developed recurrent supraglottic swelling and has had to undergo laryngoscopy with CO2 laser dilation. He also had a trach tube placed in the past and once that was removed, afterwards he developed a tracheal collapse in that area which gave the appearance of a tracheal stenosis, but he has tracheal collapse. So, he did come back to see me in January. He has been doing okay. He says he feels fine. He is not having any trouble with his swallow.   I did scope him in the office and he was found to regrow a lot of the supraglottic edema that I found in the past.  His last procedure was almost one and a half years ago and really again overall has been fairly stable considering what he has been through in the past, but since there has been a regrowth, I am able to barely see his right true vocal cord.  Otherwise, there is too much swelling and block in the airway. So, I did recommend that he undergo a panendoscopy with possible biopsy with possible CO2 laser. The procedure risks and benefits were discussed with him in the office, all questions were answered and he was agreeable to the surgery. SURGERY DETAILS:  The patient was identified in the preoperative waiting area. He was taken back to the operating room where he underwent general anesthesia. Bed was turned 90 degrees. Wet sponge was placed over the upper lip and gums to protect them during the procedure. An esophagoscope was used first.  I was able to easily slide this into the esophagus. I was able to place the tube, the full length of the esophagus. Immediately upon and getting into the esophagus, I was able to find a lot of frothy white mucus consistent with some chronic acid reflux, otherwise that was suctioned out and as I removed the tube, there was no sign of any inflammation, irritative tissue, or narrowings. He essentially had a normal esophagoscopy. So, the esophagoscope was removed, laryngoscope was used next. Tongue, lateral tongue, tonsil areas, tongue base, epiglottis, vallecula, hypopharynx all appeared to be completely normal.  He does have an irregularly shaped epiglottis ever since the chemotherapy and radiation, but everything was pink and smooth. There was no sign of any ulcerations, masses or lesions. The supraglottic region had the edematous, swollen tissue like I found in the past.  There is again no sign of any ulcerations, no masses, no growths. I was able to see portions of the right true vocal cord. So, I did place the patient in suspension. A 0-degree scope was placed through the true vocal cord. I could see that he has a tracheal compression still, this is collapsing him from the sides, but he also has an anterior tracheal collapse also.   He was able to be intubated with a size 6.5 endotracheal tube without any difficulty. So, I was not able to go any further though. Due to the collapse, I was not able to get around that and the end of the endotracheal tube to see the rest of the trachea down to the neal, but I was able to see this opened up back to normal size trachea after a segment or two. So, I did bring the scope back up with visualization of the larynx. I then used the CO2 laser and I had it on a 15 watt super pulse continuous mode and I was able to under visualization start to laser away starting more superiorly in the hypopharynx, worked my way down the lateral sides slowly eliminating the full tissue bilaterally. I was able to take this down to the level of the larynx and I was able to use a laser to remove enough of the tissue that I was able to completely see both vocal cords. Both vocal cords appeared to be healthy, smooth and normal in appearance. So, once I had a good visualization of the larynx, even from a more distal view, then that was the stopping point. So, the face was protected with wet towels, goggles were worn by all staff in the room. So, once this was complete, then the laser was placed in standby, taken off the field. Photos were taking before and during the procedure and after the procedure was complete, the wet sponges were removed. There was no sign of trauma to the face, lips, teeth and then the patient was awakened and taken to the postop recovery room in a stable condition.       DO PHIL [id number]  D:  03/07/2022 13:04  T:  03/08/2022 0:44  JOB #:  [id number]

## 2022-03-18 PROBLEM — J94.2 HEMOTHORAX: Status: ACTIVE | Noted: 2020-12-10

## 2022-03-18 PROBLEM — D61.818 PANCYTOPENIA (HCC): Status: ACTIVE | Noted: 2017-01-12

## 2022-03-19 PROBLEM — R50.81 NEUTROPENIC FEVER (HCC): Status: ACTIVE | Noted: 2017-01-12

## 2022-03-19 PROBLEM — D62 ACUTE BLOOD LOSS ANEMIA: Status: ACTIVE | Noted: 2020-12-10

## 2022-03-19 PROBLEM — J94.2 HEMOTHORAX ON LEFT: Status: ACTIVE | Noted: 2020-12-10

## 2022-03-19 PROBLEM — T45.1X5A CHEMOTHERAPY-INDUCED NEUTROPENIA (HCC): Status: ACTIVE | Noted: 2017-02-01

## 2022-03-19 PROBLEM — N17.9 AKI (ACUTE KIDNEY INJURY) (HCC): Status: ACTIVE | Noted: 2020-12-02

## 2022-03-19 PROBLEM — L03.221 CELLULITIS OF NECK: Status: ACTIVE | Noted: 2017-02-01

## 2022-03-19 PROBLEM — C09.9 TONSIL CANCER (HCC): Status: ACTIVE | Noted: 2017-04-27

## 2022-03-19 PROBLEM — I10 HTN (HYPERTENSION): Status: ACTIVE | Noted: 2017-01-24

## 2022-03-19 PROBLEM — S22.39XA RIB FRACTURE: Status: ACTIVE | Noted: 2020-11-29

## 2022-03-19 PROBLEM — E11.29 DIABETES MELLITUS WITH MICROALBUMINURIA (HCC): Status: ACTIVE | Noted: 2017-01-24

## 2022-03-19 PROBLEM — R80.9 DIABETES MELLITUS WITH MICROALBUMINURIA (HCC): Status: ACTIVE | Noted: 2017-01-24

## 2022-03-19 PROBLEM — R11.15 NON-INTRACTABLE CYCLICAL VOMITING WITH NAUSEA: Status: ACTIVE | Noted: 2017-01-10

## 2022-03-19 PROBLEM — E11.65 TYPE 2 DIABETES MELLITUS WITH HYPERGLYCEMIA (HCC): Status: ACTIVE | Noted: 2017-01-24

## 2022-03-19 PROBLEM — D70.9 NEUTROPENIC FEVER (HCC): Status: ACTIVE | Noted: 2017-01-12

## 2022-03-19 PROBLEM — D70.1 CHEMOTHERAPY-INDUCED NEUTROPENIA (HCC): Status: ACTIVE | Noted: 2017-02-01

## 2022-03-19 PROBLEM — C76.0 HEAD AND NECK CANCER (HCC): Status: ACTIVE | Noted: 2017-01-24

## 2022-03-19 PROBLEM — C32.9 MALIGNANT NEOPLASM OF LARYNX (HCC): Status: ACTIVE | Noted: 2018-12-18

## 2022-03-19 PROBLEM — R68.82 DECREASED LIBIDO: Status: ACTIVE | Noted: 2018-04-27

## 2022-03-20 PROBLEM — J39.8 TRACHEAL STENOSIS: Status: ACTIVE | Noted: 2020-04-01

## 2022-03-20 PROBLEM — Z93.0 TRACHEOSTOMY IN PLACE (HCC): Status: ACTIVE | Noted: 2017-02-01

## 2022-03-20 PROBLEM — N18.9 CKD (CHRONIC KIDNEY DISEASE): Status: ACTIVE | Noted: 2017-01-10

## 2022-04-25 LAB
AVERAGE GLUCOSE: ABNORMAL
HBA1C MFR BLD: 7.1 %

## 2022-05-28 NOTE — DISCHARGE SUMMARY
[facility]: In Patient Hematology / Oncology Discharge Summary Note    Patient ID:  Vandana Calderon  [id number]  30 y.o.  1955    Admit Date: 2/1/2017    Discharge Date: 2/5/2017    Admission Diagnoses: head/neck ca  ISI  Renal insufficiency    Discharge Diagnoses:  Principal Diagnosis: Renal insufficiency  Principal Problem:    Renal insufficiency (1/10/2017)    Active Problems:    Squamous cell carcinoma of epiglottis ([facility] [address] (11/23/2016)      Pancytopenia ([address] (1/12/2017)      Type 2 diabetes mellitus with hyperglycemia ([facility] [address] (1/24/2017)      Cellulitis of neck (2/1/2017)      Tracheostomy in place (2/1/2017)      Chemotherapy-induced neutropenia ([facility] [address] (2/1/2017)        Hospital Course:  Mr. Stephane Tolliver is a 62 y.o. male admitted on 02/01/17 with a primary diagnosis of renal insufficiency, neutropenia, cellulitis.      Oncology History: He was seen in our office for the first time in December 2016. He was referred for evaluation of a head and neck malignancy. He was a gentleman in generally good health with the exception of diabetes mellitus which was non-insulin requiring. Beginning in the fall 2016, he began to note some difficulty with dysphagia as well as some change in his voice. Ultimately, there was some left-sided otalgia for which she sought help from his primary care physician. A course of antibiotics did not prove helpful and ultimately he was referred to Dr. Jose Miguel Perdomo for evaluation. The patient had not smoked for approximately 11 years prior to his initial visit. He did not abuse alcohol. The patient underwent a flexible laryngoscopy demonstrating a large mass involving the entire epiglottis. The tumor appeared to involve the aryepiglottic folds. The vocal cords were not visualized. CT scanning on November 3, 2016 demonstrated 3.9 x 2.6 x 2.3 cm supraglottic mass extending across midline. Inferiorly, it appeared to involve the left true vocal cord.  The overlying thyroid cartilage seems preserved. There was a level 3 lymph node on the left side measuring 1.8 cm in short axis. On November 14, the patient was taken to the OR for panendoscopy. Dr. Jesus Box notes from that visit indicate that he visualized the true and false vocal cords both of which appeared uninvolved by the tumor. He also did micro-debridement, subsequent to which the patient's voice appeared to be improved. 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 There was no mention of HPV status. The patient has been seen at [facility] surgery had been offered. The patient is averse to the notion of a tracheostomy if that is a possibility. When seen here, the plan was to treat with two cycles of neoadjuvant chemotherapy and then definitive chemo/XRT.     He is status post cycle 2 of Taxotere, Cisplatin, fluorouracil. He was hospitalized following cycle 1 for neutropenic fever from which he recovered uneventfully. He did however fail a barium swallow test for all consistencies and was placed on TPN. PEG tube was placed as well as a trach. He returned today for toxicity check and found to have chest cellulitis stemming from his new trachea. He was neutropenic with low grade fever. He had a very foul odor most likely related to dying tumor. Labs also revealed blood sugar > 400 and renal insufficiency with creatinine of 2.2. He was be admitted for hydration, blood sugar control, and IV antibiotics. HOSPITAL COURSE AS FOLLOWS:     Squamous cell carcinoma of epiglottis   Cycle 2 TCF completed today 02/01/17. Plan to proceed with surgery in 4-6 weeks. 2/2/17 Speech and nutrition on board to assist with nutritional needs  2/3/17 Discussed with speech - recommend ENT consult to change to cuffless trach. Will consult Dr. Daphne Patel.   2/4/17 Plan for cuffless trach to be placed as an outpatient on Wednesday.       - Neutropenia  Start daily Granix 24 hours after completion of chemotherapy which would be 02/02 at 1700.   2/5/17 Has now received 3 doses granix. [address]. Repeat today prior to discharge.       - Cellulitis  In the setting of immunocompromised/neutropenia - start Zosyn and Vancomycin. 2/2/17 Tmax 100.2. Continue current antibiotics. 2/3/17 Culture abdominal wound. Consult gen surgery to evaluate. Dressing under trach to prevent further breakdown from exudate. 2/4/17 Awaiting surgery final consult. Copious amounts of dark green liquid coming out from around PEG tube. Yeast on gram stain - start Diflucan. 2/5/17 Readjusted per surgery. Drainage much improved. Will be discharged home on Clindamycin and Diflucan.       - Diabetes Mellitus type II - not controlled  Continue Metformin and start sliding scale Lispro. Switch to diabetic formula tube feeds. Consult hospitalist for management. 2/2/17 BS controlled overnight. Continue sliding scale  2/3/17 Hospitalist holding metformin given his renal insufficiency. Can resume at discharge once creatinine normalizes. Continue sliding scale. 2/4/17 Well controlled currently - continue sliding scale. 2/5/17 Discontinue Metformin at discharge and restart Glipizide BID. Check home BS.       - Renal Insufficiency   NS bolus 1000 mL x 1 then 150 mL/hr.   2/2/17 Continue aggressive hydration. Slight improvement to creatinine overnight (down to 2.02)  2/3/17 Continue fluids. Creatinine trending back down. 2/4/17 Creatinine continues to trend down. 2/5/17 Creatinine around baseline now.      - Trach care/mouth care frequently  - Jaspreet SOP's   - DVT prophylaxis with Lovenox       Discharge Plan:  ENT plans for cuffless trach as an outpatient on Wednesday. Follow up with surgery for any issues with PEG tube. Home on Clindamycin and Diflucan. Continue feeds from PEG as well as hydration. Follow up this week in the office with Dr. Efrem Mancilla. He is asking about proceeding to surgery rather than chemoradiation.      Consults:  IP CONSULT TO HOSPITALIST  IP CONSULT TO OTOLARYNGOLOGY  IP CONSULT TO GENERAL SURGERY    Significant Diagnostic Studies:     KUB supine and upright views 17     History: Check feeding tube position with contrast please. \"tube check\". , 62  years Male     Comparison: None available     Findings: A percutaneous gastrostomy tube is seen, with contrast injected via  the tube on the second image, and contrast filling the gastric lumen and  proximal duodenum. There is no definite evidence of contrast extravasation to  suggest leak. No free air is evident. The bowel gas pattern is nonspecific and  there is no evidence of ileus or obstruction. No suspicious renal or ureteral  calculi are evident. Visualized osseous structures unremarkable.     IMPRESSION  Impression: No acute pathology identified. Percutaneous gastrostomy tube  appears in anatomic position. No Known Allergies    OBJECTIVE:  Patient Vitals for the past 8 hrs:   BP Temp Pulse Resp SpO2 Weight   17 1100 124/67 99.5 °F (37.5 °C) 95 20 92 % -   17 0829 - - - - 94 % -   17 0730 128/70 98.3 °F (36.8 °C) 98 20 92 % 224 lb 14.4 oz (102 kg)   17 0424 138/61 98.5 °F (36.9 °C) 98 20 90 % -     Temp (24hrs), Av.7 °F (37.1 °C), Min:97.2 °F (36.2 °C), Max:99.5 °F (37.5 °C)         Physical Exam:  Constitutional: Oriented to person, place, and time. Well-developed and well-nourished. HEENT: Normocephalic and atraumatic. Oropharynx is clear and moist.   Conjunctivae and EOM are normal. No scleral icterus. Neck supple. Trach collar in place. Skin Warm and dry. No bruising and no rash noted. No erythema. No pallor. Abdominal surgical wound healing nicely. Telfa pad to chest to prevent skin breakdown related to secretions from trach. Respiratory Effort normal and breath sounds normal.  No respiratory distress. No wheezes. No rales. No tenderness. CVS Normal rate, regular rhythm and normal heart sounds.   Exam reveals no gallop, no friction and no rub. No murmur heard. Abdomen Soft. Bowel sounds are normal. Exhibits mild distension. There is no tenderness. There is no rebound and no guarding. PEG tube intact with minimal drainage. Neuro No obvious focal deficits. MSK Normal range of motion. No edema and no tenderness. Psych Normal mood, affect, behavior, judgment and thought content. Labs:  Recent Labs      02/05/17 0415 02/04/17   0400  02/03/17   0515   WBC  1.6*  1.1*  1.2*   RBC  2.58*  2.65*  2.79*   HGB  7.0*  7.2*  7.7*   HCT  21.0*  21.6*  23.2*   MCV  81.4  81.5  83.2   MCH  27.1  27.2  27.6   MCHC  33.3  33.3  33.2   RDW  12.5  12.5  12.7   PLT  88*  71*  80*   GRANS  43  31*  26*   LYMPH  44  58*  56*   MONOS  12  7  18*   EOS  1  2   --    BASOS  0  2   --    IG  0.0  0.0   --    DF  AUTOMATED  AUTOMATED  MANUAL   ANEU  0.7*  0.3*  0.3*   ABL  0.7  0.6  0.7   ABM  0.2  0.1  0.2   MAHI  0.0  0.0   --    ABB  0.0  0.0   --    AIG  0.0  0.0   --     Recent Labs      02/05/17 0415 02/04/17   0400  02/03/17   0515   NA  142  143  144   K  3.0*  3.2*  3.5   CL  103  104  107   CO2  30  31  27   AGAP  9  8  10   GLU  124*  119*  120*   BUN  20  23  27*   CREA  1.58*  1.81*  1.91*   GFRAA  58*  49*  46*   GFRNA  48*  41*  38*   CA  7.6*  7.4*  7.8*   MG  2.0  1.5*  1.8       ASSESSMENT:    Principal Problem:    Renal insufficiency (1/10/2017)    Active Problems:    Squamous cell carcinoma of epiglottis (HCC) (11/23/2016)      Pancytopenia (HCC) (1/12/2017)      Type 2 diabetes mellitus with hyperglycemia ([facility] [address] (1/24/2017)      Cellulitis of neck (2/1/2017)      Tracheostomy in place (2/1/2017)      Chemotherapy-induced neutropenia ([facility] [address] (2/1/2017)      Current Discharge Medication List      START taking these medications    Details   citalopram (CELEXA) 10 mg tablet 1 Tab by Per G Tube route daily. Qty: 30 Tab, Refills: 2      fluconazole (DIFLUCAN) 200 mg tablet 1 Tab by Per G Tube route daily for 5 days.  FDA advises cautious prescribing of oral fluconazole in pregnancy. Qty: 5 Tab, Refills: 0      magnesium oxide (MAG-OX) 400 mg tablet 1 Tab by Per G Tube route three (3) times daily. Qty: 90 Tab, Refills: 2      potassium chloride (KAON 10%) 20 mEq/15 mL solution 30 mL by Per G Tube route two (2) times daily (with meals) for 30 days. Indications: HYPOKALEMIA  Qty: 480 mL, Refills: 2         CONTINUE these medications which have NOT CHANGED    Details   clindamycin (CLEOCIN) 75 mg/5 mL solution 10 mL by mouth 4 times daily x 7 days  Qty: 280 mL, Refills: 0      Lactose-Free Food with Fiber (JEVITY 1.5 BRANDO) 0.06 gram-1.5 kcal/mL liqd 6 Cans by PEG Tube route daily for 100 days. Bolus Feedings, Jevity 1.5 or comparable, goal 6 cans/day. Pt will need additional 38-42 oz free water daily via PEG. Estimated ARRON greater than 90 days. Indications: DYSPHAGIA  Qty: 180 Can, Refills: 4      LORazepam (ATIVAN) 1 mg tablet Take 0.5-1 Tabs by mouth every six (6) hours as needed for Anxiety. Max Daily Amount: 4 mg. Indications: CANCER CHEMOTHERAPY-INDUCED NAUSEA AND VOMITING  Qty: 90 Tab, Refills: 0      magic mouthwash (GLADYS) susp Take 10 mL by mouth every four (4) hours. Qty: 500 mL, Refills: 3      lidocaine-prilocaine (EMLA) topical cream Apply  to affected area as needed. Apply 1/2 inch amount of cream to port site 90 minutes prior to needle access. Qty: 30 g, Refills: 0      ondansetron hcl (ZOFRAN) 8 mg tablet Take 1 Tab by mouth every eight (8) hours as needed for Nausea. Qty: 90 Tab, Refills: 3      prochlorperazine (COMPAZINE) 10 mg tablet Take 1 Tab by mouth every six (6) hours as needed. Qty: 120 Tab, Refills: 3      aspirin delayed-release 81 mg tablet Take 81 mg by mouth every morning. Take / use AM day of surgery  per anesthesia protocols. Indications: myocardial infarction prevention      losartan (COZAAR) 50 mg tablet 50 mg two (2) times a day.  Indications: Hypertension      amLODIPine (NORVASC) 10 mg tablet Take 10 mg by mouth nightly. Take / use AM day of surgery  per anesthesia protocols. Indications: Hypertension      allopurinol (ZYLOPRIM) 300 mg tablet 300 mg nightly. Indications: GOUT      glipiZIDE SR (GLUCOTROL) 10 mg CR tablet Take 10 mg by mouth two (2) times a day. Indications: type 2 diabetes mellitus      omeprazole (PRILOSEC) 20 mg capsule Take 20 mg by mouth every morning. Take / use AM day of surgery  per anesthesia protocols. Indications: GASTROESOPHAGEAL REFLUX         STOP taking these medications       metFORMIN (GLUCOPHAGE) 500 mg tablet Comments:   Reason for Stopping:         triamterene-hydroCHLOROthiazide (MAXZIDE) 37.5-25 mg per tablet Comments:   Reason for Stopping:               PLAN:    Follow-up Appointments   Procedures    FOLLOW UP VISIT Appointment in: Other (Specify) His navigator to get him an apt on Thursday or Friday this week - she will call with times. His navigator to get him an apt on Thursday or Friday this week - she will call with times. Standing Status:   Standing     Number of Occurrences:   1     Order Specific Question:   Appointment in     Answer: Other (Specify)                 Tree Nogueira NP  [address]  Office : [phone]  Fax : [phone]            Attending Addendum:  I personally evaluated the patient with Raz Connell N.P.,  and agree with the assessment, findings and plan as documented. Appears well, heart regular without murmur, lungs clear, abdomen benign. Afebrile, clinically doing better. For cuff less trach per ENT Wednesday. Surgery ha adjusted G tube. OK for discharge w f/u in oncology in a weeks time. I spent 32 minutes on evaluation, management, counseling and discharge planning on patient.               Jolene Doty MD  [address]  Office : [phone]  Fax : [phone] - Review of records, telemetry, vital signs and daily labs.   - General and cardiovascular physical examination.  - Generation of cardiovascular treatment plan.  - Coordination of care.      Patient was seen and examined by me on 05/27/2022,interim events noted,labs and radiology studies reviewed.  Trevor De La Garza MD,FACC.  [address] [place], [place] [id number].  [phone] - Review of records, telemetry, vital signs and daily labs.   - General and cardiovascular physical examination.  - Generation of cardiovascular treatment plan.  - Coordination of care.      Patient was seen and examined by me on 05/28/2022,interim events noted,labs and radiology studies reviewed.  Trevor De La Garza MD,FACC.  [address] [place], [place] [id number].  [phone]

## 2022-06-02 ENCOUNTER — HOSPITAL ENCOUNTER (EMERGENCY)
Age: 67
Discharge: HOME | End: 2022-06-02
Attending: EMERGENCY MEDICINE
Payer: MEDICARE

## 2022-06-02 ENCOUNTER — APPOINTMENT (OUTPATIENT)
Dept: GENERAL RADIOLOGY | Age: 67
End: 2022-06-02
Payer: MEDICARE

## 2022-06-02 ENCOUNTER — APPOINTMENT (OUTPATIENT)
Dept: CT IMAGING | Age: 67
End: 2022-06-02
Payer: MEDICARE

## 2022-06-02 VITALS
WEIGHT: 245 LBS | OXYGEN SATURATION: 91 % | DIASTOLIC BLOOD PRESSURE: 82 MMHG | HEIGHT: 71 IN | TEMPERATURE: 99 F | RESPIRATION RATE: 18 BRPM | BODY MASS INDEX: 34.3 KG/M2 | HEART RATE: 88 BPM | SYSTOLIC BLOOD PRESSURE: 157 MMHG

## 2022-06-02 DIAGNOSIS — R04.0 EPISTAXIS: ICD-10-CM

## 2022-06-02 DIAGNOSIS — T07.XXXA MULTIPLE ABRASIONS: ICD-10-CM

## 2022-06-02 DIAGNOSIS — S20.211A RIB CONTUSION, RIGHT, INITIAL ENCOUNTER: Primary | ICD-10-CM
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
## 2022-06-02 PROCEDURE — 6370000000 HC RX 637 (ALT 250 FOR IP): Performed by: EMERGENCY MEDICINE

## 2022-06-02 PROCEDURE — 71101 X-RAY EXAM UNILAT RIBS/CHEST: CPT

## 2022-06-02 PROCEDURE — 70450 CT HEAD/BRAIN W/O DYE: CPT

## 2022-06-02 PROCEDURE — 99284 EMERGENCY DEPT VISIT MOD MDM: CPT

## 2022-06-02 RX ORDER — ACETAMINOPHEN 325 MG/1
650 TABLET ORAL
Status: COMPLETED | OUTPATIENT
Start: 2022-06-02 | End: 2022-06-02

## 2022-06-02 RX ORDER — MELOXICAM 7.5 MG/1
7.5 TABLET ORAL DAILY PRN
Qty: 30 TABLET | Refills: 0 | Status: SHIPPED | OUTPATIENT
Start: 2022-06-02 | End: 2022-06-23

## 2022-06-02 RX ORDER — HYDROCODONE BITARTRATE AND ACETAMINOPHEN 5; 325 MG/1; MG/1
1 TABLET ORAL EVERY 8 HOURS PRN
Qty: 9 TABLET | Refills: 0 | Status: SHIPPED | OUTPATIENT
Start: 2022-06-02 | End: 2022-06-05

## 2022-06-02 RX ADMIN — Medication 1 EACH: at 16:33

## 2022-06-02 RX ADMIN — Medication 1 EACH: at 16:34

## 2022-06-02 RX ADMIN — ACETAMINOPHEN 650 MG: 325 TABLET ORAL at 16:10

## 2022-06-02 ASSESSMENT — PAIN - FUNCTIONAL ASSESSMENT: PAIN_FUNCTIONAL_ASSESSMENT: 0-10

## 2022-06-02 ASSESSMENT — PAIN SCALES - GENERAL
PAINLEVEL_OUTOF10: 7
PAINLEVEL_OUTOF10: 5

## 2022-06-02 ASSESSMENT — PAIN DESCRIPTION - LOCATION: LOCATION: HEAD

## 2022-06-02 NOTE — ED PROVIDER NOTES
[facility] Emergency Department Provider Note                   PCP:                None Provider               Age: 73 y.o. Sex: male       ICD-10-CM    1. Rib contusion, right, initial encounter  S20.211A    2. Fall on same level, initial encounter  W18.30XA    3. Epistaxis  R04.0    4. Multiple abrasions  T07. Velvet Sour        DISPOSITION         New Prescriptions    No medications on file       Orders Placed This Encounter   Procedures    EPISTAXIS MANAGEMENT    CT HEAD WO CONTRAST    XR RIBS RIGHT INCLUDE CHEST (MIN 3 VIEWS)        MDM  Number of Diagnoses or Management Options  Diagnosis management comments: Intracranial hemorrhage,  subdural hematoma, subarachnoid hemorrhage,    tension headache, migraine headache,     Sprain, strain, tendon injury, contusion,    Abrasion, laceration, neurovascular injury, foreign body    Fracture, open fracture, dislocation, joint separation, articular surface injury,               Amount and/or Complexity of Data Reviewed  Tests in the radiology section of CPT®: ordered and reviewed  Tests in the medicine section of CPT®: ordered and reviewed  Review and summarize past medical records: yes  Independent visualization of images, tracings, or specimens: yes         Cely Cook is a 73 y.o. male who presents to the Emergency Department with chief complaint of    Chief Complaint   Patient presents with    Abrasion    Fall      81-year-old male presenting to the emergency department today after a trip and fall. The patient states he was trying to show someone where to put a telephone pole and got his foot tangled in a rope around some tires causing him to spin around and fall to the ground striking his head on the ground and losing consciousness. This was witnessed and he was apparently unresponsive for a couple of minutes. Patient states that he had a bloody nose but this is slowed down. He also has multiple abrasions down the right side of his body.   Patient denies any vision changes but does have mild headache. He is not on any blood thinners. All other systems reviewed and are negative. Review of Systems   Skin: Positive for wound. Neurological: Positive for headaches. All other systems reviewed and are negative.       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    Type 2 diabetes mellitus (HCC)     insulin, rare home checks \"always under 200\"        Past Surgical History:   Procedure Laterality Date    COLONOSCOPY  2016    HEENT  06/15/2020    Direct laryngoscopy with CO2 laser    HEENT  02/24/2020    S/P SFE Direct laryngoscopy with biopsy, CO2 laser of supraglottic airway obstruction, Bronchoscopy with tracheal balloon dilation of tracheal stenosis 02/24/2020    HEENT  12/03/2018    Panendoscopy w/Scar Revision/SFE/12-03-18    HEENT  10/01/2018    Direct laryngoscopy with CO2 laser of supraglottic swelling    HEENT  12/2017    bronchoscopy    OTHER SURGICAL HISTORY  01/2017    PEG placement and removal    OTHER SURGICAL HISTORY Left age 11    2rd nipple removed    TRACHEOSTOMY      placement and removal    VASCULAR SURGERY      placement and removal        Family History   Problem Relation Age of Onset    Lung Disease Mother     Stroke Mother            Social Connections:     Frequency of Communication with Friends and Family: Not on file    Frequency of Social Gatherings with Friends and Family: Not on file    Attends Mandaeism Services: Not on file    Active Member of Clubs or Organizations: Not on file    Attends Club or Organization Meetings: Not on file    Marital Status: Not on file        Allergies Allergen Reactions    Ondansetron Other (See Comments)     Gives pt severe HA  Other reaction(s): Headache-Intolerance  Gives pt severe HA        Vitals signs and nursing note reviewed. Patient Vitals for the past 4 hrs:   Temp Pulse Resp BP SpO2   06/02/22 1520 99 °F (37.2 °C) 90 18 (!) 146/85 93 %          Physical Exam     GENERAL:The patient has Body mass index is 34.17 kg/m². Well-hydrated. VITAL SIGNS: Heart rate, blood pressure, respiratory rate reviewed as recorded in  nurse's notes  HEAD: negative jimenez and raccoon sign. Abrasion to the right side of the forehead as shown in the photo. EYES: Pupils reactive. Extraocular motion intact. No conjunctival redness or drainage. EARS: No external masses or lesions. External auditory canals are clear with no  hemotympanum  NOSE: No septal hematoma noted. Dried blood in the right nare with scant bleeding from the anterior nasal septum present. Left naris clear. MOUTH/THROAT: Pharynx clear; airway patent. No loose dentition or intraoral  lacerations. Floor the mouth is soft. NECK: Supple, no meningeal signs. Trachea midline. No masses or thyromegaly. C-  collar in place No step-off deformities noted  LUNGS: Breath sounds clear and equal bilaterally no accessory muscle use  CHEST: No deformity, no crepitus. Tenderness palpation along the right lower rib cage. CARDIOVASCULAR: Regular rate and rhythm  PELVIS: stable no laxity  EXTREMITIES: No clubbing or cyanosis. No joint swelling. Normal muscle tone. No  restricted range of motion appreciated. NEUROLOGIC: Sensation is grossly intact. Cranial nerve exam reveals face is  symmetrical, tongue is midline speech is clear. SKIN: No petechiae. Good skin turgor palpated. Abrasions down the right arm and right leg as shown in the photos. No lacerations present. PSYCHIATRIC: Alert and oriented. Appropriate behavior and judgment.     Epistaxis Mgmt    Date/Time: 6/2/2022 4:34 PM  Performed by: Simba Chavarria   Authorized by: Merlyn Clarke DO     Consent:     Consent obtained:  Verbal    Consent given by:  Patient    Risks discussed:  Bleeding, nasal injury and pain    Alternatives discussed:  No treatment and alternative treatment  Procedure details:     Treatment site:  R anterior    Treatment method:  Silver nitrate    Treatment complexity:  Limited  Post-procedure details:     Assessment:  Bleeding decreased    Patient tolerance of procedure: Tolerated well, no immediate complications        Labs Reviewed - No data to display     XR RIBS RIGHT INCLUDE CHEST (MIN 3 VIEWS)   Final Result   1. No evidence of acute right rib fracture. 2. Remote left rib fractures. CT HEAD WO CONTRAST   Final Result   No acute intracranial abnormality. Neri Coma Scale  Eye Opening: Spontaneous  Best Verbal Response: Oriented  Best Motor Response: Obeys commands  Neri Coma Scale Score: 15               ED Course as of 06/02/22 1637   Thu Jun 02, 2022   1617 XR RIBS RIGHT INCLUDE CHEST (MIN 3 VIEWS)    IMPRESSION:  1. No evidence of acute right rib fracture. 2. Remote left rib fractures. [KH]   3201 CT HEAD WO CONTRAST  IMPRESSION:  No acute intracranial abnormality. [IT]   8203 I talked to the patient and his wife about the findings in the emergency department and the physical exam and the imaging studies. We discussed using pain medication as needed for control of his symptoms as well as ice over the affected areas. [KH]      ED Course User Index  [KH] Merlyn Clarke DO        Voice dictation software was used during the making of this note. This software is not perfect and grammatical and other typographical errors may be present. This note has not been completely proofread for errors.        Merlyn Clarke DO  06/02/22 6860

## 2022-06-02 NOTE — ED TRIAGE NOTES
Pt presents to ED c/o scattered abrasions from fall. Pt endorses LOC. Pt fell and hit head at [facility] cart track\". Masked.

## 2022-06-23 ENCOUNTER — OFFICE VISIT (OUTPATIENT)
Dept: ENDOCRINOLOGY | Age: 67
End: 2022-06-23
Payer: COMMERCIAL

## 2022-06-23 VITALS
BODY MASS INDEX: 33.75 KG/M2 | DIASTOLIC BLOOD PRESSURE: 66 MMHG | HEART RATE: 83 BPM | WEIGHT: 242 LBS | SYSTOLIC BLOOD PRESSURE: 120 MMHG | OXYGEN SATURATION: 61 %

## 2022-06-23 DIAGNOSIS — Z92.3 HISTORY OF HEAD AND NECK RADIATION: ICD-10-CM

## 2022-06-23 DIAGNOSIS — R79.89 ABNORMAL TSH: ICD-10-CM

## 2022-06-23 DIAGNOSIS — E11.29 DIABETES MELLITUS WITH MICROALBUMINURIA (HCC): Primary | ICD-10-CM

## 2022-06-23 DIAGNOSIS — E34.9 ELEVATED PARATHYROID HORMONE: ICD-10-CM

## 2022-06-23 DIAGNOSIS — E11.29 DIABETES MELLITUS WITH MICROALBUMINURIA (HCC): ICD-10-CM

## 2022-06-23 DIAGNOSIS — R80.9 DIABETES MELLITUS WITH MICROALBUMINURIA (HCC): ICD-10-CM

## 2022-06-23 DIAGNOSIS — R80.9 DIABETES MELLITUS WITH MICROALBUMINURIA (HCC): Primary | ICD-10-CM

## 2022-06-23 LAB
25(OH)D3 SERPL-MCNC: 15.4 NG/ML (ref 30–100)
ALBUMIN SERPL-MCNC: 4 G/DL (ref 3.2–4.6)
ALBUMIN/GLOB SERPL: 1 {RATIO} (ref 1.2–3.5)
ALP SERPL-CCNC: 87 U/L (ref 50–136)
ALT SERPL-CCNC: 16 U/L (ref 12–65)
ANION GAP SERPL CALC-SCNC: 5 MMOL/L (ref 7–16)
AST SERPL-CCNC: 18 U/L (ref 15–37)
BASOPHILS # BLD: 0 K/UL (ref 0–0.2)
BASOPHILS NFR BLD: 0 % (ref 0–2)
BILIRUB SERPL-MCNC: 0.8 MG/DL (ref 0.2–1.1)
BUN SERPL-MCNC: 19 MG/DL (ref 8–23)
CALCIUM SERPL-MCNC: 9.4 MG/DL (ref 8.3–10.4)
CALCIUM SERPL-MCNC: 9.6 MG/DL (ref 8.3–10.4)
CHLORIDE SERPL-SCNC: 96 MMOL/L (ref 98–107)
CO2 SERPL-SCNC: 36 MMOL/L (ref 21–32)
CREAT SERPL-MCNC: 1.8 MG/DL (ref 0.8–1.5)
DIFFERENTIAL METHOD BLD: ABNORMAL
EOSINOPHIL # BLD: 0.1 K/UL (ref 0–0.8)
EOSINOPHIL NFR BLD: 1 % (ref 0.5–7.8)
ERYTHROCYTE [DISTWIDTH] IN BLOOD BY AUTOMATED COUNT: 14.4 % (ref 11.9–14.6)
GLOBULIN SER CALC-MCNC: 4 G/DL (ref 2.3–3.5)
GLUCOSE SERPL-MCNC: 134 MG/DL (ref 65–100)
HCT VFR BLD AUTO: 36.5 % (ref 41.1–50.3)
HGB BLD-MCNC: 11.4 G/DL (ref 13.6–17.2)
IMM GRANULOCYTES # BLD AUTO: 0 K/UL (ref 0–0.5)
IMM GRANULOCYTES NFR BLD AUTO: 0 % (ref 0–5)
LYMPHOCYTES # BLD: 1 K/UL (ref 0.5–4.6)
LYMPHOCYTES NFR BLD: 9 % (ref 13–44)
MAGNESIUM SERPL-MCNC: 2 MG/DL (ref 1.8–2.4)
MCH RBC QN AUTO: 27.1 PG (ref 26.1–32.9)
MCHC RBC AUTO-ENTMCNC: 31.2 G/DL (ref 31.4–35)
MCV RBC AUTO: 86.9 FL (ref 79.6–97.8)
MONOCYTES # BLD: 0.4 K/UL (ref 0.1–1.3)
MONOCYTES NFR BLD: 4 % (ref 4–12)
NEUTS SEG # BLD: 9.2 K/UL (ref 1.7–8.2)
NEUTS SEG NFR BLD: 86 % (ref 43–78)
NRBC # BLD: 0 K/UL (ref 0–0.2)
PHOSPHATE SERPL-MCNC: 3.2 MG/DL (ref 2.3–3.7)
PLATELET # BLD AUTO: 290 K/UL (ref 150–450)
PMV BLD AUTO: 9.2 FL (ref 9.4–12.3)
POTASSIUM SERPL-SCNC: 4.1 MMOL/L (ref 3.5–5.1)
PROT SERPL-MCNC: 8 G/DL (ref 6.3–8.2)
PTH-INTACT SERPL-MCNC: 146.9 PG/ML (ref 18.5–88)
RBC # BLD AUTO: 4.2 M/UL (ref 4.23–5.6)
SODIUM SERPL-SCNC: 137 MMOL/L (ref 138–145)
T4 FREE SERPL-MCNC: 0.7 NG/DL (ref 0.78–1.46)
TSH, 3RD GENERATION: 40.1 UIU/ML (ref 0.36–3.74)
WBC # BLD AUTO: 10.7 K/UL (ref 4.3–11.1)

## 2022-06-23 PROCEDURE — 3046F HEMOGLOBIN A1C LEVEL >9.0%: CPT | Performed by: PHYSICIAN ASSISTANT

## 2022-06-23 PROCEDURE — G8417 CALC BMI ABV UP PARAM F/U: HCPCS | Performed by: PHYSICIAN ASSISTANT

## 2022-06-23 PROCEDURE — 2022F DILAT RTA XM EVC RTNOPTHY: CPT | Performed by: PHYSICIAN ASSISTANT

## 2022-06-23 PROCEDURE — 3017F COLORECTAL CA SCREEN DOC REV: CPT | Performed by: PHYSICIAN ASSISTANT

## 2022-06-23 PROCEDURE — 99204 OFFICE O/P NEW MOD 45 MIN: CPT | Performed by: PHYSICIAN ASSISTANT

## 2022-06-23 PROCEDURE — 1036F TOBACCO NON-USER: CPT | Performed by: PHYSICIAN ASSISTANT

## 2022-06-23 PROCEDURE — G8427 DOCREV CUR MEDS BY ELIG CLIN: HCPCS | Performed by: PHYSICIAN ASSISTANT

## 2022-06-23 PROCEDURE — 1123F ACP DISCUSS/DSCN MKR DOCD: CPT | Performed by: PHYSICIAN ASSISTANT

## 2022-06-23 ASSESSMENT — ENCOUNTER SYMPTOMS
VOICE CHANGE: 0
TROUBLE SWALLOWING: 0

## 2022-06-23 NOTE — PROGRESS NOTES
revision, chemotherapy, and radiation. Now with abnormal PTH without indication of hypercalcemia on screening labs. Check thyroid US as below as labs listed  - PTH, Intact; Future  - Calcium, Ionized; Future  - Vitamin D 25 Hydroxy; Future  - Magnesium; Future  - Phosphorus; Future    3. Abnormal TSH  Patient with a history of head and neck radiation status post laryngeal carcinoma with surgical revision, chemotherapy, and radiation. Now with acutely abnormal TSH, physical exam limited by scar tissue, check thyroid ultrasound    - TSH; Future  - T4, Free; Future  - Thyroid Peroxidase Antibody; Future  - Thyroid Stimulating Immunoglobulin; Future    4. History of head and neck radiation  Patient with a history of head and neck radiation status post laryngeal carcinoma with surgical revision, chemotherapy, and radiation. Now with acutely abnormal TSH, physical exam limited by scar tissue, check thyroid ultrasound  - US HEAD NECK SOFT TISSUE THYROID; Future  - TSH; Future  - T4, Free;     Note, patient recently had a fall with chest wall injury. Patient SaO2 eating today in house. Patient's daughter reached out to her work and patient's primary caregiver with plans to obtain chest x-ray today. Patient denies shortness of breath, mild anterior chest wall tenderness reported and noted        Diagnoses and all orders for this visit:    Diabetes mellitus with microalbuminuria ([address]  -     CBC with Auto Differential; Future  -     Comprehensive Metabolic Panel; Future  -     Fructosamine; Future  -     Microalbumin / Creatinine Urine Ratio; Future    Elevated parathyroid hormone  -     PTH, Intact; Future  -     Calcium, Ionized; Future  -     Vitamin D 25 Hydroxy; Future  -     Magnesium; Future  -     Phosphorus; Future    Abnormal TSH  -     TSH; Future  -     T4, Free; Future  -     Thyroid Peroxidase Antibody; Future  -     Thyroid Stimulating Immunoglobulin;  Future    History of head and neck radiation  -     US HEAD NECK SOFT TISSUE THYROID; Future  -     TSH; Future  -     T4, Free; Future            History of Present Illness:    6/24/2022       DIABETES MELLITUS  Mitchel Rees is here for new evaluation and treatment of stable type 2 diabetes mellitus. Review of Records: Furred for uncontrolled type 2 diabetes with abnormal labs reportedly abnormal TSH and abnormal PTH      Date of diagnosis: 2016    Stopped soliqua due to poor efficacy, taking at night    Denies  HX pancreatitis, DKA, gastroparesis, foot ulcer      History obtained from patient and daughter Jeovany Santiago       Current Regimen metformin 500mg BID    Home blood glucose monitoring frequency: <1 times per day      By recall     Typical Standard Deviation   Fasting ~160-165 As low as 120   AC lunch     AC supper     Bedtime         Blood glucose levels are unknown      Diet: \"I eat everything I want\", regular pepsi x 4 days, no ETOH    Exercise:  No routine      Notes no change in blood glucose with       Body weight trend: stable      Wt Readings from Last 3 Encounters:   06/23/22 242 lb (109.8 kg)   06/02/22 245 lb (111.1 kg)   02/28/22 176 lb (79.8 kg)       Diabetes education: The patient has not received formal diabetes education. Diabetic complications:       Retinopathy: Last eye exam was 2021 and demonstrated no diabetic retinopathy. [facility] care specialist is [facility]     Albuminuria/nephropathy:         04/25/2022 Cr 1.58, GFR 45       Neuropathy:  No reported      Hypoglycemia: never    Hyperglycemia: without symptoms     Hemoglobin A1c:  04/25/2022 7.1%    No results found for: CCM4LKSP, HBA1C        Other pertinent labs:     Lipids:        No results found for: CHOL, TRIGL, HDL, LDLC, VLDL       Thyroid:     See below:      THYROID DYSFUNCTION  Mitchel Rees is seen for new evaluation/management of suspected hypothyroidism ; this was noted in April 2022.       Review of Records: Patient with family history of hypothyroidism presents with TSH greater than 40    Current symptoms:  He reports generally feeling well but is noted to be fatigued and sleepy. COMPLAINS of:  See ROS    DENIES:      Prior treatment:   none    Current Regimen:  None    Pertinent labs:  2022 42.40  No results found for: TSH, TSH2, TSH3, TSHELE, T3RIA, T3UP, FT3, FT4, FT4P, T4, T4P, FT4T, TT7    Imagin/25/2022 42.40    No results found for: TSH, TSH2, TSH3        HYPERCALCEMIA/HYPERPARATHYROIDISM      Presentation/Diagnosis:    Risk Factors:  Denies the use of calcium, vitamin D, lithium. No family history of hypercalcemia, hyperparathyroidism, multiple endocrine neoplasia. Symptoms: No history of kidney stones. Denies hematuria, flank pain, polydipsia. Denies  constipation, abdominal pain, depression, memory problems. Denies arthralgias, myalgias or bone pain. Some fatigue, Nocturia  . Does take thiazide, HCTZ combo    Imaging:      Fracture History:    Treatment:    Contraindications to treatment:        Pertinent Labs:    Calcium  2022 8.9    Vitamin D    PTH  2022 115      Phosphorus    Magnesium    SPEP    Thyroid  TSH  2022 42.40    Renal function  See above            Allergies & Medications:  Reviewed in chart. Review of Systems   Constitutional: Positive for fatigue. Negative for unexpected weight change. HENT: Negative for trouble swallowing and voice change. Gastrointestinal: Negative for constipation and diarrhea. Endocrine: Negative for cold intolerance and heat intolerance. Genitourinary:        Nocturia        Vital Signs:  /66 (Site: Left Upper Arm, Position: Sitting)   Pulse 83   Wt 242 lb (109.8 kg)   SpO2 (!) 61%   BMI 33.75 kg/m²     Physical Exam  Constitutional:       Appearance: Normal appearance. He is obese. Neck:      Thyroid: No thyromegaly.       Comments: Extensive scarring of anterior neck  Firm, nonmobile submental engorgement of tissue with no noted discernable border, no noted lymphadenopathy   Cardiovascular:      Rate and Rhythm: Normal rate and regular rhythm. Pulmonary:      Effort: Pulmonary effort is normal.      Breath sounds: Normal breath sounds. Chest:      Comments: Mild Tenderness right anterior chest at midclavicular line overlying ribs 3 through 6. No crepitus. Clear lung sounds. No flail segment. No ecchymosis. Abdominal:      Palpations: Abdomen is soft. Feet:      Right foot:      Protective Sensation: 3 sites tested. 3 sites sensed. Left foot:      Protective Sensation: 3 sites tested. 3 sites sensed. Lymphadenopathy:      Cervical: No cervical adenopathy. Skin:     General: Skin is warm and dry. Neurological:      General: No focal deficit present. Mental Status: He is alert. Psychiatric:         Mood and Affect: Mood normal.         Behavior: Behavior normal.         Thought Content: Thought content normal.         Judgment: Judgment normal.            Return in about 3 months (around 9/23/2022). Portions of this note were generated with the assistance of voice recogniton software. As such, some errors in transcription may be present.

## 2022-06-23 NOTE — Clinical Note
Dr. Yamilex Farias, this patient is new to me with elevated TSH and elevated PTH. His submental exam was abnormal to me and I realize he is under your care for his epiglottic cancer treatment and follow up. I'm obtaining a thyroid US and labs. Please tell me if there is anything that I can do to aid you in his care.  ~Ranjana

## 2022-06-24 ENCOUNTER — TELEPHONE (OUTPATIENT)
Dept: ENDOCRINOLOGY | Age: 67
End: 2022-06-24

## 2022-06-24 ENCOUNTER — HOSPITAL ENCOUNTER (OUTPATIENT)
Dept: GENERAL RADIOLOGY | Age: 67
Discharge: HOME | End: 2022-06-27
Payer: COMMERCIAL

## 2022-06-24 DIAGNOSIS — E34.9 ELEVATED PARATHYROID HORMONE: ICD-10-CM

## 2022-06-24 DIAGNOSIS — R09.02 HYPOXEMIA: ICD-10-CM

## 2022-06-24 DIAGNOSIS — E55.9 VITAMIN D DEFICIENCY: ICD-10-CM

## 2022-06-24 DIAGNOSIS — E03.9 PRIMARY HYPOTHYROIDISM: Primary | ICD-10-CM

## 2022-06-24 LAB
CREAT UR-MCNC: 268 MG/DL
MICROALBUMIN UR-MCNC: 10.4 MG/DL
MICROALBUMIN/CREAT UR-RTO: 39 MG/G

## 2022-06-24 PROCEDURE — 71046 X-RAY EXAM CHEST 2 VIEWS: CPT

## 2022-06-24 RX ORDER — LEVOTHYROXINE SODIUM 0.07 MG/1
75 TABLET ORAL DAILY
Qty: 30 TABLET | Refills: 5 | Status: SHIPPED | OUTPATIENT
Start: 2022-06-24

## 2022-06-24 RX ORDER — ERGOCALCIFEROL 1.25 MG/1
50000 CAPSULE ORAL
Qty: 12 CAPSULE | Refills: 1 | Status: SHIPPED | OUTPATIENT
Start: 2022-06-27

## 2022-06-24 ASSESSMENT — ENCOUNTER SYMPTOMS
CONSTIPATION: 0
DIARRHEA: 0

## 2022-06-24 NOTE — TELEPHONE ENCOUNTER
Spoke to patient, TSH greater than 40, free T4 0.7. Although we have other labs pending we will start patient on levothyroxine. Dosing instructions reviewed.   Plan to have labs rechecked in 8 weeks    PTH remains markedly elevated with low Vitamin D, start Ergocalciferol twice weekly and repeat with 8 week labs

## 2022-06-25 ENCOUNTER — HOSPITAL ENCOUNTER (EMERGENCY)
Dept: GENERAL RADIOLOGY | Age: 67
Discharge: HOME | DRG: 871 | End: 2022-06-28
Payer: MEDICARE

## 2022-06-25 ENCOUNTER — HOSPITAL ENCOUNTER (EMERGENCY)
Dept: CT IMAGING | Age: 67
Discharge: HOME | DRG: 871 | End: 2022-06-28
Payer: MEDICARE

## 2022-06-25 ENCOUNTER — HOSPITAL ENCOUNTER (INPATIENT)
Age: 67
LOS: 3 days | Discharge: HOME | DRG: 871 | End: 2022-06-28
Attending: EMERGENCY MEDICINE
Payer: MEDICARE

## 2022-06-25 DIAGNOSIS — E11.9 TYPE 2 DIABETES MELLITUS WITHOUT COMPLICATION, UNSPECIFIED WHETHER LONG TERM INSULIN USE (HCC): ICD-10-CM

## 2022-06-25 DIAGNOSIS — J18.9 PNEUMONIA OF BOTH LOWER LOBES DUE TO INFECTIOUS ORGANISM: Primary | ICD-10-CM

## 2022-06-25 DIAGNOSIS — I10 ESSENTIAL HYPERTENSION: ICD-10-CM

## 2022-06-25 DIAGNOSIS — R09.02 HYPOXIA: ICD-10-CM

## 2022-06-25 DIAGNOSIS — D72.829 LEUKOCYTOSIS, UNSPECIFIED TYPE: ICD-10-CM

## 2022-06-25 PROBLEM — N18.9 CKD (CHRONIC KIDNEY DISEASE): Status: ACTIVE | Noted: 2017-01-10

## 2022-06-25 PROBLEM — E11.29 DIABETES MELLITUS WITH MICROALBUMINURIA (HCC): Status: ACTIVE | Noted: 2017-01-24

## 2022-06-25 PROBLEM — J15.9 BACTERIAL PNEUMONIA: Status: ACTIVE | Noted: 2022-06-25

## 2022-06-25 PROBLEM — N17.9 AKI (ACUTE KIDNEY INJURY) (HCC): Status: ACTIVE | Noted: 2020-12-02

## 2022-06-25 PROBLEM — R80.9 DIABETES MELLITUS WITH MICROALBUMINURIA (HCC): Status: ACTIVE | Noted: 2017-01-24

## 2022-06-25 PROBLEM — C32.9 MALIGNANT NEOPLASM OF LARYNX (HCC): Status: ACTIVE | Noted: 2018-12-18

## 2022-06-25 LAB
ALBUMIN SERPL-MCNC: 3.7 G/DL (ref 3.2–4.6)
ALBUMIN/GLOB SERPL: 0.7 (ref 1.2–3.5)
ALP SERPL-CCNC: 86 U/L (ref 50–136)
ALT SERPL-CCNC: 11 U/L (ref 12–65)
ANION GAP SERPL CALC-SCNC: 7 MMOL/L (ref 7–16)
APPEARANCE UR: ABNORMAL
AST SERPL-CCNC: 28 U/L (ref 15–37)
BACTERIA URNS QL MICRO: 0 /HPF
BASOPHILS # BLD: 0 K/UL (ref 0–0.2)
BASOPHILS NFR BLD: 0 % (ref 0–2)
BILIRUB SERPL-MCNC: 2.3 MG/DL (ref 0.2–1.1)
BILIRUB UR QL: ABNORMAL
BUN SERPL-MCNC: 25 MG/DL (ref 8–23)
CALCIUM SERPL-MCNC: 9.1 MG/DL (ref 8.3–10.4)
CASTS URNS QL MICRO: ABNORMAL /LPF
CHLORIDE SERPL-SCNC: 93 MMOL/L (ref 98–107)
CO2 SERPL-SCNC: 31 MMOL/L (ref 21–32)
COLOR UR: ABNORMAL
CREAT SERPL-MCNC: 1.95 MG/DL (ref 0.8–1.5)
DIFFERENTIAL METHOD BLD: ABNORMAL
EOSINOPHIL # BLD: 0.1 K/UL (ref 0–0.8)
EOSINOPHIL NFR BLD: 0 % (ref 0.5–7.8)
EPI CELLS #/AREA URNS HPF: ABNORMAL /HPF
ERYTHROCYTE [DISTWIDTH] IN BLOOD BY AUTOMATED COUNT: 14.6 % (ref 11.9–14.6)
FRUCTOSAMINE SERPL-SCNC: 321 UMOL/L (ref 0–285)
GLOBULIN SER CALC-MCNC: 5 G/DL (ref 2.3–3.5)
GLUCOSE BLD STRIP.AUTO-MCNC: 203 MG/DL (ref 65–100)
GLUCOSE BLD STRIP.AUTO-MCNC: 207 MG/DL (ref 65–100)
GLUCOSE SERPL-MCNC: 184 MG/DL (ref 65–100)
GLUCOSE UR STRIP.AUTO-MCNC: NEGATIVE MG/DL
HCT VFR BLD AUTO: 35.6 % (ref 41.1–50.3)
HGB BLD-MCNC: 11.4 G/DL (ref 13.6–17.2)
HGB UR QL STRIP: ABNORMAL
IMM GRANULOCYTES # BLD AUTO: 0.1 K/UL (ref 0–0.5)
IMM GRANULOCYTES NFR BLD AUTO: 1 % (ref 0–5)
INR PPP: 1.1
KETONES UR QL STRIP.AUTO: ABNORMAL MG/DL
LEUKOCYTE ESTERASE UR QL STRIP.AUTO: NEGATIVE
LYMPHOCYTES # BLD: 1.6 K/UL (ref 0.5–4.6)
LYMPHOCYTES NFR BLD: 9 % (ref 13–44)
MAGNESIUM SERPL-MCNC: 1.6 MG/DL (ref 1.8–2.4)
MCH RBC QN AUTO: 27.4 PG (ref 26.1–32.9)
MCHC RBC AUTO-ENTMCNC: 32 G/DL (ref 31.4–35)
MCV RBC AUTO: 85.6 FL (ref 79.6–97.8)
MONOCYTES # BLD: 0.8 K/UL (ref 0.1–1.3)
MONOCYTES NFR BLD: 5 % (ref 4–12)
NEUTS SEG # BLD: 14.3 K/UL (ref 1.7–8.2)
NEUTS SEG NFR BLD: 85 % (ref 43–78)
NITRITE UR QL STRIP.AUTO: NEGATIVE
NRBC # BLD: 0 K/UL (ref 0–0.2)
OTHER OBSERVATIONS: ABNORMAL
PH UR STRIP: 6 (ref 5–9)
PLATELET # BLD AUTO: 247 K/UL (ref 150–450)
PMV BLD AUTO: 9.2 FL (ref 9.4–12.3)
POTASSIUM SERPL-SCNC: 4.4 MMOL/L (ref 3.5–5.1)
PROT SERPL-MCNC: 8.7 G/DL (ref 6.3–8.2)
PROT UR STRIP-MCNC: 100 MG/DL
PROTHROMBIN TIME: 14.2 SEC (ref 12.6–14.5)
RBC # BLD AUTO: 4.16 M/UL (ref 4.23–5.6)
RBC #/AREA URNS HPF: ABNORMAL /HPF
SARS-COV-2 RDRP RESP QL NAA+PROBE: NOT DETECTED
SERVICE CMNT-IMP: ABNORMAL
SERVICE CMNT-IMP: ABNORMAL
SODIUM SERPL-SCNC: 131 MMOL/L (ref 136–145)
SOURCE: NORMAL
SP GR UR REFRACTOMETRY: 1.02 (ref 1–1.02)
THYROPEROXIDASE AB SERPL-ACNC: <8 IU/ML (ref 0–34)
TROPONIN I SERPL HS-MCNC: 26.2 PG/ML (ref 0–14)
TROPONIN I SERPL HS-MCNC: 27.5 PG/ML (ref 0–14)
TROPONIN I SERPL HS-MCNC: 42 PG/ML (ref 0–14)
TSI ACT/NOR SER: <0.1 IU/L (ref 0–0.55)
UROBILINOGEN UR QL STRIP.AUTO: 1 EU/DL (ref 0.2–1)
WBC # BLD AUTO: 16.8 K/UL (ref 4.3–11.1)
WBC URNS QL MICRO: ABNORMAL /HPF

## 2022-06-25 PROCEDURE — 1100000000 HC RM PRIVATE

## 2022-06-25 PROCEDURE — 83735 ASSAY OF MAGNESIUM: CPT

## 2022-06-25 PROCEDURE — 94760 N-INVAS EAR/PLS OXIMETRY 1: CPT

## 2022-06-25 PROCEDURE — 6370000000 HC RX 637 (ALT 250 FOR IP): Performed by: INTERNAL MEDICINE

## 2022-06-25 PROCEDURE — 82962 GLUCOSE BLOOD TEST: CPT

## 2022-06-25 PROCEDURE — 6360000002 HC RX W HCPCS: Performed by: EMERGENCY MEDICINE

## 2022-06-25 PROCEDURE — 85610 PROTHROMBIN TIME: CPT

## 2022-06-25 PROCEDURE — 96366 THER/PROPH/DIAG IV INF ADDON: CPT

## 2022-06-25 PROCEDURE — 81001 URINALYSIS AUTO W/SCOPE: CPT

## 2022-06-25 PROCEDURE — 80053 COMPREHEN METABOLIC PANEL: CPT

## 2022-06-25 PROCEDURE — 96365 THER/PROPH/DIAG IV INF INIT: CPT

## 2022-06-25 PROCEDURE — 36415 COLL VENOUS BLD VENIPUNCTURE: CPT

## 2022-06-25 PROCEDURE — 6360000004 HC RX CONTRAST MEDICATION: Performed by: EMERGENCY MEDICINE

## 2022-06-25 PROCEDURE — 96375 TX/PRO/DX INJ NEW DRUG ADDON: CPT

## 2022-06-25 PROCEDURE — 99285 EMERGENCY DEPT VISIT HI MDM: CPT

## 2022-06-25 PROCEDURE — 2580000003 HC RX 258: Performed by: EMERGENCY MEDICINE

## 2022-06-25 PROCEDURE — 71045 X-RAY EXAM CHEST 1 VIEW: CPT

## 2022-06-25 PROCEDURE — 93005 ELECTROCARDIOGRAM TRACING: CPT | Performed by: EMERGENCY MEDICINE

## 2022-06-25 PROCEDURE — 87635 SARS-COV-2 COVID-19 AMP PRB: CPT

## 2022-06-25 PROCEDURE — 71260 CT THORAX DX C+: CPT

## 2022-06-25 PROCEDURE — 87040 BLOOD CULTURE FOR BACTERIA: CPT

## 2022-06-25 PROCEDURE — 84484 ASSAY OF TROPONIN QUANT: CPT

## 2022-06-25 PROCEDURE — 2580000003 HC RX 258: Performed by: INTERNAL MEDICINE

## 2022-06-25 PROCEDURE — 85025 COMPLETE CBC W/AUTO DIFF WBC: CPT

## 2022-06-25 RX ORDER — ROSUVASTATIN CALCIUM 20 MG/1
20 TABLET, COATED ORAL NIGHTLY
Status: DISCONTINUED | OUTPATIENT
Start: 2022-06-25 | End: 2022-06-28 | Stop reason: HOSPADM

## 2022-06-25 RX ORDER — SODIUM CHLORIDE 0.9 % (FLUSH) 0.9 %
5 SYRINGE (ML) INJECTION EVERY 8 HOURS
Status: DISCONTINUED | OUTPATIENT
Start: 2022-06-25 | End: 2022-06-28 | Stop reason: HOSPADM

## 2022-06-25 RX ORDER — SODIUM CHLORIDE 0.9 % (FLUSH) 0.9 %
5-40 SYRINGE (ML) INJECTION EVERY 12 HOURS SCHEDULED
Status: DISCONTINUED | OUTPATIENT
Start: 2022-06-25 | End: 2022-06-28 | Stop reason: HOSPADM

## 2022-06-25 RX ORDER — MORPHINE SULFATE 4 MG/ML
4 INJECTION, SOLUTION INTRAMUSCULAR; INTRAVENOUS
Status: COMPLETED | OUTPATIENT
Start: 2022-06-25 | End: 2022-06-25

## 2022-06-25 RX ORDER — POLYETHYLENE GLYCOL 3350 17 G/17G
17 POWDER, FOR SOLUTION ORAL DAILY PRN
Status: DISCONTINUED | OUTPATIENT
Start: 2022-06-25 | End: 2022-06-28 | Stop reason: HOSPADM

## 2022-06-25 RX ORDER — ACETAMINOPHEN 650 MG/1
650 SUPPOSITORY RECTAL EVERY 6 HOURS PRN
Status: DISCONTINUED | OUTPATIENT
Start: 2022-06-25 | End: 2022-06-28 | Stop reason: HOSPADM

## 2022-06-25 RX ORDER — SODIUM CHLORIDE 0.9 % (FLUSH) 0.9 %
10 SYRINGE (ML) INJECTION
Status: COMPLETED | OUTPATIENT
Start: 2022-06-25 | End: 2022-06-25

## 2022-06-25 RX ORDER — INSULIN LISPRO 100 [IU]/ML
0-8 INJECTION, SOLUTION INTRAVENOUS; SUBCUTANEOUS
Status: DISCONTINUED | OUTPATIENT
Start: 2022-06-25 | End: 2022-06-28 | Stop reason: HOSPADM

## 2022-06-25 RX ORDER — DEXTROSE MONOHYDRATE 50 MG/ML
100 INJECTION, SOLUTION INTRAVENOUS PRN
Status: DISCONTINUED | OUTPATIENT
Start: 2022-06-25 | End: 2022-06-28 | Stop reason: HOSPADM

## 2022-06-25 RX ORDER — 0.9 % SODIUM CHLORIDE 0.9 %
1000 INTRAVENOUS SOLUTION INTRAVENOUS
Status: COMPLETED | OUTPATIENT
Start: 2022-06-25 | End: 2022-06-25

## 2022-06-25 RX ORDER — ACETAMINOPHEN 325 MG/1
650 TABLET ORAL EVERY 6 HOURS PRN
Status: DISCONTINUED | OUTPATIENT
Start: 2022-06-25 | End: 2022-06-28 | Stop reason: HOSPADM

## 2022-06-25 RX ORDER — PANTOPRAZOLE SODIUM 40 MG/1
40 TABLET, DELAYED RELEASE ORAL
Status: DISCONTINUED | OUTPATIENT
Start: 2022-06-26 | End: 2022-06-28 | Stop reason: HOSPADM

## 2022-06-25 RX ORDER — 0.9 % SODIUM CHLORIDE 0.9 %
100 INTRAVENOUS SOLUTION INTRAVENOUS
Status: COMPLETED | OUTPATIENT
Start: 2022-06-25 | End: 2022-06-25

## 2022-06-25 RX ORDER — LEVOTHYROXINE SODIUM 0.07 MG/1
75 TABLET ORAL NIGHTLY
Status: DISCONTINUED | OUTPATIENT
Start: 2022-06-26 | End: 2022-06-28 | Stop reason: HOSPADM

## 2022-06-25 RX ORDER — SODIUM CHLORIDE 9 MG/ML
INJECTION, SOLUTION INTRAVENOUS CONTINUOUS
Status: DISCONTINUED | OUTPATIENT
Start: 2022-06-25 | End: 2022-06-28 | Stop reason: HOSPADM

## 2022-06-25 RX ORDER — INSULIN LISPRO 100 [IU]/ML
0-4 INJECTION, SOLUTION INTRAVENOUS; SUBCUTANEOUS NIGHTLY
Status: DISCONTINUED | OUTPATIENT
Start: 2022-06-25 | End: 2022-06-28 | Stop reason: HOSPADM

## 2022-06-25 RX ORDER — SODIUM CHLORIDE 0.9 % (FLUSH) 0.9 %
5 SYRINGE (ML) INJECTION AS NEEDED
Status: DISCONTINUED | OUTPATIENT
Start: 2022-06-25 | End: 2022-06-28 | Stop reason: HOSPADM

## 2022-06-25 RX ORDER — SODIUM CHLORIDE 9 MG/ML
INJECTION, SOLUTION INTRAVENOUS PRN
Status: DISCONTINUED | OUTPATIENT
Start: 2022-06-25 | End: 2022-06-28 | Stop reason: HOSPADM

## 2022-06-25 RX ORDER — SODIUM CHLORIDE 0.9 % (FLUSH) 0.9 %
5-40 SYRINGE (ML) INJECTION PRN
Status: DISCONTINUED | OUTPATIENT
Start: 2022-06-25 | End: 2022-06-28 | Stop reason: HOSPADM

## 2022-06-25 RX ADMIN — ROSUVASTATIN 20 MG: 20 TABLET, FILM COATED ORAL at 21:12

## 2022-06-25 RX ADMIN — SODIUM CHLORIDE, PRESERVATIVE FREE 5 ML: 5 INJECTION INTRAVENOUS at 17:25

## 2022-06-25 RX ADMIN — INSULIN LISPRO 2 UNITS: 100 INJECTION, SOLUTION INTRAVENOUS; SUBCUTANEOUS at 18:17

## 2022-06-25 RX ADMIN — AZITHROMYCIN MONOHYDRATE 500 MG: 500 INJECTION, POWDER, LYOPHILIZED, FOR SOLUTION INTRAVENOUS at 15:05

## 2022-06-25 RX ADMIN — IOPAMIDOL 100 ML: 755 INJECTION, SOLUTION INTRAVENOUS at 16:08

## 2022-06-25 RX ADMIN — SODIUM CHLORIDE: 900 INJECTION, SOLUTION INTRAVENOUS at 21:12

## 2022-06-25 RX ADMIN — SODIUM CHLORIDE 1000 ML: 900 INJECTION, SOLUTION INTRAVENOUS at 14:16

## 2022-06-25 RX ADMIN — SODIUM CHLORIDE, PRESERVATIVE FREE 5 ML: 5 INJECTION INTRAVENOUS at 17:24

## 2022-06-25 RX ADMIN — MORPHINE SULFATE 4 MG: 4 INJECTION, SOLUTION INTRAMUSCULAR; INTRAVENOUS at 14:16

## 2022-06-25 RX ADMIN — SODIUM CHLORIDE 100 ML: 9 INJECTION, SOLUTION INTRAVENOUS at 16:08

## 2022-06-25 RX ADMIN — SODIUM CHLORIDE, PRESERVATIVE FREE 10 ML: 5 INJECTION INTRAVENOUS at 16:08

## 2022-06-25 RX ADMIN — CEFTRIAXONE 1000 MG: 1 INJECTION, POWDER, FOR SOLUTION INTRAMUSCULAR; INTRAVENOUS at 15:01

## 2022-06-25 RX ADMIN — MORPHINE SULFATE 4 MG: 4 INJECTION INTRAVENOUS at 17:22

## 2022-06-25 ASSESSMENT — PAIN SCALES - GENERAL
PAINLEVEL_OUTOF10: 3
PAINLEVEL_OUTOF10: 4
PAINLEVEL_OUTOF10: 9
PAINLEVEL_OUTOF10: 4
PAINLEVEL_OUTOF10: 0
PAINLEVEL_OUTOF10: 3
PAINLEVEL_OUTOF10: 5
PAINLEVEL_OUTOF10: 4
PAINLEVEL_OUTOF10: 5

## 2022-06-25 ASSESSMENT — PAIN - FUNCTIONAL ASSESSMENT: PAIN_FUNCTIONAL_ASSESSMENT: PREVENTS OR INTERFERES SOME ACTIVE ACTIVITIES AND ADLS

## 2022-06-25 ASSESSMENT — ENCOUNTER SYMPTOMS
SHORTNESS OF BREATH: 1
ABDOMINAL PAIN: 1
VOMITING: 0
DIARRHEA: 0

## 2022-06-25 ASSESSMENT — PAIN DESCRIPTION - ONSET: ONSET: ON-GOING

## 2022-06-25 ASSESSMENT — PAIN DESCRIPTION - FREQUENCY: FREQUENCY: CONTINUOUS

## 2022-06-25 ASSESSMENT — PAIN DESCRIPTION - ORIENTATION: ORIENTATION: RIGHT;UPPER

## 2022-06-25 ASSESSMENT — PAIN DESCRIPTION - LOCATION: LOCATION: ABDOMEN

## 2022-06-25 ASSESSMENT — PAIN DESCRIPTION - DESCRIPTORS: DESCRIPTORS: SHARP

## 2022-06-25 ASSESSMENT — PAIN DESCRIPTION - PAIN TYPE: TYPE: ACUTE PAIN

## 2022-06-25 NOTE — ED NOTES
TRANSFER - OUT REPORT:    Verbal report given to natalie on Clive De La Torre Sender  being transferred to [facility] for routine progression of patient care       Report consisted of patient's Situation, Background, Assessment and   Recommendations(SBAR). Information from the following report(s) ED SBAR was reviewed with the receiving nurse. Lines:   Peripheral IV 06/25/22 Left Hand (Active)   Site Assessment Clean, dry & intact 06/25/22 1353   Line Status Brisk blood return 06/25/22 1353   Phlebitis Assessment No symptoms 06/25/22 1353   Infiltration Assessment 0 06/25/22 1353   Dressing Status New dressing applied 06/25/22 1353   Dressing Type Transparent 06/25/22 1353       Peripheral IV 06/25/22 Left;Upper Arm (Active)        Opportunity for questions and clarification was provided.       Patient transported with:  [facility] @ [address] [place] [address], RN  06/25/22 3811

## 2022-06-25 NOTE — ACP (ADVANCE CARE PLANNING)
[facility] Hospitalist Service  [facility] the [facility] of better care     Advance Care Planning   Admit Date:  2022  1:33 PM   Name:  Israel Neil   Age:  73 y.o. Sex:  male  :  1955   MRN:  [id number]   Room:  [place] [address]    Israel Neil is able to make his own decisions:   Yes     If pt unable to make decisions, POA/surrogate decision maker:  Wife     Other members present in the meeting:   None     Patient / surrogate decision-maker directed: Full code     Other ACP topics discussed, if applicable:   Treatment of pneumonia     Patient or surrogate consented to discussion of the current conditions, workup, management plans, prognosis, and understand the risk for further deterioration. Time spent: 32 minutes in direct discussion (face to face and/or over phone).       Signed:  Juan Pablo Juares MD

## 2022-06-25 NOTE — ED NOTES
Patient resting on stretcher. Respirations improved. Patient still c/o pain in right upper abd. Requesting something to drink - md notified per Ifrah Owen rn. Awaiting further orders and admit bed.       Katerina Carvalho RN  06/25/22 2819

## 2022-06-25 NOTE — ED TRIAGE NOTES
Pt states that \"all day\" he's been having right sided chest pain that radiates down into his abdomen. Pt describes the pain as stabbing. Pt states that he's also short of breath. Pt denies N/V/D. Pt fell 3 weeks ago and has had pain here since then but the pain got worse this morning.

## 2022-06-25 NOTE — H&P
(acute kidney injury) ([facility] [address]    CKD (chronic kidney disease)  Monitor renal function and intake and output. Avoid nephrotoxic agents. give IV fluid    I have discussed the plan of care with patient. Healthcare power of  is wife. Patient has pain now. Pain medications are ordered and patient will be monitored and followed for response. Patient requires hospital stay as an in-patient and anticipated stay is more than 2 midnights due to the serious nature of the illness. Dispo/Discharge Planning:   to be determined     Diet: ADULT DIET; Regular; 3 carb choices (45 gm/meal)  VTE ppx: SCD  Code status: Full Code    Hospital Problems:  Principal Problem:    Bacterial pneumonia  Active Problems:    HTN (hypertension)    Malignant neoplasm of larynx (HCC)    Diabetes mellitus with microalbuminuria ([facility] [address]    [place] [facility]    CKD (chronic kidney disease)  Resolved Problems:    * No resolved hospital problems.  *       Past History:     Past Medical History:   Diagnosis Date    Anxiety     Controlled with meds     GERD (gastroesophageal reflux disease)     managed with medication     Gout     symptoms in ankles, no recent episodes    Hemothorax on left 12/2020    Hypertension     managed with medication     Nausea & vomiting     Obesity     Squamous cell carcinoma of epiglottis ([facility] [address] 11/23/2016    Type 2 diabetes mellitus (HCC)     insulin, rare home checks \"always under 200\"     Past Surgical History:   Procedure Laterality Date    COLONOSCOPY  2016    HEENT  06/15/2020    Direct laryngoscopy with CO2 laser    HEENT  02/24/2020    S/P SFE Direct laryngoscopy with biopsy, CO2 laser of supraglottic airway obstruction, Bronchoscopy with tracheal balloon dilation of tracheal stenosis 02/24/2020    HEENT  12/03/2018    Panendoscopy w/Scar Revision/SFE/12-03-18    HEENT  10/01/2018    Direct laryngoscopy with CO2 laser of supraglottic swelling    HEENT  12/2017 bronchoscopy    OTHER SURGICAL HISTORY  2017    PEG placement and removal    OTHER SURGICAL HISTORY Left age 11    2rd nipple removed    TRACHEOSTOMY      placement and removal    VASCULAR SURGERY      placement and removal      Social History     Tobacco Use    Smoking status: Former Smoker     Packs/day: 2.00     Quit date: 2005     Years since quittin.4    Smokeless tobacco: Never Used   Substance Use Topics    Alcohol use: No      Social History     Substance and Sexual Activity   Drug Use No     Family History   Problem Relation Age of Onset    Lung Disease Mother     Stroke Mother     Diabetes Mother     Thyroid Disease Mother     No Known Problems Father     Liver Disease Brother     Hashimoto Thyroiditis Daughter     Diabetes type 2  Daughter     Diabetes type 2  Son       Family history reviewed and negative except as noted above.       Immunization History   Administered Date(s) Administered    Influenza, Quadv, IM, PF (6 mo and older Fluzone, Flulaval, Fluarix, and 3 yrs and older Afluria) 2018    Pneumococcal Polysaccharide ([id number]) 2018     Allergies   Allergen Reactions    Ondansetron Other (See Comments)     Gives pt severe HA  Other reaction(s): Headache-Intolerance  Gives pt severe HA     Prior to Admit Medications:  Current Outpatient Medications   Medication Instructions    citalopram (CELEXA) 40 mg, Oral, EVERY EVENING    esomeprazole (NEXIUM) 20 mg, Oral, DAILY    levothyroxine (SYNTHROID) 75 mcg, Oral, DAILY    losartan-hydroCHLOROthiazide (HYZAAR) 50-12.5 MG per tablet 1 tablet, Oral, DAILY    rosuvastatin (CRESTOR) 20 mg, Oral, DAILY    [START ON 2022] vitamin D (ERGOCALCIFEROL) 50,000 Units, Oral, TWICE WEEKLY         Objective:     Patient Vitals for the past 24 hrs:   Temp Pulse Resp BP SpO2   22 1722 -- -- 24 -- --   22 1700 99 °F (37.2 °C) (!) 104 26 128/81 95 %   22 1630 -- (!) 109 26 (!) 167/74 94 %   22 1530 -- (!) 112 29 (!) 144/81 96 %   06/25/22 1508 -- (!) 112 -- 130/82 97 %   06/25/22 1430 -- (!) 111 27 (!) 108/55 (!) 89 %   06/25/22 1415 -- (!) 114 23 138/74 90 %   06/25/22 1345 -- -- -- -- 91 %   06/25/22 1342 -- -- -- -- (!) 86 %   06/25/22 1339 98 °F (36.7 °C) (!) 115 24 (!) 184/86 (!) 78 %       Oxygen Therapy  SpO2: 95 %  Pulse via Oximetry: 105 beats per minute  O2 Device: Nasal cannula  O2 Flow Rate (L/min): 4 L/min    Estimated body mass index is 33.75 kg/m² as calculated from the following:    Height as of this encounter: 5' 11\" (1.803 m). Weight as of this encounter: 242 lb (109.8 kg). Intake/Output Summary (Last 24 hours) at 6/25/2022 1750  Last data filed at 6/25/2022 1637  Gross per 24 hour   Intake 383.33 ml   Output --   Net 383.33 ml         Physical Exam:    Blood pressure 128/81, pulse (!) 104, temperature 99 °F (37.2 °C), temperature source Oral, resp. rate 24, height 5' 11\" (1.803 m), weight 242 lb (109.8 kg), SpO2 95 %. General:    Well nourished. No overt distress, BMI of 34, he is on oxygen cannula 4 L/min. Head:  Normocephalic, atraumatic, alert, oriented. Patient is talking well. Eyes:  Sclerae appear normal.  Pupils equally round. ENT:  Nares appear normal, no drainage. Moist oral mucosa  Neck:  No restricted ROM. Trachea midline   CV:   RRR. No m/r/g. No jugular venous distension. Lungs:   Decrease in breath sounds bilaterally. no wheezing, rhonchi, or rales. Respirations even, unlabored  Abdomen: Bowel sounds present. Soft, nontender, distended due to truncal obesity  Extremities: No cyanosis or clubbing. No edema  Skin:     No rashes and normal coloration. Warm and dry. Neuro:  CN II-XII grossly intact. Sensation intact. A&Ox3  Psych:  Normal mood and affect.       I have reviewed ordered lab tests and independently visualized imaging below:    Last 24hr Labs:  Recent Results (from the past 24 hour(s))   CBC with Auto Differential    Collection Time: 06/25/22  1:59 PM   Result Value Ref Range    WBC 16.8 (H) 4.3 - 11.1 K/uL    RBC 4.16 (L) 4.23 - 5.6 M/uL    Hemoglobin 11.4 (L) 13.6 - 17.2 g/dL    Hematocrit 35.6 (L) 41.1 - 50.3 %    MCV 85.6 79.6 - 97.8 FL    MCH 27.4 26.1 - 32.9 PG    MCHC 32.0 31.4 - 35.0 g/dL    RDW 14.6 11.9 - 14.6 %    Platelets 035 072 - 762 K/uL    MPV 9.2 (L) 9.4 - 12.3 FL    nRBC 0.00 0.0 - 0.2 K/uL    Differential Type AUTOMATED      Seg Neutrophils 85 (H) 43 - 78 %    Lymphocytes 9 (L) 13 - 44 %    Monocytes 5 4.0 - 12.0 %    Eosinophils % 0 (L) 0.5 - 7.8 %    Basophils 0 0.0 - 2.0 %    Immature Granulocytes 1 0.0 - 5.0 %    Segs Absolute 14.3 (H) 1.7 - 8.2 K/UL    Absolute Lymph # 1.6 0.5 - 4.6 K/UL    Absolute Mono # 0.8 0.1 - 1.3 K/UL    Absolute Eos # 0.1 0.0 - 0.8 K/UL    Basophils Absolute 0.0 0.0 - 0.2 K/UL    Absolute Immature Granulocyte 0.1 0.0 - 0.5 K/UL   Comprehensive Metabolic Panel    Collection Time: 06/25/22  1:59 PM   Result Value Ref Range    Sodium 131 (L) 136 - 145 mmol/L    Potassium 4.4 3.5 - 5.1 mmol/L    Chloride 93 (L) 98 - 107 mmol/L    CO2 31 21 - 32 mmol/L    Anion Gap 7 7 - 16 mmol/L    Glucose 184 (H) 65 - 100 mg/dL    BUN 25 (H) 8 - 23 MG/DL    CREATININE 1.95 (H) 0.8 - 1.5 MG/DL    GFR African American 45 (L) >60 ml/min/1.73m2    GFR Non- 37 (L) >60 ml/min/1.73m2    Calcium 9.1 8.3 - 10.4 MG/DL    Total Bilirubin 2.3 (H) 0.2 - 1.1 MG/DL    ALT 11 (L) 12 - 65 U/L    AST 28 15 - 37 U/L    Alk Phosphatase 86 50 - 136 U/L    Total Protein 8.7 (H) 6.3 - 8.2 g/dL    Albumin 3.7 3.2 - 4.6 g/dL    Globulin 5.0 (H) 2.3 - 3.5 g/dL    Albumin/Globulin Ratio 0.7 (L) 1.2 - 3.5     Magnesium    Collection Time: 06/25/22  1:59 PM   Result Value Ref Range    Magnesium 1.6 (L) 1.8 - 2.4 mg/dL   Protime-INR    Collection Time: 06/25/22  1:59 PM   Result Value Ref Range    Protime 14.2 12.6 - 14.5 sec    INR 1.1     Troponin    Collection Time: 06/25/22  1:59 PM   Result Value Ref Range    Troponin, High Sensitivity 27.5 (H) 0 - 14 pg/mL   Urinalysis    Collection Time: 06/25/22  3:11 PM   Result Value Ref Range    Color, UA JUANITO      Appearance CLOUDY      Specific Gravity, UA 1.024 (H) 1.001 - 1.023      pH, Urine 6.0 5.0 - 9.0      Protein,  (A) NEG mg/dL    Glucose, UA Negative mg/dL    Ketones, Urine TRACE (A) NEG mg/dL    Bilirubin Urine SMALL (A) NEG      Blood, Urine TRACE (A) NEG      Urobilinogen, Urine 1.0 0.2 - 1.0 EU/dL    Nitrite, Urine Negative NEG      Leukocyte Esterase, Urine Negative NEG      WBC, UA 3-5 0 /hpf    RBC, UA 0-3 0 /hpf    Epithelial Cells UA 0-3 0 /hpf    BACTERIA, URINE 0 0 /hpf    Casts 10-20 0 /lpf    OTHER OBSERVATIONS RESULTS VERIFIED MANUALLY     COVID-19, Rapid    Collection Time: 06/25/22  5:02 PM    Specimen: Nasopharyngeal   Result Value Ref Range    Source NASAL SWAB      SARS-CoV-2, Rapid Not detected NOTD         Other Studies:  XR CHEST (2 VW)    Result Date: 6/24/2022  Exam: XR CHEST (2 VW) on 6/24/2022 10:26 AM Clinical History: The Male patient is 73years old  presenting for Hypoxemia. Comparison:  Chest x-ray 2/8/2021 Findings:  Frontal and lateral views of the chest were obtained. There is slight residual scarring at the left lung base. There are underlying changes of COPD. .  The cardiomediastinal silhouette is within normal limits. There are no acute osseous abnormalities. 1. No acute cardiopulmonary process. CPT code(s) 49893     XR CHEST PORTABLE    Result Date: 6/25/2022  Chest portable CLINICAL INDICATION: Shortness of breath, elevated TSH and white blood cell count COMPARISON: 6/24/2022, 2/8/2021 TECHNIQUE: single AP portable view chest at 2:20 PM upright FINDINGS: Cardiac silhouette is mildly enlarged. There are moderate reticular and groundglass opacities involving the left perihilar upper lobe and both bases. No evidence of a pneumothorax, definite pleural effusion or dense lobar consolidation. Stable hilar contours.      Multifocal bilateral lung infiltrates. CT CHEST ABDOMEN PELVIS W CONTRAST    Result Date: 6/25/2022  CT of the Chest, Abdomen, and Pelvis with contrast CLINICAL INDICATION:  Acute severe shortness of breath, right pleuritic chest pain. Subacute progressively worsening moderate to severe right upper abdominal and flank pain. Hypoxia, hematuria, elevated troponin, low magnesium, elevated bilirubin, renal insufficiency, hyponatremia, leukocytosis, anemia. Further evaluation of abnormal bilateral lung opacities on radiograph. History also includes prior left rib fractures, squamous cell cancer of the epiglottis. COMPARISON: CT chest 12/10/2020, radiograph chest today, CT abdomen pelvis 12/6/2020, PET 1/8/2019. TECHNIQUE: Dose reduction technique used: Automated exposure Control and/or adjustment of mA and kV according to patient size. Multiple axial images were obtained through the chest, abdomen, and pelvis,  after intravenous injection of 125cc of isovue 370 IV contrast to further evaluate vessels and organs. Coronal, sagittal reformatted images were done for further evaluation of bones and organs. Oral contrast was not given per request which limits evaluation of GI tract and adjacent organs. FINDINGS: Chest: Pulmonary arteries are normal in caliber, well-opacified, demonstrating no filling defect. Aorta is normal in caliber with diffuse mild calcifications and atherosclerotic changes. There coronary artery calcifications. No definite pleural or pericardial effusion. Multiple mildly enlarged mediastinal lymph nodes are nonspecific, most likely benign reactive in the absence of clinical suspicion for malignancy. Esophagus is normal in caliber. Thoracic bones again demonstrate no partially healed left posterior and lateral rib fractures. Since prior there is an oblique displaced fracture of the right lateral fourth rib.  Lungs demonstrate partial consolidations as well as peribronchial and groundglass opacities involving posterior left upper lobe and both lower lobes. No pneumothorax. Bilateral upper lobe predominant emphysema, AP elongation and narrowing of trachea. Abdomen: Lack of oral contrast limits evaluation of GI tract and other structures. No evidence of free air, ascites, bowel obstruction, hernia or appendicitis. Patent major vessels. Normal caliber aorta. Mild bilateral renal cortical thinning compatible with medical renal disease, chronic in appearance. Tiny round hypodensities in the renal cortices are too small to characterize, statistically benign incidental cysts. Scattered atherosclerotic changes and vascular calcifications. Benign diverticula of proximal small bowel. Moderate volume stool of large bowel. Liver, gallbladder, adrenals, kidneys, pancreas, spleen demonstrate no acute abnormalities. There is note of gallstones without biliary dilatation or surrounding inflammation. No obvious lymphadenopathy. Pelvis: Prostate is enlarged. No evidence of focal fluid collection, acute inflammation, or lymphadenopathy. Mild wall thickening of urinary bladder. Patent pelvic vessels. Abdominopelvic bones: No acute osseous lesions. 1.  No evidence of PE. 2.  Emphysema. Multifocal bilateral lung opacities, likely infectious. Recommend follow-up CT in about 3 months after treatment to ensure resolution and exclude underlying mass. 3.  Gallstone. 4.  Enlarged prostate. 5.  Urinary bladder wall thickening could indicate cystitis or outlet obstruction. 6.  Right fourth rib fracture. Echocardiogram:  No results found for this or any previous visit.       Meds previously ordered:  Orders Placed This Encounter   Medications    sodium chloride flush 0.9 % injection 5 mL    sodium chloride flush 0.9 % injection 5 mL    morphine injection 4 mg    0.9 % sodium chloride bolus    cefTRIAXone (ROCEPHIN) 1000 mg IVPB in NS 50ml minibag     Order Specific Question:   Antimicrobial Indications     Answer: Pneumonia (CAP)    azithromycin (ZITHROMAX) 500 mg in sodium chloride 0.9 % 250 mL IVPB ([id number])     Order Specific Question:   Antimicrobial Indications     Answer:   Pneumonia (CAP)    morphine injection 4 mg    azithromycin (ZITHROMAX) 500 mg in sodium chloride 0.9 % 250 mL IVPB ([id number])     Order Specific Question:   Antimicrobial Indications     Answer:   Pneumonia (CAP)     Order Specific Question:   CAP duration of therapy     Answer:   5 days    cefTRIAXone (ROCEPHIN) 1000 mg IVPB in NS 50ml minibag     Order Specific Question:   Antimicrobial Indications     Answer:   Pneumonia (CAP)     Order Specific Question:   CAP duration of therapy     Answer:   7 days    sodium chloride flush 0.9 % injection 5-40 mL    sodium chloride flush 0.9 % injection 5-40 mL    0.9 % sodium chloride infusion    polyethylene glycol (GLYCOLAX) packet 17 g    OR Linked Order Group     acetaminophen (TYLENOL) tablet 650 mg     acetaminophen (TYLENOL) suppository 650 mg    0.9 % sodium chloride infusion    levothyroxine (SYNTHROID) tablet 75 mcg    rosuvastatin (CRESTOR) tablet 20 mg    pantoprazole (PROTONIX) tablet 40 mg    insulin lispro (HUMALOG) injection vial 0-8 Units    insulin lispro (HUMALOG) injection vial 0-4 Units         Signed:  Amy Dempsey MD    Part of this note may have been written by using a voice dictation software. The note has been proof read but may still contain some grammatical/other typographical errors.

## 2022-06-25 NOTE — ED PROVIDER NOTES
[facility] Emergency Department Provider Note                   PCP:                Omari Thomas MD               Age: 73 y.o. Sex: male       ICD-10-CM    1. Pneumonia of both lower lobes due to infectious organism  J18.9    2. Hypoxia  R09.02    3. Leukocytosis, unspecified type  D72.829    4. Type 2 diabetes mellitus without complication, unspecified whether long term insulin use (HCC)  E11.9    5. Essential hypertension  I10        DISPOSITION Decision To Admit 06/25/2022 04:19:30 PM       New Prescriptions    No medications on file       Orders Placed This Encounter   Procedures    Culture, Blood 1    XR CHEST PORTABLE    CT CHEST ABDOMEN PELVIS W CONTRAST    CBC with Auto Differential    Comprehensive Metabolic Panel    Magnesium    Protime-INR    Troponin    Urinalysis    Cardiac Monitor - ED Only    Continuous Pulse Oximetry    EKG 12 Lead    Saline lock IV         Shari Argueta is a 73 y.o. male who presents to the Emergency Department with chief complaint of    Chief Complaint   Patient presents with    Chest Pain      Patient is a 71-year-old male with a past medical history of type 2 diabetes, hypertension, GERD, anxiety and gout who presents with abdominal pain. He states he fell 3 weeks ago, tripped over a rope and fell onto the ground. He was knocked unconscious. He was seen at [place] and discharged home. 3 days later he started having right upper quadrant abdominal pain/right-sided chest pain. Initially it was intermittent but over the past 2 to 3 days have become constant and more severe. He describes it as sharp pain much worse with deep breaths. He feels better when he sits up, worse when he lies down. He denies any nausea or vomiting. He does feel short of breath. Upon initial presentation here, his room air sat was 77%. Review of Systems   Constitutional: Negative for chills and fever. Respiratory: Positive for shortness of breath. Gastrointestinal: Positive for abdominal pain. Negative for diarrhea and vomiting. All other systems reviewed and are negative. All other systems reviewed and are negative.       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    Type 2 diabetes mellitus (HCC)     insulin, rare home checks \"always under 200\"        Past Surgical History:   Procedure Laterality Date    COLONOSCOPY  2016    HEENT  06/15/2020    Direct laryngoscopy with CO2 laser    HEENT  02/24/2020    S/P SFE Direct laryngoscopy with biopsy, CO2 laser of supraglottic airway obstruction, Bronchoscopy with tracheal balloon dilation of tracheal stenosis 02/24/2020    HEENT  12/03/2018    Panendoscopy w/Scar Revision/SFE/12-03-18    HEENT  10/01/2018    Direct laryngoscopy with CO2 laser of supraglottic swelling    HEENT  12/2017    bronchoscopy    OTHER SURGICAL HISTORY  01/2017    PEG placement and removal    OTHER SURGICAL HISTORY Left age 11    2rd nipple removed    TRACHEOSTOMY      placement and removal    VASCULAR SURGERY      placement and removal        Family History   Problem Relation Age of Onset    Lung Disease Mother     Stroke Mother     Diabetes Mother     Thyroid Disease Mother     No Known Problems Father     Liver Disease Brother     Hashimoto Thyroiditis Daughter     Diabetes type 2  Daughter     Diabetes type 2  Son            Social Connections:     Frequency of Communication with Friends and Family: Not on file    Frequency of Social Gatherings with Friends and Family: Not on file    Attends [employer] Services: Not on file    Active Member of Clubs or Organizations: Not on file   [employer] infectious organism: new, needed workup  Type 2 diabetes mellitus without complication, unspecified whether long term insulin use ([facility] [address]  Diagnosis management comments: 2:14 PM differential diagnosis includes pulmonary contusion, PE, rib fractures, liver laceration, pneumonia, ACS, pneumonia. O2 sat was 77% on room air upon presentation, immediately placed on supplemental oxygen. Now 91% on 4 L nasal cannula. 4:20 PM CT scan shows what appears to be bibasilar pneumonia which would account for his leukocytosis and his hypoxia. Rocephin and Zithromax have been started. Hospitalist has been contacted for admission.        Amount and/or Complexity of Data Reviewed  Clinical lab tests: ordered and reviewed  Tests in the radiology section of CPT®: ordered and reviewed  Tests in the medicine section of CPT®: ordered and reviewed  Discuss the patient with other providers: yes    Risk of Complications, Morbidity, and/or Mortality  Presenting problems: high  Diagnostic procedures: high  Management options: high    Patient Progress  Patient progress: improved      Procedures    Labs Reviewed   CBC WITH AUTO DIFFERENTIAL - Abnormal; Notable for the following components:       Result Value    WBC 16.8 (*)     RBC 4.16 (*)     Hemoglobin 11.4 (*)     Hematocrit 35.6 (*)     MPV 9.2 (*)     Seg Neutrophils 85 (*)     Lymphocytes 9 (*)     Eosinophils % 0 (*)     Segs Absolute 14.3 (*)     All other components within normal limits   COMPREHENSIVE METABOLIC PANEL - Abnormal; Notable for the following components:    Sodium 131 (*)     Chloride 93 (*)     Glucose 184 (*)     BUN 25 (*)     CREATININE 1.95 (*)     GFR  45 (*)     GFR Non- 37 (*)     Total Bilirubin 2.3 (*)     ALT 11 (*)     Total Protein 8.7 (*)     Globulin 5.0 (*)     Albumin/Globulin Ratio 0.7 (*)     All other components within normal limits   MAGNESIUM - Abnormal; Notable for the following components:    Magnesium 1.6 (*) All other components within normal limits   TROPONIN - Abnormal; Notable for the following components:    Troponin, High Sensitivity 27.5 (*)     All other components within normal limits   URINALYSIS - Abnormal; Notable for the following components:    Specific Gravity, UA 1.024 (*)     Protein,  (*)     Ketones, Urine TRACE (*)     Bilirubin Urine SMALL (*)     Blood, Urine TRACE (*)     All other components within normal limits   CULTURE, BLOOD 1   PROTIME-INR        XR CHEST PORTABLE   Final Result   Multifocal bilateral lung infiltrates. CT CHEST ABDOMEN PELVIS W CONTRAST    (Results Pending)                                  Voice dictation software was used during the making of this note. This software is not perfect and grammatical and other typographical errors may be present. This note has not been completely proofread for errors.        Sofy Lopes MD  06/25/22 [id number]

## 2022-06-26 LAB
ANION GAP SERPL CALC-SCNC: 6 MMOL/L (ref 7–16)
BASOPHILS # BLD: 0 K/UL (ref 0–0.2)
BASOPHILS NFR BLD: 0 % (ref 0–2)
BUN SERPL-MCNC: 27 MG/DL (ref 8–23)
CA-I SERPL ISE-MCNC: 4.9 MG/DL (ref 4.5–5.6)
CALCIUM SERPL-MCNC: 8.5 MG/DL (ref 8.3–10.4)
CHLORIDE SERPL-SCNC: 96 MMOL/L (ref 98–107)
CO2 SERPL-SCNC: 32 MMOL/L (ref 21–32)
CREAT SERPL-MCNC: 1.95 MG/DL (ref 0.8–1.5)
DIFFERENTIAL METHOD BLD: ABNORMAL
EKG ATRIAL RATE: 113 BPM
EKG DIAGNOSIS: NORMAL
EKG P AXIS: 53 DEGREES
EKG P-R INTERVAL: 180 MS
EKG Q-T INTERVAL: 310 MS
EKG QRS DURATION: 84 MS
EKG QTC CALCULATION (BAZETT): 425 MS
EKG R AXIS: 7 DEGREES
EKG T AXIS: 40 DEGREES
EKG VENTRICULAR RATE: 113 BPM
EOSINOPHIL # BLD: 0 K/UL (ref 0–0.8)
EOSINOPHIL NFR BLD: 0 % (ref 0.5–7.8)
ERYTHROCYTE [DISTWIDTH] IN BLOOD BY AUTOMATED COUNT: 14.5 % (ref 11.9–14.6)
GLUCOSE BLD STRIP.AUTO-MCNC: 157 MG/DL (ref 65–100)
GLUCOSE BLD STRIP.AUTO-MCNC: 176 MG/DL (ref 65–100)
GLUCOSE BLD STRIP.AUTO-MCNC: 195 MG/DL (ref 65–100)
GLUCOSE BLD STRIP.AUTO-MCNC: 239 MG/DL (ref 65–100)
GLUCOSE SERPL-MCNC: 139 MG/DL (ref 65–100)
HCT VFR BLD AUTO: 29.7 % (ref 41.1–50.3)
HGB BLD-MCNC: 9.4 G/DL (ref 13.6–17.2)
IMM GRANULOCYTES # BLD AUTO: 0.1 K/UL (ref 0–0.5)
IMM GRANULOCYTES NFR BLD AUTO: 1 % (ref 0–5)
LYMPHOCYTES # BLD: 0.4 K/UL (ref 0.5–4.6)
LYMPHOCYTES NFR BLD: 3 % (ref 13–44)
MCH RBC QN AUTO: 27.4 PG (ref 26.1–32.9)
MCHC RBC AUTO-ENTMCNC: 31.6 G/DL (ref 31.4–35)
MCV RBC AUTO: 86.6 FL (ref 79.6–97.8)
MONOCYTES # BLD: 0.6 K/UL (ref 0.1–1.3)
MONOCYTES NFR BLD: 5 % (ref 4–12)
NEUTS SEG # BLD: 11.5 K/UL (ref 1.7–8.2)
NEUTS SEG NFR BLD: 91 % (ref 43–78)
NRBC # BLD: 0 K/UL (ref 0–0.2)
PLATELET # BLD AUTO: 196 K/UL (ref 150–450)
PMV BLD AUTO: 9 FL (ref 9.4–12.3)
POTASSIUM SERPL-SCNC: 3.7 MMOL/L (ref 3.5–5.1)
RBC # BLD AUTO: 3.43 M/UL (ref 4.23–5.6)
SERVICE CMNT-IMP: ABNORMAL
SODIUM SERPL-SCNC: 134 MMOL/L (ref 136–145)
WBC # BLD AUTO: 12.6 K/UL (ref 4.3–11.1)

## 2022-06-26 PROCEDURE — 94760 N-INVAS EAR/PLS OXIMETRY 1: CPT

## 2022-06-26 PROCEDURE — 36415 COLL VENOUS BLD VENIPUNCTURE: CPT

## 2022-06-26 PROCEDURE — 6370000000 HC RX 637 (ALT 250 FOR IP): Performed by: FAMILY MEDICINE

## 2022-06-26 PROCEDURE — 1100000000 HC RM PRIVATE

## 2022-06-26 PROCEDURE — 80048 BASIC METABOLIC PNL TOTAL CA: CPT

## 2022-06-26 PROCEDURE — 6360000002 HC RX W HCPCS: Performed by: INTERNAL MEDICINE

## 2022-06-26 PROCEDURE — 2700000000 HC OXYGEN THERAPY PER DAY

## 2022-06-26 PROCEDURE — 2580000003 HC RX 258: Performed by: INTERNAL MEDICINE

## 2022-06-26 PROCEDURE — 6370000000 HC RX 637 (ALT 250 FOR IP): Performed by: INTERNAL MEDICINE

## 2022-06-26 PROCEDURE — 82962 GLUCOSE BLOOD TEST: CPT

## 2022-06-26 PROCEDURE — 87040 BLOOD CULTURE FOR BACTERIA: CPT

## 2022-06-26 PROCEDURE — 2580000003 HC RX 258: Performed by: EMERGENCY MEDICINE

## 2022-06-26 PROCEDURE — 85025 COMPLETE CBC W/AUTO DIFF WBC: CPT

## 2022-06-26 RX ORDER — OXYCODONE HYDROCHLORIDE 5 MG/1
5 TABLET ORAL EVERY 4 HOURS PRN
Status: DISCONTINUED | OUTPATIENT
Start: 2022-06-26 | End: 2022-06-28 | Stop reason: HOSPADM

## 2022-06-26 RX ORDER — HYDROCODONE BITARTRATE AND HOMATROPINE METHYLBROMIDE ORAL SOLUTION 5; 1.5 MG/5ML; MG/5ML
5 LIQUID ORAL EVERY 4 HOURS PRN
Status: DISCONTINUED | OUTPATIENT
Start: 2022-06-26 | End: 2022-06-28 | Stop reason: HOSPADM

## 2022-06-26 RX ORDER — GUAIFENESIN/DEXTROMETHORPHAN 100-10MG/5
5 SYRUP ORAL EVERY 4 HOURS PRN
Status: DISCONTINUED | OUTPATIENT
Start: 2022-06-26 | End: 2022-06-28 | Stop reason: HOSPADM

## 2022-06-26 RX ADMIN — SODIUM CHLORIDE: 900 INJECTION, SOLUTION INTRAVENOUS at 17:29

## 2022-06-26 RX ADMIN — INSULIN LISPRO 2 UNITS: 100 INJECTION, SOLUTION INTRAVENOUS; SUBCUTANEOUS at 08:17

## 2022-06-26 RX ADMIN — GUAIFENESIN AND DEXTROMETHORPHAN 5 ML: 100; 10 SYRUP ORAL at 23:20

## 2022-06-26 RX ADMIN — PANTOPRAZOLE SODIUM 40 MG: 40 TABLET, DELAYED RELEASE ORAL at 06:07

## 2022-06-26 RX ADMIN — AZITHROMYCIN DIHYDRATE 500 MG: 500 INJECTION, POWDER, LYOPHILIZED, FOR SOLUTION INTRAVENOUS at 14:59

## 2022-06-26 RX ADMIN — PIPERACILLIN AND TAZOBACTAM 3375 MG: 3; .375 INJECTION, POWDER, LYOPHILIZED, FOR SOLUTION INTRAVENOUS at 14:02

## 2022-06-26 RX ADMIN — PIPERACILLIN AND TAZOBACTAM 3375 MG: 3; .375 INJECTION, POWDER, LYOPHILIZED, FOR SOLUTION INTRAVENOUS at 21:32

## 2022-06-26 RX ADMIN — SODIUM CHLORIDE, PRESERVATIVE FREE 5 ML: 5 INJECTION INTRAVENOUS at 21:31

## 2022-06-26 RX ADMIN — ROSUVASTATIN 20 MG: 20 TABLET, FILM COATED ORAL at 21:31

## 2022-06-26 RX ADMIN — ACETAMINOPHEN 650 MG: 325 TABLET ORAL at 06:07

## 2022-06-26 RX ADMIN — PIPERACILLIN SODIUM AND TAZOBACTAM SODIUM 4500 MG: 4; .5 INJECTION, POWDER, LYOPHILIZED, FOR SOLUTION INTRAVENOUS at 08:14

## 2022-06-26 RX ADMIN — SODIUM CHLORIDE, PRESERVATIVE FREE 10 ML: 5 INJECTION INTRAVENOUS at 21:31

## 2022-06-26 ASSESSMENT — PAIN SCALES - GENERAL: PAINLEVEL_OUTOF10: 0

## 2022-06-26 NOTE — PROGRESS NOTES
Outreach Follow Up Note    Matthew Daniels was seen and assessed. Pt noted to be on 13L High flow nasal cannula. Oxygen at 98%. BP's low. Dr. Marilynn Strauss increased NS to 100ml/hr. Primary nurse to recheck BP in 1 hour. Vitals:    06/26/22 0553 06/26/22 0730 06/26/22 0856 06/26/22 1004   BP:  (!) 71/45 (!) 87/49 (!) 96/58   Pulse:  98 96 92   Resp:  22 21   Temp: (!) 100.5 °F (38.1 °C) (!) 100.6 °F (38.1 °C)  99.1 °F (37.3 °C)   TempSrc: Oral      SpO2:  96% 96% 98%   Weight:       Height:                CBC:   Lab Results   Component Value Date    WBC 12.6 06/26/2022    RBC 3.43 06/26/2022    HGB 9.4 06/26/2022    HCT 29.7 06/26/2022    MCV 86.6 06/26/2022    MCH 27.4 06/26/2022    MCHC 31.6 06/26/2022    RDW 14.5 06/26/2022     06/26/2022    MPV 9.0 06/26/2022            Patient reviewed and discussed with primary nurse. Will continue to follow up per outreach protocol.     Signed By:   Javier Crouch RN    June 26, 2022 10:22 AM

## 2022-06-26 NOTE — PROGRESS NOTES
Hospitalist Progress Note   Admit Date:  2022  1:33 PM   Name:  Iván Edwards   Age:  73 y.o. Sex:  male  :  1955   MRN:  [id number]   Room:  330/01    Presenting Complaint: Chest Pain     Reason(s) for Admission: Essential hypertension [I10]  Bacterial pneumonia [J15.9]  Hypoxia [R09.02]  Pneumonia of both lower lobes due to infectious organism [J18.9]  Leukocytosis, unspecified type [D72.829]  Type 2 diabetes mellitus without complication, unspecified whether long term insulin use [facility]) [E11.9]     Hospital Course & Interval History:     Iván Edwards is a 73 y.o. male with medical history of   Diabetes mellitus type 2  Hypertension  Hyperlipidemia  BMI of 34  Previous larynx cancer     who presented with right chest pain, and pain in the mid epigastric area.      Patient also reports some feeling of fever. Cough. Normally he does not require any oxygen at home. Appetite is poor.      In the emergency room he was found to have infiltrates bilaterally in the chest.  He was diagnosed with bacterial pneumonia. COVID rapid testing is negative. There is elevation of creatinine, mild elevation of troponin, with EKG showed no acute ST-T change, except for nonspecific change. Patient reports mostly the pain at the right side of the chest and the right upper abdomen area especially with coughing.      He is admitted for treatment of pneumonia [place], and will be monitored for other abnormalities on lab testing including elevated troponin. .    Subjective/24hr Events (22):   22   Patient continues to have temperature up to 100.6 Fahrenheit earlier this morning. Patient is eating breakfast well this morning. Patient is feeling better. Chest pain. Less cough. Less shortness of breath. Assessment & Plan:     Principal Problem:    Bacterial pneumonia  I will change antibiotic to Zosyn due to continuing fever and the extent of his initial chest x-ray.    Continue with oxygen supplementation. Follow-up on culture results. Symptomatic treatment  Check chest x-ray tomorrow     Active Problems:    HTN (hypertension)  Monitor blood pressure. Patient has been on ACE inhibitor. We will hold that for now due to [place]. Blood pressure is on the low side but patient is asymptomatic for now. Monitor closely.        Malignant neoplasm of larynx ([facility] [address]  Noted from history. This will be follow-up by the outpatient settings       Diabetes mellitus with microalbuminuria (HCC)  Provide Humalog sliding scale coverage  Blood sugar has been in low 100s to about low 200s range. [place] (acute kidney injury) ([address]    CKD (chronic kidney disease)  Monitor renal function and intake and output. Avoid nephrotoxic agents. Continue on IV fluid     I have discussed the plan of care with patient and his nurse. Discharge Planning:    Home when improved. Diet:  ADULT DIET; Regular; 3 carb choices (45 gm/meal)  DVT PPx: SCD  Code status: Full Code    Hospital Problems:  Principal Problem:    Bacterial pneumonia  Active Problems:    HTN (hypertension)    Malignant neoplasm of larynx (HCC)    Diabetes mellitus with microalbuminuria ([facility] [address]    [place] (acute kidney injury) ([facility] [address]    CKD (chronic kidney disease)  Resolved Problems:    * No resolved hospital problems.  *      Objective:     Patient Vitals for the past 24 hrs:   Temp Pulse Resp BP SpO2   06/26/22 0856 -- 96 -- (!) 87/49 96 %   06/26/22 0730 (!) 100.6 °F (38.1 °C) 98 22 (!) 71/45 96 %   06/26/22 0553 (!) 100.5 °F (38.1 °C) -- -- -- --   06/26/22 0400 99.9 °F (37.7 °C) (!) 114 19 (!) 123/53 93 %   06/26/22 0016 99.2 °F (37.3 °C) (!) 103 20 118/68 97 %   06/25/22 2022 -- (!) 115 18 -- 96 %   06/25/22 2000 -- (!) 120 -- (!) 164/87 --   06/1955 -- -- -- -- 92 %   06/25/22 1945 99.8 °F (37.7 °C) (!) 120 24 (!) 164/87 (!) 88 %   06/25/22 1900 99 °F (37.2 °C) (!) 106 25 (!) 141/63 (!) 58 %   06/25/22 1830 -- (!) 104 24 (!) 146/73 90 % 06/25/22 1800 -- 98 21 (!) 106/58 95 %   06/25/22 1730 -- (!) 102 24 (!) 114/56 94 %   06/25/22 1722 -- -- 24 -- --   06/25/22 1700 99 °F (37.2 °C) (!) 104 26 128/81 95 %   06/25/22 1630 -- (!) 109 26 (!) 167/74 94 %   06/25/22 1530 -- (!) 112 29 (!) 144/81 96 %   06/25/22 1508 -- (!) 112 -- 130/82 97 %   06/25/22 1430 -- (!) 111 27 (!) 108/55 (!) 89 %   06/25/22 1415 -- (!) 114 23 138/74 90 %   06/25/22 1345 -- -- -- -- 91 %   06/25/22 1342 -- -- -- -- (!) 86 %   06/25/22 1339 98 °F (36.7 °C) (!) 115 24 (!) 184/86 (!) 78 %       Oxygen Therapy  SpO2: 96 %  Pulse via Oximetry: 105 beats per minute  Pulse Oximeter Device Mode: Intermittent  O2 Device: High flow nasal cannula  O2 Flow Rate (L/min): 12 L/min    Estimated body mass index is 33.75 kg/m² as calculated from the following:    Height as of this encounter: 5' 11\" (1.803 m). Weight as of this encounter: 242 lb (109.8 kg). Intake/Output Summary (Last 24 hours) at 6/26/2022 0944  Last data filed at 6/25/2022 2200  Gross per 24 hour   Intake 383.33 ml   Output 220 ml   Net 163.33 ml         Physical Exam:     Blood pressure (!) 87/49, pulse 96, temperature (!) 100.6 °F (38.1 °C), resp. rate 22, height 5' 11\" (1.803 m), weight 242 lb (109.8 kg), SpO2 96 %. General:          Well nourished. No overt distress, BMI of 34, he is on oxygen cannula. Head:               Normocephalic, atraumatic, alert, oriented. Patient is talking well. Eyes:               Sclerae appear normal.  Pupils equally round. ENT:                Nares appear normal, no drainage. Moist oral mucosa  Neck:               No restricted ROM. Trachea midline   CV:                  RRR. No m/r/g. No jugular venous distension. Lungs:             Decrease in breath sounds bilaterally. no wheezing, rhonchi, or rales. Respirations even, unlabored  Abdomen: Bowel sounds present. Soft, nontender, distended due to truncal obesity  Extremities:     No cyanosis or clubbing.   No edema  Skin:                No rashes and normal coloration. Warm and dry. Neuro:             CN II-XII grossly intact. Sensation intact. A&Ox3  Psych:             Normal mood and affect.       I have reviewed ordered lab tests and independently visualized imaging below:    Recent Labs:  Recent Results (from the past 48 hour(s))   EKG 12 Lead    Collection Time: 06/25/22  1:43 PM   Result Value Ref Range    Ventricular Rate 113 BPM    Atrial Rate 113 BPM    P-R Interval 180 ms    QRS Duration 84 ms    Q-T Interval 310 ms    QTc Calculation (Bazett) 425 ms    P Axis 53 degrees    R Axis 7 degrees    T Axis 40 degrees    Diagnosis Sinus tachycardia    CBC with Auto Differential    Collection Time: 06/25/22  1:59 PM   Result Value Ref Range    WBC 16.8 (H) 4.3 - 11.1 K/uL    RBC 4.16 (L) 4.23 - 5.6 M/uL    Hemoglobin 11.4 (L) 13.6 - 17.2 g/dL    Hematocrit 35.6 (L) 41.1 - 50.3 %    MCV 85.6 79.6 - 97.8 FL    MCH 27.4 26.1 - 32.9 PG    MCHC 32.0 31.4 - 35.0 g/dL    RDW 14.6 11.9 - 14.6 %    Platelets 579 256 - 921 K/uL    MPV 9.2 (L) 9.4 - 12.3 FL    nRBC 0.00 0.0 - 0.2 K/uL    Differential Type AUTOMATED      Seg Neutrophils 85 (H) 43 - 78 %    Lymphocytes 9 (L) 13 - 44 %    Monocytes 5 4.0 - 12.0 %    Eosinophils % 0 (L) 0.5 - 7.8 %    Basophils 0 0.0 - 2.0 %    Immature Granulocytes 1 0.0 - 5.0 %    Segs Absolute 14.3 (H) 1.7 - 8.2 K/UL    Absolute Lymph # 1.6 0.5 - 4.6 K/UL    Absolute Mono # 0.8 0.1 - 1.3 K/UL    Absolute Eos # 0.1 0.0 - 0.8 K/UL    Basophils Absolute 0.0 0.0 - 0.2 K/UL    Absolute Immature Granulocyte 0.1 0.0 - 0.5 K/UL   Comprehensive Metabolic Panel    Collection Time: 06/25/22  1:59 PM   Result Value Ref Range    Sodium 131 (L) 136 - 145 mmol/L    Potassium 4.4 3.5 - 5.1 mmol/L    Chloride 93 (L) 98 - 107 mmol/L    CO2 31 21 - 32 mmol/L    Anion Gap 7 7 - 16 mmol/L    Glucose 184 (H) 65 - 100 mg/dL    BUN 25 (H) 8 - 23 MG/DL    CREATININE 1.95 (H) 0.8 - 1.5 MG/DL    GFR  45 (L) >60 ml/min/1.73m2    GFR Non- 37 (L) >60 ml/min/1.73m2    Calcium 9.1 8.3 - 10.4 MG/DL    Total Bilirubin 2.3 (H) 0.2 - 1.1 MG/DL    ALT 11 (L) 12 - 65 U/L    AST 28 15 - 37 U/L    Alk Phosphatase 86 50 - 136 U/L    Total Protein 8.7 (H) 6.3 - 8.2 g/dL    Albumin 3.7 3.2 - 4.6 g/dL    Globulin 5.0 (H) 2.3 - 3.5 g/dL    Albumin/Globulin Ratio 0.7 (L) 1.2 - 3.5     Magnesium    Collection Time: 06/25/22  1:59 PM   Result Value Ref Range    Magnesium 1.6 (L) 1.8 - 2.4 mg/dL   Protime-INR    Collection Time: 06/25/22  1:59 PM   Result Value Ref Range    Protime 14.2 12.6 - 14.5 sec    INR 1.1     Troponin    Collection Time: 06/25/22  1:59 PM   Result Value Ref Range    Troponin, High Sensitivity 27.5 (H) 0 - 14 pg/mL   Urinalysis    Collection Time: 06/25/22  3:11 PM   Result Value Ref Range    Color, UA JUANITO      Appearance CLOUDY      Specific Gravity, UA 1.024 (H) 1.001 - 1.023      pH, Urine 6.0 5.0 - 9.0      Protein,  (A) NEG mg/dL    Glucose, UA Negative mg/dL    Ketones, Urine TRACE (A) NEG mg/dL    Bilirubin Urine SMALL (A) NEG      Blood, Urine TRACE (A) NEG      Urobilinogen, Urine 1.0 0.2 - 1.0 EU/dL    Nitrite, Urine Negative NEG      Leukocyte Esterase, Urine Negative NEG      WBC, UA 3-5 0 /hpf    RBC, UA 0-3 0 /hpf    Epithelial Cells UA 0-3 0 /hpf    BACTERIA, URINE 0 0 /hpf    Casts 10-20 0 /lpf    OTHER OBSERVATIONS RESULTS VERIFIED MANUALLY     COVID-19, Rapid    Collection Time: 06/25/22  5:02 PM    Specimen: Nasopharyngeal   Result Value Ref Range    Source NASAL SWAB      SARS-CoV-2, Rapid Not detected NOTD     Troponin    Collection Time: 06/25/22  5:28 PM   Result Value Ref Range    Troponin, High Sensitivity 26.2 (H) 0 - 14 pg/mL   POCT Glucose    Collection Time: 06/25/22  6:12 PM   Result Value Ref Range    POC Glucose 207 (H) 65 - 100 mg/dL    Performed by: Kamran    POCT Glucose    Collection Time: 06/25/22  9:19 PM   Result Value Ref Range    POC Glucose 203 (H) 65 - 100 mg/dL    Performed by: AbercrombieTaylorBSN    Troponin    Collection Time: 06/25/22 10:12 PM   Result Value Ref Range    Troponin, High Sensitivity 42.0 (H) 0 - 14 pg/mL   Basic Metabolic Panel w/ Reflex to MG    Collection Time: 06/26/22  4:17 AM   Result Value Ref Range    Sodium 134 (L) 136 - 145 mmol/L    Potassium 3.7 3.5 - 5.1 mmol/L    Chloride 96 (L) 98 - 107 mmol/L    CO2 32 21 - 32 mmol/L    Anion Gap 6 (L) 7 - 16 mmol/L    Glucose 139 (H) 65 - 100 mg/dL    BUN 27 (H) 8 - 23 MG/DL    CREATININE 1.95 (H) 0.8 - 1.5 MG/DL    GFR African American 45 (L) >60 ml/min/1.73m2    GFR Non- 37 (L) >60 ml/min/1.73m2    Calcium 8.5 8.3 - 10.4 MG/DL   CBC with Auto Differential    Collection Time: 06/26/22  4:17 AM   Result Value Ref Range    WBC 12.6 (H) 4.3 - 11.1 K/uL    RBC 3.43 (L) 4.23 - 5.6 M/uL    Hemoglobin 9.4 (L) 13.6 - 17.2 g/dL    Hematocrit 29.7 (L) 41.1 - 50.3 %    MCV 86.6 79.6 - 97.8 FL    MCH 27.4 26.1 - 32.9 PG    MCHC 31.6 31.4 - 35.0 g/dL    RDW 14.5 11.9 - 14.6 %    Platelets 756 383 - 349 K/uL    MPV 9.0 (L) 9.4 - 12.3 FL    nRBC 0.00 0.0 - 0.2 K/uL    Differential Type AUTOMATED      Seg Neutrophils 91 (H) 43 - 78 %    Lymphocytes 3 (L) 13 - 44 %    Monocytes 5 4.0 - 12.0 %    Eosinophils % 0 (L) 0.5 - 7.8 %    Basophils 0 0.0 - 2.0 %    Immature Granulocytes 1 0.0 - 5.0 %    Segs Absolute 11.5 (H) 1.7 - 8.2 K/UL    Absolute Lymph # 0.4 (L) 0.5 - 4.6 K/UL    Absolute Mono # 0.6 0.1 - 1.3 K/UL    Absolute Eos # 0.0 0.0 - 0.8 K/UL    Basophils Absolute 0.0 0.0 - 0.2 K/UL    Absolute Immature Granulocyte 0.1 0.0 - 0.5 K/UL   POCT Glucose    Collection Time: 06/26/22  6:54 AM   Result Value Ref Range    POC Glucose 239 (H) 65 - 100 mg/dL    Performed by: AbercrombieTaylorBSN        Other Studies:  CT CHEST ABDOMEN PELVIS W CONTRAST   Final Result   1. No evidence of PE.   2.  Emphysema. Multifocal bilateral lung opacities, likely infectious.     Recommend follow-up CT in about 3 months after treatment to ensure resolution   and exclude underlying mass. 3.  Gallstone. 4.  Enlarged prostate. 5.  Urinary bladder wall thickening could indicate cystitis or outlet   obstruction. 6.  Right fourth rib fracture. XR CHEST PORTABLE   Final Result   Multifocal bilateral lung infiltrates.          XR CHEST 1 VIEW    (Results Pending)       Current Meds:  Current Facility-Administered Medications   Medication Dose Route Frequency    piperacillin-tazobactam (ZOSYN) 3,375 mg in sodium chloride 0.9 % 50 mL IVPB (mini-bag)  3,375 mg IntraVENous q8h    sodium chloride flush 0.9 % injection 5 mL  5 mL IntraVENous Q8H    sodium chloride flush 0.9 % injection 5 mL  5 mL IntraVENous PRN    azithromycin (ZITHROMAX) 500 mg in sodium chloride 0.9 % 250 mL IVPB ([id number])  500 mg IntraVENous Q24H    sodium chloride flush 0.9 % injection 5-40 mL  5-40 mL IntraVENous 2 times per day    sodium chloride flush 0.9 % injection 5-40 mL  5-40 mL IntraVENous PRN    0.9 % sodium chloride infusion   IntraVENous PRN    polyethylene glycol (GLYCOLAX) packet 17 g  17 g Oral Daily PRN    acetaminophen (TYLENOL) tablet 650 mg  650 mg Oral Q6H PRN    Or    acetaminophen (TYLENOL) suppository 650 mg  650 mg Rectal Q6H PRN    0.9 % sodium chloride infusion   IntraVENous Continuous    levothyroxine (SYNTHROID) tablet 75 mcg  75 mcg Oral Nightly    rosuvastatin (CRESTOR) tablet 20 mg  20 mg Oral Nightly    pantoprazole (PROTONIX) tablet 40 mg  40 mg Oral QAM AC    insulin lispro (HUMALOG) injection vial 0-8 Units  0-8 Units SubCUTAneous TID WC    insulin lispro (HUMALOG) injection vial 0-4 Units  0-4 Units SubCUTAneous Nightly    glucose chewable tablet 16 g  4 tablet Oral PRN    dextrose bolus 10% 125 mL  125 mL IntraVENous PRN    Or    dextrose bolus 10% 250 mL  250 mL IntraVENous PRN    glucagon (rDNA) injection 1 mg  1 mg IntraMUSCular PRN    dextrose 5 % solution  100 mL/hr IntraVENous PRN       Signed:  Kenisha Rodriguez MD    Part of this note may have been written by using a voice dictation software. The note has been proof read but may still contain some grammatical/other typographical errors.

## 2022-06-26 NOTE — CARE COORDINATION
06/26/22 0957   Service Assessment   Support Systems Spouse/Significant Other   PCP Verified by CM Yes   Social/Functional History   Lives With Spouse   73 MM with bacterial pneumonia. Has PCP. Medicare. Chart screened by  for discharge planning. No needs identified at this time. Please consult  if any new issues arise.

## 2022-06-26 NOTE — PROGRESS NOTES
Report received from Ignacio Vega in ED. Stated that on 4 L nasal cannula, pt O2 was 90-95%. Pt received to floor, vitals taken, on 4L pt O2 was in the 70's. Worked up to 10 L on nasal cannula and did not get higher than 80%. Respiratory called, placed a high flow cannula on patient at 10 L, pt O2 now 95%.

## 2022-06-27 ENCOUNTER — APPOINTMENT (OUTPATIENT)
Dept: GENERAL RADIOLOGY | Age: 67
DRG: 871 | End: 2022-06-27
Payer: MEDICARE

## 2022-06-27 LAB
ALBUMIN SERPL-MCNC: 2.6 G/DL (ref 3.2–4.6)
ALBUMIN/GLOB SERPL: 0.6 (ref 1.2–3.5)
ALP SERPL-CCNC: 80 U/L (ref 50–136)
ALT SERPL-CCNC: 14 U/L (ref 12–65)
ANION GAP SERPL CALC-SCNC: 4 MMOL/L (ref 7–16)
AST SERPL-CCNC: 20 U/L (ref 15–37)
BILIRUB SERPL-MCNC: 0.7 MG/DL (ref 0.2–1.1)
BUN SERPL-MCNC: 22 MG/DL (ref 8–23)
CALCIUM SERPL-MCNC: 8.3 MG/DL (ref 8.3–10.4)
CHLORIDE SERPL-SCNC: 98 MMOL/L (ref 98–107)
CO2 SERPL-SCNC: 33 MMOL/L (ref 21–32)
CREAT SERPL-MCNC: 1.83 MG/DL (ref 0.8–1.5)
ERYTHROCYTE [DISTWIDTH] IN BLOOD BY AUTOMATED COUNT: 14 % (ref 11.9–14.6)
GLOBULIN SER CALC-MCNC: 4.2 G/DL (ref 2.3–3.5)
GLUCOSE BLD STRIP.AUTO-MCNC: 143 MG/DL (ref 65–100)
GLUCOSE BLD STRIP.AUTO-MCNC: 154 MG/DL (ref 65–100)
GLUCOSE BLD STRIP.AUTO-MCNC: 166 MG/DL (ref 65–100)
GLUCOSE BLD STRIP.AUTO-MCNC: 194 MG/DL (ref 65–100)
GLUCOSE SERPL-MCNC: 152 MG/DL (ref 65–100)
HCT VFR BLD AUTO: 25.9 % (ref 41.1–50.3)
HGB BLD-MCNC: 8.3 G/DL (ref 13.6–17.2)
MCH RBC QN AUTO: 27.5 PG (ref 26.1–32.9)
MCHC RBC AUTO-ENTMCNC: 32 G/DL (ref 31.4–35)
MCV RBC AUTO: 85.8 FL (ref 79.6–97.8)
NRBC # BLD: 0 K/UL (ref 0–0.2)
PLATELET # BLD AUTO: 160 K/UL (ref 150–450)
PMV BLD AUTO: 9.2 FL (ref 9.4–12.3)
POTASSIUM SERPL-SCNC: 3.6 MMOL/L (ref 3.5–5.1)
PROT SERPL-MCNC: 6.8 G/DL (ref 6.3–8.2)
RBC # BLD AUTO: 3.02 M/UL (ref 4.23–5.6)
SERVICE CMNT-IMP: ABNORMAL
SODIUM SERPL-SCNC: 135 MMOL/L (ref 136–145)
WBC # BLD AUTO: 8.1 K/UL (ref 4.3–11.1)

## 2022-06-27 PROCEDURE — 97530 THERAPEUTIC ACTIVITIES: CPT

## 2022-06-27 PROCEDURE — 6370000000 HC RX 637 (ALT 250 FOR IP): Performed by: INTERNAL MEDICINE

## 2022-06-27 PROCEDURE — 80053 COMPREHEN METABOLIC PANEL: CPT

## 2022-06-27 PROCEDURE — 2580000003 HC RX 258: Performed by: INTERNAL MEDICINE

## 2022-06-27 PROCEDURE — 6360000002 HC RX W HCPCS: Performed by: INTERNAL MEDICINE

## 2022-06-27 PROCEDURE — 36415 COLL VENOUS BLD VENIPUNCTURE: CPT

## 2022-06-27 PROCEDURE — 2580000003 HC RX 258: Performed by: EMERGENCY MEDICINE

## 2022-06-27 PROCEDURE — 82962 GLUCOSE BLOOD TEST: CPT

## 2022-06-27 PROCEDURE — 2700000000 HC OXYGEN THERAPY PER DAY

## 2022-06-27 PROCEDURE — 1100000000 HC RM PRIVATE

## 2022-06-27 PROCEDURE — 71045 X-RAY EXAM CHEST 1 VIEW: CPT

## 2022-06-27 PROCEDURE — 94760 N-INVAS EAR/PLS OXIMETRY 1: CPT

## 2022-06-27 PROCEDURE — 6370000000 HC RX 637 (ALT 250 FOR IP): Performed by: FAMILY MEDICINE

## 2022-06-27 PROCEDURE — 85027 COMPLETE CBC AUTOMATED: CPT

## 2022-06-27 PROCEDURE — 97161 PT EVAL LOW COMPLEX 20 MIN: CPT

## 2022-06-27 RX ADMIN — SODIUM CHLORIDE, PRESERVATIVE FREE 10 ML: 5 INJECTION INTRAVENOUS at 21:36

## 2022-06-27 RX ADMIN — PIPERACILLIN AND TAZOBACTAM 3375 MG: 3; .375 INJECTION, POWDER, LYOPHILIZED, FOR SOLUTION INTRAVENOUS at 05:49

## 2022-06-27 RX ADMIN — GUAIFENESIN AND DEXTROMETHORPHAN 5 ML: 100; 10 SYRUP ORAL at 21:47

## 2022-06-27 RX ADMIN — ROSUVASTATIN 20 MG: 20 TABLET, FILM COATED ORAL at 21:36

## 2022-06-27 RX ADMIN — SODIUM CHLORIDE, PRESERVATIVE FREE 5 ML: 5 INJECTION INTRAVENOUS at 05:50

## 2022-06-27 RX ADMIN — AZITHROMYCIN DIHYDRATE 500 MG: 500 INJECTION, POWDER, LYOPHILIZED, FOR SOLUTION INTRAVENOUS at 15:08

## 2022-06-27 RX ADMIN — PANTOPRAZOLE SODIUM 40 MG: 40 TABLET, DELAYED RELEASE ORAL at 05:48

## 2022-06-27 RX ADMIN — SODIUM CHLORIDE, PRESERVATIVE FREE 5 ML: 5 INJECTION INTRAVENOUS at 13:56

## 2022-06-27 RX ADMIN — PIPERACILLIN AND TAZOBACTAM 3375 MG: 3; .375 INJECTION, POWDER, LYOPHILIZED, FOR SOLUTION INTRAVENOUS at 21:34

## 2022-06-27 RX ADMIN — SODIUM CHLORIDE, PRESERVATIVE FREE 5 ML: 5 INJECTION INTRAVENOUS at 21:37

## 2022-06-27 RX ADMIN — LEVOTHYROXINE SODIUM 75 MCG: 0.07 TABLET ORAL at 05:27

## 2022-06-27 ASSESSMENT — PAIN SCALES - GENERAL
PAINLEVEL_OUTOF10: 0
PAINLEVEL_OUTOF10: 0

## 2022-06-27 NOTE — PROGRESS NOTES
PHYSICAL THERAPY Initial Assessment, Daily Note and PM  (Link to Caseload Tracking: PT Visit Days : 1  Acknowledge Orders  Time In/Out  PT Charge Capture  Rehab Caseload Tracker    Clive Gallardo is a 73 y.o. male   PRIMARY DIAGNOSIS: Bacterial pneumonia  Essential hypertension [I10]  Bacterial pneumonia [J15.9]  Hypoxia [R09.02]  Pneumonia of both lower lobes due to infectious organism [J18.9]  Leukocytosis, unspecified type [D72.829]  Type 2 diabetes mellitus without complication, unspecified whether long term insulin use ([facility] [address] [E11.9]       Reason for Referral: Difficulty in walking, Not elsewhere classified (R26.2)  Other abnormalities of gait and mobility (R26.89)  Inpatient: Payor: [facility] Sabi Cox / Plan: Jo Watson / Product Type: *No Product type* /     ASSESSMENT:     REHAB RECOMMENDATIONS:   Recommendation to date pending progress:  [facility] [place] [address] Therapy    Equipment:     None     ASSESSMENT:  Mr. Konrad Aguirre is admitted with above diagnosis with decreased independence with functional mobility. Pt in bedside chair on arrival. Pt performed supine to sit to stand. Pt ambulated in room. Pt in bedside chair with needs in reach. Pt will benefit from therapy services to maximize strength, endurance and independence with ambulation.      [facility] [address] [address] AM-PAC 6 Clicks Basic Mobility Inpatient Short Form  AM-PAC Mobility Inpatient   How much difficulty turning over in bed?: None  How much difficulty sitting down on / standing up from a chair with arms?: None  How much difficulty moving from lying on back to sitting on side of bed?: None  How much help from another person moving to and from a bed to a chair?: None  How much help from another person needed to walk in hospital room?: None  How much help from another person for climbing 3-5 steps with a railing?: None  AM-PAC Inpatient Mobility Raw Score : 24  AM-PAC Inpatient T-Scale Score : 61.14  Mobility Inpatient CMS 0-100% Score: 0  Mobility Inpatient CMS G-Code Modifier : CH    SUBJECTIVE:   Mr. Xi Pitts states he is agreeable to ambulate with therapy.      Social/Functional Lives With: Spouse    OBJECTIVE:     PAIN: VITALS / O2: PRECAUTION / Suzzane Modest / DRAINS:   Pre Treatment:   Pain Assessment: None - Denies Pain  Pain Level: 0      Post Treatment: none Vitals        Oxygen  O2 Flow Rate (L/min): 3 L/min   IV    RESTRICTIONS/PRECAUTIONS:                    GROSS EVALUATION: Intact Impaired (Comments):   AROM [x]     PROM []    Strength [x]     Balance [x]     Posture [] N/A   Sensation [x]     Coordination [x]      Tone [x]     Edema []    Activity Tolerance [x]      []      COGNITION/  PERCEPTION: Intact Impaired (Comments):   Orientation [x]     Vision [x]     Hearing [x]     Cognition  [x]       MOBILITY: I Mod I S SBA CGA Min Mod Max Total  NT x2 Comments:   Bed Mobility    Rolling [] [] [] [] [] [] [] [] [] [] []    Supine to Sit [] [] [] [] [] [] [] [] [] [] []    Scooting [] [] [] [] [] [] [] [] [] [] []    Sit to Supine [] [] [] [] [] [] [] [] [] [] []    Transfers    Sit to Stand [] [] [] [x] [] [] [] [] [] [] []    Bed to Chair [] [] [] [] [] [] [] [] [] [] []    Stand to Sit [] [] [] [x] [] [] [] [] [] [] []     [] [] [] [] [] [] [] [] [] [] []    I=Independent, Mod I=Modified Independent, S=Supervision, SBA=Standby Assistance, CGA=Contact Guard Assistance,   Min=Minimal Assistance, Mod=Moderate Assistance, Max=Maximal Assistance, Total=Total Assistance, NT=Not Tested    GAIT: I Mod I S SBA CGA Min Mod Max Total  NT x2 Comments:   Level of Assistance [] [] [] [x] [] [] [] [] [] [] []    Distance 75 feet    DME None    Gait Quality intact    Weightbearing Status      Stairs      I=Independent, Mod I=Modified Independent, S=Supervision, SBA=Standby Assistance, CGA=Contact Guard Assistance,   Min=Minimal Assistance, Mod=Moderate Assistance, Max=Maximal Assistance, Total=Total Assistance, NT=Not Tested    PLAN:   ACUTE PHYSICAL THERAPY GOALS:   (Developed with and agreed upon by patient and/or caregiver.)  STG:  (1.)Mr. Brad Murrieta will move from supine to sit and sit to supine  With S- INDEPENDENT within 1-2 treatment day(s). (2.)Mr. Brad Murrieta will transfer from bed to chair and chair to bed with S- INDEPENDENT using the least restrictive device within 1-2 treatment day(s). (3.)Mr. Brad Murrieta will ambulate with S-INDEPENDENT for  feet with the least restrictive device within 1-2 treatment day(s). LTG:  (1.)Mr. Brad Murrieta will move from supine to sit and sit to supine  in bed with INDEPENDENT within 3-7 treatment day(s). (2.)Mr. Brad Murrieta will transfer from bed to chair and chair to bed with INDEPENDENT using the least restrictive device within 3-7 treatment day(s). (3.)Mr. Brad Murrieta will ambulate with INDEPENDENT for 100-125 feet with the least restrictive device within 3-7 treatment day(s). ________________________________________________________________________________________________      FREQUENCY AND DURATION: Daily for duration of hospital stay or until stated goals are met, whichever comes first.    THERAPY PROGNOSIS: Good    PROBLEM LIST:   (Skilled intervention is medically necessary to address:)  Decreased Activity Tolerance  Decreased Balance  Decreased Gait Ability  Decreased Strength INTERVENTIONS PLANNED:   (Benefits and precautions of physical therapy have been discussed with the patient.)  Therapeutic Activity  Gait Training  Education       TREATMENT:   EVALUATION: LOW COMPLEXITY: (Untimed Charge)    TREATMENT:   Therapeutic Activity (15 Minutes): Therapeutic activity included Transfer Training, Ambulation on level ground, Sitting balance  and Standing balance to improve functional Activity tolerance, Balance, Coordination, Mobility and Strength.     TREATMENT GRID:  N/A    AFTER TREATMENT PRECAUTIONS: Bed/Chair Locked, Call light within reach, Chair, Needs within reach, RN notified and wife and wife's sister present    INTERDISCIPLINARY COLLABORATION:  RN/ PCT and PT/ PTA    EDUCATION: Education Given To: Patient  Education Provided: Role of Therapy;Plan of Care  Education Method: Demonstration;Verbal  Education Outcome: Verbalized understanding;Demonstrated understanding    TIME IN/OUT:  Time In: 1425  Time Out: [phone]  Minutes: [address], PT

## 2022-06-27 NOTE — PROGRESS NOTES
Outreach Follow Up Note    Tyree Shepherd was seen and assessed. Pt resting quietly in the chair. No s/sx of distress noted. HHF 10L N/C. Discussed pt with primary RN. Vitals:    06/26/22 1915 06/26/22 2320 06/27/22 0425 06/27/22 0440   BP: 125/66 127/63 (!) 121/56    Pulse: 100 (!) 101 (!) 103 (!) 110   Resp: 20 19 21 22   Temp: 98.7 °F (37.1 °C) 99.6 °F (37.6 °C) 98.6 °F (37 °C)    TempSrc: Oral Oral Oral    SpO2: 96% 93% 91% 95%   Weight:       Height:                            Patient reviewed and discussed with primary nurse. There have been no significant clinical changes since the completion of the last dated Outreach assessment. Will continue to follow up per outreach protocol.     Signed By:   Anthony Stokes RN    June 27, 2022 7:49 AM

## 2022-06-27 NOTE — PROGRESS NOTES
Hospitalist Progress Note   Admit Date:  2022  1:33 PM   Name:  Lexy Flores   Age:  73 y.o. Sex:  male  :  1955   MRN:  [id number]   Room:  [facility]/    Presenting Complaint: Chest Pain     Reason(s) for Admission: Essential hypertension [I10]  Bacterial pneumonia [J15.9]  Hypoxia [R09.02]  Pneumonia of both lower lobes due to infectious organism [J18.9]  Leukocytosis, unspecified type [D72.829]  Type 2 diabetes mellitus without complication, unspecified whether long term insulin use [facility]) [E11.9]     Hospital Course & Interval History:     Lexy Flores is a 73 y.o. male with medical history of   Diabetes mellitus type 2  Hypertension  Hyperlipidemia  BMI of 34  Previous larynx cancer     who presented with right chest pain, and pain in the mid epigastric area.      Patient also reports some feeling of fever. Cough. Normally he does not require any oxygen at home. Appetite is poor.      In the emergency room he was found to have infiltrates bilaterally in the chest.  He was diagnosed with bacterial pneumonia. COVID rapid testing is negative. There is elevation of creatinine, mild elevation of troponin, with EKG showed no acute ST-T change, except for nonspecific change. Patient reports mostly the pain at the right side of the chest and the right upper abdomen area especially with coughing.      He is admitted for treatment of pneumonia [place], and will be monitored for other abnormalities on lab testing including elevated troponin. .    Subjective/24hr Events (22):   22   Patient continues to have temperature up to 100.6 Fahrenheit earlier this morning. Patient is eating breakfast well this morning. Patient is feeling better. Chest pain. Less cough. Less shortness of breath.     22   Patient is feeling better. No fever. No shaking. No chills. No chest pain. No shortness of breath.       Assessment & Plan:     Principal Problem:    Bacterial pneumonia  On Zosyn . Continue with oxygen supplementation. Follow-up on culture results. Symptomatic treatment  Chest x-ray does not show improvement but also does not show worsening. Clinically patient is improving, so I will continue the current management with Zosyn. Likely can switch to p.o. antibiotics soon, may be as soon as tomorrow.      Active Problems:    HTN (hypertension)  Monitor blood pressure. Patient has been on ACE inhibitor. We will hold that for now due to [place]. Blood pressure is on the low side but patient is asymptomatic for now. Monitor closely.        Malignant neoplasm of larynx ([address]  Noted from history. This will be follow-up by the outpatient settings       Diabetes mellitus with microalbuminuria (HCC)  Provide Humalog sliding scale coverage  Blood sugar has been in low 100s to about low 200s range. [place] (acute kidney injury) ([address]    CKD (chronic kidney disease)  Monitor renal function and intake and output. Avoid nephrotoxic agents. Continue on IV fluid  Check BMP tomorrow.      I have discussed the plan of care with patient. Discharge Planning:    Home when improved. May be home tomorrow. Diet:  ADULT DIET; Regular; 3 carb choices (45 gm/meal)  DVT PPx: SCD  Code status: Full Code    Hospital Problems:  Principal Problem:    Bacterial pneumonia  Active Problems:    HTN (hypertension)    Malignant neoplasm of larynx (HCC)    Diabetes mellitus with microalbuminuria ([address]    [place] (acute kidney injury) ([address]    CKD (chronic kidney disease)  Resolved Problems:    * No resolved hospital problems.  *      Objective:     Patient Vitals for the past 24 hrs:   Temp Pulse Resp BP SpO2   06/27/22 0801 99 °F (37.2 °C) 93 18 139/67 94 %   06/27/22 0756 -- 96 -- -- 94 %   06/27/22 0753 -- 96 -- -- 96 %   06/27/22 0440 -- (!) 110 22 -- 95 %   06/27/22 0425 98.6 °F (37 °C) (!) 103 21 (!) 121/56 91 %   06/26/22 2320 99.6 °F (37.6 °C) (!) 101 19 127/63 93 %   06/26/22 1915 98.7 °F (37.1 °C) 100 20 125/66 96 %   06/26/22 1626 -- 92 -- 128/63 97 %   06/26/22 1610 -- -- -- 102/62 97 %   06/26/22 1511 98.4 °F (36.9 °C) 94 22 110/61 96 %   06/26/22 1225 98.1 °F (36.7 °C) 93 -- (!) 95/57 98 %   06/26/22 1111 98.6 °F (37 °C) 83 20 (!) 93/57 96 %   06/26/22 1004 99.1 °F (37.3 °C) 92 21 (!) 96/58 98 %       Oxygen Therapy  SpO2: 94 %  Pulse via Oximetry: 105 beats per minute  Pulse Oximeter Device Mode: Intermittent  O2 Device: High flow nasal cannula  O2 Flow Rate (L/min): 5 L/min    Estimated body mass index is 33.75 kg/m² as calculated from the following:    Height as of this encounter: 5' 11\" (1.803 m). Weight as of this encounter: 242 lb (109.8 kg). Intake/Output Summary (Last 24 hours) at 6/27/2022 0951  Last data filed at 6/27/2022 0801  Gross per 24 hour   Intake --   Output 425 ml   Net -425 ml         Physical Exam:     Blood pressure 139/67, pulse 93, temperature 99 °F (37.2 °C), temperature source Axillary, resp. rate 18, height 5' 11\" (1.803 m), weight 242 lb (109.8 kg), SpO2 94 %. General:          Well nourished. No overt distress, BMI of 34, he is on oxygen cannula. Head:               Normocephalic, atraumatic, alert, oriented. Patient is talking well. Eyes:               Sclerae appear normal.  Pupils equally round. ENT:                Nares appear normal, no drainage. Moist oral mucosa  Neck:               No restricted ROM. Trachea midline   CV:                  RRR. No m/r/g. No jugular venous distension. Lungs:             Decrease in breath sounds bilaterally. no wheezing, rhonchi, or rales. Respirations even, unlabored  Abdomen: Bowel sounds present. Soft, nontender, distended due to truncal obesity  Extremities:     No cyanosis or clubbing. No edema  Skin:                No rashes and normal coloration. Warm and dry. Neuro:             CN II-XII grossly intact. Sensation intact. A&Ox3  Psych:             Normal mood and affect.       I have Performed by: AbercrombieTaylorBSN    Troponin    Collection Time: 06/25/22 10:12 PM   Result Value Ref Range    Troponin, High Sensitivity 42.0 (H) 0 - 14 pg/mL   Basic Metabolic Panel w/ Reflex to MG    Collection Time: 06/26/22  4:17 AM   Result Value Ref Range    Sodium 134 (L) 136 - 145 mmol/L    Potassium 3.7 3.5 - 5.1 mmol/L    Chloride 96 (L) 98 - 107 mmol/L    CO2 32 21 - 32 mmol/L    Anion Gap 6 (L) 7 - 16 mmol/L    Glucose 139 (H) 65 - 100 mg/dL    BUN 27 (H) 8 - 23 MG/DL    CREATININE 1.95 (H) 0.8 - 1.5 MG/DL    GFR African American 45 (L) >60 ml/min/1.73m2    GFR Non- 37 (L) >60 ml/min/1.73m2    Calcium 8.5 8.3 - 10.4 MG/DL   CBC with Auto Differential    Collection Time: 06/26/22  4:17 AM   Result Value Ref Range    WBC 12.6 (H) 4.3 - 11.1 K/uL    RBC 3.43 (L) 4.23 - 5.6 M/uL    Hemoglobin 9.4 (L) 13.6 - 17.2 g/dL    Hematocrit 29.7 (L) 41.1 - 50.3 %    MCV 86.6 79.6 - 97.8 FL    MCH 27.4 26.1 - 32.9 PG    MCHC 31.6 31.4 - 35.0 g/dL    RDW 14.5 11.9 - 14.6 %    Platelets 694 966 - 159 K/uL    MPV 9.0 (L) 9.4 - 12.3 FL    nRBC 0.00 0.0 - 0.2 K/uL    Differential Type AUTOMATED      Seg Neutrophils 91 (H) 43 - 78 %    Lymphocytes 3 (L) 13 - 44 %    Monocytes 5 4.0 - 12.0 %    Eosinophils % 0 (L) 0.5 - 7.8 %    Basophils 0 0.0 - 2.0 %    Immature Granulocytes 1 0.0 - 5.0 %    Segs Absolute 11.5 (H) 1.7 - 8.2 K/UL    Absolute Lymph # 0.4 (L) 0.5 - 4.6 K/UL    Absolute Mono # 0.6 0.1 - 1.3 K/UL    Absolute Eos # 0.0 0.0 - 0.8 K/UL    Basophils Absolute 0.0 0.0 - 0.2 K/UL    Absolute Immature Granulocyte 0.1 0.0 - 0.5 K/UL   POCT Glucose    Collection Time: 06/26/22  6:54 AM   Result Value Ref Range    POC Glucose 239 (H) 65 - 100 mg/dL    Performed by: AbercrombieTaylorBSN    POCT Glucose    Collection Time: 06/26/22 11:05 AM   Result Value Ref Range    POC Glucose 176 (H) 65 - 100 mg/dL    Performed by: Fiorella    POCT Glucose    Collection Time: 06/26/22  4:19 PM   Result Value Ref Range    POC Glucose 195 (H) 65 - 100 mg/dL    Performed by: Fiorella    POCT Glucose    Collection Time: 06/26/22  9:42 PM   Result Value Ref Range    POC Glucose 157 (H) 65 - 100 mg/dL    Performed by: AbercrombieTaylorBSN    CBC    Collection Time: 06/27/22  7:57 AM   Result Value Ref Range    WBC 8.1 4.3 - 11.1 K/uL    RBC 3.02 (L) 4.23 - 5.6 M/uL    Hemoglobin 8.3 (L) 13.6 - 17.2 g/dL    Hematocrit 25.9 (L) 41.1 - 50.3 %    MCV 85.8 79.6 - 97.8 FL    MCH 27.5 26.1 - 32.9 PG    MCHC 32.0 31.4 - 35.0 g/dL    RDW 14.0 11.9 - 14.6 %    Platelets 699 055 - 165 K/uL    MPV 9.2 (L) 9.4 - 12.3 FL    nRBC 0.00 0.0 - 0.2 K/uL   Comprehensive Metabolic Panel    Collection Time: 06/27/22  7:57 AM   Result Value Ref Range    Sodium 135 (L) 136 - 145 mmol/L    Potassium 3.6 3.5 - 5.1 mmol/L    Chloride 98 98 - 107 mmol/L    CO2 33 (H) 21 - 32 mmol/L    Anion Gap 4 (L) 7 - 16 mmol/L    Glucose 152 (H) 65 - 100 mg/dL    BUN 22 8 - 23 MG/DL    CREATININE 1.83 (H) 0.8 - 1.5 MG/DL    GFR  48 (L) >60 ml/min/1.73m2    GFR Non- 40 (L) >60 ml/min/1.73m2    Calcium 8.3 8.3 - 10.4 MG/DL    Total Bilirubin 0.7 0.2 - 1.1 MG/DL    ALT 14 12 - 65 U/L    AST 20 15 - 37 U/L    Alk Phosphatase 80 50 - 136 U/L    Total Protein 6.8 6.3 - 8.2 g/dL    Albumin 2.6 (L) 3.2 - 4.6 g/dL    Globulin 4.2 (H) 2.3 - 3.5 g/dL    Albumin/Globulin Ratio 0.6 (L) 1.2 - 3.5     POCT Glucose    Collection Time: 06/27/22  8:06 AM   Result Value Ref Range    POC Glucose 166 (H) 65 - 100 mg/dL    Performed by: Lucita Garg        Other Studies:  XR CHEST 1 VIEW   Final Result   Unchanged bibasilar lung infiltrates. CT CHEST ABDOMEN PELVIS W CONTRAST   Final Result   1. No evidence of PE.   2.  Emphysema. Multifocal bilateral lung opacities, likely infectious. Recommend follow-up CT in about 3 months after treatment to ensure resolution   and exclude underlying mass. 3.  Gallstone. 4.  Enlarged prostate. 5. Urinary bladder wall thickening could indicate cystitis or outlet   obstruction. 6.  Right fourth rib fracture. XR CHEST PORTABLE   Final Result   Multifocal bilateral lung infiltrates.              Current Meds:  Current Facility-Administered Medications   Medication Dose Route Frequency    piperacillin-tazobactam (ZOSYN) 3,375 mg in sodium chloride 0.9 % 50 mL IVPB (mini-bag)  3,375 mg IntraVENous q8h    HYDROcodone homatropine (HYCODAN) 5-1.5 MG/5ML solution 5 mL  5 mL Oral Q4H PRN    guaiFENesin-dextromethorphan (ROBITUSSIN DM) 100-10 MG/5ML syrup 5 mL  5 mL Oral Q4H PRN    oxyCODONE (ROXICODONE) immediate release tablet 5 mg  5 mg Oral Q4H PRN    sodium chloride flush 0.9 % injection 5 mL  5 mL IntraVENous Q8H    sodium chloride flush 0.9 % injection 5 mL  5 mL IntraVENous PRN    azithromycin (ZITHROMAX) 500 mg in sodium chloride 0.9 % 250 mL IVPB ([id number])  500 mg IntraVENous Q24H    sodium chloride flush 0.9 % injection 5-40 mL  5-40 mL IntraVENous 2 times per day    sodium chloride flush 0.9 % injection 5-40 mL  5-40 mL IntraVENous PRN    0.9 % sodium chloride infusion   IntraVENous PRN    polyethylene glycol (GLYCOLAX) packet 17 g  17 g Oral Daily PRN    acetaminophen (TYLENOL) tablet 650 mg  650 mg Oral Q6H PRN    Or    acetaminophen (TYLENOL) suppository 650 mg  650 mg Rectal Q6H PRN    0.9 % sodium chloride infusion   IntraVENous Continuous    levothyroxine (SYNTHROID) tablet 75 mcg  75 mcg Oral Nightly    rosuvastatin (CRESTOR) tablet 20 mg  20 mg Oral Nightly    pantoprazole (PROTONIX) tablet 40 mg  40 mg Oral QAM AC    insulin lispro (HUMALOG) injection vial 0-8 Units  0-8 Units SubCUTAneous TID WC    insulin lispro (HUMALOG) injection vial 0-4 Units  0-4 Units SubCUTAneous Nightly    glucose chewable tablet 16 g  4 tablet Oral PRN    dextrose bolus 10% 125 mL  125 mL IntraVENous PRN    Or    dextrose bolus 10% 250 mL  250 mL IntraVENous PRN    glucagon (rDNA) injection 1 mg  1 mg IntraMUSCular PRN    dextrose 5 % solution  100 mL/hr IntraVENous PRN       Signed:  Rickey Vazquez MD    Part of this note may have been written by using a voice dictation software. The note has been proof read but may still contain some grammatical/other typographical errors.

## 2022-06-27 NOTE — PROGRESS NOTES
Outreach Follow Up Note    Agus Carreon was seen and assessed. Vitals:    06/27/22 1136 06/27/22 1434 06/27/22 1438 06/27/22 1544   BP: 125/64   126/67   Pulse: 92 93 93 95   Resp: 18   20   Temp: 98.9 °F (37.2 °C)   98.4 °F (36.9 °C)   TempSrc: Oral   Oral   SpO2: 92% 97% 94% 90%   Weight:       Height:                    Patient reviewed and discussed with primary nurse. There have been no significant clinical changes since the completion of the last dated Outreach assessment. Will continue to follow up per outreach protocol.     Signed By:   Dionicio Rose RN    June 27, 2022 4:13 PM

## 2022-06-28 VITALS
SYSTOLIC BLOOD PRESSURE: 130 MMHG | RESPIRATION RATE: 18 BRPM | TEMPERATURE: 98.6 F | DIASTOLIC BLOOD PRESSURE: 87 MMHG | BODY MASS INDEX: 33.88 KG/M2 | HEIGHT: 71 IN | OXYGEN SATURATION: 91 % | HEART RATE: 93 BPM | WEIGHT: 242 LBS

## 2022-06-28 PROBLEM — S22.39XA RIB FRACTURE: Status: RESOLVED | Noted: 2020-11-29 | Resolved: 2022-06-28

## 2022-06-28 PROBLEM — R11.15 NON-INTRACTABLE CYCLICAL VOMITING WITH NAUSEA: Status: RESOLVED | Noted: 2017-01-10 | Resolved: 2022-06-28

## 2022-06-28 PROBLEM — E66.811 CLASS 1 OBESITY DUE TO EXCESS CALORIES WITH SERIOUS COMORBIDITY AND BODY MASS INDEX (BMI) OF 33.0 TO 33.9 IN ADULT: Chronic | Status: ACTIVE | Noted: 2022-06-28

## 2022-06-28 PROBLEM — D70.1 CHEMOTHERAPY-INDUCED NEUTROPENIA (HCC): Status: RESOLVED | Noted: 2017-02-01 | Resolved: 2022-06-28

## 2022-06-28 PROBLEM — L03.221 CELLULITIS OF NECK: Status: RESOLVED | Noted: 2017-02-01 | Resolved: 2022-06-28

## 2022-06-28 PROBLEM — J94.2 HEMOTHORAX: Status: RESOLVED | Noted: 2020-12-10 | Resolved: 2022-06-28

## 2022-06-28 PROBLEM — N17.9 AKI (ACUTE KIDNEY INJURY) (HCC): Status: RESOLVED | Noted: 2020-12-02 | Resolved: 2022-06-28

## 2022-06-28 PROBLEM — E66.09 CLASS 1 OBESITY DUE TO EXCESS CALORIES WITH SERIOUS COMORBIDITY AND BODY MASS INDEX (BMI) OF 33.0 TO 33.9 IN ADULT: Chronic | Status: ACTIVE | Noted: 2022-06-28

## 2022-06-28 PROBLEM — I10 HTN (HYPERTENSION): Chronic | Status: ACTIVE | Noted: 2017-01-24

## 2022-06-28 PROBLEM — Z93.0 TRACHEOSTOMY IN PLACE (HCC): Status: RESOLVED | Noted: 2017-02-01 | Resolved: 2022-06-28

## 2022-06-28 PROBLEM — D62 ACUTE BLOOD LOSS ANEMIA: Status: RESOLVED | Noted: 2020-12-10 | Resolved: 2022-06-28

## 2022-06-28 PROBLEM — N18.31 STAGE 3A CHRONIC KIDNEY DISEASE (HCC): Status: ACTIVE | Noted: 2017-01-10

## 2022-06-28 PROBLEM — J94.2 HEMOTHORAX ON LEFT: Status: RESOLVED | Noted: 2020-12-10 | Resolved: 2022-06-28

## 2022-06-28 PROBLEM — T45.1X5A CHEMOTHERAPY-INDUCED NEUTROPENIA (HCC): Status: RESOLVED | Noted: 2017-02-01 | Resolved: 2022-06-28

## 2022-06-28 PROBLEM — D61.818 PANCYTOPENIA (HCC): Status: RESOLVED | Noted: 2017-01-12 | Resolved: 2022-06-28

## 2022-06-28 LAB
ANION GAP SERPL CALC-SCNC: 5 MMOL/L (ref 7–16)
BUN SERPL-MCNC: 17 MG/DL (ref 8–23)
CALCIUM SERPL-MCNC: 8.6 MG/DL (ref 8.3–10.4)
CHLORIDE SERPL-SCNC: 96 MMOL/L (ref 98–107)
CO2 SERPL-SCNC: 31 MMOL/L (ref 21–32)
CREAT SERPL-MCNC: 1.59 MG/DL (ref 0.8–1.5)
GLUCOSE BLD STRIP.AUTO-MCNC: 175 MG/DL (ref 65–100)
GLUCOSE BLD STRIP.AUTO-MCNC: 181 MG/DL (ref 65–100)
GLUCOSE SERPL-MCNC: 148 MG/DL (ref 65–100)
POTASSIUM SERPL-SCNC: 3.5 MMOL/L (ref 3.5–5.1)
SERVICE CMNT-IMP: ABNORMAL
SERVICE CMNT-IMP: ABNORMAL
SODIUM SERPL-SCNC: 132 MMOL/L (ref 136–145)

## 2022-06-28 PROCEDURE — 2580000003 HC RX 258: Performed by: INTERNAL MEDICINE

## 2022-06-28 PROCEDURE — 82962 GLUCOSE BLOOD TEST: CPT

## 2022-06-28 PROCEDURE — 97530 THERAPEUTIC ACTIVITIES: CPT

## 2022-06-28 PROCEDURE — 36415 COLL VENOUS BLD VENIPUNCTURE: CPT

## 2022-06-28 PROCEDURE — 2580000003 HC RX 258: Performed by: EMERGENCY MEDICINE

## 2022-06-28 PROCEDURE — 6370000000 HC RX 637 (ALT 250 FOR IP): Performed by: INTERNAL MEDICINE

## 2022-06-28 PROCEDURE — 6360000002 HC RX W HCPCS: Performed by: INTERNAL MEDICINE

## 2022-06-28 PROCEDURE — 80048 BASIC METABOLIC PNL TOTAL CA: CPT

## 2022-06-28 RX ORDER — AZITHROMYCIN 500 MG/1
500 TABLET, FILM COATED ORAL DAILY
Qty: 3 TABLET | Refills: 0 | Status: SHIPPED | OUTPATIENT
Start: 2022-06-28 | End: 2022-07-01

## 2022-06-28 RX ORDER — AMOXICILLIN AND CLAVULANATE POTASSIUM 875; 125 MG/1; MG/1
1 TABLET, FILM COATED ORAL 2 TIMES DAILY
Qty: 6 TABLET | Refills: 0 | Status: SHIPPED | OUTPATIENT
Start: 2022-06-28 | End: 2022-07-01

## 2022-06-28 RX ADMIN — LEVOTHYROXINE SODIUM 75 MCG: 0.07 TABLET ORAL at 06:03

## 2022-06-28 RX ADMIN — SODIUM CHLORIDE, PRESERVATIVE FREE 5 ML: 5 INJECTION INTRAVENOUS at 06:03

## 2022-06-28 RX ADMIN — PIPERACILLIN AND TAZOBACTAM 3375 MG: 3; .375 INJECTION, POWDER, LYOPHILIZED, FOR SOLUTION INTRAVENOUS at 06:03

## 2022-06-28 RX ADMIN — PANTOPRAZOLE SODIUM 40 MG: 40 TABLET, DELAYED RELEASE ORAL at 06:03

## 2022-06-28 ASSESSMENT — PAIN SCALES - GENERAL: PAINLEVEL_OUTOF10: 0

## 2022-06-28 NOTE — PROGRESS NOTES
PHYSICAL THERAPY Daily Note, Discharge and AM  (Link to Caseload Tracking: PT Visit Days : 2  Acknowledge Orders  Time In/Out  PT Charge Capture  Rehab Caseload Tracker    Clive Amato is a 73 y.o. male   PRIMARY DIAGNOSIS: Bacterial pneumonia  Essential hypertension [I10]  Bacterial pneumonia [J15.9]  Hypoxia [R09.02]  Pneumonia of both lower lobes due to infectious organism [J18.9]  Leukocytosis, unspecified type [D72.829]  Type 2 diabetes mellitus without complication, unspecified whether long term insulin use ([facility] [E11.9]       Reason for Referral: Difficulty in walking, Not elsewhere classified (R26.2)  Other abnormalities of gait and mobility (R26.89)  Inpatient: Payor: Brianda Jeong / Plan: Karson Stall / Product Type: *No Product type* /     ASSESSMENT:     REHAB RECOMMENDATIONS:   Recommendation to date pending progress:  [facility] [place] [address] Therapy    Equipment:     None     ASSESSMENT:  Mr. Dimitris Barragan participated well. Up in the room without assist.  Ambulated increased distance today. RT present for O2 qualifier. No O2 needs at rest but required O2 with activity (2L). Ambulated 575' independently without an assistive device. Able to ambulate up/down 5 steps without a rail with supervision. He does not have any additional acute therapy needs and will be discharged at this time.      [address] [facility] [address] [address] [facility] 6 Clicks Basic Mobility Inpatient Short Form  AM-PAC Mobility Inpatient   How much difficulty turning over in bed?: None  How much difficulty sitting down on / standing up from a chair with arms?: None  How much difficulty moving from lying on back to sitting on side of bed?: None  How much help from another person moving to and from a bed to a chair?: None  How much help from another person needed to walk in hospital room?: None  How much help from another person for climbing 3-5 steps with a railing?: None  AM-PAC Inpatient Mobility Raw Score : 24  AM-PAC Inpatient T-Scale Score : 61.14  Mobility Inpatient CMS 0-100% Score: 0  Mobility Inpatient CMS G-Code Modifier : CH    SUBJECTIVE:   Mr. Erlinda Streeter agreeable. Hoping to d/c home today.     Social/Functional Lives With: Spouse  Type of Home: House  Home Access: Stairs to enter without rails  Entrance Stairs - Number of Steps: 3  Ambulation Assistance: Independent    OBJECTIVE:     PAIN: VITALS / O2: PRECAUTION / Geraldyne Romano / DRAINS:   Pre Treatment:   Pain Assessment: 0-10  Pain Level: 0      Post Treatment: none Vitals        Oxygen  Room Air at rest  2L with amb and sats 94-96%      None    RESTRICTIONS/PRECAUTIONS:                    GROSS EVALUATION: Intact Impaired (Comments):   AROM [x]     PROM []    Strength [x]     Balance [x]     Posture [] N/A   Sensation [x]     Coordination [x]      Tone [x]     Edema []    Activity Tolerance [x]      []      COGNITION/  PERCEPTION: Intact Impaired (Comments):   Orientation [x]     Vision [x]     Hearing [x]     Cognition  [x]       MOBILITY: I Mod I S SBA CGA Min Mod Max Total  NT x2 Comments:   Bed Mobility    Rolling [] [] [] [] [] [] [] [] [] [] []    Supine to Sit [] [] [] [] [] [] [] [] [] [] []    Scooting [] [] [] [] [] [] [] [] [] [] []    Sit to Supine [] [] [] [] [] [] [] [] [] [] []    Transfers    Sit to Stand [x] [] [] [] [] [] [] [] [] [] []    Bed to Chair [x] [] [] [] [] [] [] [] [] [] []    Stand to Sit [x] [] [] [] [] [] [] [] [] [] []     [] [] [] [] [] [] [] [] [] [] []    I=Independent, Mod I=Modified Independent, S=Supervision, SBA=Standby Assistance, CGA=Contact Guard Assistance,   Min=Minimal Assistance, Mod=Moderate Assistance, Max=Maximal Assistance, Total=Total Assistance, NT=Not Tested    GAIT: I Mod I S SBA CGA Min Mod Max Total  NT x2 Comments:   Level of Assistance [x] [] [] [] [] [] [] [] [] [] []    ONRQHUSL 914 feet    DME None    Gait Quality Decreased steph  and Trunk sway increased    Weightbearing Status      Stairs      I=Independent, Mod balance to improve functional Activity tolerance, Balance, Coordination, Mobility and Strength.     TREATMENT GRID:  N/A    AFTER TREATMENT PRECAUTIONS: Bed/Chair Locked, Call light within reach, Chair and Needs within reach    INTERDISCIPLINARY COLLABORATION:  RN/ PCT, PT/ PTA and RT    EDUCATION: Education Given To: Patient  Education Provided: Role of Therapy;Plan of Care  Education Outcome: Verbalized understanding    TIME IN/OUT:  Time In: 1130  Time Out: [facility]  Minutes: 3424 Ina Cox, PT

## 2022-06-28 NOTE — PROGRESS NOTES
06/28/22 1236   Resting (Room Air)   SpO2 94   HR 93   Resting (On O2)   SpO2 94   HR 98   O2 Device Nasal cannula   O2 Flow Rate (l/min) 2 l/min   During Walk (Room Air)   SpO2 86      Rate of Dyspnea 1   Symptoms Shortness of breath   During Walk (On O2)   SpO2 98      O2 Device Nasal cannula   O2 Flow Rate (l/min) 2 l/min   Need Additional O2 Flow Rate Rows No   Rate of Dyspnea 0   After Walk   SpO2 92      Rate of Dyspnea 0   Does the Patient Qualify for Home O2 Yes   Does the Patient Need Portable Oxygen Tanks Yes

## 2022-06-28 NOTE — CARE COORDINATION
MD order rec'd to arrange home 02 at 2L w/ activity. Pt lives with wife who is a pt over on Med/surg who is already current with [facility] for home 02. Pt agreeable to use the same co.  Orders sent to [place] and portable tank delivered to the hospital room. Pt ambulating in halls with PT and resp without any assistance other than 02. HHPT not indicated at this time; no orders obtained.         06/28/22 1433   Discharge Planning   Patient expects to be discharged to: [facility] Discharge   Mode of Transport at Discharge Self   Confirm Follow Up Transport Self

## 2022-06-28 NOTE — DISCHARGE SUMMARY
Hospitalist Discharge Summary   Admit Date:  2022  1:33 PM   DC Note date: 2022  Name:  Madyson Smoke   Age:  73 y.o. Sex:  male  :  1955   MRN:  [id number]   Room:  [facility]  PCP:  Suzanne Landis MD    Presenting Complaint: Chest Pain     Initial Admission Diagnosis: Essential hypertension [I10]  Bacterial pneumonia [J15.9]  Hypoxia [R09.02]  Pneumonia of both lower lobes due to infectious organism [J18.9]  Leukocytosis, unspecified type [D72.829]  Type 2 diabetes mellitus without complication, unspecified whether long term insulin use ([address] [E11.9]     Problem List for this Hospitalization (present on admission):    Principal Problem:    Bacterial pneumonia  Active Problems:    Class 1 obesity due to excess calories with serious comorbidity and body mass index (BMI) of 33.0 to 33.9 in adult    Primary hypertension    Malignant neoplasm of larynx ([address]    Diabetes mellitus with microalbuminuria ([address]    Stage 3a chronic kidney disease ([address]  Resolved Problems:    [place] (acute kidney injury) ([address]    Did Patient have Sepsis (YES OR NO): No    Hospital Course:  66M PMHx Diabetes mellitus type 2, Hypertension, Hyperlipidemia, BMI of 34, Previous larynx cancer who presented  with right chest pain, and pain in the mid epigastric area and subjective fever. Cough.  Normally he does not require any oxygen at home.  Appetite was poor.  In the emergency room he was found to have infiltrates bilaterally in the chest.  He was diagnosed with bacterial pneumonia.  COVID rapid testing was negative.  There was elevation of creatinine, mild elevation of troponin, with EKG showed no acute ST-T change, except for nonspecific change.  Patient reported the pain at the right side of the chest and the right upper abdomen area especially with coughing. He was admitted for treatment of pneumonia [place], and will be monitored for other abnormalities on lab testing including elevated troponin.  He was started on empiric antibiotics. He was given supportive therapy with oxygen and fluids. His respiratory status stabilized. His renal function improved. His appetite returned. He was found safe for discharge 6/28. He should follow-up with his primary care provider within 7 days. Primary Care Provider may consider the following  -Completion of antibiotic therapy  -Weaning oxygen  -Trend renal function    Disposition: Home with outpatient follow-up  Diet: ADULT DIET; Regular; 3 carb choices (45 gm/meal)  Code Status: Full Code    Follow Ups:  Primary care provider within 7 days      Time spent in patient discharge and coordination 42 minutes. Plan was discussed with patient, nurse, , respiratory therapist.  All questions answered. Patient was stable at time of discharge. Instructions given to call a physician or return if any concerns.     Current Discharge Medication List      START taking these medications    Details   azithromycin (ZITHROMAX) 500 MG tablet Take 1 tablet by mouth daily for 3 days  Qty: 3 tablet, Refills: 0    Associated Diagnoses: Pneumonia of both lower lobes due to infectious organism      amoxicillin-clavulanate (AUGMENTIN) 875-125 MG per tablet Take 1 tablet by mouth 2 times daily for 3 days  Qty: 6 tablet, Refills: 0         CONTINUE these medications which have NOT CHANGED    Details   levothyroxine (SYNTHROID) 75 MCG tablet Take 1 tablet by mouth daily  Qty: 30 tablet, Refills: 5    Associated Diagnoses: Primary hypothyroidism      vitamin D (ERGOCALCIFEROL) 1.25 MG (30119 UT) CAPS capsule Take 1 capsule by mouth Twice a Week  Qty: 12 capsule, Refills: 1    Associated Diagnoses: Vitamin D deficiency      citalopram (CELEXA) 40 MG tablet Take 40 mg by mouth every evening      esomeprazole (NEXIUM) 20 MG delayed release capsule Take 20 mg by mouth daily      losartan-hydroCHLOROthiazide (HYZAAR) 50-12.5 MG per tablet Take 1 tablet by mouth daily      rosuvastatin (CRESTOR) 20 MG tablet Take 20 mg by mouth daily             Procedures done this admission:  * No surgery found *    Consults this admission:  None    Echocardiogram results:  No results found for this or any previous visit. Diagnostic Imaging/Tests:   XR CHEST PORTABLE    Result Date: 6/25/2022  Chest portable CLINICAL INDICATION: Shortness of breath, elevated TSH and white blood cell count COMPARISON: 6/24/2022, 2/8/2021 TECHNIQUE: single AP portable view chest at 2:20 PM upright FINDINGS: Cardiac silhouette is mildly enlarged. There are moderate reticular and groundglass opacities involving the left perihilar upper lobe and both bases. No evidence of a pneumothorax, definite pleural effusion or dense lobar consolidation. Stable hilar contours. Multifocal bilateral lung infiltrates. CT CHEST ABDOMEN PELVIS W CONTRAST    Result Date: 6/25/2022  CT of the Chest, Abdomen, and Pelvis with contrast CLINICAL INDICATION:  Acute severe shortness of breath, right pleuritic chest pain. Subacute progressively worsening moderate to severe right upper abdominal and flank pain. Hypoxia, hematuria, elevated troponin, low magnesium, elevated bilirubin, renal insufficiency, hyponatremia, leukocytosis, anemia. Further evaluation of abnormal bilateral lung opacities on radiograph. History also includes prior left rib fractures, squamous cell cancer of the epiglottis. COMPARISON: CT chest 12/10/2020, radiograph chest today, CT abdomen pelvis 12/6/2020, PET 1/8/2019. TECHNIQUE: Dose reduction technique used: Automated exposure Control and/or adjustment of mA and kV according to patient size. Multiple axial images were obtained through the chest, abdomen, and pelvis,  after intravenous injection of 125cc of isovue 370 IV contrast to further evaluate vessels and organs. Coronal, sagittal reformatted images were done for further evaluation of bones and organs.  Oral contrast was not given per request which limits evaluation of GI tract and adjacent organs. FINDINGS: Chest: Pulmonary arteries are normal in caliber, well-opacified, demonstrating no filling defect. Aorta is normal in caliber with diffuse mild calcifications and atherosclerotic changes. There coronary artery calcifications. No definite pleural or pericardial effusion. Multiple mildly enlarged mediastinal lymph nodes are nonspecific, most likely benign reactive in the absence of clinical suspicion for malignancy. Esophagus is normal in caliber. Thoracic bones again demonstrate no partially healed left posterior and lateral rib fractures. Since prior there is an oblique displaced fracture of the right lateral fourth rib. Lungs demonstrate partial consolidations as well as peribronchial and groundglass opacities involving posterior left upper lobe and both lower lobes. No pneumothorax. Bilateral upper lobe predominant emphysema, AP elongation and narrowing of trachea. Abdomen: Lack of oral contrast limits evaluation of GI tract and other structures. No evidence of free air, ascites, bowel obstruction, hernia or appendicitis. Patent major vessels. Normal caliber aorta. Mild bilateral renal cortical thinning compatible with medical renal disease, chronic in appearance. Tiny round hypodensities in the renal cortices are too small to characterize, statistically benign incidental cysts. Scattered atherosclerotic changes and vascular calcifications. Benign diverticula of proximal small bowel. Moderate volume stool of large bowel. Liver, gallbladder, adrenals, kidneys, pancreas, spleen demonstrate no acute abnormalities. There is note of gallstones without biliary dilatation or surrounding inflammation. No obvious lymphadenopathy. Pelvis: Prostate is enlarged. No evidence of focal fluid collection, acute inflammation, or lymphadenopathy. Mild wall thickening of urinary bladder. Patent pelvic vessels. Abdominopelvic bones: No acute osseous lesions.      1.  No evidence of PE. 2.  Emphysema. Multifocal bilateral lung opacities, likely infectious. Recommend follow-up CT in about 3 months after treatment to ensure resolution and exclude underlying mass. 3.  Gallstone. 4.  Enlarged prostate. 5.  Urinary bladder wall thickening could indicate cystitis or outlet obstruction. 6.  Right fourth rib fracture. Labs: Results:       BMP, Mg, Phos Recent Labs     06/25/22  1359 06/25/22  1359 06/26/22  0417 06/27/22  0757 06/28/22  0529   *   < > 134* 135* 132*   K 4.4   < > 3.7 3.6 3.5   CL 93*   < > 96* 98 96*   CO2 31   < > 32 33* 31   BUN 25*   < > 27* 22 17   MG 1.6*  --   --   --   --     < > = values in this interval not displayed.       CBC Recent Labs     06/25/22  1359 06/26/22 0417 06/27/22  0757   WBC 16.8* 12.6* 8.1   RBC 4.16* 3.43* 3.02*   HGB 11.4* 9.4* 8.3*   HCT 35.6* 29.7* 25.9*    196 160      LFT Recent Labs     06/25/22  1359 06/27/22  0757   ALT 11* 14   GLOB 5.0* 4.2*      Cardiac Testing No results found for: BNP, CPK, RCK1, CKMB   Coagulation Tests Lab Results   Component Value Date    INR 1.1 06/25/2022    INR 0.9 12/10/2020    INR 1.0 11/30/2020      A1c No results found for: HBA1C   Lipid Panel No results found for: CHOL, CHOLPOCT, CHOLX, CHLST, CHOLV, 407400, HDL, HDLC, LDL, LDLC, 856649, VLDLC, VLDL, TGLX, TRIGL   Thyroid Panel No results found for: TSH, T4, FT4     Most Recent UA Lab Results   Component Value Date    MUCUS 0 12/02/2020    UCOM RESULTS VERIFIED MANUALLY 06/25/2022          All Labs from Last 24 Hrs:  Recent Results (from the past 24 hour(s))   POCT Glucose    Collection Time: 06/27/22  3:49 PM   Result Value Ref Range    POC Glucose 154 (H) 65 - 100 mg/dL    Performed by: Sharonda Burris    POCT Glucose    Collection Time: 06/27/22  8:50 PM   Result Value Ref Range    POC Glucose 143 (H) 65 - 100 mg/dL    Performed by: Lorene Atkinson    Basic Metabolic Panel    Collection Time: 06/28/22  5:29 AM   Result Value Ref Range Sodium 132 (L) 136 - 145 mmol/L    Potassium 3.5 3.5 - 5.1 mmol/L    Chloride 96 (L) 98 - 107 mmol/L    CO2 31 21 - 32 mmol/L    Anion Gap 5 (L) 7 - 16 mmol/L    Glucose 148 (H) 65 - 100 mg/dL    BUN 17 8 - 23 MG/DL    CREATININE 1.59 (H) 0.8 - 1.5 MG/DL    GFR  56 (L) >60 ml/min/1.73m2    GFR Non- 47 (L) >60 ml/min/1.73m2    Calcium 8.6 8.3 - 10.4 MG/DL   POCT Glucose    Collection Time: 06/28/22  6:29 AM   Result Value Ref Range    POC Glucose 175 (H) 65 - 100 mg/dL    Performed by: Nikki    POCT Glucose    Collection Time: 06/28/22 12:16 PM   Result Value Ref Range    POC Glucose 181 (H) 65 - 100 mg/dL    Performed by: Juan        Allergies   Allergen Reactions    Ondansetron Other (See Comments)     Gives pt severe HA  Other reaction(s): Headache-Intolerance  Gives pt severe HA     Immunization History   Administered Date(s) Administered    Influenza Virus Vaccine 02/07/2018    Influenza, Quadv, IM, PF (6 mo and older Fluzone, Flulaval, Fluarix, and 3 yrs and older Afluria) 02/07/2018    Pneumococcal Polysaccharide ([id number]) 02/07/2018       Recent Vital Data:  Patient Vitals for the past 24 hrs:   Temp Pulse Resp BP SpO2   06/28/22 1118 98.6 °F (37 °C) 93 18 130/87 91 %   06/28/22 0735 -- 93 -- -- --   06/28/22 0722 98.9 °F (37.2 °C) 98 18 (!) 143/95 93 %   06/28/22 0609 -- -- -- -- 98 %   06/28/22 0315 98.9 °F (37.2 °C) 96 20 136/69 100 %   06/27/22 2345 98.5 °F (36.9 °C) (!) 101 20 (!) 153/73 98 %   06/27/22 2159 -- 96 -- -- 94 %   06/27/22 2000 98.6 °F (37 °C) 94 20 (!) 143/74 92 %   06/27/22 1544 98.4 °F (36.9 °C) 95 20 126/67 90 %   06/27/22 1438 -- 93 -- -- 94 %   06/27/22 1434 -- 93 -- -- 97 %       Oxygen Therapy  SpO2: 91 %  Pulse via Oximetry: 105 beats per minute  Pulse Oximeter Device Mode: Intermittent  Pulse Oximeter Device Location: Forehead  O2 Device: High flow nasal cannula  O2 Flow Rate (L/min): 2 L/min (Weaned from 3L to 2L HFNC)  O2 Delivery Method: High flow nasal cannula    Estimated body mass index is 33.75 kg/m² as calculated from the following:    Height as of this encounter: 5' 11\" (1.803 m). Weight as of this encounter: 242 lb (109.8 kg). No intake or output data in the 24 hours ending 06/28/22 1305    Physical Exam  Vitals and nursing note reviewed. Constitutional:       General: He is not in acute distress. Appearance: He is obese. He is not diaphoretic. HENT:      Head: Normocephalic and atraumatic. Eyes:      Extraocular Movements: Extraocular movements intact. Cardiovascular:      Rate and Rhythm: Normal rate and regular rhythm. Pulmonary:      Effort: Pulmonary effort is normal. No respiratory distress. Breath sounds: No wheezing or rhonchi. Abdominal:      General: There is no distension. Musculoskeletal:         General: No deformity. Right lower leg: No edema. Left lower leg: No edema. Skin:     Coloration: Skin is not jaundiced or pale. Comments: Midline abdominal scar, PEG, trach scars   Neurological:      General: No focal deficit present. Mental Status: He is alert and oriented to person, place, and time. Psychiatric:         Mood and Affect: Mood normal.         Behavior: Behavior normal. Behavior is cooperative.          Signed:  Marcia Sommer MD

## 2022-06-28 NOTE — PROGRESS NOTES
Date of Outreach Update:  Mechelle Mcdaniel was seen and assessed. He is sitting up in the chair but was sleeping when I came in room. I checked his O2 Sat and he was 98% on 3L HFNC. Pt weaned to 2L HFNC and primary RN is going to recheck him at shift change. Pt states he feels better and is hoping to go home this morning. @[facility]([id number])@  Vitals:    06/27/22 2000 06/27/22 2159 06/27/22 2345 06/28/22 0315   BP: (!) 143/74  (!) 153/73 136/69   Pulse: 94 96 (!) 101 96   Resp: 20  20 20   Temp: 98.6 °F (37 °C)  98.5 °F (36.9 °C) 98.9 °F (37.2 °C)   TempSrc: Oral  Oral Oral   SpO2: 92% 94% 98% 100%   Weight:       Height:                           Previous Outreach assessment has been reviewed. There have been no significant clinical changes since the completion of the last dated Outreach assessment. Will continue to follow up per outreach protocol.     Signed By:   Pedro Barriga RN    June 28, 2022 6:06 AM

## 2022-06-29 ENCOUNTER — TELEPHONE (OUTPATIENT)
Dept: INTERNAL MEDICINE CLINIC | Facility: CLINIC | Age: 67
End: 2022-06-29

## 2022-06-29 NOTE — TELEPHONE ENCOUNTER
Called patient to schedule TCV appt following discharge from [address] [place] [address] 06/29/2022.   Dx Pneumonia    Per patient his PCP is Dr Stephen Almanza and has follow up appt scheduled

## 2022-06-30 LAB
BACTERIA SPEC CULT: NORMAL
SERVICE CMNT-IMP: NORMAL

## 2022-06-30 NOTE — PROGRESS NOTES
Physician Progress Note      Yasmine Rios  CSN #:                  [id number]  :                       1955  ADMIT DATE:       2022 1:33 PM  DISCH DATE:        2022 4:10 PM  RESPONDING  PROVIDER #:        Yamel Garcia MD          QUERY TEXT:    Pt admitted with bacterial pneumonia. Pt noted to have required supplemental   oxygen. If possible, please document in the progress notes and discharge   summary if you are evaluating and/or treating any of the following: The medical record reflects the following:  Risk Factors: bacterial pneumonia, hx larynx cancer  Clinical Indicators: O2 78% on arrival   PN: \"Normally he does not require any oxygen at home\"  O2 up to 10L NC  Treatment: supplemental O2, CXR  Options provided:  -- Acute respiratory failure with hypoxia  -- Other - I will add my own diagnosis  -- Disagree - Not applicable / Not valid  -- Disagree - Clinically unable to determine / Unknown  -- Refer to Clinical Documentation Reviewer    PROVIDER RESPONSE TEXT:    This patient is in acute respiratory failure with hypoxia. Query created by: Carlos Power on 2022 11:36 AM      QUERY TEXT:    Pt admitted with bacterial pneumonia. Pt noted to have signs of sepsis. If   possible, please document in the progress notes and discharge summary if you   are evaluating and /or treating any of the following:     The medical record reflects the following:  Risk Factors: bacterial pneumonia, [place]  Clinical Indicators: WBC 16.8 on admission,  and RR 24 on arrival to ED  Treatment: IVFs, IV abx, CXR, O2, daily labs  Options provided:  -- Sepsis, present on admission  -- Sepsis, present on admission, now resolved  -- Sepsis was ruled out  -- Other - I will add my own diagnosis  -- Disagree - Not applicable / Not valid  -- Disagree - Clinically unable to determine / Unknown  -- Refer to Clinical Documentation Reviewer    PROVIDER RESPONSE TEXT:    This patient was treated for sepsis this admission, which was present on   admission and is currently resolved.     Query created by: Carlos Power on 6/27/2022 11:39 AM      Electronically signed by:  Yamel Garcia MD 6/30/2022 4:44 PM

## 2022-08-16 NOTE — PROGRESS NOTES
I saw pt today with Dr Michelle Ramirez. He is doing well without complaints. He has smaller trach shiley tube now. Dr Michelle Ramirez told pt we will look to scan (if radiation does not scan) in about 4-6 weeks.  Nurse navigation is following Detail Level: Zone Detail Level: Detailed

## 2022-11-15 ENCOUNTER — OFFICE VISIT (OUTPATIENT)
Dept: ENDOCRINOLOGY | Age: 67
End: 2022-11-15
Payer: MEDICARE

## 2022-11-15 VITALS
BODY MASS INDEX: 32.06 KG/M2 | RESPIRATION RATE: 18 BRPM | SYSTOLIC BLOOD PRESSURE: 157 MMHG | WEIGHT: 229 LBS | DIASTOLIC BLOOD PRESSURE: 81 MMHG | OXYGEN SATURATION: 95 % | HEIGHT: 71 IN | HEART RATE: 80 BPM

## 2022-11-15 DIAGNOSIS — E03.9 PRIMARY HYPOTHYROIDISM: ICD-10-CM

## 2022-11-15 DIAGNOSIS — E55.9 VITAMIN D DEFICIENCY: ICD-10-CM

## 2022-11-15 DIAGNOSIS — E11.65 TYPE 2 DIABETES MELLITUS WITH HYPERGLYCEMIA, WITHOUT LONG-TERM CURRENT USE OF INSULIN (HCC): ICD-10-CM

## 2022-11-15 DIAGNOSIS — C76.0 HEAD AND NECK CANCER (HCC): ICD-10-CM

## 2022-11-15 DIAGNOSIS — E11.65 TYPE 2 DIABETES MELLITUS WITH HYPERGLYCEMIA, WITHOUT LONG-TERM CURRENT USE OF INSULIN (HCC): Primary | ICD-10-CM

## 2022-11-15 LAB
ALBUMIN SERPL-MCNC: 3.6 G/DL (ref 3.2–4.6)
ALBUMIN/GLOB SERPL: 1.1 {RATIO} (ref 0.4–1.6)
ALP SERPL-CCNC: 59 U/L (ref 50–136)
ALT SERPL-CCNC: 14 U/L (ref 12–65)
ANION GAP SERPL CALC-SCNC: 5 MMOL/L (ref 2–11)
AST SERPL-CCNC: 10 U/L (ref 15–37)
BASOPHILS # BLD: 0 K/UL (ref 0–0.2)
BASOPHILS NFR BLD: 1 % (ref 0–2)
BILIRUB SERPL-MCNC: 0.5 MG/DL (ref 0.2–1.1)
BUN SERPL-MCNC: 14 MG/DL (ref 8–23)
CALCIUM SERPL-MCNC: 8.9 MG/DL (ref 8.3–10.4)
CHLORIDE SERPL-SCNC: 100 MMOL/L (ref 101–110)
CO2 SERPL-SCNC: 34 MMOL/L (ref 21–32)
CREAT SERPL-MCNC: 1.5 MG/DL (ref 0.8–1.5)
DIFFERENTIAL METHOD BLD: ABNORMAL
EOSINOPHIL # BLD: 0.1 K/UL (ref 0–0.8)
EOSINOPHIL NFR BLD: 1 % (ref 0.5–7.8)
ERYTHROCYTE [DISTWIDTH] IN BLOOD BY AUTOMATED COUNT: 13.8 % (ref 11.9–14.6)
GLOBULIN SER CALC-MCNC: 3.3 G/DL (ref 2.8–4.5)
GLUCOSE SERPL-MCNC: 98 MG/DL (ref 65–100)
HBA1C MFR BLD: 6.1 %
HCT VFR BLD AUTO: 31.7 % (ref 41.1–50.3)
HGB BLD-MCNC: 9.9 G/DL (ref 13.6–17.2)
IMM GRANULOCYTES # BLD AUTO: 0 K/UL (ref 0–0.5)
IMM GRANULOCYTES NFR BLD AUTO: 0 % (ref 0–5)
LYMPHOCYTES # BLD: 1 K/UL (ref 0.5–4.6)
LYMPHOCYTES NFR BLD: 16 % (ref 13–44)
MCH RBC QN AUTO: 26.9 PG (ref 26.1–32.9)
MCHC RBC AUTO-ENTMCNC: 31.2 G/DL (ref 31.4–35)
MCV RBC AUTO: 86.1 FL (ref 82–102)
MONOCYTES # BLD: 0.4 K/UL (ref 0.1–1.3)
MONOCYTES NFR BLD: 6 % (ref 4–12)
NEUTS SEG # BLD: 5.1 K/UL (ref 1.7–8.2)
NEUTS SEG NFR BLD: 76 % (ref 43–78)
NRBC # BLD: 0 K/UL (ref 0–0.2)
PLATELET # BLD AUTO: 248 K/UL (ref 150–450)
PMV BLD AUTO: 9.6 FL (ref 9.4–12.3)
POTASSIUM SERPL-SCNC: 3.5 MMOL/L (ref 3.5–5.1)
PROT SERPL-MCNC: 6.9 G/DL (ref 6.3–8.2)
RBC # BLD AUTO: 3.68 M/UL (ref 4.23–5.6)
SODIUM SERPL-SCNC: 139 MMOL/L (ref 133–143)
TSH W FREE THYROID IF ABNORMAL: 4.32 UIU/ML (ref 0.36–3.74)
WBC # BLD AUTO: 6.6 K/UL (ref 4.3–11.1)

## 2022-11-15 PROCEDURE — 83036 HEMOGLOBIN GLYCOSYLATED A1C: CPT | Performed by: PHYSICIAN ASSISTANT

## 2022-11-15 PROCEDURE — G8484 FLU IMMUNIZE NO ADMIN: HCPCS | Performed by: PHYSICIAN ASSISTANT

## 2022-11-15 PROCEDURE — 3074F SYST BP LT 130 MM HG: CPT | Performed by: PHYSICIAN ASSISTANT

## 2022-11-15 PROCEDURE — G8417 CALC BMI ABV UP PARAM F/U: HCPCS | Performed by: PHYSICIAN ASSISTANT

## 2022-11-15 PROCEDURE — G8427 DOCREV CUR MEDS BY ELIG CLIN: HCPCS | Performed by: PHYSICIAN ASSISTANT

## 2022-11-15 PROCEDURE — 2022F DILAT RTA XM EVC RTNOPTHY: CPT | Performed by: PHYSICIAN ASSISTANT

## 2022-11-15 PROCEDURE — 3017F COLORECTAL CA SCREEN DOC REV: CPT | Performed by: PHYSICIAN ASSISTANT

## 2022-11-15 PROCEDURE — 1036F TOBACCO NON-USER: CPT | Performed by: PHYSICIAN ASSISTANT

## 2022-11-15 PROCEDURE — 1123F ACP DISCUSS/DSCN MKR DOCD: CPT | Performed by: PHYSICIAN ASSISTANT

## 2022-11-15 PROCEDURE — 3078F DIAST BP <80 MM HG: CPT | Performed by: PHYSICIAN ASSISTANT

## 2022-11-15 PROCEDURE — 99214 OFFICE O/P EST MOD 30 MIN: CPT | Performed by: PHYSICIAN ASSISTANT

## 2022-11-15 PROCEDURE — 3051F HG A1C>EQUAL 7.0%<8.0%: CPT | Performed by: PHYSICIAN ASSISTANT

## 2022-11-15 RX ORDER — LEVOTHYROXINE SODIUM 0.07 MG/1
75 TABLET ORAL DAILY
Qty: 30 TABLET | Refills: 5 | Status: SHIPPED | OUTPATIENT
Start: 2022-11-15 | End: 2022-11-21 | Stop reason: DRUGHIGH

## 2022-11-15 RX ORDER — ERGOCALCIFEROL 1.25 MG/1
50000 CAPSULE ORAL
Qty: 12 CAPSULE | Refills: 1 | Status: CANCELLED | OUTPATIENT
Start: 2022-11-17

## 2022-11-15 NOTE — PROGRESS NOTES
[facility]    Gordon Han PA-C  [facility]  [address], [address]  [place]  Phone [phone]  Facsimile [phone]          Marge Engle is a 78 y.o. male seen 11/15/2022 for follow up evaluation of type 2 diabetes        Assessment and Plan:    In office COVID-19 PPE worn and precautions taken    1. Type 2 diabetes mellitus with hyperglycemia, without long-term current use of insulin (HCC)  Blood sugar data available for review. A1c at goal however patient has a history of anemia, no recent lab work. Check CBC and fructosamine as well as metabolic panel. Patient encouraged to monitor blood sugar 2-3 times a week. Diet and exercise discussed  - AMB POC HEMOGLOBIN A1C  - CBC with Auto Differential; Future  - Fructosamine; Future  - Comprehensive Metabolic Panel; Future    2. Primary hypothyroidism  Was noted to have markedly elevated TSH at original visit. Started on 75 mcg dose of levothyroxine which she is taking correctly, sometimes with Pepsi, aware to take this with water. Recheck labs today  - levothyroxine (SYNTHROID) 75 MCG tablet; Take 1 tablet by mouth daily  Dispense: 30 tablet; Refill: 5  - TSH with Reflex; Future  - TSH; Future  - T4, Free; Future    3. Vitamin D deficiency  Was On twice weekly ergocalciferol, ran out and now taking daily D3, recheck vitamin D and consider maintenance dose if indicated    - Vitamin D 25 Hydroxy; Future  - PTH, Intact; Future    4. Head and neck cancer ([facility]  . pt with a history Head and neck cancer, recent change in thyroid function. Reorder US head and neck   - US HEAD NECK SOFT TISSUE THYROID; Future        Clive was seen today for follow-up.     Diagnoses and all orders for this visit:    Type 2 diabetes mellitus with hyperglycemia, without long-term current use of insulin (HCC)  -     AMB POC HEMOGLOBIN A1C  -     CBC with Auto Differential; Future  -     Fructosamine; Future  -     Comprehensive Metabolic Panel; Future    Primary hypothyroidism  -     levothyroxine (SYNTHROID) 75 MCG tablet; Take 1 tablet by mouth daily  -     TSH with Reflex; Future  -     TSH; Future  -     T4, Free; Future    Vitamin D deficiency  -     Vitamin D 25 Hydroxy; Future  -     PTH, Intact; Future    Head and neck cancer ([facility] [address]  -     US HEAD NECK SOFT TISSUE THYROID; Future          History of Present Illness:    11/15/2022    Wife  one month ago- lung CA with mets  Completed ergocalciferol course, started D3  Walking some  Taking thyroid meds correctly (sometimes with pepsi)    2022         DIABETES MELLITUS  Clive Marks is here for follow up evaluation and treatment of stable type 2 diabetes mellitus. Review of Records: Furred for uncontrolled type 2 diabetes with abnormal labs reportedly abnormal TSH and abnormal PTH        Date of diagnosis:      Stopped soliqua due to poor efficacy, taking at night     Denies  HX pancreatitis, DKA, gastroparesis, foot ulcer        History obtained from patient (and daughter Alfredo Tracey not at today's visit)        Current Regimen metformin 500mg BID       Home blood glucose monitoring frequency:   Not checking     Blood glucose levels are unknown        Diet: \"I eat everything I want\", regular pepsi x 4 days, no ETOH     Exercise:  No routine        Notes no change in blood glucose with         Body weight trend: stable                                Wt Readings from Last 3 Encounters:   22 242 lb (109.8 kg)   22 245 lb (111.1 kg)   22 176 lb (79.8 kg)         Diabetes education: The patient has not received formal diabetes education. Diabetic complications:                   Retinopathy: Last eye exam was  and demonstrated no diabetic retinopathy.   [facility] care specialist is [facility]                 Albuminuria/nephropathy:                                                     2022 Cr 1.58, GFR 45                    Neuropathy:  No reported        Hypoglycemia: never     Hyperglycemia: without symptoms      Hemoglobin A1c:  04/25/2022      7.1%   11/15/2022 6.1%  No results found for: TYH2QNNV, HBA1C           Other pertinent labs:                 Lipids:                     No results found for: CHOL, TRIGL, HDL, LDLC, VLDL                    Thyroid:      See below:        THYROID DYSFUNCTION  Leah Choi is seen for follow up evaluation/management of suspected hypothyroidism ; this was noted in April 2022. Review of Records: Patient with family history of hypothyroidism presents with TSH greater than 40     Current symptoms:  He reports generally feeling well but is noted to be fatigued and sleepy. COMPLAINS of:  See ROS     DENIES:        Prior treatment:   none     Current Regimen:  None     Pertinent labs:  04/25/2022      42.40     Imaging:                 HYPERCALCEMIA/HYPERPARATHYROIDISM        Presentation/Diagnosis:     Risk Factors:  Denies the use of calcium, vitamin D, lithium. No family history of hypercalcemia, hyperparathyroidism, multiple endocrine neoplasia. Symptoms: No history of kidney stones. Denies hematuria, flank pain, polydipsia. Denies  constipation, abdominal pain, depression, memory problems. Denies arthralgias, myalgias or bone pain. Some fatigue, Nocturia  . Does take thiazide, HCTZ combo     Imaging:        Fracture History:     Treatment:     Contraindications to treatment:           Pertinent Labs:     Calcium  04/25/2022      8.9     Vitamin D     PTH  04/25/2022      115        Phosphorus     Magnesium     SPEP     Thyroid  TSH  04/25/2022      42.40     Renal function  See above             Allergies & Medications:  Reviewed in chart.         Review of Systems    Vital Signs:  BP (!) 157/81   Pulse 80   Resp 18   Ht 5' 11\" (1.803 m)   Wt 229 lb (103.9 kg)   SpO2 95%   BMI 31.94 kg/m²       Physical Exam  Constitutional:       Appearance: Normal appearance. He is obese. Neck:      Thyroid: No thyromegaly. Comments: Extensive scarring of anterior neck  Firm, nonmobile submental engorgement of tissue with no noted discernable border, no noted lymphadenopathy   Cardiovascular:      Rate and Rhythm: Normal rate and regular rhythm. Pulmonary:      Effort: Pulmonary effort is normal.      Breath sounds: Normal breath sounds. Abdominal:      Palpations: Abdomen is soft. Feet:      Right foot:      Protective Sensation: 3 sites tested. 3 sites sensed. Left foot:      Protective Sensation: 3 sites tested. 3 sites sensed. Lymphadenopathy:      Cervical: No cervical adenopathy. Skin:     General: Skin is warm and dry. Neurological:      General: No focal deficit present. Mental Status: He is alert. Psychiatric:         Mood and Affect: Mood normal.         Behavior: Behavior normal.         Thought Content: Thought content normal.         Judgment: Judgment normal.           Return in about 16 weeks (around 3/7/2023) for Diabetes DM2 Follow-Up, hypothyroidism f/u. Portions of this note were generated with the assistance of voice recogniton software. As such, some errors in transcription may be present.

## 2022-11-16 LAB
25(OH)D3 SERPL-MCNC: 86.2 NG/ML (ref 30–100)
CALCIUM SERPL-MCNC: 9 MG/DL (ref 8.3–10.4)
PTH-INTACT SERPL-MCNC: 91.7 PG/ML (ref 18.5–88)
T4 FREE SERPL-MCNC: 1.1 NG/DL (ref 0.78–1.46)

## 2022-11-17 LAB — FRUCTOSAMINE SERPL-SCNC: 247 UMOL/L (ref 0–285)

## 2022-11-21 ENCOUNTER — TELEPHONE (OUTPATIENT)
Dept: ENDOCRINOLOGY | Age: 67
End: 2022-11-21

## 2022-11-21 DIAGNOSIS — E03.9 PRIMARY HYPOTHYROIDISM: Primary | ICD-10-CM

## 2022-11-21 RX ORDER — LEVOTHYROXINE SODIUM 88 UG/1
88 TABLET ORAL DAILY
Qty: 90 TABLET | Refills: 1 | Status: SHIPPED | OUTPATIENT
Start: 2022-11-21

## 2022-11-21 NOTE — TELEPHONE ENCOUNTER
Show stable but persisting anemia and the fructosamine level, and indication of blood sugar when the A1c is not reliable due to anemia, looks great! Thyroid labs are much improved but slightly abnormal so we will increase levothyroxine from 75 mcg daily up to 88 mcg daily.   I sent in [facility] in [facility] would appreciate if patient had labs drawn to recheck thyroid in approximately 8 weeks as ordered

## 2023-02-24 DIAGNOSIS — E03.9 PRIMARY HYPOTHYROIDISM: ICD-10-CM

## 2023-02-24 LAB
T4 FREE SERPL-MCNC: 1.2 NG/DL (ref 0.78–1.46)
TSH, 3RD GENERATION: 6.58 UIU/ML (ref 0.36–3.74)

## 2023-03-16 ENCOUNTER — OFFICE VISIT (OUTPATIENT)
Dept: ENDOCRINOLOGY | Age: 68
End: 2023-03-16
Payer: MEDICARE

## 2023-03-16 VITALS
BODY MASS INDEX: 32.22 KG/M2 | DIASTOLIC BLOOD PRESSURE: 82 MMHG | SYSTOLIC BLOOD PRESSURE: 131 MMHG | WEIGHT: 231 LBS | HEART RATE: 94 BPM | OXYGEN SATURATION: 85 %

## 2023-03-16 DIAGNOSIS — N18.31 STAGE 3A CHRONIC KIDNEY DISEASE (HCC): ICD-10-CM

## 2023-03-16 DIAGNOSIS — R80.9 DIABETES MELLITUS WITH MICROALBUMINURIA (HCC): ICD-10-CM

## 2023-03-16 DIAGNOSIS — E55.9 VITAMIN D DEFICIENCY: ICD-10-CM

## 2023-03-16 DIAGNOSIS — E66.09 CLASS 1 OBESITY DUE TO EXCESS CALORIES WITH SERIOUS COMORBIDITY AND BODY MASS INDEX (BMI) OF 33.0 TO 33.9 IN ADULT: ICD-10-CM

## 2023-03-16 DIAGNOSIS — E34.9 ELEVATED PARATHYROID HORMONE: ICD-10-CM

## 2023-03-16 DIAGNOSIS — E11.65 TYPE 2 DIABETES MELLITUS WITH HYPERGLYCEMIA, WITHOUT LONG-TERM CURRENT USE OF INSULIN (HCC): ICD-10-CM

## 2023-03-16 DIAGNOSIS — I10 PRIMARY HYPERTENSION: ICD-10-CM

## 2023-03-16 DIAGNOSIS — E03.9 PRIMARY HYPOTHYROIDISM: Primary | ICD-10-CM

## 2023-03-16 DIAGNOSIS — E11.29 DIABETES MELLITUS WITH MICROALBUMINURIA (HCC): ICD-10-CM

## 2023-03-16 LAB — HBA1C MFR BLD: 6.2 %

## 2023-03-16 PROCEDURE — 1036F TOBACCO NON-USER: CPT | Performed by: PHYSICIAN ASSISTANT

## 2023-03-16 PROCEDURE — 3046F HEMOGLOBIN A1C LEVEL >9.0%: CPT | Performed by: PHYSICIAN ASSISTANT

## 2023-03-16 PROCEDURE — 3075F SYST BP GE 130 - 139MM HG: CPT | Performed by: PHYSICIAN ASSISTANT

## 2023-03-16 PROCEDURE — G8417 CALC BMI ABV UP PARAM F/U: HCPCS | Performed by: PHYSICIAN ASSISTANT

## 2023-03-16 PROCEDURE — G8427 DOCREV CUR MEDS BY ELIG CLIN: HCPCS | Performed by: PHYSICIAN ASSISTANT

## 2023-03-16 PROCEDURE — 3079F DIAST BP 80-89 MM HG: CPT | Performed by: PHYSICIAN ASSISTANT

## 2023-03-16 PROCEDURE — 99214 OFFICE O/P EST MOD 30 MIN: CPT | Performed by: PHYSICIAN ASSISTANT

## 2023-03-16 PROCEDURE — G8484 FLU IMMUNIZE NO ADMIN: HCPCS | Performed by: PHYSICIAN ASSISTANT

## 2023-03-16 PROCEDURE — 2022F DILAT RTA XM EVC RTNOPTHY: CPT | Performed by: PHYSICIAN ASSISTANT

## 2023-03-16 PROCEDURE — 1123F ACP DISCUSS/DSCN MKR DOCD: CPT | Performed by: PHYSICIAN ASSISTANT

## 2023-03-16 PROCEDURE — 83036 HEMOGLOBIN GLYCOSYLATED A1C: CPT | Performed by: PHYSICIAN ASSISTANT

## 2023-03-16 PROCEDURE — 3017F COLORECTAL CA SCREEN DOC REV: CPT | Performed by: PHYSICIAN ASSISTANT

## 2023-03-16 RX ORDER — DAPAGLIFLOZIN 5 MG/1
5 TABLET, FILM COATED ORAL EVERY MORNING
Qty: 30 TABLET | Refills: 11 | Status: SHIPPED | OUTPATIENT
Start: 2023-03-16

## 2023-03-16 ASSESSMENT — ENCOUNTER SYMPTOMS
CONSTIPATION: 0
DIARRHEA: 0

## 2023-03-16 NOTE — PROGRESS NOTES
[facility]   [facility]   [facility]    Kevin Carrera PA-C  [facility]  [address], [address]  [place]  Phone [phone]  Facsimile [phone]          Nicky Wells is a 78 y.o. male seen 3/16/2023 for follow up evaluation of type 2 diabetes and primary hypothyroidism         Assessment and Plan:      1. Primary hypothyroidism  Patient with a history of head and neck radiation status post laryngeal carcinoma with surgical revision, chemotherapy, and radiation. Currently treated for hypothyroidism, of note is drinking Pepsi to swallow his thyroid replacement hormone. Reviewed the importance of taking levothyroxine on an empty stomach, with only water, nothing else eat or drink, using water to drink this pill and waiting 30 minutes to an hour before eating or drinking any nonwater substance. Patient will comply using his current 88 mcg dose and have labs rechecked in 8 weeks  - TSH; Future  - T4, Free; Future  - TSH; Future  - T4, Free; Future    2. Type 2 diabetes mellitus with hyperglycemia, without long-term current use of insulin (HCC)  Glycemic control appears stable on metformin alone. Unfortunately there is no blood glucose data available for review. Given cardiometabolic risk factors I believe he benefit from therapy with cardiovascular endpoint data. Start farxiga 5mg. Most recent renal function is stable. We will repeat BMP at future visit. Patient denies history of diabetic ketoacidosis. We discussed increased risk of urinary tract and genital mycotic infections as well as basic hygiene that may help to prevent infection. 3. Diabetes mellitus with microalbuminuria (HCC)  Given cardiometabolic risk factors I believe he benefit from therapy with cardiovascular endpoint data. Start farxiga 5mg. Most recent renal function is stable. We will repeat BMP at future visit.   Patient denies history of diabetic ketoacidosis. We discussed increased risk of urinary tract and genital mycotic infections as well as basic hygiene that may help to prevent infection. - Comprehensive Metabolic Panel; Future  - CBC with Auto Differential; Future  - Microalbumin / Creatinine Urine Ratio; Future  - Lipid Panel; Future  - HM DIABETES FOOT EXAM  - Magnesium; Future  - Hemoglobin A1C; Future  - FARXIGA 5 MG tablet; Take 1 tablet by mouth every morning  Dispense: 30 tablet; Refill: 11  - AMB POC HEMOGLOBIN A1C    4. Primary hypertension  BP Readings from Last 3 Encounters:   03/16/23 131/82   11/15/22 (!) 157/81   06/28/22 130/87         5. Elevated parathyroid hormone  Patient with a history of head and neck radiation status post laryngeal carcinoma with surgical revision, chemotherapy, and radiation. Now with elevated PTH without indication of hypercalcemia in context of CKD  - Phosphorus; Future  - PTH, Intact; Future  - Calcium, Ionized; Future    6. Stage 3a chronic kidney disease ([facility] [address]  Update labs  - Magnesium; Future    7. Vitamin D deficiency  Check level in context of CKD, elevated pth, and HX of vit D deficiency, taking daily D3  - Vitamin D 25 Hydroxy; Future    8. Class 1 obesity due to excess calories with serious comorbidity and body mass index (BMI) of 33.0 to 33.9 in adult  Patient would certainly benefit from improved glycemic control with agents that are either weight neutral or confer weight loss as a side effect. Heidi Mendenhall was seen today for diabetes. Diagnoses and all orders for this visit:    Primary hypothyroidism  -     TSH; Future  -     T4, Free; Future  -     TSH; Future  -     T4, Free; Future    Type 2 diabetes mellitus with hyperglycemia, without long-term current use of insulin (HCC)    Diabetes mellitus with microalbuminuria (HCC)  -     Comprehensive Metabolic Panel; Future  -     CBC with Auto Differential; Future  -     Microalbumin / Creatinine Urine Ratio;  Future  -     Lipid Panel; Future  -      DIABETES FOOT EXAM  -     Magnesium; Future  -     Hemoglobin A1C; Future  -     FARXIGA 5 MG tablet; Take 1 tablet by mouth every morning  -     AMB POC HEMOGLOBIN A1C    Primary hypertension    Elevated parathyroid hormone  -     Phosphorus; Future  -     PTH, Intact; Future  -     Calcium, Ionized; Future    Stage 3a chronic kidney disease (HCC)  -     Magnesium; Future    Vitamin D deficiency  -     Vitamin D 25 Hydroxy; Future    Class 1 obesity due to excess calories with serious comorbidity and body mass index (BMI) of 33.0 to 33.9 in adult          History of Present Illness:    3/16/2023   Interim diabetes HPI:  Returns clinic for follow-up states he is doing well. He continues to drink high calorie beverages but has made other lifestyle changes that have been impactful    Interim medical history changes:   -    Lifestyle Update:  Walking 3 day  Drinking pepsi  Smaller portion    Current Regimen: metformin 500mg QD    Glucose data:  No data      Failed past therapies:   Stopped soliqua due to poor efficacy, taking at night    Relevant co morbidities:    Denies  HX pancreatitis, DKA, gastroparesis, foot ulcer    Optho:  Last eye exam was 2021 and demonstrated no diabetic retinopathy. [facility] care specialist [facility]         Obesity:         Body mass index is 32.22 kg/m².        fluctuating a bit      Wt Readings from Last 3 Encounters:   03/16/23 231 lb (104.8 kg)   11/15/22 229 lb (103.9 kg)   06/25/22 242 lb (109.8 kg)         CardioVascular:    None   Renal:    Under care of nephro? no    On ARB/ACE-I  losartan-hydroCHLOROthiazide - 50-12.5 MG                             04/25/2022      Cr 1.58, GFR 45    06/23/2022 Cr 1.80, GFR 40, microalbumin/Cr ratio 39    11/15/2022 Cr 1.50, GFR 51     Lipids:     Current therapy rosuvastatin - 20 MG with good compliance    04/25/2022  TC- 115, LDL- 50, VLDL- -,  HDL- 34, TG- 156      No results found for: CHOL  No results found for: LDLCHOLESTEROL, LDLCALC No results found for: LABVLDL, VLDL No results found for: HDL No results found for: HDL No results found for: TRIG  No results found for: CHOLHDLRATIO      Hemoglobin A1c:  04/25/2022      7.1%  06/23/2022  fructosamine 321   11/15/2022      6.1%, fructosamine 247  03/16/2023 6.2%  Hemoglobin A1C, POC   Date Value Ref Range Status   03/16/2023 6.2 % Final   11/15/2022 6.1 % Final        Thyroid:     THYROID DYSFUNCTION  Moulton Manner is seen for follow up evaluation/management of primary hypothyroidism. This was noted in April 2022. Review of Records: Patient with family history of hypothyroidism presents with TSH greater than 40     Current symptoms:  He reports generally feeling well but is noted to be fatigued and sleepy. COMPLAINS of:  See ROS     DENIES:        Prior treatment:   75mcg levothyroxine     Current Regimen:  88mcg levothyroxine taking with pepsi       Pertinent labs:  04/25/2022      TSH 42.40     11/15/2022    TSH  4.32 (75mcg levothyroxine)    Free T4 1.1       02/24/2023    TSH  6.580 (88mcg levothyroxine)    Free T4 1.2       Imaging:                 HYPERCALCEMIA/HYPERPARATHYROIDISM        Presentation/Diagnosis:     Risk Factors:  Denies the use of calcium, vitamin D, lithium. No family history of hypercalcemia, hyperparathyroidism, multiple endocrine neoplasia. Symptoms: No history of kidney stones. Denies hematuria, flank pain, polydipsia. Denies  constipation, abdominal pain, depression, memory problems. Denies arthralgias, myalgias or bone pain.      Some fatigue, Nocturia    Does take thiazide, HCTZ combo     Imaging:        Fracture History:     Treatment:     Contraindications to treatment:           Pertinent Labs:     Calcium  04/25/2022      8.9     Vitamin D  06/23/2022 15.4  11/15/2022 86.2 (on 2 times weekly ergocalciferol)   Taking vit D    PTH  04/25/2022      115        Phosphorus     Magnesium     SPEP Thyroid  TSH  04/25/2022      42.40     Renal function  See above    No results found for: TSH, TSH2, TSH3       Reviewed and updated this visit by provider:  Tobacco  Allergies  Meds  Problems  Med Hx  Surg Hx  Fam Hx                    Allergies & Medications:  Reviewed in chart. Review of Systems   Constitutional:  Negative for fatigue and unexpected weight change. Gastrointestinal:  Negative for constipation and diarrhea. Skin:  Negative for rash. Vital Signs:  /82   Pulse 94   Wt 231 lb (104.8 kg)   SpO2 (!) 85%   BMI 32.22 kg/m²       Physical Exam  Constitutional:       Appearance: Normal appearance. He is obese. Neck:      Thyroid: No thyromegaly. Comments: Extensive scarring of anterior neck  Firm, nonmobile submental engorgement of tissue with no noted discernable border, no noted lymphadenopathy   Cardiovascular:      Rate and Rhythm: Normal rate and regular rhythm. Pulmonary:      Effort: Pulmonary effort is normal.      Breath sounds: Normal breath sounds. Abdominal:      Palpations: Abdomen is soft. Feet:      Right foot:      Protective Sensation: 3 sites tested. 3 sites sensed. Left foot:      Protective Sensation: 3 sites tested. 3 sites sensed. Lymphadenopathy:      Cervical: No cervical adenopathy. Skin:     General: Skin is warm and dry. Neurological:      General: No focal deficit present. Mental Status: He is alert. Psychiatric:         Mood and Affect: Mood normal.         Behavior: Behavior normal.         Thought Content: Thought content normal.         Judgment: Judgment normal.           Return in about 16 weeks (around 7/6/2023) for in office follow up. Portions of this note were generated with the assistance of voice recogniton software. As such, some errors in transcription may be present.

## 2023-03-17 ENCOUNTER — TELEPHONE (OUTPATIENT)
Dept: ENDOCRINOLOGY | Age: 68
End: 2023-03-17

## 2023-03-20 ENCOUNTER — TELEPHONE (OUTPATIENT)
Dept: ENDOCRINOLOGY | Age: 68
End: 2023-03-20

## 2023-07-13 ENCOUNTER — OFFICE VISIT (OUTPATIENT)
Dept: ENDOCRINOLOGY | Age: 68
End: 2023-07-13
Payer: MEDICARE

## 2023-07-13 VITALS
BODY MASS INDEX: 31.24 KG/M2 | WEIGHT: 224 LBS | HEART RATE: 83 BPM | OXYGEN SATURATION: 95 % | SYSTOLIC BLOOD PRESSURE: 122 MMHG | DIASTOLIC BLOOD PRESSURE: 76 MMHG

## 2023-07-13 DIAGNOSIS — E11.65 TYPE 2 DIABETES MELLITUS WITH HYPERGLYCEMIA, WITHOUT LONG-TERM CURRENT USE OF INSULIN (HCC): ICD-10-CM

## 2023-07-13 DIAGNOSIS — R80.9 DIABETES MELLITUS WITH MICROALBUMINURIA (HCC): ICD-10-CM

## 2023-07-13 DIAGNOSIS — E03.9 PRIMARY HYPOTHYROIDISM: ICD-10-CM

## 2023-07-13 DIAGNOSIS — E11.65 TYPE 2 DIABETES MELLITUS WITH HYPERGLYCEMIA, WITHOUT LONG-TERM CURRENT USE OF INSULIN (HCC): Primary | ICD-10-CM

## 2023-07-13 DIAGNOSIS — E03.9 PRIMARY HYPOTHYROIDISM: Primary | ICD-10-CM

## 2023-07-13 DIAGNOSIS — E55.9 VITAMIN D DEFICIENCY: ICD-10-CM

## 2023-07-13 DIAGNOSIS — I10 PRIMARY HYPERTENSION: ICD-10-CM

## 2023-07-13 DIAGNOSIS — E11.29 DIABETES MELLITUS WITH MICROALBUMINURIA (HCC): ICD-10-CM

## 2023-07-13 DIAGNOSIS — E66.09 CLASS 1 OBESITY DUE TO EXCESS CALORIES WITH SERIOUS COMORBIDITY AND BODY MASS INDEX (BMI) OF 33.0 TO 33.9 IN ADULT: Chronic | ICD-10-CM

## 2023-07-13 DIAGNOSIS — E34.9 ELEVATED PARATHYROID HORMONE: ICD-10-CM

## 2023-07-13 LAB
25(OH)D3 SERPL-MCNC: 113.6 NG/ML (ref 30–100)
ALBUMIN SERPL-MCNC: 3.5 G/DL (ref 3.2–4.6)
ALBUMIN/GLOB SERPL: 1 (ref 0.4–1.6)
ALP SERPL-CCNC: 60 U/L (ref 50–136)
ALT SERPL-CCNC: 12 U/L (ref 12–65)
ANION GAP SERPL CALC-SCNC: 9 MMOL/L (ref 2–11)
AST SERPL-CCNC: 11 U/L (ref 15–37)
BILIRUB SERPL-MCNC: 0.5 MG/DL (ref 0.2–1.1)
BUN SERPL-MCNC: 22 MG/DL (ref 8–23)
CALCIUM SERPL-MCNC: 9.5 MG/DL (ref 8.3–10.4)
CALCIUM SERPL-MCNC: 9.6 MG/DL (ref 8.3–10.4)
CHLORIDE SERPL-SCNC: 103 MMOL/L (ref 101–110)
CO2 SERPL-SCNC: 30 MMOL/L (ref 21–32)
CREAT SERPL-MCNC: 1.8 MG/DL (ref 0.8–1.5)
CREAT UR-MCNC: 112 MG/DL
GLOBULIN SER CALC-MCNC: 3.6 G/DL (ref 2.8–4.5)
GLUCOSE SERPL-MCNC: 125 MG/DL (ref 65–100)
MAGNESIUM SERPL-MCNC: 1.7 MG/DL (ref 1.8–2.4)
MICROALBUMIN UR-MCNC: 2.18 MG/DL
MICROALBUMIN/CREAT UR-RTO: 19 MG/G (ref 0–30)
PHOSPHATE SERPL-MCNC: 4.2 MG/DL (ref 2.3–3.7)
POTASSIUM SERPL-SCNC: 3.7 MMOL/L (ref 3.5–5.1)
PROT SERPL-MCNC: 7.1 G/DL (ref 6.3–8.2)
PTH-INTACT SERPL-MCNC: 37 PG/ML (ref 18.5–88)
SODIUM SERPL-SCNC: 142 MMOL/L (ref 133–143)
T4 FREE SERPL-MCNC: 1.3 NG/DL (ref 0.78–1.46)
TSH, 3RD GENERATION: 2.39 UIU/ML (ref 0.36–3.74)

## 2023-07-13 PROCEDURE — 3017F COLORECTAL CA SCREEN DOC REV: CPT | Performed by: PHYSICIAN ASSISTANT

## 2023-07-13 PROCEDURE — 3074F SYST BP LT 130 MM HG: CPT | Performed by: PHYSICIAN ASSISTANT

## 2023-07-13 PROCEDURE — 1123F ACP DISCUSS/DSCN MKR DOCD: CPT | Performed by: PHYSICIAN ASSISTANT

## 2023-07-13 PROCEDURE — 3046F HEMOGLOBIN A1C LEVEL >9.0%: CPT | Performed by: PHYSICIAN ASSISTANT

## 2023-07-13 PROCEDURE — G8427 DOCREV CUR MEDS BY ELIG CLIN: HCPCS | Performed by: PHYSICIAN ASSISTANT

## 2023-07-13 PROCEDURE — G8417 CALC BMI ABV UP PARAM F/U: HCPCS | Performed by: PHYSICIAN ASSISTANT

## 2023-07-13 PROCEDURE — 99215 OFFICE O/P EST HI 40 MIN: CPT | Performed by: PHYSICIAN ASSISTANT

## 2023-07-13 PROCEDURE — 1036F TOBACCO NON-USER: CPT | Performed by: PHYSICIAN ASSISTANT

## 2023-07-13 PROCEDURE — 3078F DIAST BP <80 MM HG: CPT | Performed by: PHYSICIAN ASSISTANT

## 2023-07-13 PROCEDURE — 2022F DILAT RTA XM EVC RTNOPTHY: CPT | Performed by: PHYSICIAN ASSISTANT

## 2023-07-13 RX ORDER — DAPAGLIFLOZIN 10 MG/1
10 TABLET, FILM COATED ORAL EVERY MORNING
Qty: 90 TABLET | Refills: 3 | Status: SHIPPED | OUTPATIENT
Start: 2023-07-13

## 2023-07-13 ASSESSMENT — ENCOUNTER SYMPTOMS
CONSTIPATION: 0
DIARRHEA: 0

## 2023-07-13 NOTE — PROGRESS NOTES
[facility]OCRIN[facility]   AND   THYROID NODULE CLINIC    Radha Granados PA-C  [facility]  [address], [address]  [facility], [address]  Phone [phone]  Facsimile [phone]          Kaylan Triana is a 78 y.o. male seen 7/13/2023 for follow up evaluation of type 2 diabetes and primary hypothyroidism         Assessment and Plan:      1. Primary hypothyroidism   Patient with a history of head and neck radiation status post laryngeal carcinoma with surgical revision, chemotherapy, and radiation. Currently treated for hypothyroidism, of note is drinking Pepsi to swallow his thyroid replacement hormone. Reviewed the importance of taking levothyroxine on an empty stomach, with only water, nothing else eat or drink, using water to drink this pill and waiting 30 minutes to an hour before eating or drinking any nonwater substance. Update labs today    - TSH; Future  - T4, Free; Future      2. Type 2 diabetes mellitus with hyperglycemia, without long-term current use of insulin (HCC)  Glycemic control appears stable on metformin alone. Unfortunately there is no blood glucose data available for review. Given cardiometabolic risk factors I believe he benefit from therapy with cardiovascular endpoint data. Did not Start farxiga 5mg. Most recent renal function is stable, update labs today. We will repeat BMP at future visit. Patient denies history of diabetic ketoacidosis. We discussed increased risk of urinary tract and genital mycotic infections as well as basic hygiene that may help to prevent infection. Start Farxiga 10mg  -  DIABETES FOOT EXAM      3. Diabetes mellitus with microalbuminuria ([address])  Given cardiometabolic risk factors I believe he benefit from therapy with cardiovascular endpoint data. Start farxiga 10mg. Most recent renal function is stable. We will repeat BMP at future visit. Patient denies history of diabetic ketoacidosis.   We

## 2023-07-14 LAB
CA-I SERPL ISE-MCNC: 5.1 MG/DL (ref 4.5–5.6)
EST. AVERAGE GLUCOSE BLD GHB EST-MCNC: 123 MG/DL
FRUCTOSAMINE SERPL-SCNC: 243 UMOL/L (ref 0–285)
HBA1C MFR BLD: 5.9 % (ref 4.8–5.6)

## 2023-07-18 ENCOUNTER — TELEPHONE (OUTPATIENT)
Dept: ENDOCRINOLOGY | Age: 68
End: 2023-07-18

## 2023-07-18 NOTE — TELEPHONE ENCOUNTER
Labs look good EXCEPT patient is taking too much vitamin D    Please find out how much he is taking and tell him to stop taking it    Let me know and I may substitute a lower dose or leave him off of vitamin D

## 2023-07-19 NOTE — TELEPHONE ENCOUNTER
Relayed prov msg to pt, he expressed understanidng. Reports he's taking 2 50mg tablets 1x a day. How would you like to proceed?

## 2023-11-16 ENCOUNTER — OFFICE VISIT (OUTPATIENT)
Dept: ENDOCRINOLOGY | Age: 68
End: 2023-11-16
Payer: MEDICARE

## 2023-11-16 VITALS
DIASTOLIC BLOOD PRESSURE: 78 MMHG | HEART RATE: 72 BPM | SYSTOLIC BLOOD PRESSURE: 144 MMHG | OXYGEN SATURATION: 97 % | WEIGHT: 225.4 LBS | BODY MASS INDEX: 31.44 KG/M2

## 2023-11-16 DIAGNOSIS — I10 PRIMARY HYPERTENSION: Chronic | ICD-10-CM

## 2023-11-16 DIAGNOSIS — E55.9 VITAMIN D DEFICIENCY: Primary | Chronic | ICD-10-CM

## 2023-11-16 DIAGNOSIS — R80.9 DIABETES MELLITUS WITH MICROALBUMINURIA (HCC): Chronic | ICD-10-CM

## 2023-11-16 DIAGNOSIS — E03.9 PRIMARY HYPOTHYROIDISM: ICD-10-CM

## 2023-11-16 DIAGNOSIS — E55.9 VITAMIN D DEFICIENCY: ICD-10-CM

## 2023-11-16 DIAGNOSIS — E11.29 DIABETES MELLITUS WITH MICROALBUMINURIA (HCC): Chronic | ICD-10-CM

## 2023-11-16 DIAGNOSIS — E11.65 TYPE 2 DIABETES MELLITUS WITH HYPERGLYCEMIA, WITHOUT LONG-TERM CURRENT USE OF INSULIN (HCC): Chronic | ICD-10-CM

## 2023-11-16 DIAGNOSIS — N18.31 STAGE 3A CHRONIC KIDNEY DISEASE (HCC): Chronic | ICD-10-CM

## 2023-11-16 DIAGNOSIS — E11.65 TYPE 2 DIABETES MELLITUS WITH HYPERGLYCEMIA, WITHOUT LONG-TERM CURRENT USE OF INSULIN (HCC): ICD-10-CM

## 2023-11-16 LAB
25(OH)D3 SERPL-MCNC: 65.7 NG/ML (ref 30–100)
ALBUMIN SERPL-MCNC: 3.6 G/DL (ref 3.2–4.6)
ALBUMIN/GLOB SERPL: 1.1 (ref 0.4–1.6)
ALP SERPL-CCNC: 59 U/L (ref 50–136)
ALT SERPL-CCNC: 10 U/L (ref 12–65)
ANION GAP SERPL CALC-SCNC: 5 MMOL/L (ref 2–11)
AST SERPL-CCNC: 11 U/L (ref 15–37)
BILIRUB SERPL-MCNC: 0.4 MG/DL (ref 0.2–1.1)
BUN SERPL-MCNC: 15 MG/DL (ref 8–23)
CALCIUM SERPL-MCNC: 8.9 MG/DL (ref 8.3–10.4)
CHLORIDE SERPL-SCNC: 100 MMOL/L (ref 101–110)
CO2 SERPL-SCNC: 34 MMOL/L (ref 21–32)
CREAT SERPL-MCNC: 1.8 MG/DL (ref 0.8–1.5)
GLOBULIN SER CALC-MCNC: 3.3 G/DL (ref 2.8–4.5)
GLUCOSE SERPL-MCNC: 133 MG/DL (ref 65–100)
HBA1C MFR BLD: 5.6 %
MAGNESIUM SERPL-MCNC: 1.8 MG/DL (ref 1.8–2.4)
POTASSIUM SERPL-SCNC: 3.5 MMOL/L (ref 3.5–5.1)
PROT SERPL-MCNC: 6.9 G/DL (ref 6.3–8.2)
SODIUM SERPL-SCNC: 139 MMOL/L (ref 133–143)
TSH W FREE THYROID IF ABNORMAL: 1.38 UIU/ML (ref 0.36–3.74)

## 2023-11-16 PROCEDURE — 1036F TOBACCO NON-USER: CPT | Performed by: PHYSICIAN ASSISTANT

## 2023-11-16 PROCEDURE — 83036 HEMOGLOBIN GLYCOSYLATED A1C: CPT | Performed by: PHYSICIAN ASSISTANT

## 2023-11-16 PROCEDURE — 3044F HG A1C LEVEL LT 7.0%: CPT | Performed by: PHYSICIAN ASSISTANT

## 2023-11-16 PROCEDURE — 2022F DILAT RTA XM EVC RTNOPTHY: CPT | Performed by: PHYSICIAN ASSISTANT

## 2023-11-16 PROCEDURE — G8427 DOCREV CUR MEDS BY ELIG CLIN: HCPCS | Performed by: PHYSICIAN ASSISTANT

## 2023-11-16 PROCEDURE — 3078F DIAST BP <80 MM HG: CPT | Performed by: PHYSICIAN ASSISTANT

## 2023-11-16 PROCEDURE — 3017F COLORECTAL CA SCREEN DOC REV: CPT | Performed by: PHYSICIAN ASSISTANT

## 2023-11-16 PROCEDURE — 3077F SYST BP >= 140 MM HG: CPT | Performed by: PHYSICIAN ASSISTANT

## 2023-11-16 PROCEDURE — G8417 CALC BMI ABV UP PARAM F/U: HCPCS | Performed by: PHYSICIAN ASSISTANT

## 2023-11-16 PROCEDURE — 99214 OFFICE O/P EST MOD 30 MIN: CPT | Performed by: PHYSICIAN ASSISTANT

## 2023-11-16 PROCEDURE — 1123F ACP DISCUSS/DSCN MKR DOCD: CPT | Performed by: PHYSICIAN ASSISTANT

## 2023-11-16 PROCEDURE — G8484 FLU IMMUNIZE NO ADMIN: HCPCS | Performed by: PHYSICIAN ASSISTANT

## 2023-11-16 ASSESSMENT — ENCOUNTER SYMPTOMS
CONSTIPATION: 0
DIARRHEA: 0

## 2023-11-18 LAB — FRUCTOSAMINE SERPL-SCNC: 246 UMOL/L (ref 0–285)

## 2023-12-19 NOTE — TELEPHONE ENCOUNTER
A My Chart message has been sent to the patient for PATIENT ROUNDING with [facility]    Pt's spouse Tc De Jesus  called and left vm. States pt is having increased dyspnea. Wants to talk about the scan that is scheduled 10-17-17. Pt had f/u with Dr. Ankur De La O on 8-10-17. Pt's spouse called Dr. Maggie Dudley with complaints of dyspnea and pt was prescribed Prednisone. Nurse navigator notifed by phone.     Deion Heller RN

## 2024-03-22 ENCOUNTER — OFFICE VISIT (OUTPATIENT)
Dept: ENDOCRINOLOGY | Age: 69
End: 2024-03-22
Payer: MEDICARE

## 2024-03-22 VITALS
DIASTOLIC BLOOD PRESSURE: 80 MMHG | BODY MASS INDEX: 31.24 KG/M2 | SYSTOLIC BLOOD PRESSURE: 120 MMHG | OXYGEN SATURATION: 98 % | WEIGHT: 224 LBS | HEART RATE: 57 BPM

## 2024-03-22 DIAGNOSIS — E55.9 VITAMIN D DEFICIENCY: ICD-10-CM

## 2024-03-22 DIAGNOSIS — E11.65 TYPE 2 DIABETES MELLITUS WITH HYPERGLYCEMIA, WITHOUT LONG-TERM CURRENT USE OF INSULIN (HCC): ICD-10-CM

## 2024-03-22 DIAGNOSIS — E03.9 PRIMARY HYPOTHYROIDISM: Primary | ICD-10-CM

## 2024-03-22 DIAGNOSIS — E11.29 DIABETES MELLITUS WITH MICROALBUMINURIA (HCC): ICD-10-CM

## 2024-03-22 DIAGNOSIS — R80.9 DIABETES MELLITUS WITH MICROALBUMINURIA (HCC): ICD-10-CM

## 2024-03-22 DIAGNOSIS — N18.31 STAGE 3A CHRONIC KIDNEY DISEASE (HCC): ICD-10-CM

## 2024-03-22 DIAGNOSIS — I10 PRIMARY HYPERTENSION: ICD-10-CM

## 2024-03-22 PROCEDURE — G8417 CALC BMI ABV UP PARAM F/U: HCPCS | Performed by: PHYSICIAN ASSISTANT

## 2024-03-22 PROCEDURE — 2022F DILAT RTA XM EVC RTNOPTHY: CPT | Performed by: PHYSICIAN ASSISTANT

## 2024-03-22 PROCEDURE — 1123F ACP DISCUSS/DSCN MKR DOCD: CPT | Performed by: PHYSICIAN ASSISTANT

## 2024-03-22 PROCEDURE — 3017F COLORECTAL CA SCREEN DOC REV: CPT | Performed by: PHYSICIAN ASSISTANT

## 2024-03-22 PROCEDURE — 3074F SYST BP LT 130 MM HG: CPT | Performed by: PHYSICIAN ASSISTANT

## 2024-03-22 PROCEDURE — G8427 DOCREV CUR MEDS BY ELIG CLIN: HCPCS | Performed by: PHYSICIAN ASSISTANT

## 2024-03-22 PROCEDURE — 3046F HEMOGLOBIN A1C LEVEL >9.0%: CPT | Performed by: PHYSICIAN ASSISTANT

## 2024-03-22 PROCEDURE — 1036F TOBACCO NON-USER: CPT | Performed by: PHYSICIAN ASSISTANT

## 2024-03-22 PROCEDURE — G8484 FLU IMMUNIZE NO ADMIN: HCPCS | Performed by: PHYSICIAN ASSISTANT

## 2024-03-22 PROCEDURE — 3079F DIAST BP 80-89 MM HG: CPT | Performed by: PHYSICIAN ASSISTANT

## 2024-03-22 PROCEDURE — G2211 COMPLEX E/M VISIT ADD ON: HCPCS | Performed by: PHYSICIAN ASSISTANT

## 2024-03-22 PROCEDURE — 99214 OFFICE O/P EST MOD 30 MIN: CPT | Performed by: PHYSICIAN ASSISTANT

## 2024-03-22 RX ORDER — LEVOTHYROXINE SODIUM 88 UG/1
88 TABLET ORAL DAILY
Qty: 90 TABLET | Refills: 3 | Status: SHIPPED | OUTPATIENT
Start: 2024-03-22

## 2024-03-22 ASSESSMENT — ENCOUNTER SYMPTOMS
CONSTIPATION: 0
DIARRHEA: 0

## 2024-03-22 NOTE — PROGRESS NOTES
[facility] ENDOCRINOLOGY   AND   THYROID NODULE CLINIC    Ranjana Dempsey PA-C  [facility] Endocrinology and Thyroid [facility]odule [facility]  [address] [place] [address], [facility] [address], [place] [address]  Phone [phone]  Facsimile [phone]          Clive Amezquita is a 68 y.o. male seen 3/22/2024 for follow up evaluation of type 2 diabetes and primary hypothyroidism         Assessment and Plan:    1. Primary hypothyroidism  Patient is biochemically euthyroid on 88 mcg of generic levothyroxine which is being taken correctly.   - levothyroxine (SYNTHROID) 88 MCG tablet; Take 1 tablet by mouth daily  Dispense: 90 tablet; Refill: 3    2. Type 2 diabetes mellitus with hyperglycemia, without long-term current use of insulin (HCC)  Pt did not start Farxiga as Rx'd. Reports now it was cost prohibitive. Did not notify office. Although is prescribed twice daily metformin, only taking once daily. Does not monitor glucose. Not open to diabetes education.     Urged to cut out high calorie beverages, drinking pepsi \"all day every day\"    At today's visit we discussed sequela associated with uncontrolled diabetes including increased risk of stroke, heart attack, kidney failure, amputation, retinopathy, neuropathy, and nephropathy.  Patient was strongly encouraged to comply with treatment regimen as well as dietary and exercise recommendations to aid in control of this chronic disease to help prevent complications associated with uncontrolled diabetes.  -  DIABETES FOOT EXAM    3. Diabetes mellitus with microalbuminuria (HCC)  Pt did not start Farxiga as Rx'd. Reports now it was cost prohibitive. Did not notify office. On losartan combo. Was offered pt assistance paperwork    4. Primary hypertension  stable  BP Readings from Last 3 Encounters:   03/22/24 120/80   11/16/23 (!) 144/78   07/13/23 122/76       5. Vitamin D deficiency  Off D3 due to overtreatment    6. Stage 3a chronic kidney disease (HCC)  Pt did not start Farxiga as

## 2024-11-01 NOTE — PROGRESS NOTES
Medication history reviewed with PT at bedside    [facility] rec is complete per PT reporting    Allergies reviewed.     Patient denies any outpatient antibiotics in the last 30 days.     Patient is not taking anticoagulants.    Preferred pharmacy for this visit - [facility] in [place] ([phone])                 PLAN:  Care management per primary  Continue CT to suction    ASSESSMENT:  Admit Date: 12/10/2020     Principal Problem:    Hemothorax on left (12/10/2020)    Active Problems:    CKD (chronic kidney disease) (1/10/2017)      HTN (hypertension) (1/24/2017)      Overview: Last Assessment & Plan:       Inadequate control. 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 Tachy in the 100s. CT with 550mL/24h serosang. output. Intake/Output Summary (Last 24 hours) at 12/14/2020 0846  Last data filed at 12/14/2020 0542  Gross per 24 hour   Intake    Output 950 ml   Net -950 ml     OBJECTIVE:  Constitutional: Alert oriented cooperative patient in no acute distress; appears stated age   Visit Vitals  /62   Pulse 100   Temp 98 °F (36.7 °C)   Resp 18   Ht 5' 11\" (1.803 m)   Wt 255 lb 11.7 oz (116 kg)   SpO2 90%   BMI 35.67 kg/m²     Eyes:Sclera are clear. ENMT: no external lesions gross hearing normal; no obvious neck masses, no ear or lip lesions  CV: RRR. Normal perfusion  Resp: No JVD. Breathing is  non-labored; no audible wheezing. Left CT to suction; no air leak, dressing c/d/i   GI: soft and non-distended     Musculoskeletal: unremarkable with normal function. No embolic signs or cyanosis.    Neuro:  Oriented; moves all 4; no focal deficits  Psychiatric: normal affect and mood, no memory impairment      Patient Vitals for the past 24 hrs:   BP Temp Pulse Resp SpO2 Weight   12/14/20 0721 117/62 98 °F (36.7 °C) 100 18 90 %    12/14/20 0700 117/62  96 21 98 %    12/14/20 0600 117/63  (!) 101 21 96 %    12/14/20 0542      255 lb 11.7 oz (116 kg)   12/14/20 0500 117/61  96 16 96 %    12/14/20 0400 (!) 112/52  (!) 103 21 95 %    12/14/20 0300 120/61 99 °F (37.2 °C) 95 15 97 %    12/14/20 0200 (!) 112/53  98 30 96 %    12/14/20 0100 106/64  96 28 99 %    12/14/20 0034 (!) 114/58  96 16 (!) 82 %    12/14/20 0000   95 18 (!) 88 %    12/13/20 2300   93 18 97 %    12/13/20 2224 111/60 100 °F (37.8 °C) 92 16 96 %    12/13/20 2200   92 23 96 %    12/13/20 2113     97 %    12/13/20 2100   92 19 98 %    12/13/20 2000   91 19 97 %    12/13/20 1930 (!) 140/62 98.3 °F (36.8 °C) 90 24 100 %    12/13/20 1600 123/67 99.9 °F (37.7 °C) 89 14 100 %    12/13/20 1500 (!) 114/57  89 24 96 %    12/13/20 1400 (!) 136/58  93 15 99 %    12/13/20 1300 136/63  92 16 98 %    12/13/20 1200 116/64  86 13 97 %    12/13/20 1126 115/61 99.7 °F (37.6 °C) 92 15 93 %    12/13/20 1100 115/61  90 16 (!) 87 %    12/13/20 1023     91 %    12/13/20 1010 104/60  92 22 97 %    12/13/20 0900 (!) 97/58  93 19 96 %      Labs:    Recent Labs     12/14/20  0819 12/13/20  0311   WBC 9.2 9.3   HGB 6.6* 7.7*    205   NA  --  134*   K  --  4.3   CL  --  100   CO2  --  29   BUN  --  34*   CREA  --  1.61*   GLU  --  156*       Signed:  Augustina Styles NP

## 2025-02-05 ENCOUNTER — OFFICE VISIT (OUTPATIENT)
Dept: ENDOCRINOLOGY | Age: 70
End: 2025-02-05
Payer: MEDICARE

## 2025-02-05 ENCOUNTER — TELEPHONE (OUTPATIENT)
Dept: ENDOCRINOLOGY | Age: 70
End: 2025-02-05

## 2025-02-05 VITALS
SYSTOLIC BLOOD PRESSURE: 124 MMHG | HEART RATE: 83 BPM | HEIGHT: 71 IN | OXYGEN SATURATION: 98 % | WEIGHT: 235.2 LBS | DIASTOLIC BLOOD PRESSURE: 80 MMHG | BODY MASS INDEX: 32.93 KG/M2

## 2025-02-05 DIAGNOSIS — E03.9 PRIMARY HYPOTHYROIDISM: Primary | ICD-10-CM

## 2025-02-05 DIAGNOSIS — E11.29 DIABETES MELLITUS WITH MICROALBUMINURIA (HCC): ICD-10-CM

## 2025-02-05 DIAGNOSIS — E03.9 PRIMARY HYPOTHYROIDISM: ICD-10-CM

## 2025-02-05 DIAGNOSIS — N18.31 STAGE 3A CHRONIC KIDNEY DISEASE (HCC): ICD-10-CM

## 2025-02-05 DIAGNOSIS — R80.9 DIABETES MELLITUS WITH MICROALBUMINURIA (HCC): ICD-10-CM

## 2025-02-05 DIAGNOSIS — I10 PRIMARY HYPERTENSION: ICD-10-CM

## 2025-02-05 DIAGNOSIS — E11.65 TYPE 2 DIABETES MELLITUS WITH HYPERGLYCEMIA, WITHOUT LONG-TERM CURRENT USE OF INSULIN (HCC): ICD-10-CM

## 2025-02-05 DIAGNOSIS — E55.9 VITAMIN D DEFICIENCY: ICD-10-CM

## 2025-02-05 LAB
HBA1C MFR BLD: 5.1 %
T4 FREE SERPL-MCNC: 1.3 NG/DL (ref 0.9–1.7)
TSH W FREE THYROID IF ABNORMAL: 6.55 UIU/ML (ref 0.27–4.2)

## 2025-02-05 PROCEDURE — G8427 DOCREV CUR MEDS BY ELIG CLIN: HCPCS | Performed by: PHYSICIAN ASSISTANT

## 2025-02-05 PROCEDURE — 1160F RVW MEDS BY RX/DR IN RCRD: CPT | Performed by: PHYSICIAN ASSISTANT

## 2025-02-05 PROCEDURE — 83036 HEMOGLOBIN GLYCOSYLATED A1C: CPT | Performed by: PHYSICIAN ASSISTANT

## 2025-02-05 PROCEDURE — 1123F ACP DISCUSS/DSCN MKR DOCD: CPT | Performed by: PHYSICIAN ASSISTANT

## 2025-02-05 PROCEDURE — 1036F TOBACCO NON-USER: CPT | Performed by: PHYSICIAN ASSISTANT

## 2025-02-05 PROCEDURE — 2022F DILAT RTA XM EVC RTNOPTHY: CPT | Performed by: PHYSICIAN ASSISTANT

## 2025-02-05 PROCEDURE — 3074F SYST BP LT 130 MM HG: CPT | Performed by: PHYSICIAN ASSISTANT

## 2025-02-05 PROCEDURE — 99214 OFFICE O/P EST MOD 30 MIN: CPT | Performed by: PHYSICIAN ASSISTANT

## 2025-02-05 PROCEDURE — G8417 CALC BMI ABV UP PARAM F/U: HCPCS | Performed by: PHYSICIAN ASSISTANT

## 2025-02-05 PROCEDURE — 1159F MED LIST DOCD IN RCRD: CPT | Performed by: PHYSICIAN ASSISTANT

## 2025-02-05 PROCEDURE — 3017F COLORECTAL CA SCREEN DOC REV: CPT | Performed by: PHYSICIAN ASSISTANT

## 2025-02-05 PROCEDURE — G2211 COMPLEX E/M VISIT ADD ON: HCPCS | Performed by: PHYSICIAN ASSISTANT

## 2025-02-05 PROCEDURE — 3079F DIAST BP 80-89 MM HG: CPT | Performed by: PHYSICIAN ASSISTANT

## 2025-02-05 PROCEDURE — 3046F HEMOGLOBIN A1C LEVEL >9.0%: CPT | Performed by: PHYSICIAN ASSISTANT

## 2025-02-05 ASSESSMENT — ENCOUNTER SYMPTOMS
CONSTIPATION: 0
DIARRHEA: 0

## 2025-02-05 NOTE — PROGRESS NOTES
[facility] ENDOCRINOLOGY   AND   THYROID NODULE CLINIC    Ranjana Dempsey PA-C  [facility] Endocrinology and Thyroid Nodule Clinic  [address] [place] [address], [address] [address]  Phone [phone]  Facsimile [phone]          Clive Amezquita is a 69 y.o. male seen 2/5/2025 for follow up evaluation of type 2 diabetes and primary hypothyroidism         Assessment and Plan:    Assessment & Plan      1. Primary hypothyroidism  - Verify levothyroxine dosage at home and inform the clinic if it differs from 88 mcg.  - urged to take with water, not pepsi  -update labs today, titrate to normal TFTs. If undertreated, consider how it is being taken before adjusting dose   - TSH reflex to FT4; Future    2. Type 2 diabetes mellitus with hyperglycemia, without long-term current use of insulin (HCC)  Although is prescribed twice daily metformin, only taking once daily. Does not monitor glucose. Not open to diabetes education.     Urged to cut out high calorie beverages, drinking pepsi \"all day every day\"  Stable. A1c 5.1.  - Continue metformin 500 mg once daily.  - Continue cinnamon pills with meals.  - Monitor blood glucose regularly.  - Reduce high-calorie beverages.    - AMB POC HEMOGLOBIN A1C  - HM DIABETES FOOT EXAM    3. Diabetes mellitus with microalbuminuria (HCC)  Pt did not start Farxiga as Rx'd. On ARB combo    4. Primary hypertension  Stable  BP Readings from Last 3 Encounters:   02/05/25 124/80   03/22/24 120/80   11/16/23 (!) 144/78         5. Vitamin D deficiency  Off D3 due to overtreatment    6. Stage 3a chronic kidney disease (HCC)  Pt did not start Farxiga as Rx'd. On ARB combo          Clive was seen today for follow-up.    Diagnoses and all orders for this visit:    Primary hypothyroidism  -     TSH reflex to FT4; Future    Type 2 diabetes mellitus with hyperglycemia, without long-term current use of insulin (HCC)  -     AMB POC HEMOGLOBIN A1C  -     HM DIABETES FOOT EXAM    Diabetes mellitus

## 2025-02-05 NOTE — TELEPHONE ENCOUNTER
Please call patient and document. Please let his know his thyroid level is slightly abnormal, most likely because he is taking his medication with pepsi. Please have him take the medication with WATER only and repeat labs in 8 weeks

## 2025-03-13 DIAGNOSIS — E03.9 PRIMARY HYPOTHYROIDISM: ICD-10-CM

## 2025-03-17 RX ORDER — LEVOTHYROXINE SODIUM 88 UG/1
88 TABLET ORAL DAILY
Qty: 90 TABLET | Refills: 1 | Status: SHIPPED | OUTPATIENT
Start: 2025-03-17

## 2025-04-02 ENCOUNTER — APPOINTMENT (OUTPATIENT)
Dept: NUCLEAR MEDICINE | Age: 70
End: 2025-04-02
Payer: MEDICARE

## 2025-04-02 ENCOUNTER — HOSPITAL ENCOUNTER (EMERGENCY)
Age: 70
Discharge: HOME | End: 2025-04-02
Attending: EMERGENCY MEDICINE
Payer: MEDICARE

## 2025-04-02 VITALS
BODY MASS INDEX: 34.02 KG/M2 | WEIGHT: 243 LBS | HEART RATE: 73 BPM | DIASTOLIC BLOOD PRESSURE: 82 MMHG | HEIGHT: 71 IN | OXYGEN SATURATION: 95 % | TEMPERATURE: 97.7 F | SYSTOLIC BLOOD PRESSURE: 140 MMHG | RESPIRATION RATE: 18 BRPM

## 2025-04-02 DIAGNOSIS — D64.9 CHRONIC ANEMIA: ICD-10-CM

## 2025-04-02 DIAGNOSIS — R06.00 DYSPNEA, UNSPECIFIED TYPE: Primary | ICD-10-CM

## 2025-04-02 DIAGNOSIS — N18.9 CHRONIC KIDNEY DISEASE, UNSPECIFIED CKD STAGE: ICD-10-CM

## 2025-04-02 LAB
ALBUMIN SERPL-MCNC: 3.7 G/DL (ref 3.2–4.6)
ALBUMIN/GLOB SERPL: 1.1 (ref 1–1.9)
ALP SERPL-CCNC: 38 U/L (ref 40–129)
ALT SERPL-CCNC: 14 U/L (ref 8–55)
ANION GAP SERPL CALC-SCNC: 7 MMOL/L (ref 7–16)
AST SERPL-CCNC: 20 U/L (ref 15–37)
BASOPHILS # BLD: 0.03 K/UL (ref 0–0.2)
BASOPHILS NFR BLD: 0.5 % (ref 0–2)
BILIRUB SERPL-MCNC: 0.5 MG/DL (ref 0–1.2)
BUN SERPL-MCNC: 27 MG/DL (ref 8–23)
CALCIUM SERPL-MCNC: 9.1 MG/DL (ref 8.8–10.2)
CHLORIDE SERPL-SCNC: 97 MMOL/L (ref 98–107)
CO2 SERPL-SCNC: 31 MMOL/L (ref 20–29)
CREAT SERPL-MCNC: 2.33 MG/DL (ref 0.8–1.3)
DIFFERENTIAL METHOD BLD: ABNORMAL
EOSINOPHIL # BLD: 0.1 K/UL (ref 0–0.8)
EOSINOPHIL NFR BLD: 1.6 % (ref 0.5–7.8)
ERYTHROCYTE [DISTWIDTH] IN BLOOD BY AUTOMATED COUNT: 13.8 % (ref 11.9–14.6)
GLOBULIN SER CALC-MCNC: 3.4 G/DL (ref 2.3–3.5)
GLUCOSE SERPL-MCNC: 134 MG/DL (ref 70–99)
HCT VFR BLD AUTO: 31.5 % (ref 41.1–50.3)
HGB BLD-MCNC: 9.7 G/DL (ref 13.6–17.2)
IMM GRANULOCYTES # BLD AUTO: 0.02 K/UL (ref 0–0.5)
IMM GRANULOCYTES NFR BLD AUTO: 0.3 % (ref 0–5)
INR PPP: 1
LYMPHOCYTES # BLD: 0.81 K/UL (ref 0.5–4.6)
LYMPHOCYTES NFR BLD: 13.2 % (ref 13–44)
MCH RBC QN AUTO: 24.4 PG (ref 26.1–32.9)
MCHC RBC AUTO-ENTMCNC: 30.8 G/DL (ref 31.4–35)
MCV RBC AUTO: 79.1 FL (ref 82–102)
MONOCYTES # BLD: 0.39 K/UL (ref 0.1–1.3)
MONOCYTES NFR BLD: 6.4 % (ref 4–12)
NEUTS SEG # BLD: 4.79 K/UL (ref 1.7–8.2)
NEUTS SEG NFR BLD: 78 % (ref 43–78)
NRBC # BLD: 0 K/UL (ref 0–0.2)
NT PRO BNP: 256 PG/ML (ref 0–125)
PLATELET # BLD AUTO: 188 K/UL (ref 150–450)
PMV BLD AUTO: 8.4 FL (ref 9.4–12.3)
POTASSIUM SERPL-SCNC: 4.6 MMOL/L (ref 3.5–5.1)
PROT SERPL-MCNC: 7 G/DL (ref 6.3–8.2)
PROTHROMBIN TIME: 13.7 SEC (ref 11.3–14.9)
RBC # BLD AUTO: 3.98 M/UL (ref 4.23–5.6)
SODIUM SERPL-SCNC: 135 MMOL/L (ref 136–145)
WBC # BLD AUTO: 6.1 K/UL (ref 4.3–11.1)

## 2025-04-02 PROCEDURE — 83880 ASSAY OF NATRIURETIC PEPTIDE: CPT

## 2025-04-02 PROCEDURE — 2500000003 HC RX 250 WO HCPCS: Performed by: EMERGENCY MEDICINE

## 2025-04-02 PROCEDURE — 85025 COMPLETE CBC W/AUTO DIFF WBC: CPT

## 2025-04-02 PROCEDURE — A9540 TC99M MAA: HCPCS | Performed by: EMERGENCY MEDICINE

## 2025-04-02 PROCEDURE — 80053 COMPREHEN METABOLIC PANEL: CPT

## 2025-04-02 PROCEDURE — 3430000000 HC RX DIAGNOSTIC RADIOPHARMACEUTICAL: Performed by: EMERGENCY MEDICINE

## 2025-04-02 PROCEDURE — 96374 THER/PROPH/DIAG INJ IV PUSH: CPT

## 2025-04-02 PROCEDURE — A9539 TC99M PENTETATE: HCPCS | Performed by: EMERGENCY MEDICINE

## 2025-04-02 PROCEDURE — 85610 PROTHROMBIN TIME: CPT

## 2025-04-02 PROCEDURE — 78582 LUNG VENTILAT&PERFUS IMAGING: CPT

## 2025-04-02 PROCEDURE — 99284 EMERGENCY DEPT VISIT MOD MDM: CPT

## 2025-04-02 RX ORDER — KIT FOR THE PREPARATION OF TECHNETIUM TC 99M PENTETATE 20 MG/1
35 INJECTION, POWDER, LYOPHILIZED, FOR SOLUTION INTRAVENOUS; RESPIRATORY (INHALATION)
Status: COMPLETED | OUTPATIENT
Start: 2025-04-02 | End: 2025-04-02

## 2025-04-02 RX ORDER — SODIUM CHLORIDE 0.9 % (FLUSH) 0.9 %
20 SYRINGE (ML) INJECTION AS NEEDED
Status: DISCONTINUED | OUTPATIENT
Start: 2025-04-02 | End: 2025-04-03 | Stop reason: HOSPADM

## 2025-04-02 RX ADMIN — SODIUM CHLORIDE, PRESERVATIVE FREE 20 ML: 5 INJECTION INTRAVENOUS at 21:40

## 2025-04-02 RX ADMIN — KIT FOR THE PREPARATION OF TECHNETIUM TC 99M ALBUMIN AGGREGATED 5 MILLICURIE: 2.5 INJECTION, POWDER, FOR SOLUTION INTRAVENOUS at 21:40

## 2025-04-02 RX ADMIN — KIT FOR THE PREPARATION OF TECHNETIUM TC 99M PENTETATE 35 MILLICURIE: 20 INJECTION, POWDER, LYOPHILIZED, FOR SOLUTION INTRAVENOUS; RESPIRATORY (INHALATION) at 21:00

## 2025-04-02 ASSESSMENT — ENCOUNTER SYMPTOMS
SHORTNESS OF BREATH: 1
COLOR CHANGE: 0
BACK PAIN: 0
CHEST TIGHTNESS: 0
VOICE CHANGE: 0
COUGH: 1
TROUBLE SWALLOWING: 0
VOMITING: 0
ABDOMINAL PAIN: 0
EYE REDNESS: 0

## 2025-04-02 ASSESSMENT — PAIN - FUNCTIONAL ASSESSMENT: PAIN_FUNCTIONAL_ASSESSMENT: NONE - DENIES PAIN

## 2025-04-02 ASSESSMENT — LIFESTYLE VARIABLES
HOW MANY STANDARD DRINKS CONTAINING ALCOHOL DO YOU HAVE ON A TYPICAL DAY: PATIENT DOES NOT DRINK
HOW OFTEN DO YOU HAVE A DRINK CONTAINING ALCOHOL: NEVER

## 2025-04-02 NOTE — ED TRIAGE NOTES
Pt presents ambulatory to triage with complaint of elevated D Dimer.  Pt sent by pcp to rule out blood clot.  Pt coughing in triage, which he states is chronic.    Pt O2 saturation is 76% on RA in triage.  Pt notes decreased appetite but increased weight.  No swelling to legs noted in triage.

## 2025-04-02 NOTE — ED PROVIDER NOTES
SpO2: 97% 97% 98% 95%   Weight:       Height:          Physical Exam  Vitals and nursing note reviewed.   Constitutional:       Appearance: Normal appearance.   HENT:      Head: Normocephalic and atraumatic.      Right Ear: External ear normal.      Left Ear: External ear normal.      Mouth/Throat:      Mouth: Mucous membranes are dry.   Eyes:      Pupils: Pupils are equal, round, and reactive to light.   Cardiovascular:      Rate and Rhythm: Normal rate and regular rhythm.      Pulses: Normal pulses.      Heart sounds: Normal heart sounds.   Pulmonary:      Effort: Pulmonary effort is normal.      Breath sounds: Normal breath sounds.   Abdominal:      General: Abdomen is flat. There is no distension.      Palpations: Abdomen is soft. There is no mass.   Musculoskeletal:         General: No swelling or tenderness.      Cervical back: Normal range of motion and neck supple.   Skin:     Coloration: Skin is not jaundiced or pale.      Findings: No bruising or erythema.   Neurological:      General: No focal deficit present.      Mental Status: He is alert and oriented to person, place, and time.      Cranial Nerves: No cranial nerve deficit.      Sensory: No sensory deficit.   Psychiatric:         Mood and Affect: Mood normal.         Behavior: Behavior normal.        Procedures     Procedures    Orders placed during this emergency department visit:     Orders Placed This Encounter   Procedures    NM LUNG VENT/PERFUSION (VQ)    CBC with Auto Differential    CMP    Protime-INR    Brain Natriuretic Peptide    Critical [facility] hospital Nephrology        Medications given during this emergency department visit:     Medications   sodium chloride flush 0.9 % injection 20 mL (20 mLs IntraVENous Given 4/2/25 2140)   technetium 99m DTPA solution 35 millicurie (35 millicuries Inhalation Given 4/2/25 2100)   technetium albumin aggregated (MAA) solution 5 millicurie (5 millicuries IntraVENous Given 4/2/25 2140)       New prescriptions:  Protime 13.7 11.3 - 14.9 sec    INR 1.0     Brain Natriuretic Peptide   Result Value Ref Range    NT Pro- (H) 0 - 125 PG/ML         NM LUNG VENT/PERFUSION (VQ)   Final Result   Low probability for pulmonary embolism. If continued clinical   concern, consider further evaluation with CTA chest pulmonary embolism protocol.      Electronically signed by Pascual Fonseca                   No results for input(s): \"COVID19\" in the last 72 hours.     Voice dictation software was used during the making of this note.  This software is not perfect and grammatical and other typographical errors may be present.  This note has not been completely proofread for errors.     Brad Sosa MD  04/02/25 1911

## 2025-04-03 NOTE — DISCHARGE INSTRUCTIONS
Be sure to use your oxygen at home  Follow-up with your pulmonology  Follow-up with your primary care provider  I have placed referral for nephrology  Return to the ER for any new, worsening or life-threatening symptoms

## 2025-07-25 NOTE — PROGRESS NOTES
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 Pt saw Dr. Margot Nguyễn on 2/15/18 and had the tumor debulked. Pt has audible stridor in his throat. Pt stated he is still feeling tired and that Corrie Gentle did not help. His CT Neck on 2/1/18 indicated not much change. Pt will return in 3 months with a PET scan prior. Reason For Visit:   Chief Complaint   Patient presents with    Surgical Followup     DOS 7/11/25 S/P JEB DAA        There were no vitals taken for this visit.    Pain Assessment  Patient Currently in Pain: Yes (3/10)    Michelle Geiger LPN

## 2025-08-07 ENCOUNTER — OFFICE VISIT (OUTPATIENT)
Dept: ENDOCRINOLOGY | Age: 70
End: 2025-08-07
Payer: MEDICARE

## 2025-08-07 VITALS
BODY MASS INDEX: 35.53 KG/M2 | SYSTOLIC BLOOD PRESSURE: 134 MMHG | HEART RATE: 81 BPM | HEIGHT: 70 IN | DIASTOLIC BLOOD PRESSURE: 86 MMHG | WEIGHT: 248.2 LBS | OXYGEN SATURATION: 96 %

## 2025-08-07 DIAGNOSIS — E11.29 DIABETES MELLITUS WITH MICROALBUMINURIA (HCC): ICD-10-CM

## 2025-08-07 DIAGNOSIS — E55.9 VITAMIN D DEFICIENCY: ICD-10-CM

## 2025-08-07 DIAGNOSIS — I10 PRIMARY HYPERTENSION: ICD-10-CM

## 2025-08-07 DIAGNOSIS — I25.10 CORONARY ARTERY CALCIFICATION SEEN ON CT SCAN: ICD-10-CM

## 2025-08-07 DIAGNOSIS — E03.9 PRIMARY HYPOTHYROIDISM: ICD-10-CM

## 2025-08-07 DIAGNOSIS — E03.9 PRIMARY HYPOTHYROIDISM: Primary | ICD-10-CM

## 2025-08-07 DIAGNOSIS — E11.65 TYPE 2 DIABETES MELLITUS WITH HYPERGLYCEMIA, WITHOUT LONG-TERM CURRENT USE OF INSULIN (HCC): ICD-10-CM

## 2025-08-07 DIAGNOSIS — R80.9 DIABETES MELLITUS WITH MICROALBUMINURIA (HCC): ICD-10-CM

## 2025-08-07 DIAGNOSIS — N18.31 STAGE 3A CHRONIC KIDNEY DISEASE (HCC): ICD-10-CM

## 2025-08-07 LAB
25(OH)D3 SERPL-MCNC: 48.7 NG/ML (ref 30–100)
HBA1C MFR BLD: 6.1 %
T4 FREE SERPL-MCNC: 1 NG/DL (ref 0.9–1.7)
TSH, 3RD GENERATION: 10.3 UIU/ML (ref 0.27–4.2)

## 2025-08-07 PROCEDURE — 83036 HEMOGLOBIN GLYCOSYLATED A1C: CPT | Performed by: PHYSICIAN ASSISTANT

## 2025-08-07 PROCEDURE — G8427 DOCREV CUR MEDS BY ELIG CLIN: HCPCS | Performed by: PHYSICIAN ASSISTANT

## 2025-08-07 PROCEDURE — G8417 CALC BMI ABV UP PARAM F/U: HCPCS | Performed by: PHYSICIAN ASSISTANT

## 2025-08-07 PROCEDURE — 1160F RVW MEDS BY RX/DR IN RCRD: CPT | Performed by: PHYSICIAN ASSISTANT

## 2025-08-07 PROCEDURE — 1036F TOBACCO NON-USER: CPT | Performed by: PHYSICIAN ASSISTANT

## 2025-08-07 PROCEDURE — 2022F DILAT RTA XM EVC RTNOPTHY: CPT | Performed by: PHYSICIAN ASSISTANT

## 2025-08-07 PROCEDURE — 3075F SYST BP GE 130 - 139MM HG: CPT | Performed by: PHYSICIAN ASSISTANT

## 2025-08-07 PROCEDURE — 3046F HEMOGLOBIN A1C LEVEL >9.0%: CPT | Performed by: PHYSICIAN ASSISTANT

## 2025-08-07 PROCEDURE — G2211 COMPLEX E/M VISIT ADD ON: HCPCS | Performed by: PHYSICIAN ASSISTANT

## 2025-08-07 PROCEDURE — 99214 OFFICE O/P EST MOD 30 MIN: CPT | Performed by: PHYSICIAN ASSISTANT

## 2025-08-07 PROCEDURE — 3017F COLORECTAL CA SCREEN DOC REV: CPT | Performed by: PHYSICIAN ASSISTANT

## 2025-08-07 PROCEDURE — 1159F MED LIST DOCD IN RCRD: CPT | Performed by: PHYSICIAN ASSISTANT

## 2025-08-07 PROCEDURE — 3079F DIAST BP 80-89 MM HG: CPT | Performed by: PHYSICIAN ASSISTANT

## 2025-08-07 PROCEDURE — 1123F ACP DISCUSS/DSCN MKR DOCD: CPT | Performed by: PHYSICIAN ASSISTANT

## 2025-08-07 PROCEDURE — 3044F HG A1C LEVEL LT 7.0%: CPT | Performed by: PHYSICIAN ASSISTANT

## 2025-08-07 RX ORDER — EMPAGLIFLOZIN 10 MG/1
10 TABLET, FILM COATED ORAL DAILY
Qty: 90 TABLET | Refills: 3 | Status: ON HOLD | OUTPATIENT
Start: 2025-08-07

## 2025-08-07 RX ORDER — ROSUVASTATIN CALCIUM 20 MG/1
20 TABLET, COATED ORAL DAILY
Qty: 90 TABLET | Refills: 1 | Status: ON HOLD | OUTPATIENT
Start: 2025-08-07

## 2025-08-07 ASSESSMENT — ENCOUNTER SYMPTOMS
DIARRHEA: 0
CONSTIPATION: 0

## 2025-08-09 ENCOUNTER — HOSPITAL ENCOUNTER (INPATIENT)
Age: 70
LOS: 3 days | Discharge: HOME HEALTH | DRG: 871 | End: 2025-08-12
Attending: EMERGENCY MEDICINE | Admitting: FAMILY MEDICINE
Payer: MEDICARE

## 2025-08-09 ENCOUNTER — APPOINTMENT (OUTPATIENT)
Dept: GENERAL RADIOLOGY | Age: 70
DRG: 871 | End: 2025-08-09
Payer: MEDICARE

## 2025-08-09 ENCOUNTER — APPOINTMENT (OUTPATIENT)
Dept: CT IMAGING | Age: 70
DRG: 871 | End: 2025-08-09
Payer: MEDICARE

## 2025-08-09 DIAGNOSIS — C09.9 TONSIL CANCER (HCC): ICD-10-CM

## 2025-08-09 DIAGNOSIS — R91.8 BILATERAL PULMONARY INFILTRATES: ICD-10-CM

## 2025-08-09 DIAGNOSIS — R09.02 HYPOXEMIA: ICD-10-CM

## 2025-08-09 DIAGNOSIS — J39.8 TRACHEAL STENOSIS: ICD-10-CM

## 2025-08-09 DIAGNOSIS — D50.9 MICROCYTIC ANEMIA: ICD-10-CM

## 2025-08-09 DIAGNOSIS — R06.02 SHORTNESS OF BREATH: ICD-10-CM

## 2025-08-09 DIAGNOSIS — R65.20 SEVERE SEPSIS (HCC): ICD-10-CM

## 2025-08-09 DIAGNOSIS — A41.9 SEVERE SEPSIS (HCC): ICD-10-CM

## 2025-08-09 DIAGNOSIS — C32.9 MALIGNANT NEOPLASM OF LARYNX (HCC): Primary | ICD-10-CM

## 2025-08-09 DIAGNOSIS — N17.9 ACUTE KIDNEY INJURY: ICD-10-CM

## 2025-08-09 PROBLEM — R79.89 ELEVATED TROPONIN: Status: ACTIVE | Noted: 2025-08-09

## 2025-08-09 PROBLEM — J18.9 BILATERAL PNEUMONIA: Status: ACTIVE | Noted: 2025-08-09

## 2025-08-09 PROBLEM — E87.20 LACTIC ACIDOSIS: Status: ACTIVE | Noted: 2025-08-09

## 2025-08-09 PROBLEM — R65.21 SEPTIC SHOCK (HCC): Status: ACTIVE | Noted: 2025-08-09

## 2025-08-09 PROBLEM — C76.0 HEAD AND NECK CANCER (HCC): Status: RESOLVED | Noted: 2017-01-24 | Resolved: 2025-08-09

## 2025-08-09 PROBLEM — E87.1 HYPONATREMIA: Status: ACTIVE | Noted: 2025-08-09

## 2025-08-09 PROBLEM — J96.01 ACUTE RESPIRATORY FAILURE WITH HYPOXIA (HCC): Status: ACTIVE | Noted: 2025-08-09

## 2025-08-09 LAB
ALBUMIN SERPL-MCNC: 3.7 G/DL (ref 3.2–4.6)
ALBUMIN/GLOB SERPL: 0.9 (ref 1–1.9)
ALP SERPL-CCNC: 70 U/L (ref 40–129)
ALT SERPL-CCNC: 206 U/L (ref 8–55)
ANION GAP SERPL CALC-SCNC: 15 MMOL/L (ref 7–16)
AST SERPL-CCNC: 247 U/L (ref 15–37)
BASE EXCESS BLDV CALC-SCNC: 5.8 MMOL/L
BASOPHILS # BLD: 0.01 K/UL (ref 0–0.2)
BASOPHILS NFR BLD: 0.1 % (ref 0–2)
BILIRUB SERPL-MCNC: 0.6 MG/DL (ref 0–1.2)
BUN SERPL-MCNC: 47 MG/DL (ref 8–23)
CALCIUM SERPL-MCNC: 8.8 MG/DL (ref 8.8–10.2)
CHLORIDE SERPL-SCNC: 90 MMOL/L (ref 98–107)
CO2 SERPL-SCNC: 26 MMOL/L (ref 20–29)
CREAT SERPL-MCNC: 3.6 MG/DL (ref 0.8–1.3)
DIFFERENTIAL METHOD BLD: ABNORMAL
EOSINOPHIL # BLD: 0.01 K/UL (ref 0–0.8)
EOSINOPHIL NFR BLD: 0.1 % (ref 0.5–7.8)
ERYTHROCYTE [DISTWIDTH] IN BLOOD BY AUTOMATED COUNT: 15.5 % (ref 11.9–14.6)
GAS FLOW.O2 O2 DELIVERY SYS: ABNORMAL
GLOBULIN SER CALC-MCNC: 3.9 G/DL (ref 2.3–3.5)
GLUCOSE SERPL-MCNC: 120 MG/DL (ref 70–99)
HCO3 BLDV-SCNC: 31.3 MMOL/L (ref 22–29)
HCT VFR BLD AUTO: 29.4 % (ref 41.1–50.3)
HGB BLD-MCNC: 8.9 G/DL (ref 13.6–17.2)
IMM GRANULOCYTES # BLD AUTO: 0.03 K/UL (ref 0–0.5)
IMM GRANULOCYTES NFR BLD AUTO: 0.3 % (ref 0–5)
LACTATE SERPL-SCNC: 2.4 MMOL/L (ref 0.5–2)
LACTATE SERPL-SCNC: 2.4 MMOL/L (ref 0.5–2)
LYMPHOCYTES # BLD: 0.46 K/UL (ref 0.5–4.6)
LYMPHOCYTES NFR BLD: 4.2 % (ref 13–44)
MAGNESIUM SERPL-MCNC: 1.8 MG/DL (ref 1.8–2.4)
MCH RBC QN AUTO: 23.7 PG (ref 26.1–32.9)
MCHC RBC AUTO-ENTMCNC: 30.3 G/DL (ref 31.4–35)
MCV RBC AUTO: 78.2 FL (ref 82–102)
MONOCYTES # BLD: 0.47 K/UL (ref 0.1–1.3)
MONOCYTES NFR BLD: 4.3 % (ref 4–12)
NEUTS SEG # BLD: 9.99 K/UL (ref 1.7–8.2)
NEUTS SEG NFR BLD: 91 % (ref 43–78)
NRBC # BLD: 0 K/UL (ref 0–0.2)
NT PRO BNP: 5766 PG/ML (ref 0–125)
PCO2 BLDV: 48.9 MMHG (ref 41–51)
PH BLDV: 7.42 (ref 7.32–7.42)
PLATELET # BLD AUTO: 248 K/UL (ref 150–450)
PMV BLD AUTO: 8.9 FL (ref 9.4–12.3)
PO2 BLDV: 26 MMHG
POC FIO2: 10
POTASSIUM SERPL-SCNC: 4.7 MMOL/L (ref 3.5–5.1)
PROCALCITONIN SERPL-MCNC: 1.58 NG/ML (ref 0–0.1)
PROT SERPL-MCNC: 7.6 G/DL (ref 6.3–8.2)
RBC # BLD AUTO: 3.76 M/UL (ref 4.23–5.6)
SAO2 % BLDV: 47.9 % (ref 65–88)
SERVICE CMNT-IMP: ABNORMAL
SODIUM SERPL-SCNC: 131 MMOL/L (ref 136–145)
SPECIMEN TYPE: ABNORMAL
TROPONIN T SERPL HS-MCNC: 1128 NG/L (ref 0–22)
TROPONIN T SERPL HS-MCNC: 1141 NG/L (ref 0–22)
WBC # BLD AUTO: 11 K/UL (ref 4.3–11.1)

## 2025-08-09 PROCEDURE — 71045 X-RAY EXAM CHEST 1 VIEW: CPT

## 2025-08-09 PROCEDURE — 2700000000 HC OXYGEN THERAPY PER DAY

## 2025-08-09 PROCEDURE — 6370000000 HC RX 637 (ALT 250 FOR IP): Performed by: EMERGENCY MEDICINE

## 2025-08-09 PROCEDURE — 6360000002 HC RX W HCPCS: Performed by: EMERGENCY MEDICINE

## 2025-08-09 PROCEDURE — 94640 AIRWAY INHALATION TREATMENT: CPT

## 2025-08-09 PROCEDURE — 84484 ASSAY OF TROPONIN QUANT: CPT

## 2025-08-09 PROCEDURE — 80053 COMPREHEN METABOLIC PANEL: CPT

## 2025-08-09 PROCEDURE — 96375 TX/PRO/DX INJ NEW DRUG ADDON: CPT

## 2025-08-09 PROCEDURE — 99291 CRITICAL CARE FIRST HOUR: CPT

## 2025-08-09 PROCEDURE — 82803 BLOOD GASES ANY COMBINATION: CPT

## 2025-08-09 PROCEDURE — 94644 CONT INHLJ TX 1ST HOUR: CPT

## 2025-08-09 PROCEDURE — 71250 CT THORAX DX C-: CPT

## 2025-08-09 PROCEDURE — 5A0945A ASSISTANCE WITH RESPIRATORY VENTILATION, 24-96 CONSECUTIVE HOURS, HIGH NASAL FLOW/VELOCITY: ICD-10-PCS | Performed by: FAMILY MEDICINE

## 2025-08-09 PROCEDURE — 83735 ASSAY OF MAGNESIUM: CPT

## 2025-08-09 PROCEDURE — 2580000003 HC RX 258: Performed by: EMERGENCY MEDICINE

## 2025-08-09 PROCEDURE — 85025 COMPLETE CBC W/AUTO DIFF WBC: CPT

## 2025-08-09 PROCEDURE — 2000000000 HC ICU R&B

## 2025-08-09 PROCEDURE — 93005 ELECTROCARDIOGRAM TRACING: CPT | Performed by: EMERGENCY MEDICINE

## 2025-08-09 PROCEDURE — 96361 HYDRATE IV INFUSION ADD-ON: CPT

## 2025-08-09 PROCEDURE — 83880 ASSAY OF NATRIURETIC PEPTIDE: CPT

## 2025-08-09 PROCEDURE — 84145 PROCALCITONIN (PCT): CPT

## 2025-08-09 PROCEDURE — 87040 BLOOD CULTURE FOR BACTERIA: CPT

## 2025-08-09 PROCEDURE — 96365 THER/PROPH/DIAG IV INF INIT: CPT

## 2025-08-09 PROCEDURE — 83605 ASSAY OF LACTIC ACID: CPT

## 2025-08-09 RX ORDER — DEXAMETHASONE SODIUM PHOSPHATE 10 MG/ML
10 INJECTION, SOLUTION INTRA-ARTICULAR; INTRALESIONAL; INTRAMUSCULAR; INTRAVENOUS; SOFT TISSUE
Status: COMPLETED | OUTPATIENT
Start: 2025-08-09 | End: 2025-08-09

## 2025-08-09 RX ORDER — ONDANSETRON 2 MG/ML
4 INJECTION INTRAMUSCULAR; INTRAVENOUS EVERY 6 HOURS PRN
Status: DISCONTINUED | OUTPATIENT
Start: 2025-08-09 | End: 2025-08-10

## 2025-08-09 RX ORDER — LOSARTAN POTASSIUM AND HYDROCHLOROTHIAZIDE 12.5; 5 MG/1; MG/1
1 TABLET ORAL DAILY
Status: DISCONTINUED | OUTPATIENT
Start: 2025-08-10 | End: 2025-08-10

## 2025-08-09 RX ORDER — ONDANSETRON 4 MG/1
4 TABLET, ORALLY DISINTEGRATING ORAL EVERY 8 HOURS PRN
Status: DISCONTINUED | OUTPATIENT
Start: 2025-08-09 | End: 2025-08-10

## 2025-08-09 RX ORDER — IPRATROPIUM BROMIDE AND ALBUTEROL SULFATE 2.5; .5 MG/3ML; MG/3ML
1 SOLUTION RESPIRATORY (INHALATION)
Status: DISCONTINUED | OUTPATIENT
Start: 2025-08-10 | End: 2025-08-10

## 2025-08-09 RX ORDER — ROSUVASTATIN CALCIUM 20 MG/1
20 TABLET, COATED ORAL DAILY
Status: DISCONTINUED | OUTPATIENT
Start: 2025-08-10 | End: 2025-08-10

## 2025-08-09 RX ORDER — LEVOTHYROXINE SODIUM 88 UG/1
88 TABLET ORAL DAILY
Status: DISCONTINUED | OUTPATIENT
Start: 2025-08-10 | End: 2025-08-12 | Stop reason: HOSPADM

## 2025-08-09 RX ORDER — SODIUM CHLORIDE 9 MG/ML
INJECTION, SOLUTION INTRAVENOUS PRN
Status: DISCONTINUED | OUTPATIENT
Start: 2025-08-09 | End: 2025-08-12 | Stop reason: HOSPADM

## 2025-08-09 RX ORDER — IPRATROPIUM BROMIDE AND ALBUTEROL SULFATE 2.5; .5 MG/3ML; MG/3ML
1 SOLUTION RESPIRATORY (INHALATION)
Status: COMPLETED | OUTPATIENT
Start: 2025-08-09 | End: 2025-08-09

## 2025-08-09 RX ORDER — SODIUM CHLORIDE 0.9 % (FLUSH) 0.9 %
5-40 SYRINGE (ML) INJECTION EVERY 12 HOURS SCHEDULED
Status: DISCONTINUED | OUTPATIENT
Start: 2025-08-10 | End: 2025-08-12 | Stop reason: HOSPADM

## 2025-08-09 RX ORDER — POLYETHYLENE GLYCOL 3350 17 G/17G
17 POWDER, FOR SOLUTION ORAL DAILY PRN
Status: DISCONTINUED | OUTPATIENT
Start: 2025-08-09 | End: 2025-08-12 | Stop reason: HOSPADM

## 2025-08-09 RX ORDER — ACETAMINOPHEN 650 MG/1
650 SUPPOSITORY RECTAL EVERY 6 HOURS PRN
Status: DISCONTINUED | OUTPATIENT
Start: 2025-08-09 | End: 2025-08-12 | Stop reason: HOSPADM

## 2025-08-09 RX ORDER — NOREPINEPHRINE BITARTRATE 0.03 MG/ML
1-100 INJECTION, SOLUTION INTRAVENOUS CONTINUOUS
Status: DISCONTINUED | OUTPATIENT
Start: 2025-08-10 | End: 2025-08-11

## 2025-08-09 RX ORDER — HEPARIN SODIUM 5000 [USP'U]/ML
5000 INJECTION, SOLUTION INTRAVENOUS; SUBCUTANEOUS EVERY 8 HOURS SCHEDULED
Status: DISCONTINUED | OUTPATIENT
Start: 2025-08-10 | End: 2025-08-12 | Stop reason: HOSPADM

## 2025-08-09 RX ORDER — PANTOPRAZOLE SODIUM 40 MG/1
40 TABLET, DELAYED RELEASE ORAL
Status: DISCONTINUED | OUTPATIENT
Start: 2025-08-10 | End: 2025-08-12 | Stop reason: HOSPADM

## 2025-08-09 RX ORDER — GUAIFENESIN 600 MG/1
600 TABLET, EXTENDED RELEASE ORAL 2 TIMES DAILY
Status: DISCONTINUED | OUTPATIENT
Start: 2025-08-10 | End: 2025-08-12 | Stop reason: HOSPADM

## 2025-08-09 RX ORDER — 0.9 % SODIUM CHLORIDE 0.9 %
500 INTRAVENOUS SOLUTION INTRAVENOUS ONCE
Status: COMPLETED | OUTPATIENT
Start: 2025-08-09 | End: 2025-08-09

## 2025-08-09 RX ORDER — SODIUM CHLORIDE 9 MG/ML
INJECTION, SOLUTION INTRAVENOUS CONTINUOUS
Status: ACTIVE | OUTPATIENT
Start: 2025-08-10 | End: 2025-08-11

## 2025-08-09 RX ORDER — ACETAMINOPHEN 325 MG/1
650 TABLET ORAL EVERY 6 HOURS PRN
Status: DISCONTINUED | OUTPATIENT
Start: 2025-08-09 | End: 2025-08-12 | Stop reason: HOSPADM

## 2025-08-09 RX ORDER — ALBUTEROL SULFATE 0.83 MG/ML
10 SOLUTION RESPIRATORY (INHALATION)
Status: COMPLETED | OUTPATIENT
Start: 2025-08-09 | End: 2025-08-09

## 2025-08-09 RX ORDER — SODIUM CHLORIDE, SODIUM LACTATE, POTASSIUM CHLORIDE, AND CALCIUM CHLORIDE .6; .31; .03; .02 G/100ML; G/100ML; G/100ML; G/100ML
1000 INJECTION, SOLUTION INTRAVENOUS ONCE
Status: COMPLETED | OUTPATIENT
Start: 2025-08-09 | End: 2025-08-09

## 2025-08-09 RX ORDER — SODIUM CHLORIDE 0.9 % (FLUSH) 0.9 %
5-40 SYRINGE (ML) INJECTION PRN
Status: DISCONTINUED | OUTPATIENT
Start: 2025-08-09 | End: 2025-08-12 | Stop reason: HOSPADM

## 2025-08-09 RX ORDER — CITALOPRAM HYDROBROMIDE 20 MG/1
40 TABLET ORAL EVERY EVENING
Status: DISCONTINUED | OUTPATIENT
Start: 2025-08-10 | End: 2025-08-12 | Stop reason: HOSPADM

## 2025-08-09 RX ADMIN — PIPERACILLIN AND TAZOBACTAM 4500 MG: 4; .5 INJECTION, POWDER, FOR SOLUTION INTRAVENOUS at 20:40

## 2025-08-09 RX ADMIN — SODIUM CHLORIDE 500 ML: 0.9 INJECTION, SOLUTION INTRAVENOUS at 21:14

## 2025-08-09 RX ADMIN — ALBUTEROL SULFATE 10 MG/HR: 2.5 SOLUTION RESPIRATORY (INHALATION) at 20:28

## 2025-08-09 RX ADMIN — DEXAMETHASONE SODIUM PHOSPHATE 10 MG: 10 INJECTION INTRAMUSCULAR; INTRAVENOUS at 20:40

## 2025-08-09 RX ADMIN — IPRATROPIUM BROMIDE AND ALBUTEROL SULFATE 1 DOSE: .5; 3 SOLUTION RESPIRATORY (INHALATION) at 20:21

## 2025-08-09 RX ADMIN — SODIUM CHLORIDE, SODIUM LACTATE, POTASSIUM CHLORIDE, AND CALCIUM CHLORIDE 1000 ML: .6; .31; .03; .02 INJECTION, SOLUTION INTRAVENOUS at 22:13

## 2025-08-09 ASSESSMENT — LIFESTYLE VARIABLES
HOW OFTEN DO YOU HAVE A DRINK CONTAINING ALCOHOL: NEVER
HOW MANY STANDARD DRINKS CONTAINING ALCOHOL DO YOU HAVE ON A TYPICAL DAY: PATIENT DOES NOT DRINK

## 2025-08-09 ASSESSMENT — PAIN SCALES - GENERAL
PAINLEVEL_OUTOF10: 0
PAINLEVEL_OUTOF10: 0

## 2025-08-10 ENCOUNTER — APPOINTMENT (OUTPATIENT)
Dept: NON INVASIVE DIAGNOSTICS | Age: 70
DRG: 871 | End: 2025-08-10
Attending: INTERNAL MEDICINE
Payer: MEDICARE

## 2025-08-10 PROBLEM — R06.02 SHORTNESS OF BREATH: Status: ACTIVE | Noted: 2025-08-10

## 2025-08-10 PROBLEM — R91.8 BILATERAL PULMONARY INFILTRATES: Status: ACTIVE | Noted: 2025-08-10

## 2025-08-10 LAB
ALBUMIN SERPL-MCNC: 3.1 G/DL (ref 3.2–4.6)
ALBUMIN/GLOB SERPL: 0.9 (ref 1–1.9)
ALP SERPL-CCNC: 57 U/L (ref 40–129)
ALT SERPL-CCNC: 179 U/L (ref 8–55)
ANION GAP SERPL CALC-SCNC: 13 MMOL/L (ref 7–16)
AST SERPL-CCNC: 187 U/L (ref 15–37)
BASOPHILS # BLD: 0.01 K/UL (ref 0–0.2)
BASOPHILS NFR BLD: 0.1 % (ref 0–2)
BILIRUB SERPL-MCNC: 0.5 MG/DL (ref 0–1.2)
BUN SERPL-MCNC: 49 MG/DL (ref 8–23)
CALCIUM SERPL-MCNC: 8 MG/DL (ref 8.8–10.2)
CHLORIDE SERPL-SCNC: 92 MMOL/L (ref 98–107)
CO2 SERPL-SCNC: 23 MMOL/L (ref 20–29)
CREAT SERPL-MCNC: 3.25 MG/DL (ref 0.8–1.3)
DIFFERENTIAL METHOD BLD: ABNORMAL
ECHO AO ASC DIAM: 3.4 CM
ECHO AO ASCENDING AORTA INDEX: 1.5 CM/M2
ECHO AO ROOT DIAM: 3.7 CM
ECHO AO ROOT INDEX: 1.64 CM/M2
ECHO AV AREA PEAK VELOCITY: 3.7 CM2
ECHO AV AREA VTI: 4 CM2
ECHO AV AREA/BSA PEAK VELOCITY: 1.6 CM2/M2
ECHO AV AREA/BSA VTI: 1.8 CM2/M2
ECHO AV MEAN GRADIENT: 6 MMHG
ECHO AV MEAN VELOCITY: 1.1 M/S
ECHO AV PEAK GRADIENT: 10 MMHG
ECHO AV PEAK VELOCITY: 1.6 M/S
ECHO AV VELOCITY RATIO: 0.94
ECHO AV VTI: 29.5 CM
ECHO BSA: 2.36 M2
ECHO EST RA PRESSURE: 3 MMHG
ECHO IVC EXP: 1.9 CM
ECHO LA AREA 2C: 27.4 CM2
ECHO LA AREA 4C: 25.8 CM2
ECHO LA DIAMETER INDEX: 1.86 CM/M2
ECHO LA DIAMETER: 4.2 CM
ECHO LA MAJOR AXIS: 6.4 CM
ECHO LA MINOR AXIS: 6.2 CM
ECHO LA TO AORTIC ROOT RATIO: 1.14
ECHO LA VOL BP: 93 ML (ref 18–58)
ECHO LA VOL MOD A2C: 100 ML (ref 18–58)
ECHO LA VOL MOD A4C: 85 ML (ref 18–58)
ECHO LA VOL/BSA BIPLANE: 41 ML/M2 (ref 16–34)
ECHO LA VOLUME INDEX MOD A2C: 44 ML/M2 (ref 16–34)
ECHO LA VOLUME INDEX MOD A4C: 38 ML/M2 (ref 16–34)
ECHO LV E' LATERAL VELOCITY: 6.48 CM/S
ECHO LV E' SEPTAL VELOCITY: 7.06 CM/S
ECHO LV EDV A2C: 134 ML
ECHO LV EDV A4C: 142 ML
ECHO LV EDV INDEX A4C: 63 ML/M2
ECHO LV EDV NDEX A2C: 59 ML/M2
ECHO LV EF PHYSICIAN: 50 %
ECHO LV EJECTION FRACTION A2C: 46 %
ECHO LV EJECTION FRACTION A4C: 49 %
ECHO LV EJECTION FRACTION BIPLANE: 49 % (ref 55–100)
ECHO LV ESV A2C: 73 ML
ECHO LV ESV A4C: 73 ML
ECHO LV ESV INDEX A2C: 32 ML/M2
ECHO LV ESV INDEX A4C: 32 ML/M2
ECHO LV FRACTIONAL SHORTENING: 26 % (ref 28–44)
ECHO LV INTERNAL DIMENSION DIASTOLE INDEX: 2.21 CM/M2
ECHO LV INTERNAL DIMENSION DIASTOLIC: 5 CM (ref 4.2–5.9)
ECHO LV INTERNAL DIMENSION SYSTOLIC INDEX: 1.64 CM/M2
ECHO LV INTERNAL DIMENSION SYSTOLIC: 3.7 CM
ECHO LV IVSD: 1 CM (ref 0.6–1)
ECHO LV MASS 2D: 182 G (ref 88–224)
ECHO LV MASS INDEX 2D: 80.5 G/M2 (ref 49–115)
ECHO LV POSTERIOR WALL DIASTOLIC: 1 CM (ref 0.6–1)
ECHO LV RELATIVE WALL THICKNESS RATIO: 0.4
ECHO LVOT AREA: 4.2 CM2
ECHO LVOT AV VTI INDEX: 0.97
ECHO LVOT DIAM: 2.3 CM
ECHO LVOT MEAN GRADIENT: 4 MMHG
ECHO LVOT PEAK GRADIENT: 8 MMHG
ECHO LVOT PEAK VELOCITY: 1.5 M/S
ECHO LVOT STROKE VOLUME INDEX: 52.7 ML/M2
ECHO LVOT SV: 119.2 ML
ECHO LVOT VTI: 28.7 CM
ECHO MV A VELOCITY: 1.18 M/S
ECHO MV AREA VTI: 3.9 CM2
ECHO MV E DECELERATION TIME (DT): 220 MS
ECHO MV E VELOCITY: 1.02 M/S
ECHO MV E/A RATIO: 0.86
ECHO MV E/E' LATERAL: 15.74
ECHO MV E/E' RATIO (AVERAGED): 15.09
ECHO MV E/E' SEPTAL: 14.45
ECHO MV LVOT VTI INDEX: 1.06
ECHO MV MAX VELOCITY: 1.2 M/S
ECHO MV MEAN GRADIENT: 2 MMHG
ECHO MV MEAN VELOCITY: 0.7 M/S
ECHO MV PEAK GRADIENT: 5 MMHG
ECHO MV VTI: 30.3 CM
ECHO PV ACCELERATION TIME (AT): 81 MS
ECHO PV MAX VELOCITY: 1 M/S
ECHO PV PEAK GRADIENT: 4 MMHG
ECHO RV BASAL DIMENSION: 4.2 CM
ECHO RV FREE WALL PEAK S': 13.3 CM/S
ECHO RV TAPSE: 2.4 CM (ref 1.7–?)
EKG ATRIAL RATE: 93 BPM
EKG DIAGNOSIS: NORMAL
EKG P AXIS: -20 DEGREES
EKG P-R INTERVAL: 187 MS
EKG Q-T INTERVAL: 373 MS
EKG QRS DURATION: 116 MS
EKG QTC CALCULATION (BAZETT): 467 MS
EKG R AXIS: -43 DEGREES
EKG T AXIS: 88 DEGREES
EKG VENTRICULAR RATE: 94 BPM
EOSINOPHIL # BLD: 0 K/UL (ref 0–0.8)
EOSINOPHIL NFR BLD: 0 % (ref 0.5–7.8)
ERYTHROCYTE [DISTWIDTH] IN BLOOD BY AUTOMATED COUNT: 15.4 % (ref 11.9–14.6)
GLOBULIN SER CALC-MCNC: 3.4 G/DL (ref 2.3–3.5)
GLUCOSE BLD STRIP.AUTO-MCNC: 132 MG/DL (ref 65–100)
GLUCOSE BLD STRIP.AUTO-MCNC: 149 MG/DL (ref 65–100)
GLUCOSE BLD STRIP.AUTO-MCNC: 204 MG/DL (ref 65–100)
GLUCOSE BLD STRIP.AUTO-MCNC: 204 MG/DL (ref 65–100)
GLUCOSE SERPL-MCNC: 214 MG/DL (ref 70–99)
HCT VFR BLD AUTO: 26.4 % (ref 41.1–50.3)
HGB BLD-MCNC: 7.9 G/DL (ref 13.6–17.2)
IMM GRANULOCYTES # BLD AUTO: 0.04 K/UL (ref 0–0.5)
IMM GRANULOCYTES NFR BLD AUTO: 0.3 % (ref 0–5)
LACTATE SERPL-SCNC: 1.7 MMOL/L (ref 0.5–2)
LACTATE SERPL-SCNC: 2.2 MMOL/L (ref 0.5–2)
LACTATE SERPL-SCNC: 2.3 MMOL/L (ref 0.5–2)
LYMPHOCYTES # BLD: 0.16 K/UL (ref 0.5–4.6)
LYMPHOCYTES NFR BLD: 1.4 % (ref 13–44)
MCH RBC QN AUTO: 23.4 PG (ref 26.1–32.9)
MCHC RBC AUTO-ENTMCNC: 29.9 G/DL (ref 31.4–35)
MCV RBC AUTO: 78.1 FL (ref 82–102)
MONOCYTES # BLD: 0.21 K/UL (ref 0.1–1.3)
MONOCYTES NFR BLD: 1.8 % (ref 4–12)
MRSA DNA SPEC QL NAA+PROBE: NOT DETECTED
NEUTS SEG # BLD: 11.38 K/UL (ref 1.7–8.2)
NEUTS SEG NFR BLD: 96.4 % (ref 43–78)
NRBC # BLD: 0 K/UL (ref 0–0.2)
PLATELET # BLD AUTO: 200 K/UL (ref 150–450)
PMV BLD AUTO: 9.1 FL (ref 9.4–12.3)
POTASSIUM SERPL-SCNC: 4.6 MMOL/L (ref 3.5–5.1)
PROT SERPL-MCNC: 6.5 G/DL (ref 6.3–8.2)
RBC # BLD AUTO: 3.38 M/UL (ref 4.23–5.6)
S AUREUS CPE NOSE QL NAA+PROBE: NOT DETECTED
SERVICE CMNT-IMP: ABNORMAL
SODIUM SERPL-SCNC: 128 MMOL/L (ref 136–145)
TROPONIN T SERPL HS-MCNC: 594 NG/L (ref 0–22)
TROPONIN T SERPL HS-MCNC: 668 NG/L (ref 0–22)
TROPONIN T SERPL HS-MCNC: 907 NG/L (ref 0–22)
WBC # BLD AUTO: 11.8 K/UL (ref 4.3–11.1)

## 2025-08-10 PROCEDURE — 6370000000 HC RX 637 (ALT 250 FOR IP): Performed by: INTERNAL MEDICINE

## 2025-08-10 PROCEDURE — 6370000000 HC RX 637 (ALT 250 FOR IP): Performed by: FAMILY MEDICINE

## 2025-08-10 PROCEDURE — 87070 CULTURE OTHR SPECIMN AEROBIC: CPT

## 2025-08-10 PROCEDURE — 87205 SMEAR GRAM STAIN: CPT

## 2025-08-10 PROCEDURE — 99223 1ST HOSP IP/OBS HIGH 75: CPT | Performed by: INTERNAL MEDICINE

## 2025-08-10 PROCEDURE — 80053 COMPREHEN METABOLIC PANEL: CPT

## 2025-08-10 PROCEDURE — 94761 N-INVAS EAR/PLS OXIMETRY MLT: CPT

## 2025-08-10 PROCEDURE — 6360000002 HC RX W HCPCS: Performed by: FAMILY MEDICINE

## 2025-08-10 PROCEDURE — 85025 COMPLETE CBC W/AUTO DIFF WBC: CPT

## 2025-08-10 PROCEDURE — 6360000004 HC RX CONTRAST MEDICATION

## 2025-08-10 PROCEDURE — 84484 ASSAY OF TROPONIN QUANT: CPT

## 2025-08-10 PROCEDURE — 2500000003 HC RX 250 WO HCPCS: Performed by: FAMILY MEDICINE

## 2025-08-10 PROCEDURE — 93010 ELECTROCARDIOGRAM REPORT: CPT | Performed by: INTERNAL MEDICINE

## 2025-08-10 PROCEDURE — 93306 TTE W/DOPPLER COMPLETE: CPT | Performed by: INTERNAL MEDICINE

## 2025-08-10 PROCEDURE — 82962 GLUCOSE BLOOD TEST: CPT

## 2025-08-10 PROCEDURE — 2580000003 HC RX 258: Performed by: FAMILY MEDICINE

## 2025-08-10 PROCEDURE — 87641 MR-STAPH DNA AMP PROBE: CPT

## 2025-08-10 PROCEDURE — C8929 TTE W OR WO FOL WCON,DOPPLER: HCPCS

## 2025-08-10 PROCEDURE — 36415 COLL VENOUS BLD VENIPUNCTURE: CPT

## 2025-08-10 PROCEDURE — 2700000000 HC OXYGEN THERAPY PER DAY

## 2025-08-10 PROCEDURE — 2000000000 HC ICU R&B

## 2025-08-10 PROCEDURE — 94640 AIRWAY INHALATION TREATMENT: CPT

## 2025-08-10 PROCEDURE — 83605 ASSAY OF LACTIC ACID: CPT

## 2025-08-10 RX ORDER — ATORVASTATIN CALCIUM 40 MG/1
40 TABLET, FILM COATED ORAL NIGHTLY
Status: DISCONTINUED | OUTPATIENT
Start: 2025-08-10 | End: 2025-08-12 | Stop reason: HOSPADM

## 2025-08-10 RX ORDER — IPRATROPIUM BROMIDE AND ALBUTEROL SULFATE 2.5; .5 MG/3ML; MG/3ML
1 SOLUTION RESPIRATORY (INHALATION)
Status: DISCONTINUED | OUTPATIENT
Start: 2025-08-10 | End: 2025-08-12 | Stop reason: HOSPADM

## 2025-08-10 RX ORDER — INSULIN LISPRO 100 [IU]/ML
0-8 INJECTION, SOLUTION INTRAVENOUS; SUBCUTANEOUS
Status: DISCONTINUED | OUTPATIENT
Start: 2025-08-10 | End: 2025-08-12 | Stop reason: HOSPADM

## 2025-08-10 RX ORDER — DEXTROSE MONOHYDRATE 100 MG/ML
INJECTION, SOLUTION INTRAVENOUS CONTINUOUS PRN
Status: DISCONTINUED | OUTPATIENT
Start: 2025-08-10 | End: 2025-08-12 | Stop reason: HOSPADM

## 2025-08-10 RX ORDER — IBUPROFEN 600 MG/1
1 TABLET ORAL PRN
Status: DISCONTINUED | OUTPATIENT
Start: 2025-08-10 | End: 2025-08-12 | Stop reason: HOSPADM

## 2025-08-10 RX ORDER — ASPIRIN 81 MG/1
81 TABLET, CHEWABLE ORAL DAILY
Status: DISCONTINUED | OUTPATIENT
Start: 2025-08-10 | End: 2025-08-12 | Stop reason: HOSPADM

## 2025-08-10 RX ADMIN — HEPARIN SODIUM 5000 UNITS: 5000 INJECTION INTRAVENOUS; SUBCUTANEOUS at 22:10

## 2025-08-10 RX ADMIN — INSULIN LISPRO 2 UNITS: 100 INJECTION, SOLUTION INTRAVENOUS; SUBCUTANEOUS at 08:30

## 2025-08-10 RX ADMIN — SULFUR HEXAFLUORIDE 4 ML: KIT at 10:40

## 2025-08-10 RX ADMIN — HEPARIN SODIUM 5000 UNITS: 5000 INJECTION INTRAVENOUS; SUBCUTANEOUS at 00:51

## 2025-08-10 RX ADMIN — ATORVASTATIN CALCIUM 40 MG: 40 TABLET, FILM COATED ORAL at 20:34

## 2025-08-10 RX ADMIN — HEPARIN SODIUM 5000 UNITS: 5000 INJECTION INTRAVENOUS; SUBCUTANEOUS at 13:09

## 2025-08-10 RX ADMIN — IPRATROPIUM BROMIDE AND ALBUTEROL SULFATE 1 DOSE: .5; 3 SOLUTION RESPIRATORY (INHALATION) at 01:03

## 2025-08-10 RX ADMIN — GUAIFENESIN 600 MG: 600 TABLET ORAL at 00:51

## 2025-08-10 RX ADMIN — GUAIFENESIN 600 MG: 600 TABLET ORAL at 20:34

## 2025-08-10 RX ADMIN — SODIUM CHLORIDE, PRESERVATIVE FREE 10 ML: 5 INJECTION INTRAVENOUS at 20:34

## 2025-08-10 RX ADMIN — HEPARIN SODIUM 5000 UNITS: 5000 INJECTION INTRAVENOUS; SUBCUTANEOUS at 06:06

## 2025-08-10 RX ADMIN — IPRATROPIUM BROMIDE AND ALBUTEROL SULFATE 1 DOSE: .5; 3 SOLUTION RESPIRATORY (INHALATION) at 08:09

## 2025-08-10 RX ADMIN — IPRATROPIUM BROMIDE AND ALBUTEROL SULFATE 1 DOSE: 2.5; .5 SOLUTION RESPIRATORY (INHALATION) at 19:29

## 2025-08-10 RX ADMIN — Medication 2500 MG: at 00:45

## 2025-08-10 RX ADMIN — WATER 2000 MG: 1 INJECTION INTRAMUSCULAR; INTRAVENOUS; SUBCUTANEOUS at 03:31

## 2025-08-10 RX ADMIN — ASPIRIN 81 MG: 81 TABLET, CHEWABLE ORAL at 13:09

## 2025-08-10 RX ADMIN — GUAIFENESIN 600 MG: 600 TABLET ORAL at 08:30

## 2025-08-10 RX ADMIN — INSULIN LISPRO 2 UNITS: 100 INJECTION, SOLUTION INTRAVENOUS; SUBCUTANEOUS at 11:29

## 2025-08-10 RX ADMIN — CEFEPIME 1000 MG: 1 INJECTION, POWDER, FOR SOLUTION INTRAMUSCULAR; INTRAVENOUS at 15:12

## 2025-08-10 RX ADMIN — CITALOPRAM HYDROBROMIDE 40 MG: 20 TABLET ORAL at 17:22

## 2025-08-10 RX ADMIN — IPRATROPIUM BROMIDE AND ALBUTEROL SULFATE 1 DOSE: 2.5; .5 SOLUTION RESPIRATORY (INHALATION) at 13:08

## 2025-08-10 RX ADMIN — SODIUM CHLORIDE, PRESERVATIVE FREE 10 ML: 5 INJECTION INTRAVENOUS at 08:32

## 2025-08-10 RX ADMIN — LEVOTHYROXINE SODIUM 88 MCG: 0.09 TABLET ORAL at 06:06

## 2025-08-10 RX ADMIN — CITALOPRAM HYDROBROMIDE 40 MG: 20 TABLET ORAL at 00:53

## 2025-08-10 RX ADMIN — PANTOPRAZOLE SODIUM 40 MG: 40 TABLET, DELAYED RELEASE ORAL at 06:06

## 2025-08-10 ASSESSMENT — PAIN SCALES - GENERAL
PAINLEVEL_OUTOF10: 0

## 2025-08-11 LAB
ALBUMIN SERPL-MCNC: 3.1 G/DL (ref 3.2–4.6)
ALBUMIN/GLOB SERPL: 0.9 (ref 1–1.9)
ALP SERPL-CCNC: 54 U/L (ref 40–129)
ALT SERPL-CCNC: 200 U/L (ref 8–55)
ANION GAP SERPL CALC-SCNC: 11 MMOL/L (ref 7–16)
AST SERPL-CCNC: 135 U/L (ref 15–37)
BACTERIA SPEC CULT: NORMAL
BASOPHILS # BLD: 0.02 K/UL (ref 0–0.2)
BASOPHILS NFR BLD: 0.3 % (ref 0–2)
BILIRUB SERPL-MCNC: 0.5 MG/DL (ref 0–1.2)
BUN SERPL-MCNC: 51 MG/DL (ref 8–23)
CALCIUM SERPL-MCNC: 8.6 MG/DL (ref 8.8–10.2)
CHLORIDE SERPL-SCNC: 96 MMOL/L (ref 98–107)
CHOLEST SERPL-MCNC: 101 MG/DL (ref 0–200)
CO2 SERPL-SCNC: 27 MMOL/L (ref 20–29)
CREAT SERPL-MCNC: 2.48 MG/DL (ref 0.8–1.3)
DIFFERENTIAL METHOD BLD: ABNORMAL
EOSINOPHIL # BLD: 0.04 K/UL (ref 0–0.8)
EOSINOPHIL NFR BLD: 0.6 % (ref 0.5–7.8)
ERYTHROCYTE [DISTWIDTH] IN BLOOD BY AUTOMATED COUNT: 15.2 % (ref 11.9–14.6)
EST. AVERAGE GLUCOSE BLD GHB EST-MCNC: 128 MG/DL
GLOBULIN SER CALC-MCNC: 3.3 G/DL (ref 2.3–3.5)
GLUCOSE BLD STRIP.AUTO-MCNC: 101 MG/DL (ref 65–100)
GLUCOSE BLD STRIP.AUTO-MCNC: 107 MG/DL (ref 65–100)
GLUCOSE BLD STRIP.AUTO-MCNC: 115 MG/DL (ref 65–100)
GLUCOSE BLD STRIP.AUTO-MCNC: 131 MG/DL (ref 65–100)
GLUCOSE SERPL-MCNC: 111 MG/DL (ref 70–99)
GRAM STN SPEC: NORMAL
HBA1C MFR BLD: 6.1 % (ref 0–5.6)
HCT VFR BLD AUTO: 27.1 % (ref 41.1–50.3)
HDLC SERPL-MCNC: 24 MG/DL (ref 40–60)
HDLC SERPL: 4.2 (ref 0–5)
HGB BLD-MCNC: 8.2 G/DL (ref 13.6–17.2)
IMM GRANULOCYTES # BLD AUTO: 0.01 K/UL (ref 0–0.5)
IMM GRANULOCYTES NFR BLD AUTO: 0.2 % (ref 0–5)
LDLC SERPL CALC-MCNC: 37 MG/DL (ref 0–100)
LYMPHOCYTES # BLD: 0.43 K/UL (ref 0.5–4.6)
LYMPHOCYTES NFR BLD: 6.6 % (ref 13–44)
MCH RBC QN AUTO: 23.6 PG (ref 26.1–32.9)
MCHC RBC AUTO-ENTMCNC: 30.3 G/DL (ref 31.4–35)
MCV RBC AUTO: 77.9 FL (ref 82–102)
MONOCYTES # BLD: 0.28 K/UL (ref 0.1–1.3)
MONOCYTES NFR BLD: 4.3 % (ref 4–12)
NEUTS SEG # BLD: 5.77 K/UL (ref 1.7–8.2)
NEUTS SEG NFR BLD: 88 % (ref 43–78)
NRBC # BLD: 0 K/UL (ref 0–0.2)
PLATELET # BLD AUTO: 167 K/UL (ref 150–450)
PMV BLD AUTO: 9.2 FL (ref 9.4–12.3)
POTASSIUM SERPL-SCNC: 4.2 MMOL/L (ref 3.5–5.1)
PROT SERPL-MCNC: 6.4 G/DL (ref 6.3–8.2)
RBC # BLD AUTO: 3.48 M/UL (ref 4.23–5.6)
SERVICE CMNT-IMP: ABNORMAL
SERVICE CMNT-IMP: NORMAL
SODIUM SERPL-SCNC: 134 MMOL/L (ref 136–145)
TRIGL SERPL-MCNC: 200 MG/DL (ref 0–150)
TROPONIN T SERPL HS-MCNC: 564 NG/L (ref 0–22)
TROPONIN T SERPL HS-MCNC: 567 NG/L (ref 0–22)
VLDLC SERPL CALC-MCNC: 40 MG/DL (ref 6–23)
WBC # BLD AUTO: 6.6 K/UL (ref 4.3–11.1)

## 2025-08-11 PROCEDURE — 80061 LIPID PANEL: CPT

## 2025-08-11 PROCEDURE — 85025 COMPLETE CBC W/AUTO DIFF WBC: CPT

## 2025-08-11 PROCEDURE — 6360000002 HC RX W HCPCS: Performed by: FAMILY MEDICINE

## 2025-08-11 PROCEDURE — 2500000003 HC RX 250 WO HCPCS: Performed by: FAMILY MEDICINE

## 2025-08-11 PROCEDURE — 2700000000 HC OXYGEN THERAPY PER DAY

## 2025-08-11 PROCEDURE — 2580000003 HC RX 258: Performed by: FAMILY MEDICINE

## 2025-08-11 PROCEDURE — 80053 COMPREHEN METABOLIC PANEL: CPT

## 2025-08-11 PROCEDURE — 6370000000 HC RX 637 (ALT 250 FOR IP): Performed by: FAMILY MEDICINE

## 2025-08-11 PROCEDURE — 82962 GLUCOSE BLOOD TEST: CPT

## 2025-08-11 PROCEDURE — 2000000000 HC ICU R&B

## 2025-08-11 PROCEDURE — 92610 EVALUATE SWALLOWING FUNCTION: CPT

## 2025-08-11 PROCEDURE — 83036 HEMOGLOBIN GLYCOSYLATED A1C: CPT

## 2025-08-11 PROCEDURE — 6370000000 HC RX 637 (ALT 250 FOR IP): Performed by: INTERNAL MEDICINE

## 2025-08-11 PROCEDURE — 94761 N-INVAS EAR/PLS OXIMETRY MLT: CPT

## 2025-08-11 PROCEDURE — 36415 COLL VENOUS BLD VENIPUNCTURE: CPT

## 2025-08-11 PROCEDURE — 99231 SBSQ HOSP IP/OBS SF/LOW 25: CPT | Performed by: INTERNAL MEDICINE

## 2025-08-11 PROCEDURE — 84484 ASSAY OF TROPONIN QUANT: CPT

## 2025-08-11 PROCEDURE — 94640 AIRWAY INHALATION TREATMENT: CPT

## 2025-08-11 PROCEDURE — 99232 SBSQ HOSP IP/OBS MODERATE 35: CPT | Performed by: INTERNAL MEDICINE

## 2025-08-11 RX ADMIN — HEPARIN SODIUM 5000 UNITS: 5000 INJECTION INTRAVENOUS; SUBCUTANEOUS at 06:42

## 2025-08-11 RX ADMIN — ATORVASTATIN CALCIUM 40 MG: 40 TABLET, FILM COATED ORAL at 20:56

## 2025-08-11 RX ADMIN — IPRATROPIUM BROMIDE AND ALBUTEROL SULFATE 1 DOSE: 2.5; .5 SOLUTION RESPIRATORY (INHALATION) at 14:55

## 2025-08-11 RX ADMIN — GUAIFENESIN 600 MG: 600 TABLET ORAL at 09:25

## 2025-08-11 RX ADMIN — CEFEPIME 1000 MG: 1 INJECTION, POWDER, FOR SOLUTION INTRAMUSCULAR; INTRAVENOUS at 03:30

## 2025-08-11 RX ADMIN — SODIUM CHLORIDE, PRESERVATIVE FREE 10 ML: 5 INJECTION INTRAVENOUS at 09:26

## 2025-08-11 RX ADMIN — CEFEPIME 1000 MG: 1 INJECTION, POWDER, FOR SOLUTION INTRAMUSCULAR; INTRAVENOUS at 15:03

## 2025-08-11 RX ADMIN — IPRATROPIUM BROMIDE AND ALBUTEROL SULFATE 1 DOSE: 2.5; .5 SOLUTION RESPIRATORY (INHALATION) at 20:36

## 2025-08-11 RX ADMIN — IPRATROPIUM BROMIDE AND ALBUTEROL SULFATE 1 DOSE: 2.5; .5 SOLUTION RESPIRATORY (INHALATION) at 08:24

## 2025-08-11 RX ADMIN — ACETAMINOPHEN 650 MG: 325 TABLET, FILM COATED ORAL at 14:57

## 2025-08-11 RX ADMIN — HEPARIN SODIUM 5000 UNITS: 5000 INJECTION INTRAVENOUS; SUBCUTANEOUS at 14:58

## 2025-08-11 RX ADMIN — LEVOTHYROXINE SODIUM 88 MCG: 0.09 TABLET ORAL at 06:43

## 2025-08-11 RX ADMIN — GUAIFENESIN 600 MG: 600 TABLET ORAL at 20:56

## 2025-08-11 RX ADMIN — ASPIRIN 81 MG: 81 TABLET, CHEWABLE ORAL at 09:25

## 2025-08-11 RX ADMIN — PANTOPRAZOLE SODIUM 40 MG: 40 TABLET, DELAYED RELEASE ORAL at 06:43

## 2025-08-11 RX ADMIN — HEPARIN SODIUM 5000 UNITS: 5000 INJECTION INTRAVENOUS; SUBCUTANEOUS at 22:18

## 2025-08-11 RX ADMIN — SODIUM CHLORIDE, PRESERVATIVE FREE 10 ML: 5 INJECTION INTRAVENOUS at 20:53

## 2025-08-11 RX ADMIN — CITALOPRAM HYDROBROMIDE 40 MG: 20 TABLET ORAL at 19:31

## 2025-08-11 ASSESSMENT — PAIN DESCRIPTION - LOCATION: LOCATION: HEAD

## 2025-08-11 ASSESSMENT — PAIN - FUNCTIONAL ASSESSMENT: PAIN_FUNCTIONAL_ASSESSMENT: 0-10

## 2025-08-11 ASSESSMENT — PAIN SCALES - GENERAL
PAINLEVEL_OUTOF10: 4
PAINLEVEL_OUTOF10: 0

## 2025-08-11 ASSESSMENT — PAIN DESCRIPTION - DESCRIPTORS: DESCRIPTORS: SHOOTING

## 2025-08-12 VITALS
WEIGHT: 241.4 LBS | HEART RATE: 73 BPM | RESPIRATION RATE: 13 BRPM | BODY MASS INDEX: 34.56 KG/M2 | SYSTOLIC BLOOD PRESSURE: 147 MMHG | DIASTOLIC BLOOD PRESSURE: 75 MMHG | HEIGHT: 70 IN | OXYGEN SATURATION: 97 % | TEMPERATURE: 97.4 F

## 2025-08-12 LAB
ALBUMIN SERPL-MCNC: 3.1 G/DL (ref 3.2–4.6)
ALBUMIN/GLOB SERPL: 0.9 (ref 1–1.9)
ALP SERPL-CCNC: 53 U/L (ref 40–129)
ALT SERPL-CCNC: 180 U/L (ref 8–55)
ANION GAP SERPL CALC-SCNC: 11 MMOL/L (ref 7–16)
AST SERPL-CCNC: 91 U/L (ref 15–37)
BASOPHILS # BLD: 0.01 K/UL (ref 0–0.2)
BASOPHILS NFR BLD: 0.2 % (ref 0–2)
BILIRUB SERPL-MCNC: 0.4 MG/DL (ref 0–1.2)
BUN SERPL-MCNC: 48 MG/DL (ref 8–23)
CALCIUM SERPL-MCNC: 9.3 MG/DL (ref 8.8–10.2)
CHLORIDE SERPL-SCNC: 97 MMOL/L (ref 98–107)
CO2 SERPL-SCNC: 27 MMOL/L (ref 20–29)
CREAT SERPL-MCNC: 2.22 MG/DL (ref 0.8–1.3)
DIFFERENTIAL METHOD BLD: ABNORMAL
EOSINOPHIL # BLD: 0.09 K/UL (ref 0–0.8)
EOSINOPHIL NFR BLD: 1.9 % (ref 0.5–7.8)
ERYTHROCYTE [DISTWIDTH] IN BLOOD BY AUTOMATED COUNT: 14.9 % (ref 11.9–14.6)
GLOBULIN SER CALC-MCNC: 3.6 G/DL (ref 2.3–3.5)
GLUCOSE BLD STRIP.AUTO-MCNC: 104 MG/DL (ref 65–100)
GLUCOSE SERPL-MCNC: 104 MG/DL (ref 70–99)
HCT VFR BLD AUTO: 27.5 % (ref 41.1–50.3)
HGB BLD-MCNC: 8.3 G/DL (ref 13.6–17.2)
IMM GRANULOCYTES # BLD AUTO: 0.01 K/UL (ref 0–0.5)
IMM GRANULOCYTES NFR BLD AUTO: 0.2 % (ref 0–5)
LYMPHOCYTES # BLD: 0.53 K/UL (ref 0.5–4.6)
LYMPHOCYTES NFR BLD: 11 % (ref 13–44)
MCH RBC QN AUTO: 23.8 PG (ref 26.1–32.9)
MCHC RBC AUTO-ENTMCNC: 30.2 G/DL (ref 31.4–35)
MCV RBC AUTO: 78.8 FL (ref 82–102)
MONOCYTES # BLD: 0.39 K/UL (ref 0.1–1.3)
MONOCYTES NFR BLD: 8.1 % (ref 4–12)
NEUTS SEG # BLD: 3.77 K/UL (ref 1.7–8.2)
NEUTS SEG NFR BLD: 78.6 % (ref 43–78)
NRBC # BLD: 0 K/UL (ref 0–0.2)
PLATELET # BLD AUTO: 184 K/UL (ref 150–450)
PMV BLD AUTO: 9.3 FL (ref 9.4–12.3)
POTASSIUM SERPL-SCNC: 4.3 MMOL/L (ref 3.5–5.1)
PROT SERPL-MCNC: 6.6 G/DL (ref 6.3–8.2)
RBC # BLD AUTO: 3.49 M/UL (ref 4.23–5.6)
SERVICE CMNT-IMP: ABNORMAL
SODIUM SERPL-SCNC: 135 MMOL/L (ref 136–145)
WBC # BLD AUTO: 4.8 K/UL (ref 4.3–11.1)

## 2025-08-12 PROCEDURE — 2500000003 HC RX 250 WO HCPCS: Performed by: FAMILY MEDICINE

## 2025-08-12 PROCEDURE — 6370000000 HC RX 637 (ALT 250 FOR IP): Performed by: INTERNAL MEDICINE

## 2025-08-12 PROCEDURE — 6370000000 HC RX 637 (ALT 250 FOR IP): Performed by: FAMILY MEDICINE

## 2025-08-12 PROCEDURE — 82962 GLUCOSE BLOOD TEST: CPT

## 2025-08-12 PROCEDURE — 87449 NOS EACH ORGANISM AG IA: CPT

## 2025-08-12 PROCEDURE — 2580000003 HC RX 258: Performed by: FAMILY MEDICINE

## 2025-08-12 PROCEDURE — 6360000002 HC RX W HCPCS: Performed by: FAMILY MEDICINE

## 2025-08-12 PROCEDURE — 99232 SBSQ HOSP IP/OBS MODERATE 35: CPT | Performed by: INTERNAL MEDICINE

## 2025-08-12 PROCEDURE — 94761 N-INVAS EAR/PLS OXIMETRY MLT: CPT

## 2025-08-12 PROCEDURE — 97161 PT EVAL LOW COMPLEX 20 MIN: CPT

## 2025-08-12 PROCEDURE — 36415 COLL VENOUS BLD VENIPUNCTURE: CPT

## 2025-08-12 PROCEDURE — 94640 AIRWAY INHALATION TREATMENT: CPT

## 2025-08-12 PROCEDURE — 2700000000 HC OXYGEN THERAPY PER DAY

## 2025-08-12 PROCEDURE — 97165 OT EVAL LOW COMPLEX 30 MIN: CPT

## 2025-08-12 PROCEDURE — 99231 SBSQ HOSP IP/OBS SF/LOW 25: CPT | Performed by: INTERNAL MEDICINE

## 2025-08-12 PROCEDURE — 85025 COMPLETE CBC W/AUTO DIFF WBC: CPT

## 2025-08-12 PROCEDURE — 80053 COMPREHEN METABOLIC PANEL: CPT

## 2025-08-12 PROCEDURE — 97530 THERAPEUTIC ACTIVITIES: CPT

## 2025-08-12 RX ORDER — GUAIFENESIN 600 MG/1
600 TABLET, EXTENDED RELEASE ORAL 2 TIMES DAILY
Qty: 14 TABLET | Refills: 0 | Status: SHIPPED | OUTPATIENT
Start: 2025-08-12 | End: 2025-08-19

## 2025-08-12 RX ORDER — CEFPODOXIME PROXETIL 200 MG/1
200 TABLET, FILM COATED ORAL 2 TIMES DAILY
Qty: 10 TABLET | Refills: 0 | Status: SHIPPED | OUTPATIENT
Start: 2025-08-12 | End: 2025-08-17

## 2025-08-12 RX ORDER — ASPIRIN 81 MG/1
81 TABLET, CHEWABLE ORAL DAILY
Qty: 30 TABLET | Refills: 0 | Status: SHIPPED | OUTPATIENT
Start: 2025-08-13 | End: 2025-09-12

## 2025-08-12 RX ADMIN — LEVOTHYROXINE SODIUM 88 MCG: 0.09 TABLET ORAL at 06:23

## 2025-08-12 RX ADMIN — CEFEPIME 1000 MG: 1 INJECTION, POWDER, FOR SOLUTION INTRAMUSCULAR; INTRAVENOUS at 03:05

## 2025-08-12 RX ADMIN — GUAIFENESIN 600 MG: 600 TABLET ORAL at 09:14

## 2025-08-12 RX ADMIN — ASPIRIN 81 MG: 81 TABLET, CHEWABLE ORAL at 09:14

## 2025-08-12 RX ADMIN — PANTOPRAZOLE SODIUM 40 MG: 40 TABLET, DELAYED RELEASE ORAL at 06:23

## 2025-08-12 RX ADMIN — HEPARIN SODIUM 5000 UNITS: 5000 INJECTION INTRAVENOUS; SUBCUTANEOUS at 06:24

## 2025-08-12 RX ADMIN — SODIUM CHLORIDE, PRESERVATIVE FREE 10 ML: 5 INJECTION INTRAVENOUS at 09:15

## 2025-08-12 RX ADMIN — IPRATROPIUM BROMIDE AND ALBUTEROL SULFATE 1 DOSE: 2.5; .5 SOLUTION RESPIRATORY (INHALATION) at 07:26

## 2025-08-12 ASSESSMENT — PAIN SCALES - GENERAL: PAINLEVEL_OUTOF10: 0

## 2025-08-14 LAB
BACTERIA SPEC CULT: NORMAL
BACTERIA SPEC CULT: NORMAL
L PNEUMO1 AG UR QL IA: NEGATIVE
SERVICE CMNT-IMP: NORMAL
SERVICE CMNT-IMP: NORMAL
SPECIMEN SOURCE: NORMAL

## (undated) DEVICE — KIT,ANTI FOG,W/SPONGE & FLUID,SOFT PACK: Brand: MEDLINE

## (undated) DEVICE — SIZER SURG NOVEL DSGN LEN DIAM MEAS ACCURACY LO COST

## (undated) DEVICE — CONTROL SYRINGE LUER-LOCK TIP: Brand: MONOJECT

## (undated) DEVICE — SYR LR LCK 1ML GRAD NSAF 30ML --

## (undated) DEVICE — AGENT HEMSTAT W1XL2IN ABSRB SFT LTWT LAYERED ORC FIBRILLAR

## (undated) DEVICE — KENDALL SCD EXPRESS SLEEVES, KNEE LENGTH, MEDIUM: Brand: KENDALL SCD

## (undated) DEVICE — MEDI-VAC YANKAUER SUCTION HANDLE W/BULBOUS TIP: Brand: CARDINAL HEALTH

## (undated) DEVICE — SOL ANTI-FOG 6ML MEDC -- MEDICHOICE - CONVERT TO 358427

## (undated) DEVICE — Z CONVERTED USE 2421973 CONTAINER SPEC 60/30ML 10% FRMLN POLYPR PREFIL

## (undated) DEVICE — PACKING 8004001 NEURAY 200PK 19X19MM: Brand: NEURAY ®

## (undated) DEVICE — CODMAN® SURGICAL PATTIES 1" X 3" (2.54CM X 7.62CM): Brand: CODMAN®

## (undated) DEVICE — FIBER LSR ENGINEERING HWG500

## (undated) DEVICE — SYSTEM INFL BLLN L40MM DIA16MM NONCOMPLIANT INTEGR STYL MAL

## (undated) DEVICE — KIT PROCEDURE SURG T AND A ORAL TOTE

## (undated) DEVICE — NDL PRT INJ NSAF BLNT 18GX1.5 --

## (undated) DEVICE — PACKING 8004003 NEURAY 200PK 25X25MM: Brand: NEURAY ®

## (undated) DEVICE — AMD ANTIMICROBIAL NON-ADHERENT PAD,0.2% POLYHEXAMETHYLENE BIGUANIDE HCI (PHMB): Brand: TELFA

## (undated) DEVICE — SUTURE PERMAHAND SZ 3-0 L18IN NONABSORBABLE BLK L26MM SH C013D

## (undated) DEVICE — 2000CC GUARDIAN II: Brand: GUARDIAN

## (undated) DEVICE — STERILE POLYISOPRENE POWDER-FREE SURGICAL GLOVES: Brand: PROTEXIS

## (undated) DEVICE — PAD,NON-ADHERENT,3X8,STERILE,LF,1/PK: Brand: MEDLINE

## (undated) DEVICE — DEVICE INFL BLLN FOR DEFLATION OF CATH ACCLARENT

## (undated) DEVICE — REM POLYHESIVE ADULT PATIENT RETURN ELECTRODE: Brand: VALLEYLAB

## (undated) DEVICE — SOLUTION IV 1000ML 0.9% SOD CHL

## (undated) DEVICE — Device

## (undated) DEVICE — COVER,MAYO STAND,STERILE: Brand: MEDLINE

## (undated) DEVICE — PAD,EYE,1-5/8X2 5/8,STERILE,LF,1/PK: Brand: MEDLINE

## (undated) DEVICE — TELFA NON-ADHERENT ABSORBENT DRESSING: Brand: TELFA

## (undated) DEVICE — SUT SLK 2-0SH 30IN BLK --

## (undated) DEVICE — KENDALL RADIOLUCENT FOAM MONITORING ELECTRODE RECTANGULAR SHAPE: Brand: KENDALL

## (undated) DEVICE — STRAP RESTRAINT DRSG MONT 4 HOLE

## (undated) DEVICE — SUTURE VCRL SZ 5-0 L18IN ABSRB UD P-3 L13MM 3/8 CIR PRIM J493H

## (undated) DEVICE — CONTAINER SPEC FRMLN 120ML --

## (undated) DEVICE — INSULATED BLADE ELECTRODE: Brand: EDGE

## (undated) DEVICE — SOLUTION IRRIG 1000ML H2O STRL BLT

## (undated) DEVICE — SINGLE USE BIOPSY VALVE MAJ-210: Brand: SINGLE USE BIOPSY VALVE (STERILE)

## (undated) DEVICE — BLADE, TONGUE, 6", STERILE: Brand: MEDLINE

## (undated) DEVICE — T AND A ORAL: Brand: MEDLINE INDUSTRIES, INC.

## (undated) DEVICE — TRAY PREP DRY W/ PREM GLV 2 APPL 6 SPNG 2 UNDPD 1 OVERWRAP

## (undated) DEVICE — YANKAUER,BULB TIP,W/O VENT,RIGID,STERILE: Brand: MEDLINE

## (undated) DEVICE — GARMENT,MEDLINE,DVT,INT,CALF,MED, GEN2: Brand: MEDLINE

## (undated) DEVICE — BUTTON SWITCH PENCIL BLADE ELECTRODE, HOLSTER: Brand: EDGE

## (undated) DEVICE — SINGLE USE SUCTION VALVE MAJ-209: Brand: SINGLE USE SUCTION VALVE (STERILE)

## (undated) DEVICE — DEVICE INFL PRSS G INDIC DISP (MUST BE PURC IN MULTIPLES OF 5)

## (undated) DEVICE — TUBING, SUCTION, 1/4" X 10', STRAIGHT: Brand: MEDLINE

## (undated) DEVICE — VINYL URETHRAL CATHETER: Brand: DOVER

## (undated) DEVICE — JELLY LUBRICATING 10GM PREFIL SYR LUBE

## (undated) DEVICE — KIT PROCEDURE SURG HEAD AND NECK TOTE

## (undated) DEVICE — STANDARD HYPODERMIC NEEDLE,POLYPROPYLENE HUB: Brand: MONOJECT

## (undated) DEVICE — SPONGE: SPECIALTY TONSIL XR MED 100/CS: Brand: MEDICAL ACTION INDUSTRIES

## (undated) DEVICE — DRAPE,TOP,102X53,STERILE: Brand: MEDLINE

## (undated) DEVICE — SOL IRRIGATION INJ NACL 0.9% 500ML BTL

## (undated) DEVICE — BRONCHOSCOPY PACK: Brand: MEDLINE INDUSTRIES, INC.

## (undated) DEVICE — BLOCK BITE AD 60FR W/ VELC STRP ADDRESSES MOST PT AND

## (undated) DEVICE — CANNULA NSL ORAL AD FOR CAPNOFLEX CO2 O2 AIRLFE

## (undated) DEVICE — TUBE VENT L18CM DEAD SPACE 30ML 12.5GM 15MM M/F CONN FOR

## (undated) DEVICE — MOUTHPIECE ENDOSCP 20X27MM --

## (undated) DEVICE — DRAPE TWL SURG 16X26IN BLU ORB04] ALLCARE INC]

## (undated) DEVICE — SPONGE LAP 18X18IN STRL -- 5/PK

## (undated) DEVICE — SHEET, DRAPE, SPLIT, STERILE: Brand: MEDLINE

## (undated) DEVICE — TUBE GASTROSTMY 24FR MED GRD SIL FEED GAM RECESS DST TIP

## (undated) DEVICE — 1200 GUARD II KIT W/5MM TUBE W/O VAC TUBE: Brand: GUARDIAN

## (undated) DEVICE — CONTAINER,SPECIMEN,O.R.STRL,4.5OZ: Brand: MEDLINE